# Patient Record
Sex: FEMALE | Race: WHITE | NOT HISPANIC OR LATINO | Employment: OTHER | URBAN - METROPOLITAN AREA
[De-identification: names, ages, dates, MRNs, and addresses within clinical notes are randomized per-mention and may not be internally consistent; named-entity substitution may affect disease eponyms.]

---

## 2017-01-18 DIAGNOSIS — G47.00 PERSISTENT INSOMNIA: ICD-10-CM

## 2017-01-18 DIAGNOSIS — G25.0 FAMILIAL TREMOR: Primary | ICD-10-CM

## 2017-01-19 NOTE — TELEPHONE ENCOUNTER
Pramipexole 0.125 mg     Last Written Prescription Date: 2/16/16  Last Fill Quantity: 180, # refills: 1  [place] [facility] or [facility] prescribing provider: 8/16/16        BP Readings from Last 3 Encounters:   08/16/16 110/66   08/01/16 106/60   06/24/16 140/70       Zolpidem 5 mg      Last Written Prescription Date:  11/29/16  Last Fill Quantity: 30,   # refills: 1  Last Office Visit with [facility] or [facility] prescribing provider: 8/16/16  Future Office visit:       Routing refill request to provider for review/approval because:  Drug not on the [facility], [facility] or [facility] refill protocol or controlled substance

## 2017-01-20 RX ORDER — PRAMIPEXOLE DIHYDROCHLORIDE 0.12 MG/1
TABLET ORAL
Qty: 180 TABLET | Refills: 1 | Status: SHIPPED | OUTPATIENT
Start: 2017-01-20 | End: 2017-07-17

## 2017-01-20 RX ORDER — ZOLPIDEM TARTRATE 5 MG/1
TABLET ORAL
Qty: 30 TABLET | Refills: 1 | Status: SHIPPED | OUTPATIENT
Start: 2017-01-20 | End: 2017-03-12

## 2017-01-27 ENCOUNTER — OFFICE VISIT (OUTPATIENT)
Dept: FAMILY MEDICINE | Facility: CLINIC | Age: 82
End: 2017-01-27
Payer: MEDICARE

## 2017-01-27 VITALS
SYSTOLIC BLOOD PRESSURE: 120 MMHG | BODY MASS INDEX: 21.83 KG/M2 | HEIGHT: 65 IN | DIASTOLIC BLOOD PRESSURE: 72 MMHG | RESPIRATION RATE: 18 BRPM | WEIGHT: 131 LBS | TEMPERATURE: 97.2 F | HEART RATE: 78 BPM

## 2017-01-27 DIAGNOSIS — R10.13 ABDOMINAL PAIN, EPIGASTRIC: ICD-10-CM

## 2017-01-27 DIAGNOSIS — R11.0 NAUSEA: Primary | ICD-10-CM

## 2017-01-27 DIAGNOSIS — R11.0 NAUSEA WITHOUT VOMITING: ICD-10-CM

## 2017-01-27 LAB
ALBUMIN SERPL-MCNC: 3.5 G/DL (ref 3.4–5)
ALP SERPL-CCNC: 100 U/L (ref 40–150)
ALT SERPL W P-5'-P-CCNC: 20 U/L (ref 0–50)
AMYLASE SERPL-CCNC: 23 U/L (ref 30–110)
ANION GAP SERPL CALCULATED.3IONS-SCNC: 7 MMOL/L (ref 3–14)
AST SERPL W P-5'-P-CCNC: 17 U/L (ref 0–45)
BILIRUB SERPL-MCNC: 0.3 MG/DL (ref 0.2–1.3)
BUN SERPL-MCNC: 12 MG/DL (ref 7–30)
CALCIUM SERPL-MCNC: 9.3 MG/DL (ref 8.5–10.1)
CHLORIDE SERPL-SCNC: 104 MMOL/L (ref 94–109)
CO2 SERPL-SCNC: 31 MMOL/L (ref 20–32)
CREAT SERPL-MCNC: 0.75 MG/DL (ref 0.52–1.04)
ERYTHROCYTE [DISTWIDTH] IN BLOOD BY AUTOMATED COUNT: 13.2 % (ref 10–15)
GFR SERPL CREATININE-BSD FRML MDRD: 73 ML/MIN/1.7M2
GLUCOSE SERPL-MCNC: 111 MG/DL (ref 70–99)
HCT VFR BLD AUTO: 45 % (ref 35–47)
HGB BLD-MCNC: 14.3 G/DL (ref 11.7–15.7)
LIPASE SERPL-CCNC: 102 U/L (ref 73–393)
MCH RBC QN AUTO: 31.6 PG (ref 26.5–33)
MCHC RBC AUTO-ENTMCNC: 31.8 G/DL (ref 31.5–36.5)
MCV RBC AUTO: 99 FL (ref 78–100)
PLATELET # BLD AUTO: 323 10E9/L (ref 150–450)
POTASSIUM SERPL-SCNC: 4.2 MMOL/L (ref 3.4–5.3)
PROT SERPL-MCNC: 7.5 G/DL (ref 6.8–8.8)
RBC # BLD AUTO: 4.53 10E12/L (ref 3.8–5.2)
SODIUM SERPL-SCNC: 142 MMOL/L (ref 133–144)
WBC # BLD AUTO: 12.3 10E9/L (ref 4–11)

## 2017-01-27 PROCEDURE — 99213 OFFICE O/P EST LOW 20 MIN: CPT | Performed by: FAMILY MEDICINE

## 2017-01-27 PROCEDURE — 36415 COLL VENOUS BLD VENIPUNCTURE: CPT | Performed by: FAMILY MEDICINE

## 2017-01-27 PROCEDURE — 83690 ASSAY OF LIPASE: CPT | Performed by: FAMILY MEDICINE

## 2017-01-27 PROCEDURE — 85027 COMPLETE CBC AUTOMATED: CPT | Performed by: FAMILY MEDICINE

## 2017-01-27 PROCEDURE — 80053 COMPREHEN METABOLIC PANEL: CPT | Performed by: FAMILY MEDICINE

## 2017-01-27 PROCEDURE — 82150 ASSAY OF AMYLASE: CPT | Performed by: FAMILY MEDICINE

## 2017-01-27 RX ORDER — ONDANSETRON 4 MG/1
4 TABLET, FILM COATED ORAL EVERY 8 HOURS PRN
Qty: 18 TABLET | Refills: 3 | Status: SHIPPED | OUTPATIENT
Start: 2017-01-27 | End: 2017-02-01

## 2017-01-27 NOTE — MR AVS SNAPSHOT
"              After Visit Summary   2017    Margy Babin    MRN: [id number]           Patient Information     Date Of Birth          10/2/1929        Visit Information        Provider Department      2017 9:15 AM Kike Nelson MD [facility]        Today's Diagnoses     Nausea    -  1     Nausea without vomiting         Abdominal pain, epigastric            Follow-ups after your visit        Who to contact     If you have questions or need follow up information about today's clinic visit or your schedule please contact [facility] directly at [phone].  Normal or non-critical lab and imaging results will be communicated to you by ChipCarehart, letter or phone within 4 business days after the clinic has received the results. If you do not hear from us within 7 days, please contact the clinic through [employer]Care[employer] or phone. If you have a critical or abnormal lab result, we will notify you by phone as soon as possible.  Submit refill requests through [employer]ify or call your pharmacy and they will forward the refill request to us. Please allow 3 business days for your refill to be completed.          Additional Information About Your Visit        MyChart Information     V[employer]ify lets you send messages to your doctor, view your test results, renew your prescriptions, schedule appointments and more. To sign up, go to [email or web address] . Click on \"Log in\" on the left side of the screen, which will take you to the Welcome page. Then click on \"Sign up Now\" on the right side of the page.     You will be asked to enter the access code listed below, as well as some personal information. Please follow the directions to create your username and password.     Your access code is: [email or web address]  Expires: 2017  9:26 AM     Your access code will  in 90 days. If you need help or a new code, please call your [facility] or [phone].      " "  Care EveryWhere ID     This is your Care EveryWhere ID. This could be used by other organizations to access your [place] medical records  [id number]        Your Vitals Were     Pulse Temperature Respirations Height BMI (Body Mass Index)       78 97.2  F (36.2  C) (Tympanic) 18 5' 5\" (1.651 m) 21.80 kg/m2        Blood Pressure from Last 3 Encounters:   01/27/17 120/72   08/16/16 110/66   08/01/16 106/60    Weight from Last 3 Encounters:   01/27/17 131 lb (59.421 kg)   08/16/16 139 lb (63.05 kg)   06/24/16 142 lb (64.411 kg)              We Performed the Following     Amylase     CBC with platelets     Comprehensive metabolic panel (BMP + Alb, Alk Phos, ALT, AST, Total. Bili, TP)     Lipase          Today's Medication Changes          These changes are accurate as of: 1/27/17  9:26 AM.  If you have any questions, ask your nurse or doctor.               Start taking these medicines.        Dose/Directions    ondansetron 4 MG tablet   Commonly known as:  ZOFRAN   Used for:  Nausea   Started by:  Kike Nelson MD        Dose:  4 mg   Take 1 tablet (4 mg) by mouth every 8 hours as needed for nausea   Quantity:  18 tablet   Refills:  3         Stop taking these medicines if you haven't already. Please contact your care team if you have questions.     prochlorperazine 10 MG tablet   Commonly known as:  COMPAZINE   Stopped by:  Kike Nelson MD                Where to get your medicines      These medications were sent to [facility] [address] [facility] [address]  [address] [facility] [address]     Phone:  [phone]    - ondansetron 4 MG tablet             Primary Care Provider Office Phone # Fax #    Yaya Hatfield [phone]       [facility] [address] [facility] [address]        Thank you!     Thank you for choosing [facility]  for your care. Our goal is always to provide you with excellent care. Hearing back " from our patients is one way we can continue to improve our services. Please take a few minutes to complete the written survey that you may receive in the mail after your visit with us. Thank you!             Your Updated Medication List - Protect others around you: Learn how to safely use, store and throw away your medicines at www.disposemymeds.org.          This list is accurate as of: 1/27/17  9:26 AM.  Always use your most recent med list.                   Brand Name Dispense Instructions for use    acetaminophen 500 MG tablet    TYLENOL    100 tablet    Take 2 tablets (1,000 mg) by mouth 3 times daily as needed for mild pain       ALPRAZolam 0.25 MG tablet    XANAX    60 tablet    TAKE 1-2 TABLETS BY MOUTH TWICE DAILY AS NEEDED.NOT TO EXCEED 2 ADDITIONAL FILLS BEFORE 12/25/2016       ascorbic acid 1000 MG Tabs    vitamin C     Take 1,000 mg by mouth daily.       aspirin 81 MG tablet      Take 1 tablet by mouth daily.       CALCIUM 600 PO      Take 1 tablet by mouth daily.       erythromycin with ethanol 2 % ophthalmic solution    THERAMYCIN     Apply topically daily       fish oil-omega-3 fatty acids 1000 MG capsule      Take 2 g by mouth daily.       GLUCOSAMINE CHOND COMPLEX/MSM Tabs      Take 1 tablet by mouth daily.       MULTIVITAL Tabs      Take 1 tablet by mouth daily.       omeprazole 40 MG capsule    priLOSEC    30 capsule    Take 1 capsule (40 mg) by mouth daily Take 30-60 minutes before a meal.       ondansetron 4 MG tablet    ZOFRAN    18 tablet    Take 1 tablet (4 mg) by mouth every 8 hours as needed for nausea       oxybutynin chloride 15 MG Tb24     90 tablet    TAKE ONE TABLET BY MOUTH ONE TIME DAILY       PARoxetine 20 MG tablet    PAXIL    90 tablet    TAKE ONE TABLET BY MOUTH AT BEDTIME       pramipexole 0.125 MG tablet    MIRAPEX    180 tablet    TAKE ONE TABLET BY MOUTH TWICE DAILY       propranolol 120 MG 24 hr capsule    INDERAL LA    180 capsule    TAKE TWO CAPSULES BY MOUTH DAILY        RESTASIS 0.05 % ophthalmic emulsion   Generic drug:  cycloSPORINE      Apply 1 drop to eye 2 times daily       THERMACARE ARTHRITIS NECK Misc      1 patch daily as needed       vitamin D 1000 UNITS capsule      Take 1 capsule by mouth daily.       zolpidem 5 MG tablet    AMBIEN    30 tablet    TAKE 1 TABLET BY MOUTH NIGHTLY AS NEEDED

## 2017-01-27 NOTE — PROGRESS NOTES
"  SUBJECTIVE:                                                    Margy Babin is a 87 year old female who presents to clinic today for the following health issues:      Gastrointestinal symptoms      Duration: 5 weeks    Description:           No pain just nausea    Intensity:  moderate    Accompanying signs and symptoms:  nausea    History  Previous similar problem: has had intermittent nausea in past  Previous evaluation:  none    Aggravating factors: none    Alleviating factors: nothing    Other Therapies tried: None           Problem list and histories reviewed & adjusted, as indicated.  Additional history: as documented    Labs reviewed in EPIC  Problem list, Medication list, Allergies, and Medical/Social/Surgical histories reviewed in [facility] and updated as appropriate.    ROS:  CONSTITUTIONAL:NEGATIVE for fever, chills, change in weight  RESP:NEGATIVE for significant cough or SOB  CV: NEGATIVE for chest pain, palpitations or peripheral edema  GI: POSITIVE for nausea  NEURO: POSITIVE for tremor no change    OBJECTIVE:                                                    /72 mmHg  Pulse 78  Temp(Src) 97.2  F (36.2  C) (Tympanic)  Resp 18  Ht 5' 5\" (1.651 m)  Wt 131 lb (59.421 kg)  BMI 21.80 kg/m2  Body mass index is 21.8 kg/(m^2).  GENERAL APPEARANCE: healthy, alert and no distress  RESP: lungs clear to auscultation - no rales, rhonchi or wheezes  CV: regular rates and rhythm, normal S1 S2, no S3 or S4 and no murmur, click or rub  ABDOMEN: soft, nontender, without hepatosplenomegaly or masses and bowel sounds normal  NEURO: Normal strength and tone, mentation intact, speech normal and tremor upper extremity and neck tremor    Diagnostic test results:  Lab: see below, results pending     ASSESSMENT/PLAN:                                                        ICD-10-CM    1. Nausea R11.0 ondansetron (ZOFRAN) 4 MG tablet   2. Nausea without vomiting R11.0 CBC with platelets     Comprehensive " metabolic panel (BMP + Alb, Alk Phos, ALT, AST, Total. Bili, TP)     Lipase     Amylase   3. Abdominal pain, epigastric R10.13        Follow up with Provider - 1 mo or as needed if not improving   Patient Instructions   Eat small amounts, drink fluids to keep hydrated        Kike Nelson MD  [facility]

## 2017-01-27 NOTE — NURSING NOTE
"Chief Complaint   Patient presents with     Gastrointestinal Problem       Initial /72 mmHg  Pulse 78  Temp(Src) 97.2  F (36.2  C) (Tympanic)  Resp 18  Ht 5' 5\" (1.651 m)  Wt 131 lb (59.421 kg)  BMI 21.80 kg/m2 Estimated body mass index is 21.8 kg/(m^2) as calculated from the following:    Height as of this encounter: 5' 5\" (1.651 m).    Weight as of this encounter: 131 lb (59.421 kg).  BP completed using cuff size: rory Davidson CMA      "

## 2017-01-27 NOTE — Clinical Note
[facility]  [address]  [address]  [place] [address] [phone]                                                                                                           Margy Babin  [address]   [facility] [address]    January 28, 2017      Dear Margy,    The results of your recent tests were reviewed and are enclosed.     Low amylase not significant.  No evidence for inflammation of pancrease  Lipase normal  Metabolic panel shows mild elevation of glucose but not significant since not fasting  CBC shows mild elevation of white blood count  Results for orders placed or performed in visit on 01/27/17   CBC with platelets   Result Value Ref Range    WBC 12.3 (H) 4.0 - 11.0 10e9/L    RBC Count 4.53 3.8 - 5.2 10e12/L    Hemoglobin 14.3 11.7 - 15.7 g/dL    Hematocrit 45.0 35.0 - 47.0 %    MCV 99 78 - 100 fl    MCH 31.6 26.5 - 33.0 pg    MCHC 31.8 31.5 - 36.5 g/dL    RDW 13.2 10.0 - 15.0 %    Platelet Count 323 150 - 450 10e9/L   Comprehensive metabolic panel (BMP + Alb, Alk Phos, ALT, AST, Total. Bili, TP)   Result Value Ref Range    Sodium 142 133 - 144 mmol/L    Potassium 4.2 3.4 - 5.3 mmol/L    Chloride 104 94 - 109 mmol/L    Carbon Dioxide 31 20 - 32 mmol/L    Anion Gap 7 3 - 14 mmol/L    Glucose 111 (H) 70 - 99 mg/dL    Urea Nitrogen 12 7 - 30 mg/dL    Creatinine 0.75 0.52 - 1.04 mg/dL    GFR Estimate 73 >60 mL/min/1.7m2    GFR Estimate If Black 89 >60 mL/min/1.7m2    Calcium 9.3 8.5 - 10.1 mg/dL    Bilirubin Total 0.3 0.2 - 1.3 mg/dL    Albumin 3.5 3.4 - 5.0 g/dL    Protein Total 7.5 6.8 - 8.8 g/dL    Alkaline Phosphatase 100 40 - 150 U/L    ALT 20 0 - 50 U/L    AST 17 0 - 45 U/L   Lipase   Result Value Ref Range    Lipase 102 73 - 393 U/L   Amylase   Result Value Ref Range    Amylase 23 (L) 30 - 110 U/L         Thank you for choosing [facility].  We appreciate the opportunity to serve you and look  forward to supporting your healthcare needs in the future.    If you have any questions or concerns, please call me or my staff at [phone].      Sincerely,    Kike Nelson MD

## 2017-02-01 ENCOUNTER — OFFICE VISIT (OUTPATIENT)
Dept: FAMILY MEDICINE | Facility: CLINIC | Age: 82
End: 2017-02-01
Payer: MEDICARE

## 2017-02-01 VITALS
OXYGEN SATURATION: 96 % | TEMPERATURE: 97.2 F | SYSTOLIC BLOOD PRESSURE: 134 MMHG | DIASTOLIC BLOOD PRESSURE: 78 MMHG | HEART RATE: 89 BPM | WEIGHT: 128.5 LBS | BODY MASS INDEX: 21.41 KG/M2 | HEIGHT: 65 IN

## 2017-02-01 DIAGNOSIS — R11.0 NAUSEA: Primary | ICD-10-CM

## 2017-02-01 DIAGNOSIS — R10.13 EPIGASTRIC PAIN: ICD-10-CM

## 2017-02-01 DIAGNOSIS — R11.0 NAUSEA WITHOUT VOMITING: ICD-10-CM

## 2017-02-01 PROCEDURE — 99214 OFFICE O/P EST MOD 30 MIN: CPT | Performed by: FAMILY MEDICINE

## 2017-02-01 RX ORDER — OMEPRAZOLE 40 MG/1
40 CAPSULE, DELAYED RELEASE ORAL DAILY
Qty: 30 CAPSULE | Refills: 1 | Status: SHIPPED | OUTPATIENT
Start: 2017-02-01 | End: 2017-02-02

## 2017-02-01 NOTE — PATIENT INSTRUCTIONS
Will send to [facility] for a consult. Restart omeprazole daily.  The patient was very unclear which medicines she was taking.  I was under the impression that she had stopped the omeprazole.  At the end of the visit we decided to restart that.  When she gets home she will call us with her medication pill bottle instructions so we can make sure her medication list is accurate.  Her Compazine needs to be refilled at some point.  It's unclear to me how many tablets she has left at present.  She will add fiber to her diet.  Because of the uncertainty of her medications I did not change anything else at present.  She will follow-up with me on an as-needed basis.

## 2017-02-01 NOTE — MR AVS SNAPSHOT
After Visit Summary   2/1/2017    Margy Babin    MRN: [id number]           Patient Information     Date Of Birth          10/2/1929        Visit Information        Provider Department      2/1/2017 3:00 PM Yaya Hatfield MD [facility]        Today's Diagnoses     Nausea    -  1     Epigastric pain         Nausea without vomiting           Care Instructions    Will send to [facility] for a consult. Restart omeprazole daily.        Follow-ups after your visit        Additional Services     GASTROENTEROLOGY ADULT REF CONSULT ONLY       Preferred Location: [facility] [phone]      Please be aware that coverage of these services is subject to the terms and limitations of your health insurance plan.  Call member services at your health plan with any benefit or coverage questions.  Any procedures must be performed at a [facility] facility OR coordinated by your clinic's referral office.    Please bring the following with you to your appointment:    (1) Any X-Rays, CTs or MRIs which have been performed.  Contact the facility where they were done to arrange for  prior to your scheduled appointment.    (2) List of current medications   (3) This referral request   (4) Any documents/labs given to you for this referral                  Who to contact     If you have questions or need follow up information about today's clinic visit or your schedule please contact [facility] directly at [phone].  Normal or non-critical lab and imaging results will be communicated to you by MyChart, letter or phone within 4 business days after the clinic has received the results. If you do not hear from us within 7 days, please contact the clinic through MyChart or phone. If you have a critical or abnormal lab result, we will notify you by phone as soon as possible.  Submit refill requests through [email or web address] or call your pharmacy and they will  "forward the refill request to us. Please allow 3 business days for your refill to be completed.          Additional Information About Your Visit        FlxOneharGreenLight Information     [employer] lets you send messages to your doctor, view your test results, renew your prescriptions, schedule appointments and more. To sign up, go to [email or web address] . Click on \"Log in\" on the left side of the screen, which will take you to the Welcome page. Then click on \"Sign up Now\" on the right side of the page.     You will be asked to enter the access code listed below, as well as some personal information. Please follow the directions to create your username and password.     Your access code is: [email or web address]  Expires: 2017  9:26 AM     Your access code will  in 90 days. If you need help or a new code, please call your [place] clinic or [phone].        Care EveryWhere ID     This is your Care EveryWhere ID. This could be used by other organizations to access your [place] medical records  [id number]        Your Vitals Were     Pulse Temperature Height BMI (Body Mass Index) Pulse Oximetry       89 97.2  F (36.2  C) (Tympanic) 5' 5\" (1.651 m) 21.38 kg/m2 96%        Blood Pressure from Last 3 Encounters:   17 134/78   17 120/72   16 110/66    Weight from Last 3 Encounters:   17 128 lb 8 oz (58.287 kg)   17 131 lb (59.421 kg)   16 139 lb (63.05 kg)              We Performed the Following     GASTROENTEROLOGY ADULT REF CONSULT ONLY          Where to get your medicines      These medications were sent to [facility] [address] [place] [facility]  [address] [facility] [address]     Phone:  [phone]    - omeprazole 40 MG capsule       Primary Care Provider Office Phone # Fax #    Yaya Hatfield [phone]       [facility] [address]  [facility] [address]        Thank you!     Thank you for choosing [facility] " [facility]  for your care. Our goal is always to provide you with excellent care. Hearing back from our patients is one way we can continue to improve our services. Please take a few minutes to complete the written survey that you may receive in the mail after your visit with us. Thank you!             Your Updated Medication List - Protect others around you: Learn how to safely use, store and throw away your medicines at www.disposemymeds.org.          This list is accurate as of: 2/1/17  3:58 PM.  Always use your most recent med list.                   Brand Name Dispense Instructions for use    acetaminophen 500 MG tablet    TYLENOL    100 tablet    Take 2 tablets (1,000 mg) by mouth 3 times daily as needed for mild pain       ALPRAZolam 0.25 MG tablet    XANAX    60 tablet    TAKE 1-2 TABLETS BY MOUTH TWICE DAILY AS NEEDED.NOT TO EXCEED 2 ADDITIONAL FILLS BEFORE 12/25/2016       ascorbic acid 1000 MG Tabs    vitamin C     Take 1,000 mg by mouth daily.       aspirin 81 MG tablet      Take 1 tablet by mouth daily.       CALCIUM 600 PO      Take 1 tablet by mouth daily.       erythromycin with ethanol 2 % ophthalmic solution    THERAMYCIN     Apply topically daily       fish oil-omega-3 fatty acids 1000 MG capsule      Take 2 g by mouth daily.       GLUCOSAMINE CHOND COMPLEX/MSM Tabs      Take 1 tablet by mouth daily.       MULTIVITAL Tabs      Take 1 tablet by mouth daily.       omeprazole 40 MG capsule    priLOSEC    30 capsule    Take 1 capsule (40 mg) by mouth daily Take 30-60 minutes before a meal.       oxybutynin chloride 15 MG Tb24     90 tablet    TAKE ONE TABLET BY MOUTH ONE TIME DAILY       PARoxetine 20 MG tablet    PAXIL    90 tablet    TAKE ONE TABLET BY MOUTH AT BEDTIME       pramipexole 0.125 MG tablet    MIRAPEX    180 tablet    TAKE ONE TABLET BY MOUTH TWICE DAILY       propranolol 120 MG 24 hr capsule    INDERAL LA    180 capsule    TAKE TWO CAPSULES BY MOUTH DAILY       RESTASIS  0.05 % ophthalmic emulsion   Generic drug:  cycloSPORINE      Apply 1 drop to eye 2 times daily       TOBRADEX ophthalmic ointment   Generic drug:  tobramycin-dexamethasone          vitamin D 1000 UNITS capsule      Take 1 capsule by mouth daily.       zolpidem 5 MG tablet    AMBIEN    30 tablet    TAKE 1 TABLET BY MOUTH NIGHTLY AS NEEDED

## 2017-02-01 NOTE — PROGRESS NOTES
"  SUBJECTIVE:                                                    Margy Babin is a 87 year old female who presents to clinic today for the following health issues:      Gastrointestinal symptoms      Duration: 6 weeks Patient saw Dr. Nelson on 1/27    Description:           No pain just nausea     Intensity:  moderate, severe    Accompanying signs and symptoms:  nausea, loose stools and constipation and appetite decrease and only eats sandwich      History  Previous {similar problem: YES  Previous evaluation:  none    Aggravating factors: poor diet     Alleviating factors: nothing    Other Therapies tried:  Zofran no help            Problem list and histories reviewed & adjusted, as indicated.  Additional history: as documented    Problem list, Medication list, Allergies, and Medical/Social/Surgical histories reviewed in EPIC and updated as appropriate.    ROS:  Constitutional, neuro, ENT, endocrine, pulmonary, cardiac, gastrointestinal, genitourinary, musculoskeletal, integument and psychiatric systems are negative, except as otherwise noted.    OBJECTIVE:                                                    /78 mmHg  Pulse 89  Temp(Src) 97.2  F (36.2  C) (Tympanic)  Ht 5' 5\" (1.651 m)  Wt 128 lb 8 oz (58.287 kg)  BMI 21.38 kg/m2  SpO2 96%  Body mass index is 21.38 kg/(m^2).  GENERAL APPEARANCE: healthy, alert and no distress  RESP: lungs clear to auscultation - no rales, rhonchi or wheezes  CV: regular rates and rhythm, normal S1 S2, no S3 or S4 and no murmur, click or rub  ABDOMEN: soft, nontender, without hepatosplenomegaly or masses and bowel sounds normal  NEURO: marked tremors, unchanged.       ASSESSMENT/PLAN:                                                        ICD-10-CM    1. Nausea R11.0 GASTROENTEROLOGY ADULT REF CONSULT ONLY     omeprazole (PRILOSEC) 40 MG capsule     prochlorperazine (COMPAZINE) 10 MG tablet   2. Epigastric pain R10.13 omeprazole (PRILOSEC) 40 MG capsule     " DISCONTINUED: omeprazole (PRILOSEC) 40 MG capsule   3. Nausea without vomiting R11.0 prochlorperazine (COMPAZINE) 10 MG tablet     DISCONTINUED: omeprazole (PRILOSEC) 40 MG capsule       Patient Instructions   Will send to [facility] for a consult. Restart omeprazole daily.  The patient was very unclear which medicines she was taking.  I was under the impression that she had stopped the omeprazole.  At the end of the visit we decided to restart that.  When she gets home she will call us with her medication pill bottle instructions so we can make sure her medication list is accurate.  Her Compazine needs to be refilled at some point.  It's unclear to me how many tablets she has left at present.  She will add fiber to her diet.  Because of the uncertainty of her medications I did not change anything else at present.  She will follow-up with me on an as-needed basis.        Yaya Hatfield MD  [facility]

## 2017-02-01 NOTE — NURSING NOTE
"No chief complaint on file.      Initial /78 mmHg  Pulse 89  Temp(Src) 97.2  F (36.2  C) (Tympanic)  Ht 5' 5\" (1.651 m)  Wt 128 lb 8 oz (58.287 kg)  BMI 21.38 kg/m2  SpO2 96% Estimated body mass index is 21.38 kg/(m^2) as calculated from the following:    Height as of this encounter: 5' 5\" (1.651 m).    Weight as of this encounter: 128 lb 8 oz (58.287 kg).  BP completed using cuff size: regular  "

## 2017-02-02 ENCOUNTER — TELEPHONE (OUTPATIENT)
Dept: FAMILY MEDICINE | Facility: CLINIC | Age: 82
End: 2017-02-02

## 2017-02-02 DIAGNOSIS — R11.0 NAUSEA: Primary | ICD-10-CM

## 2017-02-02 RX ORDER — PROCHLORPERAZINE MALEATE 10 MG
10 TABLET ORAL EVERY 6 HOURS PRN
Qty: 1 TABLET | Refills: 0 | COMMUNITY
Start: 2017-02-02 | End: 2017-06-06

## 2017-02-02 RX ORDER — PROCHLORPERAZINE MALEATE 10 MG
10 TABLET ORAL EVERY 6 HOURS PRN
Qty: 20 TABLET | Refills: 5
Start: 2017-02-02 | End: 2017-06-19

## 2017-02-02 RX ORDER — OMEPRAZOLE 40 MG/1
40 CAPSULE, DELAYED RELEASE ORAL DAILY
Qty: 30 CAPSULE | Refills: 5
Start: 2017-02-02 | End: 2018-01-09

## 2017-02-02 NOTE — TELEPHONE ENCOUNTER
Reason for Call:  Pt said Dr Hatfield wanted her to call in and go over her meds with the nurse    Detailed comments: na    Phone Number Patient can be reached at: Home number on file [phone] (home)    Best Time: any    Can we leave a detailed message on this number? YES    Call taken on 2/2/2017 at 9:34 AM by FABIOLA DUBON

## 2017-02-02 NOTE — TELEPHONE ENCOUNTER
Patient was called medication list was updated.   Pt is requesting updated medication list be sent to provider.

## 2017-03-12 DIAGNOSIS — G47.00 PERSISTENT INSOMNIA: ICD-10-CM

## 2017-03-13 RX ORDER — ZOLPIDEM TARTRATE 5 MG/1
TABLET ORAL
Qty: 30 TABLET | Refills: 1 | Status: SHIPPED | OUTPATIENT
Start: 2017-03-13 | End: 2017-05-16

## 2017-03-13 NOTE — TELEPHONE ENCOUNTER
zolpidem (AMBIEN) 5 MG tablet  Last Written Prescription Date:  01/20/2017  Last Fill Quantity: 30,   # refills: 1  Last Office Visit with [facility] or [facility] prescribing provider: 02/01/2017  Future Office visit:       Routing refill request to provider for review/approval because:  Drug not on the [facility], [facility] or [facility] refill protocol or controlled substance

## 2017-03-14 DIAGNOSIS — R10.13 EPIGASTRIC PAIN: ICD-10-CM

## 2017-03-14 DIAGNOSIS — R11.0 NAUSEA WITHOUT VOMITING: ICD-10-CM

## 2017-03-15 RX ORDER — OMEPRAZOLE 40 MG/1
CAPSULE, DELAYED RELEASE ORAL
Qty: 30 CAPSULE | Refills: 1 | OUTPATIENT
Start: 2017-03-15

## 2017-03-15 NOTE — TELEPHONE ENCOUNTER
omeprazole      Last Written Prescription Date: 02/02/17  Last Fill Quantity: 30,  # refills: 5   Last Office Visit with [facility] or [facility] prescribing provider: 02/01/17    Duplicate request- should have refills  Vickie Johnson RN  [facility]  [phone]

## 2017-04-10 DIAGNOSIS — F41.9 ANXIETY: ICD-10-CM

## 2017-04-10 RX ORDER — ALPRAZOLAM 0.25 MG
TABLET ORAL
Qty: 60 TABLET | Refills: 1 | Status: SHIPPED | OUTPATIENT
Start: 2017-04-10 | End: 2017-06-06

## 2017-04-10 NOTE — TELEPHONE ENCOUNTER
alprazolam      Last Written Prescription Date:  12-22-16  Last Fill Quantity: 60,   # refills: 1  Last Office Visit with [facility], [facility] or  Health prescribing provider: 2-1-17  Future Office visit:       Routing refill request to provider for review/approval because:  Drug not on the [facility], [facility] or  Health refill protocol or controlled substance

## 2017-05-16 DIAGNOSIS — G47.00 PERSISTENT INSOMNIA: ICD-10-CM

## 2017-05-17 RX ORDER — ZOLPIDEM TARTRATE 5 MG/1
TABLET ORAL
Qty: 30 TABLET | Refills: 5 | Status: ON HOLD | OUTPATIENT
Start: 2017-05-17 | End: 2017-06-28

## 2017-05-17 NOTE — TELEPHONE ENCOUNTER
Controlled Substance Refill Request for AMbien  Problem List Complete:  No     PROVIDER TO CONSIDER COMPLETION OF PROBLEM LIST AND OVERVIEW/CONTROLLED SUBSTANCE AGREEMENT    Last Written Prescription Date:  3-13-17  Last Fill Quantity: 30,   # refills: 1    [place] with [facility] [place] provider: 2-1-17    Future Office visit:     Controlled substance agreement on file: No.     Processing:  Fax Rx to [facility] pharmacy   checked in past 6 months?  No, route to RN     RX [place] ([place]) reviewed:  reviewed- no concerns    [place] profile:  [email or web address]

## 2017-05-30 DIAGNOSIS — F33.42 MAJOR DEPRESSIVE DISORDER, RECURRENT EPISODE, IN FULL REMISSION (H): ICD-10-CM

## 2017-05-31 RX ORDER — PAROXETINE 20 MG/1
TABLET, FILM COATED ORAL
Qty: 90 TABLET | Refills: 0 | Status: SHIPPED | OUTPATIENT
Start: 2017-05-31 | End: 2017-06-06

## 2017-05-31 NOTE — TELEPHONE ENCOUNTER
PAROXETINE HCL 20 MG TABLET     Last Written Prescription Date: 12/12/16  Last Fill Quantity: 90, # refills: 2  Last Office Visit with [facility] primary care provider:  02/01/17        Last PHQ-9 score on record=   PHQ-9 SCORE 12/12/2016   Total Score -   Total Score 1

## 2017-06-06 ENCOUNTER — OFFICE VISIT (OUTPATIENT)
Dept: FAMILY MEDICINE | Facility: CLINIC | Age: 82
End: 2017-06-06
Payer: MEDICARE

## 2017-06-06 VITALS
WEIGHT: 133 LBS | TEMPERATURE: 97 F | RESPIRATION RATE: 16 BRPM | SYSTOLIC BLOOD PRESSURE: 130 MMHG | BODY MASS INDEX: 22.13 KG/M2 | HEART RATE: 64 BPM | DIASTOLIC BLOOD PRESSURE: 70 MMHG

## 2017-06-06 DIAGNOSIS — H61.23 BILATERAL IMPACTED CERUMEN: Primary | ICD-10-CM

## 2017-06-06 DIAGNOSIS — G25.0 FAMILIAL TREMOR: ICD-10-CM

## 2017-06-06 DIAGNOSIS — F41.9 ANXIETY: ICD-10-CM

## 2017-06-06 PROCEDURE — 99213 OFFICE O/P EST LOW 20 MIN: CPT | Performed by: FAMILY MEDICINE

## 2017-06-06 NOTE — MR AVS SNAPSHOT
"              After Visit Summary   6/6/2017    Margy Babin    MRN: [id number]           Patient Information     Date Of Birth          10/2/1929        Visit Information        Provider Department      6/6/2017 2:30 PM Yaya Hatfield MD [facility]        Today's Diagnoses     Bilateral impacted cerumen    -  1    Familial tremor          Care Instructions    Patient's symptoms completely resolved after bilateral ear wash.  Her hearing was back to normal.          Follow-ups after your visit        Who to contact     If you have questions or need follow up information about today's clinic visit or your schedule please contact [facility] directly at [phone].  Normal or non-critical lab and imaging results will be communicated to you by MyChart, letter or phone within 4 business days after the clinic has received the results. If you do not hear from us within 7 days, please contact the clinic through MyChart or phone. If you have a critical or abnormal lab result, we will notify you by phone as soon as possible.  Submit refill requests through [employer] or call your pharmacy and they will forward the refill request to us. Please allow 3 business days for your refill to be completed.          Additional Information About Your Visit        MyChart Information     [employer] lets you send messages to your doctor, view your test results, renew your prescriptions, schedule appointments and more. To sign up, go to [email or web address] . Click on \"Log in\" on the left side of the screen, which will take you to the Welcome page. Then click on \"Sign up Now\" on the right side of the page.     You will be asked to enter the access code listed below, as well as some personal information. Please follow the directions to create your username and password.     Your access code is: [email or web address]  Expires: 9/4/2017  3:07 PM     Your access code will "  in 90 days. If you need help or a new code, please call your [facility] clinic or [phone].        Care EveryWhere ID     This is your Care EveryWhere ID. This could be used by other organizations to access your [facility] medical records  [id number]        Your Vitals Were     Pulse Temperature Respirations BMI (Body Mass Index)          64 97  F (36.1  C) (Tympanic) 16 22.13 kg/m2         Blood Pressure from Last 3 Encounters:   17 130/70   17 134/78   17 120/72    Weight from Last 3 Encounters:   17 133 lb (60.3 kg)   17 128 lb 8 oz (58.3 kg)   17 131 lb (59.4 kg)              Today, you had the following     No orders found for display         Today's Medication Changes          These changes are accurate as of: 17 11:59 PM.  If you have any questions, ask your nurse or doctor.               These medicines have changed or have updated prescriptions.        Dose/Directions    ALPRAZolam 0.25 MG tablet   Commonly known as:  XANAX   This may have changed:  See the new instructions.   Used for:  Anxiety   Changed by:  Kaylah Medeiros PA-C        TAKE 1 TO 2 TABLETS BY MOUTH TWICE DAILY AS NEEDED   Quantity:  60 tablet   Refills:  1            Where to get your medicines      Some of these will need a paper prescription and others can be bought over the counter.  Ask your nurse if you have questions.     Bring a paper prescription for each of these medications     ALPRAZolam 0.25 MG tablet                Primary Care Provider Office Phone # Fax #    Yaya Hatfield [phone]       [facility] [address] [facility] [address]        Thank you!     Thank you for choosing [facility]  for your care. Our goal is always to provide you with excellent care. Hearing back from our patients is one way we can continue to improve our services. Please take a few minutes to complete the written  survey that you may receive in the mail after your visit with us. Thank you!             Your Updated Medication List - Protect others around you: Learn how to safely use, store and throw away your medicines at www.disposemymeds.org.          This list is accurate as of: 6/6/17 11:59 PM.  Always use your most recent med list.                   Brand Name Dispense Instructions for use    acetaminophen 500 MG tablet    TYLENOL    100 tablet    Take 2 tablets (1,000 mg) by mouth 3 times daily as needed for mild pain       ALPRAZolam 0.25 MG tablet    XANAX    60 tablet    TAKE 1 TO 2 TABLETS BY MOUTH TWICE DAILY AS NEEDED       ascorbic acid 1000 MG Tabs    vitamin C     Take 1,000 mg by mouth daily.       aspirin 81 MG tablet      Take 1 tablet by mouth daily.       CALCIUM 600 PO      Take 1 tablet by mouth daily.       erythromycin with ethanol 2 % ophthalmic solution    THERAMYCIN     Apply topically daily       fish oil-omega-3 fatty acids 1000 MG capsule      Take 2 g by mouth daily.       GLUCOSAMINE CHOND COMPLEX/MSM Tabs      Take 1 tablet by mouth daily.       MULTIVITAL Tabs      Take 1 tablet by mouth daily.       omeprazole 40 MG capsule    priLOSEC    30 capsule    Take 1 capsule (40 mg) by mouth daily Take 30-60 minutes before a meal.       oxybutynin chloride 15 MG Tb24     90 tablet    TAKE ONE TABLET BY MOUTH ONE TIME DAILY       PARoxetine 20 MG tablet    PAXIL    90 tablet    TAKE ONE TABLET BY MOUTH AT BEDTIME       pramipexole 0.125 MG tablet    MIRAPEX    180 tablet    TAKE ONE TABLET BY MOUTH TWICE DAILY       prochlorperazine 10 MG tablet    COMPAZINE    20 tablet    Take 1 tablet (10 mg) by mouth every 6 hours as needed for nausea or vomiting       propranolol 120 MG 24 hr capsule    INDERAL LA    180 capsule    TAKE TWO CAPSULES BY MOUTH DAILY       RESTASIS 0.05 % ophthalmic emulsion   Generic drug:  cycloSPORINE      Apply 1 drop to eye 2 times daily       TOBRADEX ophthalmic ointment    Generic drug:  tobramycin-dexamethasone          vitamin D 1000 UNITS capsule      Take 1 capsule by mouth daily.       zolpidem 5 MG tablet    AMBIEN    30 tablet    TAKE ONE TABLET BY MOUTH NIGHTLY AS NEEDED

## 2017-06-06 NOTE — PROGRESS NOTES
SUBJECTIVE:                                                    Margy Babin is a 87 year old female who presents to clinic today for the following health issues:      Ear Problem      Duration: 1 week    Description (location/character/radiation): rt ear and somewhat on the left    Intensity:  mild    Accompanying signs and symptoms: feels plugged    History (similar episodes/previous evaluation): None    Precipitating or alleviating factors: None    Therapies tried and outcome: None           Problem list and histories reviewed & adjusted, as indicated.  Additional history: as documented    Patient Active Problem List   Diagnosis     Anxiety     Familial tremor     Dry eye syndrome     Blepharitis of both eyes     Depression, major, recurrent, in complete remission (H)     Persistent insomnia     Urge incontinence of urine     Nausea without vomiting     Abdominal pain, epigastric     ACP (advance care planning)     Neck pain     Left knee pain     Past Surgical History:   Procedure Laterality Date     C TOTAL HIP ARTHROPLASTY Right      HYSTERECTOMY TOTAL ABDOMINAL, BILATERAL SALPINGO-OOPHORECTOMY, COMBINED       NECK SURGERY  2009     ROTATOR CUFF REPAIR RT/LT Right        Social History   Substance Use Topics     Smoking status: Never Smoker     Smokeless tobacco: Never Used     Alcohol use No     Family History   Problem Relation Age of Onset     HEART DISEASE Father 80     MI     C.A.D. Father      Breast Cancer Daughter 43      age 53           Reviewed and updated as needed this visit by clinical staff  Tobacco  Allergies  Meds  Med Hx  Surg Hx  Fam Hx  Soc Hx      Reviewed and updated as needed this visit by Provider         ROS:  CONSTITUTIONAL:NEGATIVE for fever, chills, change in weight  ENT/MOUTH: POSITIVE for hearing loss  NEURO: POSITIVE for tremor     OBJECTIVE:                                                    /70  Pulse 64  Temp 97  F (36.1  C) (Tympanic)   Resp 16  Wt 133 lb (60.3 kg)  BMI 22.13 kg/m2  Body mass index is 22.13 kg/(m^2).  GENERAL APPEARANCE: healthy, alert and no distress  HENT: ear canals and TM's normal and nose and mouth without ulcers or lesions after bilateral ear wash  NEURO: Normal strength and tone, mentation intact, speech normal and tremor          ASSESSMENT/PLAN:                                                        ICD-10-CM    1. Bilateral impacted cerumen H61.23    2. Familial tremor G25.0        Patient Instructions   Patient's symptoms completely resolved after bilateral ear wash.  Her hearing was back to normal.      Yaya Hatfield MD  [facility]

## 2017-06-06 NOTE — NURSING NOTE
"Chief Complaint   Patient presents with     Ear Problem     RT ear       Initial /70  Pulse 64  Temp 97  F (36.1  C) (Tympanic)  Resp 16  Wt 133 lb (60.3 kg)  BMI 22.13 kg/m2 Estimated body mass index is 22.13 kg/(m^2) as calculated from the following:    Height as of 2/1/17: 5' 5\" (1.651 m).    Weight as of this encounter: 133 lb (60.3 kg).  Medication Reconciliation: complete     Ana Laura Davidson CMA      "

## 2017-06-07 NOTE — TELEPHONE ENCOUNTER
ALPRAZolam (XANAX) 0.25 MG tablet      Last Written Prescription Date:  4/10/17  Last Fill Quantity: 60,   # refills: 1  Last Office Visit with [facility], [facility] or [facility] prescribing provider: 6/6/17  Future Office visit:       Routing refill request to provider for review/approval because:  Drug not on the [facility], [facility] or [facility] refill protocol or controlled substance

## 2017-06-08 RX ORDER — ALPRAZOLAM 0.25 MG
TABLET ORAL
Qty: 60 TABLET | Refills: 1 | Status: ON HOLD | OUTPATIENT
Start: 2017-06-08 | End: 2017-06-28

## 2017-06-08 NOTE — TELEPHONE ENCOUNTER
Controlled Substance Refill Request for ALPRAZolam (XANAX) 0.25 MG tablet  Problem List Complete:  No     PROVIDER TO CONSIDER COMPLETION OF PROBLEM LIST AND OVERVIEW/CONTROLLED SUBSTANCE AGREEMENT        Controlled substance agreement on file: No.     Processing:  Fax Rx to [facility] pharmacy   checked in past 6 months?  Yes 06/08/2017   RX [place] program ([place]) reviewed:  reviewed- no concerns    [place]MP profile:  [email or web address]

## 2017-06-19 ENCOUNTER — OFFICE VISIT (OUTPATIENT)
Dept: FAMILY MEDICINE | Facility: CLINIC | Age: 82
End: 2017-06-19
Payer: MEDICARE

## 2017-06-19 VITALS
DIASTOLIC BLOOD PRESSURE: 70 MMHG | SYSTOLIC BLOOD PRESSURE: 124 MMHG | HEART RATE: 118 BPM | BODY MASS INDEX: 21.47 KG/M2 | RESPIRATION RATE: 16 BRPM | TEMPERATURE: 97.6 F | WEIGHT: 129 LBS

## 2017-06-19 DIAGNOSIS — R35.0 URINARY FREQUENCY: Primary | ICD-10-CM

## 2017-06-19 DIAGNOSIS — R11.0 NAUSEA: ICD-10-CM

## 2017-06-19 LAB
ALBUMIN UR-MCNC: ABNORMAL MG/DL
APPEARANCE UR: ABNORMAL
BACTERIA #/AREA URNS HPF: ABNORMAL /HPF
BASOPHILS # BLD AUTO: 0.1 10E9/L (ref 0–0.2)
BASOPHILS NFR BLD AUTO: 0.9 %
BILIRUB UR QL STRIP: ABNORMAL
COLOR UR AUTO: YELLOW
DIFFERENTIAL METHOD BLD: NORMAL
EOSINOPHIL # BLD AUTO: 0.2 10E9/L (ref 0–0.7)
EOSINOPHIL NFR BLD AUTO: 1.6 %
ERYTHROCYTE [DISTWIDTH] IN BLOOD BY AUTOMATED COUNT: 13.4 % (ref 10–15)
GLUCOSE UR STRIP-MCNC: NEGATIVE MG/DL
HCT VFR BLD AUTO: 44.5 % (ref 35–47)
HGB BLD-MCNC: 14.4 G/DL (ref 11.7–15.7)
HGB UR QL STRIP: ABNORMAL
KETONES UR STRIP-MCNC: 15 MG/DL
LEUKOCYTE ESTERASE UR QL STRIP: ABNORMAL
LYMPHOCYTES # BLD AUTO: 2.5 10E9/L (ref 0.8–5.3)
LYMPHOCYTES NFR BLD AUTO: 24.6 %
MCH RBC QN AUTO: 32 PG (ref 26.5–33)
MCHC RBC AUTO-ENTMCNC: 32.4 G/DL (ref 31.5–36.5)
MCV RBC AUTO: 99 FL (ref 78–100)
MONOCYTES # BLD AUTO: 0.8 10E9/L (ref 0–1.3)
MONOCYTES NFR BLD AUTO: 8.4 %
MUCOUS THREADS #/AREA URNS LPF: PRESENT /LPF
NEUTROPHILS # BLD AUTO: 6.4 10E9/L (ref 1.6–8.3)
NEUTROPHILS NFR BLD AUTO: 64.5 %
NITRATE UR QL: NEGATIVE
NON-SQ EPI CELLS #/AREA URNS LPF: ABNORMAL /LPF
PH UR STRIP: 5 PH (ref 5–7)
PLATELET # BLD AUTO: 309 10E9/L (ref 150–450)
RBC # BLD AUTO: 4.5 10E12/L (ref 3.8–5.2)
RBC #/AREA URNS AUTO: ABNORMAL /HPF (ref 0–2)
SP GR UR STRIP: 1.02 (ref 1–1.03)
URN SPEC COLLECT METH UR: ABNORMAL
UROBILINOGEN UR STRIP-ACNC: 0.2 EU/DL (ref 0.2–1)
WBC # BLD AUTO: 10 10E9/L (ref 4–11)
WBC #/AREA URNS AUTO: ABNORMAL /HPF (ref 0–2)

## 2017-06-19 PROCEDURE — 85025 COMPLETE CBC W/AUTO DIFF WBC: CPT | Performed by: FAMILY MEDICINE

## 2017-06-19 PROCEDURE — 87086 URINE CULTURE/COLONY COUNT: CPT | Performed by: FAMILY MEDICINE

## 2017-06-19 PROCEDURE — 99213 OFFICE O/P EST LOW 20 MIN: CPT | Performed by: FAMILY MEDICINE

## 2017-06-19 PROCEDURE — 81001 URINALYSIS AUTO W/SCOPE: CPT | Performed by: FAMILY MEDICINE

## 2017-06-19 PROCEDURE — 36415 COLL VENOUS BLD VENIPUNCTURE: CPT | Performed by: FAMILY MEDICINE

## 2017-06-19 RX ORDER — CIPROFLOXACIN 500 MG/1
500 TABLET, FILM COATED ORAL 2 TIMES DAILY
Qty: 14 TABLET | Refills: 0 | Status: ON HOLD | OUTPATIENT
Start: 2017-06-19 | End: 2017-06-28

## 2017-06-19 RX ORDER — ONDANSETRON 4 MG/1
4 TABLET, FILM COATED ORAL EVERY 6 HOURS PRN
Qty: 18 TABLET | Refills: 3 | Status: ON HOLD | OUTPATIENT
Start: 2017-06-19 | End: 2017-06-28

## 2017-06-19 NOTE — MR AVS SNAPSHOT
After Visit Summary   6/19/2017    Margy Babin    MRN: [id number]           Patient Information     Date Of Birth          10/2/1929        Visit Information        Provider Department      6/19/2017 2:00 PM Yaya Hatfield MD [facility]        Today's Diagnoses     Urinary frequency    -  1    Nausea          Care Instructions    Patient will discontinue Compazine.  I gave her a prescription for Zofran 4 mg to take up to 4 times daily.  I also placed her on ciprofloxacin 500 mg twice a day for the next 7 days.  Urine culture is pending.  We'll continue to push fluids.  Follow-up will be if not improving.  Her daughter was present in the room with her the entire visit.          Follow-ups after your visit        Who to contact     If you have questions or need follow up information about today's clinic visit or your schedule please contact [facility] directly at [phone].  Normal or non-critical lab and imaging results will be communicated to you by MyChart, letter or phone within 4 business days after the clinic has received the results. If you do not hear from us within 7 days, please contact the clinic through MyChart or phone. If you have a critical or abnormal lab result, we will notify you by phone as soon as possible.  Submit refill requests through [employer] or call your pharmacy and they will forward the refill request to us. Please allow 3 business days for your refill to be completed.          Additional Information About Your Visit        Care EveryWhere ID     This is your Care EveryWhere ID. This could be used by other organizations to access your [place] [facility] medical records  [id number]        Your Vitals Were     Pulse Temperature Respirations BMI (Body Mass Index)          118 97.6  F (36.4  C) (Tympanic) 16 21.47 kg/m2         Blood Pressure from Last 3 Encounters:   06/19/17 124/70   06/06/17 130/70    02/01/17 134/78    Weight from Last 3 Encounters:   06/19/17 129 lb (58.5 kg)   06/06/17 133 lb (60.3 kg)   02/01/17 128 lb 8 oz (58.3 kg)              We Performed the Following     CBC with platelets differential     DEPRESSION ACTION PLAN (DAP)     UA with Microscopic     Urine Culture Aerobic Bacterial          Today's Medication Changes          These changes are accurate as of: 6/19/17  5:25 PM.  If you have any questions, ask your nurse or doctor.               Start taking these medicines.        Dose/Directions    ciprofloxacin 500 MG tablet   Commonly known as:  CIPRO   Used for:  Urinary frequency   Started by:  Yaya Hatfield MD        Dose:  500 mg   Take 1 tablet (500 mg) by mouth 2 times daily for 7 days   Quantity:  14 tablet   Refills:  0       ondansetron 4 MG tablet   Commonly known as:  ZOFRAN   Used for:  Nausea   Started by:  Yaya Hatfield MD        Dose:  4 mg   Take 1 tablet (4 mg) by mouth every 6 hours as needed for nausea   Quantity:  18 tablet   Refills:  3         Stop taking these medicines if you haven't already. Please contact your care team if you have questions.     prochlorperazine 10 MG tablet   Commonly known as:  COMPAZINE   Stopped by:  Yaya Hatfield MD                Where to get your medicines      These medications were sent to [facility] [address] [place] [address] [facility] [address]     Phone:  [phone]     ciprofloxacin 500 MG tablet    ondansetron 4 MG tablet                Primary Care Provider Office Phone # Fax #    Yaya Hatfield [phone]       [facility] [address] [facility] [address]        Thank you!     Thank you for choosing [facility]  for your care. Our goal is always to provide you with excellent care. Hearing back from our patients is one way we can continue to improve our services. Please take a few minutes  to complete the written survey that you may receive in the mail after your visit with us. Thank you!             Your Updated Medication List - Protect others around you: Learn how to safely use, store and throw away your medicines at www.disposemymeds.org.          This list is accurate as of: 6/19/17  5:25 PM.  Always use your most recent med list.                   Brand Name Dispense Instructions for use    acetaminophen 500 MG tablet    TYLENOL    100 tablet    Take 2 tablets (1,000 mg) by mouth 3 times daily as needed for mild pain       ALPRAZolam 0.25 MG tablet    XANAX    60 tablet    TAKE 1 TO 2 TABLETS BY MOUTH TWICE DAILY AS NEEDED       ascorbic acid 1000 MG Tabs    vitamin C     Take 1,000 mg by mouth daily.       aspirin 81 MG tablet      Take 1 tablet by mouth daily.       CALCIUM 600 PO      Take 1 tablet by mouth daily.       ciprofloxacin 500 MG tablet    CIPRO    14 tablet    Take 1 tablet (500 mg) by mouth 2 times daily for 7 days       erythromycin with ethanol 2 % ophthalmic solution    THERAMYCIN     Apply topically daily       fish oil-omega-3 fatty acids 1000 MG capsule      Take 2 g by mouth daily.       GLUCOSAMINE CHOND COMPLEX/MSM Tabs      Take 1 tablet by mouth daily.       MULTIVITAL Tabs      Take 1 tablet by mouth daily.       omeprazole 40 MG capsule    priLOSEC    30 capsule    Take 1 capsule (40 mg) by mouth daily Take 30-60 minutes before a meal.       ondansetron 4 MG tablet    ZOFRAN    18 tablet    Take 1 tablet (4 mg) by mouth every 6 hours as needed for nausea       oxybutynin chloride 15 MG Tb24     90 tablet    TAKE ONE TABLET BY MOUTH ONE TIME DAILY       PARoxetine 20 MG tablet    PAXIL    90 tablet    TAKE ONE TABLET BY MOUTH AT BEDTIME       pramipexole 0.125 MG tablet    MIRAPEX    180 tablet    TAKE ONE TABLET BY MOUTH TWICE DAILY       propranolol 120 MG 24 hr capsule    INDERAL LA    180 capsule    TAKE TWO CAPSULES BY MOUTH DAILY       RESTASIS 0.05 % ophthalmic  emulsion   Generic drug:  cycloSPORINE      Apply 1 drop to eye 2 times daily       TOBRADEX ophthalmic ointment   Generic drug:  tobramycin-dexamethasone          vitamin D 1000 UNITS capsule      Take 1 capsule by mouth daily.       zolpidem 5 MG tablet    AMBIEN    30 tablet    TAKE ONE TABLET BY MOUTH NIGHTLY AS NEEDED

## 2017-06-19 NOTE — PATIENT INSTRUCTIONS
Patient will discontinue Compazine.  I gave her a prescription for Zofran 4 mg to take up to 4 times daily.  I also placed her on ciprofloxacin 500 mg twice a day for the next 7 days.  Urine culture is pending.  We'll continue to push fluids.  Follow-up will be if not improving.  Her daughter was present in the room with her the entire visit.

## 2017-06-19 NOTE — PROGRESS NOTES
SUBJECTIVE:                                                    Margy Babin is a 87 year old female who presents to clinic today for the following health issues:      Acute Illness   Acute illness concerns: nausea, fatigue  Onset: 2 weeks    Fever: no    Chills/Sweats: YES- sometimes    Headache (location?): no    Sinus Pressure:no    Conjunctivitis:  no    Ear Pain: no    Rhinorrhea: no    Congestion: no    Sore Throat: no     Cough: no    Wheeze: no    Decreased Appetite: YES    Nausea: YES    Vomiting: no    Diarrhea:  no    Dysuria/Freq.: no    Fatigue/Achiness: YES    Sick/Strep Exposure: no     Therapies Tried and outcome: compazine- helps stomach ache but not the nausea      Gastrointestinal symptoms      Duration: years    Description:           nausea    Intensity:  moderate    Accompanying signs and symptoms:  none    History  Previous {similar problem: YES  Previous evaluation:  none    Aggravating factors: none    Alleviating factors: proton pump inhibitor - prilosec    Other Therapies tried: None       Problem list and histories reviewed & adjusted, as indicated.  Additional history: as documented    Patient Active Problem List   Diagnosis     Anxiety     Familial tremor     Dry eye syndrome     Blepharitis of both eyes     Depression, major, recurrent, in complete remission (H)     Persistent insomnia     Urge incontinence of urine     Nausea without vomiting     Abdominal pain, epigastric     ACP (advance care planning)     Neck pain     Left knee pain     Past Surgical History:   Procedure Laterality Date     C TOTAL HIP ARTHROPLASTY Right 1997     HYSTERECTOMY TOTAL ABDOMINAL, BILATERAL SALPINGO-OOPHORECTOMY, COMBINED       NECK SURGERY  2009     ROTATOR CUFF REPAIR RT/LT Right        Social History   Substance Use Topics     Smoking status: Never Smoker     Smokeless tobacco: Never Used     Alcohol use No     Family History   Problem Relation Age of Onset     HEART DISEASE Father 80      HAO GERARDO Father      Breast Cancer Daughter 43      age 53           Reviewed and updated as needed this visit by clinical staff  Tobacco  Allergies  Meds  Med Hx  Surg Hx  Fam Hx  Soc Hx      Reviewed and updated as needed this visit by Provider         ROS:  Constitutional, HEENT, cardiovascular, pulmonary, gi and gu systems are negative, except as otherwise noted.  CONSTITUTIONAL:POSITIVE  for fever intermittently  GI: POSITIVE for nausea  : dysuria and frequency   NEURO: POSITIVE for tremor , made worse with compazine  OBJECTIVE:                                                    /70  Pulse 118  Temp 97.6  F (36.4  C) (Tympanic)  Resp 16  Wt 129 lb (58.5 kg)  BMI 21.47 kg/m2  Body mass index is 21.47 kg/(m^2).  GENERAL APPEARANCE: healthy, alert and no distress  ABDOMEN: soft, nontender, without hepatosplenomegaly or masses and bowel sounds normal  NEURO: Normal strength and tone, mentation intact, speech normal and tremor unchanged    Diagnostic test results:  Results for orders placed or performed in visit on 17 (from the past 24 hour(s))   UA with Microscopic   Result Value Ref Range    Color Urine Yellow     Appearance Urine Slightly Cloudy     Glucose Urine Negative NEG mg/dL    Bilirubin Urine (A) NEG     Small  This is an unconfirmed screening test result. A positive result may be false.      Ketones Urine 15 (A) NEG mg/dL    Specific Gravity Urine 1.020 1.003 - 1.035    pH Urine 5.0 5.0 - 7.0 pH    Protein Albumin Urine Trace (A) NEG mg/dL    Urobilinogen Urine 0.2 0.2 - 1.0 EU/dL    Nitrite Urine Negative NEG    Blood Urine Trace (A) NEG    Leukocyte Esterase Urine Trace (A) NEG    Source Midstream Urine     WBC Urine 2-5 (A) 0 - 2 /HPF    RBC Urine O - 2 0 - 2 /HPF    Squamous Epithelial /LPF Urine Few FEW /LPF    Bacteria Urine Few (A) NEG /HPF    Mucous Urine Present (A) NEG /LPF   CBC with platelets differential   Result Value Ref Range    WBC 10.0 4.0 - 11.0  10e9/L    RBC Count 4.50 3.8 - 5.2 10e12/L    Hemoglobin 14.4 11.7 - 15.7 g/dL    Hematocrit 44.5 35.0 - 47.0 %    MCV 99 78 - 100 fl    MCH 32.0 26.5 - 33.0 pg    MCHC 32.4 31.5 - 36.5 g/dL    RDW 13.4 10.0 - 15.0 %    Platelet Count 309 150 - 450 10e9/L    Diff Method Automated Method     % Neutrophils 64.5 %    % Lymphocytes 24.6 %    % Monocytes 8.4 %    % Eosinophils 1.6 %    % Basophils 0.9 %    Absolute Neutrophil 6.4 1.6 - 8.3 10e9/L    Absolute Lymphocytes 2.5 0.8 - 5.3 10e9/L    Absolute Monocytes 0.8 0.0 - 1.3 10e9/L    Absolute Eosinophils 0.2 0.0 - 0.7 10e9/L    Absolute Basophils 0.1 0.0 - 0.2 10e9/L        ASSESSMENT/PLAN:                                                        ICD-10-CM    1. Urinary frequency R35.0 DEPRESSION ACTION PLAN (DAP)     UA with Microscopic     CBC with platelets differential     ciprofloxacin (CIPRO) 500 MG tablet     Urine Culture Aerobic Bacterial   2. Nausea R11.0 ondansetron (ZOFRAN) 4 MG tablet       Patient Instructions   Patient will discontinue Compazine.  I gave her a prescription for Zofran 4 mg to take up to 4 times daily.  I also placed her on ciprofloxacin 500 mg twice a day for the next 7 days.  Urine culture is pending.  We'll continue to push fluids.  Follow-up will be if not improving.  Her daughter was present in the room with her the entire visit.      Yaya Hatfield MD  [facility]

## 2017-06-20 LAB
BACTERIA SPEC CULT: NORMAL
MICRO REPORT STATUS: NORMAL
SPECIMEN SOURCE: NORMAL

## 2017-06-20 ASSESSMENT — PATIENT HEALTH QUESTIONNAIRE - PHQ9: SUM OF ALL RESPONSES TO PHQ QUESTIONS 1-9: 3

## 2017-06-25 DIAGNOSIS — R35.0 URINARY FREQUENCY: ICD-10-CM

## 2017-06-26 NOTE — TELEPHONE ENCOUNTER
"Cipro    Patient was called; she has finished her Cipro given on 6-19-17 and still feels \"lousy\". Still with urinary frequency, nausea- urine is clear, no fever. She also mentioned that she has not had a BM x one week. There is no abd distension and is passing gas. She has an OTC laxative at home (name not known) and she will try that.        Patient does have an appointment Tues, 6-27-17 with Dr. Yaya Hatfield .  Patient was advised that Dr. Yaya Hatfield will want to discuss refilling the Cipro with her tomorrow.    Last Written Prescription Date:  6-19-17  Last Fill Quantity: 14,   # refills: 0  Last Office Visit with [facility], [facility] or [facility] prescribing provider: 6-19-17  Future Office visit:    Next 5 appointments (look out 90 days)     Jun 27, 2017  2:00 PM CDT   SHORT with Yaya Hatfield MD   [facility]    [address] [place] [id number]   [phone]                   Routing refill request to provider for review/approval because:  Drug not on the [facility], [facility] or [facility] refill protocol or controlled substance    "

## 2017-06-27 ENCOUNTER — APPOINTMENT (OUTPATIENT)
Dept: CARDIOLOGY | Facility: CLINIC | Age: 82
End: 2017-06-27
Attending: INTERNAL MEDICINE
Payer: MEDICARE

## 2017-06-27 ENCOUNTER — APPOINTMENT (OUTPATIENT)
Dept: CT IMAGING | Facility: CLINIC | Age: 82
End: 2017-06-27
Attending: EMERGENCY MEDICINE
Payer: MEDICARE

## 2017-06-27 ENCOUNTER — TELEPHONE (OUTPATIENT)
Dept: FAMILY MEDICINE | Facility: CLINIC | Age: 82
End: 2017-06-27

## 2017-06-27 ENCOUNTER — APPOINTMENT (OUTPATIENT)
Dept: GENERAL RADIOLOGY | Facility: CLINIC | Age: 82
End: 2017-06-27
Attending: EMERGENCY MEDICINE
Payer: MEDICARE

## 2017-06-27 ENCOUNTER — HOSPITAL ENCOUNTER (OUTPATIENT)
Facility: CLINIC | Age: 82
Setting detail: OBSERVATION
Discharge: HOME | End: 2017-06-28
Attending: EMERGENCY MEDICINE | Admitting: INTERNAL MEDICINE
Payer: MEDICARE

## 2017-06-27 DIAGNOSIS — G47.00 INSOMNIA, UNSPECIFIED TYPE: ICD-10-CM

## 2017-06-27 DIAGNOSIS — M62.81 GENERALIZED MUSCLE WEAKNESS: ICD-10-CM
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
## 2017-06-27 DIAGNOSIS — K59.00 CONSTIPATION, UNSPECIFIED CONSTIPATION TYPE: Primary | ICD-10-CM

## 2017-06-27 DIAGNOSIS — R11.0 NAUSEA: ICD-10-CM

## 2017-06-27 DIAGNOSIS — E87.6 HYPOKALEMIA: ICD-10-CM

## 2017-06-27 DIAGNOSIS — R53.1 WEAKNESS: ICD-10-CM

## 2017-06-27 PROBLEM — R55 SYNCOPE: Status: ACTIVE | Noted: 2017-06-27

## 2017-06-27 LAB
ALBUMIN SERPL-MCNC: 3.6 G/DL (ref 3.4–5)
ALP SERPL-CCNC: 85 U/L (ref 40–150)
ALT SERPL W P-5'-P-CCNC: 22 U/L (ref 0–50)
ANION GAP SERPL CALCULATED.3IONS-SCNC: 8 MMOL/L (ref 3–14)
AST SERPL W P-5'-P-CCNC: 24 U/L (ref 0–45)
BASOPHILS # BLD AUTO: 0.1 10E9/L (ref 0–0.2)
BASOPHILS NFR BLD AUTO: 0.3 %
BILIRUB SERPL-MCNC: 0.6 MG/DL (ref 0.2–1.3)
BUN SERPL-MCNC: 10 MG/DL (ref 7–30)
CALCIUM SERPL-MCNC: 9.1 MG/DL (ref 8.5–10.1)
CHLORIDE SERPL-SCNC: 99 MMOL/L (ref 94–109)
CO2 SERPL-SCNC: 30 MMOL/L (ref 20–32)
CREAT SERPL-MCNC: 0.73 MG/DL (ref 0.52–1.04)
DIFFERENTIAL METHOD BLD: ABNORMAL
EOSINOPHIL # BLD AUTO: 0 10E9/L (ref 0–0.7)
EOSINOPHIL NFR BLD AUTO: 0.2 %
ERYTHROCYTE [DISTWIDTH] IN BLOOD BY AUTOMATED COUNT: 13.5 % (ref 10–15)
GFR SERPL CREATININE-BSD FRML MDRD: 76 ML/MIN/1.7M2
GLUCOSE SERPL-MCNC: 117 MG/DL (ref 70–99)
HCT VFR BLD AUTO: 42.4 % (ref 35–47)
HGB BLD-MCNC: 14.4 G/DL (ref 11.7–15.7)
IMM GRANULOCYTES # BLD: 0.1 10E9/L (ref 0–0.4)
IMM GRANULOCYTES NFR BLD: 0.3 %
INTERPRETATION ECG - MUSE: NORMAL
LIPASE SERPL-CCNC: 100 U/L (ref 73–393)
LYMPHOCYTES # BLD AUTO: 1.7 10E9/L (ref 0.8–5.3)
LYMPHOCYTES NFR BLD AUTO: 10.8 %
MAGNESIUM SERPL-MCNC: 1.8 MG/DL (ref 1.6–2.3)
MCH RBC QN AUTO: 31.9 PG (ref 26.5–33)
MCHC RBC AUTO-ENTMCNC: 34 G/DL (ref 31.5–36.5)
MCV RBC AUTO: 94 FL (ref 78–100)
MONOCYTES # BLD AUTO: 1 10E9/L (ref 0–1.3)
MONOCYTES NFR BLD AUTO: 6.1 %
NEUTROPHILS # BLD AUTO: 13.2 10E9/L (ref 1.6–8.3)
NEUTROPHILS NFR BLD AUTO: 82.3 %
NRBC # BLD AUTO: 0 10*3/UL
NRBC BLD AUTO-RTO: 0 /100
PLATELET # BLD AUTO: 321 10E9/L (ref 150–450)
POTASSIUM SERPL-SCNC: 3.1 MMOL/L (ref 3.4–5.3)
POTASSIUM SERPL-SCNC: 3.2 MMOL/L (ref 3.4–5.3)
POTASSIUM SERPL-SCNC: 3.7 MMOL/L (ref 3.4–5.3)
PROT SERPL-MCNC: 7.2 G/DL (ref 6.8–8.8)
RBC # BLD AUTO: 4.51 10E12/L (ref 3.8–5.2)
SODIUM SERPL-SCNC: 137 MMOL/L (ref 133–144)
TROPONIN I SERPL-MCNC: NORMAL UG/L (ref 0–0.04)
TROPONIN I SERPL-MCNC: NORMAL UG/L (ref 0–0.04)
WBC # BLD AUTO: 16.1 10E9/L (ref 4–11)

## 2017-06-27 PROCEDURE — 99220 ZZC INITIAL OBSERVATION CARE,LEVL III: CPT | Performed by: INTERNAL MEDICINE

## 2017-06-27 PROCEDURE — 93306 TTE W/DOPPLER COMPLETE: CPT | Mod: 26 | Performed by: INTERNAL MEDICINE

## 2017-06-27 PROCEDURE — 25500064 ZZH RX 255 OP 636: Performed by: INTERNAL MEDICINE

## 2017-06-27 PROCEDURE — A9270 NON-COVERED ITEM OR SERVICE: HCPCS | Mod: GY | Performed by: INTERNAL MEDICINE

## 2017-06-27 PROCEDURE — 84132 ASSAY OF SERUM POTASSIUM: CPT | Performed by: INTERNAL MEDICINE

## 2017-06-27 PROCEDURE — 83690 ASSAY OF LIPASE: CPT | Performed by: EMERGENCY MEDICINE

## 2017-06-27 PROCEDURE — 93005 ELECTROCARDIOGRAM TRACING: CPT

## 2017-06-27 PROCEDURE — 96365 THER/PROPH/DIAG IV INF INIT: CPT

## 2017-06-27 PROCEDURE — G0378 HOSPITAL OBSERVATION PER HR: HCPCS

## 2017-06-27 PROCEDURE — 83735 ASSAY OF MAGNESIUM: CPT | Performed by: INTERNAL MEDICINE

## 2017-06-27 PROCEDURE — 40000264 ECHO COMPLETE WITH OPTISON

## 2017-06-27 PROCEDURE — 99285 EMERGENCY DEPT VISIT HI MDM: CPT | Mod: 25

## 2017-06-27 PROCEDURE — 36415 COLL VENOUS BLD VENIPUNCTURE: CPT | Performed by: INTERNAL MEDICINE

## 2017-06-27 PROCEDURE — 25000128 H RX IP 250 OP 636: Performed by: EMERGENCY MEDICINE

## 2017-06-27 PROCEDURE — 96375 TX/PRO/DX INJ NEW DRUG ADDON: CPT

## 2017-06-27 PROCEDURE — 80053 COMPREHEN METABOLIC PANEL: CPT | Performed by: EMERGENCY MEDICINE

## 2017-06-27 PROCEDURE — 85025 COMPLETE CBC W/AUTO DIFF WBC: CPT | Performed by: EMERGENCY MEDICINE

## 2017-06-27 PROCEDURE — 74177 CT ABD & PELVIS W/CONTRAST: CPT

## 2017-06-27 PROCEDURE — 84484 ASSAY OF TROPONIN QUANT: CPT | Performed by: INTERNAL MEDICINE

## 2017-06-27 PROCEDURE — 25000128 H RX IP 250 OP 636: Performed by: INTERNAL MEDICINE

## 2017-06-27 PROCEDURE — 71020 XR CHEST 2 VW: CPT

## 2017-06-27 PROCEDURE — 96366 THER/PROPH/DIAG IV INF ADDON: CPT

## 2017-06-27 PROCEDURE — 25000125 ZZHC RX 250: Performed by: EMERGENCY MEDICINE

## 2017-06-27 PROCEDURE — 25000132 ZZH RX MED GY IP 250 OP 250 PS 637: Mod: GY | Performed by: INTERNAL MEDICINE

## 2017-06-27 PROCEDURE — 70450 CT HEAD/BRAIN W/O DYE: CPT

## 2017-06-27 RX ORDER — SODIUM CHLORIDE 9 MG/ML
INJECTION, SOLUTION INTRAVENOUS CONTINUOUS
Status: DISCONTINUED | OUTPATIENT
Start: 2017-06-27 | End: 2017-06-28 | Stop reason: HOSPADM

## 2017-06-27 RX ORDER — PROCHLORPERAZINE MALEATE 5 MG
5 TABLET ORAL EVERY 6 HOURS PRN
Status: DISCONTINUED | OUTPATIENT
Start: 2017-06-27 | End: 2017-06-28 | Stop reason: HOSPADM

## 2017-06-27 RX ORDER — ASCORBIC ACID 500 MG
1000 TABLET ORAL DAILY
Status: DISCONTINUED | OUTPATIENT
Start: 2017-06-27 | End: 2017-06-28 | Stop reason: HOSPADM

## 2017-06-27 RX ORDER — PAROXETINE 20 MG/1
20 TABLET, FILM COATED ORAL DAILY
Status: DISCONTINUED | OUTPATIENT
Start: 2017-06-27 | End: 2017-06-28 | Stop reason: HOSPADM

## 2017-06-27 RX ORDER — NITROGLYCERIN 0.4 MG/1
0.4 TABLET SUBLINGUAL EVERY 5 MIN PRN
Status: DISCONTINUED | OUTPATIENT
Start: 2017-06-27 | End: 2017-06-28 | Stop reason: HOSPADM

## 2017-06-27 RX ORDER — DOCUSATE SODIUM 100 MG/1
100 CAPSULE, LIQUID FILLED ORAL 2 TIMES DAILY
Status: DISCONTINUED | OUTPATIENT
Start: 2017-06-27 | End: 2017-06-28 | Stop reason: HOSPADM

## 2017-06-27 RX ORDER — ASPIRIN 81 MG/1
81 TABLET, CHEWABLE ORAL DAILY
Status: DISCONTINUED | OUTPATIENT
Start: 2017-06-27 | End: 2017-06-28 | Stop reason: HOSPADM

## 2017-06-27 RX ORDER — POTASSIUM CHLORIDE 7.45 MG/ML
10 INJECTION INTRAVENOUS
Status: DISCONTINUED | OUTPATIENT
Start: 2017-06-27 | End: 2017-06-28 | Stop reason: HOSPADM

## 2017-06-27 RX ORDER — MAGNESIUM SULFATE HEPTAHYDRATE 40 MG/ML
4 INJECTION, SOLUTION INTRAVENOUS EVERY 4 HOURS PRN
Status: DISCONTINUED | OUTPATIENT
Start: 2017-06-27 | End: 2017-06-28 | Stop reason: HOSPADM

## 2017-06-27 RX ORDER — POTASSIUM CL/LIDO/0.9 % NACL 10MEQ/0.1L
10 INTRAVENOUS SOLUTION, PIGGYBACK (ML) INTRAVENOUS
Status: DISCONTINUED | OUTPATIENT
Start: 2017-06-27 | End: 2017-06-28 | Stop reason: HOSPADM

## 2017-06-27 RX ORDER — SENNOSIDES 8.6 MG
1 TABLET ORAL 2 TIMES DAILY
Status: DISCONTINUED | OUTPATIENT
Start: 2017-06-27 | End: 2017-06-28 | Stop reason: HOSPADM

## 2017-06-27 RX ORDER — POTASSIUM CL/LIDO/0.9 % NACL 10MEQ/0.1L
10 INTRAVENOUS SOLUTION, PIGGYBACK (ML) INTRAVENOUS
Status: DISCONTINUED | OUTPATIENT
Start: 2017-06-27 | End: 2017-06-27

## 2017-06-27 RX ORDER — ONDANSETRON 2 MG/ML
4 INJECTION INTRAMUSCULAR; INTRAVENOUS
Status: DISCONTINUED | OUTPATIENT
Start: 2017-06-27 | End: 2017-06-27

## 2017-06-27 RX ORDER — POTASSIUM CHLORIDE 1.5 G/1.58G
20-40 POWDER, FOR SOLUTION ORAL
Status: DISCONTINUED | OUTPATIENT
Start: 2017-06-27 | End: 2017-06-28 | Stop reason: HOSPADM

## 2017-06-27 RX ORDER — CIPROFLOXACIN 500 MG/1
TABLET, FILM COATED ORAL
Qty: 14 TABLET | Refills: 0 | OUTPATIENT
Start: 2017-06-27

## 2017-06-27 RX ORDER — POTASSIUM CHLORIDE 1500 MG/1
20-40 TABLET, EXTENDED RELEASE ORAL
Status: DISCONTINUED | OUTPATIENT
Start: 2017-06-27 | End: 2017-06-28 | Stop reason: HOSPADM

## 2017-06-27 RX ORDER — PROCHLORPERAZINE 25 MG
12.5 SUPPOSITORY, RECTAL RECTAL EVERY 12 HOURS PRN
Status: DISCONTINUED | OUTPATIENT
Start: 2017-06-27 | End: 2017-06-28 | Stop reason: HOSPADM

## 2017-06-27 RX ORDER — LIDOCAINE 40 MG/G
CREAM TOPICAL
Status: DISCONTINUED | OUTPATIENT
Start: 2017-06-27 | End: 2017-06-28 | Stop reason: HOSPADM

## 2017-06-27 RX ORDER — CYCLOSPORINE 0.5 MG/ML
1 EMULSION OPHTHALMIC 2 TIMES DAILY
Status: DISCONTINUED | OUTPATIENT
Start: 2017-06-27 | End: 2017-06-28 | Stop reason: HOSPADM

## 2017-06-27 RX ORDER — POTASSIUM CHLORIDE 29.8 MG/ML
20 INJECTION INTRAVENOUS
Status: DISCONTINUED | OUTPATIENT
Start: 2017-06-27 | End: 2017-06-28 | Stop reason: HOSPADM

## 2017-06-27 RX ORDER — IOPAMIDOL 755 MG/ML
65 INJECTION, SOLUTION INTRAVASCULAR ONCE
Status: COMPLETED | OUTPATIENT
Start: 2017-06-27 | End: 2017-06-27

## 2017-06-27 RX ORDER — ACETAMINOPHEN 500 MG
1000 TABLET ORAL 3 TIMES DAILY PRN
Status: DISCONTINUED | OUTPATIENT
Start: 2017-06-27 | End: 2017-06-28 | Stop reason: HOSPADM

## 2017-06-27 RX ADMIN — QUETIAPINE FUMARATE 12.5 MG: 25 TABLET, FILM COATED ORAL at 21:19

## 2017-06-27 RX ADMIN — POTASSIUM CHLORIDE 40 MEQ: 1500 TABLET, EXTENDED RELEASE ORAL at 16:24

## 2017-06-27 RX ADMIN — SODIUM CHLORIDE 1000 ML: 9 INJECTION, SOLUTION INTRAVENOUS at 11:01

## 2017-06-27 RX ADMIN — SENNOSIDES 1 TABLET: 8.6 TABLET, FILM COATED ORAL at 20:46

## 2017-06-27 RX ADMIN — SODIUM CHLORIDE: 9 INJECTION, SOLUTION INTRAVENOUS at 14:59

## 2017-06-27 RX ADMIN — IOPAMIDOL 65 ML: 755 INJECTION, SOLUTION INTRAVENOUS at 12:25

## 2017-06-27 RX ADMIN — ASPIRIN 81 MG 81 MG: 81 TABLET ORAL at 16:14

## 2017-06-27 RX ADMIN — DOCUSATE SODIUM 100 MG: 100 CAPSULE, LIQUID FILLED ORAL at 20:46

## 2017-06-27 RX ADMIN — POTASSIUM CHLORIDE 10 MEQ: 14.9 INJECTION, SOLUTION, CONCENTRATE PARENTERAL at 12:11

## 2017-06-27 RX ADMIN — POTASSIUM CHLORIDE 20 MEQ: 1500 TABLET, EXTENDED RELEASE ORAL at 18:39

## 2017-06-27 RX ADMIN — CYCLOSPORINE 1 DROP: 0.5 EMULSION OPHTHALMIC at 20:48

## 2017-06-27 RX ADMIN — ONDANSETRON 4 MG: 2 SOLUTION INTRAMUSCULAR; INTRAVENOUS at 11:01

## 2017-06-27 RX ADMIN — PROCHLORPERAZINE EDISYLATE 5 MG: 5 INJECTION INTRAMUSCULAR; INTRAVENOUS at 16:24

## 2017-06-27 RX ADMIN — HUMAN ALBUMIN MICROSPHERES AND PERFLUTREN 3 ML: 10; .22 INJECTION, SOLUTION INTRAVENOUS at 15:44

## 2017-06-27 RX ADMIN — PAROXETINE HYDROCHLORIDE 20 MG: 20 TABLET, FILM COATED ORAL at 16:14

## 2017-06-27 RX ADMIN — SODIUM CHLORIDE 60 ML: 9 INJECTION, SOLUTION INTRAVENOUS at 12:25

## 2017-06-27 NOTE — ED NOTES
[facility]  ED Nurse Handoff Report    ED Chief complaint: Nausea (for 3 weeks was on abx for uti and changed antiemetics but did not help. Also no appetite and weakness, constipated for 3 weeks. Pt had fall today) and Fall      ED Diagnosis:   Final diagnoses:   Nausea   Fall, initial encounter   Hypokalemia   Weakness       Code Status: Full Code    Allergies: No Known Allergies    Activity level - Baseline/Home:  Stand with Assist uses cane at home    Activity Level - Current:   Stand with Assist up to bedside commode with assist of 1 did well     Needed?: No    Isolation: No  Infection: Not Applicable    Bariatric?: No    Vital Signs:   Vitals:    06/27/17 1202 06/27/17 1300 06/27/17 1334 06/27/17 1335   BP: 114/77  132/78    Temp:       TempSrc:       SpO2: 98% 98%  95%   Weight:       Height:           Cardiac Rhythm: ,    SR    Pain level:      Is this patient confused?: No    Patient Report: Initial Complaint: pt had recent treatment for UTI and comes in with daughter today for 3 weeks of intermittent nausea and constipation.  And some increase weakness.  Pt fell today no LOC has bruise on right forehead and  unable to get her up. Pt has shaking of arms, pt at baseline neurologically per daughter  Focused Assessment: see above  Tests Performed: see epic  Abnormal Results: see epic  Treatments provided: see [facility]    Family Comments: daughter with pt, kind of demanding.  Sounds like daughter just recently got involved with her mothers care.    OBS brochure/video discussed/provided to patient: Yes    ED Medications:   Medications   ondansetron (ZOFRAN) injection 4 mg (4 mg Intravenous Given 6/27/17 1101)   potassium chloride 10 mEq in 100 mL intermittent infusion with 10 mg lidocaine (0 mEq Intravenous Stopped 6/27/17 1350)   0.9% sodium chloride BOLUS (1,000 mLs Intravenous New Bag 6/27/17 1101)   iopamidol (ISOVUE-370) solution 65 mL (65 mLs Intravenous Given 6/27/17 1225)    sodium chloride 0.9 % for CT scan flush dose 60 mL (60 mLs Intravenous Given 6/27/17 1225)       Drips infusing?:  No      ED NURSE PHONE NUMBER: [phone]

## 2017-06-27 NOTE — ED PROVIDER NOTES
"  History     Chief Complaint:  Nausea and Fall     History limited due to poor historian and supplemented by family at bedside.    ALEJANDRO Babin is a 87 year old female who presents to the emergency department today with her daughter for evaluation of nausea and a fall. The patient was seen by her primary care provider on June 19th at which time a urine sample was obtained revealing 2-5 white blood cells, 0-2 red blood cells, few squamous cells, and was negative for nitrites. She was diagnosed with a UTI and sent home with a prescription for ciprofloxacin. However, her daughter reports this antibiotic treatment ultimately did not improve the patient's condition. Today, the patient reports for evaluation of constant nausea and constipation for the past three weeks. She reports her last \"good\" bowel movement being three weeks ago. She took stool softeners and had a small nonbloody bowel movement within the last few days. She saw her primary care provider last week, at which time her nausea medication was changed from Compazine to Zofran, however her nausea has persisted. She currently takes her Zofran every six hours. Her nausea has caused a decrease in the patient's fluids and food intake. Her daughter reports a similar instance of persistent nausea this past winter, however this seemed to resolve on its own very abruptly when she woke up one day and felt suddenly back to normal. Additionally, earlier today the patient had a fall while getting up to use the restroom, subsequently hitting her head. The patient is not sure if she lost consciousness prompting the fall.  She uses a cane normally for walking. She denies any new pain or weakness to one side of her body after this fall.  Patient denies any abdominal pain, recent fevers, or urinary symptoms. She reports control over her urination.     Allergies:  No Known Drug Allergies      Medications:    ondansetron (ZOFRAN) 4 MG tablet  ALPRAZolam " "(XANAX) 0.25 MG tablet  zolpidem (AMBIEN) 5 MG tablet  omeprazole (PRILOSEC) 40 MG capsule  pramipexole (MIRAPEX) 0.125 MG tablet  PARoxetine (PAXIL) 20 MG tablet  propranolol (INDERAL LA) 120 MG 24 hr capsule  oxybutynin chloride 15 MG TB24  aspirin 81 MG tablet  ascorbic acid (VITAMIN C) 1000 MG TABS  Calcium Carbonate (CALCIUM 600 PO)  Cholecalciferol (VITAMIN D) 1000 UNITS capsule    Past Medical History:    Anxiety   Blepharitis of both eyes   Depression   Dry eye syndrome   Tremor, hereditary, benign     Past Surgical History:    C Total Hip arthroplasty   Hysterectomy total abdominal, bilateral salpingo-oophorectomy, combined   Neck surgery   Rotator cuff repair rt/lt     Family History:    Myocardial infarction  CAD     Social History:  The patient was accompanied to the ED by her daughter and granddaughter.  Smoking Status: Never smoker  Smokeless Tobacco: Never used   Alcohol Use: No    Marital Status:        Review of Systems   Unable to perform ROS: Other   ROS unable to be performed due to poor historian.     Physical Exam     Patient Vitals for the past 24 hrs:   BP Temp Temp src Heart Rate SpO2 Height Weight   06/27/17 1335 - - - 67 95 % - -   06/27/17 1334 132/78 - - 75 - - -   06/27/17 1300 - - - - 98 % - -   06/27/17 1202 114/77 - - - 98 % - -   06/27/17 1130 - - - - 99 % - -   06/27/17 1100 - - - - 99 % - -   06/27/17 1014 134/50 96.6  F (35.9  C) Temporal 72 100 % 1.651 m (5' 5\") 59 kg (130 lb)      Physical Exam  General: woman recumbent in bed, mother at bedside  HENT: mucous membranes moist, face nontender  CV: regular rate, regular rhythm, no LE edema  Resp: clear throughout, normal effort, no crackles or wheezing  GI: abdomen soft, nontender, no guarding  Rectal (female chaperone present throughout) with small-moderate amount of soft but formed brown stool but no impaction, no blood, no palpable masses  MSK: no bony tenderness, no CVAT, no midline spine tenderness, no chest wall " tenderness  Skin: appropriately warm and dry, well healed posterior neck scar, mild ecchymosis to R forehead/eyebrow  Neuro: alert, clear speech, oriented but poor historian   Psych: normal mood and affect    Emergency Department Course     ECG:  Indication: Fall   Completed at 1050.  Read at 1110.   Sinus rhythm with occasional premature ventricular complexes.   Possible Anterior infarct, age undetermined.   Abnormal ECG.   Rate 62 bpm. OR interval 158. QRS duration 106. QT/QTc 456/462. P-R-T axes 57 63 72.     Imaging:  Radiology findings were communicated with the patient, family and Admitting MD who voiced understanding of the findings.    CT Head w/o Contrast:  IMPRESSION: Chronic changes. No evidence for intracranial hemorrhage  or any acute process.  Report per radiology      CT Abdomen Pelvis w Contrast:  IMPRESSION: No acute abnormality.  Report per radiology      XR Chest 2 Views:  IMPRESSION: No acute cardiopulmonary abnormality.  Report per radiology      Laboratory:  Laboratory findings were communicated with the patient, family and Admitting MD who voiced understanding of the findings.    CBC: WBC 16.1 (H),  o/w WNL. (HGB 14.4, )    CMP: Potassium 3.1 (L), Glucose 117 (H), o/w WNL. (Creatinine 0.73)   Lipase: 100     Interventions:  1101 NS 1,000mL IV   1101 Zofran 4mg IV   1211 Potassium chloride 10mEq in 100mL intermittent infusion with 10mg lidocaine     Emergency Department Course:  Nursing notes and vitals reviewed.  I performed an exam of the patient as documented above.   1319 Patient rechecked and updated.    1345 Patient rechecked and updated for admission.  1355 I spoke with Dr. Mirza of the Hospitalist service regarding patient's presentation, findings, and plan of care.   IV was inserted and blood was drawn for laboratory testing, results above.   The patient was sent for a CT Head w/o Contrast, CT Abdomen Pelvis w Contrast, and XR Chest 2 views while in the emergency department,  results above.    EKG obtained in the ED, see results above.    I discussed the treatment plan with the patient. They expressed understanding of this plan and consented to admission. I discussed the patient with Dr. Mirza, who will admit the patient to a monitored bed for further evaluation and treatment.  I personally reviewed the laboratory and imaging results with the Patient and daughter and answered all related questions prior to admission.     Impression & Plan      Medical Decision Making:  This 87 year old woman presents for evaluation of subacute nausea, constipation, and some generalized weakness. Her exam and extensive testing as documented above reveal no definitive etiology. Consideration was given to intracranial hemorrhage, tumor, stroke, bowel obstruction, intraabdominal neoplasm, major electrolyte disturbance, fecal impaction, and many others. Her EKG is benign. The cause of her symptoms remains unclear though the patient and her daughter feel she is too weak to manage safely at home, as evidenced by her fall she sustained earlier today. She will therefore be kept in the hospitalization overnight for close monitoring and further treatment after discussing the situation with the hospitalist Dr. Mirza.     Diagnosis:    ICD-10-CM    1. Nausea R11.0    2. Fall, initial encounter W19.XXXA    3. Hypokalemia E87.6    4. Weakness R53.1       Disposition:   Admitted to Obs under the supervision of Dr. Mirza.     Scribe Disclosure:  I, Yehuda Vasquez, am serving as a scribe at 10:25 AM on 6/27/2017 to document services personally performed by Haris Hilario MD, based on my observations and the provider's statements to me.     Yehuda Vasquez  6/27/2017    EMERGENCY DEPARTMENT       Haris Hilario MD  06/27/17 8946

## 2017-06-27 NOTE — TELEPHONE ENCOUNTER
Patient calling to report that she is feeling very weak and that she fell this morning.Pt states that the paramedics came to help her up as her  couldn't,denies any injury. Patient states that she has been feeling weak and lightheaded,nauseated and unable to eat very much does admit that she is drinking.Denies any pain but states that she has not had a bm for over a week says she doesn't really remember.Advised to be seen patient states that she thinks she should go to the hospital.Agree with her.Will contact her daughter to bring her to the hospital.

## 2017-06-27 NOTE — PHARMACY-ADMISSION MEDICATION HISTORY
Admission medication history interview status for the 6/27/2017  admission is complete. See EPIC admission navigator for prior to admission medications     Medication history source reliability:Moderate    Actions taken by pharmacist (provider contacted, etc): spoke to pt and daughter     Additional medication history information not noted on PTA med list : pt didn't recognize omeprazole or oxybutynin on med list.    Medication reconciliation/reorder completed by provider prior to medication history? No    Time spent in this activity: 15 minutes    Prior to Admission medications    Medication Sig Last Dose Taking? Auth Provider   ondansetron (ZOFRAN) 4 MG tablet Take 1 tablet (4 mg) by mouth every 6 hours as needed for nausea prn med Yes Yaya Hatfield MD   ALPRAZolam (XANAX) 0.25 MG tablet TAKE 1 TO 2 TABLETS BY MOUTH TWICE DAILY AS NEEDED prn med Yes Kike Nelson MD   zolpidem (AMBIEN) 5 MG tablet TAKE ONE TABLET BY MOUTH NIGHTLY AS NEEDED prn med Yes Yaya Hatfield MD   omeprazole (PRILOSEC) 40 MG capsule Take 1 capsule (40 mg) by mouth daily Take 30-60 minutes before a meal. unk Yes Yaya Hatfield MD   TOBRADEX ophthalmic ointment 1 Application At Bedtime  6/26/2017 at Unknown time Yes Reported, Patient   pramipexole (MIRAPEX) 0.125 MG tablet TAKE ONE TABLET BY MOUTH TWICE DAILY 6/25/2017 Yes Kaylah Medeiros PA-C   PARoxetine (PAXIL) 20 MG tablet TAKE ONE TABLET BY MOUTH AT BEDTIME 6/25/2017 Yes Yaya Hatfield MD   propranolol (INDERAL LA) 120 MG 24 hr capsule TAKE TWO CAPSULES BY MOUTH DAILY 6/26/2017 at Unknown time Yes Yaya Hatfield MD   acetaminophen (TYLENOL) 500 MG tablet Take 2 tablets (1,000 mg) by mouth 3 times daily as needed for mild pain prn med Yes Yaya Hatfield MD   cycloSPORINE (RESTASIS) 0.05 % ophthalmic emulsion Apply 1 drop to eye 2 times daily 6/25/2017 Yes Reported, Patient   aspirin 81 MG tablet Take 1 tablet by mouth  daily. 6/26/2017 at Unknown time Yes Reported, Patient   ascorbic acid (VITAMIN C) 1000 MG TABS Take 1,000 mg by mouth daily. 6/25/2017 Yes Reported, Patient   Multiple Vitamins-Minerals (MULTIVITAL) TABS Take 1 tablet by mouth daily. 6/25/2017 Yes Reported, Patient   Calcium Carbonate (CALCIUM 600 PO) Take 1 tablet by mouth daily. 6/25/2017 Yes Reported, Patient   Misc Natural Products (GLUCOSAMINE CHOND COMPLEX/MSM) TABS Take 1 tablet by mouth daily. 6/25/2017 Yes Reported, Patient   Cholecalciferol (VITAMIN D) 1000 UNITS capsule Take 1 capsule by mouth daily. unk Yes Reported, Patient   fish oil-omega-3 fatty acids (FISH OIL) 1000 MG capsule Take 2 g by mouth daily. 6/25/2017 Yes Reported, Patient   oxybutynin chloride 15 MG TB24 TAKE ONE TABLET BY MOUTH ONE TIME DAILY Yaya Linton MD Beth Mulder, PharmD

## 2017-06-27 NOTE — PROGRESS NOTES
Patient arrived on unit at 1445. VSS with temp of 99.3. Mild forgetfulness. Denies dizziness, lightheadedness, chest pain, SOA, vomiting, abdominal pain. Endorses nausea without emesis. Headache earlier today, now denies pain.Tele placed, IVF started, echo in room. Ambulated form cart with assist of 1. Will update oncoming shift.

## 2017-06-27 NOTE — TELEPHONE ENCOUNTER
Patient's daughter Tina called back.   Reporting mother is feeling too weak to come see PCP today, OV cancelled.   Pt lives in [place] home and fell at 5 am last night due to weakness and dizziness.   Pt is not confused per daughter, but could be dehydrated.   Pt's call reviewed below with daughter and advised pt be taken to ER.   YULIYAI sent to PCP.

## 2017-06-27 NOTE — TELEPHONE ENCOUNTER
Reason for call:  Patient reporting a symptom  Symptom or request: nausea  Duration (how long have symptoms been present): 3 weeks  Have you been treated for this before? Yes  Additional comments: please call patient Daughter Tina at [phone]  Phone Number patient can be reached at:  [phone]  Best Time:  any  Can we leave a detailed message on this number:  YES  Call taken on 6/27/2017 at 8:13 AM by KIKE MALONE

## 2017-06-27 NOTE — IP AVS SNAPSHOT
"          [facility]: [phone]                                              INTERAGENCY TRANSFER FORM - PHYSICIAN ORDERS   2017                    Hospital Admission Date: 2017  KHANH BRAGG   : 10/2/1929  Sex: Female        Attending Provider: Uziel Mirza MD     Allergies:  No Known Allergies    Infection:  None   Service:  HOSPITALIST    Ht:  1.651 m (5' 5\")   Wt:  59 kg (130 lb)   Admission Wt:  59 kg (130 lb)    BMI:  21.63 kg/m 2   BSA:  1.64 m 2            Patient PCP Information     Provider PCP Type    Yaya Hatfield MD General      ED Clinical Impression     Diagnosis Description Comment Added By Time Added    Nausea [R11.0] Nausea [R11.0]  Haris Hilario MD 2017 12:59 PM    Fall, initial encounter [W19.XXXA] Fall, initial encounter [W19.XXXA]  Haris Hilario MD 2017 12:59 PM    Hypokalemia [E87.6] Hypokalemia [E87.6]  Haris Hilario MD 2017 12:59 PM    Weakness [R53.1] Weakness [R53.1]  Haris Hilario MD 2017 12:59 PM      Hospital Problems as of 2017              Priority Class Noted POA    Syncope Medium  2017 Yes      Non-Hospital Problems as of 2017              Priority Class Noted    Anxiety   3/6/2013    Familial tremor   3/6/2013    Dry eye syndrome   Unknown    Blepharitis of both eyes   Unknown    Depression, major, recurrent, in complete remission (H)   Unknown    Persistent insomnia   11/3/2014    Urge incontinence of urine   2015    Nausea without vomiting   2015    Abdominal pain, epigastric Medium  10/21/2015    ACP (advance care planning)   10/23/2015    Neck pain Medium  2016    Left knee pain Medium  2016      Code Status History     Date Active Date Inactive Code Status Order ID Comments User Context    2017  7:04 PM  Full Code [id number]  Lynn Mast PA-C Outpatient    2017  2:44 PM 2017  7:04 PM Full Code " [id number]  Uziel Mirza MD Inpatient         Medication Review      START taking        Dose / Directions Comments    docusate sodium 100 MG capsule   Commonly known as:  COLACE   Used for:  Constipation, unspecified constipation type        Dose:  100 mg   Take 1 capsule (100 mg) by mouth 2 times daily as needed for constipation   Quantity:  60 capsule   Refills:  0        order for DME   Used for:  Fall, initial encounter        Equipment being ordered: Walker Wheels () and Walker () Treatment Diagnosis: Difficulty ambulating   Quantity:  1 each   Refills:  0        polyethylene glycol Packet   Commonly known as:  MIRALAX/GLYCOLAX   Used for:  Constipation, unspecified constipation type        Dose:  17 g   Take 17 g by mouth daily as needed (constipation)   Quantity:  7 packet   Refills:  0        prochlorperazine 5 MG tablet   Commonly known as:  COMPAZINE   Used for:  Nausea        Dose:  5 mg   Take 1 tablet (5 mg) by mouth every 6 hours as needed for vomiting   Quantity:  30 tablet   Refills:  0        QUEtiapine 25 MG tablet   Commonly known as:  SEROQUEL   Used for:  Insomnia, unspecified type        Dose:  25 mg   Take 1 tablet (25 mg) by mouth nightly as needed   Quantity:  30 tablet   Refills:  0          CONTINUE these medications which have NOT CHANGED        Dose / Directions Comments    acetaminophen 500 MG tablet   Commonly known as:  TYLENOL   Used for:  Neck pain        Dose:  1000 mg   Take 2 tablets (1,000 mg) by mouth 3 times daily as needed for mild pain   Quantity:  100 tablet   Refills:  0        ascorbic acid 1000 MG Tabs   Commonly known as:  vitamin C        Dose:  1000 mg   Take 1,000 mg by mouth daily.   Refills:  0        aspirin 81 MG tablet        Dose:  1 tablet   Take 1 tablet by mouth daily.   Refills:  0        CALCIUM 600 PO        Dose:  1 tablet   Take 1 tablet by mouth daily.   Refills:  0        fish oil-omega-3 fatty acids 1000 MG capsule        Dose:  2 g    Take 2 g by mouth daily.   Refills:  0        GLUCOSAMINE CHOND COMPLEX/MSM Tabs        Dose:  1 tablet   Take 1 tablet by mouth daily.   Refills:  0        MULTIVITAL Tabs        Dose:  1 tablet   Take 1 tablet by mouth daily.   Refills:  0        omeprazole 40 MG capsule   Commonly known as:  priLOSEC   Used for:  Epigastric pain, Nausea        Dose:  40 mg   Take 1 capsule (40 mg) by mouth daily Take 30-60 minutes before a meal.   Quantity:  30 capsule   Refills:  5        ondansetron 4 MG tablet   Commonly known as:  ZOFRAN   Used for:  Nausea        Dose:  4 mg   Take 1 tablet (4 mg) by mouth every 6 hours as needed for nausea   Quantity:  10 tablet   Refills:  0        oxybutynin chloride 15 MG Tb24   Used for:  Urge incontinence of urine        TAKE ONE TABLET BY MOUTH ONE TIME DAILY   Quantity:  90 tablet   Refills:  3        PARoxetine 20 MG tablet   Commonly known as:  PAXIL   Used for:  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)        TAKE ONE TABLET BY MOUTH AT BEDTIME   Quantity:  90 tablet   Refills:  2        pramipexole 0.125 MG tablet   Commonly known as:  MIRAPEX   Used for:  Familial tremor        TAKE ONE TABLET BY MOUTH TWICE DAILY   Quantity:  180 tablet   Refills:  1        propranolol 120 MG 24 hr capsule   Commonly known as:  INDERAL LA   Used for:  Familial tremor        TAKE TWO CAPSULES BY MOUTH DAILY   Quantity:  180 capsule   Refills:  3        RESTASIS 0.05 % ophthalmic emulsion   Generic drug:  cycloSPORINE        Dose:  1 drop   Apply 1 drop to eye 2 times daily   Refills:  0        TOBRADEX ophthalmic ointment   Generic drug:  tobramycin-dexamethasone        Dose:  1 Application   1 Application At Bedtime   Refills:  0        vitamin D 1000 UNITS capsule        Dose:  1 capsule   Take 1 capsule by mouth daily.   Refills:  0          STOP taking     ALPRAZolam 0.25 MG tablet   Commonly known as:  XANAX           ciprofloxacin 500 MG tablet   Commonly known as:  CIPRO            zolpidem 5 MG tablet   Commonly known as:  AMBIEN                     Further instructions from your care team       [facility]   [phone]  **Call if not contacted within 24-48 hours of discharge**    Summary of Visit     Reason for your hospital stay       You were here for evaluation of nausea. We think this may be due to constipation.             After Care     Activity       Your activity upon discharge: activity as tolerated. You shoulduse your walker when ambulating.       Diet       Follow this diet upon discharge: Orders Placed This Encounter      Room Service    Regular adult diet, try and stick to bland foods. Limit acidic foods, coffee, alcohol.   Drink plenty of water to stay hydrated       Discharge Instructions       Stop Ambien and xanax and START Seroquel nightly for sleep, if you do not tolerate this medication well call your doctor for further instructions. It is very important that you do not take both Ambien and Seroquel.    You can use both colace (stool softener) and miralax to help you have regular bowel movements at home. Titrate this as needed. You can use one or both medications daily, every other day, up to twice daily as needed to have a soft bowel movement every other day to every day. Home nursing can help titrate these medications.     Continue using zofran and compazine at home as needed for nausea. Only use these medications as needed.             Referrals     Home Care PT Referral for Hospital Discharge       PT to eval and treat    Your provider has ordered home care - physical therapy. If you have not been contacted within 2 days of your discharge please call the department phone number listed on the top of this document.       Home care nursing referral       RN skilled nursing visit. RN to assess vital signs and weight and hydration, nutrition and bowel status, medication tolerance after changes.    Your provider has ordered home care nursing services. If you  have not been contacted within 2 days of your discharge please call the inpatient department phone number at [phone] .              MD face to face encounter       Documentation of Face to Face and Certification for Home Health Services    I certify that patient: Margy Babin is under my care and that I, or a nurse practitioner or physician's assistant working with me, had a face-to-face encounter that meets the physician face-to-face encounter requirements with this patient on: 6/28/2017.    This encounter with the patient was in whole, or in part, for the following medical condition, which is the primary reason for home health care: generalized weakness, persistent nausea, insomnia.     I certify that, based on my findings, the following services are medically necessary home health services: Nursing and Physical Therapy.    My clinical findings support the need for the above services because: Nurse is needed: To assess bowel function and sleep after changes in medications or other medical regimen. and Physical Therapy Services are needed to assess and treat the following functional impairments: generalized weakness, unsteady gait.     Further, I certify that my clinical findings support that this patient is homebound (i.e. absences from home require considerable and taxing effort and are for medical reasons or [employer] services or infrequently or of short duration when for other reasons) because: Requires assistance of another person or specialized equipment to access medical services because patient:  Patient no longer drives.    Based on the above findings. I certify that this patient is confined to the home and needs intermittent skilled nursing care, physical therapy and/or speech therapy.  The patient is under my care, and I have initiated the establishment of the plan of care.  This patient will be followed by a physician who will periodically review the plan of care.  Physician/Provider  to provide follow up care: Yaya Hatfield    Attending hospital physician (the Medicare certified [facility] provider): Daljit Graves MD  Physician Signature: See electronic signature associated with these discharge orders.  Date: 6/28/2017                  Follow-Up Appointment Instructions     Future Labs/Procedures    Follow-up and recommended labs and tests      Comments:    Follow up with primary care provider within 7 days to have CBC rechecked and follow up from hospitalization. If you do not need to be seen for symptoms, ask  To have home nurse collect lab. Home nurse can help titrate bowel medications.      Follow-Up Appointment Instructions     Follow-up and recommended labs and tests        Follow up with primary care provider within 7 days to have CBC rechecked and follow up from hospitalization. If you do not need to be seen for symptoms, ask  To have home nurse collect lab. Home nurse can help titrate bowel medications.

## 2017-06-27 NOTE — IP AVS SNAPSHOT
[facility]    [address] [place] [id number]    Phone:  [phone]                                       After Visit Summary   6/27/2017    Margy Babin    MRN: [id number]           After Visit Summary Signature Page     I have received my discharge instructions, and my questions have been answered. I have discussed any challenges I see with this plan with the nurse or doctor.    ..........................................................................................................................................  Patient/Patient Representative Signature      ..........................................................................................................................................  Patient Representative Print Name and Relationship to Patient    ..................................................               ................................................  Date                                            Time    ..........................................................................................................................................  Reviewed by Signature/Title    ...................................................              ..............................................  Date                                                            Time

## 2017-06-27 NOTE — IP AVS SNAPSHOT
MRN:[id number]                      After Visit Summary   6/27/2017    Margy Babin    MRN: [id number]           Thank you!     Thank you for choosing [facility] for your care. Our goal is always to provide you with excellent care. Hearing back from our patients is one way we can continue to improve our services. Please take a few minutes to complete the written survey that you may receive in the mail after you visit with us. Thank you!        Patient Information     Date Of Birth          10/2/1929        About your hospital stay     You were admitted on:  June 27, 2017 You last received care in the:  [facility] Observation Unit    You were discharged on:  June 28, 2017        Reason for your hospital stay       You were here for evaluation of nausea. We think this may be due to constipation.                  Who to Call     For medical emergencies, please call 911.  For non-urgent questions about your medical care, please call your primary care provider or clinic, [phone]          Attending Provider     Provider Specialty    Haris Hilario MD Emergency Medicine    [place], Uziel Blackman MD Internal Medicine       Primary Care Provider Office Phone # Fax #    Yaya Hatfield [phone]      After Care Instructions     Activity       Your activity upon discharge: activity as tolerated. You shoulduse your walker when ambulating.            Diet       Follow this diet upon discharge: Orders Placed This Encounter      Room Service    Regular adult diet, try and stick to bland foods. Limit acidic foods, coffee, alcohol.   Drink plenty of water to stay hydrated            Discharge Instructions       Stop Ambien and xanax and START Seroquel nightly for sleep, if you do not tolerate this medication well call your doctor for further instructions. It is very important that you do not take both Ambien and Seroquel.    You can use both colace (stool softener)  "and miralax to help you have regular bowel movements at home. Titrate this as needed. You can use one or both medications daily, every other day, up to twice daily as needed to have a soft bowel movement every other day to every day. Home nursing can help titrate these medications.     Continue using zofran and compazine at home as needed for nausea. Only use these medications as needed.                  Follow-up Appointments     Follow-up and recommended labs and tests        Follow up with primary care provider within 7 days to have CBC rechecked and follow up from hospitalization. If you do not need to be seen for symptoms, ask  To have home nurse collect lab. Home nurse can help titrate bowel medications.                  Additional Services     Home Care PT Referral for Hospital Discharge       PT to eval and treat    Your provider has ordered home care - physical therapy. If you have not been contacted within 2 days of your discharge please call the department phone number listed on the top of this document.            Home care nursing referral       RN skilled nursing visit. RN to assess vital signs and weight and hydration, nutrition and bowel status, medication tolerance after changes.    Your provider has ordered home care nursing services. If you have not been contacted within 2 days of your discharge please call the inpatient department phone number at [phone] .                  Further instructions from your care team       [facility]   [phone]  **Call if not contacted within 24-48 hours of discharge**    Pending Results     No orders found for last 3 day(s).            Admission Information     Date & Time Provider Department Dept. Phone    6/27/2017 Uziel Mirza MD [facility] Observation Unit [phone]      Your Vitals Were     Blood Pressure Pulse Temperature Respirations Height Weight    131/57 (BP Location: Right arm) 68 99.6  F (37.6  C) (Oral) 18 1.651 m (5' 5\") 59 " kg (130 lb)    Pulse Oximetry BMI (Body Mass Index)                94% 21.63 kg/m2          Care EveryWhere ID     This is your Care EveryWhere ID. This could be used by other organizations to access your [facility] medical records  [id number]        Equal Access to Services     KIAH WOOD : Hadii aad ku hadpatriciadavid Soraisa, waaxda luqadaha, qaybta kaalmada adeeleanor, waxjose lily manojaris coxcesia alegreesvin thornton. So [facility] [phone].    ATENCIÓN: Si habla español, tiene a hector disposición servicios gratuitos de asistencia lingüística. Llame al [phone].    We comply with applicable federal civil rights laws and [place] laws. We do not discriminate on the basis of race, color, national origin, age, disability sex, sexual orientation or gender identity.               Review of your medicines      START taking        Dose / Directions    docusate sodium 100 MG capsule   Commonly known as:  COLACE   Used for:  Constipation, unspecified constipation type        Dose:  100 mg   Take 1 capsule (100 mg) by mouth 2 times daily as needed for constipation   Quantity:  60 capsule   Refills:  0       order for DME   Used for:  Fall, initial encounter        Equipment being ordered: Walker Wheels () and Walker () Treatment Diagnosis: Difficulty ambulating   Quantity:  1 each   Refills:  0       polyethylene glycol Packet   Commonly known as:  MIRALAX/GLYCOLAX   Used for:  Constipation, unspecified constipation type        Dose:  17 g   Take 17 g by mouth daily as needed (constipation)   Quantity:  7 packet   Refills:  0       prochlorperazine 5 MG tablet   Commonly known as:  COMPAZINE   Used for:  Nausea        Dose:  5 mg   Take 1 tablet (5 mg) by mouth every 6 hours as needed for vomiting   Quantity:  30 tablet   Refills:  0       QUEtiapine 25 MG tablet   Commonly known as:  SEROQUEL   Used for:  Insomnia, unspecified type        Dose:  25 mg   Take 1 tablet (25 mg) by mouth nightly as needed   Quantity:  30 tablet    Refills:  0         CONTINUE these medicines which have NOT CHANGED        Dose / Directions    acetaminophen 500 MG tablet   Commonly known as:  TYLENOL   Used for:  Neck pain        Dose:  1000 mg   Take 2 tablets (1,000 mg) by mouth 3 times daily as needed for mild pain   Quantity:  100 tablet   Refills:  0       ascorbic acid 1000 MG Tabs   Commonly known as:  vitamin C        Dose:  1000 mg   Take 1,000 mg by mouth daily.   Refills:  0       aspirin 81 MG tablet        Dose:  1 tablet   Take 1 tablet by mouth daily.   Refills:  0       CALCIUM 600 PO        Dose:  1 tablet   Take 1 tablet by mouth daily.   Refills:  0       fish oil-omega-3 fatty acids 1000 MG capsule        Dose:  2 g   Take 2 g by mouth daily.   Refills:  0       GLUCOSAMINE CHOND COMPLEX/MSM Tabs        Dose:  1 tablet   Take 1 tablet by mouth daily.   Refills:  0       MULTIVITAL Tabs        Dose:  1 tablet   Take 1 tablet by mouth daily.   Refills:  0       omeprazole 40 MG capsule   Commonly known as:  priLOSEC   Used for:  Epigastric pain, Nausea        Dose:  40 mg   Take 1 capsule (40 mg) by mouth daily Take 30-60 minutes before a meal.   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)        TAKE ONE TABLET BY MOUTH AT BEDTIME   Quantity:  90 tablet   Refills:  2       pramipexole 0.125 MG tablet   Commonly known as:  MIRAPEX   Used for:  Familial tremor        TAKE ONE TABLET BY MOUTH TWICE DAILY   Quantity:  180 tablet   Refills:  1       propranolol 120 MG 24 hr  capsule   Commonly known as:  INDERAL LA   Used for:  Familial tremor        TAKE TWO CAPSULES BY MOUTH DAILY   Quantity:  180 capsule   Refills:  3       RESTASIS 0.05 % ophthalmic emulsion   Generic drug:  cycloSPORINE        Dose:  1 drop   Apply 1 drop to eye 2 times daily   Refills:  0       TOBRADEX ophthalmic ointment   Generic drug:  tobramycin-dexamethasone        Dose:  1 Application   1 Application At Bedtime   Refills:  0       vitamin D 1000 UNITS capsule        Dose:  1 capsule   Take 1 capsule by mouth daily.   Refills:  0         STOP taking     ALPRAZolam 0.25 MG tablet   Commonly known as:  XANAX           ciprofloxacin 500 MG tablet   Commonly known as:  CIPRO           zolpidem 5 MG tablet   Commonly known as:  AMBIEN                Where to get your medicines      These medications were sent to [facility] [id number] [place] [id number] [facility] [address]     Phone:  [phone]     prochlorperazine 5 MG tablet    QUEtiapine 25 MG tablet         Some of these will need a paper prescription and others can be bought over the counter. Ask your nurse if you have questions.     Bring a paper prescription for each of these medications     ondansetron 4 MG tablet    order for DME       You don't need a prescription for these medications     docusate sodium 100 MG capsule    polyethylene glycol Packet                Protect others around you: Learn how to safely use, store and throw away your medicines at www.disposemymeds.org.             Medication List: This is a list of all your medications and when to take them. Check marks below indicate your daily home schedule. Keep this list as a reference.      Medications           Morning Afternoon Evening Bedtime As Needed    acetaminophen 500 MG tablet   Commonly known as:  TYLENOL   Take 2 tablets (1,000 mg) by mouth 3 times daily as needed for mild pain   Last time this was given:  1,000 mg on 6/28/2017  5:49 PM                                 ascorbic acid 1000 MG Tabs   Commonly known as:  vitamin C   Take 1,000 mg by mouth daily.                                aspirin 81 MG tablet   Take 1 tablet by mouth daily.                                CALCIUM 600 PO   Take 1 tablet by mouth daily.                                docusate sodium 100 MG capsule   Commonly known as:  COLACE   Take 1 capsule (100 mg) by mouth 2 times daily as needed for constipation   Last time this was given:  100 mg on 6/27/2017  8:46 PM                                fish oil-omega-3 fatty acids 1000 MG capsule   Take 2 g by mouth daily.                                GLUCOSAMINE CHOND COMPLEX/MSM Tabs   Take 1 tablet by mouth daily.                                MULTIVITAL Tabs   Take 1 tablet by mouth daily.                                omeprazole 40 MG capsule   Commonly known as:  priLOSEC   Take 1 capsule (40 mg) by mouth daily Take 30-60 minutes before a meal.                                ondansetron 4 MG tablet   Commonly known as:  ZOFRAN   Take 1 tablet (4 mg) by mouth every 6 hours as needed for nausea                                order for DME   Equipment being ordered: Walker Wheels () and Walker () Treatment Diagnosis: Difficulty ambulating                                oxybutynin chloride 15 MG Tb24   TAKE ONE TABLET BY MOUTH ONE TIME DAILY                                PARoxetine 20 MG tablet   Commonly known as:  PAXIL   TAKE ONE TABLET BY MOUTH AT BEDTIME   Last time this was given:  20 mg on 6/28/2017  8:25 AM                                polyethylene glycol Packet   Commonly known as:  MIRALAX/GLYCOLAX   Take 17 g by mouth daily as needed (constipation)                                pramipexole 0.125 MG tablet   Commonly known as:  MIRAPEX   TAKE ONE TABLET BY MOUTH TWICE DAILY                                prochlorperazine 5 MG tablet   Commonly known as:  COMPAZINE   Take 1 tablet (5 mg) by mouth every 6 hours as  needed for vomiting   Last time this was given:  5 mg on 6/28/2017 12:25 PM                                propranolol 120 MG 24 hr capsule   Commonly known as:  INDERAL LA   TAKE TWO CAPSULES BY MOUTH DAILY                                QUEtiapine 25 MG tablet   Commonly known as:  SEROQUEL   Take 1 tablet (25 mg) by mouth nightly as needed   Last time this was given:  12.5 mg on 6/28/2017  2:03 AM                                RESTASIS 0.05 % ophthalmic emulsion   Apply 1 drop to eye 2 times daily   Last time this was given:  1 drop on 6/28/2017  8:25 AM   Generic drug:  cycloSPORINE                                TOBRADEX ophthalmic ointment   1 Application At Bedtime   Generic drug:  tobramycin-dexamethasone                                vitamin D 1000 UNITS capsule   Take 1 capsule by mouth daily.

## 2017-06-27 NOTE — ED NOTES
Pt became dizzy this am and fell, has bruise on right forehead.  Appetite and nausea for 3 weeks dtr concerned she is not getting enough to eat, this happened before and resolved on its own

## 2017-06-27 NOTE — ED NOTES
Pt became dizzy upon standing for orthostatics and had to lay back down, unable to obtain orthostatics

## 2017-06-28 ENCOUNTER — APPOINTMENT (OUTPATIENT)
Dept: PHYSICAL THERAPY | Facility: CLINIC | Age: 82
End: 2017-06-28
Attending: INTERNAL MEDICINE
Payer: MEDICARE

## 2017-06-28 VITALS
TEMPERATURE: 99.6 F | RESPIRATION RATE: 18 BRPM | DIASTOLIC BLOOD PRESSURE: 57 MMHG | BODY MASS INDEX: 21.66 KG/M2 | OXYGEN SATURATION: 94 % | SYSTOLIC BLOOD PRESSURE: 131 MMHG | WEIGHT: 130 LBS | HEIGHT: 65 IN | HEART RATE: 68 BPM

## 2017-06-28 LAB
ANION GAP SERPL CALCULATED.3IONS-SCNC: 4 MMOL/L (ref 3–14)
BASOPHILS # BLD AUTO: 0.1 10E9/L (ref 0–0.2)
BASOPHILS NFR BLD AUTO: 0.6 %
BUN SERPL-MCNC: 9 MG/DL (ref 7–30)
CALCIUM SERPL-MCNC: 7.9 MG/DL (ref 8.5–10.1)
CHLORIDE SERPL-SCNC: 109 MMOL/L (ref 94–109)
CO2 SERPL-SCNC: 29 MMOL/L (ref 20–32)
CREAT SERPL-MCNC: 0.74 MG/DL (ref 0.52–1.04)
DIFFERENTIAL METHOD BLD: ABNORMAL
EOSINOPHIL # BLD AUTO: 0.2 10E9/L (ref 0–0.7)
EOSINOPHIL NFR BLD AUTO: 1.7 %
ERYTHROCYTE [DISTWIDTH] IN BLOOD BY AUTOMATED COUNT: 14.1 % (ref 10–15)
GFR SERPL CREATININE-BSD FRML MDRD: 74 ML/MIN/1.7M2
GLUCOSE SERPL-MCNC: 100 MG/DL (ref 70–99)
HCT VFR BLD AUTO: 35.6 % (ref 35–47)
HGB BLD-MCNC: 11.8 G/DL (ref 11.7–15.7)
IMM GRANULOCYTES # BLD: 0 10E9/L (ref 0–0.4)
IMM GRANULOCYTES NFR BLD: 0.3 %
LYMPHOCYTES # BLD AUTO: 2.3 10E9/L (ref 0.8–5.3)
LYMPHOCYTES NFR BLD AUTO: 17.7 %
MCH RBC QN AUTO: 31.9 PG (ref 26.5–33)
MCHC RBC AUTO-ENTMCNC: 33.1 G/DL (ref 31.5–36.5)
MCV RBC AUTO: 96 FL (ref 78–100)
MONOCYTES # BLD AUTO: 1.2 10E9/L (ref 0–1.3)
MONOCYTES NFR BLD AUTO: 9.4 %
NEUTROPHILS # BLD AUTO: 8.9 10E9/L (ref 1.6–8.3)
NEUTROPHILS NFR BLD AUTO: 70.3 %
NRBC # BLD AUTO: 0 10*3/UL
NRBC BLD AUTO-RTO: 0 /100
PLATELET # BLD AUTO: 245 10E9/L (ref 150–450)
POTASSIUM SERPL-SCNC: 4 MMOL/L (ref 3.4–5.3)
RBC # BLD AUTO: 3.7 10E12/L (ref 3.8–5.2)
SODIUM SERPL-SCNC: 142 MMOL/L (ref 133–144)
WBC # BLD AUTO: 12.7 10E9/L (ref 4–11)

## 2017-06-28 PROCEDURE — 25000125 ZZHC RX 250: Performed by: PHYSICIAN ASSISTANT

## 2017-06-28 PROCEDURE — 85025 COMPLETE CBC W/AUTO DIFF WBC: CPT | Performed by: PHYSICIAN ASSISTANT

## 2017-06-28 PROCEDURE — 96375 TX/PRO/DX INJ NEW DRUG ADDON: CPT

## 2017-06-28 PROCEDURE — 25000128 H RX IP 250 OP 636: Performed by: INTERNAL MEDICINE

## 2017-06-28 PROCEDURE — 40000193 ZZH STATISTIC PT WARD VISIT

## 2017-06-28 PROCEDURE — A9270 NON-COVERED ITEM OR SERVICE: HCPCS | Mod: GY | Performed by: INTERNAL MEDICINE

## 2017-06-28 PROCEDURE — 80048 BASIC METABOLIC PNL TOTAL CA: CPT | Performed by: INTERNAL MEDICINE

## 2017-06-28 PROCEDURE — 25000132 ZZH RX MED GY IP 250 OP 250 PS 637: Mod: GY | Performed by: INTERNAL MEDICINE

## 2017-06-28 PROCEDURE — G0378 HOSPITAL OBSERVATION PER HR: HCPCS

## 2017-06-28 PROCEDURE — 99207 ZZC APP CREDIT; MD BILLING SHARED VISIT: CPT | Performed by: PHYSICIAN ASSISTANT

## 2017-06-28 PROCEDURE — 99217 ZZC OBSERVATION CARE DISCHARGE: CPT | Performed by: INTERNAL MEDICINE

## 2017-06-28 PROCEDURE — 97116 GAIT TRAINING THERAPY: CPT | Mod: GP

## 2017-06-28 PROCEDURE — 36415 COLL VENOUS BLD VENIPUNCTURE: CPT | Performed by: PHYSICIAN ASSISTANT

## 2017-06-28 PROCEDURE — 97161 PT EVAL LOW COMPLEX 20 MIN: CPT | Mod: GP

## 2017-06-28 PROCEDURE — 36415 COLL VENOUS BLD VENIPUNCTURE: CPT | Performed by: INTERNAL MEDICINE

## 2017-06-28 RX ORDER — ONDANSETRON 4 MG/1
4 TABLET, ORALLY DISINTEGRATING ORAL EVERY 6 HOURS PRN
Status: DISCONTINUED | OUTPATIENT
Start: 2017-06-28 | End: 2017-06-28 | Stop reason: HOSPADM

## 2017-06-28 RX ORDER — DOCUSATE SODIUM 100 MG/1
100 CAPSULE, LIQUID FILLED ORAL 2 TIMES DAILY PRN
Qty: 60 CAPSULE | Refills: 0 | COMMUNITY
Start: 2017-06-28 | End: 2022-08-12

## 2017-06-28 RX ORDER — POLYETHYLENE GLYCOL 3350 17 G/17G
17 POWDER, FOR SOLUTION ORAL DAILY PRN
Status: DISCONTINUED | OUTPATIENT
Start: 2017-06-28 | End: 2017-06-28 | Stop reason: HOSPADM

## 2017-06-28 RX ORDER — ONDANSETRON 4 MG/1
4 TABLET, FILM COATED ORAL EVERY 6 HOURS PRN
Qty: 10 TABLET | Refills: 0 | Status: SHIPPED | OUTPATIENT
Start: 2017-06-28 | End: 2018-01-09

## 2017-06-28 RX ORDER — PROCHLORPERAZINE MALEATE 5 MG
5 TABLET ORAL EVERY 6 HOURS PRN
Qty: 30 TABLET | Refills: 0 | Status: SHIPPED | OUTPATIENT
Start: 2017-06-28 | End: 2018-01-09

## 2017-06-28 RX ORDER — QUETIAPINE FUMARATE 25 MG/1
25 TABLET, FILM COATED ORAL
Qty: 30 TABLET | Refills: 0 | Status: SHIPPED | OUTPATIENT
Start: 2017-06-28 | End: 2017-07-25

## 2017-06-28 RX ORDER — PROPRANOLOL HYDROCHLORIDE 120 MG/1
120 CAPSULE, EXTENDED RELEASE ORAL DAILY
Status: CANCELLED | OUTPATIENT
Start: 2017-06-29

## 2017-06-28 RX ORDER — POLYETHYLENE GLYCOL 3350 17 G/17G
17 POWDER, FOR SOLUTION ORAL DAILY PRN
Qty: 7 PACKET | Refills: 0 | COMMUNITY
Start: 2017-06-28 | End: 2021-10-07

## 2017-06-28 RX ADMIN — PAROXETINE HYDROCHLORIDE 20 MG: 20 TABLET, FILM COATED ORAL at 08:25

## 2017-06-28 RX ADMIN — ACETAMINOPHEN 1000 MG: 500 TABLET, FILM COATED ORAL at 17:49

## 2017-06-28 RX ADMIN — CYCLOSPORINE 1 DROP: 0.5 EMULSION OPHTHALMIC at 08:25

## 2017-06-28 RX ADMIN — ONDANSETRON 4 MG: 4 TABLET, ORALLY DISINTEGRATING ORAL at 10:49

## 2017-06-28 RX ADMIN — QUETIAPINE FUMARATE 12.5 MG: 25 TABLET, FILM COATED ORAL at 02:03

## 2017-06-28 RX ADMIN — PANTOPRAZOLE SODIUM 40 MG: 40 INJECTION, POWDER, FOR SOLUTION INTRAVENOUS at 16:33

## 2017-06-28 RX ADMIN — ACETAMINOPHEN 1000 MG: 500 TABLET, FILM COATED ORAL at 02:10

## 2017-06-28 RX ADMIN — SODIUM CHLORIDE: 9 INJECTION, SOLUTION INTRAVENOUS at 02:10

## 2017-06-28 RX ADMIN — PROCHLORPERAZINE MALEATE 5 MG: 5 TABLET, FILM COATED ORAL at 12:25

## 2017-06-28 RX ADMIN — ASPIRIN 81 MG 81 MG: 81 TABLET ORAL at 08:25

## 2017-06-28 RX ADMIN — SENNOSIDES 1 TABLET: 8.6 TABLET, FILM COATED ORAL at 08:25

## 2017-06-28 ASSESSMENT — PAIN DESCRIPTION - DESCRIPTORS: DESCRIPTORS: ACHING;HEADACHE

## 2017-06-28 NOTE — PLAN OF CARE
Problem: Discharge Planning  Goal: Discharge Planning (Adult, OB, Behavioral, Peds)  Outcome: No Change  List all  goals to be met before discharge:   - Diagnostic tests and consults completed and resulted - not met, trops negative x2  - No further episodes of syncope and any new arrhythmia addressed with controlled heart rates - partially met, Tele SR w/ BBB  - Vital signs normal or at patient baseline and orthostatic vitals are normal and patient not lightheaded with standing - met   - Tolerating oral intake to maintain hydration - partially met  - Safe disposition plan has been identified - not met.

## 2017-06-28 NOTE — DISCHARGE INSTRUCTIONS
[facility]   [phone]  **Call if not contacted within 24-48 hours of discharge**

## 2017-06-28 NOTE — PLAN OF CARE
Problem: Discharge Planning  Goal: Discharge Planning (Adult, OB, Behavioral, Peds)  CM... Anticipate goal for discharge to home with home care on 6/28/17. LS

## 2017-06-28 NOTE — PROGRESS NOTES
"List all  goals to be met before discharge:     Diagnostic tests and consults completed and resulted   + partially met, will have CBC check later today    No further episodes of syncope and any new arrhythmia addressed with controlled heart rates   + met (thus far); tele SR, rate 60's-70's, no syncope    Vital signs normal or at patient baseline and orthostatic vitals are normal and patient not lightheaded with standing   + partially met; not lightheaded but states she feels \"goofy\"  + neuros checked, intact--pt c/o feeling weak, and bilateral equal weakness noted    Tolerating oral intake to maintain hydration   + partially met, now c/o nausea--will intervene    Safe disposition plan has been identified  + met, see PT note.          "

## 2017-06-28 NOTE — PROGRESS NOTES
"List all  goals to be met before discharge:     Diagnostic tests and consults completed and resulted   + met, CBC recheck drawn and improving    No further episodes of syncope and any new arrhythmia addressed with controlled heart rates   + met (thus far); tele SR, rate 60's-70's, no syncope    Vital signs normal or at patient baseline and orthostatic vitals are normal and patient not lightheaded with standing   + partially met; not lightheaded but states she feels \"goofy\"  + neuros checked, intact--pt c/o feeling weak, and bilateral equal weakness noted    Tolerating oral intake to maintain hydration   + partially met, now c/o nausea--zofran ineffective, BM ineffective, compazine effective, appetite low, will try dinner      Safe disposition plan has been identified  + met, see PT note.            "

## 2017-06-28 NOTE — PROGRESS NOTES
"[facility]    Hospitalist Progress Note      Assessment & Plan   Margy Babin is a 87 year old female with a past medical history significant for anxiety, depression, and hereditary benign tremor who was admitted on 6/27/2017 after presenting after a fall at home as well as persistent nausea.     Persistent nausea without vomiting. Patient and daughter report 3 weeks of persistent nausea at home without associated vomiting or abdominal pain. Upon further review I see notes dating back to January mentioning similar symptoms.  Daughter says patient feels so poorly at times she is unable to get out of bed. No provoking or palliative factors that patient can identify. Seen recently by PCP who switched her from compazine to zofran due to worsening tremor. CMP is unremarkable and lipase WNL. CT abdomen and pelvis are unrevealing for cause of nausea aside from a small hiatal hernia. No hematochezia or melena though patient reports she has been constipated and last \"good bowel movement\" was around 3 weeks prior. No obstruction on CT. Suspect some of her symptoms may be due to constipation. Patient feeling better after 2 bowel movements.   --continue prn zofran and compazine, compazine seems to work better. Could consider adding phenergan  --bowel regimen  --continue PPI  --continue IVF    -- consider GI consult as an outpatient if symptoms do not improve     Fall with possible syncope. Patient does not have good recollection of events but states she fell at home and believes she briefly lost consciousness. She did hit her head. CT head on admission is negative. Suspect fall due to dehydration in the setting of poor po intake. Troponin negative x2.   --tele shows SR  -- ECHO 6/28 shows EF 55% with mild to moderate AR. No WMA  -- holding PTA xanax  --seen by PT, ok to return home with home care     Insomnia. Patient has been taking ambien at home as well as xanax bid. In light of her age will " stop ambien and try Seroquel 25mg at bedtime. Patient had good results 6/27. Will prescribed at discharge.     Leukocytosis. WBC is elevated on admission at 16.1 with left shift. No source identified. Recent urine culture from 6/27 10-50k of mixed peewee. No respiratory symptoms. She has remained afebrile.   --recheck this afternoon     Familial tremor. Continue PTA propranolol at half dose to see if this helps with patient's fatigue . Monitor with compazine use.     Anxiety. Xanax discontinued on admission.     DVT Prophylaxis: Pneumatic Compression Devices  Code Status: Full Code    Disposition: Expected discharge 6/29 if patient able to tolerate po and nausea improved.     This patient was seen and examined with Dr. Graves who agrees with the above plan.    Lynn Mast PA-C    Interval History   Intermittent nausea, better at times and significant at others. Improved with compazine. Able to tolerate small po. Had a bowel movement this afternoon.  Fells fatigued.     -Data reviewed today: I reviewed all new labs and imaging results over the last 24 hours. I personally reviewed no images or EKG's today.    Physical Exam   Temp: 97.2  F (36.2  C) Temp src: Oral BP: 143/46 Pulse: 68 Heart Rate: 71 Resp: 18 SpO2: 98 % O2 Device: None (Room air)    Vitals:    06/27/17 1014   Weight: 59 kg (130 lb)     Vital Signs with Ranges  Temp:  [97.2  F (36.2  C)-99.9  F (37.7  C)] 97.2  F (36.2  C)  Pulse:  [68-75] 68  Heart Rate:  [65-75] 71  Resp:  [18] 18  BP: (101-175)/(46-99) 143/46  SpO2:  [94 %-98 %] 98 %  I/O last 3 completed shifts:  In: 120 [P.O.:120]  Out: -     Constitutional: Alert and oriented, sitting up in bed. Poor historian at times. Flat affect. Appears comfortable overall.   ENT: ecchymosis over right eyebrow, moist mucous membranes  Eyes:  pupils are equal and reactive to light, EOMI  Respiratory: Lungs clear to auscultation bilaterally, no increased work of breathing  Cardiovascular: Regular rate and  rhythm, no murmur  GI: Normal active bowel sounds, abdomen soft, non-tender to palpation. No rebound or guarding.   Skin/Integumen: warm, dry  MSK:  Patient moves all four extremities  Neuro:  Resting tremor in bilateral hands, mild. CN 2-12 grossly intact. No focal deficits.      Medications     NaCl 100 mL/hr at 06/28/17 0210       omeprazole  40 mg Oral BID     pantoprazole  40 mg Intravenous Once     ascorbic acid  1,000 mg Oral Daily     aspirin  81 mg Oral Daily     cycloSPORINE  1 drop Ophthalmic BID     PARoxetine  20 mg Oral Daily     sodium chloride (PF)  3 mL Intracatheter Q8H     sennosides  1 tablet Oral BID     docusate sodium  100 mg Oral BID       Data     Recent Labs  Lab 06/28/17  0650 06/27/17  2103 06/27/17  1520 06/27/17  1040   WBC  --   --   --  16.1*   HGB  --   --   --  14.4   MCV  --   --   --  94   PLT  --   --   --  321     --   --  137   POTASSIUM 4.0 3.7 3.2* 3.1*   CHLORIDE 109  --   --  99   CO2 29  --   --  30   BUN 9  --   --  10   CR 0.74  --   --  0.73   ANIONGAP 4  --   --  8   SOBIA 7.9*  --   --  9.1   *  --   --  117*   ALBUMIN  --   --   --  3.6   PROTTOTAL  --   --   --  7.2   BILITOTAL  --   --   --  0.6   ALKPHOS  --   --   --  85   ALT  --   --   --  22   AST  --   --   --  24   LIPASE  --   --   --  100   TROPI  --  <0.015The 99th percentile for upper reference range is 0.045 ug/L.  Troponin values in the range of 0.045 - 0.120 ug/L may be associated with risks of adverse clinical events. <0.015The 99th percentile for upper reference range is 0.045 ug/L.  Troponin values in the range of 0.045 - 0.120 ug/L may be associated with risks of adverse clinical events.  --        No results found for this or any previous visit (from the past 24 hour(s)).

## 2017-06-28 NOTE — PLAN OF CARE
Problem: Goal Outcome Summary  Goal: Goal Outcome Summary  Outcome: Improving  A&O x4. Temp of 99.9, otherwise VSS. Pt c/o headache, Tylenol given. Tele SR w/ BBB. Combo diet no caffeine. Troponins negative x2. SBA w/ walker. IVF infusing. Pt denies nausea, pain, SOB, dizziness. PT and SW scheduled for today. Continue to monitor.

## 2017-06-28 NOTE — PROGRESS NOTES
"List all  goals to be met before discharge:     Diagnostic tests and consults completed and resulted   + met    No further episodes of syncope and any new arrhythmia addressed with controlled heart rates   + met (thus far); tele SR, rate 60's-70's, no syncope    Vital signs normal or at patient baseline and orthostatic vitals are normal and patient not lightheaded with standing   + partially met; not lightheaded but states she feels \"goofy\"  + neuros checked, intact--pt c/o feeling weak, and bilateral equal weakness noted    Tolerating oral intake to maintain hydration   + met    Safe disposition plan has been identified  + not met.           "

## 2017-06-28 NOTE — PLAN OF CARE
"Problem: Discharge Planning  Goal: Discharge Planning (Adult, OB, Behavioral, Peds)  Outcome: Improving  RN: pt alert, oriented.  Neuros intact but pt is weak.  Somewhat flat or hard to read affect. This AM denied nausea but c/o feeling \"goofy.\"  Med review showed 12.5mg seroquel at HS and another 12.5mg seroquel in the 2am hour.  Last compazine was in 4pm hour.  This feeling improved by later in the morning.       Pt c/o \"not right, unwell\" late morning eventually describing it as being \"nauseous\" thus zofran given--ineffective.  Encouraged ambulation to promote BM, pt c/o of \"weak knees\" that increased with ambulation.  Successful BM post-walk, but unfortunately continued nausea.  PO compazine administered with relief, except then she c/o abd \"soreness.\"  Pt agrees this may be stool moving through colon.  Difficult to assess, pt is \"definitely not\" at her baseline per pt and daughter, but hard to put a finger on symptom due to somewhat vague complains of \"goofy,\" \"weak knees,\" \"not right, unwell,\" \"not better yet\" and so-so response to interventions.        "

## 2017-06-28 NOTE — PLAN OF CARE
Problem: Discharge Planning  Goal: Discharge Planning (Adult, OB, Behavioral, Peds)  Outcome: No Change  List all  goals to be met before discharge:   - Diagnostic tests and consults completed and resulted - not met   - No further episodes of syncope and any new arrhythmia addressed with controlled heart rates - met. Tele SR  - Vital signs normal or at patient baseline and orthostatic vitals are normal and patient not lightheaded with standing - met   - Tolerating oral intake to maintain hydration - not met. C/o nausea  - Safe disposition plan has been identified - not met.     Alert +O but can be forgetful. orthostatics negative. Nausea improved with compazine however gets worst when pt is up and walking in room/bathroom. Tele SR. IV infusing. Tolerates clears. K replaced. Echo completed. Dry skin with scratches noted on extremities. Last BM 6/26 but small. On stool softeners.BS present.  Daughter at bedside most of the day and supportive. Nursing will continue to monitor.

## 2017-06-28 NOTE — PLAN OF CARE
Problem: Discharge Planning  Goal: Discharge Planning (Adult, OB, Behavioral, Peds)  Outcome: No Change  List all  goals to be met before discharge:   - Diagnostic tests and consults completed and resulted - not met   - No further episodes of syncope and any new arrhythmia addressed with controlled heart rates - met. Tele SR  - Vital signs normal or at patient baseline and orthostatic vitals are normal and patient not lightheaded with standing - not met   - Tolerating oral intake to maintain hydration - not met. C/o nausea   - Safe disposition plan has been identified - not met.

## 2017-06-28 NOTE — PLAN OF CARE
"Problem: Goal Outcome Summary  Goal: Goal Outcome Summary  PT: Orders received, eval completed, treatment initiated. Pt admitted under observation status with syncope, fall, nausea and hypokalemia. Prior to admit pt was living with her spouse in an independent living apartment, uses a cane, independent with mobility and ADLs.  Pt demonstrates dec balance and difficulty ambulating and transferring and would benefit from skilled PT services in order to improve this.     Discharge Planner PT   Patient plan for discharge: home     Current status: Currently requires SBA for sit to/from stand, CGA for gait of 40 ft with SEC with dec in balance noted and pt stated she felt \"off balance.\" Pt amb another 40 ft with FWW and SBA with steady balance and pt stated she felt much steadier. Pt agreeable to take a walker home and use it. Pt was independent with bed mobility. Pt denied dizziness or lightheadedness with any activity today and states it was intermittent yesterday.     Barriers to return to prior living situation: None     Recommendations for discharge: Home with home PT and use of FWW     Rationale for recommendations: Pt would benefit form FWW for inc safety and balance with gait and home PT for improvement of balance and return to PLOF.       Entered by: Margy Castro 06/28/2017 10:21 AM         Pt has met PT goals, safe to return home and has no further skilled inpatient PT needs at this time.  Physical Therapy Discharge Summary     Reason for therapy discharge:    All goals and outcomes met, no further needs identified.     Progress towards therapy goal(s). See goals on Care Plan in [facility] electronic health record for goal details.  Goals met     Therapy recommendation(s):    Continued therapy is recommended.  Rationale/Recommendations:  home PT and use of walker to inc safety and stability.                      "

## 2017-06-28 NOTE — PROGRESS NOTES
Care Transition Initial Assessment - JESSICA  Reason For Consult: discharge planning  Met with: Patient and Family (Daughter)  Active Problems:    Syncope         DATA  Lives With: spouse  Living Arrangements: apartment  Description of Support System: Supportive, Involved  Who is your support system?: , Children  Support Assessment: Adequate family and caregiver support.   Identified issues/concerns regarding health management:     SW received request for consult to assist with discharge planning. Patient admitted Observation Status on 6/27/17 for Syncope. Tentative discharge date 6/28/17. SW met with patient, patient's daughter, and WILLA to discuss discharge planning and recommendation of home RN/PT services at discharge. Patient currently resides in an independent living apartment with her . Patient is independent at baseline, and typically uses a cane. SW explained home care services of RN/PT and coverage associated with home care. Patient and patient's daughter are in agreement with having home care services. Patient will also be sent home with a walker per therapy. Discussed options for home care services, and patient would like to use [facility] due to their services being in their building already. SW contacted [facility] and spoke with Vickie (P: [phone], F: [phone]). Per Vickie, they are able to start services for the patient at discharge. Vickie stated she will need the H&P, discharge orders, discharge summary, medication list, and facesheet faxed. SW will fax above information once completed. Patient and patient's daughter have no further questions at this time.     Resources List: Home Care     Quality Of Family Relationships: supportive, helpful, involved  Transportation Available: family or friend will provide      ASSESSMENT  Cognitive Status:  awake, alert and oriented  Concerns to be addressed: discharge planning.       PLAN  Financial costs for the patient includes:  N/A.  Patient given options and choices for discharge: discussed recommendation of Home Care RN/PT.  Patient/family is agreeable to the plan?  Yes  Patient Goals and Preferences: return home with her  at discharge.  Patient anticipates discharging to: home with home care through [facility] for RN/PT.    Rosanne Luna, REJI, LGSW

## 2017-06-28 NOTE — PROGRESS NOTES
"List all  goals to be met before discharge:     Diagnostic tests and consults completed and resulted   + partially met, will have CBC check later today    No further episodes of syncope and any new arrhythmia addressed with controlled heart rates   + met (thus far); tele SR, rate 60's-70's, no syncope    Vital signs normal or at patient baseline and orthostatic vitals are normal and patient not lightheaded with standing   + partially met; not lightheaded but states she feels \"goofy\"  + neuros checked, intact--pt c/o feeling weak, and bilateral equal weakness noted    Tolerating oral intake to maintain hydration   + partially met, now c/o nausea--zofran ineffective, BM ineffective, will try compazine    Safe disposition plan has been identified  + met, see PT note.            "

## 2017-06-28 NOTE — PROGRESS NOTES
06/28/17 0956   Quick Adds   Type of Visit Initial PT Evaluation   Living Environment   Lives With spouse   Living Arrangements apartment;independent living facility   Home Accessibility no concerns   Number of Stairs to Enter Home 0   Number of Stairs Within Home 0   Self-Care   Usual Activity Tolerance moderate   Current Activity Tolerance moderate   Regular Exercise no   Equipment Currently Used at Home cane, straight   Functional Level Prior   Ambulation 1-->assistive equipment   Transferring 1-->assistive equipment   Fall history within last six months yes   Number of times patient has fallen within last six months 1   Which of the above functional risks had a recent onset or change? ambulation;transferring   General Information   Onset of Illness/Injury or Date of Surgery - Date 06/27/17   Referring Physician Uziel Mirza MD   Patient/Family Goals Statement return home   Pertinent History of Current Problem (include personal factors and/or comorbidities that impact the POC) Admitted under observation status with syncope, fall, nausea, hypokalemia. PMH: recent UTI, anxiety, depression, tremor.   Precautions/Limitations fall precautions   Weight-Bearing Status - LLE full weight-bearing   Weight-Bearing Status - RLE full weight-bearing   General Observations Daughter present   Cognitive Status Examination   Orientation person;place   Level of Consciousness alert   Follows Commands and Answers Questions 100% of the time;able to follow single-step instructions   Personal Safety and Judgment intact   Pain Assessment   Patient Currently in Pain No   Posture    Posture Forward head position;Protracted shoulders   Range of Motion (ROM)   ROM Quick Adds No deficits were identified   Strength   Strength Comments B hip flex 4+/5, knee ext 4+/5, DF 4/5   Bed Mobility   Bed Mobility Comments Supine to/from sit independently   Transfer Skills   Transfer Comments Sit to/from stand with SBA   Gait   Gait Comments Pt amb  "10 ft with SEC and CGA, dec in balnace noted. Sidestepping at times, short step length.   Balance   Balance Comments Balance dec with cane   Sensory Examination   Sensory Perception Comments Denies numbness or tingling   General Therapy Interventions   Planned Therapy Interventions gait training   Clinical Impression   Criteria for Skilled Therapeutic Intervention yes, treatment indicated   PT Diagnosis Difficulty ambulating   Influenced by the following impairments Dec balance   Functional limitations due to impairments Difficulty ambulating   Clinical Presentation Stable/Uncomplicated   Clinical Presentation Rationale medically stable   Clinical Decision Making (Complexity) Low complexity   Therapy Frequency` daily   Predicted Duration of Therapy Intervention (days/wks) 1 day   Anticipated Discharge Disposition Home with Home Therapy   Risk & Benefits of therapy have been explained Yes   Patient, Family & other staff in agreement with plan of care Yes   [facility] [employer] \"6 Clicks\"   2016, Trustees of [facility], under license to [employer].  All rights reserved.   6 Clicks Short Forms Basic Mobility Inpatient Short Form   [facility] AM-PAC  \"6 Clicks\" V.2 Basic Mobility Inpatient Short Form   1. Turning from your back to your side while in a flat bed without using bedrails? 4 - None   2. Moving from lying on your back to sitting on the side of a flat bed without using bedrails? 4 - None   3. Moving to and from a bed to a chair (including a wheelchair)? 4 - None   4. Standing up from a chair using your arms (e.g., wheelchair, or bedside chair)? 4 - None   5. To walk in hospital room? 3 - A Little   6. Climbing 3-5 steps with a railing? 3 - A Little   Basic Mobility Raw Score (Score out of 24.Lower scores equate to lower levels of function) 22   Total Evaluation Time   Total Evaluation Time (Minutes) 15     "

## 2017-06-29 ENCOUNTER — TELEPHONE (OUTPATIENT)
Dept: FAMILY MEDICINE | Facility: CLINIC | Age: 82
End: 2017-06-29

## 2017-06-29 NOTE — DISCHARGE SUMMARY
"[facility]    Discharge Summary  Hospitalist    Date of Admission:  6/27/2017  Date of Discharge:  6/28/2017  8:00 PM  Discharging Provider: Lynn Mast PA-C    Discharge Diagnoses   Nausea  Constipation  Generalized weakness with fall  Insomnia  Leukocytosis  Familial tremor  Anxiety     History of Present Illness   Margy Babin is an 87 year old female with a past medical history significant for anxiety, depression, and hereditary benign tremor who was admitted on 6/27/2017 after presenting after a fall at home as well as persistent nausea. The patient had been struggling with intermittent nausea for three weeks prior to admission. Her symptoms were initially managed with compazine, however her tremor worsened so this medication was discontinued and zofran started about a week prior to admission. Since that time her nausea had been worsening and many days she was unable to get out of bed because she felt so poorly. She did not have associated vomiting or abdominal pain. She was seen by her PCP a few days prior to admission and diagnosed with UTI and discharged with Cipro which did not help her symptoms. Additionally, the patient reported that she has had trouble with constipation and had not had a \"good bowel movement\" in nearly three weeks. Due to her nausea she reported decreased oral intake. Per the patient's daughter she has had periods with similar symptoms to this in the past that resolved on their own. On the day of admission the patient had a fall while getting up to use the restroom. She did not have clear recollection of the events but believed she may have lost consciousness. She did hit her head. Refer to dictation by Dr. Román Mirza and ED note from Dr. Hilario for additional information.     On day of discharge patient is feeling better overall. Initially in the morning she felt \"goofy\" upon waking up though reported this improved through morning. Throughout the " "day she had intermittent episodes of nausea requiring compazine and zofran without emesis. She was started on bowel regimen with multiple bowel movements and felt significantly better following this. At time of discharge she was ambulating at baseline and seemed more like herself per daughter's report. She was tolerating regular diet prior to discharge.     Hospital Course   Margy Babin was admitted on 6/27/2017.  The following problems were addressed during her hospitalization:    Persistent nausea without vomiting. Patient and daughter report 3 weeks of persistent nausea at home without associated vomiting or abdominal pain. Upon further review I see notes dating back to January mentioning similar symptoms.  Daughter says patient feels so poorly at times she is unable to get out of bed. No provoking or palliative factors that patient can identify. Seen recently by PCP who switched her from compazine to zofran due to worsening tremor. CMP is unremarkable and lipase WNL. CT abdomen and pelvis are unrevealing for cause of nausea aside from a small hiatal hernia. No hematochezia or melena though patient reports she has been constipated and last \"good bowel movement\" was around 3 weeks prior. No obstruction on CT. Suspect some of her symptoms may be due to constipation. Patient feeling better after 2 bowel movements.   --discharged with prn zofran and compazine, compazine seems to work better, however will need to monitor tremor at home  --discharged on bowel regimen including stool softener and miralax prn, discussed with daughter to titrate these medications to one soft bowel movement every day or every other day. Hold for loose stools.   --continue PPI at discharge, patient unsure whether or not she has been taking this      Fall with possible syncope. Patient does not have good recollection of events but states she fell at home and believes she briefly lost consciousness. She did hit her head. CT head " on admission is negative. Suspect fall due to dehydration in the setting of poor po intake. Troponin negative x2. Tele shows SR through admission.ECHO 6/28 shows EF 55% with mild to moderate AR. No WMA  -- stopped PTA xanax in light of patient's age and fall risk  --seen by PT, ok to return home with home care      Insomnia. Patient has been taking ambien at home as well as xanax bid. In light of her age will stop ambien and try Seroquel 25mg at bedtime. Patient had good results 6/27. Prescibed at discharge.      Leukocytosis. WBC was elevated on admission at 16.1 with left shift, improved day of discharge to 12.7. No source identified. Recent urine culture from 6/27 10-50k of mixed peewee. No respiratory symptoms. She has remained afebrile.   --recheck at follow up with PCP      Familial tremor. Resume PTA propranolol at discharge     Anxiety. Xanax discontinued on admission.     This patient was seen and examined with Dr. Graves who agrees with the above plan.    Lynn Mast PA-C    Significant Results and Procedures   See below     Code Status   Full Code       Primary Care Physician   Yaya Hatfield    Physical Exam                      Vitals:    06/27/17 1014   Weight: 59 kg (130 lb)     Vital Signs with Ranges     I/O last 3 completed shifts:  In: 1300 [P.O.:700; I.V.:600]  Out: -     Constitutional:  Alert and oriented, sitting up in bed. Poor historian at times. Flat affect. Appears comfortable overall.   ENT: ecchymosis over right eyebrow, moist mucous membranes  Eyes:  pupils are equal and reactive to light, EOMI  Respiratory: Lungs clear to auscultation bilaterally, no increased work of breathing  Cardiovascular: Regular rate and rhythm, no murmur  GI: Normal active bowel sounds, abdomen soft, non-tender to palpation. No rebound or guarding.   Skin/Integumen: warm, dry  MSK:  Patient moves all four extremities  Neuro:  Resting tremor in bilateral hands, mild. CN 2-12 grossly intact. No focal  deficits.      Discharge Disposition   Discharged to home  Condition at discharge: Stable    Consultations This Hospital Stay   PHYSICAL THERAPY ADULT IP CONSULT  SOCIAL WORK IP CONSULT    Time Spent on this Encounter   I, Lynn Mast, personally saw the patient today and spent greater than 30 minutes discharging this patient.    Discharge Orders     Home care nursing referral     Home Care PT Referral for Hospital Discharge     Reason for your hospital stay   You were here for evaluation of nausea. We think this may be due to constipation.     Follow-up and recommended labs and tests    Follow up with primary care provider within 7 days to have CBC rechecked and follow up from hospitalization. If you do not need to be seen for symptoms, ask  To have home nurse collect lab. Home nurse can help titrate bowel medications.     Activity   Your activity upon discharge: activity as tolerated. You shoulduse your walker when ambulating.     Discharge Instructions   Stop Ambien and xanax and START Seroquel nightly for sleep, if you do not tolerate this medication well call your doctor for further instructions. It is very important that you do not take both Ambien and Seroquel.    You can use both colace (stool softener) and miralax to help you have regular bowel movements at home. Titrate this as needed. You can use one or both medications daily, every other day, up to twice daily as needed to have a soft bowel movement every other day to every day. Home nursing can help titrate these medications.     Continue using zofran and compazine at home as needed for nausea. Only use these medications as needed.     MD face to face encounter   Documentation of Face to Face and Certification for Home Health Services    I certify that patient: Margy Babin is under my care and that I, or a nurse practitioner or physician's assistant working with me, had a face-to-face encounter that meets the physician  face-to-face encounter requirements with this patient on: 6/28/2017.    This encounter with the patient was in whole, or in part, for the following medical condition, which is the primary reason for home health care: generalized weakness, persistent nausea, insomnia.     I certify that, based on my findings, the following services are medically necessary home health services: Nursing and Physical Therapy.    My clinical findings support the need for the above services because: Nurse is needed: To assess bowel function and sleep after changes in medications or other medical regimen. and Physical Therapy Services are needed to assess and treat the following functional impairments: generalized weakness, unsteady gait.     Further, I certify that my clinical findings support that this patient is homebound (i.e. absences from home require considerable and taxing effort and are for medical reasons or Christian services or infrequently or of short duration when for other reasons) because: Requires assistance of another person or specialized equipment to access medical services because patient:  Patient no longer drives.    Based on the above findings. I certify that this patient is confined to the home and needs intermittent skilled nursing care, physical therapy and/or speech therapy.  The patient is under my care, and I have initiated the establishment of the plan of care.  This patient will be followed by a physician who will periodically review the plan of care.  Physician/Provider to provide follow up care: Yaya Hatfield    [facility] physician (the Medicare certified [place] provider): Daljit Graves MD  Physician Signature: See electronic signature associated with these discharge orders.  Date: 6/28/2017     Full Code     Diet   Follow this diet upon discharge: Orders Placed This Encounter     Room Service   Regular adult diet, try and stick to bland foods. Limit acidic foods, coffee, alcohol.   Drink  plenty of water to stay hydrated       Discharge Medications   Discharge Medication List as of 6/28/2017  7:27 PM      START taking these medications    Details   order for DME Equipment being ordered: Walker Wheels () and Walker ()  Treatment Diagnosis: Difficulty ambulatingDisp-1 each, R-0, Local Print      prochlorperazine (COMPAZINE) 5 MG tablet Take 1 tablet (5 mg) by mouth every 6 hours as needed for vomiting, Disp-30 tablet, R-0, E-Prescribe      docusate sodium (COLACE) 100 MG capsule Take 1 capsule (100 mg) by mouth 2 times daily as needed for constipation, Disp-60 capsule, R-0, OTC      polyethylene glycol (MIRALAX/GLYCOLAX) Packet Take 17 g by mouth daily as needed (constipation), Disp-7 packet, R-0, OTC      QUEtiapine (SEROQUEL) 25 MG tablet Take 1 tablet (25 mg) by mouth nightly as needed, Disp-30 tablet, R-0, E-Prescribe         CONTINUE these medications which have CHANGED    Details   ondansetron (ZOFRAN) 4 MG tablet Take 1 tablet (4 mg) by mouth every 6 hours as needed for nausea, Disp-10 tablet, R-0, Local Print         CONTINUE these medications which have NOT CHANGED    Details   omeprazole (PRILOSEC) 40 MG capsule Take 1 capsule (40 mg) by mouth daily Take 30-60 minutes before a meal., Disp-30 capsule, R-5, No Print Out      TOBRADEX ophthalmic ointment 1 Application At Bedtime , CLAUDETTE, Historical      pramipexole (MIRAPEX) 0.125 MG tablet TAKE ONE TABLET BY MOUTH TWICE DAILY, Disp-180 tablet, R-1, E-Prescribe      PARoxetine (PAXIL) 20 MG tablet TAKE ONE TABLET BY MOUTH AT BEDTIME, Disp-90 tablet, R-2, E-Prescribe      propranolol (INDERAL LA) 120 MG 24 hr capsule TAKE TWO CAPSULES BY MOUTH DAILY, Disp-180 capsule, R-3, E-Prescribe      acetaminophen (TYLENOL) 500 MG tablet Take 2 tablets (1,000 mg) by mouth 3 times daily as needed for mild pain, Disp-100 tablet, R-0, OTC      oxybutynin chloride 15 MG TB24 TAKE ONE TABLET BY MOUTH ONE TIME DAILY, Disp-90 tablet, R-3, E-Prescribe       cycloSPORINE (RESTASIS) 0.05 % ophthalmic emulsion Apply 1 drop to eye 2 times daily, Historical      aspirin 81 MG tablet Take 1 tablet by mouth daily., Historical      ascorbic acid (VITAMIN C) 1000 MG TABS Take 1,000 mg by mouth daily., Historical      Multiple Vitamins-Minerals (MULTIVITAL) TABS Take 1 tablet by mouth daily., Historical      Calcium Carbonate (CALCIUM 600 PO) Take 1 tablet by mouth daily., Historical      Misc Natural Products (GLUCOSAMINE CHOND COMPLEX/MSM) TABS Take 1 tablet by mouth daily., Historical      Cholecalciferol (VITAMIN D) 1000 UNITS capsule Take 1 capsule by mouth daily., Historical      fish oil-omega-3 fatty acids (FISH OIL) 1000 MG capsule Take 2 g by mouth daily., Historical         STOP taking these medications       ciprofloxacin (CIPRO) 500 MG tablet Comments:   Reason for Stopping:         ALPRAZolam (XANAX) 0.25 MG tablet Comments:   Reason for Stopping:         zolpidem (AMBIEN) 5 MG tablet Comments:   Reason for Stopping:             Allergies   No Known Allergies  Data   Most Recent 3 CBC's:  Recent Labs   Lab Test  06/28/17   1355  06/27/17   1040  06/19/17   1412   WBC  12.7*  16.1*  10.0   HGB  11.8  14.4  14.4   MCV  96  94  99   PLT  245  321  309      Most Recent 3 BMP's:  Recent Labs   Lab Test  06/28/17   0650  06/27/17   2103  06/27/17   1520  06/27/17   1040  01/27/17   0928   NA  142   --    --   137  142   POTASSIUM  4.0  3.7  3.2*  3.1*  4.2   CHLORIDE  109   --    --   99  104   CO2  29   --    --   30  31   BUN  9   --    --   10  12   CR  0.74   --    --   0.73  0.75   ANIONGAP  4   --    --   8  7   SOBIA  7.9*   --    --   9.1  9.3   GLC  100*   --    --   117*  111*     Most Recent 2 LFT's:  Recent Labs   Lab Test  06/27/17   1040  01/27/17   0928   AST  24  17   ALT  22  20   ALKPHOS  85  100   BILITOTAL  0.6  0.3     Most Recent INR's and Anticoagulation Dosing History:  Anticoagulation Dose History     Recent Dosing and Labs Latest Ref Rng & Units  4/11/2007    INR 0.86 - 1.14 0.97        Most Recent 3 Troponin's:  Recent Labs   Lab Test  06/27/17   2103  06/27/17   1520   TROPI  <0.015  The 99th percentile for upper reference range is 0.045 ug/L.  Troponin values in   the range of 0.045 - 0.120 ug/L may be associated with risks of adverse   clinical events.    <0.015  The 99th percentile for upper reference range is 0.045 ug/L.  Troponin values in   the range of 0.045 - 0.120 ug/L may be associated with risks of adverse   clinical events.       Most Recent Cholesterol Panel:  Recent Labs   Lab Test  10/03/13   0954   CHOL  223*   LDL  126   HDL  73   TRIG  120     Most Recent 6 Bacteria Isolates From Any Culture (See EPIC Reports for Culture Details):  Recent Labs   Lab Test  06/19/17   1524  10/23/15   1028  02/11/15   1342   CULT  10,000 to 50,000 colonies/mL mixed urogenital peewee Susceptibility testing not   routinely done    10,000 to 50,000 colonies/mL mixed urogenital peewee  10,000 to 50,000 colonies/mL mixed urogenital peewee  Susceptibility testing not routinely done       Most Recent TSH, T4 and A1c Labs:  Recent Labs   Lab Test  08/24/11   1516   T4  1.06     Results for orders placed or performed during the hospital encounter of 06/27/17   CT Head w/o Contrast    Narrative    CT SCAN OF THE HEAD WITHOUT CONTRAST   6/27/2017 12:38 PM     HISTORY: Head trauma, evaluate for fracture/intracranial hemorrhage.    TECHNIQUE:  Axial images of the head and coronal reformations without  IV contrast material.  Radiation dose for this scan was reduced using  automated exposure control, adjustment of the mA and/or kV according  to patient size, or iterative reconstruction technique.    COMPARISON: 4/11/2007.    FINDINGS: There is some mild cerebral atrophy. Patchy white matter  changes are seen in both hemispheres without mass effect. There is no  evidence for intracranial hemorrhage, mass effect, acute infarct, or  skull fracture. Minimal mucosal thickening is  incidentally noted in  the left maxillary sinus.      Impression    IMPRESSION: Chronic changes. No evidence for intracranial hemorrhage  or any acute process.    LUIS MICHAUD MD   CT Abdomen Pelvis w Contrast    Narrative     CT ABDOMEN AND PELVIS WITH CONTRAST  6/27/2017 12:39 PM     HISTORY: Three weeks nausea, anorexia, constipation.    TECHNIQUE: Volumetric acquisition through abdomen and pelvis with  IV  contrast.  65 mL Isovue-370  Radiation dose for this scan was reduced  using automated exposure control, adjustment of the mA and/or kV  according to patient size, or iterative reconstruction technique.    COMPARISON: 10/24/2015.    FINDINGS: The liver, gallbladder, spleen, pancreas, adrenal glands and  kidneys demonstrate no worrisome findings. Small right renal cyst. No  hydronephrosis. Small esophageal hiatal hernia.    Mild atheromatous changes of the abdominal aorta without aneurysm.  Uterus is absent. No suspicious pelvic masses. No bowel obstruction,  ascites or inflammatory changes. Mild degenerative and hypertrophic  changes in the visualized spine. Right hip arthroplasty. There is an  old healed right lower posterior rib fracture deformity.      Impression    IMPRESSION: No acute abnormality.    JASPAL ESQUIVEL MD   XR Chest 2 Views    Narrative    XR CHEST 2 VW 6/27/2017 12:45 PM    HISTORY: Weakness, nausea.    COMPARISON: 6/17/2007    FINDINGS: No airspace consolidation, pleural effusion or pneumothorax.  Normal heart size.      Impression    IMPRESSION: No acute cardiopulmonary abnormality.    ISSA GARCÍA MD

## 2017-06-29 NOTE — PLAN OF CARE
Problem: Discharge Planning  Goal: Discharge Planning (Adult, OB, Behavioral, Peds)  Outcome: Adequate for Discharge Date Met:  06/28/17  Fall:  Pt up with walker, steady on feet. Discharge instructions reviewed with dgt and pt. Questions answered. Discharged  Via w/c  In dgt care.

## 2017-06-29 NOTE — TELEPHONE ENCOUNTER
"ED/Discharge Protocol    \"Hi, my name is Leona Funez, a registered nurse, and I am calling on behalf of Dr. Hatfield's  office at [facility].  I am calling to follow up and see how things are going for you after your recent visit.\"    \"I see that you were in the (ER/UC/IP) on 6/27/17.    How are you doing now that you are home?\" \"I am tired\"    Is patient experiencing symptoms that may require a hospital visit?  no    Discharge Instructions    \"Let's review your discharge instructions.  What is/are the follow-up recommendations?  Pt. Response: I don't know.    \"Were you instructed to make a follow-up appointment?\"  Pt. Response: No.       \"When you see the provider, I would recommend that you bring your discharge instructions with you.    Medications    \"How many new medications are you on since your hospitalization/ED visit?\"    0-1  \"How many of your current medicines changed (dose, timing, name, etc.) while you were in the hospital/ED visit?\"   0-1  \"Do you have questions about your medications?\"   No  \"Were you newly diagnosed with heart failure, COPD, diabetes or did you have a heart attack?\"   No  For patients on insulin: \"Did you start on insulin in the hospital or did you have your insulin dose changed?\"   No    Medication reconciliation completed? Yes    Was MTM referral placed (*Make sure to put transitions as reason for referral)?   No    Call Summary    \"Do you have any questions or concerns about your condition or care plan at the moment?\"    No  Triage nurse advice given: Advised to call with any concerns appointment scheduled to be seen will only see her primary care provider so appointment is scheduled 2 weeks out from her hospitalization    Patient was in ER 0 in the past year (assess appropriateness of ER visits.)      \"If you have questions or things don't continue to improve, we encourage you contact us through the main clinic number,  [phone].  Even if the clinic is not open, triage nurses " "are available 24/7 to help you.     We would like you to know that our clinic has extended hours (provide information).  We also have urgent care (provide details on closest location and hours/contact info)\"      \"Thank you for your time and take care!\"        "

## 2017-06-30 ENCOUNTER — TELEPHONE (OUTPATIENT)
Dept: FAMILY MEDICINE | Facility: CLINIC | Age: 82
End: 2017-06-30

## 2017-06-30 NOTE — H&P
[facility]  History and Physical  Hospitalist       Date of Admission:  6/27/2017  Date of Service (when I saw the patient): 6/27/17    Uziel Mirza MD  [facility] Hospitalist  Pager [phone]        Primary Care Physician   Yaya Hatfield    Chief Complaint   Fall, Nausea, Constipation    History is obtained from the patient    History of Present Illness   Margy Babin is a 87 year old female who presents with recent falls including one the morning of presentation.  She also reports about a month of waxing and waning nausea without emesis and constipation.  Her daughter reports she did have a BM the day of presentation, the patient reports decreased flatus.  The patient told me that she had a brief syncopal episode before the fall today.  She was ambulating down the hallway when she blacked out for a few seconds.  Fortunateley she did not strike her head during the fall.  She had a negative trauma workup in the ED, including negative head CT.  She was treated for a UTI the previous [place] with cipro, however, she reported no improvement in symptoms.  It was not all that convincing that she had a UTI as her UA was borderline and her UC grew normal peewee.  She reports chronic pain in her lower back and is on an oxycodone regimen for this which she says helps her significantly.  Her PCP is also changing her nausea med, she was on compazine but he is worried that its worsening her tremor.      Past Medical History    I have reviewed this patient's medical history and updated it with pertinent information if needed.   Past Medical History:   Diagnosis Date     Anxiety      Blepharitis of both eyes      Depression, major, recurrent, in complete remission (H)      Dry eye syndrome      Tremor, hereditary, benign        Past Surgical History   I have reviewed this patient's surgical history and updated it with pertinent information if needed.  Past Surgical History:    Procedure Laterality Date     C TOTAL HIP ARTHROPLASTY Right      HYSTERECTOMY TOTAL ABDOMINAL, BILATERAL SALPINGO-OOPHORECTOMY, COMBINED       NECK SURGERY       ROTATOR CUFF REPAIR RT/LT Right        Prior to Admission Medications   Prior to Admission Medications   Prescriptions Last Dose Informant Patient Reported? Taking?   ALPRAZolam (XANAX) 0.25 MG tablet prn med  No No   Sig: TAKE 1 TO 2 TABLETS BY MOUTH TWICE DAILY AS NEEDED   Calcium Carbonate (CALCIUM 600 PO) 2017  Yes Yes   Sig: Take 1 tablet by mouth daily.   Cholecalciferol (VITAMIN D) 1000 UNITS capsule unk  Yes Yes   Sig: Take 1 capsule by mouth daily.   Misc Natural Products (GLUCOSAMINE CHOND COMPLEX/MSM) TABS 2017  Yes Yes   Sig: Take 1 tablet by mouth daily.   Multiple Vitamins-Minerals (MULTIVITAL) TABS 2017  Yes Yes   Sig: Take 1 tablet by mouth daily.   PARoxetine (PAXIL) 20 MG tablet 2017  No Yes   Sig: TAKE ONE TABLET BY MOUTH AT BEDTIME   TOBRADEX ophthalmic ointment 2017 at Unknown time  Yes Yes   Si Application At Bedtime    acetaminophen (TYLENOL) 500 MG tablet prn med  No Yes   Sig: Take 2 tablets (1,000 mg) by mouth 3 times daily as needed for mild pain   ascorbic acid (VITAMIN C) 1000 MG TABS 2017  Yes Yes   Sig: Take 1,000 mg by mouth daily.   aspirin 81 MG tablet 2017 at Unknown time  Yes Yes   Sig: Take 1 tablet by mouth daily.   ciprofloxacin (CIPRO) 500 MG tablet   No No   Sig: Take 1 tablet (500 mg) by mouth 2 times daily for 7 days   cycloSPORINE (RESTASIS) 0.05 % ophthalmic emulsion 2017  Yes Yes   Sig: Apply 1 drop to eye 2 times daily   fish oil-omega-3 fatty acids (FISH OIL) 1000 MG capsule 2017  Yes Yes   Sig: Take 2 g by mouth daily.   omeprazole (PRILOSEC) 40 MG capsule unk  No Yes   Sig: Take 1 capsule (40 mg) by mouth daily Take 30-60 minutes before a meal.   oxybutynin chloride 15 MG TB24 unk  No No   Sig: TAKE ONE TABLET BY MOUTH ONE TIME DAILY   pramipexole  "(MIRAPEX) 0.125 MG tablet 2017  No Yes   Sig: TAKE ONE TABLET BY MOUTH TWICE DAILY   propranolol (INDERAL LA) 120 MG 24 hr capsule 2017 at Unknown time  No Yes   Sig: TAKE TWO CAPSULES BY MOUTH DAILY   zolpidem (AMBIEN) 5 MG tablet prn med  No No   Sig: TAKE ONE TABLET BY MOUTH NIGHTLY AS NEEDED      Facility-Administered Medications: None     Allergies   No Known Allergies    Social History   I have reviewed this patient's social history and updated it with pertinent information if needed. Margy Babin  reports that she has never smoked. She has never used smokeless tobacco. She reports that she does not drink alcohol or use illicit drugs.    Family History   I have reviewed this patient's family history and updated it with pertinent information if needed.   Family History   Problem Relation Age of Onset     HEART DISEASE Father 80     MI     C.A.D. Father      Breast Cancer Daughter 43      age 53       Review of Systems   The 10 point Review of Systems is negative other than noted in the HPI.    Physical Exam                       Blood pressure 131/57, pulse 68, temperature 99.6  F (37.6  C), temperature source Oral, resp. rate 18, height 1.651 m (5' 5\"), weight 59 kg (130 lb), SpO2 94 %, not currently breastfeeding.       130 lbs 0 oz    GEN:  NAD, A+Ox4, afebrile  HEENT: NCAT, PERRL, EOM intact bilaterally, sclera clear, conjunctiva normal, external ears without lesions, nose & mouth clear, mucous membranes moist, gums normal and good dentition  NECK/LYMPH: supple, nontender, no lymphadenopathy, full AROM bilaterally  PULM: lungs CTA bilaterally, no increased work of breathing, no wheeze, rales, rhonchi  CV: RRR, S1 & S2, no murmur  GI: soft, nontender, nondistended, no guarding or rigidity, +BS in all 4 quadrants  MSK: full AROM bilateral UE/LE, pedal & radial pulses 2+ bilaterally  NEURO: awake, alert, oriented to name, place, and time. CN II-XII grossly intact. Sensory grossly " intact. Muscle strength 5/5 bilaterally  SKIN: warm & dry without rash, wound, or pedal edema, no bruising or bleeding    Data   Data reviewed today:  I personally reviewed:  CT Head w/o Contrast: IMPRESSION: Chronic changes. No evidence for intracranial hemorrhage  or any acute process.    CT Abd/Pelvis: IMPRESSION: No acute abnormality.    CXR: IMPRESSION: No acute cardiopulmonary abnormality.    Recent Labs  Lab 06/28/17  1355 06/28/17  0650 06/27/17  2103 06/27/17  1520 06/27/17  1040   WBC 12.7*  --   --   --  16.1*   HGB 11.8  --   --   --  14.4   MCV 96  --   --   --  94     --   --   --  321   NA  --  142  --   --  137   POTASSIUM  --  4.0 3.7 3.2* 3.1*   CHLORIDE  --  109  --   --  99   CO2  --  29  --   --  30   BUN  --  9  --   --  10   CR  --  0.74  --   --  0.73   ANIONGAP  --  4  --   --  8   SOIBA  --  7.9*  --   --  9.1   GLC  --  100*  --   --  117*   ALBUMIN  --   --   --   --  3.6   PROTTOTAL  --   --   --   --  7.2   BILITOTAL  --   --   --   --  0.6   ALKPHOS  --   --   --   --  85   ALT  --   --   --   --  22   AST  --   --   --   --  24   LIPASE  --   --   --   --  100   TROPI  --   --  <0.015The 99th percentile for upper reference range is 0.045 ug/L.  Troponin values in the range of 0.045 - 0.120 ug/L may be associated with risks of adverse clinical events. <0.015The 99th percentile for upper reference range is 0.045 ug/L.  Troponin values in the range of 0.045 - 0.120 ug/L may be associated with risks of adverse clinical events.  --        No results found for this or any previous visit (from the past 24 hour(s)).     Assessment & Plan   Margy Babin is a 87 year old female who presents with fall after a syncopal episode.    1.) Syncope with Fall:  - patient reports not much hydration or other PO intake on a dialy basis since nausea and constipation, this is likely orthostatic hypotension.  She denied any CP or SOB, but could cardiac etiology as well, no abnormalities  found on cardiovascular and neuro exam  - will admit to observation  - get a TTE  - gently hydrate overnight with NS@100cc/hr  - PT eval in AM  - may be other issues related to meds, she takes oxycodone for chronic pain, consider revised pain plan, was referred to PCP for this  - may also be issue with her propranolol which is taken for her tremor as it may cause bradycardia and hypotension, consider lower dose  - she also takes ambien for insomina wich is contraindicated in elderly and female patients, strongly urged to stop using thismed  2.) Nausea and Constipation  - could be side effects from oxycodone which needs to be reconsidered on a chronic basis  - zofrna PTA likely adding to slower GI motility caused by oxycodone  - would consider going back on compazine  - will order scheduled bowel meds for tonight, senna and colace    3.) HTN  - resume PTA beta blocker  - PRN Labetalol and hydralazine    4.) Depression  - mood was a little flat although I feel she is stable  - order her home Paxil    DVT Prophylaxis: Anticipate short stay, encourage ambulation  Code Status: Full Code    Disposition: Expected discharge in 1-2 days once safe dispo plan and syncope workup complete.    Uziel Mirza MD

## 2017-06-30 NOTE — TELEPHONE ENCOUNTER
ROCHELLE Leung from [facility] called to inform provider patient refused HC services.

## 2017-07-07 NOTE — TELEPHONE ENCOUNTER
Iona from [facility] called to request order approval for SN and PT to eval and Tx. Patient has initially declined services but now would like HC again.  Verbal approval given

## 2017-07-10 ENCOUNTER — TELEPHONE (OUTPATIENT)
Dept: FAMILY MEDICINE | Facility: CLINIC | Age: 82
End: 2017-07-10

## 2017-07-10 NOTE — TELEPHONE ENCOUNTER
Patient has declined PT/RN care in the past,  is calling to restart initiation of care orders. Gave the verbal okay for the following:    Delayed start of care for patient. 7/11/17.    Lacey Schmitt RN  07/10/17  11:37 AM

## 2017-07-12 ENCOUNTER — TELEPHONE (OUTPATIENT)
Dept: FAMILY MEDICINE | Facility: CLINIC | Age: 82
End: 2017-07-12

## 2017-07-12 NOTE — TELEPHONE ENCOUNTER
Reason for Call:  Form, our goal is to have forms completed with 72 hours, however, some forms may require a visit or additional information.    Type of letter, form or note:  medical    Who is the form from?: Home care    Where did the form come from: form was faxed in    What clinic location was the form placed at?: [facility]    Where the form was placed: 's Box: Yaya Hatfield MD    What number is listed as a contact on the form?: [phone]       Additional comments: Interim healthcare  actions/orders ...availability/reschedule    Call taken on 7/12/2017 at 2:35 PM by Jigna Neumann

## 2017-07-12 NOTE — TELEPHONE ENCOUNTER
Reason for Call:  Form, our goal is to have forms completed with 72 hours, however, some forms may require a visit or additional information.    Type of letter, form or note:  medical    Who is the form from?: [facility] health care  plan of care/physician orders     Where did the form come from: form was faxed in    What clinic location was the form placed at?: [facility]    Where the form was placed: 's Box: Yaya Hatfield MD    What number is listed as a contact on the form?: [phone]       Additional comments: Interim health care  plan of care/physician orders     Call taken on 7/12/2017 at 11:23 AM by Jigna Neumann

## 2017-07-13 ENCOUNTER — TELEPHONE (OUTPATIENT)
Dept: FAMILY MEDICINE | Facility: CLINIC | Age: 82
End: 2017-07-13

## 2017-07-13 NOTE — TELEPHONE ENCOUNTER
FYI-  [facility]  nurse called requesting order approval for SN 1 time this week, 2 x 2 next wk and 1 x 1 the following week.   Verbal approval given.

## 2017-07-17 ENCOUNTER — TELEPHONE (OUTPATIENT)
Dept: FAMILY MEDICINE | Facility: CLINIC | Age: 82
End: 2017-07-17

## 2017-07-17 DIAGNOSIS — G25.0 FAMILIAL TREMOR: ICD-10-CM

## 2017-07-17 NOTE — TELEPHONE ENCOUNTER
Reason for Call:  Form, our goal is to have forms completed with 72 hours, however, some forms may require a visit or additional information.    Type of letter, form or note:  medical    Who is the form from?: Home care    Where did the form come from: form was faxed in    What clinic location was the form placed at?: [facility]    Where the form was placed: 's Box: Yaya Hatfield MD    What number is listed as a contact on the form?: [phone]       Additional comments: Interim  SN and PT orders     Call taken on 7/17/2017 at 2:53 PM by Jigna Neumann

## 2017-07-18 RX ORDER — PRAMIPEXOLE DIHYDROCHLORIDE 0.12 MG/1
TABLET ORAL
Qty: 180 TABLET | Refills: 1 | Status: SHIPPED | OUTPATIENT
Start: 2017-07-18 | End: 2018-01-12

## 2017-07-18 NOTE — TELEPHONE ENCOUNTER
pramipexole (MIRAPEX) 0.125 MG     Last Written Prescription Date: 1/20/17  Last Fill Quantity: 180, # refills: 1  Last Office Visit with [facility], [facility] or [facility] prescribing provider: 6/19/17        BP Readings from Last 3 Encounters:   06/28/17 131/57   06/19/17 124/70   06/06/17 130/70

## 2017-07-25 DIAGNOSIS — G47.00 INSOMNIA, UNSPECIFIED TYPE: ICD-10-CM

## 2017-07-26 ENCOUNTER — TELEPHONE (OUTPATIENT)
Dept: FAMILY MEDICINE | Facility: CLINIC | Age: 82
End: 2017-07-26

## 2017-07-26 DIAGNOSIS — Z53.9 DIAGNOSIS NOT YET DEFINED: Primary | ICD-10-CM

## 2017-07-26 PROCEDURE — G0180 MD CERTIFICATION HHA PATIENT: HCPCS | Performed by: FAMILY MEDICINE

## 2017-07-26 RX ORDER — QUETIAPINE FUMARATE 25 MG/1
TABLET, FILM COATED ORAL
Qty: 30 TABLET | Refills: 5 | Status: SHIPPED | OUTPATIENT
Start: 2017-07-26 | End: 2017-12-20

## 2017-07-26 NOTE — TELEPHONE ENCOUNTER
QUETIAPINE FUMARATE 25 MG TAB    Last Written Prescription Date: 06/28/2017  Last Fill Quantity: 30, # refills: 0  Last Office Visit with G, P or [facility] prescribing provider: 06/19/2017       BP Readings from Last 3 Encounters:   06/28/17 131/57   06/19/17 124/70   06/06/17 130/70     Pulse Readings from Last 2 Encounters:   06/27/17 68   06/19/17 118     Lab Results   Component Value Date     06/28/2017     Lab Results   Component Value Date    WBC 12.7 06/28/2017     Lab Results   Component Value Date    RBC 3.70 06/28/2017     Lab Results   Component Value Date    HGB 11.8 06/28/2017     Lab Results   Component Value Date    HCT 35.6 06/28/2017     No components found for: MCT  Lab Results   Component Value Date    MCV 96 06/28/2017     Lab Results   Component Value Date    MCH 31.9 06/28/2017     Lab Results   Component Value Date    MCHC 33.1 06/28/2017     Lab Results   Component Value Date    RDW 14.1 06/28/2017     Lab Results   Component Value Date     06/28/2017     Lab Results   Component Value Date    CHOL 223 10/03/2013     Lab Results   Component Value Date    HDL 73 10/03/2013     Lab Results   Component Value Date     10/03/2013     Lab Results   Component Value Date    TRIG 120 10/03/2013     Lab Results   Component Value Date    CHOLHDLRATIO 3.1 10/03/2013

## 2017-07-26 NOTE — TELEPHONE ENCOUNTER
Reason for Call:  Form, our goal is to have forms completed with 72 hours, however, some forms may require a visit or additional information.    Type of letter, form or note:  medical    Who is the form from?: Home care    Where did the form come from: form was faxed in    What clinic location was the form placed at?: [facility]    Where the form was placed: 's Box: Braeden David MD    What number is listed as a contact on the form?: [phone]       Additional comments: [facility] 7/11/17 to 9/8/17    Call taken on 7/26/2017 at 12:18 PM by Jigna Neumann

## 2017-07-27 ENCOUNTER — TELEPHONE (OUTPATIENT)
Dept: FAMILY MEDICINE | Facility: CLINIC | Age: 82
End: 2017-07-27

## 2017-07-27 NOTE — TELEPHONE ENCOUNTER
Reason for Call:  Form, our goal is to have forms completed with 72 hours, however, some forms may require a visit or additional information.    Type of letter, form or note:  medical    Who is the form from?: Home care    Where did the form come from: form was faxed in    What clinic location was the form placed at?: [facility]    Where the form was placed: 's Box: Yaya Hatfield MD    What number is listed as a contact on the form?: [phone]       Additional comments: [facility] physician orders for corn/callus trimming     Call taken on 7/27/2017 at 1:33 PM by Jigna Neumann

## 2017-07-31 ENCOUNTER — MEDICAL CORRESPONDENCE (OUTPATIENT)
Dept: HEALTH INFORMATION MANAGEMENT | Facility: CLINIC | Age: 82
End: 2017-07-31

## 2017-07-31 NOTE — H&P
Provider:  Uziel Mirza MD  Patient:  Margy Bella  MRN:  [id number]  :  10/02/1929  WORK TYPE:  History and Physical    PRIMARY CARE PROVIDER:  Dr. Yaya Hatfield    CODE STATUS:  Full code.    CHIEF COMPLAINT:   Fall and nausea.    HISTORY OF PRESENT ILLNESS:  The patient is a very pleasant 87-year-old female.  She is presenting to the emergency room with her daughter today.  She has a past medical history of anxiety, depression and hereditary benign tremors but, for the most part, she has been very healthy for many, many years.  She does have heart disease history in her family but no personal issues throughout her entire life.  She does take a couple of different meds for anxiety and she was recently seen by her primary care doctor on the  of this month due to nausea.  He checked a urinalysis and it was somewhat borderline, possible UTI although culture did not really grow anything.  They did a trial of ciprofloxacin and did not improve symptoms.  In addition, the patient is reporting constipation for about 3 weeks and she has only had a very small hard bowel movement recently but she is still passing gas.  She was on Compazine for the nausea but her primary care doctor switched her to Zofran because he thought maybe it was making her tremors worse.  Today, she was at home walking down the hallway and had a syncopal episode and fell.  She fortunately did not hit her head.  She was only unconscious for a very, very short time period.  She has no aches or pains after the fall.  Her daughter brought her in for further evaluation.  She notes to me that she is not drinking or eating very much because of her nausea recently and the patient confirms this when I asked her.  Currently the patient is not nauseated.  Her daughter is questioning whether she is on too many different medications.  We will be admitting her under observation status for syncope workup.      ALLERGIES:  No known drug  allergies.    HOME MEDICATION LIST:    1.  Zofran 4 mg q.6 p.r.n.   2.  Xanax 1 to 2 tabs daily p.r.n.  3.  Ambien 5 mg nightly p.r.n.   4.  Omeprazole 40 mg daily.  5.  TobraDex ophthalmic ointment at bedtime.  6.  Mirapex 1 tablet twice daily.  7.  Paxil 20 mg at bedtime.  8.  Propranolol 120 mg daily.  9.  Tylenol 1000 mg three times a day p.r.n.   10.  Cyclosporine ophthalmic ointment 1 drop both eyes 2 times daily.  11.  Aspirin 81 mg daily.  12.  Vitamin C 1000 mg daily.  13.  Multivitamin 1 tablet daily.  14.  Calcium 1 tablet daily.  15.  Oxybutynin 15 mg 1 tablet daily.    PAST MEDICAL HISTORY:   1.  Depression.  2.  Blepharitis bilaterally.  3.  Anxiety.  4.  Dry eyes.  5.  Hereditary benign tremor.    PAST SURGICAL HISTORY:    1.  Hysterectomy with bilateral salpingo-oophorectomy.  2.  Cervical spine surgery.  3.  Bilateral rotator cuff repair.  4.  Total hip arthroplasty.      FAMILY HISTORY:  I reviewed this with the patient and her daughter.  Both parents are .  Mother unknown cause, father  of an MI at the age of 80.  He had longstanding heart disease.  She has one daughter who  at the age of 53 from breast cancer.      SOCIAL HISTORY: She has never used tobacco.  No significant alcohol or illicit drug history.  She is retired.  She is .  She in an independent living portion of a half-way community.    REVIEW OF SYSTEMS:  10 system review conducted with the patient.  Other than those systems listed in the history of present illness are negative.    PHYSICAL EXAMINATION:   VITAL SIGNS: Blood pressure is 112/99, O2 sat is 97% on room air, respiratory rate 18, heart rate 69, temperature 96.6 F.    GENERAL:  This is a thin but adequately nourished appearing elderly female in no apparent distress.  Alert and oriented x4, afebrile.  HEENT:  Normocephalic, atraumatic.  No oropharyngeal erythema. No cervical lymphadenopathy.  No thyromegaly.    RESPIRATORY:  Lungs are clear to  auscultation bilaterally.  Normal work of breathing.  No wheezing, rales or rhonchi.   CARDIOVASCULAR:  Normal S1 and S2.  No S3 or S4.  Regular rate and rhythm.  No murmurs, rubs or gallops.  2+ pulses palpable in all 4 extremities.  ABDOMEN:  Hypoactive bowel sounds but the abdomen is soft and nondistended and nontender to palpation.    EXTREMITIES:  No clubbing, cyanosis or edema.    NEUROLOGIC:  She has a tremor in the right upper extremity.  It looks like a pill rolling tremor.  It is persistent but otherwise on neurological exam cranial nerves II through XII are grossly intact.  She has normal coordination of bilateral heel to shin test.    LABORATORY EVALUATION:   Complete metabolic profile:  All values are normal except for a low potassium of 3.1.  CBC white count 16.1 but all other values are normal.  Lipase 100.    IMAGING STUDIES:  CT head, without contrast, chronic changes, no evidence for intracranial hemorrhage or any other acute process.  CT abdomen and pelvis, with contrast, no acute abnormality.  X-ray chest, 2 view, no acute cardiopulmonary abnormality.  EKG shows sinus rhythm with occasional PVCs, possible anterior infarct.  These are changes from her most recent EKG we have on file from 04/11/2007.      ASSESSMENT:  This is an 87-year-old female with a past medical history of anxiety, depression, dry eyes and a benign tremor.  She is presenting today after a syncopal episode.  She is also reporting 3 weeks of off and on nausea and some constipation.  She will be admitted under observation for syncope workup.    PLAN:  1.  Syncope with fall.  The patient was unconscious for a very short time period, maybe just a second or two but she does acknowledge passing out before the fall.  On physical exam of the heart, I do not appreciate any significant murmurs or rhythm abnormalities but will get an echocardiogram to assess for any valve problems.  Also the computer interpreted on the EKG of possible  anterior infarct.  I do not see anything significant nor did the emergency room provider; however, since we want to rule out cardiac etiologies for syncope, we will trend troponins, look at the echo for any wall motion abnormalities and go from there.  Fortunately CT of the head looked normal.  I think the patient probably has a combination of hypovolemia causing orthostatic hypotension.  She is also on a pretty high dose of atenolol for her tremor.  I think it is probably more related to that so I will keep her on normal saline at 100 mL an hour at least until tomorrow morning and then we can get a PT evaluation for a safe disposition plan.  She is on a lot of concerning medications as well, Ambien for one which she says she does not take that very often.  She is on a benzodiazepine for her anxiety called Xanax which is not ideal in an 87-year-old.  While she is here, I would like to hold the Xanax but if she needs anything to help with sleep, we could do Seroquel at low dose.  We will start with 12.5 to 25 mg at bedtime p.r.n.  She has tried melatonin in the past but says it did nothing for her.    2.  Leukocytosis.  It does not look like she really had a UTI from her workup on the 19th and antibiotics did not really help her symptoms.  She does not have anything on CT or anything else on her workup or evaluation that suggests infection.  I think we will hold off on any antibiotics at this time and just observe closely.    3.  Hypokalemia.  It is very mildly low, might be secondary to reduced p.o. intake.  We will put her on electrolyte protocol.  She had some replacement in the ED already.  She is also on omeprazole which can cause hypomagnesemia which, in turn, can cause hypokalemia so I will check a magnesium level today.      4.  Depression.  She is very stable on paroxetine.  I will reorder this for her per her prior admission dose of 20 mg daily.    5.  Blepharitis and dry eye syndrome.  Continue all of her  ophthalmic solutions which include  Restasis and TobraDex.      6.  Deep vein thrombosis prophylaxis with PCDs.      7.  Code status:  FULL CODE, confirmed by patient.    DISPOSITION:  I am anticipating about 24 hours to rule out any cardiac causes for syncope and get her volume resuscitation and will also get a safe disposition plan with a PT evaluation.      CC:  Dr. Yaya Hatfield

## 2017-09-01 DIAGNOSIS — N39.41 URGE INCONTINENCE OF URINE: ICD-10-CM

## 2017-09-01 NOTE — TELEPHONE ENCOUNTER
oxybutynin chloride 15 MG TB24      Last Written Prescription Date: 5/24/16  Last Fill Quantity: 90,  # refills: 3   Last Office Visit with G, P or [facility] prescribing provider: 6/19/17

## 2017-09-05 RX ORDER — OXYBUTYNIN CHLORIDE 15 MG/1
TABLET, EXTENDED RELEASE ORAL
Qty: 90 TABLET | Refills: 2 | Status: SHIPPED | OUTPATIENT
Start: 2017-09-05 | End: 2018-06-02

## 2017-09-06 DIAGNOSIS — F33.42 MAJOR DEPRESSIVE DISORDER, RECURRENT EPISODE, IN FULL REMISSION (H): ICD-10-CM

## 2017-09-07 RX ORDER — PAROXETINE 20 MG/1
TABLET, FILM COATED ORAL
Qty: 90 TABLET | Refills: 0 | Status: SHIPPED | OUTPATIENT
Start: 2017-09-07 | End: 2017-12-05

## 2017-09-07 NOTE — TELEPHONE ENCOUNTER
Paroxetine 20 mg     Last Written Prescription Date: 12/12/16  Last Fill Quantity: 90, # refills: 2  Last Office Visit with G primary care provider:  6/19/17        Last PHQ-9 score on record=   PHQ-9 SCORE 6/19/2017   Total Score -   Total Score 3

## 2017-10-15 DIAGNOSIS — G25.0 FAMILIAL TREMOR: ICD-10-CM

## 2017-10-16 RX ORDER — PROPRANOLOL HYDROCHLORIDE 120 MG/1
CAPSULE, EXTENDED RELEASE ORAL
Qty: 180 CAPSULE | Refills: 2 | Status: SHIPPED | OUTPATIENT
Start: 2017-10-16 | End: 2018-11-14

## 2017-10-16 NOTE — TELEPHONE ENCOUNTER
propranolol (INDERAL LA) 120 MG 24 hr capsule      Last Written Prescription Date: 9/26/16  Last Fill Quantity: 180, # refills: 3  Last Office Visit with [facility], [facility] or [facility] prescribing provider: 6/19/17       BP Readings from Last 3 Encounters:   06/28/17 131/57   06/19/17 124/70   06/06/17 130/70

## 2017-11-28 DIAGNOSIS — F41.9 ANXIETY: Primary | ICD-10-CM

## 2017-11-28 RX ORDER — ALPRAZOLAM 0.25 MG
TABLET ORAL
Qty: 60 TABLET | Refills: 1 | Status: SHIPPED | OUTPATIENT
Start: 2017-11-28 | End: 2018-04-30

## 2017-12-05 DIAGNOSIS — F33.42 MAJOR DEPRESSIVE DISORDER, RECURRENT EPISODE, IN FULL REMISSION (H): ICD-10-CM

## 2017-12-06 RX ORDER — PAROXETINE 20 MG/1
TABLET, FILM COATED ORAL
Qty: 30 TABLET | Refills: 0 | Status: SHIPPED | OUTPATIENT
Start: 2017-12-06 | End: 2018-01-09

## 2018-01-09 ENCOUNTER — OFFICE VISIT (OUTPATIENT)
Dept: FAMILY MEDICINE | Facility: CLINIC | Age: 83
End: 2018-01-09
Payer: MEDICARE

## 2018-01-09 VITALS
HEIGHT: 65 IN | TEMPERATURE: 98 F | WEIGHT: 135 LBS | RESPIRATION RATE: 16 BRPM | HEART RATE: 71 BPM | BODY MASS INDEX: 22.49 KG/M2 | DIASTOLIC BLOOD PRESSURE: 78 MMHG | SYSTOLIC BLOOD PRESSURE: 126 MMHG

## 2018-01-09 DIAGNOSIS — R11.0 NAUSEA: ICD-10-CM

## 2018-01-09 DIAGNOSIS — G25.0 FAMILIAL TREMOR: ICD-10-CM

## 2018-01-09 DIAGNOSIS — F33.42 RECURRENT MAJOR DEPRESSION IN COMPLETE REMISSION (H): Primary | ICD-10-CM

## 2018-01-09 PROCEDURE — 99213 OFFICE O/P EST LOW 20 MIN: CPT | Performed by: FAMILY MEDICINE

## 2018-01-09 RX ORDER — PAROXETINE 20 MG/1
TABLET, FILM COATED ORAL
Qty: 90 TABLET | Refills: 3 | Status: SHIPPED | OUTPATIENT
Start: 2018-01-09 | End: 2018-12-26

## 2018-01-09 RX ORDER — PROCHLORPERAZINE MALEATE 5 MG
5 TABLET ORAL EVERY 6 HOURS PRN
Qty: 30 TABLET | Refills: 3 | Status: SHIPPED | OUTPATIENT
Start: 2018-01-09 | End: 2018-10-16

## 2018-01-09 ASSESSMENT — ANXIETY QUESTIONNAIRES
3. WORRYING TOO MUCH ABOUT DIFFERENT THINGS: NOT AT ALL
5. BEING SO RESTLESS THAT IT IS HARD TO SIT STILL: NOT AT ALL
IF YOU CHECKED OFF ANY PROBLEMS ON THIS QUESTIONNAIRE, HOW DIFFICULT HAVE THESE PROBLEMS MADE IT FOR YOU TO DO YOUR WORK, TAKE CARE OF THINGS AT HOME, OR GET ALONG WITH OTHER PEOPLE: NOT DIFFICULT AT ALL
7. FEELING AFRAID AS IF SOMETHING AWFUL MIGHT HAPPEN: NOT AT ALL
6. BECOMING EASILY ANNOYED OR IRRITABLE: NOT AT ALL
2. NOT BEING ABLE TO STOP OR CONTROL WORRYING: NOT AT ALL
GAD7 TOTAL SCORE: 0
1. FEELING NERVOUS, ANXIOUS, OR ON EDGE: NOT AT ALL

## 2018-01-09 ASSESSMENT — PATIENT HEALTH QUESTIONNAIRE - PHQ9
5. POOR APPETITE OR OVEREATING: NOT AT ALL
SUM OF ALL RESPONSES TO PHQ QUESTIONS 1-9: 0

## 2018-01-09 NOTE — PROGRESS NOTES
SUBJECTIVE:   Margy Babin is a 88 year old female who presents to clinic today for the following health issues:      Depression and Anxiety Follow-Up    Status since last visit: No change    Other associated symptoms:None    Complicating factors:     Significant life event: No     Current substance abuse: None    PHQ-9 Score and MyChart F/U Questions 2016   Total Score 1 3 0   Q9: Suicide Ideation Not at all Not at all Not at all     MEHNAZ-7 SCORE 2018   Total Score 0       PHQ-9  English  PHQ-9   Any Language  GAD7  Suicide Assessment Five-step Evaluation and Treatment (SAFE-T)      Amount of exercise or physical activity: None    Problems taking medications regularly: No    Medication side effects: none    Diet: regular (no restrictions)            Problem list and histories reviewed & adjusted, as indicated.  Additional history: as documented    Patient Active Problem List   Diagnosis     Anxiety     Familial tremor     Dry eye syndrome     Blepharitis of both eyes     Depression, major, recurrent, in complete remission (H)     Persistent insomnia     Urge incontinence of urine     Nausea without vomiting     Abdominal pain, epigastric     ACP (advance care planning)     Neck pain     Left knee pain     Syncope     Past Surgical History:   Procedure Laterality Date     C TOTAL HIP ARTHROPLASTY Right      HYSTERECTOMY TOTAL ABDOMINAL, BILATERAL SALPINGO-OOPHORECTOMY, COMBINED       NECK SURGERY  2009     ROTATOR CUFF REPAIR RT/LT Right        Social History   Substance Use Topics     Smoking status: Never Smoker     Smokeless tobacco: Never Used     Alcohol use No     Family History   Problem Relation Age of Onset     HEART DISEASE Father 80     MI     C.A.D. Father      Breast Cancer Daughter 43      age 53             Reviewed and updated as needed this visit by clinical staffTobacco  Meds  Med Hx  Surg Hx  Fam Hx  Soc Hx      Reviewed and updated as needed  "this visit by Provider  Meds         ROS:  Constitutional, HEENT, cardiovascular, pulmonary, gi and gu systems are negative, except as otherwise noted.      OBJECTIVE:                                                    /78  Pulse 71  Temp 98  F (36.7  C) (Tympanic)  Resp 16  Ht 5' 5\" (1.651 m)  Wt 135 lb (61.2 kg)  BMI 22.47 kg/m2  Body mass index is 22.47 kg/(m^2).  GENERAL APPEARANCE: healthy, alert and no distress  NEURO: Normal strength and tone, mentation intact, speech normal and tremor both hands         ASSESSMENT/PLAN:                                                        ICD-10-CM    1. 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) F33.42 PARoxetine (PAXIL) 20 MG tablet   2. Nausea R11.0 prochlorperazine (COMPAZINE) 5 MG tablet   3. Familial tremor G25.0        Patient Instructions   I refilled the patient's paroxetine and her Compazine.  The remainder of her medications were all up-to-date.  Follow-up will be in 6 months.  Will see sooner as needed.      Yaya Hatfield MD  [facility]  "

## 2018-01-09 NOTE — MR AVS SNAPSHOT
"              After Visit Summary   1/9/2018    Margy Babin    MRN: [id number]           Patient Information     Date Of Birth          10/2/1929        Visit Information        Provider Department      1/9/2018 10:30 AM Yaya Hatfield MD [facility]        Today's Diagnoses     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)    -  1    Nausea        Familial tremor          Care Instructions    I refilled the patient's paroxetine and her Compazine.  The remainder of her medications were all up-to-date.  Follow-up will be in 6 months.  Will see sooner as needed.          Follow-ups after your visit        Who to contact     If you have questions or need follow up information about today's clinic visit or your schedule please contact [facility] directly at [phone].  Normal or non-critical lab and imaging results will be communicated to you by Platialhart, letter or phone within 4 business days after the clinic has received the results. If you do not hear from us within 7 days, please contact the clinic through Platialhart or phone. If you have a critical or abnormal lab result, we will notify you by phone as soon as possible.  Submit refill requests through Jamclouds or call your pharmacy and they will forward the refill request to us. Please allow 3 business days for your refill to be completed.          Additional Information About Your Visit        MyChart Information     Jamclouds lets you send messages to your doctor, view your test results, renew your prescriptions, schedule appointments and more. To sign up, go to [email or web address] . Click on \"Log in\" on the left side of the screen, which will take you to the Welcome page. Then click on \"Sign up Now\" on the right side of the page.     You will be asked to enter the access code listed below, as well as some personal information. Please follow the directions to create your " "username and password.     Your access code is: [email or web address]  Expires: 2018 11:05 AM     Your access code will  in 90 days. If you need help or a new code, please call your [facility] or [phone].        Care EveryWhere ID     This is your Care EveryWhere ID. This could be used by other organizations to access your [place] medical records  [id number]        Your Vitals Were     Pulse Temperature Respirations Height BMI (Body Mass Index)       71 98  F (36.7  C) (Tympanic) 16 5' 5\" (1.651 m) 22.47 kg/m2        Blood Pressure from Last 3 Encounters:   18 126/78   17 131/57   17 124/70    Weight from Last 3 Encounters:   18 135 lb (61.2 kg)   17 130 lb (59 kg)   17 129 lb (58.5 kg)              Today, you had the following     No orders found for display         Today's Medication Changes          These changes are accurate as of: 18 11:05 AM.  If you have any questions, ask your nurse or doctor.               These medicines have changed or have updated prescriptions.        Dose/Directions    PARoxetine 20 MG tablet   Commonly known as:  PAXIL   This may have changed:  See the new instructions.   Used for:  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)   Changed by:  Yaya Hatfield MD        TAKE ONE TABLET BY MOUTH AT BEDTIME   Quantity:  90 tablet   Refills:  3         Stop taking these medicines if you haven't already. Please contact your care team if you have questions.     omeprazole 40 MG capsule   Commonly known as:  priLOSEC   Stopped by:  Yaya Hatfield MD           ondansetron 4 MG tablet   Commonly known as:  ZOFRAN   Stopped by:  Yaya Hatfield MD                Where to get your medicines      These medications were sent to [facility] [address] [place] [address]   [address] [facility] [address]     Phone:  [phone]     PARoxetine 20 MG tablet    prochlorperazine 5 MG tablet             "    Primary Care Provider Office Phone # Fax #    Yaya Hatfield [phone]       [address] [facility] [address] [address] [facility] [address]        Equal Access to Services     KIAH WOOD : Lashaun mullen ku lexieo Soomaali, waaxda luqadaha, qaybta kaalmada adeegyada, paul antony laKelvinraghu thornton. So [facility] [phone].    ATENCIÓN: Si habla español, tiene a hector disposición servicios gratuitos de asistencia lingüística. Llame al [phone].    We comply with applicable federal civil rights laws and [place] laws. We do not discriminate on the basis of race, color, national origin, age, disability, sex, sexual orientation, or gender identity.            Thank you!     Thank you for choosing [facility]  for your care. Our goal is always to provide you with excellent care. Hearing back from our patients is one way we can continue to improve our services. Please take a few minutes to complete the written survey that you may receive in the mail after your visit with us. Thank you!             Your Updated Medication List - Protect others around you: Learn how to safely use, store and throw away your medicines at www.disposemymeds.org.          This list is accurate as of: 1/9/18 11:05 AM.  Always use your most recent med list.                   Brand Name Dispense Instructions for use Diagnosis    acetaminophen 500 MG tablet    TYLENOL    100 tablet    Take 2 tablets (1,000 mg) by mouth 3 times daily as needed for mild pain    Neck pain       ALPRAZolam 0.25 MG tablet    XANAX    60 tablet    COSTA 1-2 TABLETS BY MOUTH TWICE DAILY AS NEEDED    Anxiety       ascorbic acid 1000 MG Tabs    vitamin C     Take 1,000 mg by mouth daily.        aspirin 81 MG tablet      Take 1 tablet by mouth daily.        CALCIUM 600 PO      Take 1 tablet by mouth daily.        docusate sodium 100 MG capsule    COLACE    60 capsule    Take 1 capsule (100 mg) by mouth 2 times daily as needed for  constipation    Constipation, unspecified constipation type       fish oil-omega-3 fatty acids 1000 MG capsule      Take 2 g by mouth daily.        GLUCOSAMINE CHOND COMPLEX/MSM Tabs      Take 1 tablet by mouth daily.        MULTIVITAL Tabs      Take 1 tablet by mouth daily.        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)       polyethylene glycol Packet    MIRALAX/GLYCOLAX    7 packet    Take 17 g by mouth daily as needed (constipation)    Constipation, unspecified constipation type       pramipexole 0.125 MG tablet    MIRAPEX    180 tablet    TAKE ONE TABLET BY MOUTH TWICE DAILY    Familial tremor       prochlorperazine 5 MG tablet    COMPAZINE    30 tablet    Take 1 tablet (5 mg) by mouth every 6 hours as needed for vomiting    Nausea       propranolol 120 MG 24 hr capsule    INDERAL LA    180 capsule    TAKE TWO CAPSULES BY MOUTH DAILY    Familial tremor       QUEtiapine 25 MG tablet    SEROquel    30 tablet    TAKE 1 TABLET (25 MG) BY MOUTH NIGHTLY AS NEEDED    Insomnia, unspecified type       RESTASIS 0.05 % ophthalmic emulsion   Generic drug:  cycloSPORINE      Apply 1 drop to eye 2 times daily        TOBRADEX ophthalmic ointment   Generic drug:  tobramycin-dexamethasone      1 Application At Bedtime        vitamin D 1000 UNITS capsule      Take 1 capsule by mouth daily.

## 2018-01-09 NOTE — PATIENT INSTRUCTIONS
I refilled the patient's paroxetine and her Compazine.  The remainder of her medications were all up-to-date.  Follow-up will be in 6 months.  Will see sooner as needed.

## 2018-01-09 NOTE — NURSING NOTE
"Chief Complaint   Patient presents with     Depression     Anxiety     Recheck Medication       Initial /78  Pulse 71  Temp 98  F (36.7  C) (Tympanic)  Resp 16  Ht 5' 5\" (1.651 m)  Wt 135 lb (61.2 kg)  BMI 22.47 kg/m2 Estimated body mass index is 22.47 kg/(m^2) as calculated from the following:    Height as of this encounter: 5' 5\" (1.651 m).    Weight as of this encounter: 135 lb (61.2 kg).  Medication Reconciliation: complete     Ana Laura Davidson CMA      "

## 2018-01-10 ASSESSMENT — ANXIETY QUESTIONNAIRES: GAD7 TOTAL SCORE: 0

## 2018-01-21 DIAGNOSIS — G25.0 FAMILIAL TREMOR: ICD-10-CM

## 2018-01-23 RX ORDER — PROPRANOLOL HYDROCHLORIDE 120 MG/1
CAPSULE, EXTENDED RELEASE ORAL
Qty: 180 CAPSULE | Refills: 2 | Status: SHIPPED | OUTPATIENT
Start: 2018-01-23 | End: 2019-02-04

## 2018-01-23 NOTE — TELEPHONE ENCOUNTER
"Requested Prescriptions   Pending Prescriptions Disp Refills     propranolol (INDERAL LA) 120 MG 24 hr capsule [Pharmacy Med Name: PROPRANOLOL  MG CAPSULE]  Last Written Prescription Date:  10/16/2017  Last Fill Quantity: 180 capsule,  # refills: 2   Last Office Visit  1/9/2018        with  [facility], [facility] or [facility] prescribing provider:     Future Office Visit:    180 capsule 2     Sig: TAKE TWO CAPSULES BY MOUTH DAILY    Beta-Blockers Protocol Passed    1/21/2018 11:35 AM       Passed - Blood pressure under 140/90    BP Readings from Last 3 Encounters:   01/09/18 126/78   06/28/17 131/57   06/19/17 124/70          Passed - Patient is age 6 or older       Passed - Recent or future visit with authorizing provider's specialty    Patient had office visit in the last year or has a visit in the next 30 days with authorizing provider.  See \"Patient Info\" tab in inbasket, or \"Choose Columns\" in Meds & Orders section of the refill encounter.             Prescription approved per [facility] Refill Protocol.    "

## 2018-01-24 ENCOUNTER — OFFICE VISIT (OUTPATIENT)
Dept: FAMILY MEDICINE | Facility: CLINIC | Age: 83
End: 2018-01-24
Payer: MEDICARE

## 2018-01-24 VITALS
RESPIRATION RATE: 16 BRPM | HEIGHT: 65 IN | BODY MASS INDEX: 23.16 KG/M2 | HEART RATE: 63 BPM | WEIGHT: 139 LBS | DIASTOLIC BLOOD PRESSURE: 72 MMHG | TEMPERATURE: 97.8 F | OXYGEN SATURATION: 99 % | SYSTOLIC BLOOD PRESSURE: 120 MMHG

## 2018-01-24 DIAGNOSIS — H60.393 INFECTIVE OTITIS EXTERNA, BILATERAL: ICD-10-CM

## 2018-01-24 DIAGNOSIS — H66.003 ACUTE SUPPURATIVE OTITIS MEDIA OF BOTH EARS WITHOUT SPONTANEOUS RUPTURE OF TYMPANIC MEMBRANES, RECURRENCE NOT SPECIFIED: Primary | ICD-10-CM

## 2018-01-24 DIAGNOSIS — H61.23 BILATERAL IMPACTED CERUMEN: ICD-10-CM

## 2018-01-24 PROCEDURE — 99214 OFFICE O/P EST MOD 30 MIN: CPT | Performed by: FAMILY MEDICINE

## 2018-01-24 RX ORDER — CIPROFLOXACIN HYDROCHLORIDE 3.5 MG/ML
1 SOLUTION/ DROPS TOPICAL 4 TIMES DAILY
Qty: 5 ML | Refills: 0 | Status: SHIPPED | OUTPATIENT
Start: 2018-01-24 | End: 2018-01-31

## 2018-01-24 RX ORDER — AMOXICILLIN AND CLAVULANATE POTASSIUM 500; 125 MG/1; MG/1
1 TABLET, FILM COATED ORAL 3 TIMES DAILY
Qty: 30 TABLET | Refills: 0 | Status: SHIPPED | OUTPATIENT
Start: 2018-01-24 | End: 2019-02-04

## 2018-01-24 RX ORDER — CIPROFLOXACIN 0.5 MG/.25ML
1 SOLUTION/ DROPS AURICULAR (OTIC) 2 TIMES DAILY
Qty: 5 ML | Refills: 0 | Status: SHIPPED | OUTPATIENT
Start: 2018-01-24 | End: 2018-01-24

## 2018-01-24 NOTE — PROGRESS NOTES
"  SUBJECTIVE:   Margy Babin is a 88 year old female who presents to clinic today for the following health issues:        Ear Problem      Duration: X7 dfays    Description (location/character/radiation): Bilateral ear, Left painfulear     Intensity:  moderate    Accompanying signs and symptoms: None    History (similar episodes/previous evaluation): Yes    Precipitating or alleviating factors: None    Therapies tried and outcome: None     No fevers.  No sore throat.  No allergies.    Problem list and histories reviewed & adjusted, as indicated.  Additional history: as documented    Labs reviewed in EPIC    Reviewed and updated as needed this visit by clinical staff  Tobacco  Allergies  Meds  Problems  Med Hx  Surg Hx  Fam Hx  Soc Hx        Reviewed and updated as needed this visit by Provider  Allergies  Meds  Problems         ROS:  C: NEGATIVE for fever, chills, change in weight  I: NEGATIVE for worrisome rashes, moles or lesions  E: NEGATIVE for vision changes or irritation  CV: NEGATIVE for chest pain, palpitations or peripheral edema  GI: NEGATIVE for nausea, abdominal pain, heartburn, or change in bowel habits  M: NEGATIVE for significant arthralgias or myalgia  H: NEGATIVE for bleeding problems      OBJECTIVE:     /72 (BP Location: Left arm, Patient Position: Sitting, Cuff Size: Adult Regular)  Pulse 63  Temp 97.8  F (36.6  C) (Tympanic)  Resp 16  Ht 5' 5\" (1.651 m)  Wt 139 lb (63 kg)  SpO2 99%  Breastfeeding? No  BMI 23.13 kg/m2  Body mass index is 23.13 kg/(m^2).   GENERAL: alert and no acute distress  EYES: Eyes grossly normal to inspection, PERRL and conjunctivae and sclerae normal  HENT: normal cephalic/atraumatic, both ears: mucopurulent effusion, occluded with wax and red and boggy canal, nose and mouth without ulcers or lesions, oropharynx clear and oral mucous membranes moist  NECK: no adenopathy, no asymmetry, masses, or scars and thyroid normal to " palpation  RESP: lungs clear to auscultation - no rales, rhonchi or wheezes  CV: regular rate and rhythm, normal S1 S2, no S3 or S4, no murmur, click or rub, no peripheral edema and peripheral pulses strong  ABDOMEN: soft, nontender, no hepatosplenomegaly, no masses and bowel sounds normal  SKIN: no suspicious lesions or rashes    Diagnostic Test Results:  none     ASSESSMENT/PLAN:     Problem List Items Addressed This Visit     None      Visit Diagnoses     Acute suppurative otitis media of both ears without spontaneous rupture of tympanic membranes, recurrence not specified    -  Primary    Relevant Medications    amoxicillin-clavulanate (AUGMENTIN) 500-125 MG per tablet    Infective otitis externa, bilateral        Relevant Medications    ciprofloxacin (CILOXAN) 0.3 % ophthalmic solution    Bilateral impacted cerumen             --Both ear canals were also found to be occluded with wax - my nurse was able to remove the ear wax successfully with irrigation (half warm water and half hydrogen peroxide.)  Hearing and plugged-feeling in both ears improved significantly.  --Will return to clinic in 1-2 weeks or sooner if needed.  See Patient Instructions for details and follow-up instructions      Margy Paredes, DO  [facility]

## 2018-01-24 NOTE — NURSING NOTE
"Chief Complaint   Patient presents with     Ear Problem     Plugged ears X1 week       Initial /72 (BP Location: Left arm, Patient Position: Sitting, Cuff Size: Adult Regular)  Pulse 63  Temp 97.8  F (36.6  C) (Tympanic)  Resp 16  Ht 5' 5\" (1.651 m)  Wt 139 lb (63 kg)  SpO2 99%  Breastfeeding? No  BMI 23.13 kg/m2 Estimated body mass index is 23.13 kg/(m^2) as calculated from the following:    Height as of this encounter: 5' 5\" (1.651 m).    Weight as of this encounter: 139 lb (63 kg).  Medication Reconciliation: complete       Rabia Alcantara LPN  "

## 2018-01-24 NOTE — MR AVS SNAPSHOT
After Visit Summary   1/24/2018    Margy Babin    MRN: [id number]           Patient Information     Date Of Birth          10/2/1929        Visit Information        Provider Department      1/24/2018 9:50 AM Margy Paredes DO [facility]        Today's Diagnoses     Acute suppurative otitis media of both ears without spontaneous rupture of tympanic membranes, recurrence not specified    -  1    Infective otitis externa, bilateral        Bilateral impacted cerumen          Care Instructions      External Ear Infection (Adult)    External otitis (also called  swimmer s ear ) is an infection in the ear canal. It is often caused by bacteria or fungus. It can occur a few days after water gets trapped in the ear canal (from swimming or bathing). It can also occur after cleaning too deeply in the ear canal with a cotton swab or other object. Sometimes, hair care products get into the ear canal and cause this problem.  Symptoms can include pain, fever, itching, redness, drainage, or swelling of the ear canal. Temporary hearing loss may also occur.  Home care    Do not try to clean the ear canal. This can push pus and bacteria deeper into the canal.    Use prescribed ear drops as directed. These help reduce swelling and fight the infection. If an ear wick was placed in the ear canal, apply drops right onto the end of the wick. The wick will draw the medication into the ear canal even if it is swollen closed.    A cotton ball may be loosely placed in the outer ear to absorb any drainage.    You may use acetaminophen or ibuprofen to control pain, unless another medication was prescribed. Note: If you have chronic liver or kidney disease or ever had a stomach ulcer or GI bleeding, talk to your health care provider before taking any of these medications.    Do not allow water to get into your ear when bathing. Also, avoid swimming until the infection has  cleared.  Prevention    Keep your ears dry. This helps lower the risk of infection. Dry your ears with a towel or hair dryer after getting wet. Also, use ear plugs when swimming.    Do not stick any objects in the ear to remove wax.    If you feel water trapped in your ear, use ear drops right away. You can get these drops over the counter at most drugstores. They work by removing water from the ear canal.  Follow-up care  Follow up with your health care provider in one week, or as advised.  When to seek medical advice  Call your health care provider right away if any of these occur:    Ear pain becomes worse or doesn t improve after 3 days of treatment    Redness or swelling of the outer ear occurs or gets worse    Headache    Painful or stiff neck    Drowsiness or confusion    Fever of 100.4 F (38 C) or higher, or as directed by your health care provider    Seizure  Date Last Reviewed: 3/22/2015    9177-5682 [employer]. [address] [place] [address]. All rights reserved. This information is not intended as a substitute for professional medical care. Always follow your healthcare professional's instructions.        Otitis Media (Middle-Ear Infection) in Adults  Otitis media is another name for a middle-ear infection. It means an infection behind your eardrum. This kind of ear infection can happen after any condition that keeps fluid from draining from the middle ear. These conditions include allergies, a cold, a sore throat, or a respiratory infection.  Middle-ear infections are common in children, but they can also happen in adults. An ear infection in an adult may mean a more serious problem than in a child. So you may need additional tests. If you have an ear infection, you should see your health care provider for treatment.  What are the types of middle-ear infections?  Infections can affect the middle ear in several ways. They are:    Acute otitis media. This middle-ear infection  occurs suddenly. It causes swelling and redness. Fluid and mucus become trapped inside the ear. You can have a fever and ear pain.    Otitis media with effusion. Fluid (effusion) and mucus build up in the middle ear after the infection goes away. You may feel like your middle ear is full. This can continue for months and may affect your hearing.    Chronic otitis media with effusion. Fluid (effusion) remains in the middle ear for a long time. Or it builds up again and again, even though there is no infection. This type of middle-ear infection may be hard to treat. It may also affect your hearing.  Who is more likely to get a middle-ear infection?  You are more likely to get an ear infection if you:    Smoke or are around someone who smokes    Have seasonal or year-round allergy symptoms    Have a cold or other upper respiratory infection  What causes a middle-ear infection?  The middle ear connects to the throat by a canal called the eustachian tube. This tube helps even out the pressure between the outer ear and the inner ear. A cold or allergy can irritate the tube or cause the area around it to swell. This can keep fluid from draining from the middle ear. The fluid builds up behind the eardrum. Bacteria and viruses can grow in this fluid. The bacteria and viruses cause the middle-ear infection.  What are the symptoms of a middle-ear infection?  Common symptoms of a middle-ear infection in adults are:    Pain in 1 or both ears    Drainage from the ear    Muffled hearing    Sore throat   You may also have a fever. Rarely, your balance can be affected.  These symptoms may be the same as for other conditions. It s important to talk with your health care provider if you think you have a middle-ear infection. If you have a high fever, severe pain behind your ear, or paralysis in your face, see your provider as soon as you can.  How is a middle-ear infection diagnosed?  Your health care provider will take a medical  history and do a physical exam. He or she will look at the outer ear and eardrum with an otoscope. The otoscope is a lighted tool that lets your provider see inside the ear. A pneumatic otoscope blows a puff of air into the ear to check how well your eardrum moves. If you eardrum doesn t move well, it may mean you have fluid behind it.  Your provider may also do a test called tympanometry. This test tells how well the middle ear is working. It can find any changes in pressure in the middle ear. Your provider may test your hearing with a tuning fork.  How is a middle-ear infection treated?  A middle-ear infection may be treated with:    Antibiotics, taken by mouth or as ear drops    Medication for pain    Decongestants, antihistamines, or nasal steroids  Your health care provider may also have you try autoinsufflation. This helps adjust the air pressure in your ear. For this, you pinch your nose and gently exhale. This forces air back through the eustachian tube.  The exact treatment for your ear infection will depend on the type of infection you have. In general, if your symptoms don t get better in 48 to 72 hours, contact your health care provider.  Middle-ear infections can cause long-term problems if not treated. They can lead to:    Infection in other parts of the head    Permanent hearing loss    Paralysis of a nerve in your face  If you have a middle-ear infection that doesn t get better, you may need to see an ear, nose, and throat specialist (otolaryngologist). You may need a CT scan or MRI to check for head and neck cancer.  Ear tubes  Sometimes fluid stays in the middle ear even after you take antibiotics and the infection goes away. In this case, your health care provider may suggest that a small tube be placed in your ear. The tube is put at the opening of the eardrum. The tube keeps fluid from building up and relieves pressure in the middle ear. It can also help you hear better. This surgery is called  "myringotomy. It is not often done in adults.  The tubes usually fall out on their own after 6 months to a year.    3326-8431 [employer]. [address] [place] [address], [place] [address]. All rights reserved. This information is not intended as a substitute for professional medical care. Always follow your healthcare professional's instructions.                Follow-ups after your visit        Follow-up notes from your care team     Return in about 2 weeks (around 2018) for re-check ears.      Who to contact     If you have questions or need follow up information about today's clinic visit or your schedule please contact [facility] directly at [phone].  Normal or non-critical lab and imaging results will be communicated to you by [employer], letter or phone within 4 business days after the clinic has received the results. If you do not hear from us within 7 days, please contact the clinic through [employer] or phone. If you have a critical or abnormal lab result, we will notify you by phone as soon as possible.  Submit refill requests through [employer] or call your pharmacy and they will forward the refill request to us. Please allow 3 business days for your refill to be completed.          Additional Information About Your Visit        [employer] Information     [employer] lets you send messages to your doctor, view your test results, renew your prescriptions, schedule appointments and more. To sign up, go to [email or web address] . Click on \"Log in\" on the left side of the screen, which will take you to the Welcome page. Then click on \"Sign up Now\" on the right side of the page.     You will be asked to enter the access code listed below, as well as some personal information. Please follow the directions to create your username and password.     Your access code is: [email or web address]  Expires: 2018 11:05 AM     Your access code will  in 90 days. If you need help or a new " "code, please call your [place] clinic or [phone].        Care EveryWhere ID     This is your Care EveryWhere ID. This could be used by other organizations to access your [place] medical records  [id number]        Your Vitals Were     Pulse Temperature Respirations Height Pulse Oximetry Breastfeeding?    63 97.8  F (36.6  C) (Tympanic) 16 5' 5\" (1.651 m) 99% No    BMI (Body Mass Index)                   23.13 kg/m2            Blood Pressure from Last 3 Encounters:   01/24/18 120/72   01/09/18 126/78   06/28/17 131/57    Weight from Last 3 Encounters:   01/24/18 139 lb (63 kg)   01/09/18 135 lb (61.2 kg)   06/27/17 130 lb (59 kg)              Today, you had the following     No orders found for display         Today's Medication Changes          These changes are accurate as of 1/24/18 10:10 AM.  If you have any questions, ask your nurse or doctor.               Start taking these medicines.        Dose/Directions    amoxicillin-clavulanate 500-125 MG per tablet   Commonly known as:  AUGMENTIN   Used for:  Acute suppurative otitis media of both ears without spontaneous rupture of tympanic membranes, recurrence not specified   Started by:  Margy Paredes DO        Dose:  1 tablet   Take 1 tablet by mouth 3 times daily   Quantity:  30 tablet   Refills:  0       ciprofloxacin 0.2 % otic solution   Commonly known as:  CETRAXAL   Used for:  Infective otitis externa, bilateral   Started by:  Margy Paredes DO        Dose:  1 Dropperette   Place 0.25 mLs (1 Dropperette) into both ears 2 times daily for 10 days   Quantity:  5 mL   Refills:  0            Where to get your medicines      These medications were sent to [place] [address] [place] [address] [place] [id number]     Phone:  [phone]     amoxicillin-clavulanate 500-125 MG per tablet    ciprofloxacin 0.2 % otic solution                Primary Care Provider Office Phone # Fax #    Yaya Hatfield, " [phone]       [address]  [facility] [address]        Equal Access to Services     KIAH WOOD : Hadii aad ku hadpatriciadavid Rosetta, waorianada rosyshaniha, pankajta kaaxelda silverio, paul medrano brookecesia antony laKelvinraghu thornton. So [facility] [phone].    ATENCIÓN: Si habla español, tiene a hector disposición servicios gratuitos de asistencia lingüística. Llame al [phone].    We comply with applicable federal civil rights laws and [place] laws. We do not discriminate on the basis of race, color, national origin, age, disability, sex, sexual orientation, or gender identity.            Thank you!     Thank you for choosing [facility]  for your care. Our goal is always to provide you with excellent care. Hearing back from our patients is one way we can continue to improve our services. Please take a few minutes to complete the written survey that you may receive in the mail after your visit with us. Thank you!             Your Updated Medication List - Protect others around you: Learn how to safely use, store and throw away your medicines at www.disposemymeds.org.          This list is accurate as of 1/24/18 10:10 AM.  Always use your most recent med list.                   Brand Name Dispense Instructions for use Diagnosis    acetaminophen 500 MG tablet    TYLENOL    100 tablet    Take 2 tablets (1,000 mg) by mouth 3 times daily as needed for mild pain    Neck pain       ALPRAZolam 0.25 MG tablet    XANAX    60 tablet    COSTA 1-2 TABLETS BY MOUTH TWICE DAILY AS NEEDED    Anxiety       amoxicillin-clavulanate 500-125 MG per tablet    AUGMENTIN    30 tablet    Take 1 tablet by mouth 3 times daily    Acute suppurative otitis media of both ears without spontaneous rupture of tympanic membranes, recurrence not specified       ascorbic acid 1000 MG Tabs    vitamin C     Take 1,000 mg by mouth daily.        aspirin 81 MG tablet      Take 1 tablet by mouth daily.        CALCIUM 600  PO      Take 1 tablet by mouth daily.        ciprofloxacin 0.2 % otic solution    CETRAXAL    5 mL    Place 0.25 mLs (1 Dropperette) into both ears 2 times daily for 10 days    Infective otitis externa, bilateral       docusate sodium 100 MG capsule    COLACE    60 capsule    Take 1 capsule (100 mg) by mouth 2 times daily as needed for constipation    Constipation, unspecified constipation type       fish oil-omega-3 fatty acids 1000 MG capsule      Take 2 g by mouth daily.        GLUCOSAMINE CHOND COMPLEX/MSM Tabs      Take 1 tablet by mouth daily.        MULTIVITAL Tabs      Take 1 tablet by mouth daily.        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)       polyethylene glycol Packet    MIRALAX/GLYCOLAX    7 packet    Take 17 g by mouth daily as needed (constipation)    Constipation, unspecified constipation type       pramipexole 0.125 MG tablet    MIRAPEX    180 tablet    TAKE ONE TABLET BY MOUTH TWICE DAILY    Familial tremor       prochlorperazine 5 MG tablet    COMPAZINE    30 tablet    Take 1 tablet (5 mg) by mouth every 6 hours as needed for vomiting    Nausea       * propranolol 120 MG 24 hr capsule    INDERAL LA    180 capsule    TAKE TWO CAPSULES BY MOUTH DAILY    Familial tremor       * propranolol 120 MG 24 hr capsule    INDERAL LA    180 capsule    TAKE TWO CAPSULES BY MOUTH DAILY    Familial tremor       QUEtiapine 25 MG tablet    SEROquel    30 tablet    TAKE 1 TABLET (25 MG) BY MOUTH NIGHTLY AS NEEDED    Insomnia, unspecified type       RESTASIS 0.05 % ophthalmic emulsion   Generic drug:  cycloSPORINE      Apply  1 drop to eye 2 times daily        TOBRADEX ophthalmic ointment   Generic drug:  tobramycin-dexamethasone      1 Application At Bedtime        vitamin D 1000 UNITS capsule      Take 1 capsule by mouth daily.        * Notice:  This list has 2 medication(s) that are the same as other medications prescribed for you. Read the directions carefully, and ask your doctor or other care provider to review them with you.

## 2018-01-24 NOTE — PATIENT INSTRUCTIONS
External Ear Infection (Adult)    External otitis (also called  swimmer s ear ) is an infection in the ear canal. It is often caused by bacteria or fungus. It can occur a few days after water gets trapped in the ear canal (from swimming or bathing). It can also occur after cleaning too deeply in the ear canal with a cotton swab or other object. Sometimes, hair care products get into the ear canal and cause this problem.  Symptoms can include pain, fever, itching, redness, drainage, or swelling of the ear canal. Temporary hearing loss may also occur.  Home care    Do not try to clean the ear canal. This can push pus and bacteria deeper into the canal.    Use prescribed ear drops as directed. These help reduce swelling and fight the infection. If an ear wick was placed in the ear canal, apply drops right onto the end of the wick. The wick will draw the medication into the ear canal even if it is swollen closed.    A cotton ball may be loosely placed in the outer ear to absorb any drainage.    You may use acetaminophen or ibuprofen to control pain, unless another medication was prescribed. Note: If you have chronic liver or kidney disease or ever had a stomach ulcer or GI bleeding, talk to your health care provider before taking any of these medications.    Do not allow water to get into your ear when bathing. Also, avoid swimming until the infection has cleared.  Prevention    Keep your ears dry. This helps lower the risk of infection. Dry your ears with a towel or hair dryer after getting wet. Also, use ear plugs when swimming.    Do not stick any objects in the ear to remove wax.    If you feel water trapped in your ear, use ear drops right away. You can get these drops over the counter at most drugstores. They work by removing water from the ear canal.  Follow-up care  Follow up with your health care provider in one week, or as advised.  When to seek medical advice  Call your health care provider right away if  any of these occur:    Ear pain becomes worse or doesn t improve after 3 days of treatment    Redness or swelling of the outer ear occurs or gets worse    Headache    Painful or stiff neck    Drowsiness or confusion    Fever of 100.4 F (38 C) or higher, or as directed by your health care provider    Seizure  Date Last Reviewed: 3/22/2015    7325-4762 [employer]. [address] [place] [address]. All rights reserved. This information is not intended as a substitute for professional medical care. Always follow your healthcare professional's instructions.        Otitis Media (Middle-Ear Infection) in Adults  Otitis media is another name for a middle-ear infection. It means an infection behind your eardrum. This kind of ear infection can happen after any condition that keeps fluid from draining from the middle ear. These conditions include allergies, a cold, a sore throat, or a respiratory infection.  Middle-ear infections are common in children, but they can also happen in adults. An ear infection in an adult may mean a more serious problem than in a child. So you may need additional tests. If you have an ear infection, you should see your health care provider for treatment.  What are the types of middle-ear infections?  Infections can affect the middle ear in several ways. They are:    Acute otitis media. This middle-ear infection occurs suddenly. It causes swelling and redness. Fluid and mucus become trapped inside the ear. You can have a fever and ear pain.    Otitis media with effusion. Fluid (effusion) and mucus build up in the middle ear after the infection goes away. You may feel like your middle ear is full. This can continue for months and may affect your hearing.    Chronic otitis media with effusion. Fluid (effusion) remains in the middle ear for a long time. Or it builds up again and again, even though there is no infection. This type of middle-ear infection may be hard to treat. It  may also affect your hearing.  Who is more likely to get a middle-ear infection?  You are more likely to get an ear infection if you:    Smoke or are around someone who smokes    Have seasonal or year-round allergy symptoms    Have a cold or other upper respiratory infection  What causes a middle-ear infection?  The middle ear connects to the throat by a canal called the eustachian tube. This tube helps even out the pressure between the outer ear and the inner ear. A cold or allergy can irritate the tube or cause the area around it to swell. This can keep fluid from draining from the middle ear. The fluid builds up behind the eardrum. Bacteria and viruses can grow in this fluid. The bacteria and viruses cause the middle-ear infection.  What are the symptoms of a middle-ear infection?  Common symptoms of a middle-ear infection in adults are:    Pain in 1 or both ears    Drainage from the ear    Muffled hearing    Sore throat   You may also have a fever. Rarely, your balance can be affected.  These symptoms may be the same as for other conditions. It s important to talk with your health care provider if you think you have a middle-ear infection. If you have a high fever, severe pain behind your ear, or paralysis in your face, see your provider as soon as you can.  How is a middle-ear infection diagnosed?  Your health care provider will take a medical history and do a physical exam. He or she will look at the outer ear and eardrum with an otoscope. The otoscope is a lighted tool that lets your provider see inside the ear. A pneumatic otoscope blows a puff of air into the ear to check how well your eardrum moves. If you eardrum doesn t move well, it may mean you have fluid behind it.  Your provider may also do a test called tympanometry. This test tells how well the middle ear is working. It can find any changes in pressure in the middle ear. Your provider may test your hearing with a tuning fork.  How is a middle-ear  infection treated?  A middle-ear infection may be treated with:    Antibiotics, taken by mouth or as ear drops    Medication for pain    Decongestants, antihistamines, or nasal steroids  Your health care provider may also have you try autoinsufflation. This helps adjust the air pressure in your ear. For this, you pinch your nose and gently exhale. This forces air back through the eustachian tube.  The exact treatment for your ear infection will depend on the type of infection you have. In general, if your symptoms don t get better in 48 to 72 hours, contact your health care provider.  Middle-ear infections can cause long-term problems if not treated. They can lead to:    Infection in other parts of the head    Permanent hearing loss    Paralysis of a nerve in your face  If you have a middle-ear infection that doesn t get better, you may need to see an ear, nose, and throat specialist (otolaryngologist). You may need a CT scan or MRI to check for head and neck cancer.  Ear tubes  Sometimes fluid stays in the middle ear even after you take antibiotics and the infection goes away. In this case, your health care provider may suggest that a small tube be placed in your ear. The tube is put at the opening of the eardrum. The tube keeps fluid from building up and relieves pressure in the middle ear. It can also help you hear better. This surgery is called myringotomy. It is not often done in adults.  The tubes usually fall out on their own after 6 months to a year.    1785-1846 [employer]. [address] [place] [address], [place] [address]. All rights reserved. This information is not intended as a substitute for professional medical care. Always follow your healthcare professional's instructions.

## 2018-02-01 ENCOUNTER — TELEPHONE (OUTPATIENT)
Dept: FAMILY MEDICINE | Facility: CLINIC | Age: 83
End: 2018-02-01

## 2018-02-01 NOTE — TELEPHONE ENCOUNTER
I called her back and she answered and said the dizziness went away. Not SOB, no chest pain, no weakness or slurred speech.  I let her know we are concerned and she should go to ED if dizzy ness comes back  Soco Jones RN- Triage [employer]

## 2018-02-01 NOTE — TELEPHONE ENCOUNTER
I spoke with patient. She was coming for a f/u for ear infection but when she was in the car she got dizzy and they came home. Did not throw up. She is not on BP medicine and they do not have BP monitor.  She has eaten and does not have history of glucose problems. NO chest pain or SOB.  She just doesn't feel well enough to come in but she is happy resting on the couch.     Per her : She hasn't been 100% for quite some time.  I let him know that this may be related to her ear problems and we discussed other possible reasons for dizziness. I asked him to make sure she is drinking adequate amounts of fluids.      We made appt for tomorrow.  He will call [facility] 24/7 for advice if needed.    FYI to Dr Lena Jones RN- Triage [employer]

## 2018-02-02 ENCOUNTER — OFFICE VISIT (OUTPATIENT)
Dept: FAMILY MEDICINE | Facility: CLINIC | Age: 83
End: 2018-02-02
Payer: MEDICARE

## 2018-02-02 VITALS
SYSTOLIC BLOOD PRESSURE: 100 MMHG | RESPIRATION RATE: 14 BRPM | DIASTOLIC BLOOD PRESSURE: 60 MMHG | BODY MASS INDEX: 22.71 KG/M2 | WEIGHT: 136.5 LBS | TEMPERATURE: 97.5 F | HEART RATE: 76 BPM

## 2018-02-02 DIAGNOSIS — H60.503 ACUTE OTITIS EXTERNA OF BOTH EARS, UNSPECIFIED TYPE: ICD-10-CM

## 2018-02-02 DIAGNOSIS — H66.003 ACUTE SUPPURATIVE OTITIS MEDIA OF BOTH EARS WITHOUT SPONTANEOUS RUPTURE OF TYMPANIC MEMBRANES, RECURRENCE NOT SPECIFIED: ICD-10-CM

## 2018-02-02 DIAGNOSIS — H61.22 HEARING LOSS OF LEFT EAR DUE TO CERUMEN IMPACTION: Primary | ICD-10-CM

## 2018-02-02 PROCEDURE — 99213 OFFICE O/P EST LOW 20 MIN: CPT | Mod: 25 | Performed by: FAMILY MEDICINE

## 2018-02-02 PROCEDURE — 69209 REMOVE IMPACTED EAR WAX UNI: CPT | Mod: LT | Performed by: FAMILY MEDICINE

## 2018-02-02 NOTE — MR AVS SNAPSHOT
"              After Visit Summary   2/2/2018    Margy Babin    MRN: [id number]           Patient Information     Date Of Birth          10/2/1929        Visit Information        Provider Department      2/2/2018 9:50 AM Margy Paredes,  [facility]        Today's Diagnoses     Hearing loss of left ear due to cerumen impaction    -  1    Acute otitis externa of both ears, unspecified type        Acute suppurative otitis media of both ears without spontaneous rupture of tympanic membranes, recurrence not specified           Follow-ups after your visit        Follow-up notes from your care team     Return if symptoms worsen or fail to improve.      Who to contact     If you have questions or need follow up information about today's clinic visit or your schedule please contact [facility] directly at [phone].  Normal or non-critical lab and imaging results will be communicated to you by MyChart, letter or phone within 4 business days after the clinic has received the results. If you do not hear from us within 7 days, please contact the clinic through MyChart or phone. If you have a critical or abnormal lab result, we will notify you by phone as soon as possible.  Submit refill requests through [employer] or call your pharmacy and they will forward the refill request to us. Please allow 3 business days for your refill to be completed.          Additional Information About Your Visit        MyChart Information     [employer] lets you send messages to your doctor, view your test results, renew your prescriptions, schedule appointments and more. To sign up, go to [email or web address] . Click on \"Log in\" on the left side of the screen, which will take you to the Welcome page. Then click on \"Sign up Now\" on the right side of the page.     You will be asked to enter the access code listed below, as well as some personal information. Please " follow the directions to create your username and password.     Your access code is: [email or web address]  Expires: 2018 11:05 AM     Your access code will  in 90 days. If you need help or a new code, please call your [place] clinic or [phone].        Care EveryWhere ID     This is your Care EveryWhere ID. This could be used by other organizations to access your [place] medical records  [id number]        Your Vitals Were     Pulse Temperature Respirations BMI (Body Mass Index)          76 97.5  F (36.4  C) (Tympanic) 14 22.71 kg/m2         Blood Pressure from Last 3 Encounters:   18 100/60   18 120/72   18 126/78    Weight from Last 3 Encounters:   18 136 lb 8 oz (61.9 kg)   18 139 lb (63 kg)   18 135 lb (61.2 kg)              Today, you had the following     No orders found for display       Primary Care Provider Office Phone # Fax #    Yaya Hatfield [phone]       [address] [facility] [id number]        Equal Access to Services     LUKE WOOD : Hadii paz alfaro hadasho Soomaali, waaxda luqadaha, qaybta kaalmada adeegyada, paul thornton. So [facility] [phone].    ATENCIÓN: Si habla español, tiene a hector disposición servicios gratuitos de asistencia lingüística. Llame al [phone].    We comply with applicable federal civil rights laws and [place] laws. We do not discriminate on the basis of race, color, national origin, age, disability, sex, sexual orientation, or gender identity.            Thank you!     Thank you for choosing [facility]  for your care. Our goal is always to provide you with excellent care. Hearing back from our patients is one way we can continue to improve our services. Please take a few minutes to complete the written survey that you may receive in the mail after your visit with us. Thank you!             Your Updated Medication List - Protect others  around you: Learn how to safely use, store and throw away your medicines at www.disposemymeds.org.          This list is accurate as of 2/2/18 10:24 AM.  Always use your most recent med list.                   Brand Name Dispense Instructions for use Diagnosis    acetaminophen 500 MG tablet    TYLENOL    100 tablet    Take 2 tablets (1,000 mg) by mouth 3 times daily as needed for mild pain    Neck pain       ALPRAZolam 0.25 MG tablet    XANAX    60 tablet    COSTA 1-2 TABLETS BY MOUTH TWICE DAILY AS NEEDED    Anxiety       amoxicillin-clavulanate 500-125 MG per tablet    AUGMENTIN    30 tablet    Take 1 tablet by mouth 3 times daily    Acute suppurative otitis media of both ears without spontaneous rupture of tympanic membranes, recurrence not specified       ascorbic acid 1000 MG Tabs    vitamin C     Take 1,000 mg by mouth daily.        aspirin 81 MG tablet      Take 1 tablet by mouth daily.        CALCIUM 600 PO      Take 1 tablet by mouth daily.        docusate sodium 100 MG capsule    COLACE    60 capsule    Take 1 capsule (100 mg) by mouth 2 times daily as needed for constipation    Constipation, unspecified constipation type       fish oil-omega-3 fatty acids 1000 MG capsule      Take 2 g by mouth daily.        GLUCOSAMINE CHOND COMPLEX/MSM Tabs      Take 1 tablet by mouth daily.        MULTIVITAL Tabs      Take 1 tablet by mouth daily.        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)       polyethylene glycol Packet    MIRALAX/GLYCOLAX    7 packet    Take 17 g by mouth daily as needed (constipation)    Constipation,  unspecified constipation type       pramipexole 0.125 MG tablet    MIRAPEX    180 tablet    TAKE ONE TABLET BY MOUTH TWICE DAILY    Familial tremor       prochlorperazine 5 MG tablet    COMPAZINE    30 tablet    Take 1 tablet (5 mg) by mouth every 6 hours as needed for vomiting    Nausea       * propranolol 120 MG 24 hr capsule    INDERAL LA    180 capsule    TAKE TWO CAPSULES BY MOUTH DAILY    Familial tremor       * propranolol 120 MG 24 hr capsule    INDERAL LA    180 capsule    TAKE TWO CAPSULES BY MOUTH DAILY    Familial tremor       QUEtiapine 25 MG tablet    SEROquel    30 tablet    TAKE 1 TABLET (25 MG) BY MOUTH NIGHTLY AS NEEDED    Insomnia, unspecified type       RESTASIS 0.05 % ophthalmic emulsion   Generic drug:  cycloSPORINE      Apply 1 drop to eye 2 times daily        TOBRADEX ophthalmic ointment   Generic drug:  tobramycin-dexamethasone      1 Application At Bedtime        vitamin D 1000 UNITS capsule      Take 1 capsule by mouth daily.        * Notice:  This list has 2 medication(s) that are the same as other medications prescribed for you. Read the directions carefully, and ask your doctor or other care provider to review them with you.

## 2018-02-02 NOTE — PROGRESS NOTES
SUBJECTIVE:   Margy Babin is a 88 year old female who presents to clinic today for the following health issues:      Ear Problems      Duration: 2 weeks    Description (location/character/radiation): Both ears feel plugged. Was treated last week for double ear infection.    Intensity:  moderate    Accompanying signs and symptoms: dizziness, and feels faint at times    History (similar episodes/previous evaluation): None    Precipitating or alleviating factors: None    Therapies tried and outcome: rest, taking antibiotics     Hard of hearing out right ear.  Feels like she has upset stomach--feels nausea, worse with Augmentin.  No gassy feeling. Only one day left of Augmentin.  Stools are regular--no concerns for constipation.  No dysuria.    No vomiting.  No fever.   Good appetite though overall and getting in fluids without issue. Ate breakfast today without issues.     Problem list and histories reviewed & adjusted, as indicated.  Additional history: as documented    Labs reviewed in EPIC    Reviewed and updated as needed this visit by clinical staff  Tobacco  Allergies  Meds  Problems  Med Hx  Surg Hx  Fam Hx  Soc Hx        Reviewed and updated as needed this visit by Provider  Allergies  Meds  Problems         ROS:  C: NEGATIVE for fever, chills, change in weight  I: NEGATIVE for worrisome rashes, moles or lesions  E: NEGATIVE for vision changes or irritation  CV: NEGATIVE for chest pain, palpitations or peripheral edema  GI: NEGATIVE for nausea, abdominal pain, heartburn, or change in bowel habits  M: NEGATIVE for significant arthralgias or myalgia  H: NEGATIVE for bleeding problems      OBJECTIVE:     /60  Pulse 76  Temp 97.5  F (36.4  C) (Tympanic)  Resp 14  Wt 136 lb 8 oz (61.9 kg)  BMI 22.71 kg/m2  Body mass index is 22.71 kg/(m^2).   GENERAL: alert and no acute distress  EYES: Eyes grossly normal to inspection, PERRL and conjunctivae and sclerae normal  HENT: mouth/nose  without ulcers or lesions.  Right ear with small amount of cotton pieces (from cotton swab), TM looks healthy, no erythema or discharge.  Right ear canal very dry and flaky--non-tender during ear tugging.  Left TM occluded with ear wax and cotton swab piece.  NECK: no adenopathy, no asymmetry, masses, or scars and thyroid normal to palpation  RESP: lungs clear to auscultation - no rales, rhonchi or wheezes  CV: regular rate and rhythm, normal S1 S2, no S3 or S4, no murmur, click or rub, no peripheral edema and peripheral pulses strong  SKIN: no suspicious lesions or rashes      Diagnostic Test Results:  none     ASSESSMENT/PLAN:     Problem List Items Addressed This Visit     None      Visit Diagnoses     Hearing loss of left ear due to cerumen impaction    -  Primary    Acute otitis externa of both ears, unspecified type        Acute suppurative otitis media of both ears without spontaneous rupture of tympanic membranes, recurrence not specified             Recommended to finish Augmentin (think she can still tolerate the medication) and ciloxan for OM and OE.    Both ear canals were found to be occluded with wax and cotton swab (worse in left ear)- my nurse was able to remove the ear wax successfully with irrigation (half warm water and half hydrogen peroxide.)  Hearing and plugged-feeling (in both ears) improved significantly.  Will return to clinic if symptoms persist or worsen.  See Patient Instructions for details and follow-up instructions  Information on cerumen impaction/removal, home care discussed with patient who expressed understanding.        Margy Paredes,   [facility]

## 2018-02-02 NOTE — NURSING NOTE
"Chief Complaint   Patient presents with     Ear Problem       Initial /60  Pulse 76  Temp 97.5  F (36.4  C) (Tympanic)  Resp 14  Wt 136 lb 8 oz (61.9 kg)  BMI 22.71 kg/m2 Estimated body mass index is 22.71 kg/(m^2) as calculated from the following:    Height as of 1/24/18: 5' 5\" (1.651 m).    Weight as of this encounter: 136 lb 8 oz (61.9 kg).  Medication Reconciliation: complete   Avelina Lebron MA student     "

## 2018-02-05 ENCOUNTER — OFFICE VISIT (OUTPATIENT)
Dept: FAMILY MEDICINE | Facility: CLINIC | Age: 83
End: 2018-02-05
Payer: MEDICARE

## 2018-02-05 VITALS
TEMPERATURE: 98.9 F | BODY MASS INDEX: 23.07 KG/M2 | WEIGHT: 138.5 LBS | OXYGEN SATURATION: 100 % | DIASTOLIC BLOOD PRESSURE: 80 MMHG | HEIGHT: 65 IN | SYSTOLIC BLOOD PRESSURE: 124 MMHG | RESPIRATION RATE: 14 BRPM | HEART RATE: 69 BPM

## 2018-02-05 DIAGNOSIS — H72.92 PERFORATION OF LEFT TYMPANIC MEMBRANE: ICD-10-CM

## 2018-02-05 DIAGNOSIS — H60.393 OTHER INFECTIVE CHRONIC OTITIS EXTERNA OF BOTH EARS: Primary | ICD-10-CM

## 2018-02-05 PROCEDURE — 99214 OFFICE O/P EST MOD 30 MIN: CPT | Performed by: FAMILY MEDICINE

## 2018-02-05 RX ORDER — OFLOXACIN 3 MG/ML
5 SOLUTION AURICULAR (OTIC) DAILY
Qty: 5 ML | Refills: 0 | Status: SHIPPED | OUTPATIENT
Start: 2018-02-05 | End: 2018-02-15

## 2018-02-05 NOTE — NURSING NOTE
"Chief Complaint   Patient presents with     Ear Problem     Bilateral plugged ears, no pain. Left greater than the right.       Initial /80 (BP Location: Left arm, Patient Position: Sitting, Cuff Size: Adult Regular)  Pulse 69  Temp 98.9  F (37.2  C)  Resp 14  Ht 5' 5\" (1.651 m)  Wt 138 lb 8 oz (62.8 kg)  SpO2 100%  Breastfeeding? No  BMI 23.05 kg/m2 Estimated body mass index is 23.05 kg/(m^2) as calculated from the following:    Height as of this encounter: 5' 5\" (1.651 m).    Weight as of this encounter: 138 lb 8 oz (62.8 kg).  Medication Reconciliation: complete     Rabia Alcantara LPN  "

## 2018-02-05 NOTE — PROGRESS NOTES
"  SUBJECTIVE:   Margy Babin is a 88 year old female who presents to clinic today for the following health issues:        Ear Problem      Duration: 3+ weeks    Description (location/character/radiation): Ears plugged with loss of hearing.    Intensity:  moderate    Accompanying signs and symptoms: None    History (similar episodes/previous evaluation): Ears were washed    Precipitating or alleviating factors: None    Therapies tried and outcome: None       Problem list and histories reviewed & adjusted, as indicated.  Additional history: as documented    Labs reviewed in EPIC    Reviewed and updated as needed this visit by clinical staff  Tobacco  Allergies  Meds  Problems  Med Hx  Surg Hx  Fam Hx  Soc Hx        Reviewed and updated as needed this visit by Provider  Allergies  Meds  Problems         ROS:  C: NEGATIVE for fever, chills, change in weight  I: NEGATIVE for worrisome rashes, moles or lesions  E: NEGATIVE for vision changes or irritation  CV: NEGATIVE for chest pain, palpitations or peripheral edema  GI: NEGATIVE for nausea, abdominal pain, heartburn, or change in bowel habits  M: NEGATIVE for significant arthralgias or myalgia  H: NEGATIVE for bleeding problems    OBJECTIVE:     /80 (BP Location: Left arm, Patient Position: Sitting, Cuff Size: Adult Regular)  Pulse 69  Temp 98.9  F (37.2  C)  Resp 14  Ht 5' 5\" (1.651 m)  Wt 138 lb 8 oz (62.8 kg)  SpO2 100%  Breastfeeding? No  BMI 23.05 kg/m2  Body mass index is 23.05 kg/(m^2).   GENERAL: alert and no acute distress  EYES: Eyes grossly normal to inspection, PERRL and conjunctivae and sclerae normal  HENT: mouth/nose without ulcers or lesions.  Right ear with small amount of cotton pieces (from cotton swab last week).  Right ear canal very dry and flaky--non-tender during ear tugging.  Left TM occluded due to swollen ear canal and possibly left over cotton swab pieces (from last week)--after ear irrigation, TM appeared " to be perforated.  Ear canals very small in general and dificult to exam.  NECK: no adenopathy, no asymmetry, masses, or scars and thyroid normal to palpation  RESP: lungs clear to auscultation - no rales, rhonchi or wheezes  CV: regular rate and rhythm, normal S1 S2, no S3 or S4, no murmur, click or rub, no peripheral edema and peripheral pulses strong  SKIN: no suspicious lesions or rashes    Diagnostic Test Results:  none     ASSESSMENT/PLAN:     Problem List Items Addressed This Visit     None      Visit Diagnoses     Other infective chronic otitis externa of both ears    -  Primary    Relevant Orders    OTOLARYNGOLOGY REFERRAL    Perforation of left tympanic membrane             --Due to re-current ear issues and failing Augmentin and Ciloxan and ear wax cleanings last week, pt agreeable to see ENT for further evaluation/treatment.  --Left ear canal was found to be occluded with wax and possibly parts of a cotton swab (used Q-tip last week)- my nurse and I were able to remove some of the debris with irrigation.  Right ear was also cleaned out--only had a few areas of wax.    Was not able to tolerate the irrigation that well and upon re-examination, Left ear appears to have a perforated TM, along with a very swollen/irritated ear canal.  Rx Ofoxacin gtts to be used until the TM heals.  Finished Augmentin last week--had GI issues toward end of treatment.  Will see ENT as well. Will not put anything in ear, including Q-tips.   Will return to clinic in 1-2 weeks or sooner if needed. See Patient Instructions for details and follow-up instructions      Margy Paredes, DO  [facility]

## 2018-02-05 NOTE — MR AVS SNAPSHOT
After Visit Summary   2/5/2018    Margy Babin    MRN: [id number]           Patient Information     Date Of Birth          10/2/1929        Visit Information        Provider Department      2/5/2018 10:10 AM Margy Paredes,  [facility]        Today's Diagnoses     Other infective chronic otitis externa of both ears    -  1    Perforation of left tympanic membrane           Follow-ups after your visit        Additional Services     OTOLARYNGOLOGY REFERRAL       Your provider has referred you to: [facility]: [facility] [phone]   [email or web address]    Please be aware that coverage of these services is subject to the terms and limitations of your health insurance plan.  Call member services at your health plan with any benefit or coverage questions.      Please bring the following with you to your appointment:    (1) Any X-Rays, CTs or MRIs which have been performed.  Contact the facility where they were done to arrange for  prior to your scheduled appointment.   (2) List of current medications  (3) This referral request   (4) Any documents/labs given to you for this referral                  Follow-up notes from your care team     Return if symptoms worsen or fail to improve.      Who to contact     If you have questions or need follow up information about today's clinic visit or your schedule please contact [facility] directly at [phone].  Normal or non-critical lab and imaging results will be communicated to you by MyChart, letter or phone within 4 business days after the clinic has received the results. If you do not hear from us within 7 days, please contact the clinic through MyChart or phone. If you have a critical or abnormal lab result, we will notify you by phone as soon as possible.  Submit refill requests through [employer] or call your pharmacy and they will  "forward the refill request to us. Please allow 3 business days for your refill to be completed.          Additional Information About Your Visit        [employer] Information     [employer] lets you send messages to your doctor, view your test results, renew your prescriptions, schedule appointments and more. To sign up, go to [email or web address] . Click on \"Log in\" on the left side of the screen, which will take you to the Welcome page. Then click on \"Sign up Now\" on the right side of the page.     You will be asked to enter the access code listed below, as well as some personal information. Please follow the directions to create your username and password.     Your access code is: [email or web address]  Expires: 2018 11:05 AM     Your access code will  in 90 days. If you need help or a new code, please call your [facility] clinic or [phone].        Care EveryWhere ID     This is your [facility] EveryWhere ID. This could be used by other organizations to access your [facility] medical records  [id number]        Your Vitals Were     Pulse Temperature Respirations Height Pulse Oximetry Breastfeeding?    69 98.9  F (37.2  C) 14 5' 5\" (1.651 m) 100% No    BMI (Body Mass Index)                   23.05 kg/m2            Blood Pressure from Last 3 Encounters:   18 124/80   18 100/60   18 120/72    Weight from Last 3 Encounters:   18 138 lb 8 oz (62.8 kg)   18 136 lb 8 oz (61.9 kg)   18 139 lb (63 kg)              We Performed the Following     OTOLARYNGOLOGY REFERRAL          Today's Medication Changes          These changes are accurate as of 18 11:12 AM.  If you have any questions, ask your nurse or doctor.               Start taking these medicines.        Dose/Directions    ofloxacin 0.3 % otic solution   Commonly known as:  FLOXIN   Used for:  Other infective chronic otitis externa of both ears, Perforation of left tympanic membrane   Started by:  Margy Paredes DO        " Dose:  5 drop   Place 5 drops Into the left ear daily for 10 days   Quantity:  5 mL   Refills:  0            Where to get your medicines      These medications were sent to [facility] [id number] [facility] - [place], [place] - [address]   [address] [facility] [address]     Phone:  [phone]     ofloxacin 0.3 % otic solution                Primary Care Provider Office Phone # Fax #    Yaya Hatfield [phone]       [address] [facility] [address]        Equal Access to Services     [facility]: Hadii aad ku hadasho Soomaali, waaxda luqadaha, qaybta kaalmada adeegyada, waxay lily hayraghu tovar . So [facility] [phone].    ATENCIÓN: Si habla español, tiene a hector disposición servicios gratuitos de asistencia lingüística. Llame al [phone].    We comply with applicable federal civil rights laws and [place] laws. We do not discriminate on the basis of race, color, national origin, age, disability, sex, sexual orientation, or gender identity.            Thank you!     Thank you for choosing [facility]  for your care. Our goal is always to provide you with excellent care. Hearing back from our patients is one way we can continue to improve our services. Please take a few minutes to complete the written survey that you may receive in the mail after your visit with us. Thank you!             Your Updated Medication List - Protect others around you: Learn how to safely use, store and throw away your medicines at www.disposemymeds.org.          This list is accurate as of 2/5/18 11:12 AM.  Always use your most recent med list.                   Brand Name Dispense Instructions for use Diagnosis    acetaminophen 500 MG tablet    TYLENOL    100 tablet    Take 2 tablets (1,000 mg) by mouth 3 times daily as needed for mild pain    Neck pain       ALPRAZolam 0.25 MG tablet    XANAX    60 tablet    COSTA 1-2 TABLETS BY MOUTH TWICE DAILY AS NEEDED     Anxiety       amoxicillin-clavulanate 500-125 MG per tablet    AUGMENTIN    30 tablet    Take 1 tablet by mouth 3 times daily    Acute suppurative otitis media of both ears without spontaneous rupture of tympanic membranes, recurrence not specified       ascorbic acid 1000 MG Tabs    vitamin C     Take 1,000 mg by mouth daily.        aspirin 81 MG tablet      Take 1 tablet by mouth daily.        CALCIUM 600 PO      Take 1 tablet by mouth daily.        docusate sodium 100 MG capsule    COLACE    60 capsule    Take 1 capsule (100 mg) by mouth 2 times daily as needed for constipation    Constipation, unspecified constipation type       fish oil-omega-3 fatty acids 1000 MG capsule      Take 2 g by mouth daily.        GLUCOSAMINE CHOND COMPLEX/MSM Tabs      Take 1 tablet by mouth daily.        MULTIVITAL Tabs      Take 1 tablet by mouth daily.        ofloxacin 0.3 % otic solution    FLOXIN    5 mL    Place 5 drops Into the left ear daily for 10 days    Other infective chronic otitis externa of both ears, Perforation of left tympanic membrane       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)       polyethylene glycol Packet    MIRALAX/GLYCOLAX    7 packet    Take 17 g by mouth daily as needed (constipation)    Constipation, unspecified constipation type       pramipexole 0.125 MG tablet    MIRAPEX    180 tablet    TAKE ONE TABLET BY MOUTH TWICE DAILY    Familial tremor       prochlorperazine 5 MG tablet    COMPAZINE    30 tablet    Take 1 tablet (5 mg) by mouth every 6 hours as needed for vomiting    Nausea       * propranolol 120 MG 24  hr capsule    INDERAL LA    180 capsule    TAKE TWO CAPSULES BY MOUTH DAILY    Familial tremor       * propranolol 120 MG 24 hr capsule    INDERAL LA    180 capsule    TAKE TWO CAPSULES BY MOUTH DAILY    Familial tremor       QUEtiapine 25 MG tablet    SEROquel    30 tablet    TAKE 1 TABLET (25 MG) BY MOUTH NIGHTLY AS NEEDED    Insomnia, unspecified type       RESTASIS 0.05 % ophthalmic emulsion   Generic drug:  cycloSPORINE      Apply 1 drop to eye 2 times daily        TOBRADEX ophthalmic ointment   Generic drug:  tobramycin-dexamethasone      1 Application At Bedtime        vitamin D 1000 UNITS capsule      Take 1 capsule by mouth daily.        * Notice:  This list has 2 medication(s) that are the same as other medications prescribed for you. Read the directions carefully, and ask your doctor or other care provider to review them with you.

## 2018-04-23 NOTE — NURSING NOTE
"Chief Complaint   Patient presents with     Nausea     x 2 weeks       Initial /70  Pulse 118  Temp 97.6  F (36.4  C) (Tympanic)  Resp 16  Wt 129 lb (58.5 kg)  BMI 21.47 kg/m2 Estimated body mass index is 21.47 kg/(m^2) as calculated from the following:    Height as of 2/1/17: 5' 5\" (1.651 m).    Weight as of this encounter: 129 lb (58.5 kg).  Medication Reconciliation: complete     Ana Laura Davidson CMA      " No significant past surgical history

## 2018-04-30 DIAGNOSIS — F41.9 ANXIETY: ICD-10-CM

## 2018-05-01 NOTE — TELEPHONE ENCOUNTER
Controlled Substance Refill Request for Xanax  Problem List Complete:  No     PROVIDER TO CONSIDER COMPLETION OF PROBLEM LIST AND OVERVIEW/CONTROLLED SUBSTANCE AGREEMENT    Last Written Prescription Date:  11-28-17   Last Fill Quantity: 60,   # refills: 1    Last Office Visit with [facility] primary care provider: 1-9-18 with Dr. Yaya Hatfield     Future Office visit:     Controlled substance agreement on file: No.     Processing:  Fax Rx to [facility] pharmacy   checked in past 6 months?  Yes 5-1-18 no concerns

## 2018-05-02 RX ORDER — ALPRAZOLAM 0.25 MG
TABLET ORAL
Qty: 60 TABLET | Refills: 1 | Status: SHIPPED | OUTPATIENT
Start: 2018-05-02 | End: 2018-07-10

## 2018-06-02 DIAGNOSIS — N39.41 URGE INCONTINENCE OF URINE: ICD-10-CM

## 2018-06-03 NOTE — TELEPHONE ENCOUNTER
"Requested Prescriptions   Pending Prescriptions Disp Refills     oxybutynin chloride 15 MG TB24 [Pharmacy Med Name: OXYBUTYNIN CL ER 15 MG TABLET]  Last Written Prescription Date:  09/05/2017  Last Fill Quantity: 90,  # refills: 2   Last office visit: 2/5/2018 with prescribing provider:  MARISOL   Future Office Visit:     90 tablet 2     Sig: TAKE ONE TABLET BY MOUTH ONE TIME DAILY    Muscarinic Antagonists (Urinary Incontinence Agents) Passed    6/2/2018  9:33 AM       Passed - Recent (12 mo) or future (30 days) visit within the authorizing provider's specialty    Patient had office visit in the last 12 months or has a visit in the next 30 days with authorizing provider or within the authorizing provider's specialty.  See \"Patient Info\" tab in inbasket, or \"Choose Columns\" in Meds & Orders section of the refill encounter.           Passed - Medication is Oxybutynin and patient is 5 years of age or older       Passed - Patient does not have a diagnosis of glaucoma on the problem list    If glaucoma diagnosis is new, refer refill to physician.         Passed - Patient is 18 years of age or older          "

## 2018-06-04 RX ORDER — OXYBUTYNIN CHLORIDE 15 MG/1
TABLET, EXTENDED RELEASE ORAL
Qty: 90 TABLET | Refills: 1 | Status: SHIPPED | OUTPATIENT
Start: 2018-06-04 | End: 2019-01-26

## 2018-07-10 DIAGNOSIS — G25.0 FAMILIAL TREMOR: ICD-10-CM

## 2018-07-10 DIAGNOSIS — F41.9 ANXIETY: ICD-10-CM

## 2018-07-11 NOTE — TELEPHONE ENCOUNTER
"Requested Prescriptions   Pending Prescriptions Disp Refills     pramipexole (MIRAPEX) 0.125 MG tablet  Last Written Prescription Date:  1/12/2018  Last Fill Quantity: 180,  # refills: 2   Last office visit: 2/5/2018 with prescribing provider:  Dr Paredes   Future Office Visit:     180 tablet 2     Sig: Take 1 tablet (0.125 mg) by mouth 2 times daily    Antiparkinson's Agents Protocol Failed    7/10/2018 11:46 AM       Failed - CBC on record in past 12 months    Recent Labs   Lab Test  06/28/17   1355   WBC  12.7*   RBC  3.70*   HGB  11.8   HCT  35.6   PLT  245       For [facility] ONLY: [id number] = WBC, [id number] = RBC         Failed - ALT on record in past 12 months        Recent Labs   Lab Test  06/27/17   1040   ALT  22            Failed - Serum Creatinine on file in past 12 months    Recent Labs   Lab Test  06/28/17   0650   CR  0.74            Passed - Blood pressure under 140/90 in past 12 months    BP Readings from Last 3 Encounters:   02/05/18 124/80   02/02/18 100/60   01/24/18 120/72                Passed - Patient is age 18 or older       Passed - No active pregnancy on record       Passed - No positive pregnancy test in the past 12 months       Passed - Recent (6 mo) or future (30 days) visit within the authorizing provider's specialty    Patient had office visit in the last 6 months or has a visit in the next 30 days with authorizing provider or within the authorizing provider's specialty.  See \"Patient Info\" tab in inbasket, or \"Choose Columns\" in Meds & Orders section of the refill encounter.            ALPRAZolam (XANAX) 0.25 MG tablet        Last Written Prescription Date:  5/2/2018  Last Fill Quantity: 60,   # refills: 1  Last Office Visit: 2/5/2018  Future Office visit:       Routing refill request to provider for review/approval because:  Drug not on the [facility] or [facility] refill protocol or controlled substance   60 tablet 1    There is no refill protocol information for this order          "

## 2018-07-12 RX ORDER — ALPRAZOLAM 0.25 MG
TABLET ORAL
Qty: 60 TABLET | Refills: 0 | Status: SHIPPED | OUTPATIENT
Start: 2018-07-12 | End: 2018-08-29

## 2018-07-12 RX ORDER — PRAMIPEXOLE DIHYDROCHLORIDE 0.12 MG/1
0.12 TABLET ORAL 2 TIMES DAILY
Qty: 60 TABLET | Refills: 0 | Status: SHIPPED | OUTPATIENT
Start: 2018-07-12 | End: 2018-09-11

## 2018-07-12 NOTE — TELEPHONE ENCOUNTER
Controlled Substance Refill Request for ALPRAZolam (XANAX) 0.25 MG tablet  Problem List Complete:  No     Processing:  Patient will  in clinic   checked in past 3 months?  Yes 5/1/18

## 2018-07-12 NOTE — TELEPHONE ENCOUNTER
Medication is being filled for 1 time refill only due to:  Patient needs to be seen because needs to be seen every six months .

## 2018-08-28 ENCOUNTER — TELEPHONE (OUTPATIENT)
Dept: FAMILY MEDICINE | Facility: CLINIC | Age: 83
End: 2018-08-28

## 2018-08-28 DIAGNOSIS — G47.00 INSOMNIA, UNSPECIFIED TYPE: ICD-10-CM

## 2018-08-28 DIAGNOSIS — F41.9 ANXIETY: ICD-10-CM

## 2018-08-28 RX ORDER — QUETIAPINE FUMARATE 25 MG/1
TABLET, FILM COATED ORAL
Qty: 30 TABLET | Refills: 0 | Status: SHIPPED | OUTPATIENT
Start: 2018-08-28 | End: 2018-09-19

## 2018-08-28 NOTE — TELEPHONE ENCOUNTER
[facility] called pt was given dissolvable alprazolam and pharmacy calling to clarify if that is what is appropriate please inform and call pharmacy

## 2018-08-28 NOTE — TELEPHONE ENCOUNTER
"QUETIAPINE 25MG TABLETS  Last Written Prescription Date:  06/04/18  Last Fill Quantity: 30,  # refills: 2   Last office visit: 2/5/2018 with prescribing provider:  02/05/18   Future Office Visit:    Requested Prescriptions   Pending Prescriptions Disp Refills     QUEtiapine (SEROQUEL) 25 MG tablet [Pharmacy Med Name: QUETIAPINE 25MG TABLETS] 30 tablet 0     Sig: TAKE ONE TABLET BY MOUTH EVERY EVENING AS NEEDED    Antipsychotic Medications Failed    8/28/2018  3:53 PM       Failed - Lipid panel on file within the past 12 months    Recent Labs   Lab Test  10/03/13   0954   CHOL  223*   TRIG  120   HDL  73   LDL  126   VLDL  24   CHOLHDLRATIO  3.1              Failed - CBC on file in past 12 months    Recent Labs   Lab Test  06/28/17   1355   WBC  12.7*   RBC  3.70*   HGB  11.8   HCT  35.6   PLT  245       For [facility] ONLY: [id number] = WBC, [id number] = RBC         Failed - A1c or Glucose on file in past 12 months    Recent Labs   Lab Test  06/28/17   0650   GLC  100*       Please review patients last 3 weights. If a weight gain of >10 lbs exists, you may refill the prescription once after instructing the patient to schedule an appointment within the next 30 days.    Wt Readings from Last 3 Encounters:   02/05/18 138 lb 8 oz (62.8 kg)   02/02/18 136 lb 8 oz (61.9 kg)   01/24/18 139 lb (63 kg)            Failed - Recent (6 mo) or future (30 days) visit within the authorizing provider's specialty    Patient had office visit in the last 6 months or has a visit in the next 30 days with authorizing provider or within the authorizing provider's specialty.  See \"Patient Info\" tab in inbasket, or \"Choose Columns\" in Meds & Orders section of the refill encounter.           Passed - Blood pressure under 140/90 in past 12 months    BP Readings from Last 3 Encounters:   02/05/18 124/80   02/02/18 100/60   01/24/18 120/72                Passed - Patient is 12 years of age or older       Passed - Heart Rate on file within past 12 months    " Pulse Readings from Last 3 Encounters:   02/05/18 69   02/02/18 76   01/24/18 63              Passed - Patient is not pregnant       Passed - No positve pregnancy test on file in past 12 months

## 2018-08-29 DIAGNOSIS — F41.9 ANXIETY: ICD-10-CM

## 2018-08-29 RX ORDER — ALPRAZOLAM 0.25 MG
TABLET ORAL
Qty: 60 TABLET | Refills: 0 | Status: CANCELLED | OUTPATIENT
Start: 2018-08-29

## 2018-08-29 RX ORDER — ALPRAZOLAM 0.25 MG
TABLET ORAL
Qty: 60 TABLET | Refills: 0 | Status: SHIPPED | OUTPATIENT
Start: 2018-08-29 | End: 2018-10-13

## 2018-08-29 NOTE — TELEPHONE ENCOUNTER
[facility] was called, they are requesting a refill for:    Controlled Substance Refill Request for ALPRAZolam (XANAX) 0.25 MG tablet  Problem List Complete:  No     PROVIDER TO CONSIDER COMPLETION OF PROBLEM LIST AND OVERVIEW/CONTROLLED SUBSTANCE AGREEMENT    Last Written Prescription Date:  7/12/18  Last Fill Quantity: 60,   # refills: 0    Last Office Visit with [facility] primary care provider: 2/5/18    Future Office visit:     Controlled substance agreement on file: No.     Processing:  Fax Rx to [facility] pharmacy   checked in past 3 months?  No, route to RN     [place] reviewed:  reviewed- no concerns    [place]  [email or web address]

## 2018-09-11 DIAGNOSIS — G25.0 FAMILIAL TREMOR: ICD-10-CM

## 2018-09-12 NOTE — TELEPHONE ENCOUNTER
"Requested Prescriptions   Pending Prescriptions Disp Refills     pramipexole (MIRAPEX) 0.125 MG tablet [Pharmacy Med Name: PRAMIPEXOLE 0.125MG TABLETS]  Last Written Prescription Date:  7/12/18  Last Fill Quantity: 60,  # refills: 0   Last office visit: 2/5/2018 with prescribing provider:  Lena   Future Office Visit:     180 tablet 0     Sig: TAKE 1 TABLET BY MOUTH TWICE DAILY    Antiparkinson's Agents Protocol Failed    9/11/2018  3:14 PM       Failed - CBC on record in past 12 months    Recent Labs   Lab Test  06/28/17   1355   WBC  12.7*   RBC  3.70*   HGB  11.8   HCT  35.6   PLT  245       For [facility] ONLY: [id number] = WBC, [id number] = RBC         Failed - ALT on record in past 12 months        Recent Labs   Lab Test  06/27/17   1040   ALT  22            Failed - Serum Creatinine on file in past 12 months    Recent Labs   Lab Test  06/28/17   0650   CR  0.74            Failed - Recent (6 mo) or future (30 days) visit within the authorizing provider's specialty    Patient had office visit in the last 6 months or has a visit in the next 30 days with authorizing provider or within the authorizing provider's specialty.  See \"Patient Info\" tab in inbasket, or \"Choose Columns\" in Meds & Orders section of the refill encounter.           Passed - Blood pressure under 140/90 in past 12 months    BP Readings from Last 3 Encounters:   02/05/18 124/80   02/02/18 100/60   01/24/18 120/72                Passed - Patient is age 18 or older       Passed - No active pregnancy on record       Passed - No positive pregnancy test in the past 12 months          "

## 2018-09-13 RX ORDER — PRAMIPEXOLE DIHYDROCHLORIDE 0.12 MG/1
TABLET ORAL
Qty: 180 TABLET | Refills: 0 | Status: SHIPPED | OUTPATIENT
Start: 2018-09-13 | End: 2018-12-06

## 2018-09-13 NOTE — TELEPHONE ENCOUNTER
Routing refill request to provider for review/approval because:  Labs not current:  Related to medication

## 2018-10-13 DIAGNOSIS — F41.9 ANXIETY: ICD-10-CM

## 2018-10-13 NOTE — TELEPHONE ENCOUNTER
ALPRAZolam (XANAX) 0.25 MG tablet  Last Written Prescription Date: 08/29/2018   Last Fill Quantity: 60,   # refills: 0  Last Office Visit: 02/05/2018  Future Office visit:       Routing refill request to provider for review/approval because:  Drug not on the [facility], [facility] or [facility] refill protocol or controlled substance

## 2018-10-14 DIAGNOSIS — G47.00 INSOMNIA, UNSPECIFIED TYPE: ICD-10-CM

## 2018-10-15 NOTE — TELEPHONE ENCOUNTER
"Requested Prescriptions   Pending Prescriptions Disp Refills     QUEtiapine (SEROQUEL) 25 MG tablet [Pharmacy Med Name: QUETIAPINE 25MG TABLETS]  Last Written Prescription Date:  9/20/2018  Last Fill Quantity: 30 tablet,  # refills: 0   Last Office Visit  2/5/2018        with  [facility] or [facility] prescribing provider:     Future Office Visit:      30 tablet 0     Sig: TAKE 1 TABLET BY MOUTH EVERY EVENING AS NEEDED    Antipsychotic Medications Failed    10/14/2018  4:32 PM       Failed - Lipid panel on file within the past 12 months    Recent Labs   Lab Test  10/03/13   0954   CHOL  223*   TRIG  120   HDL  73   LDL  126   VLDL  24   CHOLHDLRATIO  3.1            Failed - CBC on file in past 12 months    Recent Labs   Lab Test  06/28/17   1355   WBC  12.7*   RBC  3.70*   HGB  11.8   HCT  35.6   PLT  245       For [facility] ONLY: [id number] = WBC, [id number] = RBC         Failed - A1c or Glucose on file in past 12 months    Recent Labs   Lab Test  06/28/17   0650   GLC  100*       Please review patients last 3 weights. If a weight gain of >10 lbs exists, you may refill the prescription once after instructing the patient to schedule an appointment within the next 30 days.    Wt Readings from Last 3 Encounters:   02/05/18 138 lb 8 oz (62.8 kg)   02/02/18 136 lb 8 oz (61.9 kg)   01/24/18 139 lb (63 kg)            Failed - Recent (6 mo) or future (30 days) visit within the authorizing provider's specialty    Patient had office visit in the last 6 months or has a visit in the next 30 days with authorizing provider or within the authorizing provider's specialty.  See \"Patient Info\" tab in inbasket, or \"Choose Columns\" in Meds & Orders section of the refill encounter.           Passed - Blood pressure under 140/90 in past 12 months    BP Readings from Last 3 Encounters:   02/05/18 124/80   02/02/18 100/60   01/24/18 120/72            Passed - Patient is 12 years of age or older       Passed - Heart Rate on file within past 12 months    " Pulse Readings from Last 3 Encounters:   02/05/18 69   02/02/18 76   01/24/18 63            Passed - Patient is not pregnant       Passed - No positve pregnancy test on file in past 12 months

## 2018-10-15 NOTE — TELEPHONE ENCOUNTER
Controlled Substance Refill Request for ALPRAZolam (XANAX) 0.25 MG tablet  Problem List Complete:  No     PROVIDER TO CONSIDER COMPLETION OF PROBLEM LIST AND OVERVIEW/CONTROLLED SUBSTANCE AGREEMENT        Controlled substance agreement on file: No.     Processing:  Fax Rx to [facility] pharmacy   checked in past 3 months?  Yes  check 8/29/18- no concerns

## 2018-10-16 DIAGNOSIS — R11.0 NAUSEA: ICD-10-CM

## 2018-10-16 RX ORDER — PROCHLORPERAZINE MALEATE 5 MG
TABLET ORAL
Qty: 90 TABLET | Refills: 0 | Status: SHIPPED | OUTPATIENT
Start: 2018-10-16 | End: 2018-11-10

## 2018-10-16 NOTE — TELEPHONE ENCOUNTER
Prescription approved per [facility] Refill Protocol.  Soco Jones RN- Triage [employer]

## 2018-10-16 NOTE — TELEPHONE ENCOUNTER
"Requested Prescriptions   Pending Prescriptions Disp Refills     prochlorperazine (COMPAZINE) 5 MG tablet [Pharmacy Med Name: PROCHLORPERAZINE 5MG TABLETS]  Last Written Prescription Date:  1/9/2018  Last Fill Quantity: 30 tablet,  # refills: 3   Last Office Visit  2/5/2018        with  [facility] or [facility] prescribing provider:     Future Office Visit:        30 tablet 0     Sig: TAKE ONE TABLET(5MG) BY MOUTH EVERY 6 HOURS AS NEEDED FOR VOMTING     Antivertigo/Antiemetic Agents Passed    10/16/2018 10:05 AM       Passed - Recent (12 mo) or future (30 days) visit within the authorizing provider's specialty    Patient had office visit in the last 12 months or has a visit in the next 30 days with authorizing provider or within the authorizing provider's specialty.  See \"Patient Info\" tab in inbasket, or \"Choose Columns\" in Meds & Orders section of the refill encounter.           Passed - Patient is 18 years of age or older          "

## 2018-10-16 NOTE — TELEPHONE ENCOUNTER
Routing refill request to provider for review/approval because:  Labs out of date  Soco Jones RN- Triage [employer]

## 2018-10-17 RX ORDER — QUETIAPINE FUMARATE 25 MG/1
TABLET, FILM COATED ORAL
Qty: 30 TABLET | Refills: 0 | Status: SHIPPED | OUTPATIENT
Start: 2018-10-17 | End: 2018-11-10

## 2018-10-18 RX ORDER — ALPRAZOLAM 0.25 MG
TABLET ORAL
Qty: 60 TABLET | Refills: 0 | Status: SHIPPED | OUTPATIENT
Start: 2018-10-18 | End: 2018-11-13

## 2018-10-31 DIAGNOSIS — G25.0 FAMILIAL TREMOR: ICD-10-CM

## 2018-10-31 RX ORDER — PRAMIPEXOLE DIHYDROCHLORIDE 0.12 MG/1
TABLET ORAL
Qty: 180 TABLET | Refills: 2 | Status: SHIPPED | OUTPATIENT
Start: 2018-10-31 | End: 2019-02-04

## 2018-10-31 NOTE — TELEPHONE ENCOUNTER
"Requested Prescriptions   Pending Prescriptions Disp Refills     pramipexole (MIRAPEX) 0.125 MG tablet [Pharmacy Med Name: PRAMIPEXOLE 0.125 MG TABLET] 180 tablet 2     Sig: TAKE ONE TABLET BY MOUTH TWICE DAILY    Antiparkinson's Agents Protocol Failed    10/31/2018  2:27 PM       Failed - CBC on record in past 12 months    Recent Labs   Lab Test  06/28/17   1355   WBC  12.7*   RBC  3.70*   HGB  11.8   HCT  35.6   PLT  245                Failed - ALT on record in past 12 months        Recent Labs   Lab Test  06/27/17   1040   ALT  22            Failed - Serum Creatinine on file in past 12 months    Recent Labs   Lab Test  06/28/17   0650   CR  0.74            Failed - Recent (6 mo) or future (30 days) visit within the authorizing provider's specialty    Patient had office visit in the last 6 months or has a visit in the next 30 days with authorizing provider or within the authorizing provider's specialty.  See \"Patient Info\" tab in inbasket, or \"Choose Columns\" in Meds & Orders section of the refill encounter.           Passed - Blood pressure under 140/90 in past 12 months    BP Readings from Last 3 Encounters:   02/05/18 124/80   02/02/18 100/60   01/24/18 120/72                Passed - Patient is age 18 or older       Passed - No active pregnancy on record       Passed - No positive pregnancy test in the past 12 months        Routing refill request to provider for review/approval because:  Labs not current:  Cbc,creat,alt  Overdue for office visit        "

## 2018-11-10 DIAGNOSIS — R11.0 NAUSEA: ICD-10-CM

## 2018-11-10 DIAGNOSIS — G47.00 INSOMNIA, UNSPECIFIED TYPE: ICD-10-CM

## 2018-11-11 NOTE — TELEPHONE ENCOUNTER
"Requested Prescriptions   Pending Prescriptions Disp Refills     QUEtiapine (SEROQUEL) 25 MG tablet [Pharmacy Med Name: QUETIAPINE 25MG TABLETS]  Last Written Prescription Date:  10/17/2018  Last Fill Quantity: 30,  # refills: 0   Last office visit: 2/5/2018 with prescribing provider:  MARISOL  Future Office Visit:     30 tablet 0     Sig: TAKE 1 TABLET BY MOUTH EVERY EVENING AS NEEDED    Antipsychotic Medications Failed    11/10/2018  5:00 PM       Failed - Lipid panel on file within the past 12 months    Recent Labs   Lab Test  10/03/13   0954   CHOL  223*   TRIG  120   HDL  73   LDL  126   VLDL  24   CHOLHDLRATIO  3.1              Failed - CBC on file in past 12 months    Recent Labs   Lab Test  06/28/17   1355   WBC  12.7*   RBC  3.70*   HGB  11.8   HCT  35.6   PLT  245                Failed - A1c or Glucose on file in past 12 months    Recent Labs   Lab Test  06/28/17   0650   GLC  100*       Please review patients last 3 weights. If a weight gain of >10 lbs exists, you may refill the prescription once after instructing the patient to schedule an appointment within the next 30 days.    Wt Readings from Last 3 Encounters:   02/05/18 138 lb 8 oz (62.8 kg)   02/02/18 136 lb 8 oz (61.9 kg)   01/24/18 139 lb (63 kg)            Failed - Recent (6 mo) or future (30 days) visit within the authorizing provider's specialty    Patient had office visit in the last 6 months or has a visit in the next 30 days with authorizing provider or within the authorizing provider's specialty.  See \"Patient Info\" tab in inbasket, or \"Choose Columns\" in Meds & Orders section of the refill encounter.           Passed - Blood pressure under 140/90 in past 12 months    BP Readings from Last 3 Encounters:   02/05/18 124/80   02/02/18 100/60   01/24/18 120/72                Passed - Patient is 12 years of age or older       Passed - Heart Rate on file within past 12 months    Pulse Readings from Last 3 Encounters:   02/05/18 69   02/02/18 76 " "  01/24/18 63              Passed - Patient is not pregnant       Passed - No positve pregnancy test on file in past 12 months        prochlorperazine (COMPAZINE) 5 MG tablet [Pharmacy Med Name: PROCHLORPERAZINE 5MG TABLETS]  Last Written Prescription Date:  10/16/2018  Last Fill Quantity: 90,  # refills: 0   Last office visit: 2/5/2018 with prescribing provider:  MARISOL   Future Office Visit:     90 tablet 0     Sig: TAKE ONE TABLET BY MOUTH EVERY 6 HOURS AS NEEDED FOR VOMITING     Antivertigo/Antiemetic Agents Passed    11/10/2018  5:00 PM       Passed - Recent (12 mo) or future (30 days) visit within the authorizing provider's specialty    Patient had office visit in the last 12 months or has a visit in the next 30 days with authorizing provider or within the authorizing provider's specialty.  See \"Patient Info\" tab in inbasket, or \"Choose Columns\" in Meds & Orders section of the refill encounter.             Passed - Patient is 18 years of age or older          "

## 2018-11-12 NOTE — TELEPHONE ENCOUNTER
"Routing refill request to provider for review/approval because:  Patient needs to be seen because it has been more than 6 months since last office visit.    QUEtiapine (SEROQUEL) 25 MG tablet [Pharmacy Med Name: QUETIAPINE 25MG TABLETS]  Last Written Prescription Date:  10/17/2018  Last Fill Quantity: 30,  # refills: 0   Last office visit: 2/5/2018 with prescribing provider:  MARISOL  Future Office Visit:      30 tablet 0        Sig: TAKE 1 TABLET BY MOUTH EVERY EVENING AS NEEDED     Antipsychotic Medications Failed     11/10/2018  5:00 PM         Failed - Lipid panel on file within the past 12 months         Recent Labs   Lab Test  10/03/13   0954   CHOL  223*   TRIG  120   HDL  73   LDL  126   VLDL  24   CHOLHDLRATIO  3.1                Failed - CBC on file in past 12 months         Recent Labs   Lab Test  06/28/17   1355   WBC  12.7*   RBC  3.70*   HGB  11.8   HCT  35.6   PLT  245                   Failed - A1c or Glucose on file in past 12 months         Recent Labs   Lab Test  06/28/17   0650   GLC  100*         Please review patients last 3 weights. If a weight gain of >10 lbs exists, you may refill the prescription once after instructing the patient to schedule an appointment within the next 30 days.         Wt Readings from Last 3 Encounters:   02/05/18 138 lb 8 oz (62.8 kg)   02/02/18 136 lb 8 oz (61.9 kg)   01/24/18 139 lb (63 kg)             Failed - Recent (6 mo) or future (30 days) visit within the authorizing provider's specialty     Patient had office visit in the last 6 months or has a visit in the next 30 days with authorizing provider or within the authorizing provider's specialty.  See \"Patient Info\" tab in inbasket, or \"Choose Columns\" in Meds & Orders section of the refill encounter.           "

## 2018-11-13 DIAGNOSIS — F41.9 ANXIETY: ICD-10-CM

## 2018-11-14 DIAGNOSIS — R11.0 NAUSEA: ICD-10-CM

## 2018-11-14 DIAGNOSIS — G25.0 FAMILIAL TREMOR: ICD-10-CM

## 2018-11-14 RX ORDER — QUETIAPINE FUMARATE 25 MG/1
TABLET, FILM COATED ORAL
Qty: 30 TABLET | Refills: 0 | Status: SHIPPED | OUTPATIENT
Start: 2018-11-14 | End: 2018-12-06

## 2018-11-14 RX ORDER — ALPRAZOLAM 0.25 MG
TABLET ORAL
Qty: 60 TABLET | Refills: 3 | Status: SHIPPED | OUTPATIENT
Start: 2018-11-14 | End: 2019-04-01

## 2018-11-14 RX ORDER — PROCHLORPERAZINE MALEATE 5 MG
TABLET ORAL
Qty: 90 TABLET | Refills: 0 | Status: SHIPPED | OUTPATIENT
Start: 2018-11-14 | End: 2019-02-04

## 2018-11-14 RX ORDER — PROPRANOLOL HYDROCHLORIDE 120 MG/1
CAPSULE, EXTENDED RELEASE ORAL
Qty: 180 CAPSULE | Refills: 1 | Status: SHIPPED | OUTPATIENT
Start: 2018-11-14 | End: 2019-04-30

## 2018-11-14 NOTE — TELEPHONE ENCOUNTER
Requested Prescriptions   Pending Prescriptions Disp Refills     ALPRAZolam (XANAX) 0.25 MG tablet [Pharmacy Med Name: ALPRAZOLAM 0.25MG TABLETS]  Last Written Prescription Date:  10/18/2018  Last Fill Quantity: 60,   # refills: 0  Last Office Visit: 02/05/2018  Future Office visit:       Routing refill request to provider for review/approval because:  Drug not on the [facility], [facility] or [facility] refill protocol or controlled substance   60 tablet 0     Sig: TAKE 1-2 TABLETS BY MOUTH TWICE DAILY AS NEEDED    There is no refill protocol information for this order

## 2018-11-14 NOTE — TELEPHONE ENCOUNTER
"Requested Prescriptions   Pending Prescriptions Disp Refills     prochlorperazine (COMPAZINE) 5 MG tablet  Last Written Prescription Date:  10/16/2018  Last Fill Quantity: 90,  # refills: 0   Last office visit: 2/5/2018 with prescribing provider:  MARISOL   Future Office Visit:     90 tablet 0     Antivertigo/Antiemetic Agents Passed    11/14/2018  9:18 AM       Passed - Recent (12 mo) or future (30 days) visit within the authorizing provider's specialty    Patient had office visit in the last 12 months or has a visit in the next 30 days with authorizing provider or within the authorizing provider's specialty.  See \"Patient Info\" tab in inbasket, or \"Choose Columns\" in Meds & Orders section of the refill encounter.             Passed - Patient is 18 years of age or older          "

## 2018-11-14 NOTE — TELEPHONE ENCOUNTER
"Requested Prescriptions   Pending Prescriptions Disp Refills     propranolol (INDERAL LA) 120 MG 24 hr capsule  Last Written Prescription Date:  01/23/2018  Last Fill Quantity: 180,  # refills: 2   Last office visit: 2/5/2018 with prescribing provider:  MARISOL   Future Office Visit:     180 capsule 2    Beta-Blockers Protocol Passed    11/14/2018  9:17 AM       Passed - Blood pressure under 140/90 in past 12 months    BP Readings from Last 3 Encounters:   02/05/18 124/80   02/02/18 100/60   01/24/18 120/72                Passed - Patient is age 6 or older       Passed - Recent (12 mo) or future (30 days) visit within the authorizing provider's specialty    Patient had office visit in the last 12 months or has a visit in the next 30 days with authorizing provider or within the authorizing provider's specialty.  See \"Patient Info\" tab in inbasket, or \"Choose Columns\" in Meds & Orders section of the refill encounter.                "

## 2018-11-15 RX ORDER — PROCHLORPERAZINE MALEATE 5 MG
TABLET ORAL
Qty: 90 TABLET | Refills: 0 | Status: SHIPPED | OUTPATIENT
Start: 2018-11-15 | End: 2019-04-23

## 2018-12-06 DIAGNOSIS — G47.00 INSOMNIA, UNSPECIFIED TYPE: ICD-10-CM

## 2018-12-06 DIAGNOSIS — G25.0 FAMILIAL TREMOR: ICD-10-CM

## 2018-12-07 NOTE — TELEPHONE ENCOUNTER
"QUETIAPINE 25MG TABLETS  Last Written Prescription Date:  11/14/18  Last Fill Quantity: 30,  # refills: 0   Last office visit: 2/5/2018 with prescribing provider:  02/05/18   Future Office Visit:    Requested Prescriptions   Pending Prescriptions Disp Refills     QUEtiapine (SEROQUEL) 25 MG tablet [Pharmacy Med Name: QUETIAPINE 25MG TABLETS] 30 tablet 0     Sig: TAKE 1 TABLET BY MOUTH EVERY EVENING AS NEEDED    Antipsychotic Medications Failed    12/6/2018  5:27 PM       Failed - Lipid panel on file within the past 12 months    Recent Labs   Lab Test  10/03/13   0954   CHOL  223*   TRIG  120   HDL  73   LDL  126   VLDL  24   CHOLHDLRATIO  3.1              Failed - CBC on file in past 12 months    Recent Labs   Lab Test  06/28/17   1355   WBC  12.7*   RBC  3.70*   HGB  11.8   HCT  35.6   PLT  245                Failed - A1c or Glucose on file in past 12 months    Recent Labs   Lab Test  06/28/17   0650   GLC  100*       Please review patients last 3 weights. If a weight gain of >10 lbs exists, you may refill the prescription once after instructing the patient to schedule an appointment within the next 30 days.    Wt Readings from Last 3 Encounters:   02/05/18 138 lb 8 oz (62.8 kg)   02/02/18 136 lb 8 oz (61.9 kg)   01/24/18 139 lb (63 kg)            Failed - Recent (6 mo) or future (30 days) visit within the authorizing provider's specialty    Patient had office visit in the last 6 months or has a visit in the next 30 days with authorizing provider or within the authorizing provider's specialty.  See \"Patient Info\" tab in inbasket, or \"Choose Columns\" in Meds & Orders section of the refill encounter.           Passed - Blood pressure under 140/90 in past 12 months    BP Readings from Last 3 Encounters:   02/05/18 124/80   02/02/18 100/60   01/24/18 120/72                Passed - Patient is 12 years of age or older       Passed - Heart Rate on file within past 12 months    Pulse Readings from Last 3 Encounters: "   02/05/18 69   02/02/18 76   01/24/18 63              Passed - Patient is not pregnant       Passed - No positve pregnancy test on file in past 12 months

## 2018-12-10 RX ORDER — QUETIAPINE FUMARATE 25 MG/1
TABLET, FILM COATED ORAL
Qty: 30 TABLET | Refills: 0 | Status: SHIPPED | OUTPATIENT
Start: 2018-12-10 | End: 2019-01-06

## 2018-12-10 RX ORDER — PRAMIPEXOLE DIHYDROCHLORIDE 0.12 MG/1
TABLET ORAL
Qty: 180 TABLET | Refills: 0 | Status: SHIPPED | OUTPATIENT
Start: 2018-12-10 | End: 2019-02-09

## 2018-12-10 NOTE — TELEPHONE ENCOUNTER
Routing refill request to provider for review/approval because:  Labs out of range:  Lipid, CBC and A1C  FYI- patient has scheduled a future appointment 12/17/2018.

## 2018-12-26 DIAGNOSIS — F33.42 RECURRENT MAJOR DEPRESSION IN COMPLETE REMISSION (H): ICD-10-CM

## 2018-12-26 NOTE — TELEPHONE ENCOUNTER
"PAROXETINE 20MG TABLETS  Last Written Prescription Date:  01/09/18  Last Fill Quantity: 90,  # refills: 3   Last office visit: 2/5/2018 with prescribing provider:  02/05/18   Future Office Visit:    Requested Prescriptions   Pending Prescriptions Disp Refills     PARoxetine (PAXIL) 20 MG tablet [Pharmacy Med Name: PAROXETINE 20MG TABLETS] 90 tablet 0     Sig: TAKE ONE TABLET BY MOUTH EVERY NIGHT AT BEDTIME    SSRIs Protocol Failed - 12/26/2018 10:23 AM       Failed - PHQ-9 score less than 5 in past 6 months    Please review last PHQ-9 score.          Failed - Recent (6 mo) or future (30 days) visit within the authorizing provider's specialty    Patient had office visit in the last 6 months or has a visit in the next 30 days with authorizing provider or within the authorizing provider's specialty.  See \"Patient Info\" tab in inbasket, or \"Choose Columns\" in Meds & Orders section of the refill encounter.           Passed - Patient is age 18 or older       Passed - No active pregnancy on record       Passed - No positive pregnancy test in last 12 months          "

## 2018-12-31 RX ORDER — PAROXETINE 20 MG/1
TABLET, FILM COATED ORAL
Qty: 90 TABLET | Refills: 0 | Status: SHIPPED | OUTPATIENT
Start: 2018-12-31 | End: 2019-03-26

## 2018-12-31 NOTE — TELEPHONE ENCOUNTER
PHQ-9 SCORE 12/12/2016 6/19/2017 1/9/2018   PHQ-9 Total Score - - -   PHQ-9 Total Score 1 3 0     Routing refill request to provider for review/approval because:  PHQ 9 > 4     Patient Contact    Attempt # 1    Was call answered?  No.  Left message on voicemail with information to call me back.

## 2019-01-30 DIAGNOSIS — G47.00 INSOMNIA, UNSPECIFIED TYPE: ICD-10-CM

## 2019-01-30 NOTE — TELEPHONE ENCOUNTER
"Requested Prescriptions   Pending Prescriptions Disp Refills     QUEtiapine (SEROQUEL) 25 MG tablet [Pharmacy Med Name: QUETIAPINE 25MG TABLETS]  Last Written Prescription Date:  1/9/2019  Last Fill Quantity: 30 tablet,  # refills: 0   Last office visit: 2/5/2018 with prescribing provider:  Lena   Future Office Visit:     30 tablet 0     Sig: TAKE 1 TABLET BY MOUTH EVERY EVENING AS NEEDED    Antipsychotic Medications Failed - 1/30/2019  3:54 PM       Failed - Lipid panel on file within the past 12 months    Recent Labs   Lab Test 10/03/13  0954   CHOL 223*   TRIG 120   HDL 73      VLDL 24   CHOLHDLRATIO 3.1              Failed - CBC on file in past 12 months    Recent Labs   Lab Test 06/28/17  1355   WBC 12.7*   RBC 3.70*   HGB 11.8   HCT 35.6                   Failed - A1c or Glucose on file in past 12 months    Recent Labs   Lab Test 06/28/17  0650   *       Please review patients last 3 weights. If a weight gain of >10 lbs exists, you may refill the prescription once after instructing the patient to schedule an appointment within the next 30 days.    Wt Readings from Last 3 Encounters:   02/05/18 62.8 kg (138 lb 8 oz)   02/02/18 61.9 kg (136 lb 8 oz)   01/24/18 63 kg (139 lb)            Failed - Recent (6 mo) or future (30 days) visit within the authorizing provider's specialty    Patient had office visit in the last 6 months or has a visit in the next 30 days with authorizing provider or within the authorizing provider's specialty.  See \"Patient Info\" tab in inbasket, or \"Choose Columns\" in Meds & Orders section of the refill encounter.           Passed - Blood pressure under 140/90 in past 12 months    BP Readings from Last 3 Encounters:   02/05/18 124/80   02/02/18 100/60   01/24/18 120/72                Passed - Patient is 12 years of age or older       Passed - Heart Rate on file within past 12 months    Pulse Readings from Last 3 Encounters:   02/05/18 69   02/02/18 76   01/24/18 63 "              Passed - Medication is active on med list       Passed - Patient is not pregnant       Passed - No positve pregnancy test on file in past 12 months

## 2019-01-31 RX ORDER — QUETIAPINE FUMARATE 25 MG/1
TABLET, FILM COATED ORAL
Qty: 30 TABLET | Refills: 0 | Status: SHIPPED | OUTPATIENT
Start: 2019-01-31 | End: 2019-02-04

## 2019-01-31 NOTE — TELEPHONE ENCOUNTER
Routing refill request to provider for review/approval because:  Patient needs to be seen because:  Over 6 months since last visit. 1 pool refill has already been given.   Called patient and set up appointment for 2/4/2019 at 0915.

## 2019-02-04 ENCOUNTER — OFFICE VISIT (OUTPATIENT)
Dept: FAMILY MEDICINE | Facility: CLINIC | Age: 84
End: 2019-02-04
Payer: MEDICARE

## 2019-02-04 VITALS
HEIGHT: 65 IN | OXYGEN SATURATION: 96 % | HEART RATE: 68 BPM | RESPIRATION RATE: 18 BRPM | BODY MASS INDEX: 24.66 KG/M2 | DIASTOLIC BLOOD PRESSURE: 80 MMHG | WEIGHT: 148 LBS | TEMPERATURE: 97 F | SYSTOLIC BLOOD PRESSURE: 126 MMHG

## 2019-02-04 DIAGNOSIS — F33.42 DEPRESSION, MAJOR, RECURRENT, IN COMPLETE REMISSION (H): ICD-10-CM

## 2019-02-04 DIAGNOSIS — N39.41 URGE INCONTINENCE OF URINE: ICD-10-CM

## 2019-02-04 DIAGNOSIS — F41.9 ANXIETY: Primary | ICD-10-CM

## 2019-02-04 DIAGNOSIS — G47.00 INSOMNIA, UNSPECIFIED TYPE: ICD-10-CM

## 2019-02-04 DIAGNOSIS — G25.0 FAMILIAL TREMOR: ICD-10-CM

## 2019-02-04 PROCEDURE — 99214 OFFICE O/P EST MOD 30 MIN: CPT | Performed by: FAMILY MEDICINE

## 2019-02-04 RX ORDER — QUETIAPINE FUMARATE 25 MG/1
25 TABLET, FILM COATED ORAL AT BEDTIME
Qty: 90 TABLET | Refills: 1 | Status: SHIPPED | OUTPATIENT
Start: 2019-02-04 | End: 2019-08-19

## 2019-02-04 ASSESSMENT — ANXIETY QUESTIONNAIRES
5. BEING SO RESTLESS THAT IT IS HARD TO SIT STILL: NOT AT ALL
1. FEELING NERVOUS, ANXIOUS, OR ON EDGE: NOT AT ALL
7. FEELING AFRAID AS IF SOMETHING AWFUL MIGHT HAPPEN: NOT AT ALL
3. WORRYING TOO MUCH ABOUT DIFFERENT THINGS: NOT AT ALL
IF YOU CHECKED OFF ANY PROBLEMS ON THIS QUESTIONNAIRE, HOW DIFFICULT HAVE THESE PROBLEMS MADE IT FOR YOU TO DO YOUR WORK, TAKE CARE OF THINGS AT HOME, OR GET ALONG WITH OTHER PEOPLE: NOT DIFFICULT AT ALL
GAD7 TOTAL SCORE: 0
2. NOT BEING ABLE TO STOP OR CONTROL WORRYING: NOT AT ALL
6. BECOMING EASILY ANNOYED OR IRRITABLE: NOT AT ALL

## 2019-02-04 ASSESSMENT — PATIENT HEALTH QUESTIONNAIRE - PHQ9
SUM OF ALL RESPONSES TO PHQ QUESTIONS 1-9: 0
5. POOR APPETITE OR OVEREATING: NOT AT ALL

## 2019-02-04 ASSESSMENT — MIFFLIN-ST. JEOR: SCORE: 1097.2

## 2019-02-04 NOTE — PATIENT INSTRUCTIONS
Patient will be seen back in April for a physical and will need labs at that time.  Seroquel was refilled today.  Patient is having a dry mouth from her oxybutynin but it is working very well for urine control.  She is willing to put up with a dry mouth because of the effectiveness in controlling her incontinence.  She has also gained some weight and admits to eating a lot of sweets.  I did suggest trying sugarless candies.  Also, her treatment with Seroquel may be adding to the issue.

## 2019-02-04 NOTE — PROGRESS NOTES
SUBJECTIVE:   Margy Babin is a 89 year old female who presents to clinic today for the following health issues:      Anxiety Follow-Up    Status since last visit: Improved     Other associated symptoms:None    Complicating factors:   Significant life event: No   Current substance abuse: None  Depression symptoms: No  MEHNAZ-7 SCORE 1/9/2018 2/4/2019   Total Score 0 0       MEHNAZ-7    Amount of exercise or physical activity: None    Problems taking medications regularly: No    Medication side effects: none    Diet: regular (no restrictions)        URINARY TRACT SYMPTOMS      Duration: Years    Description  incontinence    Intensity:  moderate    Accompanying signs and symptoms:  Fever/chills: no   Flank pain no   Nausea and vomiting: no   Vaginal symptoms: none  Abdominal/Pelvic Pain: no     History  History of frequent UTI's: no   History of kidney stones: no   Sexually Active: no   Possibility of pregnancy: No    Precipitating or alleviating factors: Oxybutynin works very well for control.  She stopped taking it 1 day for not wanting to have her dry mouth and had a incontinent episode.    Therapies tried and outcome: Oxybutynin   outcome: Works well      Problem list and histories reviewed & adjusted, as indicated.  Additional history: as documented    Patient Active Problem List   Diagnosis     Anxiety     Familial tremor     Dry eye syndrome     Blepharitis of both eyes     Depression, major, recurrent, in complete remission (H)     Persistent insomnia     Urge incontinence of urine     Nausea without vomiting     Abdominal pain, epigastric     ACP (advance care planning)     Neck pain     Left knee pain     Syncope     Past Surgical History:   Procedure Laterality Date     C TOTAL HIP ARTHROPLASTY Right 1997     HYSTERECTOMY TOTAL ABDOMINAL, BILATERAL SALPINGO-OOPHORECTOMY, COMBINED       NECK SURGERY  2009     ROTATOR CUFF REPAIR RT/LT Right        Social History     Tobacco Use     Smoking status:  "Never Smoker     Smokeless tobacco: Never Used   Substance Use Topics     Alcohol use: No     Alcohol/week: 0.0 oz     Family History   Problem Relation Age of Onset     Heart Disease Father 80        MI     C.A.D. Father      Breast Cancer Daughter 43         age 53           Reviewed and updated as needed this visit by clinical staff  Tobacco  Allergies  Meds  Med Hx  Surg Hx  Fam Hx  Soc Hx      Reviewed and updated as needed this visit by Provider         ROS:  Constitutional, HEENT, cardiovascular, pulmonary, gi and gu systems are negative, except as otherwise noted.    OBJECTIVE:                                                    /80 (Cuff Size: Adult Regular)   Pulse 68   Temp 97  F (36.1  C) (Tympanic)   Resp 18   Ht 1.651 m (5' 5\")   Wt 67.1 kg (148 lb)   SpO2 96%   BMI 24.63 kg/m    Body mass index is 24.63 kg/m .  GENERAL APPEARANCE: healthy, alert, no distress and over weight  NEURO: Normal strength and tone, mentation intact, speech normal and tremor present and generalized but generally improved from previous visits.         ASSESSMENT/PLAN:                                                        ICD-10-CM    1. Anxiety F41.9    2. Insomnia, unspecified type G47.00 QUEtiapine (SEROQUEL) 25 MG tablet   3. Urge incontinence of urine N39.41    4. Depression, major, recurrent, in complete remission (H) F33.42    5. Familial tremor G25.0        Patient Instructions   Patient will be seen back in April for a physical and will need labs at that time.  Seroquel was refilled today.  Patient is having a dry mouth from her oxybutynin but it is working very well for urine control.  She is willing to put up with a dry mouth because of the effectiveness in controlling her incontinence.  She has also gained some weight and admits to eating a lot of sweets.  I did suggest trying sugarless candies.  Also, her treatment with Seroquel may be adding to the issue.      Yaya Hatfield, " MD  [facility]

## 2019-02-05 ASSESSMENT — ANXIETY QUESTIONNAIRES: GAD7 TOTAL SCORE: 0

## 2019-02-26 DIAGNOSIS — N39.41 URGE INCONTINENCE OF URINE: ICD-10-CM

## 2019-02-26 NOTE — TELEPHONE ENCOUNTER
"OXYBUTYNIN ER 15MG TABLETS  Last Written Prescription Date:  01/28/19  Last Fill Quantity: 30,  # refills: 0   Last office visit: 2/4/2019 with prescribing provider:  02/04/19   Future Office Visit:   Next 5 appointments (look out 90 days)    Apr 23, 2019  9:30 AM CDT  PHYSICAL with Yaya Hatfield MD  [facility] [address] [place] [phone] [phone]         Requested Prescriptions   Pending Prescriptions Disp Refills     oxybutynin ER (DITROPAN XL) 15 MG 24 hr tablet [Pharmacy Med Name: OXYBUTYNIN ER 15MG TABLETS] 30 tablet 0     Sig: TAKE 1 TABLET BY MOUTH DAILY    Muscarinic Antagonists (Urinary Incontinence Agents) Passed - 2/26/2019 10:23 AM       Passed - Recent (12 mo) or future (30 days) visit within the authorizing provider's specialty    Patient had office visit in the last 12 months or has a visit in the next 30 days with authorizing provider or within the authorizing provider's specialty.  See \"Patient Info\" tab in inbasket, or \"Choose Columns\" in Meds & Orders section of the refill encounter.             Passed - Medication is Oxybutynin and patient is 5 years of age or older       Passed - Patient does not have a diagnosis of glaucoma on the problem list    If glaucoma diagnosis is new, refer refill to physician.         Passed - Medication is active on med list       Passed - Patient is 18 years of age or older          "

## 2019-02-27 RX ORDER — OXYBUTYNIN CHLORIDE 15 MG/1
TABLET, EXTENDED RELEASE ORAL
Qty: 90 TABLET | Refills: 3 | Status: SHIPPED | OUTPATIENT
Start: 2019-02-27 | End: 2019-04-23

## 2019-03-03 DIAGNOSIS — G47.00 INSOMNIA, UNSPECIFIED TYPE: ICD-10-CM

## 2019-03-04 NOTE — TELEPHONE ENCOUNTER
Requested Prescriptions   Pending Prescriptions Disp Refills     QUEtiapine (SEROQUEL) 25 MG tablet [Pharmacy Med Name: QUETIAPINE 25MG TABLETS]  Last Written Prescription Date:  2/4/2019  Last Fill Quantity: 90 tablet,  # refills: 1   Last office visit: 2/4/2019 with prescribing provider:  Alysia   Future Office Visit:   Next 5 appointments (look out 90 days)    Apr 23, 2019  9:30 AM CDT  PHYSICAL with Y[facility]field MD  [facility] [address] [place] [phone]  [phone]          30 tablet 0     Sig: TAKE 1 TABLET BY MOUTH EVERY EVENING AS NEEDED    Antipsychotic Medications Failed - 3/3/2019  3:07 AM       Failed - Lipid panel on file within the past 12 months    Recent Labs   Lab Test 10/03/13  0954   CHOL 223*   TRIG 120   HDL 73      VLDL 24   CHOLHDLRATIO 3.1              Failed - CBC on file in past 12 months    Recent Labs   Lab Test 06/28/17  1355   WBC 12.7*   RBC 3.70*   HGB 11.8   HCT 35.6                   Failed - A1c or Glucose on file in past 12 months    Recent Labs   Lab Test 06/28/17  0650   *       Please review patients last 3 weights. If a weight gain of >10 lbs exists, you may refill the prescription once after instructing the patient to schedule an appointment within the next 30 days.    Wt Readings from Last 3 Encounters:   02/04/19 67.1 kg (148 lb)   02/05/18 62.8 kg (138 lb 8 oz)   02/02/18 61.9 kg (136 lb 8 oz)            Passed - Blood pressure under 140/90 in past 12 months    BP Readings from Last 3 Encounters:   02/04/19 126/80   02/05/18 124/80   02/02/18 100/60                Passed - Patient is 12 years of age or older       Passed - Heart Rate on file within past 12 months    Pulse Readings from Last 3 Encounters:   02/04/19 68   02/05/18 69   02/02/18 76              Passed - Medication is active on med list       Passed - Patient is not  "pregnant       Passed - No positve pregnancy test on file in past 12 months       Passed - Recent (6 mo) or future (30 days) visit within the authorizing provider's specialty    Patient had office visit in the last 6 months or has a visit in the next 30 days with authorizing provider or within the authorizing provider's specialty.  See \"Patient Info\" tab in inbasket, or \"Choose Columns\" in Meds & Orders section of the refill encounter.               "

## 2019-03-05 RX ORDER — QUETIAPINE FUMARATE 25 MG/1
TABLET, FILM COATED ORAL
Qty: 30 TABLET | Refills: 0 | Status: SHIPPED | OUTPATIENT
Start: 2019-03-05 | End: 2019-04-23

## 2019-03-05 NOTE — TELEPHONE ENCOUNTER
Pended Lipid Panel, CBC, Glucose. Unable to okay orders because the associated diagnosis is not covered for what is on record with medication.     Please complete order if approved. 30 pills with no refills was pended for medication until patient completes her labs.

## 2019-04-01 DIAGNOSIS — F41.9 ANXIETY: ICD-10-CM

## 2019-04-01 NOTE — TELEPHONE ENCOUNTER
Controlled Substance Refill Request for ALPRAZolam (XANAX) 0.25 MG tablet  Problem List Complete:  Yes    No CSA on file      check 8/29/18- no concerns    Last Written Prescription Date:  11/14/2018  Last Fill Quantity: 60 tablet,  # refills: 3   Last office visit: 2/4/2019 with prescribing provider:  Alysia   Future Office Visit:   Next 5 appointments (look out 90 days)    Apr 23, 2019  9:30 AM CDT  PHYSICAL with Yaya Hatfield MD  [facility] [address] [place] [id number]  [phone]             Controlled substance agreement:   Encounter-Level CSA:    There are no encounter-level csa.     Patient-Level CSA:    There are no patient-level csa.         Last Urine Drug Screen: No results found for: CDAUT, No results found for: COMDAT, No results found for: THC13, PCP13, COC13, MAMP13, OPI13, AMP13, BZO13, TCA13, MTD13, BAR13, OXY13, PPX13, BUP13     Processing:  [facility] [address] [place], [place] - [address] AT [address] [place] [address] pharmacy    [email or web address]   checked in past 3 months?  No, route to RN

## 2019-04-02 RX ORDER — ALPRAZOLAM 0.25 MG
TABLET ORAL
Qty: 60 TABLET | Refills: 0 | Status: SHIPPED | OUTPATIENT
Start: 2019-04-02 | End: 2019-04-16

## 2019-04-02 NOTE — TELEPHONE ENCOUNTER
RX [place] ([place]) reviewed:  reviewed- no concerns    [place]:  [email or web address]    Routing refill request to provider for review/approval because:  Drug not on the [facility] refill protocol

## 2019-04-16 DIAGNOSIS — F41.9 ANXIETY: ICD-10-CM

## 2019-04-16 NOTE — TELEPHONE ENCOUNTER
This appears much too early.                  How many tablets does she take per day?           She got an Rx for 60 tablets on 4/2/19.

## 2019-04-16 NOTE — TELEPHONE ENCOUNTER
Controlled Substance Refill Request for ALPRAZolam (XANAX) 0.25 MG tablet  Problem List Complete:  Yes    No CSA on file      check 4/2/2019- no concerns    Last Written Prescription Date:  4/2/2019  Last Fill Quantity: 60 tablet,  # refills: 0   Last office visit: 2/4/2019 with prescribing provider:  Alysia   Future Office Visit:   Next 5 appointments (look out 90 days)    Apr 23, 2019  9:30 AM CDT  PHYSICAL with Yaya Hatfield MD  [facility] ([facility]) [address] [place] [phone]  [phone]             Controlled substance agreement:   Encounter-Level CSA:    There are no encounter-level csa.     Patient-Level CSA:    There are no patient-level csa.         Last Urine Drug Screen: No results found for: CDAUT, No results found for: COMDAT, No results found for: THC13, PCP13, COC13, MAMP13, OPI13, AMP13, BZO13, TCA13, MTD13, BAR13, OXY13, PPX13, BUP13     Processing:  Fax Rx to [facility] [address] [place], [place] - [address] AT [address] [place] [address] pharmacy    [email or web address]   checked in past 3 months?  Yes 4/2/2019

## 2019-04-16 NOTE — TELEPHONE ENCOUNTER
RX [place] program ([place]) reviewed:  not reviewed/not due - last done on 4/2/19    [place]:  [email or web address]  Routing refill request to provider for review/approval because:  Drug not on the [facility] refill protocol

## 2019-04-17 RX ORDER — ALPRAZOLAM 0.25 MG
TABLET ORAL
Qty: 60 TABLET | Refills: 0 | Status: SHIPPED | OUTPATIENT
Start: 2019-04-17 | End: 2019-04-23

## 2019-04-17 NOTE — TELEPHONE ENCOUNTER
The  does not determine her dosage, nor the number of tablets she is allowed per month.                                  I cannot find this information anywhere in the medical record.                      I will refill it this time, but please ask her prescribing physician to specify the length of time that 60 tablets should last for this patient,especially at age 89.

## 2019-04-17 NOTE — TELEPHONE ENCOUNTER
For future refills, can provider please clarify how long this medication should last the patient and update problem list?

## 2019-04-23 ENCOUNTER — OFFICE VISIT (OUTPATIENT)
Dept: FAMILY MEDICINE | Facility: CLINIC | Age: 84
End: 2019-04-23
Payer: MEDICARE

## 2019-04-23 VITALS
HEART RATE: 60 BPM | OXYGEN SATURATION: 96 % | BODY MASS INDEX: 25.63 KG/M2 | WEIGHT: 154 LBS | DIASTOLIC BLOOD PRESSURE: 70 MMHG | RESPIRATION RATE: 18 BRPM | SYSTOLIC BLOOD PRESSURE: 120 MMHG

## 2019-04-23 DIAGNOSIS — R11.0 NAUSEA WITHOUT VOMITING: ICD-10-CM

## 2019-04-23 DIAGNOSIS — G47.00 PERSISTENT INSOMNIA: ICD-10-CM

## 2019-04-23 DIAGNOSIS — H04.123 DRY EYE SYNDROME OF BOTH EYES: ICD-10-CM

## 2019-04-23 DIAGNOSIS — H01.00A BLEPHARITIS OF UPPER AND LOWER EYELIDS OF BOTH EYES, UNSPECIFIED TYPE: ICD-10-CM

## 2019-04-23 DIAGNOSIS — H01.00B BLEPHARITIS OF UPPER AND LOWER EYELIDS OF BOTH EYES, UNSPECIFIED TYPE: ICD-10-CM

## 2019-04-23 DIAGNOSIS — N39.41 URGE INCONTINENCE OF URINE: ICD-10-CM

## 2019-04-23 DIAGNOSIS — F41.9 ANXIETY: ICD-10-CM

## 2019-04-23 DIAGNOSIS — Z00.00 ROUTINE MEDICAL EXAM: Primary | ICD-10-CM

## 2019-04-23 DIAGNOSIS — F33.42 DEPRESSION, MAJOR, RECURRENT, IN COMPLETE REMISSION (H): ICD-10-CM

## 2019-04-23 LAB
ALBUMIN SERPL-MCNC: 3.8 G/DL (ref 3.4–5)
ALBUMIN UR-MCNC: 30 MG/DL
ALP SERPL-CCNC: 94 U/L (ref 40–150)
ALT SERPL W P-5'-P-CCNC: 18 U/L (ref 0–50)
ANION GAP SERPL CALCULATED.3IONS-SCNC: 9 MMOL/L (ref 3–14)
APPEARANCE UR: CLEAR
AST SERPL W P-5'-P-CCNC: 19 U/L (ref 0–45)
BASOPHILS # BLD AUTO: 0.1 10E9/L (ref 0–0.2)
BASOPHILS NFR BLD AUTO: 0.6 %
BILIRUB SERPL-MCNC: 0.5 MG/DL (ref 0.2–1.3)
BILIRUB UR QL STRIP: ABNORMAL
BUN SERPL-MCNC: 20 MG/DL (ref 7–30)
CALCIUM SERPL-MCNC: 9.5 MG/DL (ref 8.5–10.1)
CHLORIDE SERPL-SCNC: 107 MMOL/L (ref 94–109)
CHOLEST SERPL-MCNC: 243 MG/DL
CO2 SERPL-SCNC: 25 MMOL/L (ref 20–32)
COLOR UR AUTO: YELLOW
CREAT SERPL-MCNC: 1.13 MG/DL (ref 0.52–1.04)
DIFFERENTIAL METHOD BLD: ABNORMAL
EOSINOPHIL # BLD AUTO: 0.4 10E9/L (ref 0–0.7)
EOSINOPHIL NFR BLD AUTO: 3.8 %
ERYTHROCYTE [DISTWIDTH] IN BLOOD BY AUTOMATED COUNT: 13.5 % (ref 10–15)
GFR SERPL CREATININE-BSD FRML MDRD: 43 ML/MIN/{1.73_M2}
GLUCOSE SERPL-MCNC: 106 MG/DL (ref 70–99)
GLUCOSE UR STRIP-MCNC: 100 MG/DL
HCT VFR BLD AUTO: 45.7 % (ref 35–47)
HDLC SERPL-MCNC: 56 MG/DL
HGB BLD-MCNC: 15.3 G/DL (ref 11.7–15.7)
HGB UR QL STRIP: NEGATIVE
KETONES UR STRIP-MCNC: ABNORMAL MG/DL
LDLC SERPL CALC-MCNC: 148 MG/DL
LEUKOCYTE ESTERASE UR QL STRIP: ABNORMAL
LYMPHOCYTES # BLD AUTO: 2.3 10E9/L (ref 0.8–5.3)
LYMPHOCYTES NFR BLD AUTO: 19.9 %
MCH RBC QN AUTO: 32.4 PG (ref 26.5–33)
MCHC RBC AUTO-ENTMCNC: 33.5 G/DL (ref 31.5–36.5)
MCV RBC AUTO: 97 FL (ref 78–100)
MONOCYTES # BLD AUTO: 0.8 10E9/L (ref 0–1.3)
MONOCYTES NFR BLD AUTO: 6.7 %
NEUTROPHILS # BLD AUTO: 7.9 10E9/L (ref 1.6–8.3)
NEUTROPHILS NFR BLD AUTO: 69 %
NITRATE UR QL: NEGATIVE
NONHDLC SERPL-MCNC: 187 MG/DL
PH UR STRIP: 5.5 PH (ref 5–7)
PLATELET # BLD AUTO: 306 10E9/L (ref 150–450)
POTASSIUM SERPL-SCNC: 4.1 MMOL/L (ref 3.4–5.3)
PROT SERPL-MCNC: 7.8 G/DL (ref 6.8–8.8)
RBC # BLD AUTO: 4.72 10E12/L (ref 3.8–5.2)
RBC #/AREA URNS AUTO: ABNORMAL /HPF
SODIUM SERPL-SCNC: 141 MMOL/L (ref 133–144)
SOURCE: ABNORMAL
SP GR UR STRIP: 1.02 (ref 1–1.03)
TRIGL SERPL-MCNC: 197 MG/DL
UROBILINOGEN UR STRIP-ACNC: 0.2 EU/DL (ref 0.2–1)
WBC # BLD AUTO: 11.5 10E9/L (ref 4–11)
WBC #/AREA URNS AUTO: ABNORMAL /HPF

## 2019-04-23 PROCEDURE — 85025 COMPLETE CBC W/AUTO DIFF WBC: CPT | Performed by: FAMILY MEDICINE

## 2019-04-23 PROCEDURE — 36415 COLL VENOUS BLD VENIPUNCTURE: CPT | Performed by: FAMILY MEDICINE

## 2019-04-23 PROCEDURE — 90670 PCV13 VACCINE IM: CPT | Performed by: FAMILY MEDICINE

## 2019-04-23 PROCEDURE — 87086 URINE CULTURE/COLONY COUNT: CPT | Performed by: FAMILY MEDICINE

## 2019-04-23 PROCEDURE — 80053 COMPREHEN METABOLIC PANEL: CPT | Performed by: FAMILY MEDICINE

## 2019-04-23 PROCEDURE — 80061 LIPID PANEL: CPT | Performed by: FAMILY MEDICINE

## 2019-04-23 PROCEDURE — 81001 URINALYSIS AUTO W/SCOPE: CPT | Performed by: FAMILY MEDICINE

## 2019-04-23 PROCEDURE — 99397 PER PM REEVAL EST PAT 65+ YR: CPT | Performed by: FAMILY MEDICINE

## 2019-04-23 PROCEDURE — G0009 ADMIN PNEUMOCOCCAL VACCINE: HCPCS | Performed by: FAMILY MEDICINE

## 2019-04-23 RX ORDER — ALPRAZOLAM 0.25 MG
0.25 TABLET ORAL
Qty: 60 TABLET | Refills: 0
Start: 2019-04-23 | End: 2019-07-15

## 2019-04-23 ASSESSMENT — ACTIVITIES OF DAILY LIVING (ADL): CURRENT_FUNCTION: NO ASSISTANCE NEEDED

## 2019-04-23 NOTE — PATIENT INSTRUCTIONS
Will see back in 6 months. Prevnar today.  Patient Education   Personalized Prevention Plan  You are due for the preventive services outlined below.  Your care team is available to assist you in scheduling these services.  If you have already completed any of these items, please share that information with your care team to update in your medical record.  Health Maintenance Due   Topic Date Due     Zoster (Shingles) Vaccine (1 of 2) 10/02/1979     Pneumococcal Vaccine (2 of 2 - PCV13) 01/01/2007     Annual Wellness Visit  10/08/2015     Flu Vaccine (1) 09/01/2018       Exercise for a Healthier Heart     Exercise with a friend. When activity is fun, you're more likely to stick with it.   You may wonder how you can improve the health of your heart. If you re thinking about exercise, you re on the right track. You don t need to become an athlete, but you do need a certain amount of brisk exercise to help strengthen your heart. If you have been diagnosed with a heart condition, your doctor may recommend exercise to help stabilize your condition. To help make exercise a habit, choose safe, fun activities.  Be sure to check with your healthcare provider before starting an exercise program.   Why exercise?  Exercising regularly offers many healthy rewards. It can help you do all of the following:    Improve your blood cholesterol level to help prevent further heart trouble    Lower your blood pressure to help prevent a stroke or heart attack    Control diabetes, or reduce your risk of getting this disease    Improve your heart and lung function    Reach and maintain a healthy weight    Make your muscles stronger and more limber so you can stay active    Prevent falls and fractures by slowing the loss of bone mass (osteoporosis)    Manage stress better    Reduce your blood pressure    Improve your sense of self and your body image  Exercise tips  Ease into your routine. Set small goals. Then build on them.  Exercise on most  days. Aim for a total of 150 or more minutes of moderate to  vigorous intensity activity each week. Consider 40 minutes, 3 to 4 times a week. For best results, activity should last for 40 minutes on average. It is OK to work up to the 40 minute period over time. Examples of moderate-intensity activity is walking 1 mile in 15 minutes or 30 to 45 minutes of yard work.  Step up your daily activity level. Along with your exercise program, try being more active throughout the day. Walk instead of drive. Do more household tasks or yard work.  Choose one or more activities you enjoy. Walking is one of the easiest things you can do. You can also try swimming, riding a bike, dancing, or taking an exercise class.  Stop exercising and call your doctor if you:    Have chest pain or feel dizzy or lightheaded    Feel burning, tightness, pressure, or heaviness in your chest, neck, shoulders, back, or arms    Have unusual shortness of breath    Have increased joint or muscle pain    Have palpitations or an irregular heartbeat   Date Last Reviewed: 5/1/2016 2000-2018 [employer]. [address] [place] [address]. All rights reserved. This information is not intended as a substitute for professional medical care. Always follow your healthcare professional's instructions.          Understanding USDA MyPlate  The USDA (U.S. Department of Agriculture) has guidelines to help you make healthy food choices. These are called MyPlate. MyPlate shows the food groups that make up healthy meals using the image of a place setting. Before you eat, think about the healthiest choices for what to put onto your plate or into your cup or bowl. To learn more about building a healthy plate, visit www.choosemyplate.gov.    The food groups    Fruits. Any fruit or 100% fruit juice counts as part of the Fruit Group. Fruits may be fresh, canned, frozen, or dried, and may be whole, cut-up, or pureed. Make half your plate fruits and  vegetables.    Vegetables. Any vegetable or 100% vegetable juice counts as a member of the Vegetable Group. Vegetables may be fresh, frozen, canned, or dried. They can be served raw or cooked and may be whole, cut-up, or mashed. Make half your plate fruits and vegetables.    Grains. All foods made from grains are part of the Grains Group. These include wheat, rice, oats, cornmeal, and barley such as bread, pasta, oatmeal, cereal, tortillas, and grits. Grains should be no more than a quarter of your plate. At least half of your grains should be whole grains.    Protein. This group includes meat, poultry, seafood, beans and peas, eggs, processed soy products (like tofu), nuts (including nut butters), and seeds. Make protein choices no more than a quarter of your plate. Meat and poultry choices should be lean or low fat.    Dairy. All fluid milk products and foods made from milk that contain calcium, like yogurt and cheese, are part of the Dairy Group. (Foods that have little calcium, such as cream, butter, and cream cheese, are not part of the group.) Most dairy choices should be low-fat or fat-free.    Oils. These are fats that are liquid at room temperature. They include canola, corn, olive, soybean, and sunflower oil. Foods that are mainly oil include mayonnaise, certain salad dressings, and soft margarines. You should have only 5 to 7 teaspoons of oils a day. You probably already get this much from the food you eat.  Use [employer] to help build your meals  The [employer] can help you plan and track your meals and activity. You can look up individual foods to see or compare their nutritional value. You can get guidelines for what and how much you should eat. You can compare your food choices. And you can assess personal physical activities and see ways you can improve. Go to www.choosemyplate.gov/supertracker/. For more eating tips and nutritional information, go to www.choosemyplate.gov/ten-tips.  Date Last  Reviewed: 8/1/2017 2000-2018 [employer]. [address] [place] [address], [place] [address]. All rights reserved. This information is not intended as a substitute for professional medical care. Always follow your healthcare professional's instructions.          Urinary Incontinence, Female (Adult)  Urinary incontinence means loss of control of the bladder. This problem affects many women, especially as they get older. If you have incontinence, you may be embarrassed to ask for help. But know that this problem can be treated.  Types of Incontinence  There are different types of incontinence. Two of the main types are described here. You can have more than one type.    Stress incontinence. With this type, urine leaks when pressure (stress) is put on the bladder. This may happen when you cough, sneeze, or laugh. Stress incontinence most often occurs because the pelvic floor muscles that support the bladder and urethra are weak. This can happen after pregnancy and vaginal childbirth or a hysterectomy. It can also be due to excess body weight or hormone changes.    Urge incontinence (also called overactive bladder). With this type, a sudden urge to urinate is felt often. This may happen even though there may not be much urine in the bladder. The need to urinate often during the night is common. Urge incontinence most often occurs because of bladder spasms. This may be due to bladder irritation or infection. Damage to bladder nerves or pelvic muscles, constipation, and certain medicines can also lead to urge incontinence.  Treatment of urinary incontinence depends on the cause. Further evaluation is needed to find the type you have. This will likely include an exam and certain tests. Based on the results, you and your healthcare provider can then plan treatment. Until a diagnosis is made, the home care tips below can help relieve symptoms.  Home care    Do pelvic floor muscle exercises, if they are  prescribed. The pelvic floor muscles help support the bladder and urethra. Many women find that their symptoms improve when doing special exercises that strengthen these muscles. To do the exercises contract the muscles you would use to stop your stream of urine, but do this when you re not urinating. Hold for 10 seconds, then relax. Repeat 10 to 20 times in a row, at least 3 times a day. Your provider may give you other instructions for how to do the exercises and how often.    Keep a bladder diary. This helps track how often and how much you urinate over a set period of time. Bring this diary with you to your next visit with the provider. The information can help your provider learn more about your bladder problem.    Lose weight, if advised to by your provider. Excess weight puts pressure on the bladder. Your provider can help you create a weight-loss plan that s right for you. This may include exercising more and making certain diet changes.    Don't consume foods and drinks that may irritate the bladder. These can include alcohol and caffeinated drinks.    Quit smoking. Smoking and other tobacco use can lead to chronic cough that strains the pelvic floor muscles. Smoking may also damage the bladder and urethra. Talk with your provider about treatments or methods you can use to quit smoking.    If drinking large amounts of fluid causes you to have symptoms, you may be advised to limit your fluid intake. You may also be advised to drink most of your fluids during the day and to limit fluids at night.    If you re worried about urine leakage or accidents, you may wear absorbent pads to catch urine. Change the pads often. This helps reduce discomfort. It may also reduce the risk of skin or bladder infections.  Follow-up care  Follow up with your healthcare provider, or as directed. It may take some to find the right treatment for your problem. Your treatment plan may include special therapies or medicines. Certain  procedures or surgery may also be options. Be sure to discuss any questions you have with your provider.  When to seek medical advice  Call the healthcare provider right away if any of these occur:    Fever of 100.4 F (38 C) or higher, or as directed by your provider    Bladder pain or fullness    Abdominal swelling    Nausea or vomiting    Back pain    Weakness, dizziness or fainting  Date Last Reviewed: 10/1/2017    1678-3282 [employer]. [address] [place] [address], [place] [address]. All rights reserved. This information is not intended as a substitute for professional medical care. Always follow your healthcare professional's instructions.             Urinary Incontinence, Female (Adult)  Urinary incontinence means loss of control of the bladder. This problem affects many women, especially as they get older. If you have incontinence, you may be embarrassed to ask for help. But know that this problem can be treated.  Types of Incontinence  There are different types of incontinence. Two of the main types are described here. You can have more than one type.    Stress incontinence. With this type, urine leaks when pressure (stress) is put on the bladder. This may happen when you cough, sneeze, or laugh. Stress incontinence most often occurs because the pelvic floor muscles that support the bladder and urethra are weak. This can happen after pregnancy and vaginal childbirth or a hysterectomy. It can also be due to excess body weight or hormone changes.    Urge incontinence (also called overactive bladder). With this type, a sudden urge to urinate is felt often. This may happen even though there may not be much urine in the bladder. The need to urinate often during the night is common. Urge incontinence most often occurs because of bladder spasms. This may be due to bladder irritation or infection. Damage to bladder nerves or pelvic muscles, constipation, and certain medicines can also lead to urge  incontinence.  Treatment of urinary incontinence depends on the cause. Further evaluation is needed to find the type you have. This will likely include an exam and certain tests. Based on the results, you and your healthcare provider can then plan treatment. Until a diagnosis is made, the home care tips below can help relieve symptoms.  Home care    Do pelvic floor muscle exercises, if they are prescribed. The pelvic floor muscles help support the bladder and urethra. Many women find that their symptoms improve when doing special exercises that strengthen these muscles. To do the exercises contract the muscles you would use to stop your stream of urine, but do this when you re not urinating. Hold for 10 seconds, then relax. Repeat 10 to 20 times in a row, at least 3 times a day. Your provider may give you other instructions for how to do the exercises and how often.    Keep a bladder diary. This helps track how often and how much you urinate over a set period of time. Bring this diary with you to your next visit with the provider. The information can help your provider learn more about your bladder problem.    Lose weight, if advised to by your provider. Excess weight puts pressure on the bladder. Your provider can help you create a weight-loss plan that s right for you. This may include exercising more and making certain diet changes.    Don't consume foods and drinks that may irritate the bladder. These can include alcohol and caffeinated drinks.    Quit smoking. Smoking and other tobacco use can lead to chronic cough that strains the pelvic floor muscles. Smoking may also damage the bladder and urethra. Talk with your provider about treatments or methods you can use to quit smoking.    If drinking large amounts of fluid causes you to have symptoms, you may be advised to limit your fluid intake. You may also be advised to drink most of your fluids during the day and to limit fluids at night.    If you re worried  about urine leakage or accidents, you may wear absorbent pads to catch urine. Change the pads often. This helps reduce discomfort. It may also reduce the risk of skin or bladder infections.  Follow-up care  Follow up with your healthcare provider, or as directed. It may take some to find the right treatment for your problem. Your treatment plan may include special therapies or medicines. Certain procedures or surgery may also be options. Be sure to discuss any questions you have with your provider.  When to seek medical advice  Call the healthcare provider right away if any of these occur:    Fever of 100.4 F (38 C) or higher, or as directed by your provider    Bladder pain or fullness    Abdominal swelling    Nausea or vomiting    Back pain    Weakness, dizziness or fainting  Date Last Reviewed: 10/1/2017    0846-4266 [employer]. [address] [place] [address], [place] [address]. All rights reserved. This information is not intended as a substitute for professional medical care. Always follow your healthcare professional's instructions.          Treating Incontinence in Women: Nonsurgical Methods    The best treatment for you will depend on the type of incontinence you have. Your symptoms, age, and any underlying problems that are found also affect your treatment. While some types of incontinence may eventually require surgery, nonsurgical treatments may be effective in many cases. Nonsurgical treatments include lifestyle changes, muscle-strengthening exercises, and medicines.  Nonsurgical Treatments  Treatment for stress urinary incontinence includes:    Bladder training    Lifestyle changes such as weight loss and increased activity if incontinence is due to being overweight    Medicines, if bladder training has not helped    Pelvic floor muscle exercises  Lifestyle changes    Losing weight. Excess weight puts extra pressure on the pelvic floor muscles. Exercising and eating right can help you lose weight.  This helps other treatments work better.    Making certain diet changes. Some foods may make you need to urinate more, so it may be good to avoid them. These include caffeinated drinks and alcohol. Ask your healthcare provider whether these or other diet changes might be helpful.    Quitting smoking. Smoking can lead to a chronic cough that strains pelvic floor muscles. Smoking may also damage the bladder and urethra.  Pelvic floor muscle exercises  There are exercises you can do to help strengthen your pelvic floor muscles. The pelvic floor muscles act as a sling to help hold the bladder and urethra in place. These muscles also help keep the urethra closed. Weak pelvic floor muscles may allow urine to leak. To strengthen the pelvic floor muscles, do the exercises daily. In a few months, the muscles will be stronger and tighter. This can help prevent urine leakage.  Date Last Reviewed: 1/1/2017 2000-2018 [employer]. [address] [place] [address]. All rights reserved. This information is not intended as a substitute for professional medical care. Always follow your healthcare professional's instructions.

## 2019-04-23 NOTE — NURSING NOTE
Screening Questionnaire for Adult Immunization    Are you sick today?   No   Do you have allergies to medications, food, a vaccine component or latex?   No   Have you ever had a serious reaction after receiving a vaccination?   No   Do you have a long-term health problem with heart disease, lung disease, asthma, kidney disease, metabolic disease (e.g. diabetes), anemia, or other blood disorder?   No   Do you have cancer, leukemia, HIV/AIDS, or any other immune system problem?   No   In the past 3 months, have you taken medications that affect  your immune system, such as prednisone, other steroids, or anticancer drugs; drugs for the treatment of rheumatoid arthritis, Crohn s disease, or psoriasis; or have you had radiation treatments?   No   Have you had a seizure, or a brain or other nervous system problem?   No   During the past year, have you received a transfusion of blood or blood     products, or been given immune (gamma) globulin or antiviral drug?   No   For women: Are you pregnant or is there a chance you could become        pregnant during the next month?   No   Have you received any vaccinations in the past 4 weeks?   No     Immunization questionnaire answers were all negative.        Per orders of Dr. Hatfield, injection of Prevnar 13 given by Gricelda Sousa. Patient instructed to remain in clinic for 15 minutes afterwards, and to report any adverse reaction to me immediately.       Screening performed by Gricelda Sousa on 4/23/2019 at 9:52 AM.     Abdomen soft, nontender, nondistended, bowel sounds present in all 4 quadrants.

## 2019-04-23 NOTE — PROGRESS NOTES
"SUBJECTIVE:   Margy Babin is a 89 year old female who presents for Preventive Visit.    Are you in the first 12 months of your Medicare coverage?  No    Healthy Habits:     In general, how would you rate your overall health?  Excellent    Frequency of exercise:  None    Do you usually eat at least 4 servings of fruit and vegetables a day, include whole grains    & fiber and avoid regularly eating high fat or \"junk\" foods?  No    Taking medications regularly:  Yes    Medication side effects:  None    Ability to successfully perform activities of daily living:  No assistance needed    Home Safety:  No safety concerns identified    Hearing Impairment:  No hearing concerns    In the past 6 months, have you been bothered by leaking of urine? Yes    In general, how would you rate your overall mental or emotional health?  Excellent      PHQ-2 Total Score: 0    Additional concerns today:  No    Do you feel safe in your environment? Yes    Do you have a Health Care Directive? No: Advance care planning was reviewed with patient; patient declined at this time.      Fall risk  Fallen 2 or more times in the past year?: (P) No  Any fall with injury in the past year?: (P) No    Cognitive Screening   1) Repeat 3 items (Leader, Season, Table)    2) Clock draw: ABNORMAL numbers are a little off and hands are set wrong  3) 3 item recall: Recalls NO objects   Results: 0 items recalled: PROBABLE COGNITIVE IMPAIRMENT, **INFORM PROVIDER**    Mini-CogTM Copyright KYLEE Johnson. Licensed by the author for use in [facility]; reprinted with permission (peter@.[facility]). All rights reserved.      Do you have sleep apnea, excessive snoring or daytime drowsiness?: no    Reviewed and updated as needed this visit by clinical staff  Tobacco  Allergies  Meds  Med Hx  Surg Hx  Fam Hx  Soc Hx        Reviewed and updated as needed this visit by Provider        Social History     Tobacco Use     Smoking status: Never Smoker     " Smokeless tobacco: Never Used   Substance Use Topics     Alcohol use: No     Alcohol/week: 0.0 oz         Alcohol Use 4/23/2019   Prescreen: >3 drinks/day or >7 drinks/week? No   Prescreen: >3 drinks/day or >7 drinks/week? -               Current providers sharing in care for this patient include: PCP  Patient Care Team:  Yaya Hatfield MD as PCP - General (Family Practice)  Yaya Hatfield MD as Assigned PCP    The following health maintenance items are reviewed in Epic and correct as of today:  Health Maintenance   Topic Date Due     ZOSTER IMMUNIZATION (1 of 2) 10/02/1979     PNEUMOCOCCAL IMMUNIZATION 65+ LOW/MEDIUM RISK (2 of 2 - PCV13) 01/01/2007     MEDICARE ANNUAL WELLNESS VISIT  10/08/2015     INFLUENZA VACCINE (1) 09/01/2018     PHQ-9 Q6 MONTHS  08/04/2019     FALL RISK ASSESSMENT  02/04/2020     ADVANCE DIRECTIVE PLANNING Q5 YRS  10/23/2020     DTAP/TDAP/TD IMMUNIZATION (2 - Td) 08/24/2021     DEPRESSION ACTION PLAN  Completed     IPV IMMUNIZATION  Aged Out     MENINGITIS IMMUNIZATION  Aged Out     Patient Active Problem List   Diagnosis     Anxiety     Familial tremor     Dry eye syndrome     Blepharitis of both eyes     Depression, major, recurrent, in complete remission (H)     Persistent insomnia     Urge incontinence of urine     Nausea without vomiting     Abdominal pain, epigastric     ACP (advance care planning)     Neck pain     Left knee pain     Syncope     Past Surgical History:   Procedure Laterality Date     C TOTAL HIP ARTHROPLASTY Right 1997     HYSTERECTOMY TOTAL ABDOMINAL, BILATERAL SALPINGO-OOPHORECTOMY, COMBINED       NECK SURGERY  2009     ROTATOR CUFF REPAIR RT/LT Right        Social History     Tobacco Use     Smoking status: Never Smoker     Smokeless tobacco: Never Used   Substance Use Topics     Alcohol use: No     Alcohol/week: 0.0 oz     Family History   Problem Relation Age of Onset     Heart Disease Father 80        MI     C.A.D. Father      Breast Cancer  "Daughter 43         age 53         Pneumonia Vaccine:Adults age 65+ who received Pneumovax (PPSV23) at 65 years or older: Should be given PCV13 > 1 year after their most recent PPSV23    Review of Systems  CONSTITUTIONAL: NEGATIVE for fever, chills, change in weight  INTEGUMENTARY/SKIN: NEGATIVE for worrisome rashes, moles or lesions  EYES: NEGATIVE for vision changes or irritation  ENT/MOUTH: NEGATIVE for ear, mouth and throat problems except dry mouth  RESP: NEGATIVE for significant cough or SOB  BREAST: NEGATIVE for masses, tenderness or discharge  CV: NEGATIVE for chest pain, palpitations or peripheral edema  GI: NEGATIVE for nausea, abdominal pain, heartburn, or change in bowel habits  : NEGATIVE for frequency, dysuria, or hematuria  MUSCULOSKELETAL: NEGATIVE for significant arthralgias or myalgia  NEURO: NEGATIVE for weakness, dizziness or paresthesias  ENDOCRINE: NEGATIVE for temperature intolerance, skin/hair changes  HEME: NEGATIVE for bleeding problems  PSYCHIATRIC: NEGATIVE for changes in mood or affect    OBJECTIVE:   /70   Pulse 60   Resp 18   Wt 69.9 kg (154 lb)   SpO2 96%   BMI 25.63 kg/m   Estimated body mass index is 25.63 kg/m  as calculated from the following:    Height as of 19: 1.651 m (5' 5\").    Weight as of this encounter: 69.9 kg (154 lb).  Physical Exam  GENERAL APPEARANCE: healthy, alert and no distress  EYES: Eyes grossly normal to inspection, PERRL and conjunctivae and sclerae normal  HENT: ear canals and TM's normal, nose and mouth without ulcers or lesions, oropharynx clear and oral mucous membranes moist  NECK: no adenopathy, no asymmetry, masses, or scars and thyroid normal to palpation  RESP: lungs clear to auscultation - no rales, rhonchi or wheezes  CV: regular rate and rhythm, normal S1 S2, no S3 or S4, no murmur, click or rub, no peripheral edema and peripheral pulses strong  ABDOMEN: soft, nontender, no hepatosplenomegaly, no masses and bowel sounds " "normal  MS: no musculoskeletal defects are noted and gait is age appropriate without ataxia  SKIN: no suspicious lesions or rashes  NEURO: Normal strength and tone, sensory exam grossly normal, mentation intact and speech normal with marked familial tremor  PSYCH: mentation appears normal and affect normal/bright        ASSESSMENT / PLAN:       ICD-10-CM    1. Routine medical exam Z00.00 ADMIN 1st VACCINE     UA with Microscopic     CBC with platelets differential     Comprehensive metabolic panel     Lipid Profile   2. Anxiety F41.9 ALPRAZolam (XANAX) 0.25 MG tablet   3. Nausea without vomiting R11.0    4. Urge incontinence of urine N39.41 UA with Microscopic   5. Persistent insomnia G47.00    6. Dry eye syndrome of both eyes H04.123    7. Blepharitis of upper and lower eyelids of both eyes, unspecified type H01.00A     H01.00B    8. Depression, major, recurrent, in complete remission (H) F33.42        End of Life Planning:  Patient currently has an advanced directive: No.  I have verified the patient's ablity to prepare an advanced directive/make health care decisions.  Literature was provided to assist patient in preparing an advanced directive.    COUNSELING:  Reviewed preventive health counseling, as reflected in patient instructions       Regular exercise       Healthy diet/nutrition       Immunizations    Vaccinated for: Pneumococcal          BP Readings from Last 1 Encounters:   04/23/19 120/70     Estimated body mass index is 25.63 kg/m  as calculated from the following:    Height as of 2/4/19: 1.651 m (5' 5\").    Weight as of this encounter: 69.9 kg (154 lb).           reports that she has never smoked. She has never used smokeless tobacco.      Appropriate preventive services were discussed with this patient, including applicable screening as appropriate for cardiovascular disease, diabetes, osteopenia/osteoporosis, and glaucoma.  As appropriate for age/gender, discussed screening for colorectal cancer, " prostate cancer, breast cancer, and cervical cancer. Checklist reviewing preventive services available has been given to the patient.    Reviewed patients plan of care and provided an AVS. The Basic Care Plan (routine screening as documented in Health Maintenance) for Margy meets the Care Plan requirement. This Care Plan has been established and reviewed with the Patient.    Counseling Resources:  ATP IV Guidelines  Pooled Cohorts Equation Calculator  Breast Cancer Risk Calculator  FRAX Risk Assessment  ICSI Preventive Guidelines  Dietary Guidelines for Americans, 2010  [employer]'s MyPlate  ASA Prophylaxis  Lung CA Screening    Yaya Hatfield MD  [facility]    Identified Health Risks:    She is at risk for lack of exercise and has been provided with information to increase physical activity for the benefit of her well-being.  The patient was counseled and encouraged to consider modifying their diet and eating habits. She was provided with information on recommended healthy diet options.  Information on urinary incontinence and treatment options given to patient.  She is really bothered by the dry mouth that her medication for her bladder causes.  She does not think it works very well anyway so we opted to discontinue the oxybutynin.  With respect to her alprazolam she usually takes 1 of those at night and was cautioned not to overuse that.  I did change the directions on her prescription.  She is not really having any increased problems with either nausea or vomiting so we decided to discontinue her Compazine.  Hopefully the mixture of discontinuing these medications will improve her dry mouth situation.  We will check labs as noted and follow-up pending review of those.  Prevnar was given today.

## 2019-04-25 LAB
BACTERIA SPEC CULT: NORMAL
SPECIMEN SOURCE: NORMAL

## 2019-06-19 DIAGNOSIS — F33.42 RECURRENT MAJOR DEPRESSION IN COMPLETE REMISSION (H): ICD-10-CM

## 2019-06-20 NOTE — TELEPHONE ENCOUNTER
"Requested Prescriptions   Pending Prescriptions Disp Refills     PARoxetine (PAXIL) 20 MG tablet [Pharmacy Med Name: PAROXETINE 20MG TABLETS] 90 tablet 0     Sig: TAKE 1 TABLET BY MOUTH EVERY NIGHT AT BEDTIME   Last Written Prescription Date:  3/26/2019  Last Fill Quantity: 90,  # refills: 0   Last office visit: 4/23/2019 with prescribing provider:  Dr Hatfield   Future Office Visit:        SSRIs Protocol Passed - 6/19/2019  5:14 PM        Passed - PHQ-9 score less than 5 in past 6 months     Please review last PHQ-9 score.           Passed - Medication is active on med list        Passed - Patient is age 18 or older        Passed - No active pregnancy on record        Passed - No positive pregnancy test in last 12 months        Passed - Recent (6 mo) or future (30 days) visit within the authorizing provider's specialty     Patient had office visit in the last 6 months or has a visit in the next 30 days with authorizing provider or within the authorizing provider's specialty.  See \"Patient Info\" tab in inbasket, or \"Choose Columns\" in Meds & Orders section of the refill encounter.              "

## 2019-06-24 RX ORDER — PAROXETINE 20 MG/1
TABLET, FILM COATED ORAL
Qty: 90 TABLET | Refills: 1 | Status: SHIPPED | OUTPATIENT
Start: 2019-06-24 | End: 2019-12-11

## 2019-06-24 NOTE — TELEPHONE ENCOUNTER
Prescription approved per [facility] Refill Protocol for 6 months of refills since last appointment, which was 4/23/2019.     PHQ-9 SCORE 6/19/2017 1/9/2018 2/4/2019   PHQ-9 Total Score - - -   PHQ-9 Total Score 3 0 0

## 2019-07-15 DIAGNOSIS — F41.9 ANXIETY: ICD-10-CM

## 2019-07-16 NOTE — TELEPHONE ENCOUNTER
ALPRAZOLAM 0.25MG TABLETS      Last Written Prescription Date:  04/23/2019  Last Fill Quantity: 60,   # refills: 0  Last Office Visit: 04/23/2019  Future Office visit:       Routing refill request to provider for review/approval because:  Drug not on the [facility], [facility] or [facility] refill protocol or controlled substance  Requested Prescriptions   Pending Prescriptions Disp Refills     ALPRAZolam (XANAX) 0.25 MG tablet [Pharmacy Med Name: ALPRAZOLAM 0.25MG TABLETS] 60 tablet 0     Sig: TAKE 1 TO 2 TABLETS BY MOUTH TWICE DAILY AS NEEDED       There is no refill protocol information for this order

## 2019-07-18 RX ORDER — ALPRAZOLAM 0.25 MG
TABLET ORAL
Qty: 60 TABLET | Refills: 0 | Status: SHIPPED | OUTPATIENT
Start: 2019-07-18 | End: 2019-08-25

## 2019-10-23 DIAGNOSIS — G47.00 INSOMNIA, UNSPECIFIED TYPE: ICD-10-CM

## 2019-10-23 RX ORDER — QUETIAPINE FUMARATE 25 MG/1
TABLET, FILM COATED ORAL
Qty: 30 TABLET | Refills: 0 | Status: SHIPPED | OUTPATIENT
Start: 2019-10-23 | End: 2019-12-04

## 2019-10-23 NOTE — TELEPHONE ENCOUNTER
Routing refill request to provider for review/approval because:  Анна given x1 and patient did not follow up, please advise  Due for office visit

## 2019-10-23 NOTE — TELEPHONE ENCOUNTER
"Requested Prescriptions   Pending Prescriptions Disp Refills     QUEtiapine (SEROQUEL) 25 MG tablet [Pharmacy Med Name: QUETIAPINE 25MG TABLETS]  Last Written Prescription Date:  9/17/2019  Last Fill Quantity: 30 tablet,  # refills: 0   Last office visit: 4/23/2019 with prescribing provider:  Alysia   Future Office Visit:     30 tablet 0     Sig: TAKE 1 TABLET BY MOUTH EVERY EVENING AS NEEDED       Antipsychotic Medications Failed - 10/23/2019  2:29 PM        Failed - Recent (6 mo) or future (30 days) visit within the authorizing provider's specialty     Patient had office visit in the last 6 months or has a visit in the next 30 days with authorizing provider or within the authorizing provider's specialty.  See \"Patient Info\" tab in inbasket, or \"Choose Columns\" in Meds & Orders section of the refill encounter.            Passed - Blood pressure under 140/90 in past 12 months     BP Readings from Last 3 Encounters:   04/23/19 120/70   02/04/19 126/80   02/05/18 124/80                 Passed - Patient is 12 years of age or older        Passed - Lipid panel on file within the past 12 months     Recent Labs   Lab Test 04/23/19  0951 10/03/13  0954   CHOL 243* 223*   TRIG 197* 120   HDL 56 73   * 126   NHDL 187*  --    VLDL  --  24   CHOLHDLRATIO  --  3.1               Passed - CBC on file in past 12 months     Recent Labs   Lab Test 04/23/19  0951   WBC 11.5*   RBC 4.72   HGB 15.3   HCT 45.7                    Passed - Heart Rate on file within past 12 months     Pulse Readings from Last 3 Encounters:   04/23/19 60   02/04/19 68   02/05/18 69               Passed - A1c or Glucose on file in past 12 months     Recent Labs   Lab Test 04/23/19  0951   *       Please review patients last 3 weights. If a weight gain of >10 lbs exists, you may refill the prescription once after instructing the patient to schedule an appointment within the next 30 days.    Wt Readings from Last 3 Encounters:   04/23/19 " 69.9 kg (154 lb)   02/04/19 67.1 kg (148 lb)   02/05/18 62.8 kg (138 lb 8 oz)             Passed - Medication is active on med list        Passed - Patient is not pregnant        Passed - No positve pregnancy test on file in past 12 months

## 2019-12-04 ENCOUNTER — OFFICE VISIT (OUTPATIENT)
Dept: FAMILY MEDICINE | Facility: CLINIC | Age: 84
End: 2019-12-04
Payer: MEDICARE

## 2019-12-04 VITALS
SYSTOLIC BLOOD PRESSURE: 152 MMHG | DIASTOLIC BLOOD PRESSURE: 80 MMHG | OXYGEN SATURATION: 96 % | BODY MASS INDEX: 24.66 KG/M2 | HEART RATE: 67 BPM | WEIGHT: 148 LBS | TEMPERATURE: 98.1 F | HEIGHT: 65 IN | RESPIRATION RATE: 14 BRPM

## 2019-12-04 DIAGNOSIS — H04.123 DRY EYE SYNDROME OF BOTH EYES: ICD-10-CM

## 2019-12-04 DIAGNOSIS — F41.9 ANXIETY: ICD-10-CM

## 2019-12-04 DIAGNOSIS — N18.30 CHRONIC KIDNEY DISEASE, STAGE 3 (MODERATE): Primary | ICD-10-CM

## 2019-12-04 DIAGNOSIS — G25.0 FAMILIAL TREMOR: ICD-10-CM

## 2019-12-04 DIAGNOSIS — G47.00 INSOMNIA, UNSPECIFIED TYPE: ICD-10-CM

## 2019-12-04 PROCEDURE — 84520 ASSAY OF UREA NITROGEN: CPT | Performed by: FAMILY MEDICINE

## 2019-12-04 PROCEDURE — 36415 COLL VENOUS BLD VENIPUNCTURE: CPT | Performed by: FAMILY MEDICINE

## 2019-12-04 PROCEDURE — 82565 ASSAY OF CREATININE: CPT | Performed by: FAMILY MEDICINE

## 2019-12-04 PROCEDURE — 80051 ELECTROLYTE PANEL: CPT | Performed by: FAMILY MEDICINE

## 2019-12-04 PROCEDURE — 99214 OFFICE O/P EST MOD 30 MIN: CPT | Performed by: FAMILY MEDICINE

## 2019-12-04 RX ORDER — QUETIAPINE FUMARATE 25 MG/1
TABLET, FILM COATED ORAL
Qty: 30 TABLET | Refills: 5 | Status: SHIPPED | OUTPATIENT
Start: 2019-12-04 | End: 2020-06-05

## 2019-12-04 ASSESSMENT — MIFFLIN-ST. JEOR: SCORE: 1092.2

## 2019-12-04 NOTE — PROGRESS NOTES
Subjective     Margy Babin is a 89 year old female who presents to clinic today for the following health issues:    HPI   Depression and Anxiety Follow-Up    How are you doing with your depression since your last visit? No change    How are you doing with your anxiety since your last visit?  No change    Are you having other symptoms that might be associated with depression or anxiety? No    Have you had a significant life event? Grief or Loss     Do you have any concerns with your use of alcohol or other drugs? No    Social History     Tobacco Use     Smoking status: Never Smoker     Smokeless tobacco: Never Used   Substance Use Topics     Alcohol use: No     Alcohol/week: 0.0 standard drinks     Drug use: No     PHQ 1/9/2018 2/4/2019 12/4/2019   PHQ-9 Total Score 0 0 -   Q9: Thoughts of better off dead/self-harm past 2 weeks Not at all Not at all Not at all     MEHNAZ-7 SCORE 1/9/2018 2/4/2019   Total Score 0 0     Last PHQ-9 12/4/2019   1.  Little interest or pleasure in doing things 0   2.  Feeling down, depressed, or hopeless 0   3.  Trouble falling or staying asleep, or sleeping too much 0   4.  Feeling tired or having little energy 0   5.  Poor appetite or overeating 0   6.  Feeling bad about yourself 0   7.  Trouble concentrating 0   8.  Moving slowly or restless -   Q9: Thoughts of better off dead/self-harm past 2 weeks 0   PHQ-9 Total Score -   Difficulty at work, home, or with people Not difficult at all     MEHNAZ-7  2/4/2019   1. Feeling nervous, anxious, or on edge 0   2. Not being able to stop or control worrying 0   3. Worrying too much about different things 0   4. Trouble relaxing 0   5. Being so restless that it is hard to sit still 0   6. Becoming easily annoyed or irritable 0   7. Feeling afraid, as if something awful might happen 0   MEHNAZ-7 Total Score 0   If you checked any problems, how difficult have they made it for you to do your work, take care of things at home, or get along with  other people? Not difficult at all         Suicide Assessment Five-step Evaluation and Treatment (SAFE-T)      How many servings of fruits and vegetables do you eat daily?  2-3    On average, how many sweetened beverages do you drink each day (Examples: soda, juice, sweet tea, etc.  Do NOT count diet or artificially sweetened beverages)?   0    How many days per week do you miss taking your medication? 0    Insomnia      Duration: years    Description  Frequency of insomnia:  occasionally  Time to fall asleep: 15 minutes  Middle of night awakening:  none  Early morning awakening:  none    Accompanying signs and symptoms:  none    History  Similar episodes in past:  YES  Previous evaluation/sleep study:  no     Precipitating or alleviating factors:  New stressful situation: no   Caffeine intake after lunchtime: no   OTC decongestants: no   Any new medications: no     Therapies tried and outcome: seroquel has worked well      Patient Active Problem List   Diagnosis     Anxiety     Familial tremor     Dry eye syndrome     Blepharitis of both eyes     Depression, major, recurrent, in complete remission (H)     Insomnia, unspecified type     Urge incontinence of urine     Nausea without vomiting     Abdominal pain, epigastric     ACP (advance care planning)     Neck pain     Left knee pain     Syncope     Chronic kidney disease, stage 3 (moderate) (H)     Past Surgical History:   Procedure Laterality Date     C TOTAL HIP ARTHROPLASTY Right 1997     HYSTERECTOMY TOTAL ABDOMINAL, BILATERAL SALPINGO-OOPHORECTOMY, COMBINED       NECK SURGERY  2009    cervical fusion     ROTATOR CUFF REPAIR RT/LT Right        Social History     Tobacco Use     Smoking status: Never Smoker     Smokeless tobacco: Never Used   Substance Use Topics     Alcohol use: No     Alcohol/week: 0.0 standard drinks     Family History   Problem Relation Age of Onset     Heart Disease Father 80        MI     C.A.D. Father      Breast Cancer Daughter 43        "  age 53     Emphysema Sister      Heart Disease Sister              Reviewed and updated as needed this visit by Provider         Review of Systems   ROS COMP: Constitutional, HEENT, cardiovascular, pulmonary, gi and gu systems are negative, except as otherwise noted.      Objective    BP (!) 152/80   Pulse 67   Temp 98.1  F (36.7  C) (Tympanic)   Resp 14   Ht 1.651 m (5' 5\")   Wt 67.1 kg (148 lb)   SpO2 96%   BMI 24.63 kg/m    Body mass index is 24.63 kg/m .  Physical Exam   GENERAL APPEARANCE: healthy, alert and no distress  NEURO: Normal strength and tone, mentation intact, speech normal and tremor especially the right hand            Assessment & Plan       ICD-10-CM    1. Chronic kidney disease, stage 3 (moderate) (H) N18.3 Electrolyte panel (Na, K, Cl, CO2, Anion gap)     Creatinine     Urea nitrogen   2. Insomnia, unspecified type G47.00 QUEtiapine (SEROQUEL) 25 MG tablet   3. Anxiety F41.9    4. Familial tremor G25.0    5. Dry eye syndrome of both eyes H04.123           Patient Instructions   The patient and her  are currently starting to look into assisted living spots.  Her  has been having problems with falling.  They have no obstacles in their current living site at [address] [place] [address] for ambulation.  However, they are here with their daughter and she is going to see if she can help make their bedroom a little bit more accessible for her dad to get up at night when he needs to use the restroom.  I did refill the patient's Seroquel.  That has worked well for her for sleep.  It is not making her overly sedated or groggy.  We will check an electrolyte panel and kidney function today.  Follow-up will be in 3 months sooner as needed.  I did discuss with the patient and her daughter that some of the assisted living spots will have onsite medical care if they so choose.  It is getting more difficult to get them both out to go anywhere.      Return in about 3 months (around 3/4/2020) for BP " Recheck, anxiety, tremor, CKD.    Yaya Hatfield MD  [facility]

## 2019-12-04 NOTE — LETTER
December 6, 2019      Margy Tonya Funesyasmani  [address]   [place] [address]        Dear ,    We are writing to inform you of your test results.    Your test results fall within the expected range(s) or remain unchanged from previous results.  Please continue with current treatment plan.    Resulted Orders   Electrolyte panel (Na, K, Cl, CO2, Anion gap)   Result Value Ref Range    Sodium 139 133 - 144 mmol/L    Potassium 4.9 3.4 - 5.3 mmol/L    Chloride 107 94 - 109 mmol/L    Carbon Dioxide 25 20 - 32 mmol/L    Anion Gap 7 3 - 14 mmol/L   Creatinine   Result Value Ref Range    Creatinine 1.07 (H) 0.52 - 1.04 mg/dL    GFR Estimate 46 (L) >60 mL/min/[1.73_m2]      Comment:      Non  GFR Calc  Starting 12/18/2018, serum creatinine based estimated GFR (eGFR) will be   calculated using the Chronic Kidney Disease Epidemiology Collaboration   (CKD-EPI) equation.      GFR Estimate If Black 53 (L) >60 mL/min/[1.73_m2]      Comment:       GFR Calc  Starting 12/18/2018, serum creatinine based estimated GFR (eGFR) will be   calculated using the Chronic Kidney Disease Epidemiology Collaboration   (CKD-EPI) equation.     Urea nitrogen   Result Value Ref Range    Urea Nitrogen 29 7 - 30 mg/dL       If you have any questions or concerns, please call the clinic at the number listed above.       Sincerely,        Yaya Hatfield MD

## 2019-12-05 LAB
ANION GAP SERPL CALCULATED.3IONS-SCNC: 7 MMOL/L (ref 3–14)
BUN SERPL-MCNC: 29 MG/DL (ref 7–30)
CHLORIDE SERPL-SCNC: 107 MMOL/L (ref 94–109)
CO2 SERPL-SCNC: 25 MMOL/L (ref 20–32)
CREAT SERPL-MCNC: 1.07 MG/DL (ref 0.52–1.04)
GFR SERPL CREATININE-BSD FRML MDRD: 46 ML/MIN/{1.73_M2}
POTASSIUM SERPL-SCNC: 4.9 MMOL/L (ref 3.4–5.3)
SODIUM SERPL-SCNC: 139 MMOL/L (ref 133–144)

## 2019-12-05 NOTE — PATIENT INSTRUCTIONS
The patient and her  are currently starting to look into assisted living spots.  Her  has been having problems with falling.  They have no obstacles in their current living site at [address] [place] [address] for ambulation.  However, they are here with their daughter and she is going to see if she can help make their bedroom a little bit more accessible for her dad to get up at night when he needs to use the restroom.  I did refill the patient's Seroquel.  That has worked well for her for sleep.  It is not making her overly sedated or groggy.  We will check an electrolyte panel and kidney function today.  Follow-up will be in 3 months sooner as needed.  I did discuss with the patient and her daughter that some of the assisted living spots will have onsite medical care if they so choose.  It is getting more difficult to get them both out to go anywhere.

## 2020-01-22 DIAGNOSIS — F41.9 ANXIETY: ICD-10-CM

## 2020-01-22 NOTE — TELEPHONE ENCOUNTER
Controlled Substance Refill Request for ALPRAZolam (XANAX) 0.25 MG tablet  Problem List Complete:  Yes      No CSA on file      check 12/20/2019 - No concerns      Last Written Prescription Date:  12/20/2019  Last Fill Quantity: 60 tablet,  # refills: 0   Last office visit: 12/4/2019 with prescribing provider:  Alysia   Future Office Visit:   Next 5 appointments (look out 90 days)    Mar 04, 2020  4:15 PM CST  Office Visit with Y[facility]field MD  [facility] [address] [place] [id number]  [phone]             Controlled substance agreement:   Encounter-Level CSA:    There are no encounter-level csa.     Patient-Level CSA:    There are no patient-level csa.         Last Urine Drug Screen: No results found for: CDAUT, No results found for: COMDAT, No results found for: THC13, PCP13, COC13, MAMP13, OPI13, AMP13, BZO13, TCA13, MTD13, BAR13, OXY13, PPX13, BUP13         [email or web address]   checked in past 3 months?  Yes 12/20/2019

## 2020-01-23 RX ORDER — ALPRAZOLAM 0.25 MG
TABLET ORAL
Qty: 60 TABLET | Refills: 0 | Status: SHIPPED | OUTPATIENT
Start: 2020-01-23 | End: 2020-02-29

## 2020-02-11 DIAGNOSIS — N39.41 URGE INCONTINENCE OF URINE: ICD-10-CM

## 2020-02-11 NOTE — TELEPHONE ENCOUNTER
"Requested Prescriptions   Pending Prescriptions Disp Refills     oxybutynin ER (DITROPAN XL) 15 MG 24 hr tablet [Pharmacy Med Name: OXYBUTYNIN ER 15MG TABLETS]  DISCONTINUED  Last Written Prescription Date:  2/27/2019 END: 4/23/2019  Last Fill Quantity: 90 tablet,  # refills: 3   Last office visit: 12/4/2019 with prescribing provider:  Alysia   Future Office Visit:   Next 5 appointments (look out 90 days)    Mar 04, 2020  4:15 PM CST  [facility] [address] [place] [id number] [phone]          90 tablet 3     Sig: TAKE 1 TABLET BY MOUTH DAILY       Muscarinic Antagonists (Urinary Incontinence Agents) Failed - 2/11/2020 10:21 AM        Failed - Medication is active on med list        Passed - Recent (12 mo) or future (30 days) visit within the authorizing provider's specialty     Patient has had an office visit with the authorizing provider or a provider within the authorizing providers department within the previous 12 mos or has a future within next 30 days. See \"Patient Info\" tab in inbasket, or \"Choose Columns\" in Meds & Orders section of the refill encounter.              Passed - Medication is Oxybutynin and patient is 5 years of age or older        Passed - Patient does not have a diagnosis of glaucoma on the problem list     If glaucoma diagnosis is new, refer refill to physician.          Passed - Patient is 18 years of age or older           "

## 2020-02-12 RX ORDER — OXYBUTYNIN CHLORIDE 15 MG/1
TABLET, EXTENDED RELEASE ORAL
Qty: 90 TABLET | Refills: 3 | OUTPATIENT
Start: 2020-02-12

## 2020-03-04 ENCOUNTER — OFFICE VISIT (OUTPATIENT)
Dept: FAMILY MEDICINE | Facility: CLINIC | Age: 85
End: 2020-03-04
Payer: MEDICARE

## 2020-03-04 VITALS
SYSTOLIC BLOOD PRESSURE: 170 MMHG | WEIGHT: 150 LBS | TEMPERATURE: 97.7 F | OXYGEN SATURATION: 97 % | DIASTOLIC BLOOD PRESSURE: 78 MMHG | HEART RATE: 67 BPM | HEIGHT: 65 IN | RESPIRATION RATE: 14 BRPM | BODY MASS INDEX: 24.99 KG/M2

## 2020-03-04 DIAGNOSIS — I10 ESSENTIAL HYPERTENSION: ICD-10-CM

## 2020-03-04 DIAGNOSIS — H61.23 CERUMINOSIS, BILATERAL: ICD-10-CM

## 2020-03-04 DIAGNOSIS — F41.9 ANXIETY: Primary | ICD-10-CM

## 2020-03-04 DIAGNOSIS — F33.42 DEPRESSION, MAJOR, RECURRENT, IN COMPLETE REMISSION (H): ICD-10-CM

## 2020-03-04 DIAGNOSIS — G25.0 FAMILIAL TREMOR: ICD-10-CM

## 2020-03-04 DIAGNOSIS — N18.30 CHRONIC KIDNEY DISEASE, STAGE 3 (MODERATE): ICD-10-CM

## 2020-03-04 PROCEDURE — 99214 OFFICE O/P EST MOD 30 MIN: CPT | Performed by: FAMILY MEDICINE

## 2020-03-04 ASSESSMENT — MIFFLIN-ST. JEOR: SCORE: 1101.28

## 2020-03-04 NOTE — PROGRESS NOTES
Subjective     Margy Babin is a 89 year old female, accompanied by her daughter, who presents to clinic today for the following health issues:    HPI   Tremors      Duration: Recheck    Description (location/character/radiation): right hand tremor    Intensity:  moderate    Accompanying signs and symptoms: None    History (similar episodes/previous evaluation): recheck today    Precipitating or alleviating factors: None    Therapies tried and outcome: None     Hypertension Follow-up      Do you check your blood pressure regularly outside of the clinic? No     Are you following a low salt diet? Yes    Are your blood pressures ever more than 140 on the top number (systolic) OR more   than 90 on the bottom number (diastolic), for example 140/90? No    Anxiety Follow-Up    How are you doing with your anxiety since your last visit? No change    Are you having other symptoms that might be associated with anxiety? No    Have you had a significant life event? No     Are you feeling depressed? No    Do you have any concerns with your use of alcohol or other drugs? No    Social History     Tobacco Use     Smoking status: Never Smoker     Smokeless tobacco: Never Used   Substance Use Topics     Alcohol use: No     Alcohol/week: 0.0 standard drinks     Drug use: No     MEHNAZ-7 SCORE 1/9/2018 2/4/2019   Total Score 0 0     PHQ 1/9/2018 2/4/2019 12/4/2019   PHQ-9 Total Score 0 0 -   Q9: Thoughts of better off dead/self-harm past 2 weeks Not at all Not at all Not at all     Last PHQ-9 12/4/2019   1.  Little interest or pleasure in doing things 0   2.  Feeling down, depressed, or hopeless 0   3.  Trouble falling or staying asleep, or sleeping too much 0   4.  Feeling tired or having little energy 0   5.  Poor appetite or overeating 0   6.  Feeling bad about yourself 0   7.  Trouble concentrating 0   8.  Moving slowly or restless -   Q9: Thoughts of better off dead/self-harm past 2 weeks 0   PHQ-9 Total Score -    Difficulty at work, home, or with people Not difficult at all     MEHNAZ-7  2019   1. Feeling nervous, anxious, or on edge 0   2. Not being able to stop or control worrying 0   3. Worrying too much about different things 0   4. Trouble relaxing 0   5. Being so restless that it is hard to sit still 0   6. Becoming easily annoyed or irritable 0   7. Feeling afraid, as if something awful might happen 0   MEHNAZ-7 Total Score 0   If you checked any problems, how difficult have they made it for you to do your work, take care of things at home, or get along with other people? Not difficult at all         Patient Active Problem List   Diagnosis     Anxiety     Familial tremor     Dry eye syndrome     Blepharitis of both eyes     Depression, major, recurrent, in complete remission (H)     Insomnia, unspecified type     Urge incontinence of urine     Nausea without vomiting     Abdominal pain, epigastric     ACP (advance care planning)     Neck pain     Left knee pain     Syncope     Chronic kidney disease, stage 3 (moderate) (H)     Essential hypertension     Past Surgical History:   Procedure Laterality Date     C TOTAL HIP ARTHROPLASTY Right      HYSTERECTOMY TOTAL ABDOMINAL, BILATERAL SALPINGO-OOPHORECTOMY, COMBINED       NECK SURGERY  2009    cervical fusion     ROTATOR CUFF REPAIR RT/LT Right        Social History     Tobacco Use     Smoking status: Never Smoker     Smokeless tobacco: Never Used   Substance Use Topics     Alcohol use: No     Alcohol/week: 0.0 standard drinks     Family History   Problem Relation Age of Onset     Heart Disease Father 80        MI     C.A.D. Father      Breast Cancer Daughter 43         age 53     Emphysema Sister      Heart Disease Sister            Fall      Duration: fell 2 weeks ago with injury to back    Description (location/character/radiation): Cut and pain on back    Intensity:  mild    Accompanying signs and symptoms: None    History (similar episodes/previous  "evaluation): None    Precipitating or alleviating factors: None    Therapies tried and outcome: None     Reviewed and updated as needed this visit by Provider         Review of Systems   ROS COMP: Constitutional, HEENT, cardiovascular, pulmonary, gi and gu systems are negative, except as otherwise noted.      Objective    BP (!) 170/78   Pulse 67   Temp 97.7  F (36.5  C) (Tympanic)   Resp 14   Ht 1.651 m (5' 5\")   Wt 68 kg (150 lb)   SpO2 97%   Breastfeeding No   BMI 24.96 kg/m    Body mass index is 24.96 kg/m .  Physical Exam   GENERAL APPEARANCE: healthy, alert and no distress  SKIN: There is some bruising present on the lumbar portion of her back where she fell and hit a plastic wastebasket that actually broke when she hit it.  NEURO: mentation intact, speech normal and tremor of both hands  HENT: Mild cerumen bilaterally.          Assessment & Plan       ICD-10-CM    1. Anxiety F41.9    2. Depression, major, recurrent, in complete remission (H) F33.42    3. Chronic kidney disease, stage 3 (moderate) (H) N18.3    4. Familial tremor G25.0    5. Essential hypertension I10    6. Ceruminosis, bilateral H61.23           Patient Instructions   The patient has only been taking 1 tablet of her propranolol.  She was supposed to have been taking 2.  I will have her increase her propranolol to 1 twice daily.  She was also complaining of some cerumen in her ears and in fact she does have some mild ceruminosis on exam.  She will try Debrox 5 drops twice daily for the next 5 days.  If her hearing becomes totally plugged I suggested having her see 1 of the ear nose and throat specialists to get the cleaning done.  I gave the daughter the contact information.  We made a follow-up appointment towards the end of April.  She will follow with me sooner as needed.  That is the next time that her daughter will be back in town.  She will also bring her medications with her next time so we can make sure that her medication list " is accurate.  I did print up her AVS today so that the daughter could check her medications at home to see if they are in fact accurate.  She was also complaining of a sore spot on her tongue.  She had been to the dentist and the dentist seem to think that it looks like she had bitten herself.  Today I cannot see anything abnormal in the area where she says her tongue hurts.  I did recommend salt water gargles 1/4 teaspoon of salt in 4 ounces of lukewarm water and swish and spit that to see if that will help with the sore area.      Return in about 2 months (around 5/4/2020) for hypertension, labs, tremors, dyslipidemia.    Yaya Hatfield MD  [facility]

## 2020-03-05 NOTE — PATIENT INSTRUCTIONS
The patient has only been taking 1 tablet of her propranolol.  She was supposed to have been taking 2.  I will have her increase her propranolol to 1 twice daily.  She was also complaining of some cerumen in her ears and in fact she does have some mild ceruminosis on exam.  She will try Debrox 5 drops twice daily for the next 5 days.  If her hearing becomes totally plugged I suggested having her see 1 of the ear nose and throat specialists to get the cleaning done.  I gave the daughter the contact information.  We made a follow-up appointment towards the end of April.  She will follow with me sooner as needed.  That is the next time that her daughter will be back in town.  She will also bring her medications with her next time so we can make sure that her medication list is accurate.  I did print up her AVS today so that the daughter could check her medications at home to see if they are in fact accurate.  She was also complaining of a sore spot on her tongue.  She had been to the dentist and the dentist seem to think that it looks like she had bitten herself.  Today I cannot see anything abnormal in the area where she says her tongue hurts.  I did recommend salt water gargles 1/4 teaspoon of salt in 4 ounces of lukewarm water and swish and spit that to see if that will help with the sore area.

## 2020-03-10 DIAGNOSIS — N39.41 URGE INCONTINENCE OF URINE: ICD-10-CM

## 2020-03-10 NOTE — TELEPHONE ENCOUNTER
"Requested Prescriptions   Pending Prescriptions Disp Refills     oxybutynin ER (DITROPAN XL) 15 MG 24 hr tablet [Pharmacy Med Name: OXYBUTYNIN ER 15MG TABLETS]  DISCONTINUED  Last Written Prescription Date:  2/27/2019 END: 4/23/2019  Last Fill Quantity: 90 tablet,  # refills: 3   Last office visit: 3/4/2020 with prescribing provider:  Alysia   Future Office Visit:   Next 5 appointments (look out 90 days)    Apr 28, 2020  4:15 PM CDT  Office Visit with Yaya Hatfield MD  [facility] [address] [place] [id number] [phone]          90 tablet 3     Sig: TAKE 1 TABLET BY MOUTH DAILY       Muscarinic Antagonists (Urinary Incontinence Agents) Failed - 3/10/2020  2:31 PM        Failed - Medication is active on med list        Passed - Recent (12 mo) or future (30 days) visit within the authorizing provider's specialty     Patient has had an office visit with the authorizing provider or a provider within the authorizing providers department within the previous 12 mos or has a future within next 30 days. See \"Patient Info\" tab in inbasket, or \"Choose Columns\" in Meds & Orders section of the refill encounter.              Passed - Medication is Oxybutynin and patient is 5 years of age or older        Passed - Patient does not have a diagnosis of glaucoma on the problem list     If glaucoma diagnosis is new, refer refill to physician.          Passed - Patient is 18 years of age or older              "

## 2020-03-14 RX ORDER — OXYBUTYNIN CHLORIDE 15 MG/1
TABLET, EXTENDED RELEASE ORAL
Qty: 90 TABLET | Refills: 3 | Status: SHIPPED | OUTPATIENT
Start: 2020-03-14 | End: 2021-03-03

## 2020-04-17 ENCOUNTER — TELEPHONE (OUTPATIENT)
Dept: FAMILY MEDICINE | Facility: CLINIC | Age: 85
End: 2020-04-17

## 2020-04-17 NOTE — TELEPHONE ENCOUNTER
Reason for Call:  Other call back    Detailed comments: Writer called Margy to reschedule an in office appointment in the Work Que on 4/28/2020.      Margy said the appointment was to check her high blood pressure.  She does not have a home BP unit.    Question is: should Margy be scheduled in office or for a telephone visit?  Informed Margy Dr. Hatfield's office will return a call to her to schedle.    Phone Number Patient can be reached at: Home number on file [phone] (home)    Best Time: any    Can we leave a detailed message on this number? YES    Call taken on 4/17/2020 at 1:18 PM by Mariela Bonilla

## 2020-04-20 NOTE — TELEPHONE ENCOUNTER
Patient Contact    Called patient and scheduled OV with PCP on 5/4. She prefers to stay with PCP at this time. Routing to provider as FYI.

## 2020-05-04 ENCOUNTER — VIRTUAL VISIT (OUTPATIENT)
Dept: FAMILY MEDICINE | Facility: CLINIC | Age: 85
End: 2020-05-04
Payer: MEDICARE

## 2020-05-04 VITALS — SYSTOLIC BLOOD PRESSURE: 138 MMHG | DIASTOLIC BLOOD PRESSURE: 77 MMHG

## 2020-05-04 DIAGNOSIS — H91.93 DECREASED HEARING OF BOTH EARS: ICD-10-CM

## 2020-05-04 DIAGNOSIS — I10 ESSENTIAL HYPERTENSION: Primary | ICD-10-CM

## 2020-05-04 PROCEDURE — 99442 ZZC PHYSICIAN TELEPHONE EVALUATION 11-20 MIN: CPT | Performed by: FAMILY MEDICINE

## 2020-05-04 NOTE — PROGRESS NOTES
"Margy Babin is a 89 year old female who is being evaluated via a billable telephone visit.      The patient has been notified of following:     \"This telephone visit will be conducted via a call between you and your physician/provider. We have found that certain health care needs can be provided without the need for a physical exam.  This service lets us provide the care you need with a short phone conversation.  If a prescription is necessary we can send it directly to your pharmacy.  If lab work is needed we can place an order for that and you can then stop by our lab to have the test done at a later time.    Telephone visits are billed at different rates depending on your insurance coverage. During this emergency period, for some insurers they may be billed the same as an in-person visit.  Please reach out to your insurance provider with any questions.    If during the course of the call the physician/provider feels a telephone visit is not appropriate, you will not be charged for this service.\"    Patient has given verbal consent for Telephone visit?  Yes    What phone number would you like to be contacted at? [phone]    How would you like to obtain your AVS? Mail a copy    Subjective     Margy Babin is a 89 year old female who presents to clinic today for the following health issues:    HPI  Hypertension Follow-up      Do you check your blood pressure regularly outside of the clinic? Yes     Are you following a low salt diet? Yes    Are your blood pressures ever more than 140 on the top number (systolic) OR more   than 90 on the bottom number (diastolic), for example 140/90? No      How many servings of fruits and vegetables do you eat daily?  0-1    On average, how many sweetened beverages do you drink each day (Examples: soda, juice, sweet tea, etc.  Do NOT count diet or artificially sweetened beverages)?   0    How many days per week do you exercise enough to make your heart " beat faster? 3 or less    How many minutes a day do you exercise enough to make your heart beat faster? 9 or less    How many days per week do you miss taking your medication? 0    Blood pressure at her residence today was 138/77.      Decreased hearing 2 weeks      Duration: 2 weeks    Description (location/character/radiation): Bilateral    Intensity:  moderate    Accompanying signs and symptoms: Did have a trial of Debrox    History (similar episodes/previous evaluation): None    Precipitating or alleviating factors: The patient thinks it is caused by cerumen.    Therapies tried and outcome: They called her ENT office and they did not want her to come in.       Patient Active Problem List   Diagnosis     Anxiety     Familial tremor     Dry eye syndrome     Blepharitis of both eyes     Depression, major, recurrent, in complete remission (H)     Insomnia, unspecified type     Urge incontinence of urine     Nausea without vomiting     Abdominal pain, epigastric     ACP (advance care planning)     Neck pain     Left knee pain     Syncope     Chronic kidney disease, stage 3 (moderate) (H)     Essential hypertension     Past Surgical History:   Procedure Laterality Date     C TOTAL HIP ARTHROPLASTY Right      HYSTERECTOMY TOTAL ABDOMINAL, BILATERAL SALPINGO-OOPHORECTOMY, COMBINED       NECK SURGERY  2009    cervical fusion     ROTATOR CUFF REPAIR RT/LT Right        Social History     Tobacco Use     Smoking status: Never Smoker     Smokeless tobacco: Never Used   Substance Use Topics     Alcohol use: No     Alcohol/week: 0.0 standard drinks     Family History   Problem Relation Age of Onset     Heart Disease Father 80        MI     C.A.D. Father      Breast Cancer Daughter 43         age 53     Emphysema Sister      Heart Disease Sister            Reviewed and updated as needed this visit by Provider         Review of Systems   ROS COMP: Constitutional, HEENT, cardiovascular, pulmonary, gi and gu systems are  negative, except as otherwise noted.       Objective   Reported vitals:  There were no vitals taken for this visit.   alert  PSYCH: Alert and oriented times 3; coherent speech, normal   rate and volume, able to articulate logical thoughts, able   to abstract reason, no tangential thoughts, no hallucinations   or delusions  Her affect is normal and pleasant  RESP: No cough, no audible wheezing, able to talk in full sentences  Remainder of exam unable to be completed due to telephone visits            Assessment/Plan:  1. Essential hypertension  No change in medications.    2. Decreased hearing of both ears  The patient may try another 5-day course of Debrox twice daily.  If her hearing gets markedly worse she will make a face-to-face visit to get her ears examined.  She has not been having any other signs or symptoms of any respiratory or COVID-19 symptoms.      No follow-ups on file.      Phone call duration:  13 minutes    Yaya Hatfield MD

## 2020-05-10 DIAGNOSIS — G25.0 FAMILIAL TREMOR: ICD-10-CM

## 2020-05-11 RX ORDER — PROPRANOLOL HYDROCHLORIDE 120 MG/1
CAPSULE, EXTENDED RELEASE ORAL
Qty: 180 CAPSULE | Refills: 0 | Status: SHIPPED | OUTPATIENT
Start: 2020-05-11 | End: 2020-10-16

## 2020-06-05 DIAGNOSIS — G47.00 INSOMNIA, UNSPECIFIED TYPE: ICD-10-CM

## 2020-06-05 RX ORDER — QUETIAPINE FUMARATE 25 MG/1
TABLET, FILM COATED ORAL
Qty: 30 TABLET | Refills: 5 | Status: SHIPPED | OUTPATIENT
Start: 2020-06-05 | End: 2020-09-02

## 2020-06-05 NOTE — TELEPHONE ENCOUNTER
Routing refill request to provider for review/approval because:  Failed protocol     Lipid panel on file within the past 12 months Protocol Details    CBC on file in past 12 months     A1c or Glucose on file in past 12 months

## 2020-06-18 DIAGNOSIS — F33.42 RECURRENT MAJOR DEPRESSION IN COMPLETE REMISSION (H): ICD-10-CM

## 2020-06-18 NOTE — TELEPHONE ENCOUNTER
Patient care team-     Please update PHQ-9    Please route back to triage with the above information.    If unable to get a hold of patient, please route to the provider.     Nichole MENDOZAN, RN, PHN

## 2020-06-19 RX ORDER — PAROXETINE 20 MG/1
TABLET, FILM COATED ORAL
Qty: 90 TABLET | Refills: 1 | Status: SHIPPED | OUTPATIENT
Start: 2020-06-19 | End: 2020-12-14

## 2020-06-19 ASSESSMENT — PATIENT HEALTH QUESTIONNAIRE - PHQ9: SUM OF ALL RESPONSES TO PHQ QUESTIONS 1-9: 0

## 2020-08-06 ENCOUNTER — TELEPHONE (OUTPATIENT)
Dept: FAMILY MEDICINE | Facility: CLINIC | Age: 85
End: 2020-08-06

## 2020-08-06 NOTE — LETTER
August 6, 2020    Margy Babin  [address]   [place] [address]    Dear Amari Ji cares about your health and your health plan.  I have reviewed your medical conditions, medication list and lab results, and am making recommendations based on this review to better manage your health.    You are in particular need of attention regarding:  -High Blood Pressure  -Wellness (Physical) Visit     I am recommending that you:     -schedule a WELLNESS (Physical) APPOINTMENT with me.   I will check fasting labs the same day - nothing to eat except water and meds for 8-10 hours prior. (If you go elsewhere for Wellness visits then please disregard this reminder.)      Please call us at the [place] location:  [phone] or use [employer] to address the above recommendations.     Thank you for trusting [facility].  We appreciate the opportunity to serve you and look forward to supporting your healthcare in the future.    If you have (or plan to have) any of these tests done at a facility other than a [facility] or a [facility], please have the results sent to the [facility] location noted above.      Best Regards,    Yaya Hatfield MD

## 2020-08-06 NOTE — TELEPHONE ENCOUNTER
Panel Management Review      Patient has the following on her problem list:     Depression / Dysthymia review    Measure:  Needs PHQ-9 score of 4 or less during index window.  Administer PHQ-9 and if score is 5 or more, send encounter to provider for next steps.    5 - 7 month window range:     PHQ-9 SCORE 1/9/2018 2/4/2019 6/19/2020   PHQ-9 Total Score - - -   PHQ-9 Total Score 0 0 0       If PHQ-9 recheck is 5 or more, route to provider for next steps.    Patient is due for:  None    Hypertension   Last three blood pressure readings:  BP Readings from Last 3 Encounters:   05/04/20 138/77   03/04/20 (!) 170/78   12/04/19 (!) 152/80     Blood pressure: FAILED    HTN Guidelines:  Less than 140/90      Composite cancer screening  Chart review shows that this patient is due/due soon for the following None  Summary:    Patient is due/failing the following:   BP CHECK and PHYSICAL    Action needed:   Patient needs office visit for physical.    Type of outreach:    Sent letter.    Questions for provider review:    None

## 2020-09-02 ENCOUNTER — TELEPHONE (OUTPATIENT)
Dept: FAMILY MEDICINE | Facility: CLINIC | Age: 85
End: 2020-09-02

## 2020-09-02 DIAGNOSIS — G47.00 INSOMNIA, UNSPECIFIED TYPE: ICD-10-CM

## 2020-09-02 RX ORDER — QUETIAPINE FUMARATE 25 MG/1
25-50 TABLET, FILM COATED ORAL
Qty: 35 TABLET | Refills: 5 | Status: SHIPPED | OUTPATIENT
Start: 2020-09-02 | End: 2020-11-30

## 2020-09-21 ENCOUNTER — TELEPHONE (OUTPATIENT)
Dept: FAMILY MEDICINE | Facility: CLINIC | Age: 85
End: 2020-09-21

## 2020-09-21 NOTE — TELEPHONE ENCOUNTER
Reason for Call:  Form, our goal is to have forms completed with 72 hours, however, some forms may require a visit or additional information.    Type of letter, form or note:  medical    Who is the form from?: Pediatry (if other please explain)    Where did the form come from: form was faxed in    What clinic location was the form placed at?: [facility]    Where the form was placed: [employer] Box/Folder    What number is listed as a contact on the form?: [phone] (P) [phone]       Additional comments: [employer]: Order request for [employer]    Call taken on 9/21/2020 at 11:01 AM by Miranda Painting

## 2020-10-05 PROBLEM — N18.30 STAGE 3 CHRONIC KIDNEY DISEASE (H): Status: ACTIVE | Noted: 2019-12-05

## 2020-10-16 DIAGNOSIS — G25.0 FAMILIAL TREMOR: ICD-10-CM

## 2020-10-16 RX ORDER — PROPRANOLOL HYDROCHLORIDE 120 MG/1
CAPSULE, EXTENDED RELEASE ORAL
Qty: 180 CAPSULE | Refills: 1 | Status: SHIPPED | OUTPATIENT
Start: 2020-10-16 | End: 2021-04-05

## 2020-11-04 ENCOUNTER — OFFICE VISIT (OUTPATIENT)
Dept: FAMILY MEDICINE | Facility: CLINIC | Age: 85
End: 2020-11-04
Payer: MEDICARE

## 2020-11-04 VITALS
SYSTOLIC BLOOD PRESSURE: 142 MMHG | OXYGEN SATURATION: 97 % | HEIGHT: 65 IN | HEART RATE: 81 BPM | RESPIRATION RATE: 16 BRPM | BODY MASS INDEX: 24.83 KG/M2 | TEMPERATURE: 97.6 F | DIASTOLIC BLOOD PRESSURE: 76 MMHG | WEIGHT: 149 LBS

## 2020-11-04 DIAGNOSIS — N18.31 STAGE 3A CHRONIC KIDNEY DISEASE (H): ICD-10-CM

## 2020-11-04 DIAGNOSIS — G25.0 FAMILIAL TREMOR: ICD-10-CM

## 2020-11-04 DIAGNOSIS — N39.41 URGE INCONTINENCE OF URINE: ICD-10-CM

## 2020-11-04 DIAGNOSIS — I10 ESSENTIAL HYPERTENSION: ICD-10-CM

## 2020-11-04 DIAGNOSIS — H04.123 DRY EYE SYNDROME OF BOTH EYES: ICD-10-CM

## 2020-11-04 DIAGNOSIS — F41.9 ANXIETY: ICD-10-CM

## 2020-11-04 DIAGNOSIS — E78.2 MIXED HYPERLIPIDEMIA: ICD-10-CM

## 2020-11-04 DIAGNOSIS — G44.209 TENSION HEADACHE: ICD-10-CM

## 2020-11-04 DIAGNOSIS — G47.00 INSOMNIA, UNSPECIFIED TYPE: ICD-10-CM

## 2020-11-04 DIAGNOSIS — Z00.00 ROUTINE MEDICAL EXAM: Primary | ICD-10-CM

## 2020-11-04 DIAGNOSIS — R11.0 NAUSEA WITHOUT VOMITING: ICD-10-CM

## 2020-11-04 DIAGNOSIS — H61.23 CERUMINOSIS, BILATERAL: ICD-10-CM

## 2020-11-04 LAB
ALBUMIN UR-MCNC: 30 MG/DL
APPEARANCE UR: CLEAR
BASOPHILS # BLD AUTO: 0.1 10E9/L (ref 0–0.2)
BASOPHILS NFR BLD AUTO: 0.6 %
BILIRUB UR QL STRIP: ABNORMAL
COLOR UR AUTO: YELLOW
DIFFERENTIAL METHOD BLD: ABNORMAL
EOSINOPHIL # BLD AUTO: 0.5 10E9/L (ref 0–0.7)
EOSINOPHIL NFR BLD AUTO: 3.7 %
ERYTHROCYTE [DISTWIDTH] IN BLOOD BY AUTOMATED COUNT: 13.4 % (ref 10–15)
GLUCOSE UR STRIP-MCNC: NEGATIVE MG/DL
HCT VFR BLD AUTO: 44.6 % (ref 35–47)
HGB BLD-MCNC: 14.1 G/DL (ref 11.7–15.7)
HGB UR QL STRIP: NEGATIVE
KETONES UR STRIP-MCNC: ABNORMAL MG/DL
LEUKOCYTE ESTERASE UR QL STRIP: NEGATIVE
LYMPHOCYTES # BLD AUTO: 2.8 10E9/L (ref 0.8–5.3)
LYMPHOCYTES NFR BLD AUTO: 20.1 %
MCH RBC QN AUTO: 30.7 PG (ref 26.5–33)
MCHC RBC AUTO-ENTMCNC: 31.6 G/DL (ref 31.5–36.5)
MCV RBC AUTO: 97 FL (ref 78–100)
MONOCYTES # BLD AUTO: 1.1 10E9/L (ref 0–1.3)
MONOCYTES NFR BLD AUTO: 7.9 %
NEUTROPHILS # BLD AUTO: 9.5 10E9/L (ref 1.6–8.3)
NEUTROPHILS NFR BLD AUTO: 67.7 %
NITRATE UR QL: NEGATIVE
PH UR STRIP: 5 PH (ref 5–7)
PLATELET # BLD AUTO: 366 10E9/L (ref 150–450)
RBC # BLD AUTO: 4.59 10E12/L (ref 3.8–5.2)
RBC #/AREA URNS AUTO: ABNORMAL /HPF
SOURCE: ABNORMAL
SP GR UR STRIP: >1.03 (ref 1–1.03)
UROBILINOGEN UR STRIP-ACNC: 0.2 EU/DL (ref 0.2–1)
WBC # BLD AUTO: 14 10E9/L (ref 4–11)
WBC #/AREA URNS AUTO: ABNORMAL /HPF

## 2020-11-04 PROCEDURE — 81001 URINALYSIS AUTO W/SCOPE: CPT | Performed by: FAMILY MEDICINE

## 2020-11-04 PROCEDURE — 36415 COLL VENOUS BLD VENIPUNCTURE: CPT | Performed by: FAMILY MEDICINE

## 2020-11-04 PROCEDURE — 85025 COMPLETE CBC W/AUTO DIFF WBC: CPT | Performed by: FAMILY MEDICINE

## 2020-11-04 PROCEDURE — 80061 LIPID PANEL: CPT | Performed by: FAMILY MEDICINE

## 2020-11-04 PROCEDURE — 80053 COMPREHEN METABOLIC PANEL: CPT | Performed by: FAMILY MEDICINE

## 2020-11-04 PROCEDURE — G0439 PPPS, SUBSEQ VISIT: HCPCS | Performed by: FAMILY MEDICINE

## 2020-11-04 RX ORDER — PROCHLORPERAZINE MALEATE 5 MG
5 TABLET ORAL EVERY 6 HOURS PRN
Qty: 20 TABLET | Refills: 0 | Status: SHIPPED | OUTPATIENT
Start: 2020-11-04 | End: 2020-12-09

## 2020-11-04 ASSESSMENT — ACTIVITIES OF DAILY LIVING (ADL)
CURRENT_FUNCTION: SHOPPING REQUIRES ASSISTANCE
CURRENT_FUNCTION: LAUNDRY REQUIRES ASSISTANCE
CURRENT_FUNCTION: TRANSPORTATION REQUIRES ASSISTANCE
CURRENT_FUNCTION: HOUSEWORK REQUIRES ASSISTANCE

## 2020-11-04 ASSESSMENT — MIFFLIN-ST. JEOR: SCORE: 1091.74

## 2020-11-04 NOTE — LETTER
November 6, 2020      Margy Tonya Funesyasmani  [address]   [place] [address]        Dear ,    We are writing to inform you of your test results.    This is all very stable and can be repeated in 6 months.    Resulted Orders   UA with Microscopic   Result Value Ref Range    Color Urine Yellow     Appearance Urine Clear     Glucose Urine Negative NEG^Negative mg/dL    Bilirubin Urine Small (A) NEG^Negative      Comment:      This is an unconfirmed screening test result. A positive result may be false.    Ketones Urine Trace (A) NEG^Negative mg/dL    Specific Gravity Urine >1.030 1.003 - 1.035    pH Urine 5.0 5.0 - 7.0 pH    Protein Albumin Urine 30 (A) NEG^Negative mg/dL    Urobilinogen Urine 0.2 0.2 - 1.0 EU/dL    Nitrite Urine Negative NEG^Negative    Blood Urine Negative NEG^Negative    Leukocyte Esterase Urine Negative NEG^Negative    Source Midstream Urine     WBC Urine 0 - 5 OTO5^0 - 5 /HPF    RBC Urine O - 2 OTO2^O - 2 /HPF   CBC with platelets differential   Result Value Ref Range    WBC 14.0 (H) 4.0 - 11.0 10e9/L    RBC Count 4.59 3.8 - 5.2 10e12/L    Hemoglobin 14.1 11.7 - 15.7 g/dL    Hematocrit 44.6 35.0 - 47.0 %    MCV 97 78 - 100 fl    MCH 30.7 26.5 - 33.0 pg    MCHC 31.6 31.5 - 36.5 g/dL    RDW 13.4 10.0 - 15.0 %    Platelet Count 366 150 - 450 10e9/L    Diff Method Automated Method     % Neutrophils 67.7 %    % Lymphocytes 20.1 %    % Monocytes 7.9 %    % Eosinophils 3.7 %    % Basophils 0.6 %    Absolute Neutrophil 9.5 (H) 1.6 - 8.3 10e9/L    Absolute Lymphocytes 2.8 0.8 - 5.3 10e9/L    Absolute Monocytes 1.1 0.0 - 1.3 10e9/L    Absolute Eosinophils 0.5 0.0 - 0.7 10e9/L    Absolute Basophils 0.1 0.0 - 0.2 10e9/L   Comprehensive metabolic panel   Result Value Ref Range    Sodium 142 133 - 144 mmol/L    Potassium 4.2 3.4 - 5.3 mmol/L    Chloride 110 (H) 94 - 109 mmol/L    Carbon Dioxide 25 20 - 32 mmol/L    Anion Gap 7 3 - 14 mmol/L    Glucose 105 (H) 70 - 99 mg/dL     Urea Nitrogen 32 (H) 7 - 30 mg/dL    Creatinine 1.38 (H) 0.52 - 1.04 mg/dL    GFR Estimate 33 (L) >60 mL/min/[1.73_m2]      Comment:      Non  GFR Calc  Starting 12/18/2018, serum creatinine based estimated GFR (eGFR) will be   calculated using the Chronic Kidney Disease Epidemiology Collaboration   (CKD-EPI) equation.      GFR Estimate If Black 39 (L) >60 mL/min/[1.73_m2]      Comment:       GFR Calc  Starting 12/18/2018, serum creatinine based estimated GFR (eGFR) will be   calculated using the Chronic Kidney Disease Epidemiology Collaboration   (CKD-EPI) equation.      Calcium 10.1 8.5 - 10.1 mg/dL    Bilirubin Total 0.5 0.2 - 1.3 mg/dL    Albumin 3.8 3.4 - 5.0 g/dL    Protein Total 7.9 6.8 - 8.8 g/dL    Alkaline Phosphatase 105 40 - 150 U/L    ALT 20 0 - 50 U/L    AST 22 0 - 45 U/L   Lipid Profile   Result Value Ref Range    Cholesterol 240 (H) <200 mg/dL      Comment:      Desirable:       <200 mg/dl    Triglycerides 168 (H) <150 mg/dL      Comment:      Borderline high:  150-199 mg/dl  High:             200-499 mg/dl  Very high:       >499 mg/dl      HDL Cholesterol 51 >49 mg/dL    LDL Cholesterol Calculated 155 (H) <100 mg/dL      Comment:      Above desirable:  100-129 mg/dl  Borderline High:  130-159 mg/dL  High:             160-189 mg/dL  Very high:       >189 mg/dl      Non HDL Cholesterol 189 (H) <130 mg/dL      Comment:      Above Desirable:  130-159 mg/dl  Borderline high:  160-189 mg/dl  High:             190-219 mg/dl  Very high:       >219 mg/dl         If you have any questions or concerns, please call the clinic at the number listed above.       Sincerely,        Yaya Hatfield MD

## 2020-11-04 NOTE — PROGRESS NOTES
"SUBJECTIVE:   Margy Babin is a 89 year old female who presents for Preventive Visit.      Patient has been advised of split billing requirements and indicates understanding: Yes   Are you in the first 12 months of your Medicare coverage?  No    Healthy Habits:     In general, how would you rate your overall health?  Good    Frequency of exercise:  None    Do you usually eat at least 4 servings of fruit and vegetables a day, include whole grains    & fiber and avoid regularly eating high fat or \"junk\" foods?  Yes    Taking medications regularly:  Yes    Medication side effects:  No muscle aches and No significant flushing    Ability to successfully perform activities of daily living:  Transportation requires assistance, shopping requires assistance, housework requires assistance and laundry requires assistance    Home Safety:  No safety concerns identified    Hearing Impairment:  Feel that people are mumbling or not speaking clearly, need to ask people to speak up or repeat themselves and difficulty understanding speech on the telephone    In the past 6 months, have you been bothered by leaking of urine?  No    In general, how would you rate your overall mental or emotional health?  Good      PHQ-2 Total Score: 0    Additional concerns today:  Yes    Do you feel safe in your environment? Yes    Have you ever done Advance Care Planning? (For example, a Health Directive, POLST, or a discussion with a medical provider or your loved ones about your wishes): Yes, advance care planning is on file.      Fall risk  Fallen 2 or more times in the past year?: No  Any fall with injury in the past year?: No  click delete button to remove this line now  Cognitive Screening   1) Repeat 3 items (Leader, Season, Table)    2) Clock draw: ABNORMAL   3) 3 item recall: Recalls 1 object   Results: ABNORMAL clock, 1-2 items recalled: PROBABLE COGNITIVE IMPAIRMENT, **INFORM PROVIDER**    Mini-CogTM Copyright S Elizabeth. Licensed " by the author for use in [facility]; reprinted with permission (peter@[facility]). All rights reserved.      Do you have sleep apnea, excessive snoring or daytime drowsiness?: no    Reviewed and updated as needed this visit by clinical staff  Tobacco  Allergies  Meds   Med Hx  Surg Hx  Fam Hx  Soc Hx        Reviewed and updated as needed this visit by Provider                Social History     Tobacco Use     Smoking status: Never Smoker     Smokeless tobacco: Never Used   Substance Use Topics     Alcohol use: No     Alcohol/week: 0.0 standard drinks     If you drink alcohol do you typically have >3 drinks per day or >7 drinks per week? No    Alcohol Use 11/4/2020   Prescreen: >3 drinks/day or >7 drinks/week? Not Applicable   Prescreen: >3 drinks/day or >7 drinks/week? -           Back itching      Duration: X2 week    Description (location/character/radiation): unable to describe, not seen    Intensity:  moderate    Accompanying signs and symptoms: none    History (similar episodes/previous evaluation): None    Precipitating or alleviating factors: None    Therapies tried and outcome: None       Current providers sharing in care for this patient include:   Patient Care Team:  Yaya Hatfield MD as PCP - General (Family Practice)  Yaya Hatfield MD as Assigned PCP    The following health maintenance items are reviewed in Epic and correct as of today:  Health Maintenance   Topic Date Due     ZOSTER IMMUNIZATION (1 of 2) 10/[birth year]/1979     FALL RISK ASSESSMENT  04/23/2020     PHQ-9  12/18/2020     DTAP/TDAP/TD IMMUNIZATION (2 - Td) 08/24/2021     MEDICARE ANNUAL WELLNESS VISIT  11/04/2021     ADVANCE CARE PLANNING  11/04/2025     DEPRESSION ACTION PLAN  Completed     INFLUENZA VACCINE  Completed     Pneumococcal Vaccine: 65+ Years  Completed     Pneumococcal Vaccine: Pediatrics (0 to 5 Years) and At-Risk Patients (6 to 64 Years)  Aged Out     IPV IMMUNIZATION  Aged Out     MENINGITIS  "IMMUNIZATION  Aged Out     HEPATITIS B IMMUNIZATION  Aged Out     Patient Active Problem List   Diagnosis     Anxiety     Familial tremor     Dry eye syndrome     Blepharitis of both eyes     Depression, major, recurrent, in complete remission (H)     Insomnia, unspecified type     Urge incontinence of urine     Nausea without vomiting     Abdominal pain, epigastric     ACP (advance care planning)     Neck pain     Left knee pain     Syncope     Chronic kidney disease, stage 3 (moderate)     Essential hypertension     Ceruminosis, bilateral     Mixed hyperlipidemia     Tension headache     Past Surgical History:   Procedure Laterality Date     C TOTAL HIP ARTHROPLASTY Right      HYSTERECTOMY TOTAL ABDOMINAL, BILATERAL SALPINGO-OOPHORECTOMY, COMBINED       NECK SURGERY  2009    cervical fusion     ROTATOR CUFF REPAIR RT/LT Right        Social History     Tobacco Use     Smoking status: Never Smoker     Smokeless tobacco: Never Used   Substance Use Topics     Alcohol use: No     Alcohol/week: 0.0 standard drinks     Family History   Problem Relation Age of Onset     Heart Disease Father 80        MI     C.A.D. Father      Breast Cancer Daughter 43         age 53     Emphysema Sister      Heart Disease Sister              Review of Systems  Constitutional, HEENT, cardiovascular, pulmonary, gi and gu systems are negative, except as otherwise noted.  Patient has had a very itchy upper back right in the midline for the past 2 weeks.    OBJECTIVE:   BP (!) 142/76   Pulse 81   Temp 97.6  F (36.4  C) (Tympanic)   Resp 16   Ht 1.651 m (5' 5\")   Wt 67.6 kg (149 lb)   SpO2 97%   Breastfeeding No   BMI 24.79 kg/m   Estimated body mass index is 24.79 kg/m  as calculated from the following:    Height as of this encounter: 1.651 m (5' 5\").    Weight as of this encounter: 67.6 kg (149 lb).  Physical Exam  GENERAL APPEARANCE: healthy, alert and no distress  EYES: Eyes grossly normal to inspection, PERRL and " "conjunctivae and sclerae normal  HENT: ear canals and TM's normal, nose and mouth without ulcers or lesions, oropharynx clear and oral mucous membranes moist  NECK: no adenopathy, no asymmetry, masses, or scars and thyroid normal to palpation  RESP: lungs clear to auscultation - no rales, rhonchi or wheezes  CV: regular rate and rhythm, normal S1 S2, no S3 or S4, no murmur, click or rub, no peripheral edema and peripheral pulses strong  ABDOMEN: soft, nontender, no hepatosplenomegaly, no masses and bowel sounds normal  MS: no musculoskeletal defects are noted and gait is age appropriate without ataxia  SKIN: no suspicious lesions or rashes  NEURO: Normal strength and tone, sensory exam grossly normal, mentation intact and speech normal  PSYCH: mentation appears normal and affect normal/bright        ASSESSMENT / PLAN:       ICD-10-CM    1. Routine medical exam  Z00.00    2. Nausea without vomiting  R11.0    3. Essential hypertension  I10 UA with Microscopic     CBC with platelets differential     Comprehensive metabolic panel   4. Ceruminosis, bilateral  H61.23    5. Urge incontinence of urine  N39.41    6. Mixed hyperlipidemia  E78.2 Lipid Profile   7. Stage 3a chronic kidney disease  N18.31    8. Anxiety  F41.9    9. Familial tremor  G25.0    10. Dry eye syndrome of both eyes  H04.123    11. Insomnia, unspecified type  G47.00    12. Tension headache  G44.209        Patient has been advised of split billing requirements and indicates understanding: Yes  COUNSELING:  Reviewed preventive health counseling, as reflected in patient instructions       Regular exercise       Healthy diet/nutrition    Estimated body mass index is 24.79 kg/m  as calculated from the following:    Height as of this encounter: 1.651 m (5' 5\").    Weight as of this encounter: 67.6 kg (149 lb).        She reports that she has never smoked. She has never used smokeless tobacco.      Appropriate preventive services were discussed with this " patient, including applicable screening as appropriate for cardiovascular disease, diabetes, osteopenia/osteoporosis, and glaucoma.  As appropriate for age/gender, discussed screening for colorectal cancer, prostate cancer, breast cancer, and cervical cancer. Checklist reviewing preventive services available has been given to the patient.    Reviewed patients plan of care and provided an AVS. The Basic Care Plan (routine screening as documented in Health Maintenance) for Margy meets the Care Plan requirement. This Care Plan has been established and reviewed with the Patient and daughter.    Counseling Resources:  ATP IV Guidelines  Pooled Cohorts Equation Calculator  Breast Cancer Risk Calculator  Breast Cancer: Medication to Reduce Risk  FRAX Risk Assessment  ICSI Preventive Guidelines  Dietary Guidelines for Americans, 2010  USDA's MyPlate  ASA Prophylaxis  Lung CA Screening    Yaya Hatfield MD  [facility]    Identified Health Risks:

## 2020-11-04 NOTE — PATIENT INSTRUCTIONS
The patient has been using intermittent Compazine for nausea which works very well.  I refilled that and the 5 mg dose.  For her headaches she is taking 2 extra strength Tylenol which works very well.  Her headaches frequently come on in the early to mid afternoon.  She will continue using acetaminophen.  I reviewed all of her medications with her daughter.  She is going to go back into the patient's residence when she gets back there and compare what is on the list to what the patient's bottles are.  She has not been in the patient's apartment since the beginning of the Covid pandemic.  She will use Debrox drops twice daily for the next week for the ceruminosis.  We will see if that affects her hearing or not.  Her daughter will keep an eye on that for us.  We were going to have her see an ENT specialist to get her ears cleared out but then the coronavirus appeared and they have not made another appointment.  The patient will keep a food diary to see if there are any foods that she is eating that are causing her to get nausea.  She gets Meals on Wheels for some of her meals but the ones that she does for herself there is a lot of spaghetti and meatballs.

## 2020-11-05 LAB
ALBUMIN SERPL-MCNC: 3.8 G/DL (ref 3.4–5)
ALP SERPL-CCNC: 105 U/L (ref 40–150)
ALT SERPL W P-5'-P-CCNC: 20 U/L (ref 0–50)
ANION GAP SERPL CALCULATED.3IONS-SCNC: 7 MMOL/L (ref 3–14)
AST SERPL W P-5'-P-CCNC: 22 U/L (ref 0–45)
BILIRUB SERPL-MCNC: 0.5 MG/DL (ref 0.2–1.3)
BUN SERPL-MCNC: 32 MG/DL (ref 7–30)
CALCIUM SERPL-MCNC: 10.1 MG/DL (ref 8.5–10.1)
CHLORIDE SERPL-SCNC: 110 MMOL/L (ref 94–109)
CHOLEST SERPL-MCNC: 240 MG/DL
CO2 SERPL-SCNC: 25 MMOL/L (ref 20–32)
CREAT SERPL-MCNC: 1.38 MG/DL (ref 0.52–1.04)
GFR SERPL CREATININE-BSD FRML MDRD: 33 ML/MIN/{1.73_M2}
GLUCOSE SERPL-MCNC: 105 MG/DL (ref 70–99)
HDLC SERPL-MCNC: 51 MG/DL
LDLC SERPL CALC-MCNC: 155 MG/DL
NONHDLC SERPL-MCNC: 189 MG/DL
POTASSIUM SERPL-SCNC: 4.2 MMOL/L (ref 3.4–5.3)
PROT SERPL-MCNC: 7.9 G/DL (ref 6.8–8.8)
SODIUM SERPL-SCNC: 142 MMOL/L (ref 133–144)
TRIGL SERPL-MCNC: 168 MG/DL

## 2020-11-06 RX ORDER — PROCHLORPERAZINE MALEATE 5 MG
TABLET ORAL
Qty: 20 TABLET | Refills: 0 | OUTPATIENT
Start: 2020-11-06

## 2020-12-09 DIAGNOSIS — R11.0 NAUSEA WITHOUT VOMITING: Primary | ICD-10-CM

## 2020-12-09 RX ORDER — PROCHLORPERAZINE MALEATE 5 MG
5 TABLET ORAL EVERY 6 HOURS PRN
Qty: 20 TABLET | Refills: 3 | Status: SHIPPED | OUTPATIENT
Start: 2020-12-09 | End: 2020-12-30

## 2020-12-11 DIAGNOSIS — F33.42 RECURRENT MAJOR DEPRESSION IN COMPLETE REMISSION (H): ICD-10-CM

## 2020-12-14 RX ORDER — PAROXETINE 20 MG/1
20 TABLET, FILM COATED ORAL AT BEDTIME
Qty: 90 TABLET | Refills: 3 | Status: SHIPPED | OUTPATIENT
Start: 2020-12-14 | End: 2021-11-15

## 2020-12-16 DIAGNOSIS — F41.9 ANXIETY: ICD-10-CM

## 2020-12-16 RX ORDER — ALPRAZOLAM 0.25 MG
TABLET ORAL
Qty: 60 TABLET | Refills: 1 | Status: ON HOLD | OUTPATIENT
Start: 2020-12-16 | End: 2022-01-11

## 2020-12-17 DIAGNOSIS — G47.00 INSOMNIA, UNSPECIFIED TYPE: ICD-10-CM

## 2020-12-18 RX ORDER — QUETIAPINE FUMARATE 25 MG/1
25 TABLET, FILM COATED ORAL AT BEDTIME
Qty: 30 TABLET | Refills: 5 | Status: SHIPPED | OUTPATIENT
Start: 2020-12-18 | End: 2021-05-03

## 2020-12-18 NOTE — TELEPHONE ENCOUNTER
Seroquel    Routing refill request to provider for review/approval because:  BP not at goal    BP Readings from Last 3 Encounters:   11/04/20 (!) 142/76   05/04/20 138/77   03/04/20 (!) 170/78

## 2020-12-25 ENCOUNTER — NURSE TRIAGE (OUTPATIENT)
Dept: NURSING | Facility: CLINIC | Age: 85
End: 2020-12-25

## 2020-12-25 ENCOUNTER — APPOINTMENT (OUTPATIENT)
Dept: CT IMAGING | Facility: CLINIC | Age: 85
End: 2020-12-25
Attending: EMERGENCY MEDICINE
Payer: MEDICARE

## 2020-12-25 ENCOUNTER — APPOINTMENT (OUTPATIENT)
Dept: GENERAL RADIOLOGY | Facility: CLINIC | Age: 85
End: 2020-12-25
Attending: EMERGENCY MEDICINE
Payer: MEDICARE

## 2020-12-25 ENCOUNTER — HOSPITAL ENCOUNTER (OUTPATIENT)
Facility: CLINIC | Age: 85
Setting detail: OBSERVATION
Discharge: HOME | End: 2020-12-26
Attending: EMERGENCY MEDICINE | Admitting: STUDENT IN AN ORGANIZED HEALTH CARE EDUCATION/TRAINING PROGRAM
Payer: MEDICARE

## 2020-12-25 DIAGNOSIS — E86.0 DEHYDRATION: ICD-10-CM

## 2020-12-25 DIAGNOSIS — G25.0 FAMILIAL TREMOR: ICD-10-CM

## 2020-12-25 DIAGNOSIS — R11.0 NAUSEA: ICD-10-CM

## 2020-12-25 DIAGNOSIS — B37.2 CANDIDIASIS OF SKIN: Primary | ICD-10-CM

## 2020-12-25 DIAGNOSIS — M62.81 GENERALIZED MUSCLE WEAKNESS: ICD-10-CM

## 2020-12-25 LAB
ALBUMIN SERPL-MCNC: 3.5 G/DL (ref 3.4–5)
ALBUMIN UR-MCNC: NEGATIVE MG/DL
ALP SERPL-CCNC: 78 U/L (ref 40–150)
ALT SERPL W P-5'-P-CCNC: 26 U/L (ref 0–50)
ANION GAP SERPL CALCULATED.3IONS-SCNC: 6 MMOL/L (ref 3–14)
APPEARANCE UR: CLEAR
AST SERPL W P-5'-P-CCNC: 28 U/L (ref 0–45)
BASOPHILS # BLD AUTO: 0.2 10E9/L (ref 0–0.2)
BASOPHILS NFR BLD AUTO: 1.2 %
BILIRUB SERPL-MCNC: 0.3 MG/DL (ref 0.2–1.3)
BILIRUB UR QL STRIP: NEGATIVE
BUN SERPL-MCNC: 9 MG/DL (ref 7–30)
CALCIUM SERPL-MCNC: 8.8 MG/DL (ref 8.5–10.1)
CHLORIDE SERPL-SCNC: 102 MMOL/L (ref 94–109)
CK SERPL-CCNC: 85 U/L (ref 30–225)
CO2 SERPL-SCNC: 30 MMOL/L (ref 20–32)
COLOR UR AUTO: YELLOW
CREAT SERPL-MCNC: 0.76 MG/DL (ref 0.52–1.04)
DIFFERENTIAL METHOD BLD: ABNORMAL
EOSINOPHIL # BLD AUTO: 0.2 10E9/L (ref 0–0.7)
EOSINOPHIL NFR BLD AUTO: 1.4 %
ERYTHROCYTE [DISTWIDTH] IN BLOOD BY AUTOMATED COUNT: 14 % (ref 10–15)
GFR SERPL CREATININE-BSD FRML MDRD: 68 ML/MIN/{1.73_M2}
GLUCOSE SERPL-MCNC: 92 MG/DL (ref 70–99)
GLUCOSE UR STRIP-MCNC: NEGATIVE MG/DL
HCT VFR BLD AUTO: 44.2 % (ref 35–47)
HGB BLD-MCNC: 14.5 G/DL (ref 11.7–15.7)
HGB UR QL STRIP: NEGATIVE
IMM GRANULOCYTES # BLD: 0.1 10E9/L (ref 0–0.4)
IMM GRANULOCYTES NFR BLD: 0.4 %
INTERPRETATION ECG - MUSE: NORMAL
KETONES UR STRIP-MCNC: NEGATIVE MG/DL
LABORATORY COMMENT REPORT: NORMAL
LACTATE BLD-SCNC: 1.4 MMOL/L (ref 0.7–2)
LACTATE BLD-SCNC: 3 MMOL/L (ref 0.7–2)
LEUKOCYTE ESTERASE UR QL STRIP: NEGATIVE
LIPASE SERPL-CCNC: 147 U/L (ref 73–393)
LYMPHOCYTES # BLD AUTO: 2.8 10E9/L (ref 0.8–5.3)
LYMPHOCYTES NFR BLD AUTO: 21.3 %
MCH RBC QN AUTO: 30.8 PG (ref 26.5–33)
MCHC RBC AUTO-ENTMCNC: 32.8 G/DL (ref 31.5–36.5)
MCV RBC AUTO: 94 FL (ref 78–100)
MONOCYTES # BLD AUTO: 1 10E9/L (ref 0–1.3)
MONOCYTES NFR BLD AUTO: 7.8 %
NEUTROPHILS # BLD AUTO: 8.8 10E9/L (ref 1.6–8.3)
NEUTROPHILS NFR BLD AUTO: 67.9 %
NITRATE UR QL: NEGATIVE
NRBC # BLD AUTO: 0 10*3/UL
NRBC BLD AUTO-RTO: 0 /100
PH UR STRIP: 5.5 PH (ref 5–7)
PLATELET # BLD AUTO: 388 10E9/L (ref 150–450)
POTASSIUM SERPL-SCNC: 2.9 MMOL/L (ref 3.4–5.3)
POTASSIUM SERPL-SCNC: 3.1 MMOL/L (ref 3.4–5.3)
PROT SERPL-MCNC: 7.3 G/DL (ref 6.8–8.8)
RBC # BLD AUTO: 4.71 10E12/L (ref 3.8–5.2)
RBC #/AREA URNS AUTO: <1 /HPF (ref 0–2)
SARS-COV-2 RNA SPEC QL NAA+PROBE: NEGATIVE
SODIUM SERPL-SCNC: 138 MMOL/L (ref 133–144)
SOURCE: NORMAL
SP GR UR STRIP: 1.01 (ref 1–1.03)
SPECIMEN SOURCE: NORMAL
TROPONIN I SERPL-MCNC: <0.015 UG/L (ref 0–0.04)
UROBILINOGEN UR STRIP-MCNC: NORMAL MG/DL (ref 0–2)
WBC # BLD AUTO: 12.9 10E9/L (ref 4–11)
WBC #/AREA URNS AUTO: <1 /HPF (ref 0–5)

## 2020-12-25 PROCEDURE — 80053 COMPREHEN METABOLIC PANEL: CPT | Performed by: EMERGENCY MEDICINE

## 2020-12-25 PROCEDURE — 36415 COLL VENOUS BLD VENIPUNCTURE: CPT | Performed by: STUDENT IN AN ORGANIZED HEALTH CARE EDUCATION/TRAINING PROGRAM

## 2020-12-25 PROCEDURE — C9803 HOPD COVID-19 SPEC COLLECT: HCPCS

## 2020-12-25 PROCEDURE — 82550 ASSAY OF CK (CPK): CPT | Performed by: EMERGENCY MEDICINE

## 2020-12-25 PROCEDURE — 70450 CT HEAD/BRAIN W/O DYE: CPT

## 2020-12-25 PROCEDURE — 93005 ELECTROCARDIOGRAM TRACING: CPT

## 2020-12-25 PROCEDURE — 83605 ASSAY OF LACTIC ACID: CPT | Performed by: EMERGENCY MEDICINE

## 2020-12-25 PROCEDURE — 96374 THER/PROPH/DIAG INJ IV PUSH: CPT

## 2020-12-25 PROCEDURE — 83690 ASSAY OF LIPASE: CPT | Performed by: EMERGENCY MEDICINE

## 2020-12-25 PROCEDURE — G0378 HOSPITAL OBSERVATION PER HR: HCPCS

## 2020-12-25 PROCEDURE — 81001 URINALYSIS AUTO W/SCOPE: CPT | Performed by: EMERGENCY MEDICINE

## 2020-12-25 PROCEDURE — 250N000013 HC RX MED GY IP 250 OP 250 PS 637: Mod: GY | Performed by: STUDENT IN AN ORGANIZED HEALTH CARE EDUCATION/TRAINING PROGRAM

## 2020-12-25 PROCEDURE — 85025 COMPLETE CBC W/AUTO DIFF WBC: CPT | Performed by: EMERGENCY MEDICINE

## 2020-12-25 PROCEDURE — 258N000003 HC RX IP 258 OP 636: Performed by: EMERGENCY MEDICINE

## 2020-12-25 PROCEDURE — 84484 ASSAY OF TROPONIN QUANT: CPT | Performed by: EMERGENCY MEDICINE

## 2020-12-25 PROCEDURE — 99219 PR INITIAL OBSERVATION CARE,LEVEL II: CPT | Performed by: STUDENT IN AN ORGANIZED HEALTH CARE EDUCATION/TRAINING PROGRAM

## 2020-12-25 PROCEDURE — 84132 ASSAY OF SERUM POTASSIUM: CPT | Mod: 91 | Performed by: STUDENT IN AN ORGANIZED HEALTH CARE EDUCATION/TRAINING PROGRAM

## 2020-12-25 PROCEDURE — 96361 HYDRATE IV INFUSION ADD-ON: CPT

## 2020-12-25 PROCEDURE — 250N000011 HC RX IP 250 OP 636: Performed by: EMERGENCY MEDICINE

## 2020-12-25 PROCEDURE — 87635 SARS-COV-2 COVID-19 AMP PRB: CPT | Performed by: EMERGENCY MEDICINE

## 2020-12-25 PROCEDURE — 99285 EMERGENCY DEPT VISIT HI MDM: CPT | Mod: 25

## 2020-12-25 PROCEDURE — 87040 BLOOD CULTURE FOR BACTERIA: CPT | Performed by: EMERGENCY MEDICINE

## 2020-12-25 PROCEDURE — 71046 X-RAY EXAM CHEST 2 VIEWS: CPT

## 2020-12-25 RX ORDER — QUETIAPINE FUMARATE 25 MG/1
25 TABLET, FILM COATED ORAL AT BEDTIME
Status: DISCONTINUED | OUTPATIENT
Start: 2020-12-25 | End: 2020-12-26 | Stop reason: HOSPADM

## 2020-12-25 RX ORDER — LIDOCAINE 40 MG/G
CREAM TOPICAL
Status: DISCONTINUED | OUTPATIENT
Start: 2020-12-25 | End: 2020-12-26 | Stop reason: HOSPADM

## 2020-12-25 RX ORDER — PAROXETINE 20 MG/1
20 TABLET, FILM COATED ORAL AT BEDTIME
Status: DISCONTINUED | OUTPATIENT
Start: 2020-12-25 | End: 2020-12-26 | Stop reason: HOSPADM

## 2020-12-25 RX ORDER — POTASSIUM CHLORIDE 7.45 MG/ML
10 INJECTION INTRAVENOUS ONCE
Status: COMPLETED | OUTPATIENT
Start: 2020-12-25 | End: 2020-12-25

## 2020-12-25 RX ORDER — ONDANSETRON 2 MG/ML
4 INJECTION INTRAMUSCULAR; INTRAVENOUS EVERY 30 MIN PRN
Status: DISCONTINUED | OUTPATIENT
Start: 2020-12-25 | End: 2020-12-25

## 2020-12-25 RX ORDER — PROCHLORPERAZINE MALEATE 5 MG
5 TABLET ORAL EVERY 6 HOURS PRN
Status: DISCONTINUED | OUTPATIENT
Start: 2020-12-25 | End: 2020-12-26 | Stop reason: HOSPADM

## 2020-12-25 RX ORDER — ALPRAZOLAM 0.25 MG
0.25 TABLET ORAL 4 TIMES DAILY PRN
Status: DISCONTINUED | OUTPATIENT
Start: 2020-12-25 | End: 2020-12-26 | Stop reason: HOSPADM

## 2020-12-25 RX ORDER — ERYTHROMYCIN 5 MG/G
0.5 OINTMENT OPHTHALMIC 2 TIMES DAILY
COMMUNITY
End: 2021-11-23

## 2020-12-25 RX ORDER — POTASSIUM CHLORIDE 1500 MG/1
40 TABLET, EXTENDED RELEASE ORAL ONCE
Status: COMPLETED | OUTPATIENT
Start: 2020-12-25 | End: 2020-12-25

## 2020-12-25 RX ORDER — POTASSIUM CHLORIDE 1500 MG/1
20 TABLET, EXTENDED RELEASE ORAL ONCE
Status: DISCONTINUED | OUTPATIENT
Start: 2020-12-25 | End: 2020-12-25 | Stop reason: DRUGHIGH

## 2020-12-25 RX ORDER — PROCHLORPERAZINE 25 MG
12.5 SUPPOSITORY, RECTAL RECTAL EVERY 12 HOURS PRN
Status: DISCONTINUED | OUTPATIENT
Start: 2020-12-25 | End: 2020-12-25

## 2020-12-25 RX ORDER — PROCHLORPERAZINE 25 MG
12.5 SUPPOSITORY, RECTAL RECTAL EVERY 12 HOURS PRN
Status: DISCONTINUED | OUTPATIENT
Start: 2020-12-25 | End: 2020-12-26 | Stop reason: HOSPADM

## 2020-12-25 RX ORDER — POTASSIUM CHLORIDE 1500 MG/1
40 TABLET, EXTENDED RELEASE ORAL ONCE
Status: DISCONTINUED | OUTPATIENT
Start: 2020-12-25 | End: 2020-12-25 | Stop reason: DRUGHIGH

## 2020-12-25 RX ORDER — PRAMIPEXOLE DIHYDROCHLORIDE 0.12 MG/1
0.12 TABLET ORAL 2 TIMES DAILY
Status: DISCONTINUED | OUTPATIENT
Start: 2020-12-25 | End: 2020-12-26 | Stop reason: HOSPADM

## 2020-12-25 RX ORDER — PROCHLORPERAZINE MALEATE 5 MG
5 TABLET ORAL EVERY 6 HOURS PRN
Status: DISCONTINUED | OUTPATIENT
Start: 2020-12-25 | End: 2020-12-25

## 2020-12-25 RX ORDER — ONDANSETRON 2 MG/ML
4 INJECTION INTRAMUSCULAR; INTRAVENOUS EVERY 6 HOURS PRN
Status: DISCONTINUED | OUTPATIENT
Start: 2020-12-25 | End: 2020-12-25

## 2020-12-25 RX ORDER — ONDANSETRON 4 MG/1
4 TABLET, ORALLY DISINTEGRATING ORAL EVERY 6 HOURS PRN
Status: DISCONTINUED | OUTPATIENT
Start: 2020-12-25 | End: 2020-12-25

## 2020-12-25 RX ORDER — ACETAMINOPHEN 325 MG/1
650 TABLET ORAL EVERY 4 HOURS PRN
Status: DISCONTINUED | OUTPATIENT
Start: 2020-12-25 | End: 2020-12-26 | Stop reason: HOSPADM

## 2020-12-25 RX ORDER — ACETAMINOPHEN 650 MG/1
650 SUPPOSITORY RECTAL EVERY 4 HOURS PRN
Status: DISCONTINUED | OUTPATIENT
Start: 2020-12-25 | End: 2020-12-26 | Stop reason: HOSPADM

## 2020-12-25 RX ORDER — PROPRANOLOL HYDROCHLORIDE 120 MG/1
240 CAPSULE, EXTENDED RELEASE ORAL DAILY
Status: DISCONTINUED | OUTPATIENT
Start: 2020-12-25 | End: 2020-12-26 | Stop reason: HOSPADM

## 2020-12-25 RX ADMIN — QUETIAPINE 25 MG: 25 TABLET ORAL at 21:16

## 2020-12-25 RX ADMIN — ONDANSETRON 4 MG: 2 INJECTION INTRAMUSCULAR; INTRAVENOUS at 14:52

## 2020-12-25 RX ADMIN — PAROXETINE HYDROCHLORIDE 20 MG: 20 TABLET, FILM COATED ORAL at 21:16

## 2020-12-25 RX ADMIN — POTASSIUM CHLORIDE 10 MEQ: 7.46 INJECTION, SOLUTION INTRAVENOUS at 16:18

## 2020-12-25 RX ADMIN — PROPRANOLOL HYDROCHLORIDE 240 MG: 120 CAPSULE, EXTENDED RELEASE ORAL at 20:07

## 2020-12-25 RX ADMIN — SODIUM CHLORIDE 500 ML: 9 INJECTION, SOLUTION INTRAVENOUS at 14:41

## 2020-12-25 RX ADMIN — OXYBUTYNIN 15 MG: 5 TABLET, FILM COATED, EXTENDED RELEASE ORAL at 20:02

## 2020-12-25 RX ADMIN — PRAMIPEXOLE DIHYDROCHLORIDE 0.12 MG: 0.12 TABLET ORAL at 20:07

## 2020-12-25 RX ADMIN — POTASSIUM CHLORIDE 40 MEQ: 1500 TABLET, EXTENDED RELEASE ORAL at 20:02

## 2020-12-25 ASSESSMENT — MIFFLIN-ST. JEOR
SCORE: 1078.59
SCORE: 1096.28

## 2020-12-25 NOTE — PROGRESS NOTES
RECEIVING UNIT ED HANDOFF REVIEW    ED Nurse Handoff Report was reviewed by: Fay Chavez RN on December 25, 2020 at 5:20 PM

## 2020-12-25 NOTE — ED NOTES
"[facility]  ED Nurse Handoff Report    ED Chief complaint: Generalized Weakness      ED Diagnosis:   Final diagnoses:   Nausea   Generalized muscle weakness   Dehydration       Code Status: to be assessed by admitting provider     Allergies: No Known Allergies    Patient Story: Patient arrives to the ED today from home were she resides alone. Patients daughter went to visit her mother and noted her mother was nauseated but not vomiting and had generalized weakness with a headache for multiple days. Patients is forgetful but per daughter she is at her baseline mentation. Patient also has a tremor at baseline.   Focused Assessment:    Resp: WDL  Cardiac: WDL   Neuro: patient is very forgetful but however is reportedly at her baseline mentation, patient additionally complains of a headache for the last few days   GI: nausea with no vomiting for the last few days   :WDL   Skin: patient has significant skin breakdown in her frances area which she states is from her depends.   Musculoskeletal: generalized weakness   Treatments and/or interventions provided: Fluids, potassium, zofran   Patient's response to treatments and/or interventions: improved labs    To be done/followed up on inpatient unit:  control patients signs and symptoms, continue to monitor    Does this patient have any cognitive concerns?: Short term memory loss and Forgetful    Activity level - Baseline/Home:  Stand with Assist  Activity Level - Current:   Total Care    Patient's Preferred language: English   Needed?: No    Isolation: None  Infection: Not Applicable  Patient tested for COVID 19 prior to admission: YES  Bariatric?: No    Vital Signs:   Vitals:    12/25/20 1430 12/25/20 1500 12/25/20 1507 12/25/20 1600   BP: (!) 149/81 (!) 136/109  (!) 146/72   Pulse: 96 85  104   Resp: 18      Temp: 99.7  F (37.6  C)      TempSrc: Oral      SpO2: 99% 100%  99%   Weight:   68 kg (150 lb)    Height:   1.651 m (5' 5\")        Cardiac " Rhythm:     Was the PSS-3 completed:   Yes  What interventions are required if any?               Family Comments: Daughter would like updates on care    OBS brochure/video discussed/provided to patient/family: Yes              Name of person given brochure if not patient: n.a               Relationship to patient: n.a     For the majority of the shift this patient's behavior was Green.   Behavioral interventions performed were none .    ED NURSE PHONE NUMBER: *[id number]

## 2020-12-25 NOTE — ED PROVIDER NOTES
"  History   Chief Complaint:  Generalized Weakness     The history is provided by the patient.      Margy Babin is a 89 year old female with history of hyperlipidemia, CKD, tremors, who presents with generalized weakness, fatigue, nausea.  Patient's symptoms started about a week ago.  She lives independently.  She has been able to ambulate with her walker to the bathroom but otherwise has largely been staying in her bed.  She denies vomiting or fever or cough or diarrhea or constipation.     Per discussion with daughter Tina, patient lives independently. On the 16th of December patient started complaining of weakness and nausea. Her building staff called 911 on the 19th to check on patient, but pt refused transport. Apparently after eating a turkey sandwich pt started to feel well again that evening up until the past few days when her symptoms returned. Today patient had a banana and applesauce but that didn't help her symptoms.       Review of Systems   All other systems reviewed and are negative.    Allergies:  No known drug allergies     Medications:  Xanax  Colace  Oxybutynin  Mirapex  Miralax  Inderal  Seroquel     Past Medical History:    Anxiety  Depression   Tremor  Hyperlipidemia   Tension headache  CKD stage 3     Past Surgical History:    Total hip arthroplasty, right   Total abdominal hysterectomy, salpingoophrectomy   Rotator cuff repair  Neck surgery    Family History:    Heart disease, father   CAD, father   Breast cancer, daughter   Emphysema, sister  Heart disease, sister    Social History:  Lives independently  PCP: Yaya Hatfield      Physical Exam     Patient Vitals for the past 24 hrs:   BP Temp Temp src Pulse Resp SpO2 Height Weight   12/25/20 1600 (!) 146/72 -- -- 104 -- 99 % -- --   12/25/20 1507 -- -- -- -- -- -- 1.651 m (5' 5\") 68 kg (150 lb)   12/25/20 1500 (!) 136/109 -- -- 85 -- 100 % -- --   12/25/20 1430 (!) 149/81 99.7  F (37.6  C) Oral 96 18 99 % -- -- "     Physical Exam  VS: Reviewed per above  HENT: Mucous membranes dry, no nuchal rigidity  EYES: sclera anicteric  CV: Rate as noted, regular rhythm.   RESP: Effort normal. Breath sounds are normal bilaterally.  GI: no tenderness/rebound/guarding, not distended.  NEURO: GCS 15, cranial nerves II through XII are intact, 5 out of 5 strength in all 4 extremities, sensation is intact to light touch in all 4 extremities  MSK: No deformity of the extremities  SKIN: Warm and dry, lower abdominal erythematous rash at site of depends elastic    Emergency Department Course     ECG:  ECG taken at 1455, ECG read at 1531  Atrial flutter with variable AV block vs supraventricular regular wide complex rhythm  Left axis deviation   Left bundle branch block  Abnormal ECG  Changes noted compared to EKG dated 6/27/2017  Rate 85 bpm. MD interval * ms. QRS duration 146 ms. QT/QTc 448/533 ms. P-R-T axes 38 -30 131.     Imaging:  CT Head w/o Contrast   Final Result   IMPRESSION:   1. No acute intracranial findings by CT.   2. Severe small vessel ischemic disease.       ANJALI CAMPOS MD      XR Chest 2 Views   Final Result   IMPRESSION: PA and lateral views of the chest were obtained.   Cardiomediastinal silhouette is within normal limits. No suspicious   focal pulmonary opacities. No significant pleural effusion or   pneumothorax. Diffuse osteopenia of the visualized thoracic spine.   Degenerative changes of the right shoulder joint.      WILLIAN TRUJILLO MD          Laboratory:  Labs Ordered and Resulted from Time of ED Arrival Up to the Time of Departure from the ED   CBC WITH PLATELETS DIFFERENTIAL - Abnormal; Notable for the following components:       Result Value    WBC 12.9 (*)     Absolute Neutrophil 8.8 (*)     All other components within normal limits   COMPREHENSIVE METABOLIC PANEL - Abnormal; Notable for the following components:    Potassium 2.9 (*)     All other components within normal limits   LACTIC ACID WHOLE BLOOD -  Abnormal; Notable for the following components:    Lactic Acid 3.0 (*)     All other components within normal limits   ROUTINE UA WITH MICROSCOPIC   TROPONIN I   LACTIC ACID WHOLE BLOOD   CK TOTAL   SARS-COV-2 (COVID-19) VIRUS RT-PCR   LIPASE   CARDIAC CONTINUOUS MONITORING   BLOOD CULTURE   BLOOD CULTURE      Procedures    Emergency Department Course:  Reviewed:  I reviewed the patient's nursing notes, vitals, past medical records, Care Everywhere.     Assessments:  I performed an assessment and examination of the patient as noted above.     Interventions:  Medications   ondansetron (ZOFRAN) injection 4 mg (4 mg Intravenous Given 12/25/20 1452)   0.9% sodium chloride BOLUS (0 mLs Intravenous Stopped 12/25/20 1530)   potassium chloride 10 mEq in 100 mL sterile water intermittent infusion (premix) (10 mEq Intravenous New Bag 12/25/20 1618)       Disposition:  The patient was admitted to the hospital under the care of Dr. Mueller.       Impression & Plan   Medical Decision Making:  Patient presents to the ER for evaluation of generalized weakness, nausea.  On arrival vital signs are reassuring.  On exam there are no focal neurological deficits to suggest stroke or signs of skin or soft tissue infection.  Abdomen is soft and nontender without peritoneal signs.  I have low suspicion for occult sinister intra-abdominal process, including infectious and obstructive processes.  No evidence of UTI or pneumonia.  No evidence of intracranial hemorrhage or other acute intracranial process on CT imaging of the brain without contrast.  Labs initially show lactic acidosis of 3 and after 1 L of IV fluids, this normalized.  I suspect this is from dehydration rather than sepsis.  Other labs do not suggest evidence of myocardial ischemia or rhabdomyolysis.  EKG was interpreted by the computer as atrial flutter although I could not definitively see flutter waves.  Other possible supraventricular wide-complex rhythm is possible as  well.  In discussion with patient's daughter, there was concern about patient's wellbeing given that she lives independently, and so she was admitted to the hospital for further evaluation.    Diagnosis:    ICD-10-CM    1. Nausea  R11.0 Blood culture     Blood culture     Lactic acid     CK total     Asymptomatic SARS-CoV-2 COVID-19 Virus (Coronavirus) by PCR     Lipase     Lipase     CANCELED: Lipase   2. Generalized muscle weakness  M62.81    3. Dehydration  E86.0        Discharge Medications:  New Prescriptions    No medications on file       Scribe Disclosure:  I, Margy Avina, am serving as a scribe at 2:52 PM on 12/25/2020 to document services personally performed by Neo Freitas MD based on my observations and the provider's statements to me.     Scribe Disclosure:  I, Orla Severson, am serving as a scribe at 3:56 PM on 12/25/2020 to document services personally performed by Neo Freitas MD based on my observations and the provider's statements to me.                  Neo Freitas MD  12/25/20 [id number]

## 2020-12-25 NOTE — ED NOTES
Report received. Patient visualized. Patient returned from CT scan. Pure wick placed on patient due to her need to void. Patients blood taken for cultures and lactic acid redrawn per MD orders post fluid admin.

## 2020-12-25 NOTE — TELEPHONE ENCOUNTER
"Daughter calling in stating that patient has not been feeling well for the last 10 days. She is nauseated and tired. No vomiting. NO abdominal pain, no diarrhea.  Daughter calling in today asking what to do next? She ate yesterday, had cereal and a turkey sandwich. She was up and prepared this herself. BM yesterday and normal. NO abdominal pain. No changes with urination, yellow. NO fever, no chills. Advised patient to be seen within 24 hours. Urgent Care recommended. Hours and locations discussed.   Anita Johnson RN on 12/25/2020 at 8:22 AM      Additional Information    Negative: Severe difficulty breathing (e.g., struggling for each breath, speaks in single words)    Negative: Shock suspected (e.g., cold/pale/clammy skin, too weak to stand, low BP, rapid pulse)    Negative: Difficult to awaken or acting confused (e.g., disoriented, slurred speech)    Negative: [1] Fainted > 15 minutes ago AND [2] still feels too weak or dizzy to stand    Negative: [1] SEVERE weakness (i.e., unable to walk or barely able to walk, requires support) AND     [2] new onset or worsening    Negative: Sounds like a life-threatening emergency to the triager    Negative: Weakness of the face, arm or leg on one side of the body    Negative: [1] Has diabetes (diabetes mellitus) AND [2] weakness from low blood sugar (i.e., < 60 mg/dl or 3.5 mmol/l)    Negative: Heat exhaustion suspected (i.e., dehydration from heat exposure)    Negative: Vomiting is main symptom    Negative: Diarrhea is main symptom    Negative: Difficulty breathing    Negative: Heart beating < 50 beats per minute OR > 140 beats per minute    Negative: Extra heart beats OR irregular heart beating   (i.e., \"palpitations\")    Negative: Follows bleeding (e.g., from vomiting, rectum, vagina; Exception: small brief weakness from sight of a small amount blood)    Negative: Black or tarry bowel movements    Negative: [1] Drinking very little AND [2] dehydration suspected (e.g., no " urine > 12 hours, very dry mouth, very lightheaded)    Negative: Patient sounds very sick or weak to the triager    Negative: [1] MODERATE weakness (i.e., interferes with work, school, normal activities) AND [2] cause unknown  (Exceptions: weakness with acute minor illness, or weakness from poor fluid intake)    Negative: [1] MODERATE weakness AND [2] from poor fluid intake AND [3] no improvement after 2 hours of rest and fluids    Negative: [1] Fever > 103 F (39.4 C) AND [2] not able to get the fever down using Fever Care Advice    Negative: [1] Fever > 101 F (38.3 C) AND [2] age > 60    Negative: [1] Fever > 100.0 F (37.8 C) AND [2] bedridden (e.g., nursing home patient, CVA, chronic illness, recovering from surgery)    Negative: [1] Fever > 100.0 F (37.8 C) AND [2] diabetes mellitus or weak immune system (e.g., HIV positive, cancer chemo, splenectomy, organ transplant, chronic steroids)    Negative: Pale skin (pallor)    [1] MODERATE weakness (i.e., interferes with work, school, normal activities) AND [2] persists > 3 days    Protocols used: WEAKNESS (GENERALIZED) AND FATIGUE-A-AH

## 2020-12-25 NOTE — ED TRIAGE NOTES
Lives in [place].  Daughter visited today and states patient has been nauseous, no vomiting, HA, gen weakness for a couple days.  Baseline mentation and tremors.

## 2020-12-26 ENCOUNTER — APPOINTMENT (OUTPATIENT)
Dept: PHYSICAL THERAPY | Facility: CLINIC | Age: 85
End: 2020-12-26
Attending: STUDENT IN AN ORGANIZED HEALTH CARE EDUCATION/TRAINING PROGRAM
Payer: MEDICARE

## 2020-12-26 VITALS
OXYGEN SATURATION: 95 % | HEIGHT: 65 IN | SYSTOLIC BLOOD PRESSURE: 158 MMHG | DIASTOLIC BLOOD PRESSURE: 53 MMHG | BODY MASS INDEX: 24.51 KG/M2 | TEMPERATURE: 96.5 F | WEIGHT: 147.1 LBS | HEART RATE: 74 BPM | RESPIRATION RATE: 18 BRPM

## 2020-12-26 LAB — POTASSIUM SERPL-SCNC: 4.1 MMOL/L (ref 3.4–5.3)

## 2020-12-26 PROCEDURE — 250N000009 HC RX 250: Performed by: PHYSICIAN ASSISTANT

## 2020-12-26 PROCEDURE — 250N000013 HC RX MED GY IP 250 OP 250 PS 637: Mod: GY | Performed by: INTERNAL MEDICINE

## 2020-12-26 PROCEDURE — 99217 PR OBSERVATION CARE DISCHARGE: CPT | Performed by: PHYSICIAN ASSISTANT

## 2020-12-26 PROCEDURE — G0378 HOSPITAL OBSERVATION PER HR: HCPCS

## 2020-12-26 PROCEDURE — 250N000013 HC RX MED GY IP 250 OP 250 PS 637: Performed by: PHYSICIAN ASSISTANT

## 2020-12-26 PROCEDURE — 250N000013 HC RX MED GY IP 250 OP 250 PS 637: Mod: GY | Performed by: STUDENT IN AN ORGANIZED HEALTH CARE EDUCATION/TRAINING PROGRAM

## 2020-12-26 PROCEDURE — 36415 COLL VENOUS BLD VENIPUNCTURE: CPT | Performed by: STUDENT IN AN ORGANIZED HEALTH CARE EDUCATION/TRAINING PROGRAM

## 2020-12-26 PROCEDURE — 84132 ASSAY OF SERUM POTASSIUM: CPT | Performed by: STUDENT IN AN ORGANIZED HEALTH CARE EDUCATION/TRAINING PROGRAM

## 2020-12-26 PROCEDURE — 97161 PT EVAL LOW COMPLEX 20 MIN: CPT | Mod: GP

## 2020-12-26 PROCEDURE — 97530 THERAPEUTIC ACTIVITIES: CPT | Mod: GP

## 2020-12-26 PROCEDURE — 99207 PR MOONLIGHTING INDICATOR: CPT | Performed by: PHYSICIAN ASSISTANT

## 2020-12-26 RX ORDER — SCOLOPAMINE TRANSDERMAL SYSTEM 1 MG/1
1 PATCH, EXTENDED RELEASE TRANSDERMAL
Status: DISCONTINUED | OUTPATIENT
Start: 2020-12-26 | End: 2020-12-26 | Stop reason: HOSPADM

## 2020-12-26 RX ADMIN — Medication 1 MG: at 00:33

## 2020-12-26 RX ADMIN — OXYBUTYNIN 15 MG: 5 TABLET, FILM COATED, EXTENDED RELEASE ORAL at 08:52

## 2020-12-26 RX ADMIN — SCOPALAMINE 1 PATCH: 1 PATCH, EXTENDED RELEASE TRANSDERMAL at 08:53

## 2020-12-26 RX ADMIN — PROPRANOLOL HYDROCHLORIDE 240 MG: 120 CAPSULE, EXTENDED RELEASE ORAL at 08:52

## 2020-12-26 RX ADMIN — PROCHLORPERAZINE MALEATE 5 MG: 5 TABLET ORAL at 00:33

## 2020-12-26 RX ADMIN — ACETAMINOPHEN 650 MG: 325 TABLET, FILM COATED ORAL at 09:10

## 2020-12-26 RX ADMIN — PRAMIPEXOLE DIHYDROCHLORIDE 0.12 MG: 0.12 TABLET ORAL at 08:52

## 2020-12-26 RX ADMIN — ALPRAZOLAM 0.25 MG: 0.25 TABLET ORAL at 04:33

## 2020-12-26 RX ADMIN — MICONAZOLE NITRATE: 20 POWDER TOPICAL at 11:12

## 2020-12-26 ASSESSMENT — MIFFLIN-ST. JEOR: SCORE: 1083.12

## 2020-12-26 NOTE — PLAN OF CARE
"PRIMARY DIAGNOSIS: \"GENERIC\" NURSING  OUTPATIENT/OBSERVATION GOALS TO BE MET BEFORE DISCHARGE:  1. ADLs back to baseline: Yes    2. Activity and level of assistance: Up with standby assistance.    3. Pain status: Pain free.    4. Return to near baseline physical activity: Yes     Discharge Planner Nurse   Safe discharge environment identified: Yes  Barriers to discharge: No       Entered by: Nga Schafer 12/26/2020 1:15 PM     Please review provider order for any additional goals.   Nurse to notify provider when observation goals have been met and patient is ready for discharge.  "

## 2020-12-26 NOTE — DISCHARGE SUMMARY
Patient discharged to home on 12/26/2020 at 2:03 PM via wheelchair with daughter picking up . All belongings with patient. Patient education given and all questions answered. Medication regimen discussed with patient. Upcoming appointments also discussed. Instructed to remove scopalamine patch behind R ear tomorrow and that miconazole powder used in hospital was sent home with patient. Patient verbalized understanding.

## 2020-12-26 NOTE — PLAN OF CARE
"PRIMARY DIAGNOSIS: \"GENERIC\" NURSING  OUTPATIENT/OBSERVATION GOALS TO BE MET BEFORE DISCHARGE:  1. ADLs back to baseline: Yes    2. Activity and level of assistance: Up with standby assistance.    3. Pain status: Pain free.    4. Return to near baseline physical activity: Yes     Discharge Planner Nurse   Safe discharge environment identified: No  Barriers to discharge: Yes       Entered by: Nga Schafer 12/26/2020 11:28 AM     Please review provider order for any additional goals.   Nurse to notify provider when observation goals have been met and patient is ready for discharge.  "

## 2020-12-26 NOTE — PLAN OF CARE
Covid Negative.  Pt A&OX4. VSS on RA except slightly elevated 163/69, 153/67. Tremors at baseline. Forgetful. Denies pain. No Nausea. Tolerating Reg diet and oral meds. Purewick in place. Has abdominal abrasion/rash. K+ 3.1, given 40mEq once this shift and redraw K+ at midnight. PT/OT/Wound consult in place. Continue to monitor.

## 2020-12-26 NOTE — H&P
[facility]    History and Physical - Hospitalist Service       Date of Admission:  12/25/2020    Assessment & Plan   Margy Babin is a 89 year old female admitted on 12/25/2020. She presents with nausea and inability to tolerate p.o.    Nausea, acute on chronic  Generalized weakness  Patient has long history of intermittent nausea. Minimal prior workup, seems as though family would like to limit aggressive interventions as able. Only PTA med for nausea appears to be alprazolam and compazine.  -register to observation  -plan to discharge once able to tolerate PO  -discussed with daughter that may be unable to find etiology of nausea  -ativan and compazine PRN for nausea    Lactic acidosis, resolved  Likely related to hypovolemia from poor PO.    Prolonged QTC  LBBB    -Avoid QRS prolonging medications    Overactive bladder  - continue PTA oxybutynin    Tremor  - continue beta blocker    Abdominal rash, arm sore, POA  - consult WOCN    Hypokalemia  - on protocol       Diet: Regular Diet Adult    DVT Prophylaxis: Pneumatic Compression Devices  Pichardo Catheter: not present  Code Status: No CPR- Do NOT Intubate           Disposition Plan   Expected discharge: Tomorrow, recommended to prior living arrangement once adequate pain management/ tolerating PO medications and nausea tolerable.  Entered: Dionte Mueller MD 12/25/2020, 6:03 PM     The patient's care was discussed with the Patient, Patient's Family and ED MD.    Dionte Mueller MD  [facility]  Contact information available via [facility] Paging/Directory      ______________________________________________________________________    Chief Complaint   Nausea    History is obtained from the patient, daughter    History of Present Illness   Margy Babin is a 89 year old female who has a long history of intermittent nausea and presents with approximately 10 days of nausea preventing  significant oral intake.  Reportedly on 16 December she began feeling nauseous and was unable to take significant p.o. for the next several days.  On the 19th, security at her facility due to welfare check and thought that she looked acutely unwell and EMS was called.  At that time she was assessed and offered transport to the emergency department which she declined.  That evening she ate a turkey sandwich and said she felt back at her baseline but the next morning she again felt nauseous.  Since the 20th she has not been able to tolerate significant input.  Daughter saw her on the 25th and felt that her nausea was just unmanageable and so decided to call EMS.    On exam patient endorses ongoing nausea but has a negative review of systems otherwise.    In the emergency department she was under the care of Dr. Freitas and the case was discussed with him.  Labs were notable for hypokalemia 2.9, and elevated lactate of 3.0 that resolved with 1 L of fluids.  She does have a mild leukocytosis of 12.9 but this appears to be somewhat chronic.  Chest x-ray noted no acute pathology.  CT head with no acute pathology but severe small vessel ischemic disease.    Review of Systems    The 10 point Review of Systems is negative other than noted in the HPI or here.     Past Medical History    I have reviewed this patient's medical history and updated it with pertinent information if needed.   Past Medical History:   Diagnosis Date     Anxiety      Blepharitis of both eyes      Depression, major, recurrent, in complete remission (H)      Dry eye syndrome      Tremor, hereditary, benign        Past Surgical History   I have reviewed this patient's surgical history and updated it with pertinent information if needed.  Past Surgical History:   Procedure Laterality Date     C TOTAL HIP ARTHROPLASTY Right 1997     HYSTERECTOMY TOTAL ABDOMINAL, BILATERAL SALPINGO-OOPHORECTOMY, COMBINED       NECK SURGERY  2009    cervical fusion      ROTATOR CUFF REPAIR RT/LT Right        Social History   I have reviewed this patient's social history and updated it with pertinent information if needed.  Social History     Tobacco Use     Smoking status: Never Smoker     Smokeless tobacco: Never Used   Substance Use Topics     Alcohol use: No     Alcohol/week: 0.0 standard drinks     Drug use: No       Family History   I have reviewed this patient's family history and updated it with pertinent information if needed.  Family History   Problem Relation Age of Onset     Heart Disease Father 80        MI     C.A.D. Father      Breast Cancer Daughter 43         age 53     Emphysema Sister      Heart Disease Sister        Prior to Admission Medications   Prior to Admission Medications   Prescriptions Last Dose Informant Patient Reported? Taking?   ALPRAZolam (XANAX) 0.25 MG tablet   No No   Sig: TAKE 1 TO 2 TABLETS BY MOUTH TWICE DAILY AS NEEDED   Calcium Carbonate (CALCIUM 600 PO)   Yes No   Sig: Take 1 tablet by mouth daily.   Cholecalciferol (VITAMIN D) 1000 UNITS capsule   Yes No   Sig: Take 1 capsule by mouth daily.   Misc Natural Products (GLUCOSAMINE CHOND COMPLEX/MSM) TABS   Yes No   Sig: Take 1 tablet by mouth daily.   Multiple Vitamins-Minerals (MULTIVITAL) TABS   Yes No   Sig: Take 1 tablet by mouth daily.   PARoxetine (PAXIL) 20 MG tablet   No No   Sig: Take 1 tablet (20 mg) by mouth At Bedtime   QUEtiapine (SEROQUEL) 25 MG tablet   No No   Sig: Take 1 tablet (25 mg) by mouth At Bedtime   acetaminophen (TYLENOL) 500 MG tablet   No No   Sig: Take 2 tablets (1,000 mg) by mouth 3 times daily as needed for mild pain   ascorbic acid (VITAMIN C) 1000 MG TABS   Yes No   Sig: Take 1,000 mg by mouth daily.   cycloSPORINE (RESTASIS) 0.05 % ophthalmic emulsion   Yes No   Sig: Apply 1 drop to eye 2 times daily   docusate sodium (COLACE) 100 MG capsule   No No   Sig: Take 1 capsule (100 mg) by mouth 2 times daily as needed for constipation   fish oil-omega-3 fatty  acids (FISH OIL) 1000 MG capsule   Yes No   Sig: Take 2 g by mouth daily.   order for DME   No No   Sig: Equipment being ordered: Walker Wheels () and Walker ()  Treatment Diagnosis: Difficulty ambulating   oxybutynin ER (DITROPAN XL) 15 MG 24 hr tablet   No No   Sig: TAKE 1 TABLET BY MOUTH DAILY   polyethylene glycol (MIRALAX/GLYCOLAX) Packet   No No   Sig: Take 17 g by mouth daily as needed (constipation)   pramipexole (MIRAPEX) 0.125 MG tablet   No No   Sig: TAKE 1 TABLET BY MOUTH TWICE DAILY   prochlorperazine (COMPAZINE) 5 MG tablet   No No   Sig: Take 1 tablet (5 mg) by mouth every 6 hours as needed for nausea   propranolol ER (INDERAL LA) 120 MG 24 hr capsule   No No   Sig: TAKE 2 CAPSULES BY MOUTH DAILY      Facility-Administered Medications: None     Allergies   No Known Allergies    Physical Exam   Vital Signs: Temp: 96.4  F (35.8  C) Temp src: Axillary BP: (!) 163/69 Pulse: 103   Resp: 18 SpO2: 99 % O2 Device: None (Room air)    Weight: 146 lbs 1.6 oz    Constitutional: Awake, alert, cooperative, no apparent cardiopulmonary distress.  Eyes: Conjunctiva and pupils examined and normal.  HEENT: Moist mucous membranes, normal dentition.  Respiratory: Clear to auscultation bilaterally, no crackles or wheezing.  Cardiovascular: Regular rate and rhythm, normal S1 and S2, and no murmur noted.  GI: Soft, non-distended, non-tender, normal bowel sounds.  Lymph/Hematologic: No anterior cervical or supraclavicular adenopathy.  Skin: No rashes, no cyanosis, no edema noted on exposed skin.  Musculoskeletal: No joint swelling, erythema or tenderness. No gross bony abnormalities  Neurologic: Cranial nerves 2-12 grossly intact, normal strength and sensation.  Significant essential tremor noted.  Psychiatric: Alert, oriented to person, place and time, no obvious anxiety or depression.  Some repetition of previously asked questions.      Data   Data reviewed today: I reviewed all medications, new labs and imaging  results over the last 24 hours. I personally reviewed the EKG tracing showing Sinus rhythm, the chest x-ray image(s) showing No acute infiltrate, diffuse osteopenia and the head CT image(s) showing No acute pathology, severe small vessel ischemic disease.    Recent Labs   Lab 12/25/20  1435   WBC 12.9*   HGB 14.5   MCV 94         POTASSIUM 2.9*   CHLORIDE 102   CO2 30   BUN 9   CR 0.76   ANIONGAP 6   SOBIA 8.8   GLC 92   ALBUMIN 3.5   PROTTOTAL 7.3   BILITOTAL 0.3   ALKPHOS 78   ALT 26   AST 28   LIPASE 147   TROPI <0.015     Recent Results (from the past 24 hour(s))   XR Chest 2 Views    Narrative    XR CHEST TWO VIEWS   12/25/2020 3:29 PM     HISTORY: Weakness    COMPARISON: Chest x-ray on 6/27/2017      Impression    IMPRESSION: PA and lateral views of the chest were obtained.  Cardiomediastinal silhouette is within normal limits. No suspicious  focal pulmonary opacities. No significant pleural effusion or  pneumothorax. Diffuse osteopenia of the visualized thoracic spine.  Degenerative changes of the right shoulder joint.    WILLIAN TRUJILLO MD   CT Head w/o Contrast    Narrative    CT SCAN OF THE HEAD WITHOUT CONTRAST   12/25/2020 3:34 PM     HISTORY: Headaches, nausea.    TECHNIQUE:  Axial images of the head and coronal reformations without  IV contrast material. Radiation dose for this scan was reduced using  automated exposure control, adjustment of the mA and/or kV according  to patient size, or iterative reconstruction technique.    COMPARISON: Brain MRI 4/12/2007 and head CT 6/27/2017    FINDINGS:   The ventricles and sulci are normal for age. Patchy and confluent  low-attenuation of the white matter is nonspecific, though likely due  to severe small vessel ischemic disease. No evidence of recent  infarction or mass. Cerebellum and brainstem are within normal limits.  The globes and orbits are unremarkable. The paranasal sinuses and  mastoid air cells are clear.      Impression     IMPRESSION:  1. No acute intracranial findings by CT.  2. Severe small vessel ischemic disease.     ANJALI CAMPOS MD

## 2020-12-26 NOTE — DISCHARGE INSTRUCTIONS
HOMECARE NOTE:   Your doctor has ordered home care to help you after your hospital stay.  The staff will contact you to schedule your first visit.  This service will be provided by [facility].  If you have any question, or have not received a call within 48 hours of discharge, please call them at [phone] or [phone].  *please see homecare quality ratings for all homecares in your area at www.medicare.gov

## 2020-12-26 NOTE — PHARMACY-ADMISSION MEDICATION HISTORY
Pharmacy Medication History  Admission medication history interview status for the 12/25/2020  admission is complete. See EPIC admission navigator for prior to admission medications       Medication history sources: Patient's family/friend (Daughter Tina [phone] )    Adherence Assessment: Moderate    Significant changes made to the medication list:    Only medication daughter not sure about was pramipexole. I left it on the list. Not sure when last doses were    Additional medication history information:     Medication reconciliation completed by provider prior to medication history? No    Time spent in this activity: 25min       Prior to Admission medications    Medication Sig Last Dose Taking? Auth Provider   acetaminophen (TYLENOL) 500 MG tablet Take 2 tablets (1,000 mg) by mouth 3 times daily as needed for mild pain Unknown at Unknown time  Yaya Hatfield MD   ALPRAZolam (XANAX) 0.25 MG tablet TAKE 1 TO 2 TABLETS BY MOUTH TWICE DAILY AS NEEDED Unknown at Unknown time  Yaya Hatfield MD   Calcium Carbonate (CALCIUM 600 PO) Take 1 tablet by mouth daily. Unknown at Unknown time  Reported, Patient   Cholecalciferol (VITAMIN D) 1000 UNITS capsule Take 1 capsule by mouth daily. Unknown at Unknown time  Reported, Patient   cycloSPORINE (RESTASIS) 0.05 % ophthalmic emulsion Apply 1 drop to eye 2 times daily Unknown at Unknown time  Reported, Patient   docusate sodium (COLACE) 100 MG capsule Take 1 capsule (100 mg) by mouth 2 times daily as needed for constipation Unknown at Unknown time  Lynn Mast PA-C   erythromycin (ROMYCIN) 5 MG/GM ophthalmic ointment 0.5 inches 2 times daily Unknown at Unknown time  Unknown, Entered By History   Misc Natural Products (GLUCOSAMINE CHOND COMPLEX/MSM) TABS Take 1 tablet by mouth daily. Unknown at Unknown time  Reported, Patient   Multiple Vitamins-Minerals (MULTIVITAL) TABS Take 1 tablet by mouth daily. Unknown at Unknown time  Reported, Patient    order for DME Equipment being ordered: Walker Wheels () and Walker ()  Treatment Diagnosis: Difficulty ambulating Unknown at Unknown time  Lynn Mast PA-C   oxybutynin ER (DITROPAN XL) 15 MG 24 hr tablet TAKE 1 TABLET BY MOUTH DAILY Unknown at Unknown time  Yaya Hatfield MD   PARoxetine (PAXIL) 20 MG tablet Take 1 tablet (20 mg) by mouth At Bedtime Unknown at Unknown time  Yaya Hatfield MD   polyethylene glycol (MIRALAX/GLYCOLAX) Packet Take 17 g by mouth daily as needed (constipation) Unknown at Unknown time  Lynn Mast PA-C   pramipexole (MIRAPEX) 0.125 MG tablet TAKE 1 TABLET BY MOUTH TWICE DAILY Unknown at Unknown time  Yaya Hatfield MD   prochlorperazine (COMPAZINE) 5 MG tablet Take 1 tablet (5 mg) by mouth every 6 hours as needed for nausea Unknown at Unknown time  Yaya Hatfield MD   propranolol ER (INDERAL LA) 120 MG 24 hr capsule TAKE 2 CAPSULES BY MOUTH DAILY Unknown at Unknown time  Yaya Hatfield MD   QUEtiapine (SEROQUEL) 25 MG tablet Take 1 tablet (25 mg) by mouth At Bedtime Unknown at Unknown time  Yaya Hatfield MD       The information provided in this note is only as accurate as the sources available at the time of the update(s).   Leonora Finley PharmD

## 2020-12-26 NOTE — PROVIDER NOTIFICATION
MD Notification    Notified Person: MD    Notified Person Name: Dr. Mueller    Notification Date/Time: 12/25/2020 1821    Notification Interaction: Webpage    Purpose of Notification: FYI: Pt potassium is 2.9. Given 10meq in ED. Thanks    Orders Received:    Comments:

## 2020-12-26 NOTE — PLAN OF CARE
Physical Therapy Discharge Summary    Reason for therapy discharge:    IP PT goals met, defer further needs to home PT.    Progress towards therapy goal(s). See goals on Care Plan in [facility] electronic health record for goal details.  Goals met    Therapy recommendation(s):    Continued therapy is recommended.  Rationale/Recommendations:  Home PT recommended to build activity tolerance and strength toward baseline.

## 2020-12-26 NOTE — PROGRESS NOTES
Observation goals PRIOR TO DISCHARGE     Comments: List all goals to be met before discharge home   - Nausea/vomiting (diarrhea if present) improved. - met  - Tolerating oral intake to maintain hydration - met  - Vital signs normal or at patient baseline and orthostatic vitals are normal and patient not lightheaded with standing - not met  - Diagnostic tests and consults completed and resulted if ordered - not met  - Adequate pain control on oral analgesia - not met  - Tolerating oral antibiotics if ordered - not met  - Safe disposition plan has been identified - not met  - Nurse to notify provider when observation goals have been met and patient is ready for discharge.

## 2020-12-26 NOTE — DISCHARGE SUMMARY
[facility]  Discharge Summary  Hospitalist    Date of Admission:  12/25/2020  Date of Discharge:  12/26/2020  Discharging Provider: Salome Kimble PA-C    Discharge Diagnoses   Acute on chronic nausea    Hospital Course   Margy Babin is a 89 year old year old female who has a PMH significant for intermittent nausea, overactive bladder, prolonged QTC, and tremor. Pt was admitted to [facility] on 12/25/2020 after presenting with nausea, abdominal rash, hypokalemia, and lactic acidosis.  The following problems were addressed during her hospitalization:    Nausea, acute on chronic  Generalized weakness  Patient has long history of intermittent nausea. Minimal prior workup, seems as though family would like to limit aggressive interventions as able. Only PTA meds for nausea appears to be alprazolam and compazine.  -Continue PTA ativan and compazine PRN for nausea  -Scopolamine patch in place, remove in 2 days  -Follow-up outpatient with PCP  -PT consulted and recommend home PT. Will also order home RN to assess home and to check on abdominal rash     Lactic acidosis, resolved  Likely related to hypovolemia from poor PO.     Prolonged QTC  LBBB    -Avoid QRS prolonging medications     Overactive bladder  -Continue PTA oxybutynin     Tremor  -Continue beta blocker     Abdominal rash, arm sore, POA  - Miconazole for abdominal fold yeast infection     Hypokalemia  - on protocol      The patient's care was discussed with the Attending Physician, Dr. Jacinto, Bedside Nurse, Care Coordinator/, and Patient.    Salome Kimble PA-C    Significant Results and Procedures   Nausea and hypokalemia resolved    Pending Results   These results will be followed up by PCP  Unresulted Labs Ordered in the Past 30 Days of this Admission     Date and Time Order Name Status Description    12/25/2020 1522 Blood culture Preliminary     12/25/2020 1522 Blood  culture Preliminary         Code Status   DNR / DNI       Primary Care Physician   Yaya Hatfield    Physical Exam   Temp: 96.5  F (35.8  C) Temp src: Oral BP: (!) 158/53(sitting,  initially dizzy but resolved sitting) Pulse: 74   Resp: 18 SpO2: 95 % O2 Device: None (Room air)    Vitals:    12/25/20 1507 12/25/20 1749 12/26/20 0616   Weight: 68 kg (150 lb) 66.3 kg (146 lb 1.6 oz) 66.7 kg (147 lb 1.6 oz)     Vital Signs with Ranges  Temp:  [95.8  F (35.4  C)-99.7  F (37.6  C)] 96.5  F (35.8  C)  Pulse:  [] 74  Resp:  [16-18] 18  BP: (136-172)/() 158/53  SpO2:  [94 %-100 %] 95 %  No intake/output data recorded.    Constitutional: resting comfortably in bed, no acute distress  Eyes: No scleral icterus or injection  ENT: Normocephalic, atraumatic  Respiratory: No secondary muscle use or labored breathing, no supplemental oxygen required. Lungs clear to auscultation bilaterally without wheezes or rhonchi   Cardiovascular: RRR without murmur  GI: Abdomen soft, non-tender to palpation, non-distended.  Bowel sounds active  Lymph/Hematologic: no LE edema, no calf tenderness  Genitourinary: no head catheter in place  Skin/Integumen: warm, dry, in tact without rash or hives  MSK: able to move extremities on command without weakness, walks without weakness or ataxia  Neurologic: alert to voice, oriented x3,   Neuropsychiatric: pleasant, interactive, answers questions appropriately    Discharge Disposition   Discharged to assisted living  Condition at discharge: Stable    Consultations This Hospital Stay   PHYSICAL THERAPY ADULT IP CONSULT  OCCUPATIONAL THERAPY ADULT IP CONSULT  WOUND OSTOMY CONTINENCE NURSE  IP CONSULT    Time Spent on this Encounter   ISalome PA-C, personally saw the patient today and spent greater than 30 minutes discharging this patient.    Discharge Orders      Home Care PT Referral for Hospital Discharge      Home care nursing referral      Reason for your hospital  stay    You were in the hospital to treat the nausea, low potassium, and dehydration     Follow-up and recommended labs and tests     Follow up with primary care provider, Yaya Hatfield, within 7 days for hospital follow- up.  The following labs/tests are recommended: basic metabolic panel.     Activity    Your activity upon discharge: activity as tolerated     MD face to face encounter    Documentation of Face to Face and Certification for Home Health Services    I certify that patient: Margy Babin is under my care and that I, or a nurse practitioner or physician's assistant working with me, had a face-to-face encounter that meets the physician face-to-face encounter requirements with this patient on: December 26, 2020.    This encounter with the patient was in whole, or in part, for the following medical condition, which is the primary reason for home health care: deconditioning.    I certify that, based on my findings, the following services are medically necessary home health services: Physical Therapy.    My clinical findings support the need for the above services because: Nurse is needed: To assess abdomen after changes in medications or other medical regimen.. and Physical Therapy Services are needed to assess and treat the following functional impairments: deconditioning.    Further, I certify that my clinical findings support that this patient is homebound (i.e. absences from home require considerable and taxing effort and are for medical reasons or Religion services or infrequently or of short duration when for other reasons) because: Leaving home is medically contraindicated for the following reason(s): Dyspnea on exertion that makes it so they cannot leave their home for needed services without clinical deterioration...    Based on the above findings. I certify that this patient is confined to the home and needs intermittent skilled nursing care, physical therapy and/or speech  therapy.  The patient is under my care, and I have initiated the establishment of the plan of care.  This patient will be followed by a physician who will periodically review the plan of care.  Physician/Provider to provide follow up care: Yaya Hatfield    Attending [facility] physician (the Medicare certified [place] provider): Dionte Mueller MD  Physician Signature: See electronic signature associated with these discharge orders.  Date: 12/26/2020     Discharge Instructions    Keep the abdominal fold clean and dry. Do NOT apply vaseline to this area. Instead keep it dry with the prescribed powder     Discharge Instructions    Remove the scopolamine patch behind your ear on 12/28     Diet    Follow this diet upon discharge: Orders Placed This Encounter      Regular Diet Adult     Discharge Medications   Current Discharge Medication List      START taking these medications    Details   miconazole (MICATIN) 2 % external powder Apply topically 2 times daily  Qty: 30 g, Refills: 1    Associated Diagnoses: Candidiasis of skin         CONTINUE these medications which have NOT CHANGED    Details   acetaminophen (TYLENOL) 500 MG tablet Take 2 tablets (1,000 mg) by mouth 3 times daily as needed for mild pain  Qty: 100 tablet, Refills: 0    Associated Diagnoses: Neck pain      ALPRAZolam (XANAX) 0.25 MG tablet TAKE 1 TO 2 TABLETS BY MOUTH TWICE DAILY AS NEEDED  Qty: 60 tablet, Refills: 1    Associated Diagnoses: Anxiety      Calcium Carbonate (CALCIUM 600 PO) Take 1 tablet by mouth daily.      Cholecalciferol (VITAMIN D) 1000 UNITS capsule Take 1 capsule by mouth daily.      cycloSPORINE (RESTASIS) 0.05 % ophthalmic emulsion Apply 1 drop to eye 2 times daily      docusate sodium (COLACE) 100 MG capsule Take 1 capsule (100 mg) by mouth 2 times daily as needed for constipation  Qty: 60 capsule, Refills: 0    Associated Diagnoses: Constipation, unspecified constipation type      erythromycin (ROMYCIN) 5 MG/GM  ophthalmic ointment 0.5 inches 2 times daily      Misc Natural Products (GLUCOSAMINE CHOND COMPLEX/MSM) TABS Take 1 tablet by mouth daily.      Multiple Vitamins-Minerals (MULTIVITAL) TABS Take 1 tablet by mouth daily.      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)      polyethylene glycol (MIRALAX/GLYCOLAX) Packet Take 17 g by mouth daily as needed (constipation)  Qty: 7 packet, Refills: 0    Associated Diagnoses: Constipation, unspecified constipation type      pramipexole (MIRAPEX) 0.125 MG tablet TAKE 1 TABLET BY MOUTH TWICE DAILY  Qty: 180 tablet, Refills: 3    Associated Diagnoses: Familial tremor      prochlorperazine (COMPAZINE) 5 MG tablet Take 1 tablet (5 mg) by mouth every 6 hours as needed for nausea  Qty: 20 tablet, Refills: 3    Associated Diagnoses: Nausea without vomiting      propranolol ER (INDERAL LA) 120 MG 24 hr capsule TAKE 2 CAPSULES BY MOUTH DAILY  Qty: 180 capsule, Refills: 1    Associated Diagnoses: Familial tremor      QUEtiapine (SEROQUEL) 25 MG tablet Take 1 tablet (25 mg) by mouth At Bedtime  Qty: 30 tablet, Refills: 5    Associated Diagnoses: Insomnia, unspecified type           Allergies   No Known Allergies  Data   Most Recent 3 CBC's:  Recent Labs   Lab Test 12/25/20  1435 11/04/20  1043 04/23/19  0951   WBC 12.9* 14.0* 11.5*   HGB 14.5 14.1 15.3   MCV 94 97 97    366 306      Most Recent 3 BMP's:  Recent Labs   Lab Test 12/26/20  0025 12/25/20  1851 12/25/20  1435 11/04/20  1043  12/04/19  1707 04/23/19  0951   NA  --   --  138 142 139 141   POTASSIUM 4.1 3.1* 2.9* 4.2 4.9 4.1   CHLORIDE  --   --  102 110* 107 107   CO2  --   --  30 25 25 25   BUN  --   --  9 32* 29 20   CR  --   --  0.76 1.38* 1.07* 1.13*   ANIONGAP  --   --  6 7 7 9   SOBIA  --   --  8.8 10.1  --  9.5   GLC  --   --  92 105*  --  106*     Most Recent 2 LFT's:  Recent Labs   Lab Test 12/25/20  1435 11/04/20  1043   AST 28 22   ALT 26 20   ALKPHOS 78 105   BILITOTAL 0.3 0.5     Most Recent INR's and Anticoagulation Dosing History:  Anticoagulation Dose History     Recent Dosing and Labs Latest Ref Rng & Units 4/11/2007    INR 0.86 - 1.14 0.97        Most Recent 3 Troponin's:  Recent Labs   Lab Test 12/25/20  1435 06/27/17  2103 06/27/17  1520   TROPI <0.015 <0.015  The 99th percentile for upper reference range is 0.045 ug/L.  Troponin values in   the range of 0.045 - 0.120 ug/L may be associated with risks of adverse   clinical events.   <0.015  The 99th percentile for upper reference range is 0.045 ug/L.  Troponin values in   the range of 0.045 - 0.120 ug/L may be associated with risks of adverse   clinical events.       Most Recent Cholesterol Panel:  Recent Labs   Lab Test 11/04/20  1043   CHOL 240*   *   HDL 51   TRIG 168*     Most Recent 6 Bacteria Isolates From Any Culture (See EPIC Reports for Culture Details):  Recent Labs   Lab Test 12/25/20  1544 12/25/20  1435 04/23/19  1255 06/19/17  1524 10/23/15  1028 02/11/15  1342   CULT No growth after 11 hours No growth after 11 hours 10,000 to 50,000 colonies/mL  mixed urogenital peewee  Susceptibility testing not routinely done   10,000 to 50,000 colonies/mL mixed urogenital peewee Susceptibility testing not   routinely done   10,000 to 50,000 colonies/mL mixed urogenital peewee 10,000 to 50,000 colonies/mL mixed urogenital peewee  Susceptibility testing not routinely done       Most Recent TSH, T4 and A1c Labs:No lab results found.  Results for orders placed or  performed during the hospital encounter of 12/25/20   CT Head w/o Contrast    Narrative    CT SCAN OF THE HEAD WITHOUT CONTRAST   12/25/2020 3:34 PM     HISTORY: Headaches, nausea.    TECHNIQUE:  Axial images of the head and coronal reformations without  IV contrast material. Radiation dose for this scan was reduced using  automated exposure control, adjustment of the mA and/or kV according  to patient size, or iterative reconstruction technique.    COMPARISON: Brain MRI 4/12/2007 and head CT 6/27/2017    FINDINGS:   The ventricles and sulci are normal for age. Patchy and confluent  low-attenuation of the white matter is nonspecific, though likely due  to severe small vessel ischemic disease. No evidence of recent  infarction or mass. Cerebellum and brainstem are within normal limits.  The globes and orbits are unremarkable. The paranasal sinuses and  mastoid air cells are clear.      Impression    IMPRESSION:  1. No acute intracranial findings by CT.  2. Severe small vessel ischemic disease.     ANJALI CAMPOS MD   XR Chest 2 Views    Narrative    XR CHEST TWO VIEWS   12/25/2020 3:29 PM     HISTORY: Weakness    COMPARISON: Chest x-ray on 6/27/2017      Impression    IMPRESSION: PA and lateral views of the chest were obtained.  Cardiomediastinal silhouette is within normal limits. No suspicious  focal pulmonary opacities. No significant pleural effusion or  pneumothorax. Diffuse osteopenia of the visualized thoracic spine.  Degenerative changes of the right shoulder joint.    WILLIAN TRUJILLO MD

## 2020-12-26 NOTE — PLAN OF CARE
Rested well overnight. Nauseated at times, Compazine and Ativan given. Regular diet but not taking in much PO. A&Ox4, forgetful. K+ recheck WNL 4.1. Denies pain. Ambulating well stand by with walker. Voiding. WOC ordered for abdominal wound. PT/OT ordered. Discharge TBD, lives at home alone.

## 2020-12-26 NOTE — PLAN OF CARE
OT: Orders received. Chart reviewed and discussed with care team.  OT not indicated due to: evaluated/treated by physical therapy, pt completing ADLs with min A, PT recommending home OT eval for further ADL and equipment needs. PT recommended shower chair for home use.  Defer discharge recommendations to PT.  Will complete orders.    Jennifer Cruz, OT

## 2020-12-26 NOTE — CONSULTS
Care Management Initial Consult    General Information  Assessment completed with: Patient,    Type of CM/SW Visit: Initial Assessment    Primary Care Provider verified and updated as needed:     Readmission within the last 30 days:        Reason for Consult: discharge planning  Advance Care Planning: Advance Care Planning Reviewed: no concerns identified          Communication Assessment  Patient's communication style: spoken language (English or Bilingual)    Hearing Difficulty or Deaf: no   Wear Glasses or Blind: yes    Cognitive  Cognitive/Neuro/Behavioral: WDL  Level of Consciousness: alert  Arousal Level: opens eyes spontaneously  Orientation: oriented x 4  Mood/Behavior: calm, cooperative  Best Language: 0 - No aphasia  Speech: clear    Living Environment:   People in home: alone     Current living Arrangements: apartment, independent living facility      Able to return to prior arrangements:         Family/Social Support:  Care provided by:    Provides care for:       Children          Description of Support System: Supportive         Current Resources:   Skilled Home Care Services:    Community Resources:    Equipment currently used at home: walker, rolling  Supplies currently used at home:      Employment/Financial:  Employment Status:          Financial Concerns:             Lifestyle & Psychosocial Needs:        Socioeconomic History     Marital status:      Spouse name: Not on file     Number of children: 2     Years of education: Not on file     Highest education level: Not on file   Occupational History     Employer: RETIRED     Tobacco Use     Smoking status: Never Smoker     Smokeless tobacco: Never Used   Substance and Sexual Activity     Alcohol use: No     Alcohol/week: 0.0 standard drinks     Drug use: No     Sexual activity: Never     Partners: Male       Functional Status:  Prior to admission patient needed assistance:              Mental Health Status:          Chemical Dependency  Status:                Values/Beliefs:  Spiritual, Cultural Beliefs, Orthodox Practices, Values that affect care:                 Additional Information:  Patient has orders for discharge, including orders for HHC.  Met briefly with patient.  She was waiting for ride home.  She states she lives in independent senior building.  Her daughter checks in on her or the security checks in on her if she has not opened the door.  We discussed home care and she was in agreement.  She would like [facility].  She will schedule PCP appt.  She verified that she is aware that the [facility] has closed, but stated her provider Dr. Hatfield is at a new clinic.  She denied any other discharge needs.    Care Management Discharge Note    Discharge Date: 12/26/20       Discharge Disposition: Home, Home Care    Discharge Services:  Home care    Discharge DME:  n/a    Discharge Transportation: family or friend will provide    Patient/family educated on Medicare website which has current facility and service quality ratings: yes    Education Provided on the Discharge Plan:    Persons Notified of Discharge n/a Plans:Patient/Family in Agreement with the Plan:  yes    Handoff Referral Completed: No    Additional Information:  Referral emailed to [facility].      Samina Polo RN

## 2020-12-26 NOTE — PROGRESS NOTES
"   12/26/20 1116   Quick Adds   Type of Visit Initial PT Evaluation   Living Environment   People in home alone   Current Living Arrangements apartment;independent living facility   Transportation Anticipated family or friend will provide   Living Environment Comments Pt reports she lives alone in independent senior apartment. Pt reports her daughter assists with laundry. Pt used to have cleaning lady 1x/month but building hasn't allowed it since pandemic. Pt has meals on wheels for 1 meal per day M-F and prepares simple meals the rest of the time. Pt's daughter Jamin helps with grocery shopping. Pt reports she does her own med management (\"I only take 1 a day with breakfast\"). Pt reports jamin calls every day; Jamin is out of town often but stops by at least every 1-2 weeks per pt/daughter. Pt's daughter reports that pt's apartment building will check in on pt if she doesn't open her door in the morning   Self-Care   Usual Activity Tolerance good   Current Activity Tolerance fair   Equipment Currently Used at Home walker, rolling   Activity/Exercise/Self-Care Comment Pt has walk in shower & stands to shower at baseline, pt has toilet without grab bars.   Disability/Function   Wear Glasses or Blind yes   Vision Management glasses   Walking or Climbing Stairs Difficulty yes   Walking or Climbing Stairs ambulation difficulty, requires equipment   Dressing/Bathing Difficulty no   Toileting issues no   Change in Functional Status Since Onset of Current Illness/Injury yes   General Information   Onset of Illness/Injury or Date of Surgery 12/25/20   Referring Physician Dionte Mueller MD   Patient/Family Therapy Goals Statement (PT) to go home   Pertinent History of Current Problem (include personal factors and/or comorbidities that impact the POC) Pt admitted to observation 12/25 with nausea and inability to tolerate P.O. Pt with R UE tremor at baseline per pt.   Existing Precautions/Restrictions fall   General " "Observations Pt is pleasant & agreeable to PT   Cognition   Orientation Status (Cognition) oriented x 4  (states 12/27/2020)   Pain Assessment   Patient Currently in Pain No  (denies pain at rest, also denies nausea)   Integumentary/Edema   Integumentary/Edema no deficits were identifed   Posture    Posture Forward head position;Protracted shoulders   Range of Motion (ROM)   ROM Comment ROM appears WFL with bed mobility and transfers; pt is able to reach to don/doff sock over foot in sitting   Strength   Strength Comments Pt appears to have some weakness with bed mobility but able to complete without assist; pt able to complete functional mobilty without assist but endorses feeling \"weak\"   Bed Mobility   Comment (Bed Mobility) Supine>sit from flat bed; pt initially reaching for/ requesting assist of PT but able to come to sitting EOB without assist with extra time/cue   Transfers   Transfers sit-stand transfer   Sit-Stand Transfer   Sit/Stand Transfer Comments Sit<>stand from EOB and toilet with FWW, demos mod ind level   Gait/Stairs (Locomotion)   Comment (Gait/Stairs) Ambulated to/from bathroom with FWW and S-SBA with good stability, pt reports some dizziness on return from bathroom, SBA provided for safety but pt steady with walker throughout   Balance   Balance Comments Needs B UE support on walker for safe dynamic mobility   Sensory Examination   Sensory Perception Comments denies numbness/tingling   Clinical Impression   Criteria for Skilled Therapeutic Intervention yes, treatment indicated   PT Diagnosis (PT) decreased functional independence   Influenced by the following impairments decreased activity tolerance, decreased strength   Functional limitations due to impairments difficulty with bed mobility, decreased activity tolerance for ambulation & functional tasks, decreased independence with bathing   Clinical Presentation Stable/Uncomplicated   Clinical Presentation Rationale clinical judgement "   Clinical Decision Making (Complexity) low complexity   Therapy Frequency (PT) One time eval and treatment only   Predicted Duration of Therapy Intervention (days/wks) 1 day   Planned Therapy Interventions (PT) bed mobility training;gait training;patient/family education;transfer training   Anticipated Equipment Needs at Discharge (PT) other (see comments)  (recommend shower chair )   Risk & Benefits of therapy have been explained evaluation/treatment results reviewed;care plan/treatment goals reviewed;risks/benefits reviewed;current/potential barriers reviewed;participants voiced agreement with care plan;participants included;patient;daughter  (daughter updated via phone)   Clinical Impression Comments Patient appears slightly below baseline mobility; pt is steady ambulating with walker for household distances but reports she feels weak & fatigues more quickly since being ill past few weeks. Pt will benefit from home PT and OT to progress activity tolerance, assess home set up and progress independence with cares toward baseline.   PT Discharge Planning    PT Discharge Recommendation (DC Rec) home with home care physical therapy   PT Rationale for DC Rec Patient appears slightly below baseline mobility with reduced activity tolerance and strength. Recommend Home PT/OT to progress toward baseline & recommend pt use shower chair for safety, wait to shower until after practiced with home therapy.   PT Brief overview of current status  IND bed mobility, mod ind transfer with walker, ambulated in room with walker with good stability but some dizziness. Rec SBA of nursing for mobility due to pt dizziness   Total Evaluation Time   Total Evaluation Time (Minutes) 15

## 2020-12-26 NOTE — PLAN OF CARE
A&Ox4, VSS on RA with ex of htn. Forgetful at times. Denies pain, denies nausea. C/o abdominal discomfort after eating, tylenol given. Up SBA with walker. Reg diet, voiding adequately. IV SL. Pannus cleaned and miconazole powder applied. PT seen, rec home with home PT. Plan to discharge today.

## 2020-12-28 DIAGNOSIS — R11.0 NAUSEA WITHOUT VOMITING: ICD-10-CM

## 2020-12-29 ENCOUNTER — MEDICAL CORRESPONDENCE (OUTPATIENT)
Dept: HEALTH INFORMATION MANAGEMENT | Facility: CLINIC | Age: 85
End: 2020-12-29

## 2020-12-29 ENCOUNTER — VIRTUAL VISIT (OUTPATIENT)
Dept: INTERNAL MEDICINE | Facility: CLINIC | Age: 85
End: 2020-12-29
Payer: MEDICARE

## 2020-12-29 DIAGNOSIS — R11.0 NAUSEA: Primary | ICD-10-CM

## 2020-12-29 DIAGNOSIS — F41.9 ANXIETY: ICD-10-CM

## 2020-12-29 DIAGNOSIS — G25.0 FAMILIAL TREMOR: ICD-10-CM

## 2020-12-29 PROCEDURE — 99443 PR PHYSICIAN TELEPHONE EVALUATION 21-30 MIN: CPT | Mod: 95 | Performed by: FAMILY MEDICINE

## 2020-12-29 NOTE — PATIENT INSTRUCTIONS
I spent 25 minutes with the patient's daughter going over all of her medications and indications for use.  She has now gotten a pillbox for her mother to use.  I suggested getting a separate pillbox for the alprazolam to use as needed.  She has gotten her appetite back and is doing relatively well.  She is no longer taking her Mirapex.  Her tremors are not any worse having been off that so she will stay off.  We will follow-up with another virtual visit in 1 month.  We discussed getting signed up for my chart.

## 2020-12-29 NOTE — PROGRESS NOTES
"Margy Babin is a 89 year old female who is being evaluated via a billable telephone visit.      The patient has been notified of following:     \"This telephone visit will be conducted via a call between you and your physician/provider. We have found that certain health care needs can be provided without the need for a physical exam.  This service lets us provide the care you need with a short phone conversation.  If a prescription is necessary we can send it directly to your pharmacy.  If lab work is needed we can place an order for that and you can then stop by our lab to have the test done at a later time.    Telephone visits are billed at different rates depending on your insurance coverage. During this emergency period, for some insurers they may be billed the same as an in-person visit.  Please reach out to your insurance provider with any questions.    If during the course of the call the physician/provider feels a telephone visit is not appropriate, you will not be charged for this service.\"    Patient has given verbal consent for Telephone visit?  Yes    What phone number would you like to be contacted at? [phone]    How would you like to obtain your AVS? Mail a copy    Subjective     Margy Babin is a 89 year old female who presents via phone visit today for the following health issues:    [facility]       Hospital Follow-up Visit:    Hospital/Nursing Home/IP Rehab Facility: [facility]  Date of Admission: 12/25/2020  Date of Discharge: 12/26/2020  Reason(s) for Admission: candidiasis of skin, nausea, generalized muscle weakness, dehydration, familial tremor       Was your hospitalization related to COVID-19? No   Problems taking medications regularly:  None  Medication changes since discharge: miconazole 2% external powder   Problems adhering to non-medication therapy:  None    Summary of hospitalization:  [facility] discharge summary reviewed  Diagnostic " Tests/Treatments reviewed.  Follow up needed: Patient has home care and PT stopping in to see her.  I went over the vital signs with the home care nurse today.  Other Healthcare Providers Involved in Patient s Care:         Homecare  Update since discharge: improved. Post Discharge Medication Reconciliation: discharge medications reconciled, continue medications without change.  Plan of care communicated with family               Anxiety Follow-Up    How are you doing with your anxiety since your last visit? No change    Are you having other symptoms that might be associated with anxiety? Yes:  Nausea    Have you had a significant life event? No     Are you feeling depressed? Yes:  Patient's daughter thinks that she is probably a little bit depressed.  It is now been 1 year since she and her  have been .  They have been  for over 60 years.    Do you have any concerns with your use of alcohol or other drugs? No    Social History     Tobacco Use     Smoking status: Never Smoker     Smokeless tobacco: Never Used   Substance Use Topics     Alcohol use: No     Alcohol/week: 0.0 standard drinks     Drug use: No     MEHNAZ-7 SCORE 1/9/2018 2/4/2019   Total Score 0 0     PHQ 2/4/2019 12/4/2019 6/19/2020   PHQ-9 Total Score 0 - 0   Q9: Thoughts of better off dead/self-harm past 2 weeks Not at all Not at all Not at all           How many servings of fruits and vegetables do you eat daily?  2-3    On average, how many sweetened beverages do you drink each day (Examples: soda, juice, sweet tea, etc.  Do NOT count diet or artificially sweetened beverages)?   0    How many days per week do you exercise enough to make your heart beat faster? 3 or less    How many minutes a day do you exercise enough to make your heart beat faster? 9 or less    How many days per week do you miss taking your medication? 0      Review of Systems   Constitutional, HEENT, cardiovascular, pulmonary, gi and gu systems are negative,  "except as otherwise noted.       Objective   Vitals - Patient Reported  Weight (Patient Reported): 66.7 kg (147 lb)  Height (Patient Reported): 165.1 cm (5' 5\")  BMI (Based on Pt Reported Ht/Wt): 24.46      Vitals:  No vitals were obtained today due to virtual visit.    healthy, alert and no distress  PSYCH: Alert and oriented times 3; coherent speech, normal   rate and volume, able to articulate logical thoughts, able   to abstract reason, no tangential thoughts, no hallucinations   or delusions  Her affect is normal  RESP: No cough, no audible wheezing, able to talk in full sentences  Remainder of exam unable to be completed due to telephone visits            Assessment/Plan:    Assessment & Plan     There are no diagnoses linked to this encounter.        Patient Instructions   I spent 25 minutes with the patient's daughter going over all of her medications and indications for use.  She has now gotten a pillbox for her mother to use.  I suggested getting a separate pillbox for the alprazolam to use as needed.  She has gotten her appetite back and is doing relatively well.  She is no longer taking her Mirapex.  Her tremors are not any worse having been off that so she will stay off.  We will follow-up with another virtual visit in 1 month.  We discussed getting signed up for my chart.      Return in about 4 weeks (around 1/26/2021) for anxiety, virtual visit.    Yaya Hatfield MD  [facility]    Phone call duration:  25 minutes               "

## 2020-12-30 RX ORDER — PROCHLORPERAZINE MALEATE 5 MG
5 TABLET ORAL EVERY 6 HOURS PRN
Qty: 20 TABLET | Refills: 3 | Status: SHIPPED | OUTPATIENT
Start: 2020-12-30 | End: 2021-01-26

## 2020-12-31 LAB
BACTERIA SPEC CULT: NO GROWTH
BACTERIA SPEC CULT: NO GROWTH
SPECIMEN SOURCE: NORMAL
SPECIMEN SOURCE: NORMAL

## 2021-01-17 DIAGNOSIS — Z53.9 DIAGNOSIS NOT YET DEFINED: Primary | ICD-10-CM

## 2021-01-17 PROCEDURE — G0180 MD CERTIFICATION HHA PATIENT: HCPCS | Performed by: FAMILY MEDICINE

## 2021-01-24 DIAGNOSIS — R11.0 NAUSEA WITHOUT VOMITING: ICD-10-CM

## 2021-01-26 RX ORDER — PROCHLORPERAZINE MALEATE 5 MG
5 TABLET ORAL EVERY 6 HOURS PRN
Qty: 20 TABLET | Refills: 3 | Status: SHIPPED | OUTPATIENT
Start: 2021-01-26 | End: 2021-11-23

## 2021-01-30 ENCOUNTER — IMMUNIZATION (OUTPATIENT)
Dept: NURSING | Facility: CLINIC | Age: 86
End: 2021-01-30
Payer: MEDICARE

## 2021-01-30 PROCEDURE — 0001A PR COVID VAC PFIZER DIL RECON 30 MCG/0.3 ML IM: CPT

## 2021-01-30 PROCEDURE — 91300 PR COVID VAC PFIZER DIL RECON 30 MCG/0.3 ML IM: CPT

## 2021-02-20 ENCOUNTER — IMMUNIZATION (OUTPATIENT)
Dept: NURSING | Facility: CLINIC | Age: 86
End: 2021-02-20
Attending: INTERNAL MEDICINE
Payer: MEDICARE

## 2021-02-20 PROCEDURE — 0002A PR COVID VAC PFIZER DIL RECON 30 MCG/0.3 ML IM: CPT

## 2021-02-20 PROCEDURE — 91300 PR COVID VAC PFIZER DIL RECON 30 MCG/0.3 ML IM: CPT

## 2021-02-24 ENCOUNTER — NURSE TRIAGE (OUTPATIENT)
Dept: INTERNAL MEDICINE | Facility: CLINIC | Age: 86
End: 2021-02-24

## 2021-02-24 NOTE — TELEPHONE ENCOUNTER
"Call from daughter. The patient had second vaccine on Saturday morning. No reaction Saturday, Sunday, Monday. States on Tuesday she started to not feel well, and today daughter states she said, \"I just don't feel good and I feel so sick.\" She can't describe it any more.     Called patient. She denies headache, muscle aches, fever, numbness or tingling in arms. States she feels tired and like she needs to stay in bed. She states she is eating, but also notes she is \"skimping on meals\". She has not tried taking tylenol. Advised she could try this. Her daughter is coming to help her and will bring her something to eat.     Called daughter and updated on conversation. She verbalizes understanding. They will call back if symptoms don't improve by tomorrow to see if further evaluation is needed.     Routing to PCP as FYI.     Reason for Disposition    COVID-19 vaccine, systemic reactions (e.g., fatigue, fever, muscle aches), questions about    Additional Information    Negative: [1] Difficulty breathing or swallowing AND [2] starts within 2 hours after injection    Negative: Sounds like a life-threatening emergency to the triager    Negative: [1] COVID-19 exposure AND [2] no symptoms    Negative: [1] Typical COVID-19 symptoms AND [2] symptoms that are NOT expected from vaccine (e.g., cough, difficulty breathing, loss of taste or smell, runny nose, sore throat)    Negative: [1] Typical COVID-19 symptoms AND [2] started > 3 days after getting vaccine    Negative: Fever > 104 F (40 C)    Negative: Sounds like a severe, unusual reaction to the triager    Negative: [1] Redness or red streak around the injection site AND [2] started > 48 hours after getting vaccine AND [3] fever    Negative: [1] Fever > 101 F (38.3 C) AND [2] age > 60 AND [3] started > 48 hours after getting vaccine    Negative: [1] Fever > 100.0 F (37.8 C) AND [2] bedridden (e.g., nursing home patient, CVA, chronic illness, recovering from surgery) AND [3] " "started > 48 hours after getting vaccine    Negative: [1] Fever > 100.0 F (37.8 C) AND [2] diabetes mellitus or weak immune system (e.g., HIV positive, cancer chemo, splenectomy, organ transplant, chronic steroids) AND [3] started > 48 hours after getting vaccine    Negative: [1] Redness or red streak around the injection site AND [2] started > 48 hours after getting vaccine AND [3] no fever  (Exception: red area < 1 inch or 2.5 cm wide)    Negative: [1] Pain, tenderness, or swelling at the injection site AND [2] over 3 days (72 hours) since vaccine AND [3] getting worse    Negative: Fever > 100.0 F (37.8 C) present > 3 days (72 hours)    Negative: [1] Fever > 100.0 F (37.8 C) AND [2] healthcare worker    Negative: [1] Pain, tenderness, or swelling at the injection site AND [2] lasts > 7 days    Negative: [1] Requesting COVID-19 vaccine AND [2] healthcare worker (e.g., EMS first responders, doctors, nurses)    Negative: [1] Requesting COVID-19 vaccine AND [2] resident of a long-term care facility (e.g., nursing home)    Negative: [1] Requesting COVID-19 vaccine AND [2] vaccine available in the community for this patient group    Negative: COVID-19 vaccine, injection site reaction (e.g., pain, redness, swelling), question about    Answer Assessment - Initial Assessment Questions  1. MAIN CONCERN OR SYMPTOM:  \"What is your main concern right now?\" \"What question do you have?\" \"What's the main symptom you're worried about?\" (e.g., fever, pain, redness, swelling)    Feeling awful, \"I don't feel good and I feel so sick.\"   Feels like she wants to stay in bed        2. VACCINE: \"What vaccination did you receive?\" \"Is this your first or second shot?\" (e.g., none; Moderna, Pfizer, other)    Yes - she cannot remember the vaccine she had           3. SYMPTOM ONSET: \"When did the symptoms begin?\" (e.g., not relevant; hours, days)     Started to feel bad yesterday. Woke up feeling bad.         4. SYMPTOM SEVERITY: \"How bad is " "it?\"     Bad that she doesn't want to get out of bed       5. FEVER: \"Is there a fever?\" If so, ask: \"What is it, how was it measured, and when did it start?\"     NO         6. PAST REACTIONS: \"Have you reacted to immunizations before?\" If so, ask: \"What happened?\"    NO         7. OTHER SYMPTOMS: \"Do you have any other symptoms?\"    No.        No pain. No muscle aches. No numbness or tingling in her arms.    Protocols used: CORONAVIRUS (COVID-19) VACCINE QUESTIONS AND [id number] 1.3      "

## 2021-03-03 DIAGNOSIS — N39.41 URGE INCONTINENCE OF URINE: ICD-10-CM

## 2021-03-03 RX ORDER — OXYBUTYNIN CHLORIDE 15 MG/1
15 TABLET, EXTENDED RELEASE ORAL DAILY
Qty: 90 TABLET | Refills: 2 | Status: SHIPPED | OUTPATIENT
Start: 2021-03-03 | End: 2021-11-23

## 2021-04-02 DIAGNOSIS — G25.0 FAMILIAL TREMOR: ICD-10-CM

## 2021-04-05 RX ORDER — PROPRANOLOL HYDROCHLORIDE 120 MG/1
CAPSULE, EXTENDED RELEASE ORAL
Qty: 180 CAPSULE | Refills: 1 | Status: SHIPPED | OUTPATIENT
Start: 2021-04-05 | End: 2021-09-23

## 2021-05-22 DIAGNOSIS — G47.00 INSOMNIA, UNSPECIFIED TYPE: ICD-10-CM

## 2021-05-28 RX ORDER — QUETIAPINE FUMARATE 25 MG/1
TABLET, FILM COATED ORAL
Qty: 30 TABLET | Refills: 5 | OUTPATIENT
Start: 2021-05-28

## 2021-05-28 NOTE — TELEPHONE ENCOUNTER
QUEtiapine (SEROQUEL) 25 MG tablet    Request refused: Should already have refills on file (RX ordered on 5/3/2021 for 30 tabs and 5 refills.)

## 2021-06-30 ENCOUNTER — OFFICE VISIT (OUTPATIENT)
Dept: INTERNAL MEDICINE | Facility: CLINIC | Age: 86
End: 2021-06-30
Payer: MEDICARE

## 2021-06-30 VITALS
HEIGHT: 65 IN | BODY MASS INDEX: 24.47 KG/M2 | TEMPERATURE: 97.8 F | WEIGHT: 146.9 LBS | HEART RATE: 60 BPM | SYSTOLIC BLOOD PRESSURE: 142 MMHG | DIASTOLIC BLOOD PRESSURE: 62 MMHG | OXYGEN SATURATION: 97 %

## 2021-06-30 DIAGNOSIS — M70.62 TROCHANTERIC BURSITIS OF LEFT HIP: Primary | ICD-10-CM

## 2021-06-30 PROCEDURE — 99213 OFFICE O/P EST LOW 20 MIN: CPT | Performed by: FAMILY MEDICINE

## 2021-06-30 ASSESSMENT — MIFFLIN-ST. JEOR: SCORE: 1082.21

## 2021-06-30 NOTE — PROGRESS NOTES
Assessment & Plan     Trochanteric bursitis of left hip    - Orthopedic  Referral; Future             There are no Patient Instructions on file for this visit.    No follow-ups on file.    Yaya Hatfield MD  [facility]    Antonio Ji is a 89 year old who presents for the following health issues     HPI     Musculoskeletal problem/pain  Onset/Duration: 1 month  Description  Location: hip - left  Joint Swelling: no  Redness: no  Pain: YES-she cannot sleep on her left side due to the pain when she lies that way.  Warmth: no  Intensity:  mild, moderate  Progression of Symptoms:  improving  Accompanying signs and symptoms:   Fevers: no  Numbness/tingling/weakness: no  History  Trauma to the area: no  Recent illness:  no  Previous similar problem: no  Previous evaluation:  no  Precipitating or alleviating factors:  Aggravating factors include: sleeping the left side  Therapies tried and outcome: nothing        Review of Systems   Constitutional, HEENT, cardiovascular, pulmonary, gi and gu systems are negative, except as otherwise noted.      Objective    There were no vitals taken for this visit.  There is no height or weight on file to calculate BMI.  Physical Exam   GENERAL APPEARANCE: healthy, alert and no distress  MS: extremities normal- no gross deformities noted  ORTHO: Examination of the left hip shows decreased range of motion with internal and external rotation but no pain with any of that movement.  She has full flexion and extension.  Palpation over the left greater trochanteric bursa is exquisitely tender.

## 2021-09-22 DIAGNOSIS — G25.0 FAMILIAL TREMOR: ICD-10-CM

## 2021-09-23 RX ORDER — PROPRANOLOL HYDROCHLORIDE 120 MG/1
CAPSULE, EXTENDED RELEASE ORAL
Qty: 180 CAPSULE | Refills: 0 | Status: SHIPPED | OUTPATIENT
Start: 2021-09-23 | End: 2021-10-07

## 2021-09-23 NOTE — TELEPHONE ENCOUNTER
Routing refill request to provider for review/approval because:  Labs out of range:    BP Readings from Last 3 Encounters:   06/30/21 (!) 142/62   12/26/20 (!) 158/53   11/04/20 (!) 142/76   Caridad Jaeger RN

## 2021-10-07 ENCOUNTER — OFFICE VISIT (OUTPATIENT)
Dept: FAMILY MEDICINE | Facility: CLINIC | Age: 86
End: 2021-10-07
Payer: MEDICARE

## 2021-10-07 VITALS
DIASTOLIC BLOOD PRESSURE: 80 MMHG | HEART RATE: 50 BPM | TEMPERATURE: 98 F | BODY MASS INDEX: 25.69 KG/M2 | WEIGHT: 154.4 LBS | RESPIRATION RATE: 20 BRPM | SYSTOLIC BLOOD PRESSURE: 138 MMHG | OXYGEN SATURATION: 99 %

## 2021-10-07 DIAGNOSIS — R06.09 DYSPNEA ON EXERTION: ICD-10-CM

## 2021-10-07 DIAGNOSIS — N39.41 URGE INCONTINENCE OF URINE: ICD-10-CM

## 2021-10-07 DIAGNOSIS — F33.42 DEPRESSION, MAJOR, RECURRENT, IN COMPLETE REMISSION (H): Primary | ICD-10-CM

## 2021-10-07 DIAGNOSIS — G25.0 FAMILIAL TREMOR: ICD-10-CM

## 2021-10-07 DIAGNOSIS — R11.0 NAUSEA WITHOUT VOMITING: ICD-10-CM

## 2021-10-07 PROBLEM — E86.0 DEHYDRATION: Status: RESOLVED | Noted: 2020-12-25 | Resolved: 2021-10-07

## 2021-10-07 PROCEDURE — 99214 OFFICE O/P EST MOD 30 MIN: CPT | Mod: 25 | Performed by: INTERNAL MEDICINE

## 2021-10-07 PROCEDURE — 69209 REMOVE IMPACTED EAR WAX UNI: CPT | Performed by: INTERNAL MEDICINE

## 2021-10-07 RX ORDER — ONDANSETRON 4 MG/1
4 TABLET, FILM COATED ORAL EVERY 8 HOURS PRN
Qty: 90 TABLET | Refills: 3 | Status: SHIPPED | OUTPATIENT
Start: 2021-10-07 | End: 2022-06-24

## 2021-10-07 RX ORDER — PROPRANOLOL HYDROCHLORIDE 120 MG/1
CAPSULE, EXTENDED RELEASE ORAL
Qty: 180 CAPSULE | Refills: 1 | Status: SHIPPED | OUTPATIENT
Start: 2021-10-07 | End: 2021-12-09 | Stop reason: ALTCHOICE

## 2021-10-07 ASSESSMENT — ENCOUNTER SYMPTOMS
SHORTNESS OF BREATH: 1
CHEST TIGHTNESS: 0
CONSTIPATION: 1
COUGH: 0
NAUSEA: 1
VOMITING: 0

## 2021-10-07 ASSESSMENT — PATIENT HEALTH QUESTIONNAIRE - PHQ9: SUM OF ALL RESPONSES TO PHQ QUESTIONS 1-9: 4

## 2021-10-07 ASSESSMENT — PAIN SCALES - GENERAL: PAINLEVEL: NO PAIN (0)

## 2021-10-07 NOTE — PATIENT INSTRUCTIONS
Please call to schedule the echocardiogram [phone]  They are located in [address] on the [facility]    Follow up in 1 month

## 2021-10-07 NOTE — PROGRESS NOTES
Assessment & Plan     Depression, major, recurrent, in complete remission (H)  Stable.  Denies suicidal ideation.  Continue paroxetine 20 mg daily    Familial tremor  Stable.  Continue propranolol to 40 mg daily as prescribed by her previous physician.  This dose has helped her.  She is also taking Compazine for nausea occasionally maybe 1 time a week.  We will switch to another medication and explained to the family that Compazine can cause or increase tremors as well.  - propranolol ER (INDERAL LA) 120 MG 24 hr capsule; Propranolol 120 mg two tablets daily    Nausea without vomiting  Stop taking Compazine.  Start Zofran 4 nausea take as needed.  - ondansetron (ZOFRAN) 4 MG tablet; Take 1 tablet (4 mg) by mouth every 8 hours as needed for nausea    Dyspnea on exertion  Dyspnea on walking outside the house to get mail.  No dyspnea been ambulating in the house.  On exam lungs are clear on auscultation and there is no leg swelling.  We will get a echo to see LV function.  - Echocardiogram Complete; Future    Urge incontinence of urine  Stable.  Continue oxybutynin 15 mg tablet      Return in about 4 weeks (around 11/4/2021).    ALLYSON ESPINAL MD  [facility]    Antonio Ji is a 89 year old who presents for the following health issues  accompanied by her Daughter:    ALEJANDRO Ji, 89-year-old lady who presented to the clinic with her daughter to establish care.  Her previous primary care physician retired.  Margy lives by herself in an independent living.  Her  passed away this summer who was living in a nursing home.  She is able to rest, eat meals and go to the bathroom by herself.  Her daughter helps her with medications, groceries and laundry and driving her to 's appointments.  She has been feeling shortness of breath for past few weeks.  She gets dyspneic when walking to get her mail outside the house.  She does not feel short of breath walking in the  house.    Review of Systems   Respiratory: Positive for shortness of breath. Negative for cough and chest tightness.    Cardiovascular: Negative for chest pain.   Gastrointestinal: Positive for constipation and nausea. Negative for vomiting.   Genitourinary: Positive for urgency.        Objective     There were no vitals taken for this visit.  There is no height or weight on file to calculate BMI.  Physical Exam  Vitals reviewed.   Constitutional:       Appearance: Normal appearance.   HENT:      Right Ear: There is impacted cerumen.      Left Ear: There is impacted cerumen.   Cardiovascular:      Rate and Rhythm: Normal rate and regular rhythm.      Heart sounds: Normal heart sounds. No murmur heard.   No gallop.    Pulmonary:      Effort: Pulmonary effort is normal. No respiratory distress.      Breath sounds: Normal breath sounds. No wheezing or rales.   Neurological:      Mental Status: She is alert.      Motor: Tremor present.

## 2021-10-07 NOTE — PROGRESS NOTES
Patient identified using two patient identifiers.  Ear exam showing wax occlusion completed by RN.  Solution: warm water was placed in the bilateral ear(s) via irrigation tool: elephant ear.

## 2021-11-05 ENCOUNTER — HOSPITAL ENCOUNTER (OUTPATIENT)
Dept: CARDIOLOGY | Facility: CLINIC | Age: 86
Discharge: HOME | End: 2021-11-05
Attending: INTERNAL MEDICINE | Admitting: INTERNAL MEDICINE
Payer: MEDICARE

## 2021-11-05 DIAGNOSIS — R06.09 DYSPNEA ON EXERTION: ICD-10-CM

## 2021-11-05 LAB — LVEF ECHO: NORMAL

## 2021-11-05 PROCEDURE — 93306 TTE W/DOPPLER COMPLETE: CPT

## 2021-11-05 PROCEDURE — 93306 TTE W/DOPPLER COMPLETE: CPT | Mod: 26 | Performed by: INTERNAL MEDICINE

## 2021-11-08 ENCOUNTER — TELEPHONE (OUTPATIENT)
Dept: FAMILY MEDICINE | Facility: CLINIC | Age: 86
End: 2021-11-08
Payer: MEDICARE

## 2021-11-08 DIAGNOSIS — I50.20 HEART FAILURE WITH REDUCED EJECTION FRACTION, NYHA CLASS II (H): Primary | ICD-10-CM

## 2021-11-09 NOTE — TELEPHONE ENCOUNTER
Patient Contact    Attempt # 1    Was call answered?  No.  Left message on voicemail with information to call back.    Zuleyka SIDHU, Triage RN  [facility]

## 2021-11-11 NOTE — TELEPHONE ENCOUNTER
Called patient and notified her, gave her cardiology scheduling number     Zuleyka SIDHU, Triage RN  [facility]

## 2021-11-16 ENCOUNTER — OFFICE VISIT (OUTPATIENT)
Dept: CARDIOLOGY | Facility: CLINIC | Age: 86
End: 2021-11-16
Payer: MEDICARE

## 2021-11-16 ENCOUNTER — HOSPITAL ENCOUNTER (OUTPATIENT)
Dept: GENERAL RADIOLOGY | Facility: CLINIC | Age: 86
End: 2021-11-16
Attending: INTERNAL MEDICINE
Payer: MEDICARE

## 2021-11-16 ENCOUNTER — LAB (OUTPATIENT)
Dept: LAB | Facility: CLINIC | Age: 86
End: 2021-11-16
Attending: INTERNAL MEDICINE
Payer: MEDICARE

## 2021-11-16 VITALS
HEIGHT: 65 IN | BODY MASS INDEX: 25.67 KG/M2 | DIASTOLIC BLOOD PRESSURE: 82 MMHG | WEIGHT: 154.1 LBS | HEART RATE: 67 BPM | SYSTOLIC BLOOD PRESSURE: 173 MMHG

## 2021-11-16 DIAGNOSIS — I50.20 HEART FAILURE WITH REDUCED EJECTION FRACTION, NYHA CLASS II (H): ICD-10-CM

## 2021-11-16 DIAGNOSIS — R06.09 DYSPNEA ON EXERTION: Primary | ICD-10-CM

## 2021-11-16 LAB
ALBUMIN SERPL-MCNC: 3.5 G/DL (ref 3.4–5)
ALP SERPL-CCNC: 102 U/L (ref 40–150)
ALT SERPL W P-5'-P-CCNC: 19 U/L (ref 0–50)
ANION GAP SERPL CALCULATED.3IONS-SCNC: 6 MMOL/L (ref 3–14)
AST SERPL W P-5'-P-CCNC: 15 U/L (ref 0–45)
BILIRUB SERPL-MCNC: 0.4 MG/DL (ref 0.2–1.3)
BUN SERPL-MCNC: 19 MG/DL (ref 7–30)
CALCIUM SERPL-MCNC: 8.9 MG/DL (ref 8.5–10.1)
CHLORIDE BLD-SCNC: 107 MMOL/L (ref 94–109)
CO2 SERPL-SCNC: 25 MMOL/L (ref 20–32)
CREAT SERPL-MCNC: 1.11 MG/DL (ref 0.52–1.04)
ERYTHROCYTE [DISTWIDTH] IN BLOOD BY AUTOMATED COUNT: 13.8 % (ref 10–15)
GFR SERPL CREATININE-BSD FRML MDRD: 43 ML/MIN/1.73M2
GLUCOSE BLD-MCNC: 116 MG/DL (ref 70–99)
HCT VFR BLD AUTO: 45.8 % (ref 35–47)
HGB BLD-MCNC: 14.5 G/DL (ref 11.7–15.7)
MCH RBC QN AUTO: 29.1 PG (ref 26.5–33)
MCHC RBC AUTO-ENTMCNC: 31.7 G/DL (ref 31.5–36.5)
MCV RBC AUTO: 92 FL (ref 78–100)
NT-PROBNP SERPL-MCNC: 840 PG/ML (ref 0–450)
PLATELET # BLD AUTO: 454 10E3/UL (ref 150–450)
POTASSIUM BLD-SCNC: 4.3 MMOL/L (ref 3.4–5.3)
PROT SERPL-MCNC: 7.9 G/DL (ref 6.8–8.8)
RBC # BLD AUTO: 4.98 10E6/UL (ref 3.8–5.2)
SODIUM SERPL-SCNC: 138 MMOL/L (ref 133–144)
WBC # BLD AUTO: 12.2 10E3/UL (ref 4–11)

## 2021-11-16 PROCEDURE — 85027 COMPLETE CBC AUTOMATED: CPT

## 2021-11-16 PROCEDURE — 83880 ASSAY OF NATRIURETIC PEPTIDE: CPT

## 2021-11-16 PROCEDURE — 80053 COMPREHEN METABOLIC PANEL: CPT

## 2021-11-16 PROCEDURE — 99204 OFFICE O/P NEW MOD 45 MIN: CPT | Performed by: INTERNAL MEDICINE

## 2021-11-16 PROCEDURE — 36415 COLL VENOUS BLD VENIPUNCTURE: CPT

## 2021-11-16 PROCEDURE — 71046 X-RAY EXAM CHEST 2 VIEWS: CPT

## 2021-11-16 RX ORDER — LISINOPRIL 10 MG/1
10 TABLET ORAL DAILY
Qty: 90 TABLET | Refills: 3 | Status: ON HOLD | OUTPATIENT
Start: 2021-11-16 | End: 2022-02-11

## 2021-11-16 ASSESSMENT — MIFFLIN-ST. JEOR: SCORE: 1109.87

## 2021-11-16 NOTE — LETTER
11/16/2021    Braeden Sood MD  [address]  [facility] [address]    RE: Margy Babin       Dear Colleague,    I had the pleasure of seeing Margy Babin in the [facility].    HPI and Plan:   See dictation    Today's clinic visit entailed:  The following tests were independently interpreted by me as noted in my documentation: Echocardiogram  30 minutes spent on the date of the encounter doing chart review, history and exam, documentation and further activities per the note  Provider  Link to MDM Help Grid     The level of medical decision making during this visit was of moderate complexity.      Orders Placed This Encounter   Procedures     XR Chest 2 Views     N terminal pro BNP outpatient     CBC with platelets     Comprehensive metabolic panel       Orders Placed This Encounter   Medications     lisinopril (ZESTRIL) 10 MG tablet     Sig: Take 1 tablet (10 mg) by mouth daily     Dispense:  90 tablet     Refill:  3       There are no discontinued medications.      Encounter Diagnoses   Name Primary?     Heart failure with reduced ejection fraction, NYHA class II (H)      Dyspnea on exertion Yes       CURRENT MEDICATIONS:  Current Outpatient Medications   Medication Sig Dispense Refill     ALPRAZolam (XANAX) 0.25 MG tablet TAKE 1 TO 2 TABLETS BY MOUTH TWICE DAILY AS NEEDED 60 tablet 1     Calcium Carbonate (CALCIUM 600 PO) Take 1 tablet by mouth daily.       Cholecalciferol (VITAMIN D) 1000 UNITS capsule Take 1 capsule by mouth daily.       cycloSPORINE (RESTASIS) 0.05 % ophthalmic emulsion Apply 1 drop to eye 2 times daily       docusate sodium (COLACE) 100 MG capsule Take 1 capsule (100 mg) by mouth 2 times daily as needed for constipation 60 capsule 0     lisinopril (ZESTRIL) 10 MG tablet Take 1 tablet (10 mg) by mouth daily 90 tablet 3     Multiple Vitamins-Minerals (MULTIVITAL) TABS Take 1 tablet by mouth daily.        ondansetron (ZOFRAN) 4 MG tablet Take 1 tablet (4 mg) by mouth every 8 hours as needed for nausea 90 tablet 3     oxybutynin ER (DITROPAN XL) 15 MG 24 hr tablet Take 1 tablet (15 mg) by mouth daily 90 tablet 2     PARoxetine (PAXIL) 20 MG tablet TAKE 1 TABLET(20 MG) BY MOUTH AT BEDTIME 90 tablet 0     prochlorperazine (COMPAZINE) 5 MG tablet Take 1 tablet (5 mg) by mouth every 6 hours as needed for nausea 20 tablet 3     propranolol ER (INDERAL LA) 120 MG 24 hr capsule Propranolol 120 mg two tablets daily 180 capsule 1     QUEtiapine (SEROQUEL) 25 MG tablet TAKE 1 TABLET(25 MG) BY MOUTH AT BEDTIME 30 tablet 5     acetaminophen (TYLENOL) 500 MG tablet Take 2 tablets (1,000 mg) by mouth 3 times daily as needed for mild pain (Patient not taking: Reported on 11/16/2021) 100 tablet 0     erythromycin (ROMYCIN) 5 MG/GM ophthalmic ointment 0.5 inches 2 times daily (Patient not taking: Reported on 11/16/2021)       miconazole (MICATIN) 2 % external powder Apply topically 2 times daily (Patient not taking: Reported on 11/16/2021) 30 g 1     order for DME Equipment being ordered: Walker Wheels () and Walker ()  Treatment Diagnosis: Difficulty ambulating 1 each 0       ALLERGIES   No Known Allergies    PAST MEDICAL HISTORY:  Past Medical History:   Diagnosis Date     Anxiety      Blepharitis of both eyes      Depression, major, recurrent, in complete remission (H)      Dry eye syndrome      Dyspnea on exertion 10/7/2021     Essential hypertension 3/5/2020     Familial tremor 3/6/2013     Insomnia, unspecified type 11/3/2014    No CSA on file Last  check - 02/28/2020 with no concerns.     Mixed hyperlipidemia 11/4/2020     Nausea without vomiting 6/16/2015     Problem list name updated by automated process. Provider to review     Tremor, hereditary, benign        PAST SURGICAL HISTORY:  Past Surgical History:   Procedure Laterality Date     C TOTAL HIP ARTHROPLASTY Right 1997     HYSTERECTOMY TOTAL ABDOMINAL,  "BILATERAL SALPINGO-OOPHORECTOMY, COMBINED       NECK SURGERY  2009    cervical fusion     ROTATOR CUFF REPAIR RT/LT Right        FAMILY HISTORY:  Family History   Problem Relation Age of Onset     Heart Disease Father 80        MI     C.A.D. Father      Breast Cancer Daughter 43         age 53     Emphysema Sister      Heart Disease Sister        SOCIAL HISTORY:  Social History     Socioeconomic History     Marital status:      Spouse name: None     Number of children: 2     Years of education: None     Highest education level: None   Occupational History     Employer: RETIRED   Tobacco Use     Smoking status: Never Smoker     Smokeless tobacco: Never Used   Substance and Sexual Activity     Alcohol use: No     Alcohol/week: 0.0 standard drinks     Drug use: No     Sexual activity: Never     Partners: Male   Other Topics Concern     Parent/sibling w/ CABG, MI or angioplasty before 65F 55M? No   Social History Narrative     None     Social Determinants of Health     Financial Resource Strain: Not on file   Food Insecurity: Not on file   Transportation Needs: Not on file   Physical Activity: Not on file   Stress: Not on file   Social Connections: Not on file   Intimate Partner Violence: Not on file   Housing Stability: Not on file       Review of Systems:  Skin:  Positive for bruising     Eyes:  Positive for glasses;cataracts    ENT:  Negative      Respiratory:  Positive for shortness of breath;dyspnea on exertion when she bends over   Cardiovascular:  Negative      Gastroenterology: Positive for nausea    Genitourinary:  not assessed      Musculoskeletal:  Positive for arthritis    Neurologic:  Positive for tremors    Psychiatric:  Positive for anxiety    Heme/Lymph/Imm:  Negative      Endocrine:    diabetes;thyroid disorder      Physical Exam:  Vitals: BP (!) 173/82   Pulse 67   Ht 1.651 m (5' 5\")   Wt 69.9 kg (154 lb 1.6 oz)   BMI 25.64 kg/m      Constitutional:  cooperative;in no acute distress    "     Skin:  warm and dry to the touch          Head:  normocephalic        Eyes:  conjunctivae and lids unremarkable        Lymph:      ENT:  no pallor or cyanosis        Neck:  JVP normal        Respiratory:  clear to auscultation         Cardiac: regular rhythm;no murmurs, gallops or rubs detected                pulses full and equal                                        GI:  abdomen soft;non-tender        Extremities and Muscular Skeletal:  no edema              Neurological:    tremor      Psych:  Alert and Oriented x 3        CC  Brittani Schafer MD  [address] [id number]                  Thank you for allowing me to participate in the care of your patient.      Sincerely,     Cedrick Mcmahon MD, MD     [facility]  cc:   Brittani Schafer MD  [address] [id number]

## 2021-11-16 NOTE — LETTER
11/16/2021       RE: Margy Babin  [address]  [facility] [address]     Dear Colleague,    Thank you for referring your patient, Margy Babin, to the [facility] [place] at [facility]. Please see a copy of my visit note below.    HPI and Plan:   See dictation    Today's clinic visit entailed:  The following tests were independently interpreted by me as noted in my documentation: Echocardiogram  30 minutes spent on the date of the encounter doing chart review, history and exam, documentation and further activities per the note  Provider  Link to MDM Help Grid     The level of medical decision making during this visit was of moderate complexity.      Orders Placed This Encounter   Procedures     XR Chest 2 Views     N terminal pro BNP outpatient     CBC with platelets     Comprehensive metabolic panel       Orders Placed This Encounter   Medications     lisinopril (ZESTRIL) 10 MG tablet     Sig: Take 1 tablet (10 mg) by mouth daily     Dispense:  90 tablet     Refill:  3       There are no discontinued medications.      Encounter Diagnoses   Name Primary?     Heart failure with reduced ejection fraction, NYHA class II (H)      Dyspnea on exertion Yes       CURRENT MEDICATIONS:  Current Outpatient Medications   Medication Sig Dispense Refill     ALPRAZolam (XANAX) 0.25 MG tablet TAKE 1 TO 2 TABLETS BY MOUTH TWICE DAILY AS NEEDED 60 tablet 1     Calcium Carbonate (CALCIUM 600 PO) Take 1 tablet by mouth daily.       Cholecalciferol (VITAMIN D) 1000 UNITS capsule Take 1 capsule by mouth daily.       cycloSPORINE (RESTASIS) 0.05 % ophthalmic emulsion Apply 1 drop to eye 2 times daily       docusate sodium (COLACE) 100 MG capsule Take 1 capsule (100 mg) by mouth 2 times daily as needed for constipation 60 capsule 0     lisinopril (ZESTRIL) 10 MG tablet Take 1 tablet (10 mg) by mouth daily 90 tablet 3     Multiple  Vitamins-Minerals (MULTIVITAL) TABS Take 1 tablet by mouth daily.       ondansetron (ZOFRAN) 4 MG tablet Take 1 tablet (4 mg) by mouth every 8 hours as needed for nausea 90 tablet 3     oxybutynin ER (DITROPAN XL) 15 MG 24 hr tablet Take 1 tablet (15 mg) by mouth daily 90 tablet 2     PARoxetine (PAXIL) 20 MG tablet TAKE 1 TABLET(20 MG) BY MOUTH AT BEDTIME 90 tablet 0     prochlorperazine (COMPAZINE) 5 MG tablet Take 1 tablet (5 mg) by mouth every 6 hours as needed for nausea 20 tablet 3     propranolol ER (INDERAL LA) 120 MG 24 hr capsule Propranolol 120 mg two tablets daily 180 capsule 1     QUEtiapine (SEROQUEL) 25 MG tablet TAKE 1 TABLET(25 MG) BY MOUTH AT BEDTIME 30 tablet 5     acetaminophen (TYLENOL) 500 MG tablet Take 2 tablets (1,000 mg) by mouth 3 times daily as needed for mild pain (Patient not taking: Reported on 11/16/2021) 100 tablet 0     erythromycin (ROMYCIN) 5 MG/GM ophthalmic ointment 0.5 inches 2 times daily (Patient not taking: Reported on 11/16/2021)       miconazole (MICATIN) 2 % external powder Apply topically 2 times daily (Patient not taking: Reported on 11/16/2021) 30 g 1     order for DME Equipment being ordered: Walker Wheels () and Walker ()  Treatment Diagnosis: Difficulty ambulating 1 each 0       ALLERGIES   No Known Allergies    PAST MEDICAL HISTORY:  Past Medical History:   Diagnosis Date     Anxiety      Blepharitis of both eyes      Depression, major, recurrent, in complete remission (H)      Dry eye syndrome      Dyspnea on exertion 10/7/2021     Essential hypertension 3/5/2020     Familial tremor 3/6/2013     Insomnia, unspecified type 11/3/2014    No CSA on file Last  check - 02/28/2020 with no concerns.     Mixed hyperlipidemia 11/4/2020     Nausea without vomiting 6/16/2015     Problem list name updated by automated process. Provider to review     Tremor, hereditary, benign        PAST SURGICAL HISTORY:  Past Surgical History:   Procedure Laterality Date     C  "TOTAL HIP ARTHROPLASTY Right 1997     HYSTERECTOMY TOTAL ABDOMINAL, BILATERAL SALPINGO-OOPHORECTOMY, COMBINED       NECK SURGERY  2009    cervical fusion     ROTATOR CUFF REPAIR RT/LT Right        FAMILY HISTORY:  Family History   Problem Relation Age of Onset     Heart Disease Father 80        MI     C.A.D. Father      Breast Cancer Daughter 43         age 53     Emphysema Sister      Heart Disease Sister        SOCIAL HISTORY:  Social History     Socioeconomic History     Marital status:      Spouse name: None     Number of children: 2     Years of education: None     Highest education level: None   Occupational History     Employer: RETIRED   Tobacco Use     Smoking status: Never Smoker     Smokeless tobacco: Never Used   Substance and Sexual Activity     Alcohol use: No     Alcohol/week: 0.0 standard drinks     Drug use: No     Sexual activity: Never     Partners: Male   Other Topics Concern     Parent/sibling w/ CABG, MI or angioplasty before 65F 55M? No   Social History Narrative     None     Social Determinants of Health     Financial Resource Strain: Not on file   Food Insecurity: Not on file   Transportation Needs: Not on file   Physical Activity: Not on file   Stress: Not on file   Social Connections: Not on file   Intimate Partner Violence: Not on file   Housing Stability: Not on file       Review of Systems:  Skin:  Positive for bruising     Eyes:  Positive for glasses;cataracts    ENT:  Negative      Respiratory:  Positive for shortness of breath;dyspnea on exertion when she bends over   Cardiovascular:  Negative      Gastroenterology: Positive for nausea    Genitourinary:  not assessed      Musculoskeletal:  Positive for arthritis    Neurologic:  Positive for tremors    Psychiatric:  Positive for anxiety    Heme/Lymph/Imm:  Negative      Endocrine:    diabetes;thyroid disorder      Physical Exam:  Vitals: BP (!) 173/82   Pulse 67   Ht 1.651 m (5' 5\")   Wt 69.9 kg (154 lb 1.6 oz)   BMI " 25.64 kg/m      Constitutional:  cooperative;in no acute distress        Skin:  warm and dry to the touch          Head:  normocephalic        Eyes:  conjunctivae and lids unremarkable        Lymph:      ENT:  no pallor or cyanosis        Neck:  JVP normal        Respiratory:  clear to auscultation         Cardiac: regular rhythm;no murmurs, gallops or rubs detected                pulses full and equal                                        GI:  abdomen soft;non-tender        Extremities and Muscular Skeletal:  no edema              Neurological:    tremor      Psych:  Alert and Oriented x 3        CC  Brittani Schafer MD  [address] [place] [address]                Service Date: 11/16/2021    REASON FOR CONSULTATION:  Dyspnea on exertion.    REFERRING PHYSICIAN:  Dr. Schafer.    HISTORY OF PRESENT ILLNESS:  I had the pleasure of seeing Margy Babin at the [facility] in [place] this afternoon.  She is a delightful 89-year-old female with a history of hypertension and hyperlipidemia who was referred for further evaluation regarding recent onset dyspnea on exertion.    She reports dyspnea on exertion over the last 2-3 months.  She gets short of breath with minimal activity such as walking or climbing flights of stairs.  She also gets short of breath if she bends down to tie her shoelaces.  She does not have any symptoms at rest.  No orthopnea or PND.  Also, no significant lower extremity edema.    To further evaluate her symptoms, the patient was referred for a transthoracic echocardiogram.  This was performed 3 days ago.  It revealed mild LV systolic dysfunction with an EF of 45%-50% and mild to moderate aortic regurgitation.  There was septal motion likely consistent with the patient's known left bundle branch block.  Given the echo findings, the patient was referred here for further evaluation.    She does not endorse chest pain.  No palpitations, presyncope or syncope.   Her blood pressure is elevated in the office today and previous office visits have also shown elevated blood pressures.    ASSESSMENT AND PLAN:  A very pleasant 89-year-old female with new onset dyspnea on exertion.  The cause of the patient's dyspnea on exertion and the new cardiomyopathy is unclear at this point.  She does not endorse chest pain.  No personal history of coronary artery disease, but she does have family history of coronary artery disease.  Her previous EKG from a year ago showed left bundle branch block and atrial flutter.  Whether the cause of the patient's cardiomyopathy is due to poor rate control is unclear at this point.  However, her most recent office visits, her heart rate has been in the 50s to 60s, therefore I doubt this is due to tachycardia-mediated.  Of note, she is on propranolol.    To further investigate the cause of her shortness of breath, we will obtain some basic labs including NT-proBNP.  We will also obtain chest x-ray to see if there is any evidence of pulmonary edema.  Given her risk factors we will also obtain a stress test to rule out significant ischemia, as her symptoms could be secondary to significant obstructive coronary artery disease.    Finally, we will start her on lisinopril 10 mg daily to treat her blood pressure.    I appreciate the opportunity to participate in her care.      Cedrick Mcmahon MD      D: 2021   T: 2021   MT: estefani    Name:     KHANH BRAGG  MRN:      [id number]        Account:      [id number]   :      10/02/1929           Service Date: 2021     Document: [id number]

## 2021-11-16 NOTE — PROGRESS NOTES
HPI and Plan:   See dictation    Today's clinic visit entailed:  The following tests were independently interpreted by me as noted in my documentation: Echocardiogram  30 minutes spent on the date of the encounter doing chart review, history and exam, documentation and further activities per the note  Provider  Link to MDM Help Grid     The level of medical decision making during this visit was of moderate complexity.      Orders Placed This Encounter   Procedures     XR Chest 2 Views     N terminal pro BNP outpatient     CBC with platelets     Comprehensive metabolic panel       Orders Placed This Encounter   Medications     lisinopril (ZESTRIL) 10 MG tablet     Sig: Take 1 tablet (10 mg) by mouth daily     Dispense:  90 tablet     Refill:  3       There are no discontinued medications.      Encounter Diagnoses   Name Primary?     Heart failure with reduced ejection fraction, NYHA class II (H)      Dyspnea on exertion Yes       CURRENT MEDICATIONS:  Current Outpatient Medications   Medication Sig Dispense Refill     ALPRAZolam (XANAX) 0.25 MG tablet TAKE 1 TO 2 TABLETS BY MOUTH TWICE DAILY AS NEEDED 60 tablet 1     Calcium Carbonate (CALCIUM 600 PO) Take 1 tablet by mouth daily.       Cholecalciferol (VITAMIN D) 1000 UNITS capsule Take 1 capsule by mouth daily.       cycloSPORINE (RESTASIS) 0.05 % ophthalmic emulsion Apply 1 drop to eye 2 times daily       docusate sodium (COLACE) 100 MG capsule Take 1 capsule (100 mg) by mouth 2 times daily as needed for constipation 60 capsule 0     lisinopril (ZESTRIL) 10 MG tablet Take 1 tablet (10 mg) by mouth daily 90 tablet 3     Multiple Vitamins-Minerals (MULTIVITAL) TABS Take 1 tablet by mouth daily.       ondansetron (ZOFRAN) 4 MG tablet Take 1 tablet (4 mg) by mouth every 8 hours as needed for nausea 90 tablet 3     oxybutynin ER (DITROPAN XL) 15 MG 24 hr tablet Take 1 tablet (15 mg) by mouth daily 90 tablet 2     PARoxetine (PAXIL) 20 MG tablet TAKE 1 TABLET(20 MG) BY  MOUTH AT BEDTIME 90 tablet 0     prochlorperazine (COMPAZINE) 5 MG tablet Take 1 tablet (5 mg) by mouth every 6 hours as needed for nausea 20 tablet 3     propranolol ER (INDERAL LA) 120 MG 24 hr capsule Propranolol 120 mg two tablets daily 180 capsule 1     QUEtiapine (SEROQUEL) 25 MG tablet TAKE 1 TABLET(25 MG) BY MOUTH AT BEDTIME 30 tablet 5     acetaminophen (TYLENOL) 500 MG tablet Take 2 tablets (1,000 mg) by mouth 3 times daily as needed for mild pain (Patient not taking: Reported on 11/16/2021) 100 tablet 0     erythromycin (ROMYCIN) 5 MG/GM ophthalmic ointment 0.5 inches 2 times daily (Patient not taking: Reported on 11/16/2021)       miconazole (MICATIN) 2 % external powder Apply topically 2 times daily (Patient not taking: Reported on 11/16/2021) 30 g 1     order for DME Equipment being ordered: Walker Wheels () and Walker ()  Treatment Diagnosis: Difficulty ambulating 1 each 0       ALLERGIES   No Known Allergies    PAST MEDICAL HISTORY:  Past Medical History:   Diagnosis Date     Anxiety      Blepharitis of both eyes      Depression, major, recurrent, in complete remission (H)      Dry eye syndrome      Dyspnea on exertion 10/7/2021     Essential hypertension 3/5/2020     Familial tremor 3/6/2013     Insomnia, unspecified type 11/3/2014    No CSA on file Last  check - 02/28/2020 with no concerns.     Mixed hyperlipidemia 11/4/2020     Nausea without vomiting 6/16/2015     Problem list name updated by automated process. Provider to review     Tremor, hereditary, benign        PAST SURGICAL HISTORY:  Past Surgical History:   Procedure Laterality Date     C TOTAL HIP ARTHROPLASTY Right 1997     HYSTERECTOMY TOTAL ABDOMINAL, BILATERAL SALPINGO-OOPHORECTOMY, COMBINED       NECK SURGERY  2009    cervical fusion     ROTATOR CUFF REPAIR RT/LT Right        FAMILY HISTORY:  Family History   Problem Relation Age of Onset     Heart Disease Father 80        MI     C.A.D. Father      Breast Cancer  "Daughter 43         age 53     Emphysema Sister      Heart Disease Sister        SOCIAL HISTORY:  Social History     Socioeconomic History     Marital status:      Spouse name: None     Number of children: 2     Years of education: None     Highest education level: None   Occupational History     Employer: RETIRED   Tobacco Use     Smoking status: Never Smoker     Smokeless tobacco: Never Used   Substance and Sexual Activity     Alcohol use: No     Alcohol/week: 0.0 standard drinks     Drug use: No     Sexual activity: Never     Partners: Male   Other Topics Concern     Parent/sibling w/ CABG, MI or angioplasty before 65F 55M? No   Social History Narrative     None     Social Determinants of Health     Financial Resource Strain: Not on file   Food Insecurity: Not on file   Transportation Needs: Not on file   Physical Activity: Not on file   Stress: Not on file   Social Connections: Not on file   Intimate Partner Violence: Not on file   Housing Stability: Not on file       Review of Systems:  Skin:  Positive for bruising     Eyes:  Positive for glasses;cataracts    ENT:  Negative      Respiratory:  Positive for shortness of breath;dyspnea on exertion when she bends over   Cardiovascular:  Negative      Gastroenterology: Positive for nausea    Genitourinary:  not assessed      Musculoskeletal:  Positive for arthritis    Neurologic:  Positive for tremors    Psychiatric:  Positive for anxiety    Heme/Lymph/Imm:  Negative      Endocrine:    diabetes;thyroid disorder      Physical Exam:  Vitals: BP (!) 173/82   Pulse 67   Ht 1.651 m (5' 5\")   Wt 69.9 kg (154 lb 1.6 oz)   BMI 25.64 kg/m      Constitutional:  cooperative;in no acute distress        Skin:  warm and dry to the touch          Head:  normocephalic        Eyes:  conjunctivae and lids unremarkable        Lymph:      ENT:  no pallor or cyanosis        Neck:  JVP normal        Respiratory:  clear to auscultation         Cardiac: regular rhythm;no " murmurs, gallops or rubs detected                pulses full and equal                                        GI:  abdomen soft;non-tender        Extremities and Muscular Skeletal:  no edema              Neurological:    tremor      Psych:  Alert and Oriented x 3        CC  Brittani Schafer MD  [address] [id number]

## 2021-11-17 NOTE — PROGRESS NOTES
Service Date: 11/16/2021    REASON FOR CONSULTATION:  Dyspnea on exertion.    REFERRING PHYSICIAN:  Dr. Schafer.    HISTORY OF PRESENT ILLNESS:  I had the pleasure of seeing Margy Babin at the [facility] in [place] this afternoon.  She is a delightful 89-year-old female with a history of hypertension and hyperlipidemia who was referred for further evaluation regarding recent onset dyspnea on exertion.    She reports dyspnea on exertion over the last 2-3 months.  She gets short of breath with minimal activity such as walking or climbing flights of stairs.  She also gets short of breath if she bends down to tie her shoelaces.  She does not have any symptoms at rest.  No orthopnea or PND.  Also, no significant lower extremity edema.    To further evaluate her symptoms, the patient was referred for a transthoracic echocardiogram.  This was performed 3 days ago.  It revealed mild LV systolic dysfunction with an EF of 45%-50% and mild to moderate aortic regurgitation.  There was septal motion likely consistent with the patient's known left bundle branch block.  Given the echo findings, the patient was referred here for further evaluation.    She does not endorse chest pain.  No palpitations, presyncope or syncope.  Her blood pressure is elevated in the office today and previous office visits have also shown elevated blood pressures.    ASSESSMENT AND PLAN:  A very pleasant 89-year-old female with new onset dyspnea on exertion.  The cause of the patient's dyspnea on exertion and the new cardiomyopathy is unclear at this point.  She does not endorse chest pain.  No personal history of coronary artery disease, but she does have family history of coronary artery disease.  Her previous EKG from a year ago showed left bundle branch block and atrial flutter.  Whether the cause of the patient's cardiomyopathy is due to poor rate control is unclear at this point.  However, her most recent office  visits, her heart rate has been in the 50s to 60s, therefore I doubt this is due to tachycardia-mediated.  Of note, she is on propranolol.    To further investigate the cause of her shortness of breath, we will obtain some basic labs including NT-proBNP.  We will also obtain chest x-ray to see if there is any evidence of pulmonary edema.  Given her risk factors we will also obtain a stress test to rule out significant ischemia, as her symptoms could be secondary to significant obstructive coronary artery disease.    Finally, we will start her on lisinopril 10 mg daily to treat her blood pressure.    I appreciate the opportunity to participate in her care.    Cedrick Mcmahon MD        D: 2021   T: 2021   MT: estefani    Name:     KHANH BRAGG  MRN:      [id number]        Account:      [id number]   :      10/02/1929           Service Date: 2021       Document: [id number]

## 2021-11-18 DIAGNOSIS — G47.00 INSOMNIA, UNSPECIFIED TYPE: ICD-10-CM

## 2021-11-18 NOTE — TELEPHONE ENCOUNTER
Routing refill request to provider for review/approval because:  Routing to new PCP establishing 11/23  BP Readings from Last 3 Encounters:   11/16/21 (!) 173/82   10/07/21 138/80   06/30/21 (!) 142/62     Recent Labs   Lab Test 11/04/20  1043 04/23/19  0951 10/03/13  0954   CHOL 240* 243* 223*   HDL 51 56 73   * 148* 126   TRIG 168* 197* 120   CHOLHDLRATIO  --   --  3.1        Kale Kingston, RN  [facility] Triage Nurse

## 2021-11-19 RX ORDER — QUETIAPINE FUMARATE 25 MG/1
TABLET, FILM COATED ORAL
Qty: 30 TABLET | Refills: 5 | Status: SHIPPED | OUTPATIENT
Start: 2021-11-19 | End: 2021-11-23

## 2021-11-22 DIAGNOSIS — N39.41 URGE INCONTINENCE OF URINE: ICD-10-CM

## 2021-11-23 ENCOUNTER — OFFICE VISIT (OUTPATIENT)
Dept: FAMILY MEDICINE | Facility: CLINIC | Age: 86
End: 2021-11-23
Payer: MEDICARE

## 2021-11-23 VITALS
DIASTOLIC BLOOD PRESSURE: 76 MMHG | TEMPERATURE: 97 F | BODY MASS INDEX: 24.83 KG/M2 | SYSTOLIC BLOOD PRESSURE: 134 MMHG | HEART RATE: 97 BPM | RESPIRATION RATE: 17 BRPM | OXYGEN SATURATION: 99 % | WEIGHT: 149 LBS | HEIGHT: 65 IN

## 2021-11-23 DIAGNOSIS — G47.00 INSOMNIA, UNSPECIFIED TYPE: ICD-10-CM

## 2021-11-23 DIAGNOSIS — I51.7 LVH (LEFT VENTRICULAR HYPERTROPHY): Primary | ICD-10-CM

## 2021-11-23 LAB — VIT B12 SERPL-MCNC: 630 PG/ML (ref 193–986)

## 2021-11-23 PROCEDURE — 82607 VITAMIN B-12: CPT | Performed by: INTERNAL MEDICINE

## 2021-11-23 PROCEDURE — 80048 BASIC METABOLIC PNL TOTAL CA: CPT | Performed by: INTERNAL MEDICINE

## 2021-11-23 PROCEDURE — 99214 OFFICE O/P EST MOD 30 MIN: CPT | Performed by: INTERNAL MEDICINE

## 2021-11-23 PROCEDURE — 36415 COLL VENOUS BLD VENIPUNCTURE: CPT | Performed by: INTERNAL MEDICINE

## 2021-11-23 PROCEDURE — 84443 ASSAY THYROID STIM HORMONE: CPT | Performed by: INTERNAL MEDICINE

## 2021-11-23 RX ORDER — OXYBUTYNIN CHLORIDE 15 MG/1
TABLET, EXTENDED RELEASE ORAL
Qty: 90 TABLET | Refills: 2 | Status: SHIPPED | OUTPATIENT
Start: 2021-11-23 | End: 2022-08-12

## 2021-11-23 RX ORDER — QUETIAPINE FUMARATE 25 MG/1
TABLET, FILM COATED ORAL
Qty: 30 TABLET | Refills: 5 | Status: SHIPPED | OUTPATIENT
Start: 2021-11-23 | End: 2022-05-17

## 2021-11-23 ASSESSMENT — MIFFLIN-ST. JEOR: SCORE: 1086.74

## 2021-11-23 NOTE — LETTER
December 6, 2021      Margy Tonya Funesyasmani  [address]   [place] [address]        Dear ,    We are writing to inform you of your test results.    Your electrolytes are improved as is yourkidney function. your thyroid function was completely normal.      Resulted Orders   Basic metabolic panel  (Ca, Cl, CO2, Creat, Gluc, K, Na, BUN)   Result Value Ref Range    Sodium 140 133 - 144 mmol/L    Potassium 4.5 3.4 - 5.3 mmol/L    Chloride 107 94 - 109 mmol/L    Carbon Dioxide (CO2) 26 20 - 32 mmol/L    Anion Gap 7 3 - 14 mmol/L    Urea Nitrogen 20 7 - 30 mg/dL    Creatinine 0.95 0.52 - 1.04 mg/dL    Calcium 9.7 8.5 - 10.1 mg/dL    Glucose 96 70 - 99 mg/dL    GFR Estimate 52 (L) >60 mL/min/1.73m2      Comment:      As of July 11, 2021, eGFR is calculated by the CKD-EPI creatinine equation, without race adjustment. eGFR can be influenced by muscle mass, exercise, and diet. The reported eGFR is an estimation only and is only applicable if the renal function is stable.   TSH with free T4 reflex   Result Value Ref Range    TSH 1.27 0.40 - 4.00 mU/L   Vitamin B12   Result Value Ref Range    Vitamin B12 630 193 - 986 pg/mL       If you have any questions or concerns, please call the clinic at the number listed above.       Sincerely,      Braeden Sood MD

## 2021-11-23 NOTE — TELEPHONE ENCOUNTER
Routing refill request to provider for review/approval because:  Routing to new PCP, scheduled today 11/23    Kale Kingston, RN  [facility] Triage Nurse

## 2021-11-23 NOTE — PATIENT INSTRUCTIONS
Margy;  It was very nice to meet you today    I would like to check a couple of lab tests today.    I will schedule you for a cardiac monitor to make certain that you have no episodes of rapid heartbeat.    I would like to see you back in 1 month to recheck your blood pressure and electrolytes.    Best regards  Braeden

## 2021-11-23 NOTE — PROGRESS NOTES
"  Assessment & Plan     LVH (left ventricular hypertrophy)  Is being followed by cardiology.  I would like to obtain a leadless EKG monitor to rule out tachycardia.    Initial blood pressure elevated rechecked at 134/76.    - Basic metabolic panel  (Ca, Cl, CO2, Creat, Gluc, K, Na, BUN); Future  - TSH with free T4 reflex; Future  - Vitamin B12; Future  - Leadless EKG Monitor 3 to 7 Days; Future  - Basic metabolic panel  (Ca, Cl, CO2, Creat, Gluc, K, Na, BUN)  - TSH with free T4 reflex  - Vitamin B12    Insomnia, unspecified type  Seroquel will be prescribed.  We will discontinue alprazolam at this juncture in this 89-year-old female.  - QUEtiapine (SEROQUEL) 25 MG tablet; TAKE 1 TABLET(25 MG) BY MOUTH AT BEDTIME             See Patient Instructions    Return in about 4 weeks (around 12/21/2021) for Follow up.    Braeden Sood MD  [facility] MARGA Ji is a 89 year old who presents for the following health issues  accompanied by her [facility]. [facility]           Review of Systems   Constitutional, HEENT, cardiovascular, pulmonary, gi and gu systems are negative, except as otherwise noted.      Objective    /76 (BP Location: Right arm, Patient Position: Chair, Cuff Size: Adult Regular)   Pulse 97   Temp 97  F (36.1  C) (Temporal)   Resp 17   Ht 1.651 m (5' 5\")   Wt 67.6 kg (149 lb)   SpO2 99%   BMI 24.79 kg/m    Body mass index is 24.79 kg/m .  Physical Exam   GENERAL: healthy, alert and no distress  EYES: Eyes grossly normal to inspection, PERRL and conjunctivae and sclerae normal  RESP: lungs clear to auscultation - no rales, rhonchi or wheezes  CV: regular rate and rhythm, normal S1 S2, no S3 or S4, no murmur, click or rub, no peripheral edema and peripheral pulses strong  MS: no gross musculoskeletal defects noted, no edema  NEURO: Normal strength and tone, mentation intact and speech normal  PSYCH: mentation appears normal, affect normal/bright    Lab on " 11/16/2021   Component Date Value Ref Range Status     Sodium 11/16/2021 138  133 - 144 mmol/L Final     Potassium 11/16/2021 4.3  3.4 - 5.3 mmol/L Final     Chloride 11/16/2021 107  94 - 109 mmol/L Final     Carbon Dioxide (CO2) 11/16/2021 25  20 - 32 mmol/L Final     Anion Gap 11/16/2021 6  3 - 14 mmol/L Final     Urea Nitrogen 11/16/2021 19  7 - 30 mg/dL Final     Creatinine 11/16/2021 1.11* 0.52 - 1.04 mg/dL Final     Calcium 11/16/2021 8.9  8.5 - 10.1 mg/dL Final     Glucose 11/16/2021 116* 70 - 99 mg/dL Final     Alkaline Phosphatase 11/16/2021 102  40 - 150 U/L Final     AST 11/16/2021 15  0 - 45 U/L Final     ALT 11/16/2021 19  0 - 50 U/L Final     Protein Total 11/16/2021 7.9  6.8 - 8.8 g/dL Final     Albumin 11/16/2021 3.5  3.4 - 5.0 g/dL Final     Bilirubin Total 11/16/2021 0.4  0.2 - 1.3 mg/dL Final     GFR Estimate 11/16/2021 43* >60 mL/min/1.73m2 Final    As of July 11, 2021, eGFR is calculated by the CKD-EPI creatinine equation, without race adjustment. eGFR can be influenced by muscle mass, exercise, and diet. The reported eGFR is an estimation only and is only applicable if the renal function is stable.     WBC Count 11/16/2021 12.2* 4.0 - 11.0 10e3/uL Final     RBC Count 11/16/2021 4.98  3.80 - 5.20 10e6/uL Final     Hemoglobin 11/16/2021 14.5  11.7 - 15.7 g/dL Final     Hematocrit 11/16/2021 45.8  35.0 - 47.0 % Final     MCV 11/16/2021 92  78 - 100 fL Final     MCH 11/16/2021 29.1  26.5 - 33.0 pg Final     MCHC 11/16/2021 31.7  31.5 - 36.5 g/dL Final     RDW 11/16/2021 13.8  10.0 - 15.0 % Final     Platelet Count 11/16/2021 454* 150 - 450 10e3/uL Final     N Terminal Pro BNP Outpatient 11/16/2021 840* 0 - 450 pg/mL Final    Reference range shown and results flagged as abnormal are for the outpatient, non acute settings. Establishing a baseline value for each individual patient is useful for follow-up.    Suggested inpatient cut points for confirming diagnosis of CHF in an acute setting are:  >450  pg/mL (age 18 to less than 50)  >900 pg/mL (age 50 to less than 75)  >1800 pg/mL (75 yrs and older)    An inpatient or emergency department NT-proPBNP <300 pg/mL effectively rules out acute CHF, with 99% negative predictive value.

## 2021-11-24 LAB
ANION GAP SERPL CALCULATED.3IONS-SCNC: 7 MMOL/L (ref 3–14)
BUN SERPL-MCNC: 20 MG/DL (ref 7–30)
CALCIUM SERPL-MCNC: 9.7 MG/DL (ref 8.5–10.1)
CHLORIDE BLD-SCNC: 107 MMOL/L (ref 94–109)
CO2 SERPL-SCNC: 26 MMOL/L (ref 20–32)
CREAT SERPL-MCNC: 0.95 MG/DL (ref 0.52–1.04)
GFR SERPL CREATININE-BSD FRML MDRD: 52 ML/MIN/1.73M2
GLUCOSE BLD-MCNC: 96 MG/DL (ref 70–99)
POTASSIUM BLD-SCNC: 4.5 MMOL/L (ref 3.4–5.3)
SODIUM SERPL-SCNC: 140 MMOL/L (ref 133–144)
TSH SERPL DL<=0.005 MIU/L-ACNC: 1.27 MU/L (ref 0.4–4)

## 2021-11-29 ENCOUNTER — HOSPITAL ENCOUNTER (OUTPATIENT)
Dept: CARDIOLOGY | Facility: CLINIC | Age: 86
End: 2021-11-29
Attending: INTERNAL MEDICINE
Payer: MEDICARE

## 2021-11-29 VITALS
SYSTOLIC BLOOD PRESSURE: 124 MMHG | HEIGHT: 65 IN | HEART RATE: 65 BPM | BODY MASS INDEX: 25.56 KG/M2 | OXYGEN SATURATION: 95 % | DIASTOLIC BLOOD PRESSURE: 70 MMHG | WEIGHT: 153.4 LBS

## 2021-11-29 DIAGNOSIS — R06.09 DYSPNEA ON EXERTION: ICD-10-CM

## 2021-11-29 LAB
CV STRESS MAX HR HE: 97
RATE PRESSURE PRODUCT: NORMAL
STRESS ECHO BASELINE DIASTOLIC HE: 70
STRESS ECHO BASELINE HR: 65 BPM
STRESS ECHO BASELINE SYSTOLIC BP: 124
STRESS ECHO CALCULATED PERCENT HR: 76 %
STRESS ECHO LAST STRESS DIASTOLIC BP: 66
STRESS ECHO LAST STRESS SYSTOLIC BP: 120
STRESS ECHO TARGET HR: 128

## 2021-11-29 PROCEDURE — 250N000011 HC RX IP 250 OP 636: Performed by: INTERNAL MEDICINE

## 2021-11-29 PROCEDURE — 78452 HT MUSCLE IMAGE SPECT MULT: CPT | Mod: 26 | Performed by: INTERNAL MEDICINE

## 2021-11-29 PROCEDURE — 93018 CV STRESS TEST I&R ONLY: CPT | Mod: MG | Performed by: INTERNAL MEDICINE

## 2021-11-29 PROCEDURE — A9502 TC99M TETROFOSMIN: HCPCS | Performed by: INTERNAL MEDICINE

## 2021-11-29 PROCEDURE — G1004 CDSM NDSC: HCPCS

## 2021-11-29 PROCEDURE — 343N000001 HC RX 343: Performed by: INTERNAL MEDICINE

## 2021-11-29 PROCEDURE — G1004 CDSM NDSC: HCPCS | Performed by: INTERNAL MEDICINE

## 2021-11-29 PROCEDURE — 93016 CV STRESS TEST SUPVJ ONLY: CPT | Performed by: INTERNAL MEDICINE

## 2021-11-29 RX ORDER — AMINOPHYLLINE 25 MG/ML
50-100 INJECTION, SOLUTION INTRAVENOUS
Status: DISCONTINUED | OUTPATIENT
Start: 2021-11-29 | End: 2021-11-30 | Stop reason: HOSPADM

## 2021-11-29 RX ORDER — ACYCLOVIR 200 MG/1
0-1 CAPSULE ORAL
Status: DISCONTINUED | OUTPATIENT
Start: 2021-11-29 | End: 2021-11-30 | Stop reason: HOSPADM

## 2021-11-29 RX ORDER — REGADENOSON 0.08 MG/ML
0.4 INJECTION, SOLUTION INTRAVENOUS ONCE
Status: COMPLETED | OUTPATIENT
Start: 2021-11-29 | End: 2021-11-29

## 2021-11-29 RX ORDER — ALBUTEROL SULFATE 90 UG/1
2 AEROSOL, METERED RESPIRATORY (INHALATION) EVERY 5 MIN PRN
Status: DISCONTINUED | OUTPATIENT
Start: 2021-11-29 | End: 2021-11-30 | Stop reason: HOSPADM

## 2021-11-29 RX ORDER — CAFFEINE CITRATE 20 MG/ML
60 SOLUTION INTRAVENOUS
Status: DISCONTINUED | OUTPATIENT
Start: 2021-11-29 | End: 2021-11-30 | Stop reason: HOSPADM

## 2021-11-29 RX ADMIN — REGADENOSON 0.4 MG: 0.08 INJECTION, SOLUTION INTRAVENOUS at 14:27

## 2021-11-29 RX ADMIN — TETROFOSMIN 9.23 MCI.: 1.38 INJECTION, POWDER, LYOPHILIZED, FOR SOLUTION INTRAVENOUS at 14:32

## 2021-11-29 RX ADMIN — TETROFOSMIN 3.47 MCI.: 1.38 INJECTION, POWDER, LYOPHILIZED, FOR SOLUTION INTRAVENOUS at 13:30

## 2021-11-29 ASSESSMENT — MIFFLIN-ST. JEOR: SCORE: 1106.7

## 2021-12-07 ENCOUNTER — HOSPITAL ENCOUNTER (OUTPATIENT)
Dept: CARDIOLOGY | Facility: CLINIC | Age: 86
Discharge: HOME | End: 2021-12-07
Attending: INTERNAL MEDICINE | Admitting: INTERNAL MEDICINE
Payer: MEDICARE

## 2021-12-07 DIAGNOSIS — I51.7 LVH (LEFT VENTRICULAR HYPERTROPHY): ICD-10-CM

## 2021-12-07 PROCEDURE — 93242 EXT ECG>48HR<7D RECORDING: CPT

## 2021-12-07 PROCEDURE — 93244 EXT ECG>48HR<7D REV&INTERPJ: CPT | Performed by: INTERNAL MEDICINE

## 2021-12-09 ENCOUNTER — TELEPHONE (OUTPATIENT)
Dept: CARDIOLOGY | Facility: CLINIC | Age: 86
End: 2021-12-09
Payer: MEDICARE

## 2021-12-09 DIAGNOSIS — R06.09 DYSPNEA ON EXERTION: ICD-10-CM

## 2021-12-09 DIAGNOSIS — I50.20 HEART FAILURE WITH REDUCED EJECTION FRACTION, NYHA CLASS II (H): Primary | ICD-10-CM

## 2021-12-09 RX ORDER — METOPROLOL SUCCINATE 25 MG/1
25 TABLET, EXTENDED RELEASE ORAL DAILY
Qty: 90 TABLET | Refills: 3 | Status: ON HOLD | OUTPATIENT
Start: 2021-12-09 | End: 2022-01-11

## 2021-12-09 NOTE — TELEPHONE ENCOUNTER
Labs, CXR and stress test reviewed by Dr. Mcmahon. No findings to explain ongoing shortness of breath. Would like patient to stop propanolol and start Toprol XL. Would like NICK follow-up to reassess symptoms.    Telephoned Margy, relayed recommendations regarding medications. She verbalized understanding. Confirmed pharmacy. Requested I call her daughter to schedule follow-up appointment.    Spoke with Tina, verbalized understanding regarding medication change. Patient is schedule to see Dr. Sood 12/23, I will follow-up with them by phone that week to see if NICK visit needed, as Tina drives patient. She has my direct contact information if needed.    Zuleyka James, RN  [facility]

## 2021-12-23 ENCOUNTER — TELEPHONE (OUTPATIENT)
Dept: CARDIOLOGY | Facility: CLINIC | Age: 86
End: 2021-12-23

## 2021-12-23 ENCOUNTER — OFFICE VISIT (OUTPATIENT)
Dept: FAMILY MEDICINE | Facility: CLINIC | Age: 86
End: 2021-12-23
Payer: MEDICARE

## 2021-12-23 VITALS
WEIGHT: 150 LBS | DIASTOLIC BLOOD PRESSURE: 91 MMHG | SYSTOLIC BLOOD PRESSURE: 197 MMHG | RESPIRATION RATE: 30 BRPM | BODY MASS INDEX: 24.99 KG/M2 | TEMPERATURE: 96.2 F | HEART RATE: 96 BPM | HEIGHT: 65 IN | OXYGEN SATURATION: 97 %

## 2021-12-23 DIAGNOSIS — G47.00 INSOMNIA, UNSPECIFIED TYPE: ICD-10-CM

## 2021-12-23 DIAGNOSIS — I10 ESSENTIAL HYPERTENSION: ICD-10-CM

## 2021-12-23 DIAGNOSIS — K59.00 CONSTIPATION, UNSPECIFIED CONSTIPATION TYPE: Primary | ICD-10-CM

## 2021-12-23 PROCEDURE — 99214 OFFICE O/P EST MOD 30 MIN: CPT | Performed by: INTERNAL MEDICINE

## 2021-12-23 RX ORDER — AMLODIPINE BESYLATE 2.5 MG/1
2.5 TABLET ORAL DAILY
Qty: 60 TABLET | Refills: 1 | Status: SHIPPED | OUTPATIENT
Start: 2021-12-23 | End: 2022-04-19

## 2021-12-23 RX ORDER — RAMELTEON 8 MG/1
8 TABLET ORAL AT BEDTIME
Qty: 30 TABLET | Refills: 1 | Status: ON HOLD | OUTPATIENT
Start: 2021-12-23 | End: 2022-01-11

## 2021-12-23 RX ORDER — BISACODYL 5 MG/1
5 TABLET, DELAYED RELEASE ORAL DAILY PRN
Qty: 60 TABLET | Refills: 1 | Status: SHIPPED | OUTPATIENT
Start: 2021-12-23 | End: 2022-08-12

## 2021-12-23 ASSESSMENT — MIFFLIN-ST. JEOR: SCORE: 1091.28

## 2021-12-23 ASSESSMENT — PAIN SCALES - GENERAL: PAINLEVEL: NO PAIN (0)

## 2021-12-23 NOTE — TELEPHONE ENCOUNTER
Telephoned daughter Tina to follow-up on change to propanolol. Pt to see PCP Dr. Sood today.    LM with direct contact information, requested call back to discuss.    Zuleyka James RN  [facility]

## 2021-12-23 NOTE — PROGRESS NOTES
Assessment & Plan     Constipation, unspecified constipation type  Likely related to medications inclusive of Ditropan.  As well as a diet low in fiber.  She needs to increase her fluid and fiber intake and utilize Dulcolax as needed    - bisacodyl (DULCOLAX) 5 MG EC tablet; Take 1 tablet (5 mg) by mouth daily as needed for constipation    Essential hypertension  We need to initiate 2.5 mg of amlodipine.  Her systolic pressures are elevated.  I am cognizant of her age we will aim for systolic pressure of 150 range  - amLODIPine (NORVASC) 2.5 MG tablet; Take 1 tablet (2.5 mg) by mouth daily    Insomnia, unspecified type  We will try to avoid benzodiazepines in this patient with generalized muscular weakness.  - ramelteon (ROZEREM) 8 MG tablet; Take 1 tablet (8 mg) by mouth At Bedtime        Return in about 6 weeks (around 2/3/2022) for Follow up.    Braeden Sood MD  [facility] MARGA Ji is a 89 year old who presents for the following health issues  accompanied by her daughter. Tina ALLEN 89-year-old presents clinic today with a few issues.  This is a patient who is having issues of anxiety.  She has insomnia occasionally as well.  BuSpar Seroquel is not helpful for her anxiety as it has paroxetine.  Her current dose of paroxetine is 20 g daily.  Occasionally she is having difficulty in the evenings with sleep.  The past she was treated with benzodiazepines.  I have discussed with her my concerns of this given her history of generalized muscular weakness.  I am very concerned about her possibility of fall utilizing benzodiazepines.  She has utilized alprazolam in the past    Her blood pressure is elevated again today.  We discussed adding a low-dose of a calcium channel blocker.    Having occasional issues with constipation.  Suspect her constipation relates to her utilization of Ditropan as well as supplementation with vitamin D and calcium.  The patient needs to utilize  "over-the-counter fiber, increase her fluid intake, utilize Dulcolax as needed.  I am cognizant that the calcium channel blocker may also exacerbate this.  If this is the case the use of magnesium oxide may be very helpful.        Review of Systems   Constitutional, HEENT, cardiovascular, pulmonary, gi and gu systems are negative, except as otherwise noted.      Objective    BP (!) 197/91 (BP Location: Right arm, Patient Position: Chair, Cuff Size: Adult Regular)   Pulse 96   Temp (!) 96.2  F (35.7  C) (Temporal)   Resp 30   Ht 1.651 m (5' 5\")   Wt 68 kg (150 lb)   SpO2 97%   BMI 24.96 kg/m    Body mass index is 24.96 kg/m .  Physical Exam   Alert patient no apparent distress  Heart regular rate and rhythm  Lungs are clear  Abdomen is soft  Affect is normal mood good.  Mild agitation noted    Hospital Outpatient Visit on 11/29/2021   Component Date Value Ref Range Status     Target HR 11/29/2021 128   Final     Baseline Systolic BP 11/29/2021 124   Final     Baseline Diastolic BP 11/29/2021 70   Final     Last Stress Systolic BP 11/29/2021 120   Final     Last Stress Diastolic BP 11/29/2021 66   Final     Baseline HR 11/29/2021 65  bpm Final     Max HR  11/29/2021 97   Final     Max Perdicted HR  11/29/2021 76  % Final     Rate Pressure Product 11/29/2021 11,640.0   Final               "

## 2021-12-28 ENCOUNTER — TELEPHONE (OUTPATIENT)
Dept: CARDIOLOGY | Facility: CLINIC | Age: 86
End: 2021-12-28
Payer: MEDICARE

## 2021-12-28 NOTE — TELEPHONE ENCOUNTER
Daughter called to state patient was just started on amloidipine for BP by Dr. Sood.  At this time she doesn't feel much different.  I explained to her that it may take 2 weeks before she notices a difference.  She will call next month with update.

## 2022-01-06 ENCOUNTER — APPOINTMENT (OUTPATIENT)
Dept: CT IMAGING | Facility: CLINIC | Age: 87
DRG: 871 | End: 2022-01-06
Attending: EMERGENCY MEDICINE
Payer: MEDICARE

## 2022-01-06 ENCOUNTER — HOSPITAL ENCOUNTER (INPATIENT)
Facility: CLINIC | Age: 87
LOS: 5 days | Discharge: NURSING, REHAB OR LONG-TERM CARE | DRG: 871 | End: 2022-01-11
Attending: EMERGENCY MEDICINE | Admitting: INTERNAL MEDICINE
Payer: MEDICARE

## 2022-01-06 ENCOUNTER — APPOINTMENT (OUTPATIENT)
Dept: GENERAL RADIOLOGY | Facility: CLINIC | Age: 87
DRG: 871 | End: 2022-01-06
Attending: EMERGENCY MEDICINE
Payer: MEDICARE

## 2022-01-06 DIAGNOSIS — R06.09 DYSPNEA ON EXERTION: Primary | ICD-10-CM

## 2022-01-06 DIAGNOSIS — T79.6XXA TRAUMATIC RHABDOMYOLYSIS, INITIAL ENCOUNTER (H): ICD-10-CM

## 2022-01-06 DIAGNOSIS — A41.9 SEPSIS, DUE TO UNSPECIFIED ORGANISM, UNSPECIFIED WHETHER ACUTE ORGAN DYSFUNCTION PRESENT (H): ICD-10-CM

## 2022-01-06 DIAGNOSIS — N39.0 URINARY TRACT INFECTION WITHOUT HEMATURIA, SITE UNSPECIFIED: ICD-10-CM

## 2022-01-06 DIAGNOSIS — F41.9 ANXIETY: ICD-10-CM

## 2022-01-06 DIAGNOSIS — I47.29 NSVT (NONSUSTAINED VENTRICULAR TACHYCARDIA) (H): ICD-10-CM

## 2022-01-06 DIAGNOSIS — N28.9 RENAL INSUFFICIENCY: ICD-10-CM

## 2022-01-06 LAB
ABO/RH(D): NORMAL
ALBUMIN SERPL-MCNC: 3.5 G/DL (ref 3.4–5)
ALBUMIN UR-MCNC: 70 MG/DL
ALP SERPL-CCNC: 108 U/L (ref 40–150)
ALT SERPL W P-5'-P-CCNC: 85 U/L (ref 0–50)
ANION GAP SERPL CALCULATED.3IONS-SCNC: 9 MMOL/L (ref 3–14)
ANTIBODY SCREEN: NEGATIVE
APPEARANCE UR: ABNORMAL
AST SERPL W P-5'-P-CCNC: 133 U/L (ref 0–45)
ATRIAL RATE - MUSE: 120 BPM
BACTERIA #/AREA URNS HPF: ABNORMAL /HPF
BASOPHILS # BLD MANUAL: 0 10E3/UL (ref 0–0.2)
BASOPHILS NFR BLD MANUAL: 0 %
BILIRUB SERPL-MCNC: 0.9 MG/DL (ref 0.2–1.3)
BILIRUB UR QL STRIP: NEGATIVE
BUN SERPL-MCNC: 67 MG/DL (ref 7–30)
CALCIUM SERPL-MCNC: 9.4 MG/DL (ref 8.5–10.1)
CHLORIDE BLD-SCNC: 111 MMOL/L (ref 94–109)
CK SERPL-CCNC: 2103 U/L (ref 30–225)
CO2 SERPL-SCNC: 23 MMOL/L (ref 20–32)
COLOR UR AUTO: ABNORMAL
CREAT BLD-MCNC: 2.7 MG/DL (ref 0.5–1)
CREAT SERPL-MCNC: 2.51 MG/DL (ref 0.52–1.04)
DIASTOLIC BLOOD PRESSURE - MUSE: NORMAL MMHG
EOSINOPHIL # BLD MANUAL: 0.3 10E3/UL (ref 0–0.7)
EOSINOPHIL NFR BLD MANUAL: 1 %
ERYTHROCYTE [DISTWIDTH] IN BLOOD BY AUTOMATED COUNT: 14.5 % (ref 10–15)
GFR SERPL CREATININE-BSD FRML MDRD: 16 ML/MIN/1.73M2
GFR SERPL CREATININE-BSD FRML MDRD: 17 ML/MIN/1.73M2
GLUCOSE BLD-MCNC: 133 MG/DL (ref 70–99)
GLUCOSE BLDC GLUCOMTR-MCNC: 112 MG/DL (ref 70–99)
GLUCOSE UR STRIP-MCNC: NEGATIVE MG/DL
HCO3 BLDV-SCNC: 22 MMOL/L (ref 21–28)
HCT VFR BLD AUTO: 49.1 % (ref 35–47)
HGB BLD-MCNC: 15.4 G/DL (ref 11.7–15.7)
HGB UR QL STRIP: ABNORMAL
HOLD SPECIMEN: NORMAL
INR PPP: 1.27 (ref 0.85–1.15)
INTERPRETATION ECG - MUSE: NORMAL
KETONES UR STRIP-MCNC: NEGATIVE MG/DL
LACTATE BLD-SCNC: 3.1 MMOL/L
LACTATE SERPL-SCNC: 1.4 MMOL/L (ref 0.7–2)
LEUKOCYTE ESTERASE UR QL STRIP: ABNORMAL
LIPASE SERPL-CCNC: 63 U/L (ref 73–393)
LIPASE SERPL-CCNC: 82 U/L (ref 73–393)
LYMPHOCYTES # BLD MANUAL: 2.3 10E3/UL (ref 0.8–5.3)
LYMPHOCYTES NFR BLD MANUAL: 7 %
MAGNESIUM SERPL-MCNC: 2.6 MG/DL (ref 1.6–2.3)
MCH RBC QN AUTO: 29.6 PG (ref 26.5–33)
MCHC RBC AUTO-ENTMCNC: 31.4 G/DL (ref 31.5–36.5)
MCV RBC AUTO: 94 FL (ref 78–100)
MONOCYTES # BLD MANUAL: 2 10E3/UL (ref 0–1.3)
MONOCYTES NFR BLD MANUAL: 6 %
MUCOUS THREADS #/AREA URNS LPF: PRESENT /LPF
NEUTROPHILS # BLD MANUAL: 28.5 10E3/UL (ref 1.6–8.3)
NEUTROPHILS NFR BLD MANUAL: 86 %
NITRATE UR QL: NEGATIVE
P AXIS - MUSE: NORMAL DEGREES
PCO2 BLDV: 40 MM HG (ref 40–50)
PH BLDV: 7.35 [PH] (ref 7.32–7.43)
PH UR STRIP: 5 [PH] (ref 5–7)
PLAT MORPH BLD: ABNORMAL
PLATELET # BLD AUTO: 373 10E3/UL (ref 150–450)
PO2 BLDV: 15 MM HG (ref 25–47)
POTASSIUM BLD-SCNC: 3.8 MMOL/L (ref 3.4–5.3)
PR INTERVAL - MUSE: NORMAL MS
PROCALCITONIN SERPL-MCNC: 0.9 NG/ML
PROT SERPL-MCNC: 8.1 G/DL (ref 6.8–8.8)
QRS DURATION - MUSE: 130 MS
QT - MUSE: 364 MS
QTC - MUSE: 546 MS
R AXIS - MUSE: -49 DEGREES
RBC # BLD AUTO: 5.21 10E6/UL (ref 3.8–5.2)
RBC MORPH BLD: ABNORMAL
RBC URINE: 22 /HPF
SAO2 % BLDV: 17 % (ref 94–100)
SARS-COV-2 RNA RESP QL NAA+PROBE: NEGATIVE
SODIUM SERPL-SCNC: 143 MMOL/L (ref 133–144)
SP GR UR STRIP: 1.02 (ref 1–1.03)
SPECIMEN EXPIRATION DATE: NORMAL
SQUAMOUS EPITHELIAL: 2 /HPF
SYSTOLIC BLOOD PRESSURE - MUSE: NORMAL MMHG
T AXIS - MUSE: 127 DEGREES
TROPONIN I SERPL HS-MCNC: 83 NG/L
UROBILINOGEN UR STRIP-MCNC: NORMAL MG/DL
VENTRICULAR RATE- MUSE: 135 BPM
WBC # BLD AUTO: 33.1 10E3/UL (ref 4–11)
WBC CLUMPS #/AREA URNS HPF: PRESENT /HPF
WBC URINE: 67 /HPF

## 2022-01-06 PROCEDURE — 71045 X-RAY EXAM CHEST 1 VIEW: CPT

## 2022-01-06 PROCEDURE — 82803 BLOOD GASES ANY COMBINATION: CPT

## 2022-01-06 PROCEDURE — 250N000013 HC RX MED GY IP 250 OP 250 PS 637: Performed by: INTERNAL MEDICINE

## 2022-01-06 PROCEDURE — 36415 COLL VENOUS BLD VENIPUNCTURE: CPT | Performed by: EMERGENCY MEDICINE

## 2022-01-06 PROCEDURE — 87635 SARS-COV-2 COVID-19 AMP PRB: CPT | Performed by: EMERGENCY MEDICINE

## 2022-01-06 PROCEDURE — 250N000011 HC RX IP 250 OP 636: Performed by: EMERGENCY MEDICINE

## 2022-01-06 PROCEDURE — 86901 BLOOD TYPING SEROLOGIC RH(D): CPT | Performed by: EMERGENCY MEDICINE

## 2022-01-06 PROCEDURE — G1004 CDSM NDSC: HCPCS

## 2022-01-06 PROCEDURE — 83605 ASSAY OF LACTIC ACID: CPT | Performed by: EMERGENCY MEDICINE

## 2022-01-06 PROCEDURE — 84484 ASSAY OF TROPONIN QUANT: CPT | Performed by: EMERGENCY MEDICINE

## 2022-01-06 PROCEDURE — 96365 THER/PROPH/DIAG IV INF INIT: CPT

## 2022-01-06 PROCEDURE — 96367 TX/PROPH/DG ADDL SEQ IV INF: CPT

## 2022-01-06 PROCEDURE — 250N000009 HC RX 250: Performed by: EMERGENCY MEDICINE

## 2022-01-06 PROCEDURE — 83735 ASSAY OF MAGNESIUM: CPT | Performed by: INTERNAL MEDICINE

## 2022-01-06 PROCEDURE — 99223 1ST HOSP IP/OBS HIGH 75: CPT | Mod: AI | Performed by: INTERNAL MEDICINE

## 2022-01-06 PROCEDURE — 99232 SBSQ HOSP IP/OBS MODERATE 35: CPT | Performed by: NURSE PRACTITIONER

## 2022-01-06 PROCEDURE — C9803 HOPD COVID-19 SPEC COLLECT: HCPCS

## 2022-01-06 PROCEDURE — 99285 EMERGENCY DEPT VISIT HI MDM: CPT | Mod: 25

## 2022-01-06 PROCEDURE — C9113 INJ PANTOPRAZOLE SODIUM, VIA: HCPCS | Performed by: EMERGENCY MEDICINE

## 2022-01-06 PROCEDURE — 250N000011 HC RX IP 250 OP 636

## 2022-01-06 PROCEDURE — 82803 BLOOD GASES ANY COMBINATION: CPT | Performed by: NURSE PRACTITIONER

## 2022-01-06 PROCEDURE — 84145 PROCALCITONIN (PCT): CPT | Performed by: INTERNAL MEDICINE

## 2022-01-06 PROCEDURE — 258N000003 HC RX IP 258 OP 636: Performed by: EMERGENCY MEDICINE

## 2022-01-06 PROCEDURE — 258N000003 HC RX IP 258 OP 636: Performed by: INTERNAL MEDICINE

## 2022-01-06 PROCEDURE — 99356 PR PROLONGED SERV,INPATIENT,1ST HR: CPT | Performed by: NURSE PRACTITIONER

## 2022-01-06 PROCEDURE — 96375 TX/PRO/DX INJ NEW DRUG ADDON: CPT

## 2022-01-06 PROCEDURE — 87086 URINE CULTURE/COLONY COUNT: CPT | Performed by: EMERGENCY MEDICINE

## 2022-01-06 PROCEDURE — 210N000002 HC R&B HEART CARE

## 2022-01-06 PROCEDURE — 83690 ASSAY OF LIPASE: CPT | Performed by: EMERGENCY MEDICINE

## 2022-01-06 PROCEDURE — 81001 URINALYSIS AUTO W/SCOPE: CPT | Performed by: EMERGENCY MEDICINE

## 2022-01-06 PROCEDURE — 82550 ASSAY OF CK (CPK): CPT | Performed by: EMERGENCY MEDICINE

## 2022-01-06 PROCEDURE — 82310 ASSAY OF CALCIUM: CPT | Performed by: EMERGENCY MEDICINE

## 2022-01-06 PROCEDURE — 82565 ASSAY OF CREATININE: CPT

## 2022-01-06 PROCEDURE — 96361 HYDRATE IV INFUSION ADD-ON: CPT

## 2022-01-06 PROCEDURE — 85610 PROTHROMBIN TIME: CPT | Performed by: EMERGENCY MEDICINE

## 2022-01-06 PROCEDURE — 96366 THER/PROPH/DIAG IV INF ADDON: CPT

## 2022-01-06 PROCEDURE — 83605 ASSAY OF LACTIC ACID: CPT

## 2022-01-06 PROCEDURE — 87040 BLOOD CULTURE FOR BACTERIA: CPT | Performed by: EMERGENCY MEDICINE

## 2022-01-06 PROCEDURE — 93005 ELECTROCARDIOGRAM TRACING: CPT

## 2022-01-06 PROCEDURE — 85027 COMPLETE CBC AUTOMATED: CPT | Performed by: EMERGENCY MEDICINE

## 2022-01-06 PROCEDURE — 250N000009 HC RX 250: Performed by: INTERNAL MEDICINE

## 2022-01-06 RX ORDER — PIPERACILLIN SODIUM, TAZOBACTAM SODIUM 2; .25 G/10ML; G/10ML
2.25 INJECTION, POWDER, LYOPHILIZED, FOR SOLUTION INTRAVENOUS ONCE
Status: COMPLETED | OUTPATIENT
Start: 2022-01-06 | End: 2022-01-06

## 2022-01-06 RX ORDER — PIPERACILLIN SODIUM, TAZOBACTAM SODIUM 2; .25 G/10ML; G/10ML
2.25 INJECTION, POWDER, LYOPHILIZED, FOR SOLUTION INTRAVENOUS EVERY 6 HOURS
Status: DISCONTINUED | OUTPATIENT
Start: 2022-01-07 | End: 2022-01-09

## 2022-01-06 RX ORDER — LIDOCAINE 40 MG/G
CREAM TOPICAL
Status: DISCONTINUED | OUTPATIENT
Start: 2022-01-06 | End: 2022-01-11 | Stop reason: HOSPADM

## 2022-01-06 RX ORDER — IOPAMIDOL 755 MG/ML
69 INJECTION, SOLUTION INTRAVASCULAR ONCE
Status: DISCONTINUED | OUTPATIENT
Start: 2022-01-06 | End: 2022-01-06

## 2022-01-06 RX ORDER — BISACODYL 10 MG
10 SUPPOSITORY, RECTAL RECTAL DAILY PRN
Status: DISCONTINUED | OUTPATIENT
Start: 2022-01-06 | End: 2022-01-11 | Stop reason: HOSPADM

## 2022-01-06 RX ORDER — CARBOXYMETHYLCELLULOSE SODIUM 5 MG/ML
1 SOLUTION/ DROPS OPHTHALMIC DAILY
Status: DISCONTINUED | OUTPATIENT
Start: 2022-01-07 | End: 2022-01-11 | Stop reason: HOSPADM

## 2022-01-06 RX ORDER — QUETIAPINE FUMARATE 25 MG/1
25 TABLET, FILM COATED ORAL AT BEDTIME
Status: DISCONTINUED | OUTPATIENT
Start: 2022-01-06 | End: 2022-01-11 | Stop reason: HOSPADM

## 2022-01-06 RX ORDER — SODIUM CHLORIDE 9 MG/ML
INJECTION, SOLUTION INTRAVENOUS CONTINUOUS
Status: DISCONTINUED | OUTPATIENT
Start: 2022-01-06 | End: 2022-01-07

## 2022-01-06 RX ORDER — PAROXETINE 20 MG/1
20 TABLET, FILM COATED ORAL AT BEDTIME
Status: DISCONTINUED | OUTPATIENT
Start: 2022-01-06 | End: 2022-01-11 | Stop reason: HOSPADM

## 2022-01-06 RX ORDER — SENNOSIDES 8.6 MG
1-2 TABLET ORAL 2 TIMES DAILY PRN
Status: DISCONTINUED | OUTPATIENT
Start: 2022-01-06 | End: 2022-01-11 | Stop reason: HOSPADM

## 2022-01-06 RX ORDER — ONDANSETRON 2 MG/ML
4 INJECTION INTRAMUSCULAR; INTRAVENOUS EVERY 6 HOURS PRN
Status: DISCONTINUED | OUTPATIENT
Start: 2022-01-06 | End: 2022-01-11 | Stop reason: HOSPADM

## 2022-01-06 RX ORDER — ACETAMINOPHEN 650 MG/1
650 SUPPOSITORY RECTAL EVERY 6 HOURS PRN
Status: DISCONTINUED | OUTPATIENT
Start: 2022-01-06 | End: 2022-01-11 | Stop reason: HOSPADM

## 2022-01-06 RX ORDER — ONDANSETRON 4 MG/1
4 TABLET, ORALLY DISINTEGRATING ORAL EVERY 6 HOURS PRN
Status: DISCONTINUED | OUTPATIENT
Start: 2022-01-06 | End: 2022-01-11 | Stop reason: HOSPADM

## 2022-01-06 RX ORDER — ACETAMINOPHEN 325 MG/1
650 TABLET ORAL EVERY 6 HOURS PRN
Status: DISCONTINUED | OUTPATIENT
Start: 2022-01-06 | End: 2022-01-11 | Stop reason: HOSPADM

## 2022-01-06 RX ORDER — METOPROLOL SUCCINATE 25 MG/1
25 TABLET, EXTENDED RELEASE ORAL DAILY
Status: DISCONTINUED | OUTPATIENT
Start: 2022-01-07 | End: 2022-01-08

## 2022-01-06 RX ORDER — PROCHLORPERAZINE MALEATE 5 MG
5 TABLET ORAL EVERY 6 HOURS PRN
Status: DISCONTINUED | OUTPATIENT
Start: 2022-01-06 | End: 2022-01-11 | Stop reason: HOSPADM

## 2022-01-06 RX ORDER — NALOXONE HYDROCHLORIDE 0.4 MG/ML
INJECTION, SOLUTION INTRAMUSCULAR; INTRAVENOUS; SUBCUTANEOUS
Status: COMPLETED
Start: 2022-01-06 | End: 2022-01-06

## 2022-01-06 RX ORDER — PROCHLORPERAZINE 25 MG
12.5 SUPPOSITORY, RECTAL RECTAL EVERY 12 HOURS PRN
Status: DISCONTINUED | OUTPATIENT
Start: 2022-01-06 | End: 2022-01-11 | Stop reason: HOSPADM

## 2022-01-06 RX ORDER — POLYETHYLENE GLYCOL 3350 17 G/17G
17 POWDER, FOR SOLUTION ORAL DAILY PRN
Status: DISCONTINUED | OUTPATIENT
Start: 2022-01-06 | End: 2022-01-11 | Stop reason: HOSPADM

## 2022-01-06 RX ORDER — HYDROMORPHONE HYDROCHLORIDE 1 MG/ML
0.2 INJECTION, SOLUTION INTRAMUSCULAR; INTRAVENOUS; SUBCUTANEOUS ONCE
Status: COMPLETED | OUTPATIENT
Start: 2022-01-06 | End: 2022-01-06

## 2022-01-06 RX ADMIN — PAROXETINE HYDROCHLORIDE 20 MG: 20 TABLET, FILM COATED ORAL at 21:47

## 2022-01-06 RX ADMIN — HYDROMORPHONE HYDROCHLORIDE 0.2 MG: 1 INJECTION, SOLUTION INTRAMUSCULAR; INTRAVENOUS; SUBCUTANEOUS at 14:57

## 2022-01-06 RX ADMIN — PIPERACILLIN SODIUM AND TAZOBACTAM SODIUM 2.25 G: 2; .25 INJECTION, POWDER, LYOPHILIZED, FOR SOLUTION INTRAVENOUS at 21:45

## 2022-01-06 RX ADMIN — SODIUM CHLORIDE: 9 INJECTION, SOLUTION INTRAVENOUS at 21:47

## 2022-01-06 RX ADMIN — DILTIAZEM HYDROCHLORIDE 5 MG/HR: 5 INJECTION INTRAVENOUS at 21:46

## 2022-01-06 RX ADMIN — SODIUM CHLORIDE 1000 ML: 9 INJECTION, SOLUTION INTRAVENOUS at 14:15

## 2022-01-06 RX ADMIN — PIPERACILLIN SODIUM AND TAZOBACTAM SODIUM 2.25 G: 2; .25 INJECTION, POWDER, LYOPHILIZED, FOR SOLUTION INTRAVENOUS at 15:11

## 2022-01-06 RX ADMIN — SODIUM CHLORIDE 1000 ML: 9 INJECTION, SOLUTION INTRAVENOUS at 20:34

## 2022-01-06 RX ADMIN — ACETAMINOPHEN 650 MG: 325 TABLET, FILM COATED ORAL at 22:10

## 2022-01-06 RX ADMIN — PANTOPRAZOLE SODIUM 40 MG: 40 INJECTION, POWDER, FOR SOLUTION INTRAVENOUS at 14:17

## 2022-01-06 RX ADMIN — QUETIAPINE FUMARATE 25 MG: 25 TABLET ORAL at 22:01

## 2022-01-06 RX ADMIN — NALOXONE HYDROCHLORIDE 0.4 MG: 0.4 INJECTION, SOLUTION INTRAMUSCULAR; INTRAVENOUS; SUBCUTANEOUS at 23:49

## 2022-01-06 ASSESSMENT — ACTIVITIES OF DAILY LIVING (ADL)
ADLS_ACUITY_SCORE: 11

## 2022-01-06 NOTE — ED NOTES
Bed: ST02  Expected date:   Expected time:   Means of arrival:   Comments:  Sheeba 2 to room 9/pt is leaving soon

## 2022-01-06 NOTE — ED NOTES
[facility]  ED Nurse Handoff Report    ED Chief complaint: Fall      ED Diagnosis:   Final diagnoses:   None       Code Status: DNR / DNI    Allergies: No Known Allergies    Patient Story: Found down, estimate of 18 hrs. Covered in urine and with blood in the moth.  Focused Assessment:  Alert, disoriented x4. In 2L of O2. 20 G in ACs bilaterally. Moves all extremities. Dry blood in the mouth. A-fib with RVR upon arrival. Cathed for UA.     Labs Ordered and Resulted from Time of ED Arrival to Time of ED Departure   ISTAT GASES LACTATE VENOUS POCT - Abnormal       Result Value    Lactic Acid POCT 3.1 (*)     Bicarbonate Venous POCT 22      O2 Sat, Venous POCT 17 (*)     pCO2V Venous POCT 40      pH Venous POCT 7.35      pO2 Venous POCT 15 (*)    INR - Abnormal    INR 1.27 (*)    COMPREHENSIVE METABOLIC PANEL - Abnormal    Sodium 143      Potassium 3.8      Chloride 111 (*)     Carbon Dioxide (CO2) 23      Anion Gap 9      Urea Nitrogen 67 (*)     Creatinine 2.51 (*)     Calcium 9.4      Glucose 133 (*)     Alkaline Phosphatase 108       (*)     ALT 85 (*)     Protein Total 8.1      Albumin 3.5      Bilirubin Total 0.9      GFR Estimate 17 (*)    TROPONIN I - Abnormal    Troponin I High Sensitivity 83 (*)    ROUTINE UA WITH MICROSCOPIC REFLEX TO CULTURE - Abnormal    Color Urine Orange (*)     Appearance Urine Cloudy (*)     Glucose Urine Negative      Bilirubin Urine Negative      Ketones Urine Negative      Specific Gravity Urine 1.020      Blood Urine Large (*)     pH Urine 5.0      Protein Albumin Urine 70  (*)     Urobilinogen Urine Normal      Nitrite Urine Negative      Leukocyte Esterase Urine Large (*)     Bacteria Urine Many (*)     WBC Clumps Urine Present (*)     Mucus Urine Present (*)     RBC Urine 22 (*)     WBC Urine 67 (*)     Squamous Epithelials Urine 2 (*)    CBC WITH PLATELETS AND DIFFERENTIAL - Abnormal    WBC Count 33.1 (*)     RBC Count 5.21 (*)     Hemoglobin 15.4       Hematocrit 49.1 (*)     MCV 94      MCH 29.6      MCHC 31.4 (*)     RDW 14.5      Platelet Count 373     ISTAT CREATININE POCT - Abnormal    Creatinine POCT 2.7 (*)     GFR, ESTIMATED POCT 16 (*)    DIFFERENTIAL - Abnormal    % Neutrophils 86      % Lymphocytes 7      % Monocytes 6      % Eosinophils 1      % Basophils 0      Absolute Neutrophils 28.5 (*)     Absolute Lymphocytes 2.3      Absolute Monocytes 2.0 (*)     Absolute Eosinophils 0.3      Absolute Basophils 0.0      RBC Morphology Confirmed RBC Indices      Platelet Assessment        Value: Automated Count Confirmed. Platelet morphology is normal.   LIPASE - Normal    Lipase 82     COVID-19 VIRUS (CORONAVIRUS) BY PCR   CK TOTAL   TYPE AND SCREEN, ADULT   BLOOD CULTURE   BLOOD CULTURE   URINE CULTURE   ABO/RH TYPE AND SCREEN     XR Chest Port 1 View   Preliminary Result   IMPRESSION: No acute disease.       CT Head w/o Contrast    (Results Pending)   CT Abdomen Pelvis w/o Contrast    (Results Pending)         Treatments and/or interventions provided: NS, Dilaudid, Sozyn  Patient's response to treatments and/or interventions: Tolerated well    To be done/followed up on inpatient unit:  Continue with plan of care per admitting MD.      Does this patient have any cognitive concerns?: Forgetful and Disoriented to time    Activity level - Baseline/Home:  Independent  Activity Level - Current:   Total Care    Patient's Preferred language: English   Needed?: No    Isolation: None  Infection: Not Applicable  Patient tested for COVID 19 prior to admission: YES  Bariatric?: No    Vital Signs:   Vitals:    01/06/22 1405 01/06/22 1410 01/06/22 1415 01/06/22 1420   BP: (!) 154/81 (!) 134/101 (!) 127/93 (!) 150/76   Pulse: (!) 210 (!) 131 (!) 135 (!) 137   Resp: 23 28 22 18   Temp:       TempSrc:       SpO2:   92% 95%   Weight:           Cardiac Rhythm:     Was the PSS-3 completed:   Yes  What interventions are required if any?               Family Comments:  Daughter called the ED and is on her way  OBS brochure/video discussed/provided to patient/family: N/A               For the majority of the shift this patient's behavior was Green.     ED NURSE PHONE NUMBER: [id number]

## 2022-01-06 NOTE — H&P
[facility]    History and Physical - Hospitalist Service       Date of Admission:  1/6/2022    Assessment & Plan      Margy Babin is a 89 year old female history of chronic nausea, hypertension, paroxysmal atrial flutter, HLD, insomnia, anxiety, and recent cardiac work-up for progressive dyspnea as outlined below who is admitted on 1/6/2022 after she was found in her apartment on the floor of the bathroom feces and confused.    At presentation her heart rate was 128 (EKG: afib with RVR with LBBB) blood pressure 154/81 respirations 28, oxygenation 92% on room air.     Labs were remarkable for an elevated BUN 67, Cr 2.51 (recent baseline Cr 0.95), ALT 85  with a CK of 2103.  Lipase normal.  Lactic acid was initially 3.1 normalized to 1.4 after fluids.  Troponin mildly elevated at 83.  Glucose 133.  VBG revealed a normal pH of 7.35 CO2 40.  Her white cell count was markedly elevated at 33.1 (neutrophilic) hemoglobin 15.4 platelets 373, INR 1.27.  Her UA was contaminated with 2 squamous cells 67 WBCs 22 RBCs and concentrated at 1.020 with elevated protein.      CT of her head showed no acute findings.  CT of the abdomen pelvis was unremarkable as well.  Chest x-ray reviewed by myself is clear as well.  Covid PCR negative.  Blood cultures and urine cultures were obtained.    In the ED she received 2 L normal saline, 40 mg IV Protonix x1, 0.2 mg of IV Dilaudid, and 2.25 g of Zosyn.     Acute metabolic encephalopathy, resolving multifactorial secondary to below.  * Head CT negative.  Mentation is clearing and patient is returning to baseline.  She lives independently and has no history of dementia.    Frequent re-orientation  Maintain normal day/night, sleep wake cycles  Minimize sedating/altering medications as able  Treat separate conditions as detailed above/below    Sepsis (WBC 33, , Afeb) with probable UTI No localizing symptoms and chest x-ray and CT of the abdomen  pelvis negative.  Lactic acid elevation, resolved    Continue antibiotics with IV Zosyn to treat UTI  Follow blood cultures and urine culture  Hemodynamically stable, continue NS at 100 ml/h.     Atrial fib/flutter with RVR   Dyspnea on exertion x months with recent evaluation by cardiology with echo, lexiscan, zio patch as outlined below   H/o SVT, VTach Zio patch on 12/7/2021: 2 episodes of VT up to 15 beats, and 16 episodes of SVT up to 17 beats.  H/o Aflutter, paroxysmal (seen on EKG 2020) during hospitalization for N/V, but do not see this diagnosis noted in the in EMR.   Cardiomyopathy, HF borderline EF unknown etiology Echo 11/5/2021: mild LVH, proximal septal thickening not consistent with left ventricular outflow obstruction.  EF mildly low at 46 to 51% with moderately dilated left atrium and mild to moderate aortic regurgitation RV was of normal size and function  Lexiscan on 11/29/2021 revealed an abnormality consistent with left bundle branch block no evidence of ischemia or infarction EF was 59% at rest, 50% with stress.   LBBB seen on 2020 EKG.      Telemetry   Start heparin drip for A. fib/a flutter in anticipation of possible cardioversion although I do not think she is a great candidate for this as it appears she has recurrent proximal paroxysmal episodes  Rate control with IV diltiazem.    Continue PTA metoprolol. Otherwise, hold on initiation of further rate controlling oral medications given concern for sepsis  Echo  Follow serial troponins  Check magnesium and TSH    Suspect acute dehydration  Mild rhabdomyolysis with from being down CK elevation in the 2000 (low risk for kidney injury) and   Severe CRISSY on chronic stage 3a CKD  Cr, 2.51 at admission, baseline Cr 0.95 (on 11/23/21)     Continue IV fluids at 100 cc/h following strict I's and O's and daily creatinines.  Follow CK    Liver enzyme elevation Suspect from CK elevation    Follow serial levels and abdominal exam.     HTN  Hold PTA  lisinopril and amlodipine for AK I with possible sepsis  Continue metoprolol     Anxiety/depression, insomnia  Hold ramelteon, which was just recently started  Hold PRN xanax  Continue PTA quetiapine at HS  Continue PTA paroxetine     Chronic nausea  Chronic consitipation -none currently.  Nausea and vomiting protocol was ordered.  Bowel regimen ordered     Polypharmacy -  Recommended she discuss decreasing her medications with her PCP as this likely contributes to her nausea     Urgency  Hold PTA oxybutynin        Diet:  regular diet.   DVT Prophylaxis: heparin gtt.   Pichardo Catheter: Not present  Central Lines: None  Code Status:  DNR/DNI    Dispo: Admit to inpatient with dispo TBD     Clinically Significant Risk Factors Present on Admission             # Coagulation Defect: INR = 1.27 (Ref range: 0.85 - 1.15) and/or PTT = N/A on admission, will monitor for bleeding      The patient's care was discussed with the daughter, granddaughter, ED physician and RN.    Iona Pena MD  [facility]  Securely message with the Vocera Web Console (learn more here)  Text page via [employer] Paging/Directory    ______________________________________________________________________    Chief Complaint    Found down, altered.     History is obtained from the ED physician note and daughter.     History of Present Illness    Margy Babin is a 89 year old female with history of chronic nausea, hypertension, paroxysmal atrial flutter, HLD, insomnia, anxiety, and recent cardiac work-up for progressive dyspnea as outlined below who is admitted on 1/6/2022 after she was found in her apartment on the floor of the bathroom, confused.  They went to check on her for normal well check this AM and she was found on the floor of her bathroom covered with feces, unable to get up, and confused.  Patient cannot tell me history of recent events.  She thinks she was in her bed and unable to get out due to guardrails  on her bed. She does not recall being in the bathroom.  Per  daughter, she has been in her usual state of health.  There has been no recent signs of acute illness. She does have chronic nausea and progressive dyspnea on exertion, which was evaluated by the cardiologist.  Otherwise she has had no recent signs of infection no fevers chills, vomiting, diarrhea, complaints of urinary symptoms, rashes.  No signs of fluid overload, edema. She lives independently and is quite sharp.  There is no history of memory problems or dementia. Daughter also notes some med changes in the last month from cardiologist and PCP for insomnia. She is unsure of the names of the medications.     At presentation her heart rate was 128 (EKG: afib with RVR with LBBB) blood pressure 154/81 respirations 28, oxygenation 92% on room air.     Labs were remarkable for an elevated BUN 67, Cr 2.51 (recent baseline Cr 0.95), ALT 85  with a CK of 2103.  Lipase normal.  Lactic acid was initially 3.1 normalized to 1.4 after fluids.  Troponin mildly elevated at 83.  Glucose 133.  VBG revealed a normal pH of 7.35 CO2 40.  Her white cell count was markedly elevated at 33.1 (neutrophilic) hemoglobin 15.4 platelets 373, INR 1.27.  Her UA was contaminated with 2 squamous cells 67 WBCs 22 RBCs and concentrated at 1.020 with elevated protein.      CT of her head showed no acute findings.  CT of the abdomen pelvis was unremarkable as well.  Chest x-ray reviewed by myself is clear as well.  Covid PCR negative.  Blood cultures and urine cultures were obtained.    In the ED she received 2 L normal saline, 40 mg IV Protonix x1, 0.2 mg of IV Dilaudid, and 2.25 g of Zosyn.   Review of Systems    The 10 point Review of Systems is negative other than noted in the HPI.     Past Medical History    I have reviewed this patient's medical history and updated it with pertinent information if needed.   Past Medical History:   Diagnosis Date     Anxiety      Blepharitis of  both eyes      Depression, major, recurrent, in complete remission (H)      Dry eye syndrome      Dyspnea on exertion 10/7/2021     Essential hypertension 3/5/2020     Familial tremor 3/6/2013     Insomnia, unspecified type 11/3/2014    No CSA on file Last  check - 2020 with no concerns.     Mixed hyperlipidemia 2020     Nausea without vomiting 2015     Problem list name updated by automated process. Provider to review     Tremor, hereditary, benign    Dyspnea   SVT, VTach Zio patch on 2021: 2 episodes of VT up to 15 beats, and 16 episodes of SVT up to 17 beats.  Aflutter, paroxysmal (seen on EKG ) during hospitalization for N/V.   Cardiomyopathy, HF borderline EF unknown etiology Echo 2021: mild LVH, proximal septal thickening not consistent with left ventricular outflow obstruction.  EF mildly low at 46 to 51% with moderately dilated left atrium and mild to moderate aortic regurgitation RV was of normal size and function  Lexiscan on 2021 revealed an abnormality consistent with left bundle branch block no evidence of ischemia or infarction EF was 59% at rest, 50% with stress.   LBBB seen on  EKG.      Past Surgical History   I have reviewed this patient's surgical history and updated it with pertinent information if needed.  Past Surgical History:   Procedure Laterality Date     HYSTERECTOMY TOTAL ABDOMINAL, BILATERAL SALPINGO-OOPHORECTOMY, COMBINED       NECK SURGERY  2009    cervical fusion     ROTATOR CUFF REPAIR RT/LT Right      ZZC TOTAL HIP ARTHROPLASTY Right        Social History   I have reviewed this patient's social history and updated it with pertinent information if needed.  [address] [place] [address] apartment. Totally independent. Uses walker.     in July, lived separately due to dementia.   Does not alcohol.   Social History     Tobacco Use     Smoking status: Never Smoker     Smokeless tobacco: Never Used   Substance Use Topics     Alcohol use: No      Alcohol/week: 0.0 standard drinks     Drug use: No       Family History    I have reviewed this patient's family history and updated it with pertinent information if needed.  Family History   Problem Relation Age of Onset     Heart Disease Father 80        MI     C.A.D. Father      Breast Cancer Daughter 43         age 53     Emphysema Sister      Heart Disease Sister        Prior to Admission Medications    Prior to Admission Medications   Prescriptions Last Dose Informant Patient Reported? Taking?   ALPRAZolam (XANAX) 0.25 MG tablet  Daughter No No   Sig: TAKE 1 TO 2 TABLETS BY MOUTH TWICE DAILY AS NEEDED   Calcium Carbonate (CALCIUM 600 PO)  Daughter Yes No   Sig: Take 1 tablet by mouth daily.   Cholecalciferol (VITAMIN D) 1000 UNITS capsule  Daughter Yes No   Sig: Take 1 capsule by mouth daily.   Multiple Vitamins-Minerals (MULTIVITAL) TABS  Daughter Yes No   Sig: Take 1 tablet by mouth daily.   PARoxetine (PAXIL) 20 MG tablet   No No   Sig: TAKE 1 TABLET(20 MG) BY MOUTH AT BEDTIME   QUEtiapine (SEROQUEL) 25 MG tablet   No No   Sig: TAKE 1 TABLET(25 MG) BY MOUTH AT BEDTIME   acetaminophen (TYLENOL) 500 MG tablet  Daughter No No   Sig: Take 2 tablets (1,000 mg) by mouth 3 times daily as needed for mild pain   Patient not taking: Reported on 2021   amLODIPine (NORVASC) 2.5 MG tablet   No No   Sig: Take 1 tablet (2.5 mg) by mouth daily   bisacodyl (DULCOLAX) 5 MG EC tablet   No No   Sig: Take 1 tablet (5 mg) by mouth daily as needed for constipation   cycloSPORINE (RESTASIS) 0.05 % ophthalmic emulsion  Daughter Yes No   Sig: Apply 1 drop to eye 2 times daily   docusate sodium (COLACE) 100 MG capsule  Daughter No No   Sig: Take 1 capsule (100 mg) by mouth 2 times daily as needed for constipation   lisinopril (ZESTRIL) 10 MG tablet   No No   Sig: Take 1 tablet (10 mg) by mouth daily   metoprolol succinate ER (TOPROL XL) 25 MG 24 hr tablet   No No   Sig: Take 1 tablet (25 mg) by mouth daily   ondansetron  (ZOFRAN) 4 MG tablet   No No   Sig: Take 1 tablet (4 mg) by mouth every 8 hours as needed for nausea   order for DME  Daughter No No   Sig: Equipment being ordered: Walker Wheels () and Walker ()  Treatment Diagnosis: Difficulty ambulating   oxybutynin ER (DITROPAN XL) 15 MG 24 hr tablet   No No   Sig: TAKE 1 TABLET(15 MG) BY MOUTH DAILY   ramelteon (ROZEREM) 8 MG tablet   No No   Sig: Take 1 tablet (8 mg) by mouth At Bedtime      Facility-Administered Medications: None     Allergies   No Known Allergies    Physical Exam   Vital Signs: Temp: 97.4  F (36.3  C) Temp src: Rectal BP: (!) 156/90 Pulse: (!) 130   Resp: 19 SpO2: 96 %      Weight: 136 lbs 10.96 oz    Constitutional:   awake, alert, cooperative, no apparent distress, and appears stated age     Eyes:   Lids and lashes normal, extra ocular muscles intact, sclera clear, conjunctiva normal     ENT:   Normocephalic, without obvious abnormality, atramatic, oral pharynx very dry.      Neck:   Supple, symmetrical, trachea midline, no adenopathy, thyroid symmetric, not enlarged and no tenderness, skin normal     Lungs:   No increased work of breathing, good air exchange, clear to auscultation bilaterally, no crackles or wheezing     Cardiovascular:   Irregular and tachy currently regular and appears to be in aflutter on monitor, normal S1 and S2, no S3 or S4, and no murmur noted. Extremities are warm. No edema.      Abdomen:   Normal bowel sounds, soft, non-distended, non-tender, no masses palpated, no hepatosplenomegally     Musculoskeletal:   There is no redness, warmth, or swelling of the joints. Frail build     Neurologic:   Awake, alert, oriented to being in hospital, but can not recall recent eventss  Speech intact. Follows commands  Face symmetric   Horizontal gaze normal.   Moving all 4 extremities without gross focal deficit.      Neuropsychiatric:   General: normal, calm and normal eye contact     Skin:   Scattered bruising and abrasions. No  bleeding, redness, warmth, or swelling and no rashes         Data   Data reviewed today: I reviewed all medications, new labs and imaging results over the last 24 hours. I personally reviewed the EKG tracing showing as above  and the chest x-ray image(s) showing as above.    Recent Labs   Lab 01/06/22  1431 01/06/22  1406 01/06/22  0506   WBC  --  33.1*  --    HGB  --  15.4  --    MCV  --  94  --    PLT  --  373  --    INR  --  1.27*  --    NA  --  143  --    POTASSIUM  --  3.8  --    CHLORIDE  --  111*  --    CO2  --  23  --    BUN  --  67*  --    CR 2.7* 2.51*  --    ANIONGAP  --  9  --    SOBIA  --  9.4  --    GLC  --  133*  --    ALBUMIN  --  3.5  --    PROTTOTAL  --  8.1  --    BILITOTAL  --  0.9  --    ALKPHOS  --  108  --    ALT  --  85*  --    AST  --  133*  --    LIPASE  --  82 63*     Recent Results (from the past 24 hour(s))   XR Chest Port 1 View    Narrative    CHEST ONE VIEW  1/6/2022 2:14 PM     HISTORY: Weak.    COMPARISON: November 16, 2021.      Impression    IMPRESSION: No acute disease.     ELI SCHUMACHER MD         SYSTEM ID:  [id number]   CT Abdomen Pelvis w/o Contrast    Narrative    CT ABDOMEN AND PELVIS WITHOUT CONTRAST 1/6/2022 3:11 PM    CLINICAL HISTORY: Abdominal pain, acute, nonlocalized.     TECHNIQUE: CT scan of the abdomen and pelvis was performed without IV  contrast. Multiplanar reformats were obtained. Dose reduction  techniques were used.  CONTRAST: None.    COMPARISON: 6/27/2017    FINDINGS:   LOWER CHEST: No infiltrates or effusions. Minimal scattered probable  atelectasis. Small hiatal hernia.    HEPATOBILIARY: No significant mass or bile duct dilatation. No  calcified gallstones.     PANCREAS: No significant mass, duct dilatation, or inflammatory  change.    SPLEEN: Normal size.    ADRENAL GLANDS: No significant nodules.    KIDNEYS/BLADDER: No significant mass, stones, or hydronephrosis.    BOWEL: No obstruction or inflammatory change.    VASCULATURE: There are moderate  atherosclerotic changes of the  visualized aorta and its branches. There is no evidence of aortic  aneurysm.    PELVIC ORGANS: No pelvic masses.    OTHER: No free air or free fluid.    MUSCULOSKELETAL: No suspicious bony lesions.      Impression    IMPRESSION:   1.  No acute process demonstrated in the abdomen and pelvis.    ELI SCHUMACHER MD         SYSTEM ID:  [id number]   CT Head w/o Contrast    Narrative    CT OF THE HEAD WITHOUT CONTRAST 1/6/2022 3:11 PM     COMPARISON: Head CT 12/25/2020    HISTORY: Trauma.  Head injury.    TECHNIQUE: 5 mm thick axial CT images of the head were acquired  without IV contrast material.    FINDINGS:  There is mild diffuse cerebral volume loss. There are  extensive confluent areas of decreased density in the cerebral white  matter bilaterally that are consistent with sequela of chronic small  vessel ischemic disease.     The ventricles and basal cisterns are within normal limits in  configuration given the degree of cerebral volume loss.  There is no  midline shift. There are no extra-axial fluid collections.     No intracranial hemorrhage, mass or recent infarct.    The visualized paranasal sinuses are well-aerated. There is no  mastoiditis. There are no fractures of the visualized bones.       Impression    IMPRESSION:  Diffuse cerebral volume loss and cerebral white matter  changes consistent with chronic small vessel ischemic disease. No  evidence for acute intracranial pathology.       Radiation dose for this scan was reduced using automated exposure  control, adjustment of the mA and/or kV according to patient size, or  iterative reconstruction technique.    GISELLE CHAVIRA MD         SYSTEM ID:  [id number]

## 2022-01-06 NOTE — ED TRIAGE NOTES
[facility]  ED Arrival Note      Means of Arrival: EMS  Comes from: Independent living    Story: Patient found in the bathroom by the building security. Patient was unable to get up, was covered in feces, and confused. EMS found the patient to be tachycardic.         EMS/PD Interventions: EKG  EMS Medications: N/A    Meets Stroke Criteria? No  Meets Trauma Criteria? No  Shock Index: >1      Directed to: Stabilization room  Belongings: In room locker

## 2022-01-07 ENCOUNTER — APPOINTMENT (OUTPATIENT)
Dept: CARDIOLOGY | Facility: CLINIC | Age: 87
DRG: 871 | End: 2022-01-07
Attending: INTERNAL MEDICINE
Payer: MEDICARE

## 2022-01-07 ENCOUNTER — APPOINTMENT (OUTPATIENT)
Dept: PHYSICAL THERAPY | Facility: CLINIC | Age: 87
DRG: 871 | End: 2022-01-07
Attending: INTERNAL MEDICINE
Payer: MEDICARE

## 2022-01-07 LAB
ALBUMIN SERPL-MCNC: 2.3 G/DL (ref 3.4–5)
ALP SERPL-CCNC: 74 U/L (ref 40–150)
ALT SERPL W P-5'-P-CCNC: 55 U/L (ref 0–50)
ANION GAP SERPL CALCULATED.3IONS-SCNC: 7 MMOL/L (ref 3–14)
AST SERPL W P-5'-P-CCNC: 62 U/L (ref 0–45)
BASE EXCESS BLDV CALC-SCNC: -7.5 MMOL/L (ref -7.7–1.9)
BILIRUB DIRECT SERPL-MCNC: 0.1 MG/DL (ref 0–0.2)
BILIRUB SERPL-MCNC: 0.6 MG/DL (ref 0.2–1.3)
BUN SERPL-MCNC: 59 MG/DL (ref 7–30)
CALCIUM SERPL-MCNC: 7.4 MG/DL (ref 8.5–10.1)
CHLORIDE BLD-SCNC: 124 MMOL/L (ref 94–109)
CK SERPL-CCNC: 604 U/L (ref 30–225)
CO2 SERPL-SCNC: 16 MMOL/L (ref 20–32)
CREAT SERPL-MCNC: 2.08 MG/DL (ref 0.52–1.04)
ERYTHROCYTE [DISTWIDTH] IN BLOOD BY AUTOMATED COUNT: 14.6 % (ref 10–15)
ERYTHROCYTE [DISTWIDTH] IN BLOOD BY AUTOMATED COUNT: 14.6 % (ref 10–15)
GFR SERPL CREATININE-BSD FRML MDRD: 22 ML/MIN/1.73M2
GLUCOSE BLD-MCNC: 114 MG/DL (ref 70–99)
HCO3 BLDV-SCNC: 19 MMOL/L (ref 21–28)
HCT VFR BLD AUTO: 37.5 % (ref 35–47)
HCT VFR BLD AUTO: 40.2 % (ref 35–47)
HGB BLD-MCNC: 12 G/DL (ref 11.7–15.7)
HGB BLD-MCNC: 12.3 G/DL (ref 11.7–15.7)
LACTATE SERPL-SCNC: 1.1 MMOL/L (ref 0.7–2)
LVEF ECHO: NORMAL
MCH RBC QN AUTO: 30.1 PG (ref 26.5–33)
MCH RBC QN AUTO: 30.8 PG (ref 26.5–33)
MCHC RBC AUTO-ENTMCNC: 30.6 G/DL (ref 31.5–36.5)
MCHC RBC AUTO-ENTMCNC: 32 G/DL (ref 31.5–36.5)
MCV RBC AUTO: 96 FL (ref 78–100)
MCV RBC AUTO: 98 FL (ref 78–100)
O2/TOTAL GAS SETTING VFR VENT: 28 %
PCO2 BLDV: 39 MM HG (ref 40–50)
PH BLDV: 7.29 [PH] (ref 7.32–7.43)
PLATELET # BLD AUTO: 261 10E3/UL (ref 150–450)
PLATELET # BLD AUTO: 373 10E3/UL (ref 150–450)
PO2 BLDV: 43 MM HG (ref 25–47)
POTASSIUM BLD-SCNC: 3.6 MMOL/L (ref 3.4–5.3)
PROT SERPL-MCNC: 5.7 G/DL (ref 6.8–8.8)
RBC # BLD AUTO: 3.9 10E6/UL (ref 3.8–5.2)
RBC # BLD AUTO: 4.09 10E6/UL (ref 3.8–5.2)
SODIUM SERPL-SCNC: 147 MMOL/L (ref 133–144)
UFH PPP CHRO-ACNC: 0.39 IU/ML
UFH PPP CHRO-ACNC: <0.1 IU/ML
WBC # BLD AUTO: 21.5 10E3/UL (ref 4–11)
WBC # BLD AUTO: 22 10E3/UL (ref 4–11)

## 2022-01-07 PROCEDURE — 93321 DOPPLER ECHO F-UP/LMTD STD: CPT | Mod: 26 | Performed by: INTERNAL MEDICINE

## 2022-01-07 PROCEDURE — 36415 COLL VENOUS BLD VENIPUNCTURE: CPT | Performed by: INTERNAL MEDICINE

## 2022-01-07 PROCEDURE — 250N000011 HC RX IP 250 OP 636

## 2022-01-07 PROCEDURE — 85027 COMPLETE CBC AUTOMATED: CPT | Performed by: INTERNAL MEDICINE

## 2022-01-07 PROCEDURE — 82550 ASSAY OF CK (CPK): CPT | Performed by: INTERNAL MEDICINE

## 2022-01-07 PROCEDURE — 250N000013 HC RX MED GY IP 250 OP 250 PS 637: Performed by: INTERNAL MEDICINE

## 2022-01-07 PROCEDURE — 93325 DOPPLER ECHO COLOR FLOW MAPG: CPT

## 2022-01-07 PROCEDURE — 93325 DOPPLER ECHO COLOR FLOW MAPG: CPT | Mod: 26 | Performed by: INTERNAL MEDICINE

## 2022-01-07 PROCEDURE — 97530 THERAPEUTIC ACTIVITIES: CPT | Mod: GP | Performed by: PHYSICAL THERAPIST

## 2022-01-07 PROCEDURE — 93308 TTE F-UP OR LMTD: CPT | Mod: 26 | Performed by: INTERNAL MEDICINE

## 2022-01-07 PROCEDURE — 99222 1ST HOSP IP/OBS MODERATE 55: CPT | Mod: 25 | Performed by: INTERNAL MEDICINE

## 2022-01-07 PROCEDURE — 99233 SBSQ HOSP IP/OBS HIGH 50: CPT | Performed by: INTERNAL MEDICINE

## 2022-01-07 PROCEDURE — 80053 COMPREHEN METABOLIC PANEL: CPT | Performed by: INTERNAL MEDICINE

## 2022-01-07 PROCEDURE — 85520 HEPARIN ASSAY: CPT | Performed by: INTERNAL MEDICINE

## 2022-01-07 PROCEDURE — 210N000002 HC R&B HEART CARE

## 2022-01-07 PROCEDURE — 250N000011 HC RX IP 250 OP 636: Performed by: HOSPITALIST

## 2022-01-07 PROCEDURE — 83605 ASSAY OF LACTIC ACID: CPT | Performed by: INTERNAL MEDICINE

## 2022-01-07 PROCEDURE — 93308 TTE F-UP OR LMTD: CPT

## 2022-01-07 PROCEDURE — 258N000003 HC RX IP 258 OP 636: Performed by: HOSPITALIST

## 2022-01-07 PROCEDURE — 36415 COLL VENOUS BLD VENIPUNCTURE: CPT | Performed by: HOSPITALIST

## 2022-01-07 PROCEDURE — 82248 BILIRUBIN DIRECT: CPT | Performed by: INTERNAL MEDICINE

## 2022-01-07 PROCEDURE — 250N000011 HC RX IP 250 OP 636: Performed by: INTERNAL MEDICINE

## 2022-01-07 PROCEDURE — 85027 COMPLETE CBC AUTOMATED: CPT | Performed by: HOSPITALIST

## 2022-01-07 PROCEDURE — 97161 PT EVAL LOW COMPLEX 20 MIN: CPT | Mod: GP | Performed by: PHYSICAL THERAPIST

## 2022-01-07 PROCEDURE — 258N000003 HC RX IP 258 OP 636: Performed by: INTERNAL MEDICINE

## 2022-01-07 PROCEDURE — 93005 ELECTROCARDIOGRAM TRACING: CPT

## 2022-01-07 RX ORDER — DEXTROSE MONOHYDRATE 50 MG/ML
INJECTION, SOLUTION INTRAVENOUS CONTINUOUS
Status: DISCONTINUED | OUTPATIENT
Start: 2022-01-07 | End: 2022-01-09

## 2022-01-07 RX ORDER — NALOXONE HYDROCHLORIDE 0.4 MG/ML
INJECTION, SOLUTION INTRAMUSCULAR; INTRAVENOUS; SUBCUTANEOUS
Status: COMPLETED
Start: 2022-01-07 | End: 2022-01-07

## 2022-01-07 RX ORDER — HEPARIN SODIUM 10000 [USP'U]/100ML
0-5000 INJECTION, SOLUTION INTRAVENOUS CONTINUOUS
Status: DISCONTINUED | OUTPATIENT
Start: 2022-01-07 | End: 2022-01-08

## 2022-01-07 RX ADMIN — NALOXONE HYDROCHLORIDE 0.4 MG: 0.4 INJECTION, SOLUTION INTRAMUSCULAR; INTRAVENOUS; SUBCUTANEOUS at 00:20

## 2022-01-07 RX ADMIN — PIPERACILLIN SODIUM AND TAZOBACTAM SODIUM 2.25 G: 2; .25 INJECTION, POWDER, LYOPHILIZED, FOR SOLUTION INTRAVENOUS at 16:27

## 2022-01-07 RX ADMIN — HEPARIN SODIUM 750 UNITS/HR: 1000 INJECTION INTRAVENOUS; SUBCUTANEOUS at 03:10

## 2022-01-07 RX ADMIN — ACETAMINOPHEN 650 MG: 325 TABLET, FILM COATED ORAL at 15:01

## 2022-01-07 RX ADMIN — Medication 1 DROP: at 08:56

## 2022-01-07 RX ADMIN — QUETIAPINE FUMARATE 25 MG: 25 TABLET ORAL at 20:30

## 2022-01-07 RX ADMIN — METOPROLOL SUCCINATE 25 MG: 25 TABLET, EXTENDED RELEASE ORAL at 08:56

## 2022-01-07 RX ADMIN — HEPARIN SODIUM 1050 UNITS/HR: 1000 INJECTION INTRAVENOUS; SUBCUTANEOUS at 20:42

## 2022-01-07 RX ADMIN — PIPERACILLIN SODIUM AND TAZOBACTAM SODIUM 2.25 G: 2; .25 INJECTION, POWDER, LYOPHILIZED, FOR SOLUTION INTRAVENOUS at 20:42

## 2022-01-07 RX ADMIN — DEXTROSE MONOHYDRATE: 50 INJECTION, SOLUTION INTRAVENOUS at 15:02

## 2022-01-07 RX ADMIN — PIPERACILLIN SODIUM AND TAZOBACTAM SODIUM 2.25 G: 2; .25 INJECTION, POWDER, LYOPHILIZED, FOR SOLUTION INTRAVENOUS at 03:09

## 2022-01-07 RX ADMIN — PIPERACILLIN SODIUM AND TAZOBACTAM SODIUM 2.25 G: 2; .25 INJECTION, POWDER, LYOPHILIZED, FOR SOLUTION INTRAVENOUS at 08:57

## 2022-01-07 RX ADMIN — PAROXETINE HYDROCHLORIDE 20 MG: 20 TABLET, FILM COATED ORAL at 20:30

## 2022-01-07 ASSESSMENT — ACTIVITIES OF DAILY LIVING (ADL)
ADLS_ACUITY_SCORE: 15
ADLS_ACUITY_SCORE: 11
ADLS_ACUITY_SCORE: 15
ADLS_ACUITY_SCORE: 11
ADLS_ACUITY_SCORE: 15

## 2022-01-07 NOTE — PROGRESS NOTES
[facility]    Hospitalist Progress Note    Assessment & Plan   Margy Babin is a 89 year old female history of chronic nausea, hypertension, paroxysmal atrial flutter, HLD, insomnia, anxiety, and recent cardiac work-up for progressive dyspnea as outlined below who is admitted on 1/6/2022 after she was found in her apartment on the floor of the bathroom feces and confused.  Acute metabolic encephalopathy  Sepsis secondary to UTI  Lactic acidosis secondary to sepsis  ---Head CT negative, pending  blood cultures, urine cultures growing E. coli.  --Continue IV fluids and IV antibiotics Zosyn  --White count is trending down  Hypernatremia  --Possibly secondary to IV fluids, will change fluids  --Repeat labs in a.m.  Acute dehydration  Mild rhabdomyolysis  Severe CRISSY  on CKD stage III  --- Baseline creatinine appears to be 0.95, present with a creatinine of 2.51, continue hydration, creatinine trending down.  A. fib/flutter with RVR  History of SVT V. tach  Paroxysmal a flutter  Cardiomyopathy  -Zio patch on 12/7/2021: 2 episodes of VT up to 15 beats, and 16 episodes of SVT up to 17 beats.-  --HF borderline EF unknown etiology Echo 11/5/2021: mild LVH, proximal septal thickening not consistent with left ventricular outflow obstruction.  EF mildly low at 46 to 51% with moderately dilated left atrium and mild to moderate aortic regurgitation RV was of normal size and function  Lexiscan on 11/29/2021 revealed an abnormality consistent with left bundle branch block no evidence of ischemia or infarction EF was 59% at rest, 50% with stress.   LBBB seen on 2020 EKG.    --Continue on telemetry, continue heparin drip, currently on IV diltiazem for rate control, continue PTA metoprolol, will adjust the dose and wean diltiazem down, repeat echocardiogram pending, monitor electrolytes and replace.  --cardiology consult pending   Elevated LFTs secondary to rhabdomyolysis  --Trending  down  HTN  Hold PTA lisinopril and amlodipine for AK I with possible sepsis  Continue metoprolol      Anxiety/depression, insomnia  Hold ramelteon, which was just recently started  Hold PRN xanax  Continue PTA quetiapine at HS  Continue PTA paroxetine      Chronic nausea  Chronic consitipation -none currently.  Nausea and vomiting protocol was ordered.  Bowel regimen ordered      Polypharmacy -  Recommended she discuss decreasing her medications with her PCP as this likely contributes to her nausea     Urgency  Hold PTA oxybutynin  DVT Prophylaxis: Currently on heparin drip  Code Status: No CPR- Do NOT Intubate   Updated daughter on the phone  Disposition: Expected discharge in 1 to 2 days    Stacy James MD  Text Page   (7am to 6pm)    Interval History   Overnight events noted, patient was very sleepy when I visited with her, she was sitting in the chair and dozing off.  Denied any new concerns.  Appeared to be confused    -Data reviewed today: I reviewed all new labs and imaging results over the last 24 hours.  Physical Exam   Temp: 98.1  F (36.7  C) Temp src: Axillary BP: 123/55 Pulse: 82   Resp: 19 SpO2: 92 % O2 Device: Nasal cannula Oxygen Delivery: 3 LPM  Vitals:    01/06/22 1345 01/07/22 0600   Weight: 62 kg (136 lb 11 oz) 71.4 kg (157 lb 6.4 oz)     Vital Signs with Ranges  Temp:  [98.1  F (36.7  C)-98.7  F (37.1  C)] 98.1  F (36.7  C)  Pulse:  [] 82  Resp:  [11-28] 19  BP: ()/() 123/55  SpO2:  [86 %-100 %] 92 %  I/O last 3 completed shifts:  In: 0   Out: 150 [Urine:150]    Constitutional: Patient very sleepy/drowsy  Respiratory: Clear to auscultation bilaterally, no crackles or wheezing  Cardiovascular: Regular rate and rhythm, normal S1 and S2, and no murmur noted  GI: Normal bowel sounds, soft, non-distended, non-tender  Skin/Integumen: No rashes, no cyanosis, lower extremity edema present  Neuro : moving all 4 extremities, no focal deficit noted     Medications     dilTIAZem  HCl-Sodium Chloride Stopped (01/07/22 0920)     heparin 750 Units/hr (01/07/22 0952)     - MEDICATION INSTRUCTIONS -       sodium chloride 100 mL/hr at 01/07/22 0021       carboxymethylcellulose PF  1 drop Both Eyes Daily     metoprolol succinate ER  25 mg Oral Daily     PARoxetine  20 mg Oral At Bedtime     piperacillin-tazobactam  2.25 g Intravenous Q6H     QUEtiapine  25 mg Oral At Bedtime     sodium chloride (PF)  3 mL Intracatheter Q8H       Data   Recent Labs   Lab 01/07/22  0552 01/07/22  0242 01/06/22  2346 01/06/22  1431 01/06/22  1406 01/06/22  0506   WBC 21.5* 22.0*  --   --  33.1*  --    HGB 12.3 12.0  --   --  15.4  --    MCV 98 96  --   --  94  --     373  --   --  373  --    INR  --   --   --   --  1.27*  --    *  --   --   --  143  --    POTASSIUM 3.6  --   --   --  3.8  --    CHLORIDE 124*  --   --   --  111*  --    CO2 16*  --   --   --  23  --    BUN 59*  --   --   --  67*  --    CR 2.08*  --   --  2.7* 2.51*  --    ANIONGAP 7  --   --   --  9  --    SOBIA 7.4*  --   --   --  9.4  --    *  --  112*  --  133*  --    ALBUMIN 2.3*  --   --   --  3.5  --    PROTTOTAL 5.7*  --   --   --  8.1  --    BILITOTAL 0.6  --   --   --  0.9  --    ALKPHOS 74  --   --   --  108  --    ALT 55*  --   --   --  85*  --    AST 62*  --   --   --  133*  --    LIPASE  --   --   --   --  82 63*     Recent Labs   Lab 01/07/22  0552 01/06/22  2346 01/06/22  1406   * 112* 133*       Imaging:   Recent Results (from the past 24 hour(s))   XR Chest Port 1 View    Narrative    CHEST ONE VIEW  1/6/2022 2:14 PM     HISTORY: Weak.    COMPARISON: November 16, 2021.      Impression    IMPRESSION: No acute disease.     ELI SCHUMACHER MD         SYSTEM ID:  [id number]   CT Abdomen Pelvis w/o Contrast    Narrative    CT ABDOMEN AND PELVIS WITHOUT CONTRAST 1/6/2022 3:11 PM    CLINICAL HISTORY: Abdominal pain, acute, nonlocalized.     TECHNIQUE: CT scan of the abdomen and pelvis was performed without IV  contrast.  Multiplanar reformats were obtained. Dose reduction  techniques were used.  CONTRAST: None.    COMPARISON: 6/27/2017    FINDINGS:   LOWER CHEST: No infiltrates or effusions. Minimal scattered probable  atelectasis. Small hiatal hernia.    HEPATOBILIARY: No significant mass or bile duct dilatation. No  calcified gallstones.     PANCREAS: No significant mass, duct dilatation, or inflammatory  change.    SPLEEN: Normal size.    ADRENAL GLANDS: No significant nodules.    KIDNEYS/BLADDER: No significant mass, stones, or hydronephrosis.    BOWEL: No obstruction or inflammatory change.    VASCULATURE: There are moderate atherosclerotic changes of the  visualized aorta and its branches. There is no evidence of aortic  aneurysm.    PELVIC ORGANS: No pelvic masses.    OTHER: No free air or free fluid.    MUSCULOSKELETAL: No suspicious bony lesions.      Impression    IMPRESSION:   1.  No acute process demonstrated in the abdomen and pelvis.    ELI SCHUMACHER MD         SYSTEM ID:  [id number]   CT Head w/o Contrast    Narrative    CT OF THE HEAD WITHOUT CONTRAST 1/6/2022 3:11 PM     COMPARISON: Head CT 12/25/2020    HISTORY: Trauma.  Head injury.    TECHNIQUE: 5 mm thick axial CT images of the head were acquired  without IV contrast material.    FINDINGS:  There is mild diffuse cerebral volume loss. There are  extensive confluent areas of decreased density in the cerebral white  matter bilaterally that are consistent with sequela of chronic small  vessel ischemic disease.     The ventricles and basal cisterns are within normal limits in  configuration given the degree of cerebral volume loss.  There is no  midline shift. There are no extra-axial fluid collections.     No intracranial hemorrhage, mass or recent infarct.    The visualized paranasal sinuses are well-aerated. There is no  mastoiditis. There are no fractures of the visualized bones.       Impression    IMPRESSION:  Diffuse cerebral volume loss and cerebral white  matter  changes consistent with chronic small vessel ischemic disease. No  evidence for acute intracranial pathology.       Radiation dose for this scan was reduced using automated exposure  control, adjustment of the mA and/or kV according to patient size, or  iterative reconstruction technique.    GISELLE CHAVIRA MD         SYSTEM ID:  [id number]

## 2022-01-07 NOTE — CODE/RAPID RESPONSE
[facility]    House NICK RRT Note  1/7/2022   Time Called: 2335    RRT called for: Neuro changes    Assessment & Plan     Acute encephalopathy suspect multifactorial 2/2 sepsis/UTI, medication effect (dilaudid, seroquel).  Thick, garbled speech suspect 2/2 above, very dry oral mucosa, medication effect (seroquel).    Non-anion gap metabolic acidosis.  - Upon arrival, pt lying in bed, eyes closed, in no overt distress.  Nursing notes pt recently arrived from ED.  At time of arrival, nursing notes pt was obtunded with minimal response to sternal rub and no grasp in R hand prompting RRT.  At time of arrival, pt sluggishly opens bilateral eyes to voice, pupils 2 mm, able to protrude tongue and show teeth, no obvious facial asymmetry noted, pt able to lift BUE off the bed up to bilateral elbows (pt states too tired to do so), pt able to grasp bilateral hands equally, pt able to bend at bilateral knees and wiggle bilateral toes.  Pt states an address when asked where she is.        INTERVENTIONS:  - BG - 112  - Stat VBG  - Will trial narcan 0.4 mg IV now, will repeat x1  - Continues on zosyn for suspected UTI  - Pt presented with AMS, unknown last known well, CT head without on admission without acute abnormality, pt able to follow commands x4 extremities equally, able to protrude tongue, show teeth without asymmetry noted; will defer CODE STROKE activation.  Will proceed with additional work up as noted above  - Will adjust VS to Q4H and neuro checks to Q4H    At the end of the RRT pt remains hemodynamically stable, lethargic, continues on diltiazem and after discussion with cross covering hospitalist, will initiate pt on heparin infusion as noted in admitting hospitalist's note    Discussed with and defer further cares to nursing and Dr. Chaparro, cross covering hospitalist     Interval History     Margy Babin is a 89 year old female who was admitted on 1/6/2022 for AMS,  fall.    Medical history significant for: Chronic nausea, HTN, PAF, HLP, insomnia, anxiety, SVT, VT, cardiomyopathy, LBBB, CKD IIIa, depression, chronic constipation    Code Status: No CPR- Do NOT Intubate    Allergies   No Known Allergies    Physical Exam   Vital Signs with Ranges:  Temp:  [97.4  F (36.3  C)-98.7  F (37.1  C)] 98.7  F (37.1  C)  Pulse:  [] 118  Resp:  [11-28] 22  BP: ()/() 102/47  SpO2:  [89 %-100 %] 97 %  No intake/output data recorded.    Constitutional: Pt lying in bed, eyes closed, in no overt distress  Neck: No upper airway wheezes or stridor noted  Pulmonary: In no overt respiratory distress  Cardiovascular: Mildly tachycardic, regular rate and rhythm  GI: Soft  Skin/Integumen: Warm, dry  Neuro: PERRL 2mm, able to protrude tongue and show teeth, no obvious facial asymmetry noted, pt able to lift BUE off the bed up to bilateral elbows (pt states too tired to do so), pt able to grasp bilateral hands equally, pt able to bend at bilateral knees and wiggle bilateral toes.  Pt states an address when asked where she is.   Psych:  Calm  Extremities: Weakly moves all extremities     Data       IMAGING: (X-ray/CT/MRI)   Recent Results (from the past 24 hour(s))   XR Chest Port 1 View    Narrative    CHEST ONE VIEW  1/6/2022 2:14 PM     HISTORY: Weak.    COMPARISON: November 16, 2021.      Impression    IMPRESSION: No acute disease.     ELI SCHUMACHER MD         SYSTEM ID:  [id number]   CT Abdomen Pelvis w/o Contrast    Narrative    CT ABDOMEN AND PELVIS WITHOUT CONTRAST 1/6/2022 3:11 PM    CLINICAL HISTORY: Abdominal pain, acute, nonlocalized.     TECHNIQUE: CT scan of the abdomen and pelvis was performed without IV  contrast. Multiplanar reformats were obtained. Dose reduction  techniques were used.  CONTRAST: None.    COMPARISON: 6/27/2017    FINDINGS:   LOWER CHEST: No infiltrates or effusions. Minimal scattered probable  atelectasis. Small hiatal hernia.    HEPATOBILIARY: No  significant mass or bile duct dilatation. No  calcified gallstones.     PANCREAS: No significant mass, duct dilatation, or inflammatory  change.    SPLEEN: Normal size.    ADRENAL GLANDS: No significant nodules.    KIDNEYS/BLADDER: No significant mass, stones, or hydronephrosis.    BOWEL: No obstruction or inflammatory change.    VASCULATURE: There are moderate atherosclerotic changes of the  visualized aorta and its branches. There is no evidence of aortic  aneurysm.    PELVIC ORGANS: No pelvic masses.    OTHER: No free air or free fluid.    MUSCULOSKELETAL: No suspicious bony lesions.      Impression    IMPRESSION:   1.  No acute process demonstrated in the abdomen and pelvis.    ELI SCHUMACHER MD         SYSTEM ID:  [id number]   CT Head w/o Contrast    Narrative    CT OF THE HEAD WITHOUT CONTRAST 1/6/2022 3:11 PM     COMPARISON: Head CT 12/25/2020    HISTORY: Trauma.  Head injury.    TECHNIQUE: 5 mm thick axial CT images of the head were acquired  without IV contrast material.    FINDINGS:  There is mild diffuse cerebral volume loss. There are  extensive confluent areas of decreased density in the cerebral white  matter bilaterally that are consistent with sequela of chronic small  vessel ischemic disease.     The ventricles and basal cisterns are within normal limits in  configuration given the degree of cerebral volume loss.  There is no  midline shift. There are no extra-axial fluid collections.     No intracranial hemorrhage, mass or recent infarct.    The visualized paranasal sinuses are well-aerated. There is no  mastoiditis. There are no fractures of the visualized bones.       Impression    IMPRESSION:  Diffuse cerebral volume loss and cerebral white matter  changes consistent with chronic small vessel ischemic disease. No  evidence for acute intracranial pathology.       Radiation dose for this scan was reduced using automated exposure  control, adjustment of the mA and/or kV according to patient size,  or  iterative reconstruction technique.    GISELLE CHAVIRA MD         SYSTEM ID:  [id number]       Recent Labs   Lab 01/06/22  2359 01/06/22  1354   PHV 7.29*  --    PO2V 43  --    PCO2V 39*  --    HCO3V 19* 22       Time Spent on this Encounter   I spent 20 minutes on the unit/floor managing the care of Margy Tonya Lauritakishore. Over 50% of my time was spent counseling the patient and/or coordinating care regarding services listed in this note.    KAM Boss [facility]

## 2022-01-07 NOTE — PHARMACY-ADMISSION MEDICATION HISTORY
Pharmacy Medication History  Admission medication history interview status for the 1/6/2022  admission is complete. See EPIC admission navigator for prior to admission medications     Location of Interview: Patient room  Medication history sources: Patient's family/friend (Daughter)    In the past week, patient estimated taking medication this percent of the time: greater than 90%    Medication reconciliation completed by provider prior to medication history? No    Time spent in this activity: 40 minutes    Prior to Admission medications    Medication Sig Last Dose Taking? Auth Provider   acetaminophen (TYLENOL) 500 MG tablet Take 2 tablets (1,000 mg) by mouth 3 times daily as needed for mild pain  Yes Yaya Hatfield MD   ALPRAZolam (XANAX) 0.25 MG tablet TAKE 1 TABLET BY MOUTH TWICE DAILY AS NEEDED 1/5/2022 at AM Yes Yaya Hatfield MD   amLODIPine (NORVASC) 2.5 MG tablet Take 1 tablet (2.5 mg) by mouth daily 1/5/2022 at AM Yes Braeden Sood MD   bisacodyl (DULCOLAX) 5 MG EC tablet Take 1 tablet (5 mg) by mouth daily as needed for constipation Past Month at Unknown time Yes Braeden Sood MD   Calcium Carbonate (CALCIUM 600 PO) Take 1 tablet by mouth daily. 1/5/2022 at AM Yes Reported, Patient   Cholecalciferol (VITAMIN D) 1000 UNITS capsule Take 1 capsule by mouth daily. 1/5/2022 at AM Yes Reported, Patient   cycloSPORINE (RESTASIS) 0.05 % ophthalmic emulsion Place 1 drop into both eyes daily  1/5/2022 at AM Yes Reported, Patient   docusate sodium (COLACE) 100 MG capsule Take 1 capsule (100 mg) by mouth 2 times daily as needed for constipation More than a month at Unknown time Yes Lynn Davies PA-C   lisinopril (ZESTRIL) 10 MG tablet Take 1 tablet (10 mg) by mouth daily 1/5/2022 at AM Yes Cedrick Mcmahon MD   metoprolol succinate ER (TOPROL XL) 25 MG 24 hr tablet Take 1 tablet (25 mg) by mouth daily 1/5/2022 at AM Yes Cedrick Mcmahon MD   Multiple Vitamins-Minerals (MULTIVITAL)  TABS Take 1 tablet by mouth daily. 1/5/2022 at AM Yes Reported, Patient   ondansetron (ZOFRAN) 4 MG tablet Take 1 tablet (4 mg) by mouth every 8 hours as needed for nausea Past Month at Unknown time Yes Brittani Schafer MD   oxybutynin ER (DITROPAN XL) 15 MG 24 hr tablet TAKE 1 TABLET(15 MG) BY MOUTH DAILY 1/5/2022 at hs Yes Braeden Sood MD   PARoxetine (PAXIL) 20 MG tablet TAKE 1 TABLET(20 MG) BY MOUTH AT BEDTIME 1/5/2022 at hs Yes Brittani Schafer MD   QUEtiapine (SEROQUEL) 25 MG tablet TAKE 1 TABLET(25 MG) BY MOUTH AT BEDTIME 1/5/2022 at hs Yes Braeden Sood MD   ramelteon (ROZEREM) 8 MG tablet Take 1 tablet (8 mg) by mouth At Bedtime 1/5/2022 at hs Yes Braeden Sood MD   order for DME Equipment being ordered: Walker Wheels () and Walker ()  Treatment Diagnosis: Difficulty ambulating   Lynn Davies PA-C       The information provided in this note is only as accurate as the sources available at the time of update(s)

## 2022-01-07 NOTE — PROGRESS NOTES
01/07/22 0827   Quick Adds   Type of Visit Initial PT Evaluation   Living Environment   People in home alone   Current Living Arrangements condominium   Home Accessibility no concerns  (Elevator Access)   Self-Care   Usual Activity Tolerance good   Current Activity Tolerance fair   Equipment Currently Used at Home walker, rolling   Disability/Function   Fall history within last six months yes   Number of times patient has fallen within last six months 2   General Information   Onset of Illness/Injury or Date of Surgery 01/06/22   Referring Physician Iona Pena MD   Pertinent History of Current Problem (include personal factors and/or comorbidities that impact the POC) 89 y/o admitted after being found down at home, noted to have acute metabolic encephalopathy, sepsis with probable UTI. PMH including HTN, hyperlipidemia, anxiety, nausea, paroxysmal atrial flutter.    General Observations Pt in supine sleeping upon arrival of therapist.    Cognition   Orientation Status (Cognition) person;place   Affect/Mental Status (Cognition) confused   Follows Commands (Cognition) 50-74% accuracy   Pain Assessment   Patient Currently in Pain No   Integumentary/Edema   Integumentary/Edema Comments Noted redness on upper back, pt reports discomfort on back with movement.    Posture    Posture Comments Noted forward head and shoulder posture sitting EOB And standing at FWW.    Range of Motion (ROM)   ROM Comment B LEs WFL.    Strength   Strength Comments Not formally assessed, pt demonstrates B LE weakness with mobility.    Bed Mobility   Comment (Bed Mobility) Supine-sit with modA.   Transfers   Transfer Safety Comments Sit <> stand with FWW and Sean.    Gait/Stairs (Locomotion)   Comment (Gait/Stairs) Pt amb approx 5' to bedside recliner with FWW and Sean.    Balance   Balance Comments Noted good seated balance EOB and fair standing balance at FWW.    Clinical Impression   Criteria for Skilled Therapeutic Intervention yes,  treatment indicated   PT Diagnosis (PT) Difficulty with functional mobility.    Influenced by the following impairments Pain, Generalized weakness, Decreased activity tolerance   Functional limitations due to impairments Limited functional mobility requiring AD and assist.    Clinical Presentation Stable/Uncomplicated   Clinical Presentation Rationale Based on PMH, current presentation, and social support.    Clinical Decision Making (Complexity) low complexity   Therapy Frequency (PT) Daily   Predicted Duration of Therapy Intervention (days/wks) 4 days   Planned Therapy Interventions (PT) bed mobility training;gait training;strengthening;transfer training   Risk & Benefits of therapy have been explained patient   PT Discharge Planning    PT Discharge Recommendation (DC Rec) Transitional Care Facility;home with assist;home with home care physical therapy   PT Rationale for DC Rec Pt is currently below baseline, limited by lethargy, weakness and pain. Pt would benefit from continued skilled PT intervention at TCU to progress independence and safety with functional mobility. If pt were to discharge home recommend assist of 1 with all mobility and Home PT.    Total Evaluation Time   Total Evaluation Time (Minutes) 10

## 2022-01-07 NOTE — ED NOTES
Brought pt her dinner. She sat up and ate about 2 bites of food then said she no longer wants to eat any more. Now laying comfortably in bed.

## 2022-01-07 NOTE — PROGRESS NOTES
Alert to self and intermittently place but often times disoriented. VSS, on RA and some complaints of lip and eye pain. She was given Tylenol this afternoon. Heparin Xa drawn at 1500 and anticipating change to heparin. Plan for cards consult and echo to be completed today.

## 2022-01-07 NOTE — CONSULTS
CARDIOLOGY CONSULT    REASON FOR CONSULT:  Paroxysmal atrial fibrillation    PRIMARY CARE PHYSICIAN:  Braeden Sood    HISTORY OF PRESENT ILLNESS:  Ms. Babin is a 89 y/o woman with PMH significant for hypertension, hyperlipidemia who was admitted with altered mental status and acute dehydration.  She was found in the bathroom by security of her independent living facility covered in feces and confused.  She was reportedly down for about 15 hours.  She is currently being treated for a urinary tract infection.  She was initially in atrial fibrillation with RVR on admission she has subsequently converted to sinus rhythm.  She has been confused since admission, more alert today but still somewhat confused.  She denies any exertional chest pain, chest discomfort or shortness of breath.  She denies light-headedness or dizziness.  She was seen by Dr. Mcmahon in 11/2021 with complaints of shortness of breath on exertion.  She underwent an echocardiogram which demonstrated mildly reduced LV function, mild to moderate aortic regurgitation.  She wore a 7 day ZIO patch monitor which demonstrated only brief SVT, 2 episodes of VT up at 15 beats.          PAST MEDICAL HISTORY:  Past Medical History:   Diagnosis Date     Anxiety      Blepharitis of both eyes      Depression, major, recurrent, in complete remission (H)      Dry eye syndrome      Dyspnea on exertion 10/7/2021     Essential hypertension 3/5/2020     Familial tremor 3/6/2013     Insomnia, unspecified type 11/3/2014    No CSA on file Last  check - 02/28/2020 with no concerns.     Mixed hyperlipidemia 11/4/2020     Nausea without vomiting 6/16/2015     Problem list name updated by automated process. Provider to review     Tremor, hereditary, benign        MEDICATIONS:  Current Facility-Administered Medications   Medication     acetaminophen (TYLENOL) tablet 650 mg    Or     acetaminophen (TYLENOL) Suppository 650 mg     bisacodyl (DULCOLAX) Suppository 10 mg      carboxymethylcellulose PF (REFRESH PLUS) 0.5 % ophthalmic solution 1 drop     dextrose 5% infusion     diltiazem (CARDIZEM) 125 mg in sodium chloride 0.9 % 125 mL infusion     heparin infusion 25,000 units in D5W 250 mL ANTICOAGULANT     lidocaine (LMX4) cream     lidocaine 1 % 0.1-1 mL     magnesium hydroxide (MILK OF MAGNESIA) suspension 30 mL     melatonin tablet 1 mg     metoprolol succinate ER (TOPROL-XL) 24 hr tablet 25 mg     ondansetron (ZOFRAN-ODT) ODT tab 4 mg    Or     ondansetron (ZOFRAN) injection 4 mg     PARoxetine (PAXIL) tablet 20 mg     Patient is already receiving anticoagulation with heparin, enoxaparin (LOVENOX), warfarin (COUMADIN)  or other anticoagulant medication     piperacillin-tazobactam (ZOSYN) 2.25 g vial to attach to  ml bag     polyethylene glycol (MIRALAX) Packet 17 g     prochlorperazine (COMPAZINE) injection 5 mg    Or     prochlorperazine (COMPAZINE) tablet 5 mg    Or     prochlorperazine (COMPAZINE) suppository 12.5 mg     QUEtiapine (SEROquel) tablet 25 mg     sennosides (SENOKOT) tablet 1-2 tablet     sodium chloride (PF) 0.9% PF flush 3 mL     sodium chloride (PF) 0.9% PF flush 3 mL       ALLERGIES:  No Known Allergies    SOCIAL HISTORY:  I have reviewed this patient's social history and updated it with pertinent information if needed. Margy Babin  reports that she has never smoked. She has never used smokeless tobacco. She reports that she does not drink alcohol and does not use drugs.    FAMILY HISTORY:  I have reviewed this patient's family history and updated it with pertinent information if needed.   Family History   Problem Relation Age of Onset     Heart Disease Father 80        MI     C.A.D. Father      Breast Cancer Daughter 43         age 53     Emphysema Sister      Heart Disease Sister        REVIEW OF SYSTEMS:  A complete ROS was obtained and the pertinent positives are outlined in the history of present illness above.  The remainder of  systems is negative.      PHYSICAL EXAM:  Temp: 98.1  F (36.7  C) Temp src: Axillary BP: 123/55 Pulse: 82   Resp: 19 SpO2: 92 % O2 Device: Nasal cannula Oxygen Delivery: 3 LPM  Vital Signs with Ranges  Temp:  [98.1  F (36.7  C)-98.7  F (37.1  C)] 98.1  F (36.7  C)  Pulse:  [] 82  Resp:  [11-24] 19  BP: ()/() 123/55  SpO2:  [86 %-100 %] 92 %  157 lbs 6.4 oz    Constitutional: alert, mildly confused  Eyes: PERRL, sclera nonicteric  ENT: trachea midline  Respiratory: CTAB  Cardiovascular: RRR no m/r/g,no JVD  GI: nondistended, nontender, bowel sounds present  Lymph/Hematologic: no lymphadenopathy  Skin: dry, no rash  Musculoskeletal: good muscle tone, no edema bilaterally  Neurologic: no focal deficits  Neuropsychiatric: appropriate affact    DATA:  Labs:   Cr 2.51-2.08  Lactate 3.1 (now normalized)  WBC 33.1  Hgb 15.4  Troponin 83    EKG: Dated 1/6/21 reviewed personally.  Afib with RVR, LBBB converted to sinus rhythm on subsequent ECG      ASSESSMENT:  1.  Acute metabolic encephalopathy  2.  Sepsis secondary to UTI  3.  Acute dehydration  4.  CRISSY on CKD stage III  5.  Paroxysmal atrial fibrillation with RVR:  Secondary to all of the above; converted to sinus rhythm  6.  ? Hx of atrial flutter by ECG in 2020:  Personal review of ECG demonstrates sinus rhythm with underlying artifact, no evidence for atrial flutter  7.  Mild LV dysfunction by echocardiogram in 11/2021:  Secondary to underlying LBBB.  Nuclear stress test without evidence for ischemia or infarction.      RECOMMENDATIONS:  1.  Echocardiogram pending  2.  Stop heparin gtt.   Transient atrial fibrillation precipitated by acute underlying illness.  Would not anticoagulate in this setting along with advanced age and altered mental status, fall risk.  If recurrent afib in the outpatient setting, could consider anticoagulation at that time.  3.  Plan for 14 day ZIO patch monitor at discharge with cardiology follow up. Cardiology will sign off.  Please call with questions.      Bianca Subramanian MD [facility]  January 7, 2022

## 2022-01-07 NOTE — PROGRESS NOTES
Pt arrived to unit from ED. Settled by another RN and per RN pt was arousable to speech but lethargic. Pt obtunded, arousable to sternal rub, garbled speech, unable to grasp with R hand. RRT called.

## 2022-01-07 NOTE — PROGRESS NOTES
RECEIVING UNIT ED HANDOFF REVIEW    ED Nurse Handoff Report was reviewed by: Shana Munoz RN on January 6, 2022 at 10:36 PM

## 2022-01-07 NOTE — PLAN OF CARE
Pt alert to self only. Speech hoarse, garbled. More alert this AM. Following commands. /55 (BP Location: Right arm)   Pulse 105   Temp 98.1  F (36.7  C) (Axillary)   Resp 16   Wt 71.4 kg (157 lb 6.4 oz)   SpO2 93%   BMI 26.19 kg/m   Tele ST. On 3 L NC. Denies pain. Heparin, Dilt and NS infusing per orders. Q2H repo. Plan for cards consult, PT,OT.

## 2022-01-08 ENCOUNTER — APPOINTMENT (OUTPATIENT)
Dept: PHYSICAL THERAPY | Facility: CLINIC | Age: 87
DRG: 871 | End: 2022-01-08
Payer: MEDICARE

## 2022-01-08 LAB
ALBUMIN SERPL-MCNC: 2.2 G/DL (ref 3.4–5)
ALP SERPL-CCNC: 69 U/L (ref 40–150)
ALT SERPL W P-5'-P-CCNC: 58 U/L (ref 0–50)
ANION GAP SERPL CALCULATED.3IONS-SCNC: 5 MMOL/L (ref 3–14)
AST SERPL W P-5'-P-CCNC: 48 U/L (ref 0–45)
BACTERIA UR CULT: ABNORMAL
BACTERIA UR CULT: ABNORMAL
BILIRUB SERPL-MCNC: 0.4 MG/DL (ref 0.2–1.3)
BUN SERPL-MCNC: 44 MG/DL (ref 7–30)
CALCIUM SERPL-MCNC: 7.9 MG/DL (ref 8.5–10.1)
CHLORIDE BLD-SCNC: 118 MMOL/L (ref 94–109)
CK SERPL-CCNC: 261 U/L (ref 30–225)
CO2 SERPL-SCNC: 22 MMOL/L (ref 20–32)
CREAT SERPL-MCNC: 1.5 MG/DL (ref 0.52–1.04)
ERYTHROCYTE [DISTWIDTH] IN BLOOD BY AUTOMATED COUNT: 14.8 % (ref 10–15)
GFR SERPL CREATININE-BSD FRML MDRD: 32 ML/MIN/1.73M2
GLUCOSE BLD-MCNC: 96 MG/DL (ref 70–99)
GLUCOSE BLDC GLUCOMTR-MCNC: 86 MG/DL (ref 70–99)
GLUCOSE BLDC GLUCOMTR-MCNC: 95 MG/DL (ref 70–99)
HCT VFR BLD AUTO: 37 % (ref 35–47)
HGB BLD-MCNC: 11.8 G/DL (ref 11.7–15.7)
MAGNESIUM SERPL-MCNC: 2.2 MG/DL (ref 1.6–2.3)
MCH RBC QN AUTO: 29.9 PG (ref 26.5–33)
MCHC RBC AUTO-ENTMCNC: 31.9 G/DL (ref 31.5–36.5)
MCV RBC AUTO: 94 FL (ref 78–100)
PLATELET # BLD AUTO: 254 10E3/UL (ref 150–450)
POTASSIUM BLD-SCNC: 3.5 MMOL/L (ref 3.4–5.3)
PROT SERPL-MCNC: 5.8 G/DL (ref 6.8–8.8)
RBC # BLD AUTO: 3.94 10E6/UL (ref 3.8–5.2)
SODIUM SERPL-SCNC: 145 MMOL/L (ref 133–144)
UFH PPP CHRO-ACNC: 0.26 IU/ML
WBC # BLD AUTO: 16 10E3/UL (ref 4–11)

## 2022-01-08 PROCEDURE — 99233 SBSQ HOSP IP/OBS HIGH 50: CPT | Performed by: INTERNAL MEDICINE

## 2022-01-08 PROCEDURE — 258N000003 HC RX IP 258 OP 636: Performed by: INTERNAL MEDICINE

## 2022-01-08 PROCEDURE — 36415 COLL VENOUS BLD VENIPUNCTURE: CPT | Performed by: INTERNAL MEDICINE

## 2022-01-08 PROCEDURE — 97116 GAIT TRAINING THERAPY: CPT | Mod: GP | Performed by: PHYSICAL THERAPIST

## 2022-01-08 PROCEDURE — 82550 ASSAY OF CK (CPK): CPT | Performed by: INTERNAL MEDICINE

## 2022-01-08 PROCEDURE — 85027 COMPLETE CBC AUTOMATED: CPT | Performed by: INTERNAL MEDICINE

## 2022-01-08 PROCEDURE — 210N000002 HC R&B HEART CARE

## 2022-01-08 PROCEDURE — 83735 ASSAY OF MAGNESIUM: CPT | Performed by: INTERNAL MEDICINE

## 2022-01-08 PROCEDURE — 250N000013 HC RX MED GY IP 250 OP 250 PS 637: Performed by: INTERNAL MEDICINE

## 2022-01-08 PROCEDURE — 97530 THERAPEUTIC ACTIVITIES: CPT | Mod: GP | Performed by: PHYSICAL THERAPIST

## 2022-01-08 PROCEDURE — 250N000011 HC RX IP 250 OP 636: Performed by: INTERNAL MEDICINE

## 2022-01-08 PROCEDURE — 85520 HEPARIN ASSAY: CPT | Performed by: INTERNAL MEDICINE

## 2022-01-08 PROCEDURE — 80053 COMPREHEN METABOLIC PANEL: CPT | Performed by: INTERNAL MEDICINE

## 2022-01-08 RX ORDER — METOPROLOL SUCCINATE 50 MG/1
50 TABLET, EXTENDED RELEASE ORAL DAILY
Status: DISCONTINUED | OUTPATIENT
Start: 2022-01-09 | End: 2022-01-11 | Stop reason: HOSPADM

## 2022-01-08 RX ORDER — METOPROLOL SUCCINATE 25 MG/1
25 TABLET, EXTENDED RELEASE ORAL ONCE
Status: COMPLETED | OUTPATIENT
Start: 2022-01-08 | End: 2022-01-08

## 2022-01-08 RX ADMIN — Medication 1 DROP: at 08:46

## 2022-01-08 RX ADMIN — DEXTROSE MONOHYDRATE: 50 INJECTION, SOLUTION INTRAVENOUS at 17:53

## 2022-01-08 RX ADMIN — PAROXETINE HYDROCHLORIDE 20 MG: 20 TABLET, FILM COATED ORAL at 20:27

## 2022-01-08 RX ADMIN — PIPERACILLIN SODIUM AND TAZOBACTAM SODIUM 2.25 G: 2; .25 INJECTION, POWDER, LYOPHILIZED, FOR SOLUTION INTRAVENOUS at 17:52

## 2022-01-08 RX ADMIN — QUETIAPINE FUMARATE 25 MG: 25 TABLET ORAL at 20:27

## 2022-01-08 RX ADMIN — SENNOSIDES 2 TABLET: 8.6 TABLET, FILM COATED ORAL at 08:46

## 2022-01-08 RX ADMIN — DEXTROSE MONOHYDRATE: 50 INJECTION, SOLUTION INTRAVENOUS at 22:44

## 2022-01-08 RX ADMIN — METOPROLOL SUCCINATE 25 MG: 25 TABLET, EXTENDED RELEASE ORAL at 17:52

## 2022-01-08 RX ADMIN — ACETAMINOPHEN 650 MG: 325 TABLET, FILM COATED ORAL at 00:01

## 2022-01-08 RX ADMIN — METOPROLOL SUCCINATE 25 MG: 25 TABLET, EXTENDED RELEASE ORAL at 08:46

## 2022-01-08 RX ADMIN — ACETAMINOPHEN 650 MG: 325 TABLET, FILM COATED ORAL at 20:27

## 2022-01-08 RX ADMIN — PIPERACILLIN SODIUM AND TAZOBACTAM SODIUM 2.25 G: 2; .25 INJECTION, POWDER, LYOPHILIZED, FOR SOLUTION INTRAVENOUS at 05:08

## 2022-01-08 RX ADMIN — DEXTROSE MONOHYDRATE: 50 INJECTION, SOLUTION INTRAVENOUS at 05:11

## 2022-01-08 RX ADMIN — PIPERACILLIN SODIUM AND TAZOBACTAM SODIUM 2.25 G: 2; .25 INJECTION, POWDER, LYOPHILIZED, FOR SOLUTION INTRAVENOUS at 08:46

## 2022-01-08 RX ADMIN — PIPERACILLIN SODIUM AND TAZOBACTAM SODIUM 2.25 G: 2; .25 INJECTION, POWDER, LYOPHILIZED, FOR SOLUTION INTRAVENOUS at 22:43

## 2022-01-08 ASSESSMENT — ACTIVITIES OF DAILY LIVING (ADL)
ADLS_ACUITY_SCORE: 15
ADLS_ACUITY_SCORE: 15
ADLS_ACUITY_SCORE: 19
ADLS_ACUITY_SCORE: 19
ADLS_ACUITY_SCORE: 15
ADLS_ACUITY_SCORE: 15
ADLS_ACUITY_SCORE: 19
ADLS_ACUITY_SCORE: 15
ADLS_ACUITY_SCORE: 19
ADLS_ACUITY_SCORE: 15
ADLS_ACUITY_SCORE: 19
ADLS_ACUITY_SCORE: 15

## 2022-01-08 NOTE — PROGRESS NOTES
[facility]    Hospitalist Progress Note    Assessment & Plan   Margy Babin is a 89 year old female history of chronic nausea, hypertension, paroxysmal atrial flutter, HLD, insomnia, anxiety, and recent cardiac work-up for progressive dyspnea as outlined below who is admitted on 1/6/2022 after she was found in her apartment on the floor of the bathroom feces and confused.  Acute metabolic encephalopathy  Sepsis secondary to UTI  Lactic acidosis secondary to sepsis  ---Head CT negative, pending  blood cultures, urine cultures growing E. coli.  --Continue IV fluids and IV antibiotics Zosyn  --White count is trending down  Hypernatremia  --Possibly secondary to IV fluids, improving, continue D5W for now.  Repeat labs in a.m.  Acute dehydration  Mild rhabdomyolysis  Severe CRISSY  on CKD stage III  --- Baseline creatinine appears to be 0.95, present with a creatinine of 2.51, continue hydration, creatinine trending down.  A. fib/flutter with RVR  History of SVT V. tach  Paroxysmal a flutter  Cardiomyopathy  -Zio patch on 12/7/2021: 2 episodes of VT up to 15 beats, and 16 episodes of SVT up to 17 beats.-  --HF borderline EF unknown etiology Echo 11/5/2021: mild LVH, proximal septal thickening not consistent with left ventricular outflow obstruction.  EF mildly low at 46 to 51% with moderately dilated left atrium and mild to moderate aortic regurgitation RV was of normal size and function  Lexiscan on 11/29/2021 revealed an abnormality consistent with left bundle branch block no evidence of ischemia or infarction EF was 59% at rest, 50% with stress.   LBBB seen on 2020 EKG.    --Continue on telemetry, appreciate cardiology input, they recommended to discontinue heparin since patient will not be discharged home on anticoagulation, his her A. fib possibly related to acute sepsis.  Needs to wean down diltiazem drip and continue with the metoprolol.  -Cardiology recommending Zio patch at  the time of discharge x14 days  Elevated LFTs secondary to rhabdomyolysis  --Trending down  HTN  Hold PTA lisinopril and amlodipine for AK I with possible sepsis  Continue metoprolol, will increase metoprolol dose for now.  This was increased from 25 to 50 mg on 1/8 full     Anxiety/depression, insomnia  Hold ramelteon, which was just recently started  Hold PRN xanax  Continue PTA quetiapine at HS  Continue PTA paroxetine      Chronic nausea  Chronic consitipation -none currently.  Nausea and vomiting protocol was ordered.  Bowel regimen ordered      Polypharmacy -  Recommended she discuss decreasing her medications with her PCP as this likely contributes to her nausea     Urgency  Hold PTA oxybutynin  DVT Prophylaxis: Currently on heparin drip  Code Status: No CPR- Do NOT Intubate   Updated daughter on the phone  Disposition: Expected discharge in 1 to 2 days    Stacy James MD  Text Page   (7am to 6pm)    Interval History   And is doing better today, she is still quite weak and lethargic, alert, oriented to self, denies any headache or nausea    -Data reviewed today: I reviewed all new labs and imaging results over the last 24 hours.  Physical Exam   Temp: 97.5  F (36.4  C) Temp src: Oral BP: (!) 143/75 Pulse: 80   Resp: 16 SpO2: 96 % O2 Device: None (Room air)    Vitals:    01/06/22 1345 01/07/22 0600 01/08/22 0505   Weight: 62 kg (136 lb 11 oz) 71.4 kg (157 lb 6.4 oz) 73.9 kg (162 lb 14.4 oz)     Vital Signs with Ranges  Temp:  [97.5  F (36.4  C)-97.9  F (36.6  C)] 97.5  F (36.4  C)  Pulse:  [80-91] 80  Resp:  [16-18] 16  BP: (121-169)/(51-75) 143/75  SpO2:  [95 %-96 %] 96 %  I/O last 3 completed shifts:  In: 500 [P.O.:500]  Out: -     Constitutional: Patient awake but appears deconditioned  Respiratory: Clear to auscultation bilaterally, no crackles or wheezing  Cardiovascular: Regular rate and rhythm, normal S1 and S2, and no murmur noted  GI: Normal bowel sounds, soft, non-distended,  non-tender  Skin/Integumen: No rashes, no cyanosis, lower extremity edema present  Neuro : moving all 4 extremities, no focal deficit noted     Medications     D5W 75 mL/hr at 01/08/22 0511     dilTIAZem HCl-Sodium Chloride Stopped (01/07/22 0920)     heparin 1,050 Units/hr (01/07/22 2128)     - MEDICATION INSTRUCTIONS -         carboxymethylcellulose PF  1 drop Both Eyes Daily     metoprolol succinate ER  25 mg Oral Daily     PARoxetine  20 mg Oral At Bedtime     piperacillin-tazobactam  2.25 g Intravenous Q6H     QUEtiapine  25 mg Oral At Bedtime     sodium chloride (PF)  3 mL Intracatheter Q8H       Data   Recent Labs   Lab 01/08/22  0608 01/07/22  0552 01/07/22  0242 01/06/22  2346 01/06/22  1431 01/06/22  1406 01/06/22  1406 01/06/22  0506   WBC 16.0* 21.5* 22.0*  --   --    < > 33.1*  --    HGB 11.8 12.3 12.0  --   --    < > 15.4  --    MCV 94 98 96  --   --    < > 94  --     261 373  --   --    < > 373  --    INR  --   --   --   --   --   --  1.27*  --    * 147*  --   --   --   --  143  --    POTASSIUM 3.5 3.6  --   --   --   --  3.8  --    CHLORIDE 118* 124*  --   --   --   --  111*  --    CO2 22 16*  --   --   --   --  23  --    BUN 44* 59*  --   --   --   --  67*  --    CR 1.50* 2.08*  --   --  2.7*  --  2.51*  --    ANIONGAP 5 7  --   --   --   --  9  --    SOBIA 7.9* 7.4*  --   --   --   --  9.4  --    GLC 96 114*  --  112*  --   --  133*  --    ALBUMIN 2.2* 2.3*  --   --   --    < > 3.5  --    PROTTOTAL 5.8* 5.7*  --   --   --    < > 8.1  --    BILITOTAL 0.4 0.6  --   --   --    < > 0.9  --    ALKPHOS 69 74  --   --   --    < > 108  --    ALT 58* 55*  --   --   --    < > 85*  --    AST 48* 62*  --   --   --    < > 133*  --    LIPASE  --   --   --   --   --   --  82 63*    < > = values in this interval not displayed.     Recent Labs   Lab 01/08/22  0608 01/07/22  0552 01/06/22  2346 01/06/22  1406   GLC 96 114* 112* 133*       Imaging:   Recent Results (from the past 24 hour(s))   Echo Limited    Result Value    LVEF  45-50%    [facility]    [id number]  [id number]  [id number]  [id number]^MAC^KAYODE^ISABEL     [facility]  Echocardiography Laboratory  [address] [place] [address]     Name: KHANH BRAGG  MRN: [id number]  : 10/02/1929  Study Date: 2022 03:24 PM  Age: 89 yrs  Gender: Female  Patient Location: [facility]  Reason For Study: Atrial Fibrillation  Ordering Physician: KAYODE WATTS  Referring Physician: Braeden Sood  Performed By: Jean Mcclendon     BSA: 1.8 m2  Height: 67 in  Weight: 157 lb  HR: 87  BP: 126/77 mmHg  ______________________________________________________________________________  Procedure  Limited Portable Echo Adult.  ______________________________________________________________________________  Interpretation Summary     The left ventricle is normal in size. Left ventricular systolic function is  mildly reduced. The visual ejection fraction is 45-50%.  There is mild (1+) mitral, tricuspid and aortic regurgitation.  Compared to prior study, there is no significant change.  ______________________________________________________________________________  Left Ventricle  The left ventricle is normal in size. Left ventricular systolic function is  mildly reduced. The visual ejection fraction is 45-50%. There is mild global  hypokinesia of the left ventricle.     Right Ventricle  Right ventricular function cannot be assessed due to poor image quality.     Mitral Valve  There is mild (1+) mitral regurgitation.     Tricuspid Valve  There is mild (1+) tricuspid regurgitation. The right ventricular systolic  pressure is approximated at 34.4 mmHg plus the right atrial pressure.     Aortic Valve  There is mild (1+) aortic regurgitation.  ______________________________________________________________________________  MMode/2D Measurements & Calculations     IVSd: 1.3 cm  LVIDd: 4.4 cm  LVIDs: 3.7 cm  LVPWd: 1.4 cm  FS: 16.6 %  LV mass(C)d: 229.7 grams  LV  mass(C)dI: 125.9 grams/m2  LA dimension: 3.8 cm  asc Aorta Diam: 2.8 cm  RWT: 0.61     Doppler Measurements & Calculations  AI P1/2t: 492.1 msec  TR max miranda: 293.2 cm/sec  TR max P.4 mmHg     ______________________________________________________________________________  Report approved by: Lorie Grayson 2022 05:00 PM

## 2022-01-08 NOTE — CONSULTS
Care Management Initial Consult    General Information  Assessment completed with: Children,  (Daughter Tina)  Type of CM/SW Visit: Initial Assessment    Primary Care Provider verified and updated as needed: Yes   Readmission within the last 30 days:        Reason for Consult: discharge planning  Advance Care Planning: Advance Care Planning Reviewed:  (no ACP documents)          Communication Assessment  Patient's communication style: spoken language (English or Bilingual)    Hearing Difficulty or Deaf: no   Wear Glasses or Blind: yes    Cognitive  Cognitive/Neuro/Behavioral: .WDL except  Level of Consciousness: confused  Arousal Level: opens eyes spontaneously  Orientation: disoriented to,place,time,situation  Mood/Behavior: calm,cooperative  Best Language: 0 - No aphasia  Speech: hoarse    Living Environment:   People in home: alone     Current living Arrangements: independent living facility ([facility] ([address] [place] [address])      Able to return to prior arrangements: no       Family/Social Support:  Care provided by: self  Provides care for: no one  Marital Status:   Children (grandchildren)          Description of Support System: Supportive,Involved    Support Assessment: Adequate family and caregiver support,Adequate social supports    Current Resources:   Patient receiving home care services: No     Community Resources: None  Equipment currently used at home: walker, rolling  Supplies currently used at home: None    Employment/Financial:  Employment Status: retired        Financial Concerns: No concerns identified           Lifestyle & Psychosocial Needs:  Social Determinants of Health     Tobacco Use: Low Risk      Smoking Tobacco Use: Never Smoker     Smokeless Tobacco Use: Never Used   Alcohol Use: Not on file   Financial Resource Strain: Not on file   Food Insecurity: Not on file   Transportation Needs: Not on file   Physical Activity: Not on file   Stress: Not on file   Social Connections: Not on file    Intimate Partner Violence: Not on file   Depression: Not at risk     PHQ-2 Score: 0   Housing Stability: Not on file       Functional Status:  Prior to admission patient needed assistance:              Mental Health Status:          Chemical Dependency Status:                Values/Beliefs:  Spiritual, Cultural Beliefs, Mu-ism Practices, Values that affect care: no               Additional Information:  Per care management/social work consult for discharge planning.  Patient was admitted on 1-6-22 after she was found down on her apartment floor in feces and confused.  The tentative date of discharge is 1-10-22.  Reviewed chart and call placed to patient's daughter Tina to discuss discharge plans.  Per patient's daughter's report, patient lives alone in a senior co[facility]op.  Patient uses a rolling walker at baseline.  Reviewed the therapy discharge recommendations of TCU placement on discharge and patient's daughter is in agreement.    Patient's daughter is asking for a referral to be sent to [facility].  Patient's daughter states that they go to [facility] and patient has been on the waiting list for long term care there if and when she needs that.  [facility], [facility], [facility] and [facility].  Referrals sent, via discharge on the double, to check bed availability.  Patient's daughter is asking for transport to be arranged.  Explained that this will be private pay and patient's daughter is in agreement.    Will continue to follow.      REJI Avila, [facility]    [phone]  [facility]

## 2022-01-08 NOTE — PLAN OF CARE
Neuro- A&O to self  Most Recent Vitals- Temp: 97.6  F (36.4  C) Temp src: Oral BP: 121/65 Pulse: 89   Resp: 18 SpO2: 95 % O2 Device: None (Room air)   Tele/Cardiac- SR  Resp- LD diminished on RA  Activity- A1 GB, W  Pain- generalized, tylenol given x1  Drips- hep, D5  Drains/Tubes- PIV  Skin- WNL, scattered bruising, rash  GI/- voiding adequately  Aggression Color- Green  COVID status- Negative  Plan- TCU at discharge  [facility]-     Rosanne Boyce RN

## 2022-01-08 NOTE — PLAN OF CARE
VSS. Pt complained of mild pain to back relieved w/ re positioning, given tyelnol x1. Pt up w/ Ax1 GBW. Sating well on RA.

## 2022-01-09 LAB
ANION GAP SERPL CALCULATED.3IONS-SCNC: 6 MMOL/L (ref 3–14)
BUN SERPL-MCNC: 32 MG/DL (ref 7–30)
CALCIUM SERPL-MCNC: 8.1 MG/DL (ref 8.5–10.1)
CHLORIDE BLD-SCNC: 113 MMOL/L (ref 94–109)
CK SERPL-CCNC: 101 U/L (ref 30–225)
CO2 SERPL-SCNC: 22 MMOL/L (ref 20–32)
CREAT SERPL-MCNC: 1.18 MG/DL (ref 0.52–1.04)
ERYTHROCYTE [DISTWIDTH] IN BLOOD BY AUTOMATED COUNT: 14.5 % (ref 10–15)
GFR SERPL CREATININE-BSD FRML MDRD: 43 ML/MIN/1.73M2
GLUCOSE BLD-MCNC: 103 MG/DL (ref 70–99)
GLUCOSE BLDC GLUCOMTR-MCNC: 95 MG/DL (ref 70–99)
HCT VFR BLD AUTO: 36.5 % (ref 35–47)
HGB BLD-MCNC: 11.6 G/DL (ref 11.7–15.7)
MAGNESIUM SERPL-MCNC: 2 MG/DL (ref 1.6–2.3)
MCH RBC QN AUTO: 30.3 PG (ref 26.5–33)
MCHC RBC AUTO-ENTMCNC: 31.8 G/DL (ref 31.5–36.5)
MCV RBC AUTO: 95 FL (ref 78–100)
PLATELET # BLD AUTO: 272 10E3/UL (ref 150–450)
POTASSIUM BLD-SCNC: 3.3 MMOL/L (ref 3.4–5.3)
POTASSIUM BLD-SCNC: 3.6 MMOL/L (ref 3.4–5.3)
RBC # BLD AUTO: 3.83 10E6/UL (ref 3.8–5.2)
SODIUM SERPL-SCNC: 141 MMOL/L (ref 133–144)
WBC # BLD AUTO: 13.5 10E3/UL (ref 4–11)

## 2022-01-09 PROCEDURE — 36415 COLL VENOUS BLD VENIPUNCTURE: CPT | Performed by: INTERNAL MEDICINE

## 2022-01-09 PROCEDURE — 250N000011 HC RX IP 250 OP 636: Performed by: INTERNAL MEDICINE

## 2022-01-09 PROCEDURE — 80048 BASIC METABOLIC PNL TOTAL CA: CPT | Performed by: INTERNAL MEDICINE

## 2022-01-09 PROCEDURE — 84132 ASSAY OF SERUM POTASSIUM: CPT | Performed by: INTERNAL MEDICINE

## 2022-01-09 PROCEDURE — 99232 SBSQ HOSP IP/OBS MODERATE 35: CPT | Performed by: INTERNAL MEDICINE

## 2022-01-09 PROCEDURE — 250N000013 HC RX MED GY IP 250 OP 250 PS 637: Performed by: INTERNAL MEDICINE

## 2022-01-09 PROCEDURE — 82550 ASSAY OF CK (CPK): CPT | Performed by: INTERNAL MEDICINE

## 2022-01-09 PROCEDURE — 83735 ASSAY OF MAGNESIUM: CPT | Performed by: INTERNAL MEDICINE

## 2022-01-09 PROCEDURE — 258N000003 HC RX IP 258 OP 636: Performed by: INTERNAL MEDICINE

## 2022-01-09 PROCEDURE — 210N000002 HC R&B HEART CARE

## 2022-01-09 PROCEDURE — 85027 COMPLETE CBC AUTOMATED: CPT | Performed by: INTERNAL MEDICINE

## 2022-01-09 RX ORDER — CEFTRIAXONE 1 G/1
1 INJECTION, POWDER, FOR SOLUTION INTRAMUSCULAR; INTRAVENOUS EVERY 24 HOURS
Status: DISCONTINUED | OUTPATIENT
Start: 2022-01-09 | End: 2022-01-10

## 2022-01-09 RX ORDER — POTASSIUM CHLORIDE 1.5 G/1.58G
20 POWDER, FOR SOLUTION ORAL ONCE
Status: COMPLETED | OUTPATIENT
Start: 2022-01-09 | End: 2022-01-09

## 2022-01-09 RX ADMIN — POTASSIUM CHLORIDE 20 MEQ: 1.5 POWDER, FOR SOLUTION ORAL at 06:43

## 2022-01-09 RX ADMIN — QUETIAPINE FUMARATE 25 MG: 25 TABLET ORAL at 21:38

## 2022-01-09 RX ADMIN — ACETAMINOPHEN 650 MG: 325 TABLET, FILM COATED ORAL at 10:08

## 2022-01-09 RX ADMIN — DEXTROSE MONOHYDRATE: 50 INJECTION, SOLUTION INTRAVENOUS at 06:44

## 2022-01-09 RX ADMIN — METOPROLOL SUCCINATE 50 MG: 50 TABLET, EXTENDED RELEASE ORAL at 09:22

## 2022-01-09 RX ADMIN — MAGNESIUM HYDROXIDE 30 ML: 400 SUSPENSION ORAL at 17:50

## 2022-01-09 RX ADMIN — PIPERACILLIN SODIUM AND TAZOBACTAM SODIUM 2.25 G: 2; .25 INJECTION, POWDER, LYOPHILIZED, FOR SOLUTION INTRAVENOUS at 09:22

## 2022-01-09 RX ADMIN — PIPERACILLIN SODIUM AND TAZOBACTAM SODIUM 2.25 G: 2; .25 INJECTION, POWDER, LYOPHILIZED, FOR SOLUTION INTRAVENOUS at 03:29

## 2022-01-09 RX ADMIN — CEFTRIAXONE SODIUM 1 G: 1 INJECTION, POWDER, FOR SOLUTION INTRAMUSCULAR; INTRAVENOUS at 12:50

## 2022-01-09 RX ADMIN — PAROXETINE HYDROCHLORIDE 20 MG: 20 TABLET, FILM COATED ORAL at 21:38

## 2022-01-09 RX ADMIN — ACETAMINOPHEN 650 MG: 325 TABLET, FILM COATED ORAL at 05:33

## 2022-01-09 RX ADMIN — Medication 1 DROP: at 09:22

## 2022-01-09 RX ADMIN — SENNOSIDES 2 TABLET: 8.6 TABLET, FILM COATED ORAL at 12:51

## 2022-01-09 ASSESSMENT — ACTIVITIES OF DAILY LIVING (ADL)
ADLS_ACUITY_SCORE: 17
ADLS_ACUITY_SCORE: 15
ADLS_ACUITY_SCORE: 17
ADLS_ACUITY_SCORE: 17
ADLS_ACUITY_SCORE: 15
ADLS_ACUITY_SCORE: 17
ADLS_ACUITY_SCORE: 15

## 2022-01-09 NOTE — PLAN OF CARE
Oriented to self only. VSS on RA ex HTN (140s-150's systolic). Tylenol for back pain with improvement. Tele: SR with BBB. Up AX1 with GB and walker. Able to turn/reposition self in bed independently but occasionally needs reminders to do so. Voiding via external catheter, good urine output. Potassium of 3.3 replaced this AM, recheck will be at 1045. Therapies recommending TCU at discharge, SW following to assist with discharge planning.

## 2022-01-09 NOTE — PLAN OF CARE
Neuro- A&O x3, confused and forgetful  Most Recent Vitals- Temp: 97.8  F (36.6  C) Temp src: Oral BP: (!) 165/81 Pulse: 75   Resp: 20 SpO2: 97 % O2 Device: None (Room air)   Tele/Cardiac- SR BBB  Resp- LS diminished with crackles at bases on RA  Activity- A1 GB w  Pain- complained of gen pain x 1, tylenol given with relief  Drips- ABX, D5  Drains/Tubes- PIV  Skin- scattered bruising, scabs and rash  GI/- voiding adequately with purewick  Aggression Color- Green  COVID status- Negative  Plan- discharge pending clinical improvement  Misc-     Rosanne Boyce RN

## 2022-01-09 NOTE — PROGRESS NOTES
[facility]    Hospitalist Progress Note    Assessment & Plan   Margy Tonya Babin is a 89 year old female history of chronic nausea, hypertension, paroxysmal atrial flutter, HLD, insomnia, anxiety, and recent cardiac work-up for progressive dyspnea as outlined below who is admitted on 1/6/2022 after she was found in her apartment on the floor of the bathroom feces and confused.  Acute metabolic encephalopathy  Sepsis secondary to UTI  Lactic acidosis secondary to sepsis  ---Head CT negative, blood cultures so far negative  --Creatinine is close to baseline, will stop IV fluids, transition Zosyn to Rocephin with once daily dosage.  --White count is trending down, urine culture is growing E. coli x2  Hypernatremia  --Resolved  Hypokalemia  --Replace per protocol  Acute dehydration  Mild rhabdomyolysis  Severe CRISSY  on CKD stage III  --- Baseline creatinine appears to be 0.95, present with a creatinine of 2.51, continue hydration, creatinine is close to baseline.  A. fib/flutter with RVR  History of SVT V. tach  Paroxysmal a flutter  Cardiomyopathy  -Zio patch on 12/7/2021: 2 episodes of VT up to 15 beats, and 16 episodes of SVT up to 17 beats.-  --HF borderline EF unknown etiology Echo 11/5/2021: mild LVH, proximal septal thickening not consistent with left ventricular outflow obstruction.  EF mildly low at 46 to 51% with moderately dilated left atrium and mild to moderate aortic regurgitation RV was of normal size and function  Lexiscan on 11/29/2021 revealed an abnormality consistent with left bundle branch block no evidence of ischemia or infarction EF was 59% at rest, 50% with stress.   LBBB seen on 2020 EKG.    --Continue on telemetry, appreciate cardiology input, they recommended to discontinue heparin since patient will not be discharged home on anticoagulation, his her A. fib possibly related to acute sepsis.  Needs to wean down diltiazem drip and continue with the  metoprolol.  -Cardiology recommending Zio patch at the time of discharge x14 days  Elevated LFTs secondary to rhabdomyolysis  --Trending down  HTN  Hold PTA lisinopril and amlodipine for AK I with possible sepsis  Continue metoprolol, will increase metoprolol dose for now.  This was increased from 25 to 50 mg on 1/8 full     Anxiety/depression, insomnia  Hold ramelteon, which was just recently started  Hold PRN xanax  Continue PTA quetiapine at HS  Continue PTA paroxetine      Chronic nausea  Chronic consitipation -none currently.  Nausea and vomiting protocol was ordered.  Bowel regimen ordered      Polypharmacy -  Recommended she discuss decreasing her medications with her PCP as this likely contributes to her nausea     Urgency  Hold PTA oxybutynin  DVT Prophylaxis: Currently on heparin drip  Code Status: No CPR- Do NOT Intubate   Updated daughter on the phone  Disposition: Expected discharge 1/10/2022 to TCU    Stacy James MD  Text Page   (7am to 6pm)    Interval History   Patient appears to be close to baseline in terms of her mental status, denies any new concerns, discussed about possible discharge to TCU, I tried to contact patient's daughter, left message.    -Data reviewed today: I reviewed all new labs and imaging results over the last 24 hours.  Physical Exam   Temp: 97.5  F (36.4  C) Temp src: Oral BP: (!) 146/74 Pulse: 75   Resp: 16 SpO2: 98 % O2 Device: None (Room air)    Vitals:    01/07/22 0600 01/08/22 0505 01/09/22 0530   Weight: 71.4 kg (157 lb 6.4 oz) 73.9 kg (162 lb 14.4 oz) 73.6 kg (162 lb 3.2 oz)     Vital Signs with Ranges  Temp:  [97.5  F (36.4  C)] 97.5  F (36.4  C)  Pulse:  [75-93] 75  Resp:  [16-18] 16  BP: (140-156)/(53-75) 146/74  SpO2:  [96 %-98 %] 98 %  I/O last 3 completed shifts:  In: 1515 [P.O.:360; I.V.:1155]  Out: 1150 [Urine:1150]    Constitutional: Patient awake but appears deconditioned  Respiratory: Clear to auscultation bilaterally, no crackles or  wheezing  Cardiovascular: Regular rate and rhythm, normal S1 and S2, and no murmur noted  GI: Normal bowel sounds, soft, non-distended, non-tender  Skin/Integumen: No rashes, no cyanosis, lower extremity edema present  Neuro : moving all 4 extremities, no focal deficit noted     Medications     D5W 75 mL/hr at 01/09/22 0644     dilTIAZem HCl-Sodium Chloride Stopped (01/07/22 0920)     - MEDICATION INSTRUCTIONS -         carboxymethylcellulose PF  1 drop Both Eyes Daily     metoprolol succinate ER  50 mg Oral Daily     PARoxetine  20 mg Oral At Bedtime     piperacillin-tazobactam  2.25 g Intravenous Q6H     QUEtiapine  25 mg Oral At Bedtime     sodium chloride (PF)  3 mL Intracatheter Q8H       Data   Recent Labs   Lab 01/09/22  1041 01/09/22  0545 01/09/22  0320 01/08/22  2127 01/08/22  1729 01/08/22  0608 01/07/22  0552 01/06/22  1431 01/06/22  1406 01/06/22  0506   0000   WBC  --  13.5*  --   --   --  16.0* 21.5*   < > 33.1*  --   --    HGB  --  11.6*  --   --   --  11.8 12.3   < > 15.4  --   --    MCV  --  95  --   --   --  94 98   < > 94  --   --    PLT  --  272  --   --   --  254 261   < > 373  --   --    INR  --   --   --   --   --   --   --   --  1.27*  --   --    NA  --  141  --   --   --  145* 147*  --  143  --    < >   POTASSIUM 3.6 3.3*  --   --   --  3.5 3.6  --  3.8  --    < >   CHLORIDE  --  113*  --   --   --  118* 124*  --  111*  --    < >   CO2  --  22  --   --   --  22 16*  --  23  --    < >   BUN  --  32*  --   --   --  44* 59*  --  67*  --    < >   CR  --  1.18*  --   --   --  1.50* 2.08*   < > 2.51*  --    < >   ANIONGAP  --  6  --   --   --  5 7  --  9  --    < >   SOBIA  --  8.1*  --   --   --  7.9* 7.4*  --  9.4  --    < >   GLC  --  103* 95 95   < > 96 114*   < > 133*  --    < >   ALBUMIN  --   --   --   --   --  2.2* 2.3*  --  3.5  --    < >   PROTTOTAL  --   --   --   --   --  5.8* 5.7*  --  8.1  --    < >   BILITOTAL  --   --   --   --   --  0.4 0.6  --  0.9  --    < >   ALKPHOS  --   --    --   --   --  69 74  --  108  --    < >   ALT  --   --   --   --   --  58* 55*  --  85*  --    < >   AST  --   --   --   --   --  48* 62*  --  133*  --    < >   LIPASE  --   --   --   --   --   --   --   --  82 63*  --     < > = values in this interval not displayed.     Recent Labs   Lab 01/09/22  0545 01/09/22  0320 01/08/22  2127 01/08/22  1729 01/08/22  0608   * 95 95 86 96       Imaging:   No results found for this or any previous visit (from the past 24 hour(s)).

## 2022-01-09 NOTE — PLAN OF CARE
Neuro- oriented to self only  Most Recent Vitals- Temp: 97.5  F (36.4  C) Temp src: Oral BP: (!) 143/72 Pulse: 93   Resp: 16 SpO2: 96 % O2 Device: None (Room air)   Tele/Cardiac- SR BBB  Resp- LS diminished  Activity- A GB W  Pain- no complaints  Drips- hep gtt, D5  Drains/Tubes- 2 PIV  Skin- rash to back, scattered bruising and scabs  GI/- purewick  Aggression Color- Green  COVID status- Negative  Plan- discharge pending clinical improvement  Misc-     Rosanne Boyce, RN

## 2022-01-09 NOTE — PLAN OF CARE
OT: orders rec'd and chart reviewed. Pt admitted on 1/6/2022 after she was found in her apartment on the floor of the bathroom feces and confused. Per chart, pt discharging to TCU in next 1-2 days, spoke with PT and will continue to work with pt until discharge. Will defer OT to TCU, OT order completed.

## 2022-01-09 NOTE — PROVIDER NOTIFICATION
01/08/22 7:53 PM Paged Dr. Carter: Per cardiology note on 1/7 they would like heparin drip stopped, but the order was never discontinued, it is still infusing. Would you like it stopped/could you discontinue the order?

## 2022-01-10 ENCOUNTER — APPOINTMENT (OUTPATIENT)
Dept: PHYSICAL THERAPY | Facility: CLINIC | Age: 87
DRG: 871 | End: 2022-01-10
Payer: MEDICARE

## 2022-01-10 LAB
ANION GAP SERPL CALCULATED.3IONS-SCNC: 6 MMOL/L (ref 3–14)
BUN SERPL-MCNC: 20 MG/DL (ref 7–30)
CALCIUM SERPL-MCNC: 8.7 MG/DL (ref 8.5–10.1)
CHLORIDE BLD-SCNC: 109 MMOL/L (ref 94–109)
CO2 SERPL-SCNC: 27 MMOL/L (ref 20–32)
CREAT SERPL-MCNC: 0.9 MG/DL (ref 0.52–1.04)
ERYTHROCYTE [DISTWIDTH] IN BLOOD BY AUTOMATED COUNT: 14.4 % (ref 10–15)
GFR SERPL CREATININE-BSD FRML MDRD: 60 ML/MIN/1.73M2
GLUCOSE BLD-MCNC: 94 MG/DL (ref 70–99)
HCT VFR BLD AUTO: 39.2 % (ref 35–47)
HGB BLD-MCNC: 12.4 G/DL (ref 11.7–15.7)
LACTATE SERPL-SCNC: 1.1 MMOL/L (ref 0.7–2)
MAGNESIUM SERPL-MCNC: 2 MG/DL (ref 1.6–2.3)
MCH RBC QN AUTO: 29.8 PG (ref 26.5–33)
MCHC RBC AUTO-ENTMCNC: 31.6 G/DL (ref 31.5–36.5)
MCV RBC AUTO: 94 FL (ref 78–100)
PLATELET # BLD AUTO: 312 10E3/UL (ref 150–450)
POTASSIUM BLD-SCNC: 3.6 MMOL/L (ref 3.4–5.3)
RBC # BLD AUTO: 4.16 10E6/UL (ref 3.8–5.2)
SODIUM SERPL-SCNC: 142 MMOL/L (ref 133–144)
WBC # BLD AUTO: 12.2 10E3/UL (ref 4–11)

## 2022-01-10 PROCEDURE — 36415 COLL VENOUS BLD VENIPUNCTURE: CPT | Performed by: INTERNAL MEDICINE

## 2022-01-10 PROCEDURE — 85027 COMPLETE CBC AUTOMATED: CPT | Performed by: INTERNAL MEDICINE

## 2022-01-10 PROCEDURE — 83735 ASSAY OF MAGNESIUM: CPT | Performed by: INTERNAL MEDICINE

## 2022-01-10 PROCEDURE — 97116 GAIT TRAINING THERAPY: CPT | Mod: GP

## 2022-01-10 PROCEDURE — 999N000128 HC STATISTIC PERIPHERAL IV START W/O US GUIDANCE

## 2022-01-10 PROCEDURE — 210N000002 HC R&B HEART CARE

## 2022-01-10 PROCEDURE — 250N000011 HC RX IP 250 OP 636: Performed by: INTERNAL MEDICINE

## 2022-01-10 PROCEDURE — 84132 ASSAY OF SERUM POTASSIUM: CPT | Performed by: INTERNAL MEDICINE

## 2022-01-10 PROCEDURE — 99232 SBSQ HOSP IP/OBS MODERATE 35: CPT | Performed by: INTERNAL MEDICINE

## 2022-01-10 PROCEDURE — 250N000013 HC RX MED GY IP 250 OP 250 PS 637: Performed by: INTERNAL MEDICINE

## 2022-01-10 PROCEDURE — 83605 ASSAY OF LACTIC ACID: CPT | Performed by: INTERNAL MEDICINE

## 2022-01-10 PROCEDURE — 97530 THERAPEUTIC ACTIVITIES: CPT | Mod: GP

## 2022-01-10 RX ORDER — LABETALOL HYDROCHLORIDE 5 MG/ML
10 INJECTION, SOLUTION INTRAVENOUS
Status: DISCONTINUED | OUTPATIENT
Start: 2022-01-10 | End: 2022-01-11 | Stop reason: HOSPADM

## 2022-01-10 RX ORDER — LEVOFLOXACIN 250 MG/1
500 TABLET, FILM COATED ORAL DAILY
Status: DISCONTINUED | OUTPATIENT
Start: 2022-01-10 | End: 2022-01-10

## 2022-01-10 RX ORDER — CEFDINIR 300 MG/1
300 CAPSULE ORAL 2 TIMES DAILY
Status: DISCONTINUED | OUTPATIENT
Start: 2022-01-10 | End: 2022-01-11 | Stop reason: HOSPADM

## 2022-01-10 RX ADMIN — ONDANSETRON 4 MG: 2 INJECTION INTRAMUSCULAR; INTRAVENOUS at 17:34

## 2022-01-10 RX ADMIN — PAROXETINE HYDROCHLORIDE 20 MG: 20 TABLET, FILM COATED ORAL at 21:10

## 2022-01-10 RX ADMIN — ACETAMINOPHEN 650 MG: 325 TABLET, FILM COATED ORAL at 06:30

## 2022-01-10 RX ADMIN — METOPROLOL SUCCINATE 50 MG: 50 TABLET, EXTENDED RELEASE ORAL at 08:49

## 2022-01-10 RX ADMIN — QUETIAPINE FUMARATE 25 MG: 25 TABLET ORAL at 21:10

## 2022-01-10 RX ADMIN — Medication 1 DROP: at 08:48

## 2022-01-10 RX ADMIN — LABETALOL HYDROCHLORIDE 10 MG: 5 INJECTION, SOLUTION INTRAVENOUS at 14:01

## 2022-01-10 RX ADMIN — CEFDINIR 300 MG: 300 CAPSULE ORAL at 13:58

## 2022-01-10 RX ADMIN — CEFDINIR 300 MG: 300 CAPSULE ORAL at 21:10

## 2022-01-10 ASSESSMENT — ACTIVITIES OF DAILY LIVING (ADL)
ADLS_ACUITY_SCORE: 15
ADLS_ACUITY_SCORE: 15
ADLS_ACUITY_SCORE: 17
DRESSING/BATHING_DIFFICULTY: YES
ADLS_ACUITY_SCORE: 15
ADLS_ACUITY_SCORE: 15
ADLS_ACUITY_SCORE: 17
FALL_HISTORY_WITHIN_LAST_SIX_MONTHS: YES
ADLS_ACUITY_SCORE: 15
DOING_ERRANDS_INDEPENDENTLY_DIFFICULTY: YES
ADLS_ACUITY_SCORE: 17
DIFFICULTY_EATING/SWALLOWING: NO
TOILETING_ISSUES: NO
ADLS_ACUITY_SCORE: 15
HEARING_DIFFICULTY_OR_DEAF: NO
ADLS_ACUITY_SCORE: 17
ADLS_ACUITY_SCORE: 15
WALKING_OR_CLIMBING_STAIRS: AMBULATION DIFFICULTY, REQUIRES EQUIPMENT;STAIR CLIMBING DIFFICULTY, DEPENDENT;TRANSFERRING DIFFICULTY, REQUIRES EQUIPMENT
ADLS_ACUITY_SCORE: 15
ADLS_ACUITY_SCORE: 17
NUMBER_OF_TIMES_PATIENT_HAS_FALLEN_WITHIN_LAST_SIX_MONTHS: 1
DRESSING/BATHING: BATHING DIFFICULTY, ASSISTANCE 1 PERSON
ADLS_ACUITY_SCORE: 15
ADLS_ACUITY_SCORE: 15
WALKING_OR_CLIMBING_STAIRS_DIFFICULTY: YES
EQUIPMENT_CURRENTLY_USED_AT_HOME: CANE, STRAIGHT;WALKER, HEMI
CONCENTRATING,_REMEMBERING_OR_MAKING_DECISIONS_DIFFICULTY: YES
ADLS_ACUITY_SCORE: 15
ADLS_ACUITY_SCORE: 17
WEAR_GLASSES_OR_BLIND: NO
WHICH_OF_THE_ABOVE_FUNCTIONAL_RISKS_HAD_A_RECENT_ONSET_OR_CHANGE?: AMBULATION;COGNITION
ADLS_ACUITY_SCORE: 15
DIFFICULTY_COMMUNICATING: NO
ADLS_ACUITY_SCORE: 15
ADLS_ACUITY_SCORE: 15

## 2022-01-10 NOTE — PROGRESS NOTES
Care Management Follow Up    Length of Stay (days): 4    Expected Discharge Date: 01/10/2022     Concerns to be Addressed: discharge planning     Patient plan of care discussed at interdisciplinary rounds: Yes    Anticipated Discharge Disposition: Transitional Care     Anticipated Discharge Services: Other (see comment) (therapy)  Anticipated Discharge DME: None    Patient/family educated on Medicare website which has current facility and service quality ratings:  No  Education Provided on the Discharge Plan:  yes  Patient/Family in Agreement with the Plan: yes    Referrals Placed by CM/SW: Post Acute Facilities  Private pay costs discussed: private room/amenity fees and transportation costs    Additional Information:  Received a call back from [facility].  They have a bed available for tomorrow.  It is a double room.  They can place patient on the private room waiting list.  The private room amenity fee is $58.13 per day,  They also want patient's family to be aware that they have COVID in the building.  Call placed to update patient's daughter.  She is in agreement and is asking for patient to be placed on the waiting list for a private room.  Call placed to [facility] to arrange for wheelchair transport at 12:30 tomorrow.  Call placed to update patient's daughter as to this information and she is in agreement.  Call placed and message left for [facility] to update them.    Will continue to follow.      REJI Avila, [facility]    [phone]  [facility]

## 2022-01-10 NOTE — PROGRESS NOTES
[facility]    Hospitalist Progress Note    Assessment & Plan   Margy Tonya Babin is a 89 year old female history of chronic nausea, hypertension, paroxysmal atrial flutter, HLD, insomnia, anxiety, and recent cardiac work-up for progressive dyspnea as outlined below who is admitted on 1/6/2022 after she was found in her apartment on the floor of the bathroom feces and confused.  Acute metabolic encephalopathy  Sepsis secondary to UTI  Lactic acidosis secondary to sepsis  ---Head CT negative, blood cultures so far negative  --Creatinine is close to baseline, she was received Zosyn, later transitioned to Rocephin, she lost her IV access, wanted to transition to p.o., will continue with the cefdinir 300 mg twice daily to complete 10-day course.  --White count is trending down, urine culture is growing E. coli x2, E. coli sensitive to second and third-generation cephalosporins.  Hypernatremia  --Resolved  Hypokalemia  --Replace per protocol  Acute dehydration  Mild rhabdomyolysis  Severe CRISSY  on CKD stage III  --- Baseline creatinine appears to be 0.95, present with a creatinine of 2.51, continue hydration, creatinine is close to baseline.  A. fib/flutter with RVR  History of SVT V. tach  Paroxysmal a flutter  Cardiomyopathy  -Zio patch on 12/7/2021: 2 episodes of VT up to 15 beats, and 16 episodes of SVT up to 17 beats.-  --HF borderline EF unknown etiology Echo 11/5/2021: mild LVH, proximal septal thickening not consistent with left ventricular outflow obstruction.  EF mildly low at 46 to 51% with moderately dilated left atrium and mild to moderate aortic regurgitation RV was of normal size and function  Lexiscan on 11/29/2021 revealed an abnormality consistent with left bundle branch block no evidence of ischemia or infarction EF was 59% at rest, 50% with stress.   LBBB seen on 2020 EKG.    --Continue on telemetry, appreciate cardiology input, they recommended to discontinue heparin  since patient will not be discharged home on anticoagulation, his her A. fib possibly related to acute sepsis.  Needs to wean down diltiazem drip and continue with the metoprolol.  -Cardiology recommending Zio patch at the time of discharge x14 days  Elevated LFTs secondary to rhabdomyolysis  --Trending down  HTN  Hold PTA lisinopril and amlodipine for AK I with possible sepsis  Continue metoprolol, will increase metoprolol dose for now.  This was increased from 25 to 50 mg on 1/8 full     Anxiety/depression, insomnia  Hold ramelteon, which was just recently started  Hold PRN xanax  Continue PTA quetiapine at HS  Continue PTA paroxetine      Chronic nausea  Chronic consitipation -none currently.  Nausea and vomiting protocol was ordered.  Bowel regimen ordered      Polypharmacy -  Recommended she discuss decreasing her medications with her PCP as this likely contributes to her nausea     Urgency  Hold PTA oxybutynin  DVT Prophylaxis: Currently on heparin drip  Code Status: No CPR- Do NOT Intubate   Updated daughter on the phone  Disposition: Expected discharge 1/10/2022 to TCU  Total time spend 35 min >50% spend on coordination of care including discharge planning, discussion with family etc.    Stacy James MD  Text Page   (7am to 6pm)    Interval History   Discussed with family, requested social work consulted, PT/OT is recommending TCU, will plan discharge to TCU when bed available.  Currently patient appears to be close to baseline.    -Data reviewed today: I reviewed all new labs and imaging results over the last 24 hours.  Physical Exam   Temp: 98  F (36.7  C) Temp src: Oral BP: (!) 174/84 Pulse: 87   Resp: 18 SpO2: 94 % O2 Device: None (Room air)    Vitals:    01/08/22 0505 01/09/22 0530 01/10/22 0615   Weight: 73.9 kg (162 lb 14.4 oz) 73.6 kg (162 lb 3.2 oz) 70.5 kg (155 lb 6.4 oz)     Vital Signs with Ranges  Temp:  [97.3  F (36.3  C)-99.4  F (37.4  C)] 98  F (36.7  C)  Pulse:  [75-99] 87  Resp:  [16-24]  18  BP: (156-174)/(64-88) 174/84  SpO2:  [94 %-98 %] 94 %  I/O last 3 completed shifts:  In: 30 [P.O.:30]  Out: 2300 [Urine:2300]    Constitutional: Patient awake but appears deconditioned  Respiratory: Clear to auscultation bilaterally, no crackles or wheezing  Cardiovascular: Regular rate and rhythm, normal S1 and S2, and no murmur noted  GI: Normal bowel sounds, soft, non-distended, non-tender  Skin/Integumen: No rashes, no cyanosis, lower extremity edema present  Neuro : moving all 4 extremities, no focal deficit noted     Medications     - MEDICATION INSTRUCTIONS -         carboxymethylcellulose PF  1 drop Both Eyes Daily     levofloxacin  500 mg Oral Daily     metoprolol succinate ER  50 mg Oral Daily     PARoxetine  20 mg Oral At Bedtime     QUEtiapine  25 mg Oral At Bedtime     sodium chloride (PF)  3 mL Intracatheter Q8H       Data   Recent Labs   Lab 01/10/22  0651 01/09/22  1041 01/09/22  0545 01/09/22  0320 01/08/22  1729 01/08/22  0608 01/07/22  0552 01/06/22  1431 01/06/22  1406 01/06/22  0506   0000   WBC 12.2*  --  13.5*  --   --  16.0* 21.5*   < > 33.1*  --   --    HGB 12.4  --  11.6*  --   --  11.8 12.3   < > 15.4  --   --    MCV 94  --  95  --   --  94 98   < > 94  --   --      --  272  --   --  254 261   < > 373  --   --    INR  --   --   --   --   --   --   --   --  1.27*  --   --      --  141  --   --  145* 147*  --  143  --    < >   POTASSIUM 3.6 3.6 3.3*  --   --  3.5 3.6  --  3.8  --    < >   CHLORIDE 109  --  113*  --   --  118* 124*  --  111*  --    < >   CO2 27  --  22  --   --  22 16*  --  23  --    < >   BUN 20  --  32*  --   --  44* 59*  --  67*  --    < >   CR 0.90  --  1.18*  --   --  1.50* 2.08*   < > 2.51*  --    < >   ANIONGAP 6  --  6  --   --  5 7  --  9  --    < >   SOBIA 8.7  --  8.1*  --   --  7.9* 7.4*  --  9.4  --    < >   GLC 94  --  103* 95   < > 96 114*   < > 133*  --    < >   ALBUMIN  --   --   --   --   --  2.2* 2.3*  --  3.5  --    < >   PROTTOTAL  --   --    --   --   --  5.8* 5.7*  --  8.1  --    < >   BILITOTAL  --   --   --   --   --  0.4 0.6  --  0.9  --    < >   ALKPHOS  --   --   --   --   --  69 74  --  108  --    < >   ALT  --   --   --   --   --  58* 55*  --  85*  --    < >   AST  --   --   --   --   --  48* 62*  --  133*  --    < >   LIPASE  --   --   --   --   --   --   --   --  82 63*  --     < > = values in this interval not displayed.     Recent Labs   Lab 01/10/22  0651 01/09/22  0545 01/09/22  0320 01/08/22  2127 01/08/22  1729   GLC 94 103* 95 95 86       Imaging:   No results found for this or any previous visit (from the past 24 hour(s)).

## 2022-01-10 NOTE — PLAN OF CARE
No  ivs in place when doing am assessment.  Inf. Appetite poor..  to start po   antibiotic med. Pt very weak. C/O dizziness and lightheaded when up . BP elevated.Skin dry and flaky

## 2022-01-10 NOTE — PLAN OF CARE
DO to place/situation, forgetful. Tele: SR w/BBB. Denies pain. Neuro's unchanged. Assist x 1-2 w/GB & walker. Triggered sepsis protocol, LA 1.1. Purewick in place. Discharge pending clinical improvement. Will continue w/plan of care.

## 2022-01-11 VITALS
OXYGEN SATURATION: 96 % | BODY MASS INDEX: 25.29 KG/M2 | RESPIRATION RATE: 18 BRPM | SYSTOLIC BLOOD PRESSURE: 163 MMHG | TEMPERATURE: 98.3 F | DIASTOLIC BLOOD PRESSURE: 75 MMHG | WEIGHT: 152 LBS | HEART RATE: 83 BPM

## 2022-01-11 LAB
ANION GAP SERPL CALCULATED.3IONS-SCNC: 5 MMOL/L (ref 3–14)
ATRIAL RATE - MUSE: 104 BPM
BACTERIA BLD CULT: NO GROWTH
BACTERIA BLD CULT: NO GROWTH
BUN SERPL-MCNC: 18 MG/DL (ref 7–30)
CALCIUM SERPL-MCNC: 8.7 MG/DL (ref 8.5–10.1)
CHLORIDE BLD-SCNC: 110 MMOL/L (ref 94–109)
CO2 SERPL-SCNC: 28 MMOL/L (ref 20–32)
CREAT SERPL-MCNC: 0.93 MG/DL (ref 0.52–1.04)
DIASTOLIC BLOOD PRESSURE - MUSE: NORMAL MMHG
GFR SERPL CREATININE-BSD FRML MDRD: 57 ML/MIN/1.73M2
GLUCOSE BLD-MCNC: 97 MG/DL (ref 70–99)
INTERPRETATION ECG - MUSE: NORMAL
MAGNESIUM SERPL-MCNC: 1.9 MG/DL (ref 1.6–2.3)
P AXIS - MUSE: 62 DEGREES
POTASSIUM BLD-SCNC: 3.5 MMOL/L (ref 3.4–5.3)
PR INTERVAL - MUSE: 152 MS
QRS DURATION - MUSE: 136 MS
QT - MUSE: 394 MS
QTC - MUSE: 518 MS
R AXIS - MUSE: -27 DEGREES
SODIUM SERPL-SCNC: 143 MMOL/L (ref 133–144)
SYSTOLIC BLOOD PRESSURE - MUSE: NORMAL MMHG
T AXIS - MUSE: 149 DEGREES
VENTRICULAR RATE- MUSE: 104 BPM

## 2022-01-11 PROCEDURE — 83735 ASSAY OF MAGNESIUM: CPT | Performed by: HOSPITALIST

## 2022-01-11 PROCEDURE — 250N000013 HC RX MED GY IP 250 OP 250 PS 637: Performed by: INTERNAL MEDICINE

## 2022-01-11 PROCEDURE — 99239 HOSP IP/OBS DSCHRG MGMT >30: CPT | Performed by: INTERNAL MEDICINE

## 2022-01-11 PROCEDURE — 99221 1ST HOSP IP/OBS SF/LOW 40: CPT | Performed by: INTERNAL MEDICINE

## 2022-01-11 PROCEDURE — 82310 ASSAY OF CALCIUM: CPT | Performed by: HOSPITALIST

## 2022-01-11 PROCEDURE — 36415 COLL VENOUS BLD VENIPUNCTURE: CPT | Performed by: HOSPITALIST

## 2022-01-11 RX ORDER — CEFDINIR 300 MG/1
300 CAPSULE ORAL 2 TIMES DAILY
DISCHARGE
Start: 2022-01-11 | End: 2022-01-18

## 2022-01-11 RX ORDER — METOPROLOL SUCCINATE 50 MG/1
50 TABLET, EXTENDED RELEASE ORAL DAILY
DISCHARGE
Start: 2022-01-12 | End: 2022-03-21

## 2022-01-11 RX ORDER — POTASSIUM CHLORIDE 1500 MG/1
20 TABLET, EXTENDED RELEASE ORAL DAILY
DISCHARGE
Start: 2022-01-11 | End: 2022-04-25

## 2022-01-11 RX ORDER — POLYETHYLENE GLYCOL 3350 17 G/17G
17 POWDER, FOR SOLUTION ORAL DAILY
Qty: 510 G | DISCHARGE
Start: 2022-01-11 | End: 2022-08-12

## 2022-01-11 RX ORDER — POTASSIUM CHLORIDE 1.5 G/1.58G
40 POWDER, FOR SOLUTION ORAL ONCE
Status: COMPLETED | OUTPATIENT
Start: 2022-01-11 | End: 2022-01-11

## 2022-01-11 RX ADMIN — POTASSIUM CHLORIDE 40 MEQ: 1.5 POWDER, FOR SOLUTION ORAL at 10:21

## 2022-01-11 RX ADMIN — Medication 1 DROP: at 10:20

## 2022-01-11 RX ADMIN — CEFDINIR 300 MG: 300 CAPSULE ORAL at 10:20

## 2022-01-11 RX ADMIN — METOPROLOL SUCCINATE 50 MG: 50 TABLET, EXTENDED RELEASE ORAL at 10:20

## 2022-01-11 ASSESSMENT — ACTIVITIES OF DAILY LIVING (ADL)
ADLS_ACUITY_SCORE: 15

## 2022-01-11 NOTE — PROGRESS NOTES
Care Management Discharge Note    Discharge Date: 01/11/2022  Expected Time of Departure: 12:30    Discharge Disposition: Transitional Care    Discharge Services: Other (see comment) (therapy)    Discharge DME: None    Discharge Transportation: agency, [facility] wheelchair transport at 12:30    Private pay costs discussed: transportation costs    PAS Confirmation Code: [id number]  Patient/family educated on Medicare website which has current facility and service quality ratings: no    Education Provided on the Discharge Plan:  yes  Persons Notified of Discharge Plans: patient's daughter  Patient/Family in Agreement with the Plan: yes    Handoff Referral Completed: Yes    Additional Information:  Received discharge orders for patient.  Bed available at [facility] for today.  Patient's daughter is asking for transport to be arranged.  Explained that this will be private pay and patient's daughter is in agreement.  Call placed to [facility] to arrange for wheelchair transport at 12:30 today.  Patient's daughter informed of the plan and in agreement to the plan.  Call placed to update [employer] [facility] and faxed the orders and the PAS.        PAS-RR    D: Per DHS regulation, JESSICA completed and submitted PAS-RR to [place] [employer] Direct Connect via the Senior [employer] Line.  PAS-RR confirmation # is : [id number].    I: SW spoke with patient's daughter and they are aware a PAS-RR has been submitted.  SW reviewed with patient's daughter that they may be contacted for a follow up appointment within 10 days of hospital discharge if their SNF stay is < 30 days.  Contact information for [facility] was also provided.    A: Patient's daughter verbalized understanding.    P: Further questions may be directed to [facility] at #[phone], option #4 for PAS-RR staff.        REJI Avila, [facility]    [phone]  [facility] [place]

## 2022-01-11 NOTE — PROVIDER NOTIFICATION
Patient noted to have 29 beats VT on the tele monitor. Writer went into patient room to check on patient who was sleeping at the time. Primary RN notified and Dr. Chaparro paged

## 2022-01-11 NOTE — PLAN OF CARE
Neuro- A&OX3-4, forgetful, horse/garbled speech unchanged,   Most Recent Vitals- Temp: 97.9  F (36.6  C) Temp src: Oral BP: 136/62 Pulse: 89   Resp: 20 SpO2: 94 % O2 Device: None (Room air)   Tele/Cardiac- SR   Resp- RA   Activity- Up with 1 and walker   Pain- Denies   Drips- none   Drains/Tubes- P-IV on right forearm SL  Skin- multiple abrasions, bruising. Dressing on back CDI.  GI/- WDL/pure wick   Aggression Color- Green  COVID status- Negative  Plan- Discharge to [facility] TCU tomorrow at 1230pm.  [facility]     Eliud Alexander RN

## 2022-01-11 NOTE — DISCHARGE SUMMARY
[facility]    Discharge Summary  Hospitalist    Date of Admission:  1/6/2022  Date of Discharge:  1/11/2022  Discharging Provider: Stacy James MD    Discharge Diagnoses   Abscess  UTI  CRISSY    History of Present Illness   Please review admission history and physical.    Hospital Course   Margy Babin was admitted on 1/6/2022.  The following problems were addressed during her hospitalization:    Active Problems:    Renal insufficiency    Traumatic rhabdomyolysis, initial encounter (H)    Urinary tract infection without hematuria, site unspecified    Sepsis, due to unspecified organism, unspecified whether acute organ dysfunction present (H)  Margy Babin is a 89 year old female history of chronic nausea, hypertension, paroxysmal atrial flutter, HLD, insomnia, anxiety, and recent cardiac work-up for progressive dyspnea as outlined below who is admitted on 1/6/2022 after she was found in her apartment on the floor of the bathroom feces and confused.  Acute metabolic encephalopathy  Sepsis secondary to UTI  Lactic acidosis secondary to sepsis  Head CT negative, blood cultures so far negative, creatinine is currently close to baseline after hydration, she was received Zosyn, later transitioned to Rocephin, she lost her IV access, wanted to transition to p.o., will continue with the cefdinir 300 mg twice daily to complete 10-day course.  White count is trending down, urine culture is growing E. coli x2, E. coli sensitive to second and third-generation cephalosporins.  Hypernatremia and hypokalemia  --Resolved    Acute dehydration  Mild rhabdomyolysis  Severe CRISSY  on CKD stage III   Baseline creatinine appears to be 0.95, present with a creatinine of 2.51, continue hydration, creatinine is close to baseline.  A. fib/flutter with RVR  History of SVT V. tach  Paroxysmal a flutter  Cardiomyopathy  -Zio patch on 12/7/2021: 2 episodes of VT up to 15 beats, and 16 episodes  of SVT up to 17 beats.-  --HF borderline EF unknown etiology Echo 11/5/2021: mild LVH, proximal septal thickening not consistent with left ventricular outflow obstruction.  EF mildly low at 46 to 51% with moderately dilated left atrium and mild to moderate aortic regurgitation RV was of normal size and function  Lexiscan on 11/29/2021 revealed an abnormality consistent with left bundle branch block no evidence of ischemia or infarction EF was 59% at rest, 50% with stress.   LBBB seen on 2020 EKG.    --Continue on telemetry, appreciate cardiology input, they recommended to discontinue heparin since patient will not be discharged home on anticoagulation,  her A. fib possibly related to acute sepsis.  Diltiazem drip was weaned off and metoprolol continued.  Cardiology recommended Zio patch at the time of discharge for 14 days, patient had an episode of nonsustained VT for 28 beats the day prior to discharge, patient was evaluated by electrophysiology team and they recommended no further antiarrhythmic drug therapy, EP study or ICD.  Plan is to continue to keep potassium close to 4, started on potassium chloride 20 mEq daily.  Need to repeat labs in 4 to 5 days and adjust potassium dosage.    Elevated LFTs secondary to rhabdomyolysis  --Trending down  HTN   PTA lisinopril and amlodipine were on hold for CRISSY with possible sepsis.  Continue metoprolol, will increase metoprolol dose for now.  This was increased from 25 to 50 mg .  Amlodipine and lisinopril were restarted on discharge.     Anxiety/depression, insomnia  PTA was on ramelteon and Xanax, both discontinued on discharge.  Continue PTA quetiapine at HS  Continue PTA paroxetine      Chronic nausea  Chronic consitipation -none currently.  Nausea and vomiting protocol was ordered.  Bowel regimen ordered      Polypharmacy -  Recommended she discuss decreasing her medications with her PCP as this likely contributes to her nausea     Urgency  Hold PTA  oxybutynin        Stacy James MD    Significant Results and Procedures       Pending Results     Unresulted Labs Ordered in the Past 30 Days of this Admission     Date and Time Order Name Status Description    1/6/2022  2:00 PM Blood Culture Line, venous Preliminary     1/6/2022  2:00 PM Blood Culture Line, venous Preliminary           Code Status   DNR/DNI       Primary Care Physician   Braeden Sood    Physical Exam   Temp: 98.3  F (36.8  C) Temp src: Oral BP: (!) 163/75 Pulse: 83   Resp: 18 SpO2: 96 % O2 Device: None (Room air)    Vitals:    01/09/22 0530 01/10/22 0615 01/11/22 0544   Weight: 73.6 kg (162 lb 3.2 oz) 70.5 kg (155 lb 6.4 oz) 68.9 kg (152 lb)     Vital Signs with Ranges  Temp:  [97.9  F (36.6  C)-98.4  F (36.9  C)] 98.3  F (36.8  C)  Pulse:  [] 83  Resp:  [18-20] 18  BP: (136-167)/(61-76) 163/75  SpO2:  [92 %-96 %] 96 %  I/O last 3 completed shifts:  In: 420 [P.O.:420]  Out: 600 [Urine:600]    The patient was examined on the day of discharge.    Discharge Disposition   Discharged to nursing home  Condition at discharge: Stable    Consultations This Hospital Stay   CARDIOLOGY IP CONSULT  CARE MANAGEMENT / SOCIAL WORK IP CONSULT  PHYSICAL THERAPY ADULT IP CONSULT  OCCUPATIONAL THERAPY ADULT IP CONSULT  PHARMACY IP CONSULT  PHARMACY IP CONSULT  SOCIAL WORK IP CONSULT  CARDIOLOGY IP CONSULT  PHYSICAL THERAPY ADULT IP CONSULT  OCCUPATIONAL THERAPY ADULT IP CONSULT    Time Spent on this Encounter   I, Stacy James MD, personally saw the patient today and spent greater than 30 minutes discharging this patient.    Discharge Orders      Care Coordination Referral      General info for SNF    Length of Stay Estimate: Short Term Care: Estimated # of Days <30  Condition at Discharge: Improving  Level of care:skilled   Rehabilitation Potential: Excellent  Admission H&P remains valid and up-to-date: Yes  Recent Chemotherapy: N/A  Use Nursing Home Standing Orders: Yes     Mantoux instructions     Give two-step Mantoux (PPD) Per Facility Policy Yes     Follow Up and recommended labs and tests    Follow up with care home physician.  The following labs/tests are recommended: cbc and basic metabolic panel in 4-5 days .     Reason for your hospital stay    Fall, urinary tract infection , sergo     Intake and output    Every shift     Daily weights    Call Provider for weight gain of more than 2 pounds per day or 5 pounds per week.     Activity - Up with nursing assistance     No CPR- Do NOT Intubate     Physical Therapy Adult Consult    Evaluate and treat as clinically indicated.    Reason:  deconditioning     Occupational Therapy Adult Consult    Evaluate and treat as clinically indicated.    Reason:  deconditioning     Fall precautions     Diet    Follow this diet upon discharge: Orders Placed This Encounter      Room Service      Combination Diet Regular Diet Adult     Discharge Medications   Discharge Medication List as of 1/11/2022 12:26 PM      START taking these medications    Details   cefdinir (OMNICEF) 300 MG capsule Take 1 capsule (300 mg) by mouth 2 times daily for 7 days, Transitional      polyethylene glycol (MIRALAX) 17 GM/Dose powder Take 17 g by mouth daily, Disp-510 g, Transitional      potassium chloride ER (KLOR-CON M) 20 MEQ CR tablet Take 1 tablet (20 mEq) by mouth daily, Transitional         CONTINUE these medications which have CHANGED    Details   metoprolol succinate ER (TOPROL-XL) 50 MG 24 hr tablet Take 1 tablet (50 mg) by mouth daily, Transitional         CONTINUE these medications which have NOT CHANGED    Details   acetaminophen (TYLENOL) 500 MG tablet Take 2 tablets (1,000 mg) by mouth 3 times daily as needed for mild pain, Disp-100 tablet, R-0, OTC      amLODIPine (NORVASC) 2.5 MG tablet Take 1 tablet (2.5 mg) by mouth daily, Disp-60 tablet, R-1, E-Prescribe      bisacodyl (DULCOLAX) 5 MG EC tablet Take 1 tablet (5 mg) by mouth daily as needed for constipation, Disp-60 tablet,  R-1, E-Prescribe      Calcium Carbonate (CALCIUM 600 PO) Take 1 tablet by mouth daily., Historical      Cholecalciferol (VITAMIN D) 1000 UNITS capsule Take 1 capsule by mouth daily., Historical      cycloSPORINE (RESTASIS) 0.05 % ophthalmic emulsion Place 1 drop into both eyes daily , Historical      docusate sodium (COLACE) 100 MG capsule Take 1 capsule (100 mg) by mouth 2 times daily as needed for constipation, Disp-60 capsule, R-0, OTC      lisinopril (ZESTRIL) 10 MG tablet Take 1 tablet (10 mg) by mouth daily, Disp-90 tablet, R-3, E-Prescribe      Multiple Vitamins-Minerals (MULTIVITAL) TABS Take 1 tablet by mouth daily., Historical      ondansetron (ZOFRAN) 4 MG tablet Take 1 tablet (4 mg) by mouth every 8 hours as needed for nausea, Disp-90 tablet, R-3, E-Prescribe      oxybutynin ER (DITROPAN XL) 15 MG 24 hr tablet TAKE 1 TABLET(15 MG) BY MOUTH DAILY, Disp-90 tablet, R-2, E-Prescribe      PARoxetine (PAXIL) 20 MG tablet TAKE 1 TABLET(20 MG) BY MOUTH AT BEDTIME, Disp-90 tablet, R-0, E-Prescribe      QUEtiapine (SEROQUEL) 25 MG tablet TAKE 1 TABLET(25 MG) BY MOUTH AT BEDTIME, Disp-30 tablet, R-5, E-Prescribe      order for DME Equipment being ordered: Walker Wheels () and Walker ()  Treatment Diagnosis: Difficulty ambulatingDisp-1 each, R-0, Local Print         STOP taking these medications       ALPRAZolam (XANAX) 0.25 MG tablet Comments:   Reason for Stopping:         ramelteon (ROZEREM) 8 MG tablet Comments:   Reason for Stopping:             Allergies   No Known Allergies  Data   Most Recent 3 CBC's:Recent Labs   Lab Test 01/10/22  0651 01/09/22  0545 01/08/22  0608   WBC 12.2* 13.5* 16.0*   HGB 12.4 11.6* 11.8   MCV 94 95 94    272 254      Most Recent 3 BMP's:  Recent Labs   Lab Test 01/11/22  0603 01/10/22  0651 01/09/22  1041 01/09/22  0545    142  --  141   POTASSIUM 3.5 3.6 3.6 3.3*   CHLORIDE 110* 109  --  113*   CO2 28 27  --  22   BUN 18 20  --  32*   CR 0.93 0.90  --  1.18*    ANIONGAP 5 6  --  6   SOBIA 8.7 8.7  --  8.1*   GLC 97 94  --  103*     Most Recent 2 LFT's:  Recent Labs   Lab Test 01/08/22  0608 01/07/22  0552   AST 48* 62*   ALT 58* 55*   ALKPHOS 69 74   BILITOTAL 0.4 0.6     Most Recent INR's and Anticoagulation Dosing History:  Anticoagulation Dose History     Recent Dosing and Labs Latest Ref Rng & Units 4/11/2007 1/6/2022    INR 0.85 - 1.15 0.97 1.27(H)        Most Recent 3 Troponin's:  Recent Labs   Lab Test 12/25/20  1435 06/27/17  2103 06/27/17  1520   TROPI <0.015 <0.015  The 99th percentile for upper reference range is 0.045 ug/L.  Troponin values in   the range of 0.045 - 0.120 ug/L may be associated with risks of adverse   clinical events.   <0.015  The 99th percentile for upper reference range is 0.045 ug/L.  Troponin values in   the range of 0.045 - 0.120 ug/L may be associated with risks of adverse   clinical events.       Most Recent Cholesterol Panel:  Recent Labs   Lab Test 11/04/20  1043   CHOL 240*   *   HDL 51   TRIG 168*     Most Recent 6 Bacteria Isolates From Any Culture (See EPIC Reports for Culture Details):  Recent Labs   Lab Test 12/25/20  1544 12/25/20  1435 04/23/19  1255 06/19/17  1524 10/23/15  1028 02/11/15  1342   CULT No growth No growth 10,000 to 50,000 colonies/mL  mixed urogenital peewee  Susceptibility testing not routinely done   10,000 to 50,000 colonies/mL mixed urogenital peewee Susceptibility testing not   routinely done   10,000 to 50,000 colonies/mL mixed urogenital peewee 10,000 to 50,000 colonies/mL mixed urogenital peewee  Susceptibility testing not routinely done       Most Recent TSH, T4 and A1c Labs:  Recent Labs   Lab Test 11/23/21  1202   TSH 1.27     Results for orders placed or performed during the hospital encounter of 01/06/22   XR Chest Port 1 View    Narrative    CHEST ONE VIEW  1/6/2022 2:14 PM     HISTORY: Weak.    COMPARISON: November 16, 2021.      Impression    IMPRESSION: No acute disease.     ELI SCHUMACHER MD          SYSTEM ID:  [id number]   CT Head w/o Contrast    Narrative    CT OF THE HEAD WITHOUT CONTRAST 1/6/2022 3:11 PM     COMPARISON: Head CT 12/25/2020    HISTORY: Trauma.  Head injury.    TECHNIQUE: 5 mm thick axial CT images of the head were acquired  without IV contrast material.    FINDINGS:  There is mild diffuse cerebral volume loss. There are  extensive confluent areas of decreased density in the cerebral white  matter bilaterally that are consistent with sequela of chronic small  vessel ischemic disease.     The ventricles and basal cisterns are within normal limits in  configuration given the degree of cerebral volume loss.  There is no  midline shift. There are no extra-axial fluid collections.     No intracranial hemorrhage, mass or recent infarct.    The visualized paranasal sinuses are well-aerated. There is no  mastoiditis. There are no fractures of the visualized bones.       Impression    IMPRESSION:  Diffuse cerebral volume loss and cerebral white matter  changes consistent with chronic small vessel ischemic disease. No  evidence for acute intracranial pathology.       Radiation dose for this scan was reduced using automated exposure  control, adjustment of the mA and/or kV according to patient size, or  iterative reconstruction technique.    GISELLE CHAVIRA MD         SYSTEM ID:  [id number]   CT Abdomen Pelvis w/o Contrast    Narrative    CT ABDOMEN AND PELVIS WITHOUT CONTRAST 1/6/2022 3:11 PM    CLINICAL HISTORY: Abdominal pain, acute, nonlocalized.     TECHNIQUE: CT scan of the abdomen and pelvis was performed without IV  contrast. Multiplanar reformats were obtained. Dose reduction  techniques were used.  CONTRAST: None.    COMPARISON: 6/27/2017    FINDINGS:   LOWER CHEST: No infiltrates or effusions. Minimal scattered probable  atelectasis. Small hiatal hernia.    HEPATOBILIARY: No significant mass or bile duct dilatation. No  calcified gallstones.     PANCREAS: No significant mass, duct  dilatation, or inflammatory  change.    SPLEEN: Normal size.    ADRENAL GLANDS: No significant nodules.    KIDNEYS/BLADDER: No significant mass, stones, or hydronephrosis.    BOWEL: No obstruction or inflammatory change.    VASCULATURE: There are moderate atherosclerotic changes of the  visualized aorta and its branches. There is no evidence of aortic  aneurysm.    PELVIC ORGANS: No pelvic masses.    OTHER: No free air or free fluid.    MUSCULOSKELETAL: No suspicious bony lesions.      Impression    IMPRESSION:   1.  No acute process demonstrated in the abdomen and pelvis.    ELI SCHUMACHER MD         SYSTEM ID:  [id number]   Echo Limited     Value    LVEF  45-50%    Narrative    [id number]  [id number]  [id number]  [id number]^MAC^KAYODE^ISABEL     [facility]  Echocardiography Laboratory  [address] [place] [address]     Name: KHANH BRAGG  MRN: [id number]  : 10/02/1929  Study Date: 2022 03:24 PM  Age: 89 yrs  Gender: Female  Patient Location: [facility]  Reason For Study: Atrial Fibrillation  Ordering Physician: KAYODE WATTS  Referring Physician: Braeden Sood  Performed By: Jean Mcclendon     BSA: 1.8 m2  Height: 67 in  Weight: 157 lb  HR: 87  BP: 126/77 mmHg  ______________________________________________________________________________  Procedure  Limited Portable Echo Adult.  ______________________________________________________________________________  Interpretation Summary     The left ventricle is normal in size. Left ventricular systolic function is  mildly reduced. The visual ejection fraction is 45-50%.  There is mild (1+) mitral, tricuspid and aortic regurgitation.  Compared to prior study, there is no significant change.  ______________________________________________________________________________  Left Ventricle  The left ventricle is normal in size. Left ventricular systolic function is  mildly reduced. The visual ejection fraction is 45-50%. There is mild  global  hypokinesia of the left ventricle.     Right Ventricle  Right ventricular function cannot be assessed due to poor image quality.     Mitral Valve  There is mild (1+) mitral regurgitation.     Tricuspid Valve  There is mild (1+) tricuspid regurgitation. The right ventricular systolic  pressure is approximated at 34.4 mmHg plus the right atrial pressure.     Aortic Valve  There is mild (1+) aortic regurgitation.  ______________________________________________________________________________  MMode/2D Measurements & Calculations     IVSd: 1.3 cm  LVIDd: 4.4 cm  LVIDs: 3.7 cm  LVPWd: 1.4 cm  FS: 16.6 %  LV mass(C)d: 229.7 grams  LV mass(C)dI: 125.9 grams/m2  LA dimension: 3.8 cm  asc Aorta Diam: 2.8 cm  RWT: 0.61     Doppler Measurements & Calculations  AI P1/2t: 492.1 msec  TR max miranda: 293.2 cm/sec  TR max P.4 mmHg     ______________________________________________________________________________  Report approved by: Lorie Grayson 2022 05:00 PM

## 2022-01-11 NOTE — PLAN OF CARE
Shift Note [id number]:   Patient admitted to unit for UTI, Fall. Patient is alert and oriented x4-has had difficulty stating why pt is in hospital but was able to state in hospital due to fall without cues. Assist x1 W&GB. VS-mild tachycardia present. denies pain. Aggression Stop Light green. Tele: ST with BBB, one episode of V-tach 29 beats. MD notified, labs ordered for morning, patient asymptomatic. Purewick in place. Neuros intact.     Major Shift Events: Episode of V-Tach

## 2022-01-11 NOTE — PROGRESS NOTES
Pt denied pain. vitals stable. IV removed w/o s/s of infection. Reviewed/gave d/c instructions including med's, and F/u's. Pt verbalized understanding. Leaving unit with all belongings in wheelchair. No New RX . transport provided by wheelchair transport.

## 2022-01-11 NOTE — PROGRESS NOTES
I was paged overnight for a 28 beat run of VT; she was asymptomatic and sleeping at the time per nursing staff. I ordered AM electrolytes as we continue to monitor her very closely overnight.

## 2022-01-11 NOTE — PLAN OF CARE
Physical Therapy Discharge Summary    Reason for therapy discharge:    Discharged to transitional care facility.    Progress towards therapy goal(s). See goals on Care Plan in [facility] electronic health record for goal details.  Goals partially met.  Barriers to achieving goals:   discharge from facility.    Therapy recommendation(s):    Continued therapy is recommended.  Rationale/Recommendations:  To further increase independence with mobility.

## 2022-01-11 NOTE — CONSULTS
CARDIAC ELECTROPHYSIOLOGY CONSULTATION  January 11, 2022    REQUESTING PROVIDER:  Dr. Chaparro    REASON FOR CONSULTATION: Nonsustained ventricular tachycardia during sleep.      HISTORY OF PRESENT ILLNESS:    Thank you for asking the EP service to evaluate this pleasant 89-year-old female.  She was admitted on 1/6/2022 with confusion and weakness.  During her hospital stay she was seen in cardiology consultation by my partner Dr. Shabana Subramanian for a self-terminating episode of atrial fibrillation.  The patient was previously seen by my partner Dr. Cedrick Mcmahon in the clinic last fall for dyspnea on exertion.  She had detailed cardiac evaluation including echocardiography and nuclear stress testing.  EF was 45-50% with mild to moderate AR.  She has chronic LBBB.  She had nuclear stress test was basically unremarkable (some abnormal findings can be explained by LBBB).    Early this morning around 2:14 AM the patient had 28 beat run of wide QRS tachycardia, consistent with NSVT.  She was asleep at the time.  [place] runs of NSVT were previously seen during this hospitalization as well as during an outpatient cardiac monitor last fall.    The patient lives independently in a senior apartment.  She tells me she has never fainted.  Physical activity is quite limited.  She is a non-smoker and nondrinker.      DIAGNOSTIC STUDIES:  Labs: Sodium 143, potassium 3.5, creatinine 0.93, hematocrit 39.2%.  12-lead ECG: Sinus rhythm with LBBB.  Echocardiogram: LVEF 45-50%, 1+ MR, TR and AR.  Lexiscan nuclear stress test: See HPI      IMPRESSION:  1. Asymptomatic NSVT.  2. Mild nonischemic cardiomyopathy.  3. LBBB.  4. Self-terminating episode of atrial fibrillation early on during the present admission, possibly triggered by acute medical decompensation at the time.    RECOMMENDATIONS:    1. She is already on a beta-blocker (metoprolol XL 50 mg daily).  Nothing further needs to be done for her asymptomatic NSVT.  She has had complete  cardiac evaluation.  There is no indication for antiarrhythmic drug therapy, EP study or an ICD.  2. Keeping her potassium close to 4.0 is reasonable.  Consider low-dose daily K-Dur supplementation.    Sign off.  Please call with further questions.    I appreciate the opportunity to be part of this patient's care.  Please feel free to call me with any questions (pager #[phone]).    Patricia Shin MD, [facility]        PHYSICAL EXAM:  Vitals: BP (!) 163/76 (BP Location: Right arm)   Pulse 91   Temp 98.4  F (36.9  C) (Oral)   Resp 18   Wt 68.9 kg (152 lb)   SpO2 92%   BMI 25.29 kg/m      Intake/Output Summary (Last 24 hours) at 1/11/2022 0933  Last data filed at 1/11/2022 0922  Gross per 24 hour   Intake 420 ml   Output 1050 ml   Net -630 ml     Vitals:    01/07/22 0600 01/08/22 0505 01/09/22 0530 01/10/22 0615   Weight: 71.4 kg (157 lb 6.4 oz) 73.9 kg (162 lb 14.4 oz) 73.6 kg (162 lb 3.2 oz) 70.5 kg (155 lb 6.4 oz)    01/11/22 0544   Weight: 68.9 kg (152 lb)     Constitutional:  Alert and oriented to place.  Pt appears comfortable, has no CP or respiratory distress.  Head: Normocephalic and atraumatic.   Skin:  Normal color and texture.  No rashes, lesions or eruptions.  Eyes:  no jaundice, EOMI.  ENT:  Supple, normal JVP.  No lymphadenopathy or apparent thyroid enlargement.  Chest/Lungs:  Clear bilaterally, no rales or wheezing.  Cardiac:  Regular rhythm, normal S1 and S2.  No murmur, rub or gallop.    Abdomen:  Normal bowel sounds. Abdomen is soft.  Extremities:  Radial pulses 1+ bilaterally.   No sig lower extremity edema is present.   Neurological:  CN 2-12 grossly intact.  Strength in UE is normal & symmetric.    Back:  No CVA tenderness.     REVIEW OF SYSTEMS:  Review of system completed and negative with the exception of what was described in the HPI section.     CURRENT MEDICATIONS:    carboxymethylcellulose PF  1 drop Both Eyes Daily     cefdinir  300 mg Oral BID     metoprolol succinate ER  50 mg  Oral Daily     PARoxetine  20 mg Oral At Bedtime     potassium chloride  40 mEq Oral Once     QUEtiapine  25 mg Oral At Bedtime     sodium chloride (PF)  3 mL Intracatheter Q8H       ALLERGIES   No Known Allergies    PAST MEDICAL HISTORY:  Past Medical History:   Diagnosis Date     Anxiety      Blepharitis of both eyes      Depression, major, recurrent, in complete remission (H)      Dry eye syndrome      Dyspnea on exertion 10/7/2021     Essential hypertension 3/5/2020     Familial tremor 3/6/2013     Insomnia, unspecified type 11/3/2014    No CSA on file Last  check - 2020 with no concerns.     Mixed hyperlipidemia 2020     Nausea without vomiting 2015     Problem list name updated by automated process. Provider to review     Tremor, hereditary, benign        PAST SURGICAL HISTORY:  Past Surgical History:   Procedure Laterality Date     HYSTERECTOMY TOTAL ABDOMINAL, BILATERAL SALPINGO-OOPHORECTOMY, COMBINED       NECK SURGERY  2009    cervical fusion     ROTATOR CUFF REPAIR RT/LT Right      ZZC TOTAL HIP ARTHROPLASTY Right        FAMILY HISTORY:  Family History   Problem Relation Age of Onset     Heart Disease Father 80        MI     C.A.D. Father      Breast Cancer Daughter 43         age 53     Emphysema Sister      Heart Disease Sister        SOCIAL HISTORY:  Social History     Socioeconomic History     Marital status:      Spouse name: Not on file     Number of children: 2     Years of education: Not on file     Highest education level: Not on file   Occupational History     Employer: RETIRED   Tobacco Use     Smoking status: Never Smoker     Smokeless tobacco: Never Used   Substance and Sexual Activity     Alcohol use: No     Alcohol/week: 0.0 standard drinks     Drug use: No     Sexual activity: Never     Partners: Male   Other Topics Concern     Parent/sibling w/ CABG, MI or angioplasty before 65F 55M? No   Social History Narrative     Not on file     Social Determinants of  Health     Financial Resource Strain: Not on file   Food Insecurity: Not on file   Transportation Needs: Not on file   Physical Activity: Not on file   Stress: Not on file   Social Connections: Not on file   Intimate Partner Violence: Not on file   Housing Stability: Not on file         Recent Lab Results:  Recent Labs   Lab 01/11/22  0603 01/10/22  0651 01/09/22  1041 01/09/22  0545 01/08/22  1729 01/08/22  0608 01/07/22  0552 01/06/22  1431 01/06/22  1406 01/06/22  0506   0000   WBC  --  12.2*  --  13.5*  --  16.0* 21.5*   < > 33.1*  --   --    HGB  --  12.4  --  11.6*  --  11.8 12.3   < > 15.4  --   --    MCV  --  94  --  95  --  94 98   < > 94  --   --    PLT  --  312  --  272  --  254 261   < > 373  --   --    INR  --   --   --   --   --   --   --   --  1.27*  --   --     142  --  141  --  145* 147*  --  143  --    < >   POTASSIUM 3.5 3.6 3.6 3.3*  --  3.5 3.6  --  3.8  --    < >   CHLORIDE 110* 109  --  113*  --  118* 124*  --  111*  --    < >   CO2 28 27  --  22  --  22 16*  --  23  --    < >   BUN 18 20  --  32*  --  44* 59*  --  67*  --    < >   CR 0.93 0.90  --  1.18*  --  1.50* 2.08*   < > 2.51*  --    < >   ANIONGAP 5 6  --  6  --  5 7  --  9  --    < >   SOBIA 8.7 8.7  --  8.1*  --  7.9* 7.4*  --  9.4  --    < >   GLC 97 94  --  103*   < > 96 114*   < > 133*  --    < >   ALBUMIN  --   --   --   --   --  2.2* 2.3*  --  3.5  --    < >   PROTTOTAL  --   --   --   --   --  5.8* 5.7*  --  8.1  --    < >   BILITOTAL  --   --   --   --   --  0.4 0.6  --  0.9  --    < >   ALKPHOS  --   --   --   --   --  69 74  --  108  --    < >   ALT  --   --   --   --   --  58* 55*  --  85*  --    < >   AST  --   --   --   --   --  48* 62*  --  133*  --    < >   LIPASE  --   --   --   --   --   --   --   --  82 63*  --     < > = values in this interval not displayed.

## 2022-01-12 ENCOUNTER — PATIENT OUTREACH (OUTPATIENT)
Dept: CARE COORDINATION | Facility: CLINIC | Age: 87
End: 2022-01-12
Payer: MEDICARE

## 2022-01-12 NOTE — LETTER
[facility]   To:   [facility]          Please give to facility    From:   Jud Miner RN  Care Coordinator   [facility]   P: [phone]  [email or web address]   Patient Name:  Margy Babin YOB: 1929   Admit date: 1/11/2022      *Information Needed:  Please contact me when the patient will discharge (or if they will move to long term care)- include the discharge date, disposition, and main diagnosis   - If the patient is discharged with home care services, please provide the name of the agency    Also- Please inform me if a care conference is being held.   Phone, Fax or Email with information        Jud Miner RN, BSN, PHN  Primary Care / Care Coordinator   [facility]  E-mail [email or web address]   [phone]                Thank you

## 2022-01-12 NOTE — PROGRESS NOTES
Clinic Care Coordination Contact  Care Coordination Transition Communication    Referral Source: IP Report    Clinical Data: Patient was hospitalized at [facility] from 1/6/2022 to 1/11/2022 with diagnosis of Abscess, UTI, CRISSY.     Transition to Facility:              [facility]              SW phone number/fax: [phone]    Plan: RN/SW Care Coordinator will await notification from facility staff informing RN/SW Care Coordinator of patient's discharge plans/needs. RN/SW Care Coordinator will review chart and outreach to facility staff every 4 weeks and as needed.     Jud Miner RN, BSN, PHN  Primary Care / Care Coordinator   [facility]  E-mail [email or web address]   [phone]

## 2022-01-14 ENCOUNTER — LAB REQUISITION (OUTPATIENT)
Dept: LAB | Facility: CLINIC | Age: 87
End: 2022-01-14
Payer: MEDICARE

## 2022-01-14 DIAGNOSIS — R53.1 WEAKNESS: ICD-10-CM

## 2022-01-14 DIAGNOSIS — I10 ESSENTIAL (PRIMARY) HYPERTENSION: ICD-10-CM

## 2022-01-17 LAB
ANION GAP SERPL CALCULATED.3IONS-SCNC: 14 MMOL/L (ref 5–18)
BUN SERPL-MCNC: 17 MG/DL (ref 8–28)
CALCIUM SERPL-MCNC: 9.7 MG/DL (ref 8.5–10.5)
CHLORIDE BLD-SCNC: 103 MMOL/L (ref 98–107)
CO2 SERPL-SCNC: 23 MMOL/L (ref 22–31)
CREAT SERPL-MCNC: 0.95 MG/DL (ref 0.6–1.1)
ERYTHROCYTE [DISTWIDTH] IN BLOOD BY AUTOMATED COUNT: 14.7 % (ref 10–15)
GFR SERPL CREATININE-BSD FRML MDRD: 56 ML/MIN/1.73M2
GLUCOSE BLD-MCNC: 91 MG/DL (ref 70–125)
HCT VFR BLD AUTO: 40.9 % (ref 35–47)
HGB BLD-MCNC: 12.8 G/DL (ref 11.7–15.7)
MCH RBC QN AUTO: 30.3 PG (ref 26.5–33)
MCHC RBC AUTO-ENTMCNC: 31.3 G/DL (ref 31.5–36.5)
MCV RBC AUTO: 97 FL (ref 78–100)
PLATELET # BLD AUTO: 411 10E3/UL (ref 150–450)
POTASSIUM BLD-SCNC: 4.5 MMOL/L (ref 3.5–5)
RBC # BLD AUTO: 4.22 10E6/UL (ref 3.8–5.2)
SODIUM SERPL-SCNC: 140 MMOL/L (ref 136–145)
WBC # BLD AUTO: 14.4 10E3/UL (ref 4–11)

## 2022-01-17 PROCEDURE — 80048 BASIC METABOLIC PNL TOTAL CA: CPT

## 2022-01-17 PROCEDURE — 85027 COMPLETE CBC AUTOMATED: CPT

## 2022-01-17 PROCEDURE — 36415 COLL VENOUS BLD VENIPUNCTURE: CPT

## 2022-01-17 PROCEDURE — P9604 ONE-WAY ALLOW PRORATED TRIP: HCPCS

## 2022-01-19 ENCOUNTER — LAB REQUISITION (OUTPATIENT)
Dept: LAB | Facility: CLINIC | Age: 87
End: 2022-01-19
Payer: MEDICARE

## 2022-01-19 DIAGNOSIS — D72.829 ELEVATED WHITE BLOOD CELL COUNT, UNSPECIFIED: ICD-10-CM

## 2022-01-20 LAB
BASOPHILS # BLD AUTO: 0.2 10E3/UL (ref 0–0.2)
BASOPHILS NFR BLD AUTO: 1 %
EOSINOPHIL # BLD AUTO: 0.6 10E3/UL (ref 0–0.7)
EOSINOPHIL NFR BLD AUTO: 6 %
ERYTHROCYTE [DISTWIDTH] IN BLOOD BY AUTOMATED COUNT: 14.6 % (ref 10–15)
HCT VFR BLD AUTO: 39.3 % (ref 35–47)
HGB BLD-MCNC: 12.6 G/DL (ref 11.7–15.7)
IMM GRANULOCYTES # BLD: 0.1 10E3/UL
IMM GRANULOCYTES NFR BLD: 1 %
LYMPHOCYTES # BLD AUTO: 1.8 10E3/UL (ref 0.8–5.3)
LYMPHOCYTES NFR BLD AUTO: 17 %
MCH RBC QN AUTO: 30.6 PG (ref 26.5–33)
MCHC RBC AUTO-ENTMCNC: 32.1 G/DL (ref 31.5–36.5)
MCV RBC AUTO: 95 FL (ref 78–100)
MONOCYTES # BLD AUTO: 0.8 10E3/UL (ref 0–1.3)
MONOCYTES NFR BLD AUTO: 7 %
NEUTROPHILS # BLD AUTO: 7.3 10E3/UL (ref 1.6–8.3)
NEUTROPHILS NFR BLD AUTO: 68 %
NRBC # BLD AUTO: 0 10E3/UL
NRBC BLD AUTO-RTO: 0 /100
PLATELET # BLD AUTO: 430 10E3/UL (ref 150–450)
RBC # BLD AUTO: 4.12 10E6/UL (ref 3.8–5.2)
WBC # BLD AUTO: 10.8 10E3/UL (ref 4–11)

## 2022-01-20 PROCEDURE — P9604 ONE-WAY ALLOW PRORATED TRIP: HCPCS

## 2022-01-20 PROCEDURE — 85025 COMPLETE CBC W/AUTO DIFF WBC: CPT

## 2022-01-20 PROCEDURE — 36415 COLL VENOUS BLD VENIPUNCTURE: CPT

## 2022-01-21 ENCOUNTER — TELEPHONE (OUTPATIENT)
Dept: FAMILY MEDICINE | Facility: CLINIC | Age: 87
End: 2022-01-21
Payer: MEDICARE

## 2022-01-21 NOTE — TELEPHONE ENCOUNTER
Tash from [place] called because patient is discharging from TCU on 1/22    They are not able to open her until 1/25    Requesting verbal order for Delay SOC for kindread for 1/25     Kale Kingston RN

## 2022-01-24 ENCOUNTER — PATIENT OUTREACH (OUTPATIENT)
Dept: NURSING | Facility: CLINIC | Age: 87
End: 2022-01-24
Payer: MEDICARE

## 2022-01-24 NOTE — LETTER
[facility] CARE COORDINATION  [address]  [place] [facility] [place] [address]    January 24, 2022    Margy Babin  [address]   [place] [facility] [address]      Dear Margy,    I am a clinic care coordinator who works with Braeden Sood MD at [facility]. I wanted to thank you for spending the time to talk with me.  Below is a description of clinic care coordination and how I can further assist you.      The clinic care coordination team is made up of a registered nurse,  and community health worker who understand the health care system. The goal of clinic care coordination is to help you manage your health and improve access to the health care system in the most efficient manner. The team can assist you in meeting your health care goals by providing education, coordinating services, strengthening the communication among your providers and supporting you with any resource needs.    Please feel free to contact me with any questions or concerns. We are focused on providing you with the highest-quality healthcare experience possible and that all starts with you.     Sincerely,        Jud Miner RN, BSN, PHN  Primary Care / Care Coordinator   [facility]  E-mail [email or web address]   [phone]

## 2022-01-24 NOTE — PROGRESS NOTES
Clinic Care Coordination Contact    Follow Up Progress Note      Assessment:   RNCC received a voice mail message from JESSICA at [facility] apprising RNCC of patient discharge to home on 1/22/2022 with Physical Therapy/OT/HHA services provided by [facility] services.    RNCC reached out to patient and patient is not a candidate for care coordination.    Care Gaps:    Health Maintenance Due   Topic Date Due     ZOSTER IMMUNIZATION (1 of 2) Never done     COVID-19 Vaccine (3 - Booster for Pfizer series) 07/20/2021     DTAP/TDAP/TD IMMUNIZATION (2 - Td or Tdap) 08/24/2021     MEDICARE ANNUAL WELLNESS VISIT  11/04/2021       Care Gaps Last addressed on 1/24/2022    Goals addressed this encounter:   Goals Addressed    None       Intervention/Education provided during outreach:   RNCC called and spoke with patient; introduced self, discussed role of Care Coordination and explained reason for call    Plan:   -Patient will contact the care team with questions, concerns, support needs   -Patient will use the clinic as a resource and understands (s)he can contact the [facility] with 24/7 after hours services available  -Care Coordinator will remain available as needed  -No further care coordination at this time     Jud Miner RN, BSN, PHN  Primary Care / Care Coordinator   [facility]  [email or web address] [email or web address]   [phone]

## 2022-01-24 NOTE — TELEPHONE ENCOUNTER
Tash called back     Gave verbal okay on this     Zuleyka SIDHU Triage RN  [facility]

## 2022-01-24 NOTE — TELEPHONE ENCOUNTER
Tash calling again regarding home care orders. She states there is no other home care that would be able to see pt any sooner. Asking for approval to start tomorrow 1/25. It looks like pt does have an appointment with Dr. Sood coming up, next week the 3rd of Feb. If she does not get approval she will not be able to start tomorrow. Will you approve of orders?    Tash number: [phone], confidential     Cheryl HAYS RN  [facility]

## 2022-01-24 NOTE — TELEPHONE ENCOUNTER
Left a message with home care intake, they could not take verbal ok on orders so they will have Tash call back     Zuleyka SIDHU, Triage RN  [facility]

## 2022-01-24 NOTE — TELEPHONE ENCOUNTER
She should have started before. It's obviously OK for her to start.  Braeden Sood MD on 1/24/2022 at 1:15 PM

## 2022-01-25 ENCOUNTER — TELEPHONE (OUTPATIENT)
Dept: FAMILY MEDICINE | Facility: CLINIC | Age: 87
End: 2022-01-25
Payer: MEDICARE

## 2022-01-25 ENCOUNTER — MEDICAL CORRESPONDENCE (OUTPATIENT)
Dept: HEALTH INFORMATION MANAGEMENT | Facility: CLINIC | Age: 87
End: 2022-01-25
Payer: MEDICARE

## 2022-01-25 NOTE — TELEPHONE ENCOUNTER
Rani, PT calling in from [facility] requesting verbal orders    PT    2 x a week x 3 weeks then 1 x a week x 5 weeks for weakness and balance.     OT eval the week of 1/31/22    Verbal orders given    Caridad Jaeger RN

## 2022-01-31 ENCOUNTER — TELEPHONE (OUTPATIENT)
Dept: FAMILY MEDICINE | Facility: CLINIC | Age: 87
End: 2022-01-31
Payer: MEDICARE

## 2022-01-31 NOTE — TELEPHONE ENCOUNTER
Verbal approval given for the homecare request below. Homecare/Hospice agency to fax orders for provider signature.    OT 1 time a week for 5 weeks to work on DME recommednation, ADL trainging, therapy to exercise, cognitive testing as needed.    Court Garzon RN  [facility]

## 2022-02-01 DIAGNOSIS — Z53.9 DIAGNOSIS NOT YET DEFINED: Primary | ICD-10-CM

## 2022-02-01 PROCEDURE — G0180 MD CERTIFICATION HHA PATIENT: HCPCS | Performed by: INTERNAL MEDICINE

## 2022-02-02 ENCOUNTER — TELEPHONE (OUTPATIENT)
Dept: FAMILY MEDICINE | Facility: CLINIC | Age: 87
End: 2022-02-02
Payer: MEDICARE

## 2022-02-02 NOTE — TELEPHONE ENCOUNTER
Rani PT calling in from [facility] requesting verbal orders    Skilled nursing visit x 1 for eval      Verbal order given    Caridad Jaeger RN

## 2022-02-02 NOTE — PROGRESS NOTES
Assessment & Plan     Sepsis, due to unspecified organism, unspecified whether acute organ dysfunction present (H)    - CBC with platelets and differential; Future  - UA with Microscopic reflex to Culture - lab collect; Future  - CBC with platelets and differential    Traumatic rhabdomyolysis, subsequent encounter    - Basic metabolic panel  (Ca, Cl, CO2, Creat, Gluc, K, Na, BUN); Future  - CK total; Future  - Basic metabolic panel  (Ca, Cl, CO2, Creat, Gluc, K, Na, BUN)  - CK total    Nausea  Possibly related to calcium supplement.               See Patient Instructions    Return if symptoms worsen or fail to improve, for pcp.    Braeden Sood MD  [facility] MARGA Ji is a 89 year old who presents for the following health issues     HPI Patient found down. Transported to [place] with the following diagnosis.    Much better, yet continued fatigue. Mild dysuria.      Hospital Follow-up Visit:    Hospital/Nursing Home/IP Rehab Facility:   Date of Admission: 1/6/22  Date of Discharge: 1/11/22  Reason(s) for Admission:    Abscess  UTI  CRISSY      Was your hospitalization related to COVID-19? No   Problems taking medications regularly:  None  Medication changes since discharge: None  Problems adhering to non-medication therapy:      Summary of hospitalization:  [facility] hospital discharge summary reviewed  Diagnostic Tests/Treatments reviewed.  Follow up needed: none  Other Healthcare Providers Involved in Patient s Care:         None  Update since discharge: improved.       Post Discharge Medication Reconciliation: .  Plan of care communicated with patient                Review of Systems   Constitutional, HEENT, cardiovascular, pulmonary, gi and gu systems are negative, except as otherwise noted.      Objective    /70 (BP Location: Left arm, Patient Position: Chair, Cuff Size: Adult Large)   Pulse 98   Temp 97  F (36.1  C) (Temporal)   Resp 18   Ht 1.651 m (5'  "5\")   Wt 66.7 kg (147 lb)   SpO2 98%   BMI 24.46 kg/m    Body mass index is 24.46 kg/m .  Physical Exam   Alert patient.    Lungs clear  Heart regular rate and rhythm.  R extremities no edema  Affect and mood appear appropriate    Lab Requisition on 01/20/2022   Component Date Value Ref Range Status     WBC Count 01/20/2022 10.8  4.0 - 11.0 10e3/uL Final     RBC Count 01/20/2022 4.12  3.80 - 5.20 10e6/uL Final     Hemoglobin 01/20/2022 12.6  11.7 - 15.7 g/dL Final     Hematocrit 01/20/2022 39.3  35.0 - 47.0 % Final     MCV 01/20/2022 95  78 - 100 fL Final     MCH 01/20/2022 30.6  26.5 - 33.0 pg Final     MCHC 01/20/2022 32.1  31.5 - 36.5 g/dL Final     RDW 01/20/2022 14.6  10.0 - 15.0 % Final     Platelet Count 01/20/2022 430  150 - 450 10e3/uL Final     % Neutrophils 01/20/2022 68  % Final     % Lymphocytes 01/20/2022 17  % Final     % Monocytes 01/20/2022 7  % Final     % Eosinophils 01/20/2022 6  % Final     % Basophils 01/20/2022 1  % Final     % Immature Granulocytes 01/20/2022 1  % Final     NRBCs per 100 WBC 01/20/2022 0  <1 /100 Final     Absolute Neutrophils 01/20/2022 7.3  1.6 - 8.3 10e3/uL Final     Absolute Lymphocytes 01/20/2022 1.8  0.8 - 5.3 10e3/uL Final     Absolute Monocytes 01/20/2022 0.8  0.0 - 1.3 10e3/uL Final     Absolute Eosinophils 01/20/2022 0.6  0.0 - 0.7 10e3/uL Final     Absolute Basophils 01/20/2022 0.2  0.0 - 0.2 10e3/uL Final     Absolute Immature Granulocytes 01/20/2022 0.1  <=0.4 10e3/uL Final     Absolute NRBCs 01/20/2022 0.0  10e3/uL Final               "

## 2022-02-03 ENCOUNTER — OFFICE VISIT (OUTPATIENT)
Dept: FAMILY MEDICINE | Facility: CLINIC | Age: 87
End: 2022-02-03
Payer: MEDICARE

## 2022-02-03 ENCOUNTER — MEDICAL CORRESPONDENCE (OUTPATIENT)
Dept: HEALTH INFORMATION MANAGEMENT | Facility: CLINIC | Age: 87
End: 2022-02-03

## 2022-02-03 VITALS
DIASTOLIC BLOOD PRESSURE: 70 MMHG | SYSTOLIC BLOOD PRESSURE: 129 MMHG | BODY MASS INDEX: 24.49 KG/M2 | OXYGEN SATURATION: 98 % | RESPIRATION RATE: 18 BRPM | WEIGHT: 147 LBS | TEMPERATURE: 97 F | HEART RATE: 98 BPM | HEIGHT: 65 IN

## 2022-02-03 DIAGNOSIS — A41.9 SEPSIS, DUE TO UNSPECIFIED ORGANISM, UNSPECIFIED WHETHER ACUTE ORGAN DYSFUNCTION PRESENT (H): Primary | ICD-10-CM

## 2022-02-03 DIAGNOSIS — R11.0 NAUSEA: ICD-10-CM

## 2022-02-03 DIAGNOSIS — T79.6XXD TRAUMATIC RHABDOMYOLYSIS, SUBSEQUENT ENCOUNTER: ICD-10-CM

## 2022-02-03 DIAGNOSIS — A41.9 SEPSIS (H): Primary | ICD-10-CM

## 2022-02-03 LAB
BASOPHILS # BLD AUTO: 0.1 10E3/UL (ref 0–0.2)
BASOPHILS NFR BLD AUTO: 1 %
EOSINOPHIL # BLD AUTO: 1 10E3/UL (ref 0–0.7)
EOSINOPHIL NFR BLD AUTO: 7 %
ERYTHROCYTE [DISTWIDTH] IN BLOOD BY AUTOMATED COUNT: 14.9 % (ref 10–15)
HCT VFR BLD AUTO: 43.4 % (ref 35–47)
HGB BLD-MCNC: 13.7 G/DL (ref 11.7–15.7)
LYMPHOCYTES # BLD AUTO: 2.1 10E3/UL (ref 0.8–5.3)
LYMPHOCYTES NFR BLD AUTO: 16 %
MCH RBC QN AUTO: 30.8 PG (ref 26.5–33)
MCHC RBC AUTO-ENTMCNC: 31.6 G/DL (ref 31.5–36.5)
MCV RBC AUTO: 98 FL (ref 78–100)
MONOCYTES # BLD AUTO: 1.1 10E3/UL (ref 0–1.3)
MONOCYTES NFR BLD AUTO: 8 %
NEUTROPHILS # BLD AUTO: 9.1 10E3/UL (ref 1.6–8.3)
NEUTROPHILS NFR BLD AUTO: 68 %
PLATELET # BLD AUTO: 472 10E3/UL (ref 150–450)
RBC # BLD AUTO: 4.45 10E6/UL (ref 3.8–5.2)
WBC # BLD AUTO: 13.3 10E3/UL (ref 4–11)

## 2022-02-03 PROCEDURE — 80048 BASIC METABOLIC PNL TOTAL CA: CPT | Performed by: INTERNAL MEDICINE

## 2022-02-03 PROCEDURE — 82550 ASSAY OF CK (CPK): CPT | Performed by: INTERNAL MEDICINE

## 2022-02-03 PROCEDURE — 99214 OFFICE O/P EST MOD 30 MIN: CPT | Performed by: INTERNAL MEDICINE

## 2022-02-03 PROCEDURE — 85025 COMPLETE CBC W/AUTO DIFF WBC: CPT | Performed by: INTERNAL MEDICINE

## 2022-02-03 PROCEDURE — 36415 COLL VENOUS BLD VENIPUNCTURE: CPT | Performed by: INTERNAL MEDICINE

## 2022-02-03 ASSESSMENT — MIFFLIN-ST. JEOR: SCORE: 1077.67

## 2022-02-03 ASSESSMENT — PAIN SCALES - GENERAL: PAINLEVEL: NO PAIN (0)

## 2022-02-03 NOTE — PATIENT INSTRUCTIONS
Flor;  It looks like you are doing well.  You had a very close call.    I would like you to discontinue your calcium as this may be worsening your nausea.  As long as your diet is high in calcium, this will be no issue.    Because of your recent infection and muscle damage, I need to check your blood count and your kidney function carefully today.  We will also get a urine sample to make certain there is no residual urinary tract infection.    Best regards  Braeden

## 2022-02-04 LAB
ANION GAP SERPL CALCULATED.3IONS-SCNC: 3 MMOL/L (ref 3–14)
BUN SERPL-MCNC: 21 MG/DL (ref 7–30)
CALCIUM SERPL-MCNC: 9.4 MG/DL (ref 8.5–10.1)
CHLORIDE BLD-SCNC: 108 MMOL/L (ref 94–109)
CK SERPL-CCNC: 36 U/L (ref 30–225)
CO2 SERPL-SCNC: 27 MMOL/L (ref 20–32)
CREAT SERPL-MCNC: 1.41 MG/DL (ref 0.52–1.04)
GFR SERPL CREATININE-BSD FRML MDRD: 35 ML/MIN/1.73M2
GLUCOSE BLD-MCNC: 103 MG/DL (ref 70–99)
POTASSIUM BLD-SCNC: 5.1 MMOL/L (ref 3.4–5.3)
SODIUM SERPL-SCNC: 138 MMOL/L (ref 133–144)

## 2022-02-05 ENCOUNTER — LAB (OUTPATIENT)
Dept: LAB | Facility: CLINIC | Age: 87
End: 2022-02-05
Payer: MEDICARE

## 2022-02-05 DIAGNOSIS — N39.0 URINARY TRACT INFECTION WITHOUT HEMATURIA, SITE UNSPECIFIED: Primary | ICD-10-CM

## 2022-02-05 DIAGNOSIS — A41.9 SEPSIS (H): ICD-10-CM

## 2022-02-05 LAB
ALBUMIN UR-MCNC: NEGATIVE MG/DL
APPEARANCE UR: CLEAR
BACTERIA #/AREA URNS HPF: ABNORMAL /HPF
BILIRUB UR QL STRIP: NEGATIVE
COLOR UR AUTO: YELLOW
GLUCOSE UR STRIP-MCNC: NEGATIVE MG/DL
HGB UR QL STRIP: NEGATIVE
KETONES UR STRIP-MCNC: NEGATIVE MG/DL
LEUKOCYTE ESTERASE UR QL STRIP: ABNORMAL
NITRATE UR QL: NEGATIVE
PH UR STRIP: 5.5 [PH] (ref 5–7)
RBC #/AREA URNS AUTO: ABNORMAL /HPF
SP GR UR STRIP: 1.02 (ref 1–1.03)
SQUAMOUS #/AREA URNS AUTO: ABNORMAL /LPF
UROBILINOGEN UR STRIP-ACNC: 0.2 E.U./DL
WBC #/AREA URNS AUTO: ABNORMAL /HPF
WBC CLUMPS #/AREA URNS HPF: PRESENT /HPF

## 2022-02-05 PROCEDURE — 87186 SC STD MICRODIL/AGAR DIL: CPT

## 2022-02-05 PROCEDURE — 81001 URINALYSIS AUTO W/SCOPE: CPT

## 2022-02-05 PROCEDURE — 87086 URINE CULTURE/COLONY COUNT: CPT

## 2022-02-05 RX ORDER — CIPROFLOXACIN 250 MG/1
250 TABLET, FILM COATED ORAL 2 TIMES DAILY
Qty: 14 TABLET | Refills: 0 | Status: ON HOLD | OUTPATIENT
Start: 2022-02-05 | End: 2022-02-11

## 2022-02-06 LAB — BACTERIA UR CULT: ABNORMAL

## 2022-02-07 ENCOUNTER — MEDICAL CORRESPONDENCE (OUTPATIENT)
Dept: HEALTH INFORMATION MANAGEMENT | Facility: CLINIC | Age: 87
End: 2022-02-07
Payer: MEDICARE

## 2022-02-08 ENCOUNTER — HOSPITAL ENCOUNTER (INPATIENT)
Facility: CLINIC | Age: 87
LOS: 3 days | Discharge: HOME HEALTH | DRG: 392 | End: 2022-02-11
Attending: EMERGENCY MEDICINE | Admitting: STUDENT IN AN ORGANIZED HEALTH CARE EDUCATION/TRAINING PROGRAM
Payer: MEDICARE

## 2022-02-08 ENCOUNTER — MEDICAL CORRESPONDENCE (OUTPATIENT)
Dept: HEALTH INFORMATION MANAGEMENT | Facility: CLINIC | Age: 87
End: 2022-02-08

## 2022-02-08 ENCOUNTER — APPOINTMENT (OUTPATIENT)
Dept: CT IMAGING | Facility: CLINIC | Age: 87
DRG: 392 | End: 2022-02-08
Attending: EMERGENCY MEDICINE
Payer: MEDICARE

## 2022-02-08 DIAGNOSIS — R10.84 ABDOMINAL PAIN, GENERALIZED: ICD-10-CM

## 2022-02-08 DIAGNOSIS — K52.9 COLITIS: ICD-10-CM

## 2022-02-08 PROBLEM — I48.20 CHRONIC ATRIAL FIBRILLATION, UNSPECIFIED (H): Status: ACTIVE | Noted: 2022-02-08

## 2022-02-08 PROBLEM — I48.92 ATRIAL FLUTTER (H): Status: ACTIVE | Noted: 2022-01-11

## 2022-02-08 PROBLEM — I44.7 LEFT BUNDLE BRANCH BLOCK: Status: ACTIVE | Noted: 2022-01-11

## 2022-02-08 PROBLEM — D68.9 COAGULATION DISORDER (H): Status: ACTIVE | Noted: 2022-01-11

## 2022-02-08 PROBLEM — F32.9 MAJOR DEPRESSIVE DISORDER, SINGLE EPISODE, UNSPECIFIED: Status: ACTIVE | Noted: 2022-01-14

## 2022-02-08 PROBLEM — R79.89 OTHER SPECIFIED ABNORMAL FINDINGS OF BLOOD CHEMISTRY: Status: ACTIVE | Noted: 2022-01-11

## 2022-02-08 PROBLEM — I42.9 CARDIOMYOPATHY (H): Status: ACTIVE | Noted: 2022-01-11

## 2022-02-08 PROBLEM — I10 ESSENTIAL HYPERTENSION: Status: ACTIVE | Noted: 2020-03-05

## 2022-02-08 PROBLEM — M43.22 FUSION OF SPINE, CERVICAL REGION: Status: ACTIVE | Noted: 2022-01-11

## 2022-02-08 PROBLEM — E78.2 MIXED HYPERLIPIDEMIA: Status: ACTIVE | Noted: 2020-11-04

## 2022-02-08 PROBLEM — R06.09 DYSPNEA ON EXERTION: Status: RESOLVED | Noted: 2021-10-07 | Resolved: 2022-02-08

## 2022-02-08 PROBLEM — M62.81 GENERALIZED MUSCLE WEAKNESS: Status: RESOLVED | Noted: 2020-12-25 | Resolved: 2022-02-08

## 2022-02-08 PROBLEM — G93.41 METABOLIC ENCEPHALOPATHY: Status: ACTIVE | Noted: 2022-01-11

## 2022-02-08 LAB
ALBUMIN SERPL-MCNC: 3.1 G/DL (ref 3.4–5)
ALBUMIN UR-MCNC: 10 MG/DL
ALP SERPL-CCNC: 107 U/L (ref 40–150)
ALT SERPL W P-5'-P-CCNC: 20 U/L (ref 0–50)
ANION GAP SERPL CALCULATED.3IONS-SCNC: 9 MMOL/L (ref 3–14)
APPEARANCE UR: CLEAR
AST SERPL W P-5'-P-CCNC: 20 U/L (ref 0–45)
BASOPHILS # BLD AUTO: 0.1 10E3/UL (ref 0–0.2)
BASOPHILS NFR BLD AUTO: 1 %
BILIRUB SERPL-MCNC: 0.3 MG/DL (ref 0.2–1.3)
BILIRUB UR QL STRIP: NEGATIVE
BUN SERPL-MCNC: 26 MG/DL (ref 7–30)
CALCIUM SERPL-MCNC: 9.4 MG/DL (ref 8.5–10.1)
CHLORIDE BLD-SCNC: 109 MMOL/L (ref 94–109)
CO2 SERPL-SCNC: 21 MMOL/L (ref 20–32)
COLOR UR AUTO: YELLOW
CREAT SERPL-MCNC: 1.31 MG/DL (ref 0.52–1.04)
EOSINOPHIL # BLD AUTO: 0.8 10E3/UL (ref 0–0.7)
EOSINOPHIL NFR BLD AUTO: 5 %
ERYTHROCYTE [DISTWIDTH] IN BLOOD BY AUTOMATED COUNT: 14.1 % (ref 10–15)
GFR SERPL CREATININE-BSD FRML MDRD: 38 ML/MIN/1.73M2
GLUCOSE BLD-MCNC: 122 MG/DL (ref 70–99)
GLUCOSE UR STRIP-MCNC: NEGATIVE MG/DL
HCT VFR BLD AUTO: 42.1 % (ref 35–47)
HGB BLD-MCNC: 13.2 G/DL (ref 11.7–15.7)
HGB UR QL STRIP: NEGATIVE
IMM GRANULOCYTES # BLD: 0.1 10E3/UL
IMM GRANULOCYTES NFR BLD: 1 %
KETONES UR STRIP-MCNC: NEGATIVE MG/DL
LACTATE SERPL-SCNC: 3.1 MMOL/L (ref 0.7–2)
LEUKOCYTE ESTERASE UR QL STRIP: ABNORMAL
LYMPHOCYTES # BLD AUTO: 3.6 10E3/UL (ref 0.8–5.3)
LYMPHOCYTES NFR BLD AUTO: 25 %
MCH RBC QN AUTO: 30 PG (ref 26.5–33)
MCHC RBC AUTO-ENTMCNC: 31.4 G/DL (ref 31.5–36.5)
MCV RBC AUTO: 96 FL (ref 78–100)
MONOCYTES # BLD AUTO: 1 10E3/UL (ref 0–1.3)
MONOCYTES NFR BLD AUTO: 7 %
MUCOUS THREADS #/AREA URNS LPF: PRESENT /LPF
NEUTROPHILS # BLD AUTO: 8.7 10E3/UL (ref 1.6–8.3)
NEUTROPHILS NFR BLD AUTO: 61 %
NITRATE UR QL: NEGATIVE
NRBC # BLD AUTO: 0 10E3/UL
NRBC BLD AUTO-RTO: 0 /100
PH UR STRIP: 6 [PH] (ref 5–7)
PLATELET # BLD AUTO: 427 10E3/UL (ref 150–450)
POTASSIUM BLD-SCNC: 3.8 MMOL/L (ref 3.4–5.3)
PROT SERPL-MCNC: 7.2 G/DL (ref 6.8–8.8)
RBC # BLD AUTO: 4.4 10E6/UL (ref 3.8–5.2)
RBC URINE: 0 /HPF
SARS-COV-2 RNA RESP QL NAA+PROBE: NEGATIVE
SODIUM SERPL-SCNC: 139 MMOL/L (ref 133–144)
SP GR UR STRIP: 1 (ref 1–1.03)
SQUAMOUS EPITHELIAL: 18 /HPF
TROPONIN I SERPL HS-MCNC: 10 NG/L
UROBILINOGEN UR STRIP-MCNC: NORMAL MG/DL
WBC # BLD AUTO: 14.3 10E3/UL (ref 4–11)
WBC URINE: 33 /HPF

## 2022-02-08 PROCEDURE — 87040 BLOOD CULTURE FOR BACTERIA: CPT | Performed by: EMERGENCY MEDICINE

## 2022-02-08 PROCEDURE — 87086 URINE CULTURE/COLONY COUNT: CPT | Performed by: EMERGENCY MEDICINE

## 2022-02-08 PROCEDURE — 96361 HYDRATE IV INFUSION ADD-ON: CPT

## 2022-02-08 PROCEDURE — 83605 ASSAY OF LACTIC ACID: CPT | Performed by: EMERGENCY MEDICINE

## 2022-02-08 PROCEDURE — 74177 CT ABD & PELVIS W/CONTRAST: CPT | Mod: ME

## 2022-02-08 PROCEDURE — 120N000001 HC R&B MED SURG/OB

## 2022-02-08 PROCEDURE — 250N000011 HC RX IP 250 OP 636: Performed by: EMERGENCY MEDICINE

## 2022-02-08 PROCEDURE — 36415 COLL VENOUS BLD VENIPUNCTURE: CPT | Performed by: EMERGENCY MEDICINE

## 2022-02-08 PROCEDURE — 250N000009 HC RX 250: Performed by: EMERGENCY MEDICINE

## 2022-02-08 PROCEDURE — C9803 HOPD COVID-19 SPEC COLLECT: HCPCS

## 2022-02-08 PROCEDURE — 81001 URINALYSIS AUTO W/SCOPE: CPT | Performed by: EMERGENCY MEDICINE

## 2022-02-08 PROCEDURE — 80053 COMPREHEN METABOLIC PANEL: CPT | Performed by: EMERGENCY MEDICINE

## 2022-02-08 PROCEDURE — 84484 ASSAY OF TROPONIN QUANT: CPT | Performed by: EMERGENCY MEDICINE

## 2022-02-08 PROCEDURE — 258N000003 HC RX IP 258 OP 636: Performed by: EMERGENCY MEDICINE

## 2022-02-08 PROCEDURE — 99223 1ST HOSP IP/OBS HIGH 75: CPT | Mod: AI | Performed by: STUDENT IN AN ORGANIZED HEALTH CARE EDUCATION/TRAINING PROGRAM

## 2022-02-08 PROCEDURE — 85025 COMPLETE CBC W/AUTO DIFF WBC: CPT | Performed by: EMERGENCY MEDICINE

## 2022-02-08 PROCEDURE — 99285 EMERGENCY DEPT VISIT HI MDM: CPT | Mod: 25

## 2022-02-08 PROCEDURE — 87635 SARS-COV-2 COVID-19 AMP PRB: CPT | Performed by: EMERGENCY MEDICINE

## 2022-02-08 PROCEDURE — 96365 THER/PROPH/DIAG IV INF INIT: CPT | Mod: 59

## 2022-02-08 RX ORDER — IOPAMIDOL 755 MG/ML
74 INJECTION, SOLUTION INTRAVASCULAR ONCE
Status: COMPLETED | OUTPATIENT
Start: 2022-02-08 | End: 2022-02-08

## 2022-02-08 RX ORDER — SODIUM CHLORIDE 9 MG/ML
INJECTION, SOLUTION INTRAVENOUS CONTINUOUS
Status: DISCONTINUED | OUTPATIENT
Start: 2022-02-08 | End: 2022-02-09

## 2022-02-08 RX ORDER — MORPHINE SULFATE 2 MG/ML
2 INJECTION, SOLUTION INTRAMUSCULAR; INTRAVENOUS
Status: DISCONTINUED | OUTPATIENT
Start: 2022-02-08 | End: 2022-02-11 | Stop reason: HOSPADM

## 2022-02-08 RX ORDER — ONDANSETRON 2 MG/ML
4 INJECTION INTRAMUSCULAR; INTRAVENOUS EVERY 30 MIN PRN
Status: DISCONTINUED | OUTPATIENT
Start: 2022-02-08 | End: 2022-02-09

## 2022-02-08 RX ORDER — SODIUM CHLORIDE 9 MG/ML
INJECTION, SOLUTION INTRAVENOUS CONTINUOUS
Status: DISCONTINUED | OUTPATIENT
Start: 2022-02-08 | End: 2022-02-08

## 2022-02-08 RX ORDER — PIPERACILLIN SODIUM, TAZOBACTAM SODIUM 3; .375 G/15ML; G/15ML
3.38 INJECTION, POWDER, LYOPHILIZED, FOR SOLUTION INTRAVENOUS ONCE
Status: COMPLETED | OUTPATIENT
Start: 2022-02-08 | End: 2022-02-08

## 2022-02-08 RX ORDER — PIPERACILLIN SODIUM, TAZOBACTAM SODIUM 2; .25 G/10ML; G/10ML
2.25 INJECTION, POWDER, LYOPHILIZED, FOR SOLUTION INTRAVENOUS EVERY 6 HOURS
Status: DISCONTINUED | OUTPATIENT
Start: 2022-02-09 | End: 2022-02-11 | Stop reason: HOSPADM

## 2022-02-08 RX ADMIN — PIPERACILLIN SODIUM AND TAZOBACTAM SODIUM 3.38 G: 3; .375 INJECTION, POWDER, LYOPHILIZED, FOR SOLUTION INTRAVENOUS at 18:14

## 2022-02-08 RX ADMIN — IOPAMIDOL 74 ML: 755 INJECTION, SOLUTION INTRAVENOUS at 17:16

## 2022-02-08 RX ADMIN — SODIUM CHLORIDE 61 ML: 9 INJECTION, SOLUTION INTRAVENOUS at 17:16

## 2022-02-08 RX ADMIN — SODIUM CHLORIDE: 9 INJECTION, SOLUTION INTRAVENOUS at 19:48

## 2022-02-08 RX ADMIN — SODIUM CHLORIDE 1000 ML: 9 INJECTION, SOLUTION INTRAVENOUS at 16:41

## 2022-02-08 ASSESSMENT — ACTIVITIES OF DAILY LIVING (ADL)
ADLS_ACUITY_SCORE: 11
ADLS_ACUITY_SCORE: 12
ADLS_ACUITY_SCORE: 12

## 2022-02-08 NOTE — ED PROVIDER NOTES
History     Chief Complaint:  Abdominal Pain       HPI   Margy Baibn is a 89 year old female who presents with 1 day of abdominal pain.  She tells me that she was at the [facility] earlier today when she noticed abdominal pain that was diffuse to her whole abdomen.  It was constant, did not radiate, but tells me that the belly pain is gone while she is at rest here in the emergency room.  She also tells me she had a mild headache earlier that she thinks was a migraine and she is not sure if this is related to the stomach pain.  The pain is described as achy in nature, and is something she is not had before.  She also arrives with vomit on her person via EMS, no diarrhea noted.    Allergies:  No Known Allergies     Medications:    acetaminophen (TYLENOL) 500 MG tablet  amLODIPine (NORVASC) 2.5 MG tablet  bisacodyl (DULCOLAX) 5 MG EC tablet  Calcium Carbonate (CALCIUM 600 PO)  Cholecalciferol (VITAMIN D) 1000 UNITS capsule  ciprofloxacin (CIPRO) 250 MG tablet  cycloSPORINE (RESTASIS) 0.05 % ophthalmic emulsion  docusate sodium (COLACE) 100 MG capsule  lisinopril (ZESTRIL) 10 MG tablet  metoprolol succinate ER (TOPROL-XL) 50 MG 24 hr tablet  Multiple Vitamins-Minerals (MULTIVITAL) TABS  ondansetron (ZOFRAN) 4 MG tablet  order for DME  oxybutynin ER (DITROPAN XL) 15 MG 24 hr tablet  PARoxetine (PAXIL) 20 MG tablet  polyethylene glycol (MIRALAX) 17 GM/Dose powder  potassium chloride ER (KLOR-CON M) 20 MEQ CR tablet  QUEtiapine (SEROQUEL) 25 MG tablet        Past Medical History:    Past Medical History:   Diagnosis Date     Anxiety      Blepharitis of both eyes      Depression, major, recurrent, in complete remission (H)      Dry eye syndrome      Dyspnea on exertion 10/7/2021     Essential hypertension 3/5/2020     Familial tremor 3/6/2013     Insomnia, unspecified type 11/3/2014     Mixed hyperlipidemia 11/4/2020     Nausea without vomiting 6/16/2015     Tremor, hereditary, benign        Patient  Active Problem List    Diagnosis Date Noted     Renal insufficiency 01/06/2022     Priority: Medium     Traumatic rhabdomyolysis, initial encounter (H) 01/06/2022     Priority: Medium     Urinary tract infection without hematuria, site unspecified 01/06/2022     Priority: Medium     Sepsis, due to unspecified organism, unspecified whether acute organ dysfunction present (H) 01/06/2022     Priority: Medium     Dyspnea on exertion 10/07/2021     Priority: Medium     Generalized muscle weakness 12/25/2020     Priority: Medium     Ceruminosis, bilateral 11/04/2020     Priority: Medium     Mixed hyperlipidemia 11/04/2020     Priority: Medium     Tension headache 11/04/2020     Priority: Medium     Essential hypertension 03/05/2020     Priority: Medium     ACP (advance care planning) 10/23/2015     Priority: Medium     Advance Care Planning 10/23/2015: ACP Review and Resources Provided:  Reviewed chart for advance care plan.  Margy Babin has no plan or code status on file however states presence of ACP document. Copy requested. Added by Rose Gallagher             Nausea without vomiting 06/16/2015     Priority: Medium     Problem list name updated by automated process. Provider to review       Urge incontinence of urine 02/11/2015     Priority: Medium     Insomnia, unspecified type 11/03/2014     Priority: Medium     No CSA on file  Last  check - 02/28/2020 with no concerns.       Dry eye syndrome      Priority: Medium     Blepharitis of both eyes      Priority: Medium     Depression, major, recurrent, in complete remission (H)      Priority: Medium     Anxiety 03/06/2013     Priority: Medium       No CSA on file    RX monitoring program ([place]) reviewed:  reviewed September 22, 2020- no concerns  [place] profile:  [email or web address]         Familial tremor 03/06/2013     Priority: Medium        Past Surgical History:    Past Surgical History:   Procedure Laterality Date     HYSTERECTOMY TOTAL  ABDOMINAL, BILATERAL SALPINGO-OOPHORECTOMY, COMBINED       NECK SURGERY  2009    cervical fusion     ROTATOR CUFF REPAIR RT/LT Right      ZZC TOTAL HIP ARTHROPLASTY Right 1997        Family History:    family history includes Breast Cancer (age of onset: 43) in her daughter; C.A.D. in her father; Emphysema in her sister; Heart Disease in her sister; Heart Disease (age of onset: 80) in her father.    Social History:   reports that she has never smoked. She has never used smokeless tobacco. She reports that she does not drink alcohol and does not use drugs.    PCP: Braeden Sood     Review of Systems   All other systems reviewed and are negative.        Physical Exam     Patient Vitals for the past 24 hrs:   BP Temp Temp src Pulse Resp SpO2   02/08/22 1551 -- 97.8  F (36.6  C) Temporal -- -- --   02/08/22 1547 118/63 -- -- 63 16 100 %        Physical Exam  Vitals: reviewed by me  General: Pt seen on hospitals, pleasant, cooperative, and alert to conversation  Eyes: Tracking well, clear conjunctiva BL  ENT: MMM, midline trachea.   Lungs: No tachypnea, no accessory muscle use. No respiratory distress.   CV: Rate as above  Abd: Soft, does have significant periumbilical tenderness to palpation with distractible guarding.  MSK: no joint effusion.  No evidence of trauma  Skin: No rash  Neuro: Clear speech and no facial droop.  Psych: Not RIS, no e/o AH/VH    Emergency Department Course       Imaging:  CT Abdomen Pelvis w Contrast   Final Result   IMPRESSION:    1.  No evidence for bowel obstruction.   2.  Mild constipation.   3.  Wall thickening involving 2 portions of the transverse colon. This was not not present on the study from 01/06/2022 and may be secondary to incomplete distention. Colitis remains a possibility.    4.  Esophageal hiatal hernia.   5.  Advanced atherosclerotic disease.           Laboratory:  Labs Ordered and Resulted from Time of ED Arrival to Time of ED Departure   COMPREHENSIVE METABOLIC  PANEL - Abnormal       Result Value    Sodium 139      Potassium 3.8      Chloride 109      Carbon Dioxide (CO2) 21      Anion Gap 9      Urea Nitrogen 26      Creatinine 1.31 (*)     Calcium 9.4      Glucose 122 (*)     Alkaline Phosphatase 107      AST 20      ALT 20      Protein Total 7.2      Albumin 3.1 (*)     Bilirubin Total 0.3      GFR Estimate 38 (*)    LACTIC ACID WHOLE BLOOD - Abnormal    Lactic Acid 3.1 (*)    ROUTINE UA WITH MICROSCOPIC REFLEX TO CULTURE - Abnormal    Color Urine Yellow      Appearance Urine Clear      Glucose Urine Negative      Bilirubin Urine Negative      Ketones Urine Negative      Specific Gravity Urine 1.005      Blood Urine Negative      pH Urine 6.0      Protein Albumin Urine 10  (*)     Urobilinogen Urine Normal      Nitrite Urine Negative      Leukocyte Esterase Urine Large (*)     Mucus Urine Present (*)     RBC Urine 0      WBC Urine 33 (*)     Squamous Epithelials Urine 18 (*)    CBC WITH PLATELETS AND DIFFERENTIAL - Abnormal    WBC Count 14.3 (*)     RBC Count 4.40      Hemoglobin 13.2      Hematocrit 42.1      MCV 96      MCH 30.0      MCHC 31.4 (*)     RDW 14.1      Platelet Count 427      % Neutrophils 61      % Lymphocytes 25      % Monocytes 7      % Eosinophils 5      % Basophils 1      % Immature Granulocytes 1      NRBCs per 100 WBC 0      Absolute Neutrophils 8.7 (*)     Absolute Lymphocytes 3.6      Absolute Monocytes 1.0      Absolute Eosinophils 0.8 (*)     Absolute Basophils 0.1      Absolute Immature Granulocytes 0.1      Absolute NRBCs 0.0     TROPONIN I - Normal    Troponin I High Sensitivity 10     COVID-19 VIRUS (CORONAVIRUS) BY PCR - Normal    SARS CoV2 PCR Negative     BLOOD CULTURE   BLOOD CULTURE   URINE CULTURE        Interventions:  Medications   0.9% sodium chloride BOLUS (has no administration in time range)     Followed by   sodium chloride 0.9% infusion (has no administration in time range)   ondansetron (ZOFRAN) injection 4 mg (has no  administration in time range)   morphine (PF) injection 2 mg (has no administration in time range)        Emergency Department Course:  Past medical records, nursing notes, and vitals reviewed.  I performed an exam of the patient and obtained history, as documented above.    Impression & Plan      Medical Decision Making:  This is a very pleasant 89-year-old female who presents the emergency room with abdominal pain, likely due to colitis.  She does have some GI symptoms in the form of her vomiting, but no diarrhea as yet.  The colitis certainly would explain the tenderness she has in her abdomen, and for this reason have given her antibiotics.  She also has leukocytosis and an elevated lactate, in addition to blood cultures and IV fluids, again she did receive antibiotics.  I do think she needs to be monitored very carefully and admitted to the hospital, I do not think he stable to go home with oral antibiotics.  We will plan for admission to the care of Dr. Boyer of the hospitalist team who is very kindly agreed accept care of the patient.  Patient's abdomen is nonsurgical at this time, she is aware of her diagnosis, and continues to mentate appropriately.  Will monitor very carefully till next inpatient bed is available.    Diagnosis:    ICD-10-CM    1. Colitis  K52.9    2. Abdominal pain, generalized  R10.84          2/8/2022   Uziel Rome*        Uziel Rome MD  02/08/22 2147

## 2022-02-09 ENCOUNTER — TELEPHONE (OUTPATIENT)
Dept: FAMILY MEDICINE | Facility: CLINIC | Age: 87
End: 2022-02-09
Payer: MEDICARE

## 2022-02-09 LAB
ANION GAP SERPL CALCULATED.3IONS-SCNC: 7 MMOL/L (ref 3–14)
BUN SERPL-MCNC: 23 MG/DL (ref 7–30)
CALCIUM SERPL-MCNC: 8.4 MG/DL (ref 8.5–10.1)
CHLORIDE BLD-SCNC: 112 MMOL/L (ref 94–109)
CO2 SERPL-SCNC: 21 MMOL/L (ref 20–32)
CREAT SERPL-MCNC: 1.23 MG/DL (ref 0.52–1.04)
ERYTHROCYTE [DISTWIDTH] IN BLOOD BY AUTOMATED COUNT: 14.1 % (ref 10–15)
GFR SERPL CREATININE-BSD FRML MDRD: 41 ML/MIN/1.73M2
GLUCOSE BLD-MCNC: 97 MG/DL (ref 70–99)
HCT VFR BLD AUTO: 38.1 % (ref 35–47)
HGB BLD-MCNC: 11.8 G/DL (ref 11.7–15.7)
LACTATE SERPL-SCNC: 2.3 MMOL/L (ref 0.7–2)
MCH RBC QN AUTO: 29.9 PG (ref 26.5–33)
MCHC RBC AUTO-ENTMCNC: 31 G/DL (ref 31.5–36.5)
MCV RBC AUTO: 97 FL (ref 78–100)
PLATELET # BLD AUTO: 368 10E3/UL (ref 150–450)
POTASSIUM BLD-SCNC: 3.5 MMOL/L (ref 3.4–5.3)
RBC # BLD AUTO: 3.95 10E6/UL (ref 3.8–5.2)
SODIUM SERPL-SCNC: 140 MMOL/L (ref 133–144)
WBC # BLD AUTO: 16.4 10E3/UL (ref 4–11)

## 2022-02-09 PROCEDURE — 80048 BASIC METABOLIC PNL TOTAL CA: CPT | Performed by: STUDENT IN AN ORGANIZED HEALTH CARE EDUCATION/TRAINING PROGRAM

## 2022-02-09 PROCEDURE — 36415 COLL VENOUS BLD VENIPUNCTURE: CPT | Performed by: STUDENT IN AN ORGANIZED HEALTH CARE EDUCATION/TRAINING PROGRAM

## 2022-02-09 PROCEDURE — 250N000013 HC RX MED GY IP 250 OP 250 PS 637: Performed by: HOSPITALIST

## 2022-02-09 PROCEDURE — 83605 ASSAY OF LACTIC ACID: CPT | Performed by: STUDENT IN AN ORGANIZED HEALTH CARE EDUCATION/TRAINING PROGRAM

## 2022-02-09 PROCEDURE — 99232 SBSQ HOSP IP/OBS MODERATE 35: CPT | Performed by: HOSPITALIST

## 2022-02-09 PROCEDURE — 85027 COMPLETE CBC AUTOMATED: CPT | Performed by: STUDENT IN AN ORGANIZED HEALTH CARE EDUCATION/TRAINING PROGRAM

## 2022-02-09 PROCEDURE — 250N000013 HC RX MED GY IP 250 OP 250 PS 637: Performed by: STUDENT IN AN ORGANIZED HEALTH CARE EDUCATION/TRAINING PROGRAM

## 2022-02-09 PROCEDURE — 258N000003 HC RX IP 258 OP 636: Performed by: EMERGENCY MEDICINE

## 2022-02-09 PROCEDURE — 96366 THER/PROPH/DIAG IV INF ADDON: CPT

## 2022-02-09 PROCEDURE — 250N000011 HC RX IP 250 OP 636: Performed by: STUDENT IN AN ORGANIZED HEALTH CARE EDUCATION/TRAINING PROGRAM

## 2022-02-09 PROCEDURE — 120N000001 HC R&B MED SURG/OB

## 2022-02-09 RX ORDER — POLYETHYLENE GLYCOL 3350 17 G/17G
17 POWDER, FOR SOLUTION ORAL DAILY
Status: DISCONTINUED | OUTPATIENT
Start: 2022-02-09 | End: 2022-02-10

## 2022-02-09 RX ORDER — QUETIAPINE FUMARATE 25 MG/1
25 TABLET, FILM COATED ORAL AT BEDTIME
Status: DISCONTINUED | OUTPATIENT
Start: 2022-02-09 | End: 2022-02-11 | Stop reason: HOSPADM

## 2022-02-09 RX ORDER — ONDANSETRON 2 MG/ML
4 INJECTION INTRAMUSCULAR; INTRAVENOUS EVERY 6 HOURS PRN
Status: DISCONTINUED | OUTPATIENT
Start: 2022-02-09 | End: 2022-02-11

## 2022-02-09 RX ORDER — PAROXETINE 20 MG/1
20 TABLET, FILM COATED ORAL AT BEDTIME
Status: DISCONTINUED | OUTPATIENT
Start: 2022-02-09 | End: 2022-02-11 | Stop reason: HOSPADM

## 2022-02-09 RX ORDER — GLIPIZIDE 10 MG/1
1 TABLET ORAL DAILY
Status: DISCONTINUED | OUTPATIENT
Start: 2022-02-09 | End: 2022-02-11 | Stop reason: HOSPADM

## 2022-02-09 RX ORDER — BISACODYL 10 MG
10 SUPPOSITORY, RECTAL RECTAL DAILY PRN
Status: DISCONTINUED | OUTPATIENT
Start: 2022-02-09 | End: 2022-02-11 | Stop reason: HOSPADM

## 2022-02-09 RX ORDER — SODIUM CHLORIDE, SODIUM LACTATE, POTASSIUM CHLORIDE, CALCIUM CHLORIDE 600; 310; 30; 20 MG/100ML; MG/100ML; MG/100ML; MG/100ML
INJECTION, SOLUTION INTRAVENOUS CONTINUOUS
Status: DISCONTINUED | OUTPATIENT
Start: 2022-02-09 | End: 2022-02-09

## 2022-02-09 RX ORDER — POTASSIUM CHLORIDE 1500 MG/1
20 TABLET, EXTENDED RELEASE ORAL DAILY
Status: DISCONTINUED | OUTPATIENT
Start: 2022-02-09 | End: 2022-02-11 | Stop reason: HOSPADM

## 2022-02-09 RX ORDER — ONDANSETRON 4 MG/1
4 TABLET, ORALLY DISINTEGRATING ORAL EVERY 6 HOURS PRN
Status: DISCONTINUED | OUTPATIENT
Start: 2022-02-09 | End: 2022-02-11

## 2022-02-09 RX ORDER — METOPROLOL SUCCINATE 50 MG/1
50 TABLET, EXTENDED RELEASE ORAL DAILY
Status: DISCONTINUED | OUTPATIENT
Start: 2022-02-09 | End: 2022-02-11 | Stop reason: HOSPADM

## 2022-02-09 RX ORDER — LIDOCAINE 40 MG/G
CREAM TOPICAL
Status: DISCONTINUED | OUTPATIENT
Start: 2022-02-09 | End: 2022-02-11 | Stop reason: HOSPADM

## 2022-02-09 RX ORDER — ACETAMINOPHEN 500 MG
1000 TABLET ORAL 3 TIMES DAILY PRN
Status: DISCONTINUED | OUTPATIENT
Start: 2022-02-09 | End: 2022-02-11 | Stop reason: HOSPADM

## 2022-02-09 RX ORDER — AMLODIPINE BESYLATE 2.5 MG/1
2.5 TABLET ORAL DAILY
Status: DISCONTINUED | OUTPATIENT
Start: 2022-02-09 | End: 2022-02-11 | Stop reason: HOSPADM

## 2022-02-09 RX ADMIN — PIPERACILLIN SODIUM AND TAZOBACTAM SODIUM 2.25 G: 2; .25 INJECTION, POWDER, LYOPHILIZED, FOR SOLUTION INTRAVENOUS at 14:44

## 2022-02-09 RX ADMIN — PIPERACILLIN SODIUM AND TAZOBACTAM SODIUM 2.25 G: 2; .25 INJECTION, POWDER, LYOPHILIZED, FOR SOLUTION INTRAVENOUS at 20:30

## 2022-02-09 RX ADMIN — AMLODIPINE BESYLATE 2.5 MG: 2.5 TABLET ORAL at 09:13

## 2022-02-09 RX ADMIN — PIPERACILLIN SODIUM AND TAZOBACTAM SODIUM 2.25 G: 2; .25 INJECTION, POWDER, LYOPHILIZED, FOR SOLUTION INTRAVENOUS at 02:00

## 2022-02-09 RX ADMIN — DEXTRAN 70, GLYCERIN, HYPROMELLOSE 1 DROP: 1; 2; 3 SOLUTION/ DROPS OPHTHALMIC at 09:25

## 2022-02-09 RX ADMIN — QUETIAPINE FUMARATE 25 MG: 25 TABLET ORAL at 23:58

## 2022-02-09 RX ADMIN — PIPERACILLIN SODIUM AND TAZOBACTAM SODIUM 2.25 G: 2; .25 INJECTION, POWDER, LYOPHILIZED, FOR SOLUTION INTRAVENOUS at 09:22

## 2022-02-09 RX ADMIN — POTASSIUM CHLORIDE 20 MEQ: 1500 TABLET, EXTENDED RELEASE ORAL at 09:12

## 2022-02-09 RX ADMIN — METOPROLOL SUCCINATE 50 MG: 50 TABLET, EXTENDED RELEASE ORAL at 09:13

## 2022-02-09 RX ADMIN — POLYETHYLENE GLYCOL 3350 17 G: 17 POWDER, FOR SOLUTION ORAL at 14:44

## 2022-02-09 RX ADMIN — PAROXETINE HYDROCHLORIDE 20 MG: 20 TABLET, FILM COATED ORAL at 23:58

## 2022-02-09 RX ADMIN — SODIUM CHLORIDE: 9 INJECTION, SOLUTION INTRAVENOUS at 06:59

## 2022-02-09 ASSESSMENT — ACTIVITIES OF DAILY LIVING (ADL)
ADLS_ACUITY_SCORE: 11
ADLS_ACUITY_SCORE: 13
ADLS_ACUITY_SCORE: 11
ADLS_ACUITY_SCORE: 13
ADLS_ACUITY_SCORE: 15
ADLS_ACUITY_SCORE: 11
ADLS_ACUITY_SCORE: 11
ADLS_ACUITY_SCORE: 13
ADLS_ACUITY_SCORE: 13
ADLS_ACUITY_SCORE: 15
ADLS_ACUITY_SCORE: 17
ADLS_ACUITY_SCORE: 11
ADLS_ACUITY_SCORE: 13
ADLS_ACUITY_SCORE: 11
ADLS_ACUITY_SCORE: 15
ADLS_ACUITY_SCORE: 15
ADLS_ACUITY_SCORE: 11
ADLS_ACUITY_SCORE: 13
ADLS_ACUITY_SCORE: 11
ADLS_ACUITY_SCORE: 13
ADLS_ACUITY_SCORE: 11

## 2022-02-09 NOTE — PROGRESS NOTES
RECEIVING UNIT ED HANDOFF REVIEW    ED Nurse Handoff Report was reviewed by: Sarita Manzanares RN on February 9, 2022 at 5:24 AM

## 2022-02-09 NOTE — H&P
[facility]    History and Physical - Hospitalist Service       Date of Admission:  2/8/2022    Assessment & Plan      Margy Babin is a 89 year old female admitted on 2/8/2022. She presents with abdominal pain.       Colitis    Abdominal pain, generalized    Acute kidney injury     Lactic acidosis    Assessment: presents with 1 day hx of abdominal pain. CT abdomen shows No evidence for bowel obstruction. Mild constipation.Wall thickening involving 2 portions of the transverse colon. This was not not present on the study from 01/06/2022 and may be secondary to incomplete distention. Colitis remains a possibility.  Creatinine elevated to 1.31 on admission secondary to likely prerenal etiology in the setting of poor p.o. intake and abdominal pain. She has no diarrhea, thus unlikely C diff or viral gastroenteritis.    Plan:   - admit to inpatient  - IV Zosyn  - Consider GI if symptoms not improving tomorrow.   - Follow WBC  - IVF   - ADAT   - Pain control PRN  - Follow vitals/temp      Anxiety    Depression, major, recurrent, in complete remission (H)    Insomnia, unspecified type    Assessment/Plan: Stable, continue prior to admission Seroquel, Paxil once verified.      Essential hypertension    Mixed hyperlipidemia    Assessment: Prior to admission on Norvasc 2.5 mg daily, metoprolol 50 mg daily, lisinopril 10 mg daily.    Plan:   -Continue prior to admission Norvasc and metoprolol, hold lisinopril in the setting of CRISSY       Diet:   ADAT  DVT Prophylaxis: Pneumatic Compression Devices  Pichardo Catheter: Not present  Central Lines: None  Cardiac Monitoring: None  Code Status:  DNR / DNI    Clinically Significant Risk Factors Present on Admission                     Disposition Plan   Expected Discharge:    Anticipated discharge location:  Awaiting care coordination huddle  Delays:            The patient's care was discussed with the Patient and ED Provider.    Gary Trivedi,  MD  Hospitalist Service  [facility]  Securely message with the [employer] Web Console (learn more here)  Text page via [employer] Paging/Directory         ______________________________________________________________________    Chief Complaint     Abdominal Pain    History is obtained from the patient    History of Present Illness     Margy Babin is a 89 year old female with past medical history of hypertension, major depression, hyperlipidemia, who presents for evaluation of abdominal pain.    Patient ports that she was at her department today when she had onset of abdominal pain that was in her entire abdomen.  It was constant with no specific radiation.  Her pain persisted and thus she sought further evaluation in the emergency department.  She denies any recent blood in her stool, no weight loss or night sweats.  She denies any diarrhea.  She did endorse one episode of vomiting, nonbloody emesis.  However she currently denies any nausea or vomiting.  Reports that her current abdominal pain has improved.  She denies any chest pain or shortness of breath.  She denies any recent falls or trauma to her abdomen.  She denies any new rashes.  She denies any jaundice.  She denies any history of kidney stones or gallbladder stones.  She denies any history of liver disease.  She denies any history of pancreatitis, she denies any significant alcohol use.  At this time she has no other complaints.    Review of Systems      The 10 point Review of Systems is negative other than noted in the HPI or here.     Past Medical History    I have reviewed this patient's medical history and updated it with pertinent information if needed.   Past Medical History:   Diagnosis Date     Anxiety      Blepharitis of both eyes      Depression, major, recurrent, in complete remission (H)      Dry eye syndrome      Dyspnea on exertion 10/7/2021     Essential hypertension 3/5/2020     Familial tremor 3/6/2013      Insomnia, unspecified type 11/3/2014    No CSA on file Last  check - 2020 with no concerns.     Mixed hyperlipidemia 2020     Nausea without vomiting 2015     Problem list name updated by automated process. Provider to review     Tremor, hereditary, benign        Past Surgical History   I have reviewed this patient's surgical history and updated it with pertinent information if needed.  Past Surgical History:   Procedure Laterality Date     HYSTERECTOMY TOTAL ABDOMINAL, BILATERAL SALPINGO-OOPHORECTOMY, COMBINED       NECK SURGERY  2009    cervical fusion     ROTATOR CUFF REPAIR RT/LT Right      ZZC TOTAL HIP ARTHROPLASTY Right        Social History   I have reviewed this patient's social history and updated it with pertinent information if needed.  Social History     Tobacco Use     Smoking status: Never Smoker     Smokeless tobacco: Never Used   Substance Use Topics     Alcohol use: No     Alcohol/week: 0.0 standard drinks     Drug use: No       Family History   I have reviewed this patient's family history and updated it with pertinent information if needed.  Family History   Problem Relation Age of Onset     Heart Disease Father 80        MI     C.A.D. Father      Breast Cancer Daughter 43         age 53     Emphysema Sister      Heart Disease Sister        Prior to Admission Medications   Prior to Admission Medications   Prescriptions Last Dose Informant Patient Reported? Taking?   Calcium Carbonate (CALCIUM 600 PO) 2022 at AM Daughter Yes Yes   Sig: Take 1 tablet by mouth daily.   Cholecalciferol (VITAMIN D) 1000 UNITS capsule 2022 at AM Daughter Yes Yes   Sig: Take 1 capsule by mouth daily.   Multiple Vitamins-Minerals (MULTIVITAL) TABS 2022 at AM Daughter Yes Yes   Sig: Take 1 tablet by mouth daily.   PARoxetine (PAXIL) 20 MG tablet 2022 at HS Daughter No Yes   Sig: TAKE 1 TABLET(20 MG) BY MOUTH AT BEDTIME   QUEtiapine (SEROQUEL) 25 MG tablet 2022 at HS Daughter  No Yes   Sig: TAKE 1 TABLET(25 MG) BY MOUTH AT BEDTIME   acetaminophen (TYLENOL) 500 MG tablet Unknown at Unknown time Daughter No Yes   Sig: Take 2 tablets (1,000 mg) by mouth 3 times daily as needed for mild pain   amLODIPine (NORVASC) 2.5 MG tablet 2/8/2022 at AM Daughter No Yes   Sig: Take 1 tablet (2.5 mg) by mouth daily   bisacodyl (DULCOLAX) 5 MG EC tablet Unknown at Unknown time Daughter No Yes   Sig: Take 1 tablet (5 mg) by mouth daily as needed for constipation   ciprofloxacin (CIPRO) 250 MG tablet Not Taking at Unknown time  No No   Sig: Take 1 tablet (250 mg) by mouth 2 times daily for 7 days   Patient not taking: Reported on 2/8/2022   cycloSPORINE (RESTASIS) 0.05 % ophthalmic emulsion 2/8/2022 at AM Daughter Yes Yes   Sig: Place 1 drop into both eyes daily    docusate sodium (COLACE) 100 MG capsule Unknown at Unknown time Daughter No Yes   Sig: Take 1 capsule (100 mg) by mouth 2 times daily as needed for constipation   lisinopril (ZESTRIL) 10 MG tablet 2/8/2022 at AM Daughter No Yes   Sig: Take 1 tablet (10 mg) by mouth daily   metoprolol succinate ER (TOPROL-XL) 50 MG 24 hr tablet 2/8/2022 at AM  No Yes   Sig: Take 1 tablet (50 mg) by mouth daily   ondansetron (ZOFRAN) 4 MG tablet Unknown at Unknown time Daughter No Yes   Sig: Take 1 tablet (4 mg) by mouth every 8 hours as needed for nausea   order for DME Unknown at Unknown time  No Yes   Sig: Equipment being ordered: Walker Wheels () and Walker ()  Treatment Diagnosis: Difficulty ambulating   oxybutynin ER (DITROPAN XL) 15 MG 24 hr tablet 2/8/2022 at AM Daughter No Yes   Sig: TAKE 1 TABLET(15 MG) BY MOUTH DAILY   polyethylene glycol (MIRALAX) 17 GM/Dose powder 2/8/2022 at AM  No Yes   Sig: Take 17 g by mouth daily   potassium chloride ER (KLOR-CON M) 20 MEQ CR tablet 2/8/2022 at AM  No Yes   Sig: Take 1 tablet (20 mEq) by mouth daily      Facility-Administered Medications: None     Allergies   No Known Allergies    Physical Exam   Vital  Signs: Temp: 97.8  F (36.6  C) Temp src: Temporal BP: (!) 151/63 Pulse: 73   Resp: 17 SpO2: 99 % O2 Device: None (Room air)    Weight: 0 lbs 0 oz    Constitutional: awake, alert, cooperative, no apparent distress.   Eyes: Lids and lashes normal, pupils equal, round and reactive to light   ENT: Normocephalic, without obvious abnormality, atraumatic, sinuses nontender on palpation   Hematologic / Lymphatic: no cervical lymphadenopathy   Respiratory: CTABL   Cardiovascular: RRR with no m/r/g   GI: Normal bowel sounds, soft, non-distended, non-tender.   Skin: normal skin color, texture, turgor   Musculoskeletal: There is no redness, warmth, or swelling of the joints. Full range of motion noted.   Neurologic: Awake, alert, oriented to name, place and time. Cranial nerves II-XII are grossly intact. Motor is 5 out of 5 bilaterally. Sensory is intact.   Neuropsychiatric: normal mood and affect      Data   Data reviewed today: I reviewed all medications, new labs and imaging results over the last 24 hours. I personally reviewed the abdominal CT image(s) showing see below.    CT ABDOMEN   IMPRESSION:   1.  No evidence for bowel obstruction.  2.  Mild constipation.  3.  Wall thickening involving 2 portions of the transverse colon. This was not not present on the study from 01/06/2022 and may be secondary to incomplete distention. Colitis remains a possibility.   4.  Esophageal hiatal hernia.  5.  Advanced atherosclerotic disease.    Most Recent 3 CBC's:  Recent Labs   Lab Test 02/08/22  1607 02/03/22  1124 01/20/22  0900   WBC 14.3* 13.3* 10.8   HGB 13.2 13.7 12.6   MCV 96 98 95    472* 430     Most Recent 3 BMP's:  Recent Labs   Lab Test 02/08/22  1607 02/03/22  1124 01/17/22  0839    138 140   POTASSIUM 3.8 5.1 4.5   CHLORIDE 109 108 103   CO2 21 27 23   BUN 26 21 17   CR 1.31* 1.41* 0.95   ANIONGAP 9 3 14   SOBIA 9.4 9.4 9.7   * 103* 91     Most Recent 2 LFT's:  Recent Labs   Lab Test 02/08/22  1607  01/08/22  0608   AST 20 48*   ALT 20 58*   ALKPHOS 107 69   BILITOTAL 0.3 0.4     Most Recent 3 INR's:  Recent Labs   Lab Test 01/06/22  1406   INR 1.27*     Most Recent ESR & CRP:No lab results found.  Recent Results (from the past 24 hour(s))   CT Abdomen Pelvis w Contrast    Narrative    EXAM: CT ABDOMEN PELVIS W CONTRAST  LOCATION: [facility]  DATE/TIME: 2/8/2022 5:07 PM    INDICATION: Abdominal pain. Bowel obstruction suspected  COMPARISON: CT abdomen pelvis 01/06/2022  TECHNIQUE: CT scan of the abdomen and pelvis was performed following injection of IV contrast. Multiplanar reformats were obtained. Dose reduction techniques were used.  CONTRAST: 74 mL Isovue 370    FINDINGS:   LOWER CHEST: Subsegmental atelectasis both lower lobes. Moderate size esophageal hiatal hernia.    HEPATOBILIARY: Normal.    PANCREAS: Normal.    SPLEEN: Normal.    ADRENAL GLANDS: Normal.    KIDNEYS/BLADDER: Cortical scarring and mild atrophy left kidney. Small right renal cyst.    BOWEL: Moderate volume of stool throughout the colon to the level of the rectum. No evidence for obstruction. There is wall thickening involving 2 segments of the transverse colon, incompletely distended. No other inflammatory changes.    LYMPH NODES: Normal.    VASCULATURE: Advanced atherosclerotic disease abdominal aorta and iliac arteries.    PELVIC ORGANS: Normal.    MUSCULOSKELETAL: Total right hip arthroplasty osteopenia. Degenerative disc and facet disease are spine.      Impression    IMPRESSION:   1.  No evidence for bowel obstruction.  2.  Mild constipation.  3.  Wall thickening involving 2 portions of the transverse colon. This was not not present on the study from 01/06/2022 and may be secondary to incomplete distention. Colitis remains a possibility.   4.  Esophageal hiatal hernia.  5.  Advanced atherosclerotic disease.

## 2022-02-09 NOTE — PHARMACY-ADMISSION MEDICATION HISTORY
Pharmacy Medication History  Admission medication history interview status for the 2/8/2022  admission is complete. See EPIC admission navigator for prior to admission medications     Location of Interview: Phone  Medication history sources: Patient, Patient's family/friend (daughter), Surescripts and Care Everywhere    In the past week, patient estimated taking medication this percent of the time: greater than 90%    Additional medication history information:   Patient recently prescribed Ciprofloxacin for UTI on 2/5/22 but has not yet started course.       Time spent in this activity: 30 minutes    Prior to Admission medications    Medication Sig Last Dose Taking? Auth Provider   acetaminophen (TYLENOL) 500 MG tablet Take 2 tablets (1,000 mg) by mouth 3 times daily as needed for mild pain Unknown at Unknown time Yes Yaya Hatfield MD   amLODIPine (NORVASC) 2.5 MG tablet Take 1 tablet (2.5 mg) by mouth daily 2/8/2022 at AM Yes Braeden Sood MD   bisacodyl (DULCOLAX) 5 MG EC tablet Take 1 tablet (5 mg) by mouth daily as needed for constipation Unknown at Unknown time Yes Braeden Sood MD   Calcium Carbonate (CALCIUM 600 PO) Take 1 tablet by mouth daily. 2/8/2022 at AM Yes Reported, Patient   Cholecalciferol (VITAMIN D) 1000 UNITS capsule Take 1 capsule by mouth daily. 2/8/2022 at AM Yes Reported, Patient   cycloSPORINE (RESTASIS) 0.05 % ophthalmic emulsion Place 1 drop into both eyes daily  2/8/2022 at AM Yes Reported, Patient   docusate sodium (COLACE) 100 MG capsule Take 1 capsule (100 mg) by mouth 2 times daily as needed for constipation Unknown at Unknown time Yes Lynn Davies PA-C   lisinopril (ZESTRIL) 10 MG tablet Take 1 tablet (10 mg) by mouth daily 2/8/2022 at AM Yes Cedrick Mcmahon MD   metoprolol succinate ER (TOPROL-XL) 50 MG 24 hr tablet Take 1 tablet (50 mg) by mouth daily 2/8/2022 at AM Yes Stacy James MD   Multiple Vitamins-Minerals (MULTIVITAL) TABS Take 1 tablet by  mouth daily. 2/8/2022 at AM Yes Reported, Patient   ondansetron (ZOFRAN) 4 MG tablet Take 1 tablet (4 mg) by mouth every 8 hours as needed for nausea Unknown at Unknown time Yes Brittani Schafer MD   order for DME Equipment being ordered: Walker Wheels () and Walker ()  Treatment Diagnosis: Difficulty ambulating Unknown at Unknown time Yes Lynn Davies PA-C   oxybutynin ER (DITROPAN XL) 15 MG 24 hr tablet TAKE 1 TABLET(15 MG) BY MOUTH DAILY 2/8/2022 at AM Yes Braeden Sood MD   PARoxetine (PAXIL) 20 MG tablet TAKE 1 TABLET(20 MG) BY MOUTH AT BEDTIME 2/7/2022 at HS Yes Brittani Schafer MD   polyethylene glycol (MIRALAX) 17 GM/Dose powder Take 17 g by mouth daily 2/8/2022 at AM Yes Stacy James MD   potassium chloride ER (KLOR-CON M) 20 MEQ CR tablet Take 1 tablet (20 mEq) by mouth daily 2/8/2022 at AM Yes Stacy James MD   QUEtiapine (SEROQUEL) 25 MG tablet TAKE 1 TABLET(25 MG) BY MOUTH AT BEDTIME 2/7/2022 at HS Yes Braeden Sood MD   ciprofloxacin (CIPRO) 250 MG tablet Take 1 tablet (250 mg) by mouth 2 times daily for 7 days  Patient not taking: Reported on 2/8/2022 Not Taking at Unknown time  Braeden Sood MD       The information provided in this note is only as accurate as the sources available at the time of update(s)

## 2022-02-09 NOTE — TELEPHONE ENCOUNTER
FYI only     Home care called to report that patient is currently in patient at [facility]     They are required to call and FYI PCP     Zuleyka SIDHU Triage RN  [facility]

## 2022-02-09 NOTE — PLAN OF CARE
Pt here with abdominal pain and generalized weaknes. A&Ox4. VSS.  Regular diet, thin liquids.  Up with A1/GB. Denies pain. Pt scoring green on the Aggression Stop Light Tool.  Discharge pending.

## 2022-02-09 NOTE — PROGRESS NOTES
[facility]  Medicine Progress Note - Hospitalist Service    Date of Admission:  2/8/2022    Assessment & Plan            Margy Babin is a 89 year old female admitted on 2/8/2022. She presented with abdominal pain.        Colitis    Abdominal pain, generalized    Acute kidney injury     Lactic acidosis    Assessment: presents with 1 day hx of abdominal pain. CT abdomen shows No evidence for bowel obstruction. Mild constipation.Wall thickening involving 2 portions of the transverse colon. This was not not present on the study from 01/06/2022 and may be secondary to incomplete distention. Colitis remains a possibility.  Creatinine elevated to 1.31 on admission secondary to likely prerenal etiology in the setting of poor p.o. intake and abdominal pain. She has no diarrhea, thus unlikely C diff or viral gastroenteritis.    Plan:   -Continue with IV Zosyn  -Follow leukocytosis, slightly worse today although patient is clinically better, if fever or continues to worsen, will consult GI.  -Continue IVF, follow Cr ->1.2 today, lactic acid also trended down.  -Diet as tolerated  - Pain control PRN      Possible UTI  UA abnormal with 33 white cells and large leukocyte esterase.  Recent hospitalization 1/6-1/11 for UTI, culture grew E. coli pansensitive.  -Antibiotic as above  -Follow urine culture.      Anxiety    Depression, major, recurrent, in complete remission (H)    Insomnia, unspecified type    Assessment/Plan: Stable, continue prior to admission Seroquel, Paxil        Essential hypertension    Mixed hyperlipidemia    Assessment: Prior to admission on Norvasc 2.5 mg daily, metoprolol 50 mg daily, lisinopril 10 mg daily.    Plan:   - Continue prior to admission Norvasc and metoprolol  - Holding lisinopril in the setting of CRISSY.  - PRN labetalol ordered      Diet: Combination Diet Regular Diet Adult    DVT Prophylaxis: Pneumatic Compression Devices  Pichardo Catheter: Not  present  Central Lines: None  Cardiac Monitoring: None  Code Status: No CPR- Do NOT Intubate      Disposition Plan   Expected Discharge: 02/10/2022   As long as leucocytosis/Cr improves and tolerates diet.  Anticipated discharge location:  Awaiting care coordination huddle  Delays:            The patient's care was discussed with the Patient, Patient's Family and RN.    Roro Queen MD  Hospitalist Service  [facility]  Securely message with the Vocera Web Console (learn more here)  Text page via Spotsetter Paging/Directory         Clinically Significant Risk Factors Present on Admission                     ______________________________________________________________________    Interval History   Chart reviewed, evaluated patient this morning.  She reports abdominal pain has subsided.  Denies nausea.  She had bowel movement yesterday without blood or melena.  Patient reports typically she has bowel movement every other day.  -Patient reports she also felt lightheaded yesterday but has subsided since.    Data reviewed today: I reviewed all medications, new labs and imaging results over the last 24 hours. I personally reviewed  report of CT abdomen pelvis from admission, as above.    Physical Exam   Vital Signs: Temp: 97.6  F (36.4  C) Temp src: Oral BP: 128/59 Pulse: 70   Resp: 16 SpO2: 96 % O2 Device: None (Room air)    Weight: 0 lbs 0 oz    General: AAOx3, very pleasant, appears comfortable.  HEENT: PERRLA EOMI. Mucosa moist.   Lungs: Bilateral equal air entry. Clear to auscultation, normal work of breathing.   CVS: S1S2 regular, no tachycardia or murmur.   Abdomen: Soft, NT, ND. BS heard.  MSK: No edema or deformities.  Neuro: AAOX3. CN 2-12 normal. Strength symmetrical.  Skin: No rash.       Data   Recent Labs   Lab 02/09/22  0544 02/08/22  1607 02/03/22  1124   WBC 16.4* 14.3* 13.3*   HGB 11.8 13.2 13.7   MCV 97 96 98    427 472*    139 138   POTASSIUM 3.5 3.8 5.1    CHLORIDE 112* 109 108   CO2 21 21 27   BUN 23 26 21   CR 1.23* 1.31* 1.41*   ANIONGAP 7 9 3   SOBIA 8.4* 9.4 9.4   GLC 97 122* 103*   ALBUMIN  --  3.1*  --    PROTTOTAL  --  7.2  --    BILITOTAL  --  0.3  --    ALKPHOS  --  107  --    ALT  --  20  --    AST  --  20  --      Recent Results (from the past 24 hour(s))   CT Abdomen Pelvis w Contrast    Narrative    EXAM: CT ABDOMEN PELVIS W CONTRAST  LOCATION: [facility]  DATE/TIME: 2/8/2022 5:07 PM    INDICATION: Abdominal pain. Bowel obstruction suspected  COMPARISON: CT abdomen pelvis 01/06/2022  TECHNIQUE: CT scan of the abdomen and pelvis was performed following injection of IV contrast. Multiplanar reformats were obtained. Dose reduction techniques were used.  CONTRAST: 74 mL Isovue 370    FINDINGS:   LOWER CHEST: Subsegmental atelectasis both lower lobes. Moderate size esophageal hiatal hernia.    HEPATOBILIARY: Normal.    PANCREAS: Normal.    SPLEEN: Normal.    ADRENAL GLANDS: Normal.    KIDNEYS/BLADDER: Cortical scarring and mild atrophy left kidney. Small right renal cyst.    BOWEL: Moderate volume of stool throughout the colon to the level of the rectum. No evidence for obstruction. There is wall thickening involving 2 segments of the transverse colon, incompletely distended. No other inflammatory changes.    LYMPH NODES: Normal.    VASCULATURE: Advanced atherosclerotic disease abdominal aorta and iliac arteries.    PELVIC ORGANS: Normal.    MUSCULOSKELETAL: Total right hip arthroplasty osteopenia. Degenerative disc and facet disease are spine.      Impression    IMPRESSION:   1.  No evidence for bowel obstruction.  2.  Mild constipation.  3.  Wall thickening involving 2 portions of the transverse colon. This was not not present on the study from 01/06/2022 and may be secondary to incomplete distention. Colitis remains a possibility.   4.  Esophageal hiatal hernia.  5.  Advanced atherosclerotic disease.     Medications        amLODIPine  2.5 mg Oral Daily     artificial tears  1 drop Both Eyes Daily     metoprolol succinate ER  50 mg Oral Daily     PARoxetine  20 mg Oral At Bedtime     piperacillin-tazobactam  2.25 g Intravenous Q6H     polyethylene glycol  17 g Oral Daily     potassium chloride ER  20 mEq Oral Daily     QUEtiapine  25 mg Oral At Bedtime     sodium chloride (PF)  3 mL Intracatheter Q8H

## 2022-02-09 NOTE — ED NOTES
[facility]  ED Nurse Handoff Report    ED Chief complaint: Abdominal Pain      ED Diagnosis:   Final diagnoses:   Colitis   Abdominal pain, generalized       Code Status: see prior    Allergies: No Known Allergies    Patient Story: Pt developed acute onset of abd pain and vomiting around 1pm. By the diallo she arrived in ED she was feeling better but had some tenderness upon palpation to her abd. She recently was treated for a UTI.  Focused Assessment:  Pt alert and forgetful. VSS. Elevated lactate. Ct neg.   Labs Ordered and Resulted from Time of ED Arrival to Time of ED Departure   COMPREHENSIVE METABOLIC PANEL - Abnormal       Result Value    Sodium 139      Potassium 3.8      Chloride 109      Carbon Dioxide (CO2) 21      Anion Gap 9      Urea Nitrogen 26      Creatinine 1.31 (*)     Calcium 9.4      Glucose 122 (*)     Alkaline Phosphatase 107      AST 20      ALT 20      Protein Total 7.2      Albumin 3.1 (*)     Bilirubin Total 0.3      GFR Estimate 38 (*)    LACTIC ACID WHOLE BLOOD - Abnormal    Lactic Acid 3.1 (*)    ROUTINE UA WITH MICROSCOPIC REFLEX TO CULTURE - Abnormal    Color Urine Yellow      Appearance Urine Clear      Glucose Urine Negative      Bilirubin Urine Negative      Ketones Urine Negative      Specific Gravity Urine 1.005      Blood Urine Negative      pH Urine 6.0      Protein Albumin Urine 10  (*)     Urobilinogen Urine Normal      Nitrite Urine Negative      Leukocyte Esterase Urine Large (*)     Mucus Urine Present (*)     RBC Urine 0      WBC Urine 33 (*)     Squamous Epithelials Urine 18 (*)    CBC WITH PLATELETS AND DIFFERENTIAL - Abnormal    WBC Count 14.3 (*)     RBC Count 4.40      Hemoglobin 13.2      Hematocrit 42.1      MCV 96      MCH 30.0      MCHC 31.4 (*)     RDW 14.1      Platelet Count 427      % Neutrophils 61      % Lymphocytes 25      % Monocytes 7      % Eosinophils 5      % Basophils 1      % Immature Granulocytes 1      NRBCs per 100 WBC 0       Absolute Neutrophils 8.7 (*)     Absolute Lymphocytes 3.6      Absolute Monocytes 1.0      Absolute Eosinophils 0.8 (*)     Absolute Basophils 0.1      Absolute Immature Granulocytes 0.1      Absolute NRBCs 0.0     TROPONIN I - Normal    Troponin I High Sensitivity 10     COVID-19 VIRUS (CORONAVIRUS) BY PCR   BLOOD CULTURE   BLOOD CULTURE   URINE CULTURE         Treatments and/or interventions provided: IV fluid and Antibiotic.  Patient's response to treatments and/or interventions: n/a    To be done/followed up on inpatient unit:  n/a    Does this patient have any cognitive concerns?: n.a    Activity level - Baseline/Home:  Walker  Activity Level - Current:   Stand with Assist    Patient's Preferred language: English   Needed?: No    Isolation: None  Infection: Not Applicable  Patient tested for COVID 19 prior to admission: YES  Bariatric?: No    Vital Signs:   Vitals:    02/08/22 1547 02/08/22 1551 02/08/22 1727   BP: 118/63  106/70   Pulse: 63  69   Resp: 16  24   Temp:  97.8  F (36.6  C)    TempSrc:  Temporal    SpO2: 100%  98%       Cardiac Rhythm:     Was the PSS-3 completed:   Yes  What interventions are required if any?               Family Comments: na  OBS brochure/video discussed/provided to patient/family: N/A              Name of person given brochure if not patient: na              Relationship to patient: n/a    For the majority of the shift this patient's behavior was Green.   Behavioral interventions performed were na.    ED NURSE PHONE NUMBER: [id number]

## 2022-02-10 ENCOUNTER — APPOINTMENT (OUTPATIENT)
Dept: SPEECH THERAPY | Facility: CLINIC | Age: 87
DRG: 392 | End: 2022-02-10
Attending: HOSPITALIST
Payer: MEDICARE

## 2022-02-10 LAB
ANION GAP SERPL CALCULATED.3IONS-SCNC: 4 MMOL/L (ref 3–14)
BACTERIA UR CULT: ABNORMAL
BACTERIA UR CULT: ABNORMAL
BASOPHILS # BLD AUTO: 0.1 10E3/UL (ref 0–0.2)
BASOPHILS NFR BLD AUTO: 1 %
BUN SERPL-MCNC: 10 MG/DL (ref 7–30)
CALCIUM SERPL-MCNC: 8.1 MG/DL (ref 8.5–10.1)
CHLORIDE BLD-SCNC: 113 MMOL/L (ref 94–109)
CO2 SERPL-SCNC: 23 MMOL/L (ref 20–32)
CREAT SERPL-MCNC: 0.99 MG/DL (ref 0.52–1.04)
EOSINOPHIL # BLD AUTO: 0.7 10E3/UL (ref 0–0.7)
EOSINOPHIL NFR BLD AUTO: 6 %
ERYTHROCYTE [DISTWIDTH] IN BLOOD BY AUTOMATED COUNT: 14.3 % (ref 10–15)
GFR SERPL CREATININE-BSD FRML MDRD: 53 ML/MIN/1.73M2
GLUCOSE BLD-MCNC: 102 MG/DL (ref 70–99)
HCT VFR BLD AUTO: 35.7 % (ref 35–47)
HGB BLD-MCNC: 11.2 G/DL (ref 11.7–15.7)
IMM GRANULOCYTES # BLD: 0.1 10E3/UL
IMM GRANULOCYTES NFR BLD: 0 %
LYMPHOCYTES # BLD AUTO: 1.7 10E3/UL (ref 0.8–5.3)
LYMPHOCYTES NFR BLD AUTO: 15 %
MCH RBC QN AUTO: 29.8 PG (ref 26.5–33)
MCHC RBC AUTO-ENTMCNC: 31.4 G/DL (ref 31.5–36.5)
MCV RBC AUTO: 95 FL (ref 78–100)
MONOCYTES # BLD AUTO: 1 10E3/UL (ref 0–1.3)
MONOCYTES NFR BLD AUTO: 8 %
NEUTROPHILS # BLD AUTO: 8.1 10E3/UL (ref 1.6–8.3)
NEUTROPHILS NFR BLD AUTO: 70 %
NRBC # BLD AUTO: 0 10E3/UL
NRBC BLD AUTO-RTO: 0 /100
PLATELET # BLD AUTO: 328 10E3/UL (ref 150–450)
POTASSIUM BLD-SCNC: 4.1 MMOL/L (ref 3.4–5.3)
RBC # BLD AUTO: 3.76 10E6/UL (ref 3.8–5.2)
SODIUM SERPL-SCNC: 140 MMOL/L (ref 133–144)
WBC # BLD AUTO: 11.7 10E3/UL (ref 4–11)

## 2022-02-10 PROCEDURE — 99233 SBSQ HOSP IP/OBS HIGH 50: CPT | Performed by: HOSPITALIST

## 2022-02-10 PROCEDURE — 80048 BASIC METABOLIC PNL TOTAL CA: CPT | Performed by: HOSPITALIST

## 2022-02-10 PROCEDURE — 120N000001 HC R&B MED SURG/OB

## 2022-02-10 PROCEDURE — 85025 COMPLETE CBC W/AUTO DIFF WBC: CPT | Performed by: HOSPITALIST

## 2022-02-10 PROCEDURE — 250N000013 HC RX MED GY IP 250 OP 250 PS 637: Performed by: HOSPITALIST

## 2022-02-10 PROCEDURE — 258N000003 HC RX IP 258 OP 636: Performed by: HOSPITALIST

## 2022-02-10 PROCEDURE — 250N000011 HC RX IP 250 OP 636: Performed by: STUDENT IN AN ORGANIZED HEALTH CARE EDUCATION/TRAINING PROGRAM

## 2022-02-10 PROCEDURE — 92526 ORAL FUNCTION THERAPY: CPT | Mod: GN | Performed by: SPEECH-LANGUAGE PATHOLOGIST

## 2022-02-10 PROCEDURE — 92610 EVALUATE SWALLOWING FUNCTION: CPT | Mod: GN | Performed by: SPEECH-LANGUAGE PATHOLOGIST

## 2022-02-10 PROCEDURE — 250N000013 HC RX MED GY IP 250 OP 250 PS 637: Performed by: STUDENT IN AN ORGANIZED HEALTH CARE EDUCATION/TRAINING PROGRAM

## 2022-02-10 PROCEDURE — 36415 COLL VENOUS BLD VENIPUNCTURE: CPT | Performed by: HOSPITALIST

## 2022-02-10 RX ORDER — FAMOTIDINE 10 MG
10 TABLET ORAL 2 TIMES DAILY
Status: DISCONTINUED | OUTPATIENT
Start: 2022-02-10 | End: 2022-02-11 | Stop reason: HOSPADM

## 2022-02-10 RX ORDER — NALOXONE HYDROCHLORIDE 0.4 MG/ML
0.2 INJECTION, SOLUTION INTRAMUSCULAR; INTRAVENOUS; SUBCUTANEOUS
Status: DISCONTINUED | OUTPATIENT
Start: 2022-02-10 | End: 2022-02-11 | Stop reason: HOSPADM

## 2022-02-10 RX ORDER — NALOXONE HYDROCHLORIDE 0.4 MG/ML
0.4 INJECTION, SOLUTION INTRAMUSCULAR; INTRAVENOUS; SUBCUTANEOUS
Status: DISCONTINUED | OUTPATIENT
Start: 2022-02-10 | End: 2022-02-11 | Stop reason: HOSPADM

## 2022-02-10 RX ORDER — PROCHLORPERAZINE MALEATE 5 MG
5 TABLET ORAL EVERY 6 HOURS PRN
Status: DISCONTINUED | OUTPATIENT
Start: 2022-02-10 | End: 2022-02-11 | Stop reason: HOSPADM

## 2022-02-10 RX ORDER — POLYETHYLENE GLYCOL 3350 17 G/17G
17 POWDER, FOR SOLUTION ORAL DAILY PRN
Status: DISCONTINUED | OUTPATIENT
Start: 2022-02-10 | End: 2022-02-11 | Stop reason: HOSPADM

## 2022-02-10 RX ORDER — PROCHLORPERAZINE 25 MG
12.5 SUPPOSITORY, RECTAL RECTAL EVERY 12 HOURS PRN
Status: DISCONTINUED | OUTPATIENT
Start: 2022-02-10 | End: 2022-02-11 | Stop reason: HOSPADM

## 2022-02-10 RX ORDER — SODIUM CHLORIDE 9 MG/ML
INJECTION, SOLUTION INTRAVENOUS CONTINUOUS
Status: DISCONTINUED | OUTPATIENT
Start: 2022-02-10 | End: 2022-02-11

## 2022-02-10 RX ADMIN — SODIUM CHLORIDE: 9 INJECTION, SOLUTION INTRAVENOUS at 11:54

## 2022-02-10 RX ADMIN — FAMOTIDINE 10 MG: 10 TABLET, FILM COATED ORAL at 22:36

## 2022-02-10 RX ADMIN — ONDANSETRON 4 MG: 2 INJECTION INTRAMUSCULAR; INTRAVENOUS at 08:49

## 2022-02-10 RX ADMIN — PROCHLORPERAZINE EDISYLATE 5 MG: 5 INJECTION INTRAMUSCULAR; INTRAVENOUS at 13:05

## 2022-02-10 RX ADMIN — QUETIAPINE FUMARATE 25 MG: 25 TABLET ORAL at 22:36

## 2022-02-10 RX ADMIN — PIPERACILLIN SODIUM AND TAZOBACTAM SODIUM 2.25 G: 2; .25 INJECTION, POWDER, LYOPHILIZED, FOR SOLUTION INTRAVENOUS at 13:05

## 2022-02-10 RX ADMIN — METOPROLOL SUCCINATE 50 MG: 50 TABLET, EXTENDED RELEASE ORAL at 09:14

## 2022-02-10 RX ADMIN — PIPERACILLIN SODIUM AND TAZOBACTAM SODIUM 2.25 G: 2; .25 INJECTION, POWDER, LYOPHILIZED, FOR SOLUTION INTRAVENOUS at 02:02

## 2022-02-10 RX ADMIN — PIPERACILLIN SODIUM AND TAZOBACTAM SODIUM 2.25 G: 2; .25 INJECTION, POWDER, LYOPHILIZED, FOR SOLUTION INTRAVENOUS at 08:44

## 2022-02-10 RX ADMIN — PIPERACILLIN SODIUM AND TAZOBACTAM SODIUM 2.25 G: 2; .25 INJECTION, POWDER, LYOPHILIZED, FOR SOLUTION INTRAVENOUS at 22:36

## 2022-02-10 RX ADMIN — PAROXETINE HYDROCHLORIDE 20 MG: 20 TABLET, FILM COATED ORAL at 22:36

## 2022-02-10 RX ADMIN — AMLODIPINE BESYLATE 2.5 MG: 2.5 TABLET ORAL at 09:14

## 2022-02-10 RX ADMIN — FAMOTIDINE 10 MG: 10 TABLET, FILM COATED ORAL at 13:05

## 2022-02-10 RX ADMIN — POTASSIUM CHLORIDE 20 MEQ: 1500 TABLET, EXTENDED RELEASE ORAL at 09:13

## 2022-02-10 ASSESSMENT — ACTIVITIES OF DAILY LIVING (ADL)
ADLS_ACUITY_SCORE: 19
ADLS_ACUITY_SCORE: 15
ADLS_ACUITY_SCORE: 15
ADLS_ACUITY_SCORE: 11
ADLS_ACUITY_SCORE: 13
ADLS_ACUITY_SCORE: 15
ADLS_ACUITY_SCORE: 19
ADLS_ACUITY_SCORE: 13
ADLS_ACUITY_SCORE: 15
ADLS_ACUITY_SCORE: 19
ADLS_ACUITY_SCORE: 13
ADLS_ACUITY_SCORE: 15
ADLS_ACUITY_SCORE: 13
ADLS_ACUITY_SCORE: 11
ADLS_ACUITY_SCORE: 13
ADLS_ACUITY_SCORE: 13
ADLS_ACUITY_SCORE: 15

## 2022-02-10 NOTE — PLAN OF CARE
7 to 11 PM    Pt admitted with UTI, abdominal pain, possible colitis.  Resting with eyes-closed. No noted neuro changes. A/O x 4. VSS on RA. Slight slurred speech. Generalized weakness. Purewick in place. Assist of 1 with GB. Regular diet. Daily weights. Monitoring WBC. Currently 16.4.

## 2022-02-10 NOTE — PROGRESS NOTES
"CLINICAL SWALLOW EVALUATION     02/10/22 3812   General Information   Onset of Illness/Injury or Date of Surgery 02/08/22   Referring Physician Roro Queen MD   Patient/Family Therapy Goal Statement (SLP) Patient stated she is fine but agreed to swallow evaluation.   Pertinent History of Current Problem Per MD note:\" Margy Babin is a 89 year old female admitted on 2/8/2022. She presented with abdominal pain.\" Per RN: \" Pt reported choking with lunch, then feeling nauseous. Able to talk and O2 sating 97% on room air. Able to cough and clear up throat. Also had nausea episode after few bits of breakfast.\" RN and CNA reported patient had been tolerating regular textures and thin liquids without swallowing difficulty prior to these episodes. CT abdomen: Moderate size esophageal hiatal hernia.   General Observations Patient alert, cooperative; mild confusion noted. Patient reported eating regular diet/thin liquids at baseline and reported no hx of swallowing difficulty. She reported she never uses straws.   Past History of Dysphagia None per chart review and patient report.   Type of Evaluation   Type of Evaluation Swallow Evaluation   Oral Motor   Oral Musculature anomalies present   Structural Abnormalities none present   Mucosal Quality adequate   Dentition (Oral Motor)   Dentition (Oral Motor) natural dentition   Facial Symmetry (Oral Motor)   Comment, Facial Symmetry (Oral Motor) Disorganized facial movements noted at times.   Lip Function (Oral Motor)   Lip Range of Motion (Oral Motor) WNL   Tongue Function (Oral Motor)   Comment, Tongue Function (Oral Motor) Disorganized tongue movements noted at times.   Jaw Function (Oral Motor)   Jaw Function (Oral Motor) WNL   Cough/Swallow/Gag Reflex (Oral Motor)   Soft Palate/Velum (Oral Motor) WNL   Volitional Throat Clear/Cough (Oral Motor) WNL   Vocal Quality/Secretion Management (Oral Motor)   Vocal Quality (Oral Motor) WFL   Secretion Management " (Oral Motor) WNL   General Swallowing Observations   Current Diet/Method of Nutritional Intake (General Swallowing Observations, NIS) clear liquid diet  (regular diet prior to breakfast and lunch episodes today)   Respiratory Support (General Swallowing Observations) none   Swallowing Evaluation Clinical swallow evaluation   Clinical Swallow Evaluation   Feeding Assistance no assistance needed   Clinical Swallow Evaluation Textures Trialed thin liquids;pureed;minced & moist;solid foods   Clinical Swallow Eval: Thin Liquid Texture Trial   Mode of Presentation, Thin Liquids cup;self-fed   Volume of Liquid or Food Presented 1 ice chip; 4 oz thin water   Oral Phase of Swallow delayed AP movement  (mild)   Pharyngeal Phase of Swallow coughing/choking  (1 instance after initial sip; no other coughing/throat clear)   Diagnostic Statement No other overt aspiration signs with additional sips of thin water.   Clinical Swallow Evaluation: Puree Solid Texture Trial   Mode of Presentation, Puree spoon;self-fed   Volume of Puree Presented 2 bites applesauce   Oral Phase, Puree WFL   Pharyngeal Phase, Puree   (no overt aspiration signs)   Clinical Swallow Eval: Minced & Moist   Mode of Presentation spoon;self-fed   Volume Presented 2 bites   Oral Phase WFL   Pharyngeal Phase   (no overt aspiration signs)   Clinical Swallow Evaluation: Solid Food Texture Trial   Mode of Presentation self-fed   Volume Presented 1/2 katelynn cracker square   Oral Phase impaired mastication  (mild due to oral motor incoordination)   Pharyngeal Phase   (no overt aspiration signs)   Esophageal Phase of Swallow   Patient reports or presents with symptoms of esophageal dysphagia Yes   Esophageal comments soft burping x2   Swallowing Recommendations   Diet Consistency Recommendations regular diet;thin liquids (level 0)   Supervision Level for Intake distant supervision needed   Mode of Delivery Recommendations bolus size, small;slow rate of intake    Monitoring/Assistance Required (Eating/Swallowing) stop eating activities when fatigue is present;monitor for cough or change in vocal quality with intake   Recommended Feeding/Eating Techniques (Swallow Eval) maintain upright posture during/after eating for 30 minutes;provide 6 smaller meals throughout day   Medication Administration Recommendations, Swallowing (SLP) One at a time   General Therapy Interventions   Planned Therapy Interventions Dysphagia Treatment   Dysphagia treatment Instruction of safe swallow strategies   SLP Therapy Assessment/Plan   Criteria for Skilled Therapeutic Interventions Met (SLP Eval) yes;treatment indicated   SLP Diagnosis Mild oropharyngeal dysphagia; suspect esophageal dysfunction   Rehab Potential (SLP Eval) good, to achieve stated therapy goals   Therapy Frequency (SLP Eval) 3 times/wk   Predicted Duration of Therapy Intervention (SLP Eval) 1-2 sessions   Comment, Therapy Assessment/Plan (SLP) Mild oropharyngeal dysphagia characterized by mild oral bolus incoordination and one instance of coughing after initial sip of thin water. No other overt aspiration signs occurred with additional intake of thin liquid, puree, minced/moist or solid textures. No oral residue remained. Patient reported no globus sensation. Note soft burping after oral intake x2. Suspect prior symptoms today of choking sensation could be related to esophageal dysfunction. Recommend consider GI consult for further investigation. Recommend regular diet and thin liquids from oropharyngeal swallow standpoint given swallow and reflux precautions (fully upright position during and 30-60 minutes after oral intake, small bites/sips, slow rate, avoid straws, smaller meals more often).    Therapy Plan Review/Discharge Plan (SLP)   Therapy Plan Review (SLP) evaluation/treatment results reviewed;care plan/treatment goals reviewed;risks/benefits reviewed;current/potential barriers reviewed;participants voiced agreement  with care plan;participants included;patient   Demonstrates Need for Referral to Another Service (SLP) occupational therapist;physical therapist   SLP Discharge Planning    SLP Discharge Recommendation (DC Rec) Transitional Care Facility   SLP Rationale for DC Rec Per PT/OT. Anticipate patient will meet swallow related goals prior to discharge.   SLP Brief overview of current status  Recommend regular diet and thin liquids given swallow and reflux precautions (fully upright position, small bites/sips, slow rate, avoid straws, remain upright 30-60 minutes after meals, smaller meals more often). Please serve pills one at a time. Suspect symptoms more esophageal in nature given CT abdomen findings of moderate esopahgeal hiatal hernia. Recommend consider GI consult to further investigate. SLP to follow up for short course to ensure diet tolerance and for swallow/reflux strategy training.    Total Evaluation Time   Total Evaluation Time (Minutes) 10

## 2022-02-10 NOTE — PLAN OF CARE
A and O x 3, off on day of month. VSS. Denies abdominal tenderness, BS active and audible. Passing flatus and had a large formed BM this morning. Voiding without difficulty. Fair appetite, declined dinner. Up to BR with 1, belt. Daughter visited at bedside, states that slightly slurred speech is baseline. Pt also having tongue and mouth movements that daughter does not feel are abnormal for her. Scoring green on aggression screening tool.

## 2022-02-10 NOTE — PROGRESS NOTES
[facility]  Medicine Progress Note - Hospitalist Service    Date of Admission:  2/8/2022    Assessment & Plan            Margy Babin is a 89 year old female admitted on 2/8/2022. She presented with abdominal pain.        Colitis    Abdominal pain, generalized    Acute kidney injury     Lactic acidosis    Assessment: presents with 1 day hx of abdominal pain. CT abdomen shows No evidence for bowel obstruction. Mild constipation.Wall thickening involving 2 portions of the transverse colon. This was not not present on the study from 01/06/2022 and may be secondary to incomplete distention. Colitis remains a possibility.  Creatinine elevated to 1.31 on admission secondary to likely prerenal etiology in the setting of poor p.o. intake and abdominal pain. She has no diarrhea, thus unlikely C diff or viral gastroenteritis.    Plan:   -Continue with IV Zosyn  -Follow leukocytosis- improved. Abd pain resolved.  -Lactic acid trended down, creatinine also improved.  Continue IVF given n/v, poor p.o. intake.  -CLD and advance as tolerated.  - Pain control PRN      E coli UTI  UA abnormal with 33 white cells and large leukocyte esterase.  Recent hospitalization 1/6-1/11 for UTI, culture grew E. coli pansensitive.  -Noted urine culture is positive for E. coli again, continue antibiotic as above.  P.o. at discharge.    Dysphagia  -Patient has what appears to be akathisia. No other neuro deficit. Neuro check q4 hr.   -SLP eval  -may need GI eval- barium swallow/EGD      Anxiety    Depression, major, recurrent, in complete remission (H)    Insomnia, unspecified type    Assessment/Plan: Stable, continue prior to admission Seroquel, Paxil        Essential hypertension    Mixed hyperlipidemia    Assessment: Prior to admission on Norvasc 2.5 mg daily, metoprolol 50 mg daily, lisinopril 10 mg daily.    Plan:   - Continue prior to admission Norvasc and metoprolol  - Holding lisinopril in the setting  of CRISSY. BP remains controlled.  - PRN labetalol ordered      Diet: Advance Diet as Tolerated: Clear Liquid Diet    DVT Prophylaxis: Pneumatic Compression Devices  Pichardo Catheter: Not present  Central Lines: None  Cardiac Monitoring: None  Code Status: No CPR- Do NOT Intubate      Disposition Plan   Expected Discharge: 1-2 days,  Once diet advanced and tolerates.  Anticipated discharge location:  Awaiting care coordination huddle  Delays:            The patient's care was discussed with the Patient, Patient's Family and RN.    Roro Queen MD  Hospitalist Service  [facility]  Securely message with the Vocera Web Console (learn more here)  Text page via [facility] Paging/Directory         Clinically Significant Risk Factors Present on Admission                  ______________________________________________________________________    Interval History   Chart reviewed, evaluated patient this morning.  Denied abdominal pain or nausea.  Remains afebrile.  Leukocytosis also improved.  Patient felt nauseous and vomited subsequently after eating her meal.  Nursing also reported that patient had some dysphagia.     Data reviewed today: I reviewed all medications, new labs and imaging results over the last 24 hours. I personally reviewed  report of CT abdomen pelvis from admission, as above.    Physical Exam   Vital Signs: Temp: 98  F (36.7  C) Temp src: Oral BP: 134/59 Pulse: 62   Resp: 16 SpO2: 95 % O2 Device: None (Room air)    Weight: 0 lbs 0 oz    General: AAOx3, very pleasant, appears comfortable.  HEENT: PERRLA EOMI. Mucosa dry  Lungs: Bilateral equal air entry. Clear to auscultation, normal work of breathing.   CVS: S1S2 regular, no tachycardia or murmur.   Abdomen: Soft, NT, ND. BS heard.  MSK: No edema or deformities.  Neuro: AAOX3. CN 2-12 normal. Strength symmetrical.  Skin: No rash.       Data   Recent Labs   Lab 02/10/22  1016 02/09/22  0544 02/08/22  1607   WBC 11.7* 16.4* 14.3*   HGB  11.2* 11.8 13.2   MCV 95 97 96    368 427    140 139   POTASSIUM 4.1 3.5 3.8   CHLORIDE 113* 112* 109   CO2 23 21 21   BUN 10 23 26   CR 0.99 1.23* 1.31*   ANIONGAP 4 7 9   SOBIA 8.1* 8.4* 9.4   * 97 122*   ALBUMIN  --   --  3.1*   PROTTOTAL  --   --  7.2   BILITOTAL  --   --  0.3   ALKPHOS  --   --  107   ALT  --   --  20   AST  --   --  20     No results found for this or any previous visit (from the past 24 hour(s)).  Medications     sodium chloride 75 mL/hr at 02/10/22 1154       amLODIPine  2.5 mg Oral Daily     artificial tears  1 drop Both Eyes Daily     famotidine  10 mg Oral BID     metoprolol succinate ER  50 mg Oral Daily     PARoxetine  20 mg Oral At Bedtime     piperacillin-tazobactam  2.25 g Intravenous Q6H     potassium chloride ER  20 mEq Oral Daily     QUEtiapine  25 mg Oral At Bedtime     sodium chloride (PF)  3 mL Intracatheter Q8H

## 2022-02-10 NOTE — PLAN OF CARE
Pt here with abdominal pain. A&Ox4. VSS.  Regular diet, thin liquids. Up with A1/GB. Denies pain. Pt scoring green on the Aggression Stop Light Tool.  Discharge pending.

## 2022-02-10 NOTE — PROVIDER NOTIFICATION
Dr Queen paged at 1256 for      Pt reported choking with lunch, then feeling nauseous. Able to talk and O2 sating 97% on room air. Able to cough and clear up throat.   Also had nausea episode after few bits of breakfast.  Need order for Compazine. Zofran not due

## 2022-02-11 ENCOUNTER — APPOINTMENT (OUTPATIENT)
Dept: SPEECH THERAPY | Facility: CLINIC | Age: 87
DRG: 392 | End: 2022-02-11
Payer: MEDICARE

## 2022-02-11 ENCOUNTER — APPOINTMENT (OUTPATIENT)
Dept: PHYSICAL THERAPY | Facility: CLINIC | Age: 87
DRG: 392 | End: 2022-02-11
Attending: HOSPITALIST
Payer: MEDICARE

## 2022-02-11 ENCOUNTER — APPOINTMENT (OUTPATIENT)
Dept: OCCUPATIONAL THERAPY | Facility: CLINIC | Age: 87
DRG: 392 | End: 2022-02-11
Attending: HOSPITALIST
Payer: MEDICARE

## 2022-02-11 VITALS
TEMPERATURE: 97.2 F | DIASTOLIC BLOOD PRESSURE: 64 MMHG | RESPIRATION RATE: 16 BRPM | SYSTOLIC BLOOD PRESSURE: 138 MMHG | HEART RATE: 70 BPM | OXYGEN SATURATION: 98 %

## 2022-02-11 DIAGNOSIS — F33.42 RECURRENT MAJOR DEPRESSION IN COMPLETE REMISSION (H): ICD-10-CM

## 2022-02-11 LAB
ANION GAP SERPL CALCULATED.3IONS-SCNC: 5 MMOL/L (ref 3–14)
BASOPHILS # BLD AUTO: 0.1 10E3/UL (ref 0–0.2)
BASOPHILS NFR BLD AUTO: 1 %
BUN SERPL-MCNC: 10 MG/DL (ref 7–30)
CALCIUM SERPL-MCNC: 8.3 MG/DL (ref 8.5–10.1)
CHLORIDE BLD-SCNC: 113 MMOL/L (ref 94–109)
CO2 SERPL-SCNC: 22 MMOL/L (ref 20–32)
CREAT SERPL-MCNC: 0.86 MG/DL (ref 0.52–1.04)
EOSINOPHIL # BLD AUTO: 0.8 10E3/UL (ref 0–0.7)
EOSINOPHIL NFR BLD AUTO: 8 %
ERYTHROCYTE [DISTWIDTH] IN BLOOD BY AUTOMATED COUNT: 14.1 % (ref 10–15)
GFR SERPL CREATININE-BSD FRML MDRD: 63 ML/MIN/1.73M2
GLUCOSE BLD-MCNC: 103 MG/DL (ref 70–99)
HCT VFR BLD AUTO: 38.2 % (ref 35–47)
HGB BLD-MCNC: 12 G/DL (ref 11.7–15.7)
IMM GRANULOCYTES # BLD: 0.1 10E3/UL
IMM GRANULOCYTES NFR BLD: 1 %
LYMPHOCYTES # BLD AUTO: 2 10E3/UL (ref 0.8–5.3)
LYMPHOCYTES NFR BLD AUTO: 18 %
MCH RBC QN AUTO: 29.9 PG (ref 26.5–33)
MCHC RBC AUTO-ENTMCNC: 31.4 G/DL (ref 31.5–36.5)
MCV RBC AUTO: 95 FL (ref 78–100)
MONOCYTES # BLD AUTO: 0.7 10E3/UL (ref 0–1.3)
MONOCYTES NFR BLD AUTO: 7 %
NEUTROPHILS # BLD AUTO: 7.1 10E3/UL (ref 1.6–8.3)
NEUTROPHILS NFR BLD AUTO: 65 %
NRBC # BLD AUTO: 0 10E3/UL
NRBC BLD AUTO-RTO: 0 /100
PLATELET # BLD AUTO: 349 10E3/UL (ref 150–450)
POTASSIUM BLD-SCNC: 3.7 MMOL/L (ref 3.4–5.3)
RBC # BLD AUTO: 4.02 10E6/UL (ref 3.8–5.2)
SODIUM SERPL-SCNC: 140 MMOL/L (ref 133–144)
WBC # BLD AUTO: 10.8 10E3/UL (ref 4–11)

## 2022-02-11 PROCEDURE — 85018 HEMOGLOBIN: CPT | Performed by: HOSPITALIST

## 2022-02-11 PROCEDURE — 36415 COLL VENOUS BLD VENIPUNCTURE: CPT | Performed by: HOSPITALIST

## 2022-02-11 PROCEDURE — 99239 HOSP IP/OBS DSCHRG MGMT >30: CPT | Performed by: HOSPITALIST

## 2022-02-11 PROCEDURE — 92526 ORAL FUNCTION THERAPY: CPT | Mod: GN | Performed by: SPEECH-LANGUAGE PATHOLOGIST

## 2022-02-11 PROCEDURE — 250N000013 HC RX MED GY IP 250 OP 250 PS 637: Performed by: STUDENT IN AN ORGANIZED HEALTH CARE EDUCATION/TRAINING PROGRAM

## 2022-02-11 PROCEDURE — 80048 BASIC METABOLIC PNL TOTAL CA: CPT | Performed by: HOSPITALIST

## 2022-02-11 PROCEDURE — 250N000011 HC RX IP 250 OP 636: Performed by: STUDENT IN AN ORGANIZED HEALTH CARE EDUCATION/TRAINING PROGRAM

## 2022-02-11 PROCEDURE — 250N000013 HC RX MED GY IP 250 OP 250 PS 637: Performed by: HOSPITALIST

## 2022-02-11 PROCEDURE — 258N000003 HC RX IP 258 OP 636: Performed by: HOSPITALIST

## 2022-02-11 PROCEDURE — 97161 PT EVAL LOW COMPLEX 20 MIN: CPT | Mod: GP

## 2022-02-11 PROCEDURE — 97165 OT EVAL LOW COMPLEX 30 MIN: CPT | Mod: GO | Performed by: OCCUPATIONAL THERAPIST

## 2022-02-11 RX ADMIN — POTASSIUM CHLORIDE 20 MEQ: 1500 TABLET, EXTENDED RELEASE ORAL at 08:36

## 2022-02-11 RX ADMIN — DEXTRAN 70, GLYCERIN, HYPROMELLOSE 1 DROP: 1; 2; 3 SOLUTION/ DROPS OPHTHALMIC at 08:37

## 2022-02-11 RX ADMIN — METOPROLOL SUCCINATE 50 MG: 50 TABLET, EXTENDED RELEASE ORAL at 08:36

## 2022-02-11 RX ADMIN — FAMOTIDINE 10 MG: 10 TABLET, FILM COATED ORAL at 08:36

## 2022-02-11 RX ADMIN — AMLODIPINE BESYLATE 2.5 MG: 2.5 TABLET ORAL at 08:36

## 2022-02-11 RX ADMIN — PIPERACILLIN SODIUM AND TAZOBACTAM SODIUM 2.25 G: 2; .25 INJECTION, POWDER, LYOPHILIZED, FOR SOLUTION INTRAVENOUS at 09:52

## 2022-02-11 RX ADMIN — PIPERACILLIN SODIUM AND TAZOBACTAM SODIUM 2.25 G: 2; .25 INJECTION, POWDER, LYOPHILIZED, FOR SOLUTION INTRAVENOUS at 01:56

## 2022-02-11 RX ADMIN — SODIUM CHLORIDE: 9 INJECTION, SOLUTION INTRAVENOUS at 06:48

## 2022-02-11 ASSESSMENT — ACTIVITIES OF DAILY LIVING (ADL)
ADLS_ACUITY_SCORE: 17
ADLS_ACUITY_SCORE: 21
ADLS_ACUITY_SCORE: 19
ADLS_ACUITY_SCORE: 21
ADLS_ACUITY_SCORE: 17
ADLS_ACUITY_SCORE: 17
ADLS_ACUITY_SCORE: 19
ADLS_ACUITY_SCORE: 19
ADLS_ACUITY_SCORE: 21
ADLS_ACUITY_SCORE: 17
ADLS_ACUITY_SCORE: 17
ADLS_ACUITY_SCORE: 19
ADLS_ACUITY_SCORE: 17
ADLS_ACUITY_SCORE: 19
PREVIOUS_RESPONSIBILITIES: MEAL PREP;HOUSEKEEPING;LAUNDRY;MEDICATION MANAGEMENT;FINANCES
ADLS_ACUITY_SCORE: 21
ADLS_ACUITY_SCORE: 21

## 2022-02-11 NOTE — PLAN OF CARE
SLP: Meal follow up with breakfast this morning. Reviewed SLP evaluation yesterday and dysphagia symptoms. Pt reported episode was a 'fluke' and she does not typically have any dysphagia symptoms. Scrambled eggs and and thin liquids by straw assessed. No oral difficulty and good timing. No overt s/sx of aspiration. Frequent belching noted throughout the meal. Pt denied any reflux symptoms.     Pt in agreement to continue a regular diet and thin liquids with standard reflux precautions (small bites, slow rate, fully upright and stay upright for 30-60 minutes after eating, consider 6 smaller snacks instead of 3 large meals) and consider GI follow up if pt wishes. Discussed with MD.

## 2022-02-11 NOTE — TELEPHONE ENCOUNTER
LOV 2-3-2022 Barrie    At time of writing patient was IP at [facility]    RT Kristan (R)

## 2022-02-11 NOTE — PLAN OF CARE
Occupational Therapy Discharge Summary    Reason for therapy discharge:    Discharged to home with home therapy.    Progress towards therapy goal(s). See goals on Care Plan in [facility] electronic health record for goal details.  Goals partially met.  Barriers to achieving goals:   discharge from facility and discharge on same date as initial evaluation.    Therapy recommendation(s):    Continued therapy is recommended.  Rationale/Recommendations:  Pt would benefit from continued HHOT to progress activity tolerance needed for ADL independence. Pt had been receiving home health as it takes significant effort for pt to leave her home.

## 2022-02-11 NOTE — PLAN OF CARE
Reason for Admission: UTI, abdominal pain, Colitis         Code status: DNR/DNI   Seizure or isolation precaution: N/A   Cognitive/Mentation: A/Ox 4  Use of CPAP: No  Neuros/CMS: Neuro check was done Q4H. Intact ex garbled speech. Mouth slightly deviated, it's her baseline per report, no facial drooping   CIWA Protocol: NA  VS: stable on RA. Slight HTN and WDL  Tele: N/A  GI: BS active x4,incontinent.  : voiding w/o difficulty. incontinent.  Pulmonary: LS clear   Pain: No pain   Replacement Protocol: N/A         BG checks: N/A   Tests, MRI, CT, PHIL, Echo: No tests pending currently    Drains: NS running at 75ml/hr   Skin: intact   Edema: No edema   Surgical site: N/A   Activity: Assist x 1 with GBW  Diet: regular  with thin  liquids. Takes pills whole   Discharge: pending. Home with home care.

## 2022-02-11 NOTE — PROGRESS NOTES
02/11/22 1000   Quick Adds   Type of Visit Initial PT Evaluation   Living Environment   People in home alone   Current Living Arrangements condominium   Home Accessibility no concerns   Transportation Anticipated agency   Self-Care   Usual Activity Tolerance good   Current Activity Tolerance moderate   Regular Exercise No   Equipment Currently Used at Home walker, rolling   Disability/Function   Fall history within last six months yes   Number of times patient has fallen within last six months 1   General Information   Onset of Illness/Injury or Date of Surgery 02/08/22   Referring Physician Dr. Trivedi   Patient/Family Therapy Goals Statement (PT) To go home   Pertinent History of Current Problem (include personal factors and/or comorbidities that impact the POC) Pt is a 89 year old female admitted with colitis.   Cognition   Orientation Status (Cognition) oriented x 3   Affect/Mental Status (Cognition) WFL   Pain Assessment   Patient Currently in Pain No   Range of Motion (ROM)   ROM Quick Adds ROM WFL   Strength   Manual Muscle Testing Quick Adds Strength WFL   Bed Mobility   Comment (Bed Mobility) Independent   Transfers   Transfer Safety Comments Independent   Gait/Stairs (Locomotion)   Comment (Gait/Stairs) Pt ambulates >150' with FWW modified independent. Pt demonstrates appropriate safety awareness and speed.   Balance   Balance Comments Good in sittting and standing   Clinical Impression   Criteria for Skilled Therapeutic Intervention evaluation only   Clinical Presentation Stable/Uncomplicated   Clinical Presentation Rationale VSS, pain controlled   Clinical Decision Making (Complexity) low complexity   Therapy Frequency (PT) Evaluation only   Predicted Duration of Therapy Intervention (days/wks) 1 day   Risk & Benefits of therapy have been explained evaluation/treatment results reviewed;care plan/treatment goals reviewed;risks/benefits reviewed;current/potential barriers reviewed;participants voiced  agreement with care plan;participants included;patient   PT Discharge Planning    PT Discharge Recommendation (DC Rec) home with home care physical therapy   PT Rationale for DC Rec Pt mobilizing well and demonstrates safe mobility to discharge home with FWW. Pt will benefit from resuming home health PT to further increase independence and endurance.   Total Evaluation Time   Total Evaluation Time (Minutes) 25

## 2022-02-11 NOTE — CONSULTS
"[facility]  Gastroenterology Consultation         Margy Babin  [address]   [place] [address]  89 year old female    Admission Date/Time: 2/8/2022  Primary Care Provider: Braeden Sood  Referring / Attending Physician: Dr. Roro Queen    We were asked to see the patient in consultation by Dr. Roro Queen for evaluation of dysphagia.      CC: Abdominal pain    HPI:  Margy Babin is a 89 year old female who has PMHx of hypertension, anxiety, major depression, hyperlipidemia, who presented for evaluation of abdominal pain on 2/8/2022.  Pain began on 2/8 and was diffuse, she had no weight loss, night sweats, bloody stools, diarrhea, fever, chills. She did report nausea and one episode of vomiting. This has since resolved. She has no history of similar pain and has never been diagnosed with liver disease, gallstones and has had no recent falls or trauma.     Per RN: \" Pt reported choking with lunch, then feeling nauseous. Able to talk and O2 sating 97% on room air. Able to cough and clear up throat. Also had nausea episode after few bits of breakfast.\" RN and CNA reported patient had been tolerating regular textures and thin liquids without swallowing difficulty prior to these episodes. When questioning patient states this was a \"fluke\" and not typical of her. She denies difficulty or pain with swallowing.  She was evaluated by SLP and noted no significant finding. Per there their note :Suspect prior symptoms today of choking sensation could be related to esophageal dysfunction\"     Patient denies history of PUD or esophageal ulcer. Has no prior EGD    She was evaluated with labs and imaging; labs significant for elevated creatinine at 1.31->0.999, lactic acid 3.1->2.3, elevated WBC 14.3k->16.4k->11.7, Hemoglobin 11.2. Blood culture NGTD, COVID 19 negative.    CT abdomen/pelvis with contrast shows; No evidence for bowel obstruction. Mild " constipation.Wall thickening involving 2 portions of the transverse colon. This was not not present on the study from 01/06/2022 and may be secondary to incomplete distention. Esophageal hiatal hernia    ROS: A comprehensive ten point review of systems was negative aside from those in mentioned in the HPI.      PAST MED HX:  I have reviewed this patient's medical history and updated it with pertinent information if needed.   Past Medical History:   Diagnosis Date     Anxiety      Blepharitis of both eyes      Depression, major, recurrent, in complete remission (H)      Dry eye syndrome      Dyspnea on exertion 10/7/2021     Essential hypertension 3/5/2020     Familial tremor 3/6/2013     Insomnia, unspecified type 11/3/2014    No CSA on file Last  check - 02/28/2020 with no concerns.     Mixed hyperlipidemia 11/4/2020     Nausea without vomiting 6/16/2015     Problem list name updated by automated process. Provider to review     Tremor, hereditary, benign        MEDICATIONS:   Prior to Admission Medications   Prescriptions Last Dose Informant Patient Reported? Taking?   Calcium Carbonate (CALCIUM 600 PO) 2/8/2022 at AM Daughter Yes Yes   Sig: Take 1 tablet by mouth daily.   Cholecalciferol (VITAMIN D) 1000 UNITS capsule 2/8/2022 at AM Daughter Yes Yes   Sig: Take 1 capsule by mouth daily.   Multiple Vitamins-Minerals (MULTIVITAL) TABS 2/8/2022 at AM Daughter Yes Yes   Sig: Take 1 tablet by mouth daily.   PARoxetine (PAXIL) 20 MG tablet 2/7/2022 at HS Daughter No Yes   Sig: TAKE 1 TABLET(20 MG) BY MOUTH AT BEDTIME   QUEtiapine (SEROQUEL) 25 MG tablet 2/7/2022 at HS Daughter No Yes   Sig: TAKE 1 TABLET(25 MG) BY MOUTH AT BEDTIME   acetaminophen (TYLENOL) 500 MG tablet Unknown at Unknown time Daughter No Yes   Sig: Take 2 tablets (1,000 mg) by mouth 3 times daily as needed for mild pain   amLODIPine (NORVASC) 2.5 MG tablet 2/8/2022 at AM Daughter No Yes   Sig: Take 1 tablet (2.5 mg) by mouth daily   bisacodyl  (DULCOLAX) 5 MG EC tablet Unknown at Unknown time Daughter No Yes   Sig: Take 1 tablet (5 mg) by mouth daily as needed for constipation   ciprofloxacin (CIPRO) 250 MG tablet Not Taking at Unknown time  No No   Sig: Take 1 tablet (250 mg) by mouth 2 times daily for 7 days   Patient not taking: Reported on 2/8/2022   cycloSPORINE (RESTASIS) 0.05 % ophthalmic emulsion 2/8/2022 at AM Daughter Yes Yes   Sig: Place 1 drop into both eyes daily    docusate sodium (COLACE) 100 MG capsule Unknown at Unknown time Daughter No Yes   Sig: Take 1 capsule (100 mg) by mouth 2 times daily as needed for constipation   lisinopril (ZESTRIL) 10 MG tablet 2/8/2022 at AM Daughter No Yes   Sig: Take 1 tablet (10 mg) by mouth daily   metoprolol succinate ER (TOPROL-XL) 50 MG 24 hr tablet 2/8/2022 at AM  No Yes   Sig: Take 1 tablet (50 mg) by mouth daily   ondansetron (ZOFRAN) 4 MG tablet Unknown at Unknown time Daughter No Yes   Sig: Take 1 tablet (4 mg) by mouth every 8 hours as needed for nausea   order for DME Unknown at Unknown time  No Yes   Sig: Equipment being ordered: Walker Wheels () and Walker ()  Treatment Diagnosis: Difficulty ambulating   oxybutynin ER (DITROPAN XL) 15 MG 24 hr tablet 2/8/2022 at AM Daughter No Yes   Sig: TAKE 1 TABLET(15 MG) BY MOUTH DAILY   polyethylene glycol (MIRALAX) 17 GM/Dose powder 2/8/2022 at AM  No Yes   Sig: Take 17 g by mouth daily   potassium chloride ER (KLOR-CON M) 20 MEQ CR tablet 2/8/2022 at AM  No Yes   Sig: Take 1 tablet (20 mEq) by mouth daily      Facility-Administered Medications: None       ALLERGIES: No Known Allergies    SOCIAL HISTORY:  Social History     Tobacco Use     Smoking status: Never Smoker     Smokeless tobacco: Never Used   Substance Use Topics     Alcohol use: No     Alcohol/week: 0.0 standard drinks     Drug use: No       FAMILY HISTORY:  Family History   Problem Relation Age of Onset     Heart Disease Father 80        MI     C.A.D. Father      Breast Cancer  Daughter 43         age 53     Emphysema Sister      Heart Disease Sister        PHYSICAL EXAM:   General  Alert, oriented and comfortable  Vital Signs with Ranges  Temp: 97.7  F (36.5  C) Temp src: Oral BP: 123/53 Pulse: 64   Resp: 16 SpO2: 96 % O2 Device: None (Room air)    I/O last 3 completed shifts:  In: 170 [P.O.:170]  Out: -     Constitutional: healthy, alert and no distress   Cardiovascular: negative, PMI normal. No lifts, heaves, or thrills. RRR. No murmurs, clicks gallops or rub  Respiratory: negative, Percussion normal. Good diaphragmatic excursion. Lungs clear  Abdomen: Abdomen soft, non-tender. BS normal. No masses, organomegaly          ADDITIONAL COMMENTS:   I reviewed the patient's new clinical lab test results.   Recent Labs   Lab Test 02/10/22  1016 22  0544 22  1607 22  0242 22  1406   WBC 11.7* 16.4* 14.3*   < > 33.1*   HGB 11.2* 11.8 13.2   < > 15.4   MCV 95 97 96   < > 94    368 427   < > 373   INR  --   --   --   --  1.27*    < > = values in this interval not displayed.     Recent Labs   Lab Test 02/10/22  1016 22  0544 22  1607   POTASSIUM 4.1 3.5 3.8   CHLORIDE 113* 112* 109   CO2 23 21 21   BUN 10 23 26   ANIONGAP 4 7 9     Recent Labs   Lab Test 22  1804 22  1607 22  1424 22  0608 22  0552 22  1407 22  1406 22  0506 21  1643 20  1435 17  1412 17  0928   ALBUMIN  --  3.1*  --  2.2* 2.3*  --  3.5  --    < > 3.5   < > 3.5   BILITOTAL  --  0.3  --  0.4 0.6  --  0.9  --    < > 0.3   < > 0.3   ALT  --  20  --  58* 55*  --  85*  --    < > 26   < > 20   AST  --  20  --  48* 62*  --  133*  --    < > 28   < > 17   PROTEIN 10 *  --  Negative  --   --  70 *  --   --   --   --    < >  --    LIPASE  --   --   --   --   --   --  82 63*  --  147   < > 102   AMYLASE  --   --   --   --   --   --   --   --   --   --   --  23*    < > = values in this interval not displayed.       I reviewed  "the patient's new imaging results.        CONSULTATION ASSESSMENT AND PLAN:    Margy \"Flor\" Adrienne is a 89 year old female with admission for abdominal pain that has since resolved and new complaint of dysphagia.   Gi consulted on 2/12/22.    Principal Problem:  Dysphagia  Hiatal hernia  Had episodes of choking and nausea as witnessed by staff.  Has no complaints today  SLP evaluated patient- \"Mild oropharyngeal dysphagia characterized by mild oral bolus incoordination and one instance of coughing after initial sip of thin water. No other overt aspiration signs occurred with additional intake of thin liquid, puree, minced/moist or solid textures. No oral residue remained. Patient reported no globus sensation. Note soft burping after oral intake x2. Suspect prior symptoms today of choking sensation could be related to esophageal dysfunction. Recommend consider GI consult for further investigation. Recommend regular diet and thin liquids from oropharyngeal swallow standpoint given swallow and reflux precautions \"  Suggested GI consult to evaluate for esophageal dysphagia  I discussed with patient options for additional evaluation. Discussed EGD vs barium swallow. We discussed risks and benefits. Patient states she feels fine and this was a fluke. Does not wish to have either procedure but is happy to f/u as an outpatient to make sure no recurrence    -- GI will call patient for outpatient f/u  -- No plans for EGD or barium swallow  -- Recommend to follow SLP instructions    Abdominal pain, generalized  Colitis ?  Constipation  CT noted \"No evidence for bowel obstruction. Mild constipation.Wall thickening involving 2 portions of the transverse colon. This was not not present on the study from 01/06/2022 and may be secondary to incomplete distention. Esophageal hiatal hernia\"  Pain resolved and has no diarrhea    -- Will follow-up as outpatient        [facility]  Office: [phone]  Cell : [phone] (Dr. Benavides)  Cell: [phone] (Shana Pierce PA-C)

## 2022-02-11 NOTE — PLAN OF CARE
A&Ox4. VSS on RA. Neuros intact except baseline slurred speech. Denies chest pain and SOB. Denies pain. Up Ax1 with GB to B/R. Loose stools this shift. Miralax changed to PRN. Had episode of nausea and choking sensation during lunch. Seen by speech. Advanced to reg diet. No swallow issues noted during supper. Possible discharge to home tomorrow.

## 2022-02-11 NOTE — PLAN OF CARE
Piv x 2 removed.  Discharge instructions reviewed with pt and pt granddaughter using teach back.  All questions answered.  Pt left with all belongings.

## 2022-02-11 NOTE — PLAN OF CARE
Reason for Admission: UTI, abdominal pain, Colitis       Code status: DNR/DNI   Seizure or isolation precaution: N/A   Cognitive/Mentation: A/Ox 4  Use of CPAP: No  Neuros/CMS: Neuro check was done Q4H. Intact ex garbled speech. Mouth slightly deviated, it's her baseline per report, no facial drooping   CIWA Protocol: NA  VS: stable  Tele: N/A  GI: BS active x4, passing flatus, incontinent.  : voiding w/o difficulty. incontinent.  Pulmonary: LS clear   Pain: No pain   Replacement Protocol: N/A       BG checks: N/A   Tests, MRI, CT, PHIL, Echo: No tests pending currently    Drains: NS running at 75ml/hr   Skin: intact   Edema: No edema   Surgical site: N/A   Activity: Assist x 1 with GBW  Diet: regular  with thin  liquids. Takes pills whole   Discharge: pending. Home with home care.

## 2022-02-11 NOTE — DISCHARGE SUMMARY
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               Unresulted Labs Ordered in the Past 30 Days of this Admission     Date and Time Order Name Status Description    2/8/2022  5:51 PM Blood Culture Peripheral Blood Preliminary     2/8/2022  5:51 PM Blood Culture Peripheral Blood Preliminary       These results will be followed up by Hospitalist    Discharge Disposition   Discharged to home  Condition at discharge: Stable      Hospital Course     Margy Babin is a 89 year old female admitted on 2/8/2022. She presented with abdominal pain.        Colitis    Abdominal pain, generalized    Acute kidney injury     Lactic acidosis   Presented with 1 day hx of abdominal pain. CT abdomen shows No evidence for bowel obstruction. Mild constipation.Wall thickening involving 2 portions of the transverse colon. This was not not present on the study from 01/06/2022 and may be secondary to incomplete distention. Colitis remains a possibility.  Creatinine elevated to 1.31 on admission secondary to likely prerenal etiology in the setting of poor p.o. intake and abdominal pain. She has no diarrhea, thus unlikely C diff or viral gastroenteritis.     -Continued with IV Zosyn.  -Leukocytosis- resolved. Abd pain resolved.  -Lactic acid trended down, creatinine also improved.     -Tolerating diet.  -discharged home with 7 more days of augmentin       E coli UTI  UA abnormal with 33 white cells and large leukocyte esterase.  Recent hospitalization 1/6-1/11 for UTI, culture grew E. coli pansensitive.  -Noted urine culture is positive for E. coli again, continue antibiotic as above.  P.o. at discharge.    Dysphagia  -Patient has what appears to be akathisia. No other neuro deficit. Neuro check q4 hr.   -SLP eval completed. Patient is tolerating diet.   - discussed with GI, no further work up at this time      Anxiety    Depression, major, recurrent, in complete remission (H)    Insomnia, unspecified type   Stable, continue prior to admission Seroquel, Paxil        Essential hypertension    Mixed hyperlipidemia   Prior to admission on Norvasc 2.5 mg daily, metoprolol 50 mg daily, lisinopril 10 mg daily.  - Continue prior to admission Norvasc and metoprolol  - PTA lisinopril held in the setting of CRISSY. BP remains controlled and so this was discontinued at discharge     Patient was evaluated by PT, OT and SLP desisted.  Discharging home with home care PT OT and RN    Consultations This Hospital Stay   SPEECH LANGUAGE PATH ADULT IP CONSULT  GASTROENTEROLOGY IP CONSULT  PHYSICAL THERAPY ADULT IP CONSULT  OCCUPATIONAL THERAPY ADULT IP CONSULT  CARE MANAGEMENT / SOCIAL WORK IP CONSULT    Code Status   No CPR- Do NOT Intubate    Time Spent on this Encounter   I, Roro Queen MD, personally saw the patient today and spent greater than 30 minutes discharging this patient.       Roro Queen MD  [place] [address] [address]  [facility] [place] [address]  Phone: [phone]  ______________________________________________________________________    Physical Exam   Vital Signs: Temp: 97.2  F (36.2  C) Temp src: Oral BP: 138/64 Pulse: 70   Resp: 16 SpO2: 98 % O2 Device: None (Room air)    Weight: 0 lbs 0 oz    General: AAOx3, very pleasant, appears  comfortable.  HEENT: PERRLA EOMI. Mucosa moist  Lungs: Bilateral equal air entry. Clear to auscultation, normal work of breathing.   CVS: S1S2 regular, no tachycardia or murmur.   Abdomen: Soft, NT, ND. BS heard.  MSK: No edema or deformities.  Neuro: AAOX3. CN 2-12 normal. Strength symmetrical.  Skin: No rash.        Primary Care Physician   Braeden Sood    Discharge Orders      Home Care OT Referral for Hospital Discharge      Home Care PT Referral for Hospital Discharge      Home care nursing referral      Reason for your hospital stay    Colitis, suspect infectious     Follow-up and recommended labs and tests     Follow up with primary care provider, Braeden Sood, within 7-10 days for hospital follow- up.     Activity    Your activity upon discharge: activity as tolerated     MD face to face encounter    Documentation of Face to Face and Certification for Home Health Services    I certify that patient: Margy Babin is under my care and that I, or a nurse practitioner or physician's assistant working with me, had a face-to-face encounter that meets the physician face-to-face encounter requirements with this patient on: 2/11/2022.    This encounter with the patient was in whole, or in part, for the following medical condition, which is the primary reason for home health care: infectious colitis.    I certify that, based on my findings, the following services are medically necessary home health services: Nursing, Occupational Therapy, and Physical Therapy.    My clinical findings support the need for the above services because: Nurse is needed: For complex aftercare of surgical procedures because the patient needs instruction and cannot perform care on their own due to: cognitive impairment, Occupational Therapy Services are needed to assess and treat cognitive ability and address ADL safety due to impairment in decreased independence in her ADLs., and Physical Therapy Services are needed as Pt  will benefit from home health PT to further increase independence and endurance..    Further, I certify that my clinical findings support that this patient is homebound (i.e. absences from home require considerable and taxing effort and are for medical reasons or [facility] services or infrequently or of short duration when for other reasons) because: Requires assistance of another person or specialized equipment to access medical services because patient: Requires supervision of another for safe transfer...    Based on the above findings. I certify that this patient is confined to the home and needs intermittent skilled nursing care, physical therapy and/or speech therapy.  The patient is under my care, and I have initiated the establishment of the plan of care.  This patient will be followed by a physician who will periodically review the plan of care.  Physician/Provider to provide follow up care: Braeden Sood    Attending hospital physician (the Medicare certified [place] provider): Roro Queen MD  Physician Signature: See electronic signature associated with these discharge orders.  Date: 2/11/2022     Diet    Follow this diet upon discharge: Orders Placed This Encounter      Advance Diet as Tolerated: Low fiber diet       Significant Results and Procedures   Most Recent 3 CBC's:Recent Labs   Lab Test 02/11/22  0856 02/10/22  1016 02/09/22  0544   WBC 10.8 11.7* 16.4*   HGB 12.0 11.2* 11.8   MCV 95 95 97    328 368     Most Recent 3 BMP's:Recent Labs   Lab Test 02/11/22  0856 02/10/22  1016 02/09/22  0544    140 140   POTASSIUM 3.7 4.1 3.5   CHLORIDE 113* 113* 112*   CO2 22 23 21   BUN 10 10 23   CR 0.86 0.99 1.23*   ANIONGAP 5 4 7   SOBIA 8.3* 8.1* 8.4*   * 102* 97     Most Recent 2 LFT's:Recent Labs   Lab Test 02/08/22  1607 01/08/22  0608   AST 20 48*   ALT 20 58*   ALKPHOS 107 69   BILITOTAL 0.3 0.4     Most Recent 6 Bacteria Isolates From Any Culture (See EPIC Reports for Culture  Details):Recent Labs   Lab Test 12/25/20  1544 12/25/20  1435 04/23/19  1255 06/19/17  1524 10/23/15  1028 02/11/15  1342   CULT No growth No growth 10,000 to 50,000 colonies/mL  mixed urogenital peewee  Susceptibility testing not routinely done   10,000 to 50,000 colonies/mL mixed urogenital peewee Susceptibility testing not   routinely done   10,000 to 50,000 colonies/mL mixed urogenital peewee 10,000 to 50,000 colonies/mL mixed urogenital peewee  Susceptibility testing not routinely done       Most Recent TSH and T4:Recent Labs   Lab Test 11/23/21  1202   TSH 1.27     Most Recent 6 glucoses:Recent Labs   Lab Test 02/11/22  0856 02/10/22  1016 02/09/22  0544 02/08/22  1607 02/03/22  1124 01/17/22  0839   * 102* 97 122* 103* 91     Most Recent Urinalysis:Recent Labs   Lab Test 02/08/22  1804 02/05/22  1424   COLOR Yellow Yellow   APPEARANCE Clear Clear   URINEGLC Negative Negative   URINEBILI Negative Negative   URINEKETONE Negative Negative   SG 1.005 1.020   UBLD Negative Negative   URINEPH 6.0 5.5   PROTEIN 10 * Negative   UROBILINOGEN  --  0.2   NITRITE Negative Negative   LEUKEST Large* Trace*   RBCU 0 0-2   WBCU 33* 10-25*   ,   Results for orders placed or performed during the hospital encounter of 02/08/22   CT Abdomen Pelvis w Contrast    Narrative    EXAM: CT ABDOMEN PELVIS W CONTRAST  LOCATION: [facility]  DATE/TIME: 2/8/2022 5:07 PM    INDICATION: Abdominal pain. Bowel obstruction suspected  COMPARISON: CT abdomen pelvis 01/06/2022  TECHNIQUE: CT scan of the abdomen and pelvis was performed following injection of IV contrast. Multiplanar reformats were obtained. Dose reduction techniques were used.  CONTRAST: 74 mL Isovue 370    FINDINGS:   LOWER CHEST: Subsegmental atelectasis both lower lobes. Moderate size esophageal hiatal hernia.    HEPATOBILIARY: Normal.    PANCREAS: Normal.    SPLEEN: Normal.    ADRENAL GLANDS: Normal.    KIDNEYS/BLADDER: Cortical scarring and mild  atrophy left kidney. Small right renal cyst.    BOWEL: Moderate volume of stool throughout the colon to the level of the rectum. No evidence for obstruction. There is wall thickening involving 2 segments of the transverse colon, incompletely distended. No other inflammatory changes.    LYMPH NODES: Normal.    VASCULATURE: Advanced atherosclerotic disease abdominal aorta and iliac arteries.    PELVIC ORGANS: Normal.    MUSCULOSKELETAL: Total right hip arthroplasty osteopenia. Degenerative disc and facet disease are spine.      Impression    IMPRESSION:   1.  No evidence for bowel obstruction.  2.  Mild constipation.  3.  Wall thickening involving 2 portions of the transverse colon. This was not not present on the study from 01/06/2022 and may be secondary to incomplete distention. Colitis remains a possibility.   4.  Esophageal hiatal hernia.  5.  Advanced atherosclerotic disease.       Discharge Medications   Current Discharge Medication List      START taking these medications    Details   amoxicillin-clavulanate (AUGMENTIN) 875-125 MG tablet Take 1 tablet by mouth 2 times daily for 7 days  Qty: 14 tablet, Refills: 0    Associated Diagnoses: Colitis         CONTINUE these medications which have NOT CHANGED    Details   acetaminophen (TYLENOL) 500 MG tablet Take 2 tablets (1,000 mg) by mouth 3 times daily as needed for mild pain  Qty: 100 tablet, Refills: 0    Associated Diagnoses: Neck pain      amLODIPine (NORVASC) 2.5 MG tablet Take 1 tablet (2.5 mg) by mouth daily  Qty: 60 tablet, Refills: 1    Associated Diagnoses: Essential hypertension      bisacodyl (DULCOLAX) 5 MG EC tablet Take 1 tablet (5 mg) by mouth daily as needed for constipation  Qty: 60 tablet, Refills: 1    Associated Diagnoses: Constipation, unspecified constipation type      Calcium Carbonate (CALCIUM 600 PO) Take 1 tablet by mouth daily.      Cholecalciferol (VITAMIN D) 1000 UNITS capsule Take 1 capsule by mouth daily.      cycloSPORINE  (RESTASIS) 0.05 % ophthalmic emulsion Place 1 drop into both eyes daily       docusate sodium (COLACE) 100 MG capsule Take 1 capsule (100 mg) by mouth 2 times daily as needed for constipation  Qty: 60 capsule, Refills: 0    Associated Diagnoses: Constipation, unspecified constipation type      metoprolol succinate ER (TOPROL-XL) 50 MG 24 hr tablet Take 1 tablet (50 mg) by mouth daily    Associated Diagnoses: Dyspnea on exertion      Multiple Vitamins-Minerals (MULTIVITAL) TABS Take 1 tablet by mouth daily.      ondansetron (ZOFRAN) 4 MG tablet Take 1 tablet (4 mg) by mouth every 8 hours as needed for nausea  Qty: 90 tablet, Refills: 3    Associated Diagnoses: Nausea without vomiting      order for DME Equipment being ordered: Walker Wheels () and Walker ()  Treatment Diagnosis: Difficulty ambulating  Qty: 1 each, Refills: 0    Associated Diagnoses: Fall, initial encounter      oxybutynin ER (DITROPAN XL) 15 MG 24 hr tablet TAKE 1 TABLET(15 MG) BY MOUTH DAILY  Qty: 90 tablet, Refills: 2    Associated Diagnoses: Urge incontinence of urine      PARoxetine (PAXIL) 20 MG tablet TAKE 1 TABLET(20 MG) BY MOUTH AT BEDTIME  Qty: 90 tablet, Refills: 0    Associated Diagnoses: 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)      polyethylene glycol (MIRALAX) 17 GM/Dose powder Take 17 g by mouth daily  Qty: 510 g    Associated Diagnoses: Urinary tract infection without hematuria, site unspecified      potassium chloride ER (KLOR-CON M) 20 MEQ CR tablet Take 1 tablet (20 mEq) by mouth daily    Associated Diagnoses: NSVT (nonsustained ventricular tachycardia) (H)      QUEtiapine (SEROQUEL) 25 MG tablet TAKE 1 TABLET(25 MG) BY MOUTH AT BEDTIME  Qty: 30 tablet, Refills: 5    Associated Diagnoses: Insomnia, unspecified type         STOP taking these medications       ciprofloxacin (CIPRO) 250 MG tablet Comments:   Reason for Stopping:         lisinopril (ZESTRIL) 10 MG tablet Comments:   Reason for Stopping:              Allergies   No Known Allergies

## 2022-02-11 NOTE — CONSULTS
Care Management Initial Consult    General Information  Assessment completed with: Patient,    Type of CM/SW Visit: Offer D/C Planning    Primary Care Provider verified and updated as needed: Yes   Readmission within the last 30 days:        Reason for Consult: discharge planning  Advance Care Planning:            Communication Assessment  Patient's communication style: spoken language (English or Bilingual)    Hearing Difficulty or Deaf: yes   Wear Glasses or Blind: yes    Cognitive  Cognitive/Neuro/Behavioral: .WDL except  Level of Consciousness: alert  Arousal Level: opens eyes spontaneously  Orientation: oriented x 4  Mood/Behavior: calm,cooperative  Best Language: 0 - No aphasia  Speech: garbled    Living Environment:   People in home: alone     Current living Arrangements: condominium      Able to return to prior arrangements: yes       Family/Social Support:  Care provided by: self  Provides care for:    Marital Status:              Description of Support System: Supportive,Involved         Current Resources:   Patient receiving home care services: Yes  Skilled Home Care Services: Physicial Therapy  Community Resources: None  Equipment currently used at home: walker, rolling  Supplies currently used at home:      Employment/Financial:  Employment Status: retired        Financial Concerns: No concerns identified           Lifestyle & Psychosocial Needs:  Social Determinants of Health     Tobacco Use: Low Risk      Smoking Tobacco Use: Never Smoker     Smokeless Tobacco Use: Never Used   Alcohol Use: Not on file   Financial Resource Strain: Not on file   Food Insecurity: Not on file   Transportation Needs: Not on file   Physical Activity: Not on file   Stress: Not on file   Social Connections: Not on file   Intimate Partner Violence: Not on file   Depression: Not at risk     PHQ-2 Score: 0   Housing Stability: Not on file       Functional Status:  Prior to admission patient needed assistance:               Mental Health Status:          Chemical Dependency Status:                Values/Beliefs:  Spiritual, Cultural Beliefs, Yarsani Practices, Values that affect care:                 Additional Information:  Met with patient to discuss role in discharge planning.  Pt lives at [address] [place] [address]. Indep w/ walker at baseline. Daughter helps with groceries and errands.  Pt recently open to [facility] for RN/PT/OT.  Pt voices no needs at discharge wants to continue with [facility].   Would like Follow-up made with PCP (prefers afternoon)  Following appointment made and added to AVS:  Feb 16, 2022  4:30 PM   (Arrive by 4:15 PM)   ED/Hospital Follow Up with Braeden Sood MD   [facility] [address], [address]   [facility] [address] [phone]     Confirmed with [facility]   ( [phone] that patient is open to [facility] RN/PT/OT.   At discharge fax orders to resume to   [phone]    Of note Pt's daughter is out of town and  Pt is not sure if granddaughter could drive her today.  She was hoping to discharge tomorrow. Granddaughter could drive then. Will await MD rounding and informed patient CC could help with ride if needed.     Care Management Discharge Note    Discharge Date: 02/11/2022       Discharge Disposition: Home,Home Care    Discharge Services: None    Discharge DME: None    Discharge Transportation: family or friend will provide    Private pay costs discussed: Not applicable    PAS Confirmation Code:    Patient/family educated on Medicare website which has current facility and service quality ratings: yes    Education Provided on the Discharge Plan:    Persons Notified of Discharge Plans: Pt   Patient/Family in Agreement with the Plan: yes    Handoff Referral Completed: No    Additional Information:  See  orders are in on discharge. Orders faxed to [facility] at [phone]        Marisel Hernandez RN

## 2022-02-11 NOTE — PROGRESS NOTES
02/11/22 1300   Quick Adds   Type of Visit Initial Occupational Therapy Evaluation   Living Environment   People in home alone   Current Living Arrangements condominium   Home Accessibility no concerns   Transportation Anticipated family or friend will provide   Living Environment Comments Pt reports a walk in and tub shower. Pt uses  walk in with a seat. Pt has raised toilet seats   Self-Care   Usual Activity Tolerance good   Current Activity Tolerance good   Regular Exercise No   Equipment Currently Used at Home walker, rolling   Activity/Exercise/Self-Care Comment Pt is independent with ADLs and mobility with rolling walker at baseline. Prior to hospitalization, pt had HHOT and PT.   Instrumental Activities of Daily Living (IADL)   Previous Responsibilities meal prep;housekeeping;laundry;medication management;finances   IADL Comments Pt reports family is able to assist with groceries and other IADLS at discharge   Disability/Function   Fall history within last six months yes   Number of times patient has fallen within last six months 1   Change in Functional Status Since Onset of Current Illness/Injury yes   General Information   Onset of Illness/Injury or Date of Surgery 02/08/22   Referring Physician Roro Queen MD   Patient/Family Therapy Goal Statement (OT) Pt would like to return home and continue with home OT   Additional Occupational Profile Info/Pertinent History of Current Problem 89 year old female who has PMHx of hypertension, anxiety, major depression, hyperlipidemia, who presented for evaluation of abdominal pain on 2/8/2022.  Pain began on 2/8 and was diffuse, she had no weight loss, night sweats, bloody stools, diarrhea, fever, chills. She did report nausea and one episode of vomiting. This has since resolved. She has no history of similar pain and has never been diagnosed with liver disease, gallstones and has had no recent falls or trauma.    Existing Precautions/Restrictions fall    Cognitive Status Examination   Orientation Status orientation to person, place and time   Affect/Mental Status (Cognitive) WNL   Follows Commands WNL   Cognitive Status Comments Pt demonstrated good insight into hospitilization and prior medical intervention; intact safety awareness when mobilizing   Sensory   Sensory Comments Intact per pt report   Pain Assessment   Patient Currently in Pain No   Posture   Posture Comments Mildly kyphotic   Range of Motion Comprehensive   Comment, General Range of Motion impaired end range shoulder flexion, UE and LE appear within functional limits for daily ADLS   Strength Comprehensive (MMT)   Comment, General Manual Muscle Testing (MMT) Assessment Decreased activity tolerance   Coordination   Coordination Comments Intact   Transfers   Transfer Comments Sit to stand SBA with walker   Balance   Balance Comments Intact with walker   Activities of Daily Living   BADL Assessment bathing;lower body dressing;toileting   Bathing Assessment   Comment (Bathing) Decreased activity tolerance needed for bathing   Lower Body Dressing Assessment   Comment (Lower Body Dressing) SBA in sitting with figure four technique   Toileting   Comment (Toileting) SBA for transfer, frances cares and clothing management, decreased activity tolerance for toileting   Clinical Impression   Criteria for Skilled Therapeutic Interventions Met (OT) yes;meets criteria;skilled treatment is necessary   OT Diagnosis Decline in ADL independence and safety   OT Problem List-Impairments impacting ADL problems related to;activity tolerance impaired;strength   Assessment of Occupational Performance 3-5 Performance Deficits   Identified Performance Deficits Decreased activity tolerance for ADL independence and safety   Planned Therapy Interventions (OT) ADL retraining;progressive activity/exercise   Clinical Decision Making Complexity (OT) low complexity   Therapy Frequency (OT) 1x eval and treat   Predicted Duration of  Therapy 1 day   Anticipated Equipment Needs Upon Discharge (OT)   (N/A)   Risk & Benefits of therapy have been explained evaluation/treatment results reviewed;care plan/treatment goals reviewed;risks/benefits reviewed;current/potential barriers reviewed;participants voiced agreement with care plan;participants included;patient   OT Discharge Planning    OT Discharge Recommendation (DC Rec) Home with assist;home with home care occupational therapy   OT Rationale for DC Rec Pt appears nearing baseline level of function, pt safe to discharge home with assist for higher level IADLs due to impaired activity tolerance. Pt would benefit from HHOT to progress activity tolerance needed for aDL independence and return to prior level of function. Pt had been receiving this prior to hospitilization and was agreeable to recommendations   OT Brief overview of current status  SBA with walker for toilet transfer   Total Evaluation Time (Minutes)   Total Evaluation Time (Minutes) 19

## 2022-02-12 DIAGNOSIS — Z71.89 OTHER SPECIFIED COUNSELING: ICD-10-CM

## 2022-02-13 ENCOUNTER — PATIENT OUTREACH (OUTPATIENT)
Dept: CARE COORDINATION | Facility: CLINIC | Age: 87
End: 2022-02-13
Payer: MEDICARE

## 2022-02-13 LAB
BACTERIA BLD CULT: NO GROWTH
BACTERIA BLD CULT: NO GROWTH

## 2022-02-13 NOTE — PROGRESS NOTES
Clinic Care Coordination Contact  [facility]: Post-Discharge Note  SITUATION                                                      Admission:    Admission Date: 02/08/22   Reason for Admission: Colitis, suspect infectious  Discharge:   Discharge Date: 02/11/22  Discharge Diagnosis: Colitis, suspect infectious    BACKGROUND                                                      Per hospital discharge summary and inpatient provider notes:  Margy Babin is a 89 year old female admitted on 2/8/2022. She presented with abdominal pain.        Colitis    Abdominal pain, generalized    Acute kidney injury     Lactic acidosis   Presented with 1 day hx of abdominal pain. CT abdomen shows No evidence for bowel obstruction. Mild constipation.Wall thickening involving 2 portions of the transverse colon. This was not not present on the study from 01/06/2022 and may be secondary to incomplete distention. Colitis remains a possibility.  Creatinine elevated to 1.31 on admission secondary to likely prerenal etiology in the setting of poor p.o. intake and abdominal pain. She has no diarrhea, thus unlikely C diff or viral gastroenteritis.     -Continued with IV Zosyn.  -Leukocytosis- resolved. Abd pain resolved.  -Lactic acid trended down, creatinine also improved.    -Tolerating diet.  -discharged home with 7 more days of augmentin       E coli UTI  UA abnormal with 33 white cells and large leukocyte esterase.  Recent hospitalization 1/6-1/11 for UTI, culture grew E. coli pansensitive.  -Noted urine culture is positive for E. coli again, continue antibiotic as above.  P.o. at discharge.     Dysphagia  -Patient has what appears to be akathisia. No other neuro deficit. Neuro check q4 hr.   -SLP eval completed. Patient is tolerating diet.   - discussed with GI, no further work up at this time       Anxiety    Depression, major, recurrent, in complete remission (H)    Insomnia, unspecified type   Stable, continue prior to  admission Seroquel, Paxil        Essential hypertension    Mixed hyperlipidemia   Prior to admission on Norvasc 2.5 mg daily, metoprolol 50 mg daily, lisinopril 10 mg daily.  - Continue prior to admission Norvasc and metoprolol  - PTA lisinopril held in the setting of CRISSY. BP remains controlled and so this was discontinued at discharge      Patient was evaluated by PT, OT and SLP desisted.  Discharging home with home care PT OT and RN    ASSESSMENT      Enrollment  Primary Care Care Coordination Status: Declined    Discharge Assessment  How are you doing now that you are home?: Pt reports she went to the emergency room yesterday, but has been feeling better today. She has no questions about her discharge instructions. Pt is aware of when her follow up appointment is.  How are your symptoms? (Red Flag symptoms escalate to triage hotline per guidelines): Improved  Do you feel your condition is stable enough to be safe at home until your provider visit?: Yes  Does the patient have their discharge instructions? : Yes  Does the patient have questions regarding their discharge instructions? : No  Were you started on any new medications or were there changes to any of your previous medications? : Yes  Does the patient have all of their medications?: Yes  Do you have questions regarding any of your medications? : No  Discharge follow-up appointment scheduled within 14 calendar days? : Yes  Discharge Follow Up Appointment Date: 02/16/22  Discharge Follow Up Appointment Scheduled with?: Primary Care Provider              PLAN                                                      Outpatient Plan:     Follow up with primary care provider, Braeden Sood, within 7-10 days for hospital follow- up.         Future Appointments   Date Time Provider Department Center   2/16/2022  4:30 PM Braeden Sood MD [facility]         For any urgent concerns, please contact our 24 hour nurse triage line: [phone] ([phone])        Nuzhat Haley, ROBIN  [phone]  [facility]

## 2022-02-14 ENCOUNTER — TELEPHONE (OUTPATIENT)
Dept: FAMILY MEDICINE | Facility: CLINIC | Age: 87
End: 2022-02-14
Payer: MEDICARE

## 2022-02-14 NOTE — TELEPHONE ENCOUNTER
Returned call.   HC nurse reports patient unable to schedule start of care until 2/17/22.    OK'd requested orders.    Cindy Giron RN

## 2022-02-14 NOTE — TELEPHONE ENCOUNTER
TO PCP:     Home care called, they did not receive re-admission to home care orders for patient until today     Pt discharged from hospital 2/11/22     Asking if they can get the verbal for delay in start of care to 2/16/22 - due to staffing cannot see pt sooner     Please advise if ok to give verbal ok to home care?     Zuleyka SIDHU, Triage RN  [facility]

## 2022-02-15 RX ORDER — PAROXETINE 20 MG/1
20 TABLET, FILM COATED ORAL AT BEDTIME
Qty: 90 TABLET | Refills: 0 | Status: SHIPPED | OUTPATIENT
Start: 2022-02-15 | End: 2022-05-18

## 2022-02-16 ENCOUNTER — OFFICE VISIT (OUTPATIENT)
Dept: FAMILY MEDICINE | Facility: CLINIC | Age: 87
End: 2022-02-16
Payer: MEDICARE

## 2022-02-16 VITALS
OXYGEN SATURATION: 95 % | SYSTOLIC BLOOD PRESSURE: 153 MMHG | HEART RATE: 98 BPM | HEIGHT: 65 IN | RESPIRATION RATE: 18 BRPM | BODY MASS INDEX: 24.83 KG/M2 | WEIGHT: 149 LBS | DIASTOLIC BLOOD PRESSURE: 79 MMHG | TEMPERATURE: 96.9 F

## 2022-02-16 DIAGNOSIS — N39.0 URINARY TRACT INFECTION WITHOUT HEMATURIA, SITE UNSPECIFIED: Primary | ICD-10-CM

## 2022-02-16 DIAGNOSIS — R06.03 ACUTE RESPIRATORY DISTRESS: ICD-10-CM

## 2022-02-16 LAB
ALBUMIN UR-MCNC: 30 MG/DL
APPEARANCE UR: CLEAR
BACTERIA #/AREA URNS HPF: ABNORMAL /HPF
BILIRUB UR QL STRIP: ABNORMAL
COLOR UR AUTO: YELLOW
GLUCOSE UR STRIP-MCNC: NEGATIVE MG/DL
HGB UR QL STRIP: NEGATIVE
KETONES UR STRIP-MCNC: 15 MG/DL
LEUKOCYTE ESTERASE UR QL STRIP: ABNORMAL
NITRATE UR QL: NEGATIVE
PH UR STRIP: 5.5 [PH] (ref 5–7)
RBC #/AREA URNS AUTO: ABNORMAL /HPF
SP GR UR STRIP: 1.02 (ref 1–1.03)
SQUAMOUS #/AREA URNS AUTO: ABNORMAL /LPF
UROBILINOGEN UR STRIP-ACNC: 0.2 E.U./DL
WBC #/AREA URNS AUTO: ABNORMAL /HPF

## 2022-02-16 PROCEDURE — 85379 FIBRIN DEGRADATION QUANT: CPT | Performed by: INTERNAL MEDICINE

## 2022-02-16 PROCEDURE — 99495 TRANSJ CARE MGMT MOD F2F 14D: CPT | Performed by: INTERNAL MEDICINE

## 2022-02-16 PROCEDURE — 83880 ASSAY OF NATRIURETIC PEPTIDE: CPT | Performed by: INTERNAL MEDICINE

## 2022-02-16 PROCEDURE — 81001 URINALYSIS AUTO W/SCOPE: CPT | Performed by: INTERNAL MEDICINE

## 2022-02-16 PROCEDURE — 36415 COLL VENOUS BLD VENIPUNCTURE: CPT | Performed by: INTERNAL MEDICINE

## 2022-02-16 ASSESSMENT — PAIN SCALES - GENERAL: PAINLEVEL: NO PAIN (0)

## 2022-02-16 NOTE — NURSING NOTE
"Margy Babin is a 89 year old patient who comes in today for a Blood Pressure check.  Initial BP:  BP (!) 153/79 (BP Location: Right arm, Patient Position: Sitting, Cuff Size: Adult Regular)   Pulse 98   Temp 96.9  F (36.1  C) (Temporal)   Resp 18   Ht 1.651 m (5' 5\")   Wt 67.6 kg (149 lb)   SpO2 95%   BMI 24.79 kg/m       98  Disposition: provider notified while patient in the clinic  Vianey Lisa CMA       "

## 2022-02-16 NOTE — PROGRESS NOTES
Assessment & Plan     Urinary tract infection without hematuria, site unspecified    - UA with Microscopic reflex to Culture - lab collect; Future  - D dimer, quantitative; Future  - BNP-N terminal pro; Future  - UA with Microscopic reflex to Culture - lab collect  - D dimer, quantitative  - BNP-N terminal pro  - Urine Microscopic    Acute respiratory distress   Improved.  Doubt this relates to or thrombotic disease.  Do suspect that her kyphosis and overall weakness are playing a significant role.  Pulmonary rehabilitation may be beneficial.  Incentive spirometer was given to the patient today    - BNP-N terminal pro; Future  - BNP-N terminal pro             See Patient Instructions    Return in about 4 weeks (around 3/16/2022) for Follow up.    Braeden Sood MD  [facility] [place] Ray is a 89 year old who presents for the following health issues     HPI 89-year-old presents clinic today for follow-up.  She is doing better.  Continues to have shortness of breath.  Her shortness of breath has been going on for over a year.    Recent exacerbation related to urinary tract infection.  Continues to be weak.    Post Discharge Outreach 2/13/2022   Admission Date 2/8/2022   Reason for Admission Colitis, suspect infectious   Discharge Date 2/11/2022   Discharge Diagnosis Colitis, suspect infectious   How are you doing now that you are home? Pt reports she went to the emergency room yesterday, but has been feeling better today. She has no questions about her discharge instructions. Pt is aware of when her follow up appointment is.   How are your symptoms? (Red Flag symptoms escalate to triage hotline per guidelines) Improved   Do you feel your condition is stable enough to be safe at home until your provider visit? Yes   Does the patient have their discharge instructions?  Yes   Does the patient have questions regarding their discharge instructions?  No   Were you started on any new  "medications or were there changes to any of your previous medications?  Yes   Does the patient have all of their medications? Yes   Do you have questions regarding any of your medications?  No   Discharge follow-up appointment scheduled within 14 calendar days?  Yes   Discharge Follow Up Appointment Date 2/16/2022   Discharge Follow Up Appointment Scheduled with? Primary Care Provider     Hospital Follow-up Visit:    Hospital: [facility]  Date of Admission: 2/8/22  Date of Discharge: 2/11/22  Reason(s) for Admission:       Colitis, suspect infectious     Acute kidney injury     Lactic acidosis    E coli UTI    Oropharyngeal dysphagia    Anxiety    Major depression recurrent in complete remission    Insomnia    Essential hypertension    Mixed hyperlipidemia       Was your hospitalization related to COVID-19? No   Problems taking medications regularly:  None  Medication changes since discharge: None  Problems adhering to non-medication therapy:  None    Summary of hospitalization:  [facility] discharge summary reviewed  Diagnostic Tests/Treatments reviewed.  Follow up needed: none  Other Healthcare Providers Involved in Patient s Care:         None  Update since discharge: improved.       Post Discharge Medication Reconciliation: .  Plan of care communicated with patient and family                Review of Systems   Constitutional, HEENT, cardiovascular, pulmonary, gi and gu systems are negative, except as otherwise noted.      Objective    BP (!) 153/79 (BP Location: Right arm, Patient Position: Sitting, Cuff Size: Adult Regular)   Pulse 98   Temp 96.9  F (36.1  C) (Temporal)   Resp 18   Ht 1.651 m (5' 5\")   Wt 67.6 kg (149 lb)   SpO2 95%   BMI 24.79 kg/m    Body mass index is 24.79 kg/m .  Physical Exam   GENERAL: healthy, alert and no distress  NECK: no adenopathy, no asymmetry, masses, or scars and thyroid normal to palpation  RESP: lungs clear to auscultation - no " rales, rhonchi or wheezes  CV: regular rate and rhythm, normal S1 S2, no S3 or S4, no murmur, click or rub, no peripheral edema and peripheral pulses strong  ABDOMEN: soft, nontender, no hepatosplenomegaly, no masses and bowel sounds normal  MS: no gross musculoskeletal defects noted, no edema    Admission on 02/08/2022, Discharged on 02/11/2022   Component Date Value Ref Range Status     Sodium 02/08/2022 139  133 - 144 mmol/L Final     Potassium 02/08/2022 3.8  3.4 - 5.3 mmol/L Final     Chloride 02/08/2022 109  94 - 109 mmol/L Final     Carbon Dioxide (CO2) 02/08/2022 21  20 - 32 mmol/L Final     Anion Gap 02/08/2022 9  3 - 14 mmol/L Final     Urea Nitrogen 02/08/2022 26  7 - 30 mg/dL Final     Creatinine 02/08/2022 1.31 (A) 0.52 - 1.04 mg/dL Final     Calcium 02/08/2022 9.4  8.5 - 10.1 mg/dL Final     Glucose 02/08/2022 122 (A) 70 - 99 mg/dL Final     Alkaline Phosphatase 02/08/2022 107  40 - 150 U/L Final     AST 02/08/2022 20  0 - 45 U/L Final     ALT 02/08/2022 20  0 - 50 U/L Final     Protein Total 02/08/2022 7.2  6.8 - 8.8 g/dL Final     Albumin 02/08/2022 3.1 (A) 3.4 - 5.0 g/dL Final     Bilirubin Total 02/08/2022 0.3  0.2 - 1.3 mg/dL Final     GFR Estimate 02/08/2022 38 (A) >60 mL/min/1.73m2 Final    Effective December 21, 2021 eGFRcr in adults is calculated using the 2021 CKD-EPI creatinine equation which includes age and gender (Carine et al., NEJM, DOI: 10.1056/EHPRec1855182)     Lactic Acid 02/08/2022 3.1 (A) 0.7 - 2.0 mmol/L Final     Color Urine 02/08/2022 Yellow  Colorless, Straw, Light Yellow, Yellow Final     Appearance Urine 02/08/2022 Clear  Clear Final     Glucose Urine 02/08/2022 Negative  Negative mg/dL Final     Bilirubin Urine 02/08/2022 Negative  Negative Final     Ketones Urine 02/08/2022 Negative  Negative mg/dL Final     Specific Gravity Urine 02/08/2022 1.005  1.003 - 1.035 Final     Blood Urine 02/08/2022 Negative  Negative Final     pH Urine 02/08/2022 6.0  5.0 - 7.0 Final      Protein Albumin Urine 02/08/2022 10  (A) Negative mg/dL Final     Urobilinogen Urine 02/08/2022 Normal  Normal, 2.0 mg/dL Final     Nitrite Urine 02/08/2022 Negative  Negative Final     Leukocyte Esterase Urine 02/08/2022 Large (A) Negative Final     Mucus Urine 02/08/2022 Present (A) None Seen /LPF Final     RBC Urine 02/08/2022 0  <=2 /HPF Final     WBC Urine 02/08/2022 33 (A) <=5 /HPF Final     Squamous Epithelials Urine 02/08/2022 18 (A) <=1 /HPF Final     Troponin I High Sensitivity 02/08/2022 10  <54 ng/L Final    This Troponin-I result was obtained using a Siemens Dimension Vista High Sensitivity Troponin-I assay (TNIH). Effective 11/23/21, nine labs/sites in the [facility] switched from a Siemens [place] Contemporary Troponin I assay (CTNI) to a Siemens [place] High-Sensitivity Troponin I assay (TNIH).     WBC Count 02/08/2022 14.3 (A) 4.0 - 11.0 10e3/uL Final     RBC Count 02/08/2022 4.40  3.80 - 5.20 10e6/uL Final     Hemoglobin 02/08/2022 13.2  11.7 - 15.7 g/dL Final     Hematocrit 02/08/2022 42.1  35.0 - 47.0 % Final     MCV 02/08/2022 96  78 - 100 fL Final     MCH 02/08/2022 30.0  26.5 - 33.0 pg Final     MCHC 02/08/2022 31.4 (A) 31.5 - 36.5 g/dL Final     RDW 02/08/2022 14.1  10.0 - 15.0 % Final     Platelet Count 02/08/2022 427  150 - 450 10e3/uL Final     % Neutrophils 02/08/2022 61  % Final     % Lymphocytes 02/08/2022 25  % Final     % Monocytes 02/08/2022 7  % Final     % Eosinophils 02/08/2022 5  % Final     % Basophils 02/08/2022 1  % Final     % Immature Granulocytes 02/08/2022 1  % Final     NRBCs per 100 WBC 02/08/2022 0  <1 /100 Final     Absolute Neutrophils 02/08/2022 8.7 (A) 1.6 - 8.3 10e3/uL Final     Absolute Lymphocytes 02/08/2022 3.6  0.8 - 5.3 10e3/uL Final     Absolute Monocytes 02/08/2022 1.0  0.0 - 1.3 10e3/uL Final     Absolute Eosinophils 02/08/2022 0.8 (A) 0.0 - 0.7 10e3/uL Final     Absolute Basophils 02/08/2022 0.1  0.0 - 0.2 10e3/uL Final     Absolute Immature  Granulocytes 02/08/2022 0.1  <=0.4 10e3/uL Final     Absolute NRBCs 02/08/2022 0.0  10e3/uL Final     Culture 02/08/2022 No Growth   Final     Culture 02/08/2022 No Growth   Final     Culture 02/08/2022 >100,000 CFU/mL Escherichia coli (A)  Final     Culture 02/08/2022 10,000-50,000 CFU/mL Aerococcus urinae (A)  Final    Comment: Identification obtained by MALDI-TOF mass spectrometry research use only database. Test characteristics determined and verified by the [facility].    Aerococcus species is usually susceptible to penicillin, cephalospori                           ns, tetracycline and vancomycin.     SARS CoV2 PCR 02/08/2022 Negative  Negative Final    NEGATIVE: SARS-CoV-2 (COVID-19) RNA not detected, presumed negative.     Sodium 02/09/2022 140  133 - 144 mmol/L Final     Potassium 02/09/2022 3.5  3.4 - 5.3 mmol/L Final     Chloride 02/09/2022 112 (A) 94 - 109 mmol/L Final     Carbon Dioxide (CO2) 02/09/2022 21  20 - 32 mmol/L Final     Anion Gap 02/09/2022 7  3 - 14 mmol/L Final     Urea Nitrogen 02/09/2022 23  7 - 30 mg/dL Final     Creatinine 02/09/2022 1.23 (A) 0.52 - 1.04 mg/dL Final     Calcium 02/09/2022 8.4 (A) 8.5 - 10.1 mg/dL Final     Glucose 02/09/2022 97  70 - 99 mg/dL Final     GFR Estimate 02/09/2022 41 (A) >60 mL/min/1.73m2 Final    Effective December 21, 2021 eGFRcr in adults is calculated using the 2021 CKD-EPI creatinine equation which includes age and gender (Carine et al., NEJM, DOI: 10.1056/AMQFwg3965466)     WBC Count 02/09/2022 16.4 (A) 4.0 - 11.0 10e3/uL Final     RBC Count 02/09/2022 3.95  3.80 - 5.20 10e6/uL Final     Hemoglobin 02/09/2022 11.8  11.7 - 15.7 g/dL Final     Hematocrit 02/09/2022 38.1  35.0 - 47.0 % Final     MCV 02/09/2022 97  78 - 100 fL Final     MCH 02/09/2022 29.9  26.5 - 33.0 pg Final     MCHC 02/09/2022 31.0 (A) 31.5 - 36.5 g/dL Final     RDW 02/09/2022 14.1  10.0 - 15.0 % Final     Platelet Count 02/09/2022 368  150 - 450 10e3/uL  Final     Lactic Acid 02/09/2022 2.3 (A) 0.7 - 2.0 mmol/L Final     Sodium 02/10/2022 140  133 - 144 mmol/L Final     Potassium 02/10/2022 4.1  3.4 - 5.3 mmol/L Final     Chloride 02/10/2022 113 (A) 94 - 109 mmol/L Final     Carbon Dioxide (CO2) 02/10/2022 23  20 - 32 mmol/L Final     Anion Gap 02/10/2022 4  3 - 14 mmol/L Final     Urea Nitrogen 02/10/2022 10  7 - 30 mg/dL Final     Creatinine 02/10/2022 0.99  0.52 - 1.04 mg/dL Final     Calcium 02/10/2022 8.1 (A) 8.5 - 10.1 mg/dL Final     Glucose 02/10/2022 102 (A) 70 - 99 mg/dL Final     GFR Estimate 02/10/2022 53 (A) >60 mL/min/1.73m2 Final    Effective December 21, 2021 eGFRcr in adults is calculated using the 2021 CKD-EPI creatinine equation which includes age and gender (Carine et al., NE, DOI: 10.1056/IIKTah5723661)     WBC Count 02/10/2022 11.7 (A) 4.0 - 11.0 10e3/uL Final     RBC Count 02/10/2022 3.76 (A) 3.80 - 5.20 10e6/uL Final     Hemoglobin 02/10/2022 11.2 (A) 11.7 - 15.7 g/dL Final     Hematocrit 02/10/2022 35.7  35.0 - 47.0 % Final     MCV 02/10/2022 95  78 - 100 fL Final     MCH 02/10/2022 29.8  26.5 - 33.0 pg Final     MCHC 02/10/2022 31.4 (A) 31.5 - 36.5 g/dL Final     RDW 02/10/2022 14.3  10.0 - 15.0 % Final     Platelet Count 02/10/2022 328  150 - 450 10e3/uL Final     % Neutrophils 02/10/2022 70  % Final     % Lymphocytes 02/10/2022 15  % Final     % Monocytes 02/10/2022 8  % Final     % Eosinophils 02/10/2022 6  % Final     % Basophils 02/10/2022 1  % Final     % Immature Granulocytes 02/10/2022 0  % Final     NRBCs per 100 WBC 02/10/2022 0  <1 /100 Final     Absolute Neutrophils 02/10/2022 8.1  1.6 - 8.3 10e3/uL Final     Absolute Lymphocytes 02/10/2022 1.7  0.8 - 5.3 10e3/uL Final     Absolute Monocytes 02/10/2022 1.0  0.0 - 1.3 10e3/uL Final     Absolute Eosinophils 02/10/2022 0.7  0.0 - 0.7 10e3/uL Final     Absolute Basophils 02/10/2022 0.1  0.0 - 0.2 10e3/uL Final     Absolute Immature Granulocytes 02/10/2022 0.1  <=0.4 10e3/uL Final      Absolute NRBCs 02/10/2022 0.0  10e3/uL Final     Sodium 02/11/2022 140  133 - 144 mmol/L Final     Potassium 02/11/2022 3.7  3.4 - 5.3 mmol/L Final     Chloride 02/11/2022 113 (A) 94 - 109 mmol/L Final     Carbon Dioxide (CO2) 02/11/2022 22  20 - 32 mmol/L Final     Anion Gap 02/11/2022 5  3 - 14 mmol/L Final     Urea Nitrogen 02/11/2022 10  7 - 30 mg/dL Final     Creatinine 02/11/2022 0.86  0.52 - 1.04 mg/dL Final     Calcium 02/11/2022 8.3 (A) 8.5 - 10.1 mg/dL Final     Glucose 02/11/2022 103 (A) 70 - 99 mg/dL Final     GFR Estimate 02/11/2022 63  >60 mL/min/1.73m2 Final    Effective December 21, 2021 eGFRcr in adults is calculated using the 2021 CKD-EPI creatinine equation which includes age and gender (Carine et al., NE, DOI: 10.1056/SGEJkj9254572)     WBC Count 02/11/2022 10.8  4.0 - 11.0 10e3/uL Final     RBC Count 02/11/2022 4.02  3.80 - 5.20 10e6/uL Final     Hemoglobin 02/11/2022 12.0  11.7 - 15.7 g/dL Final     Hematocrit 02/11/2022 38.2  35.0 - 47.0 % Final     MCV 02/11/2022 95  78 - 100 fL Final     MCH 02/11/2022 29.9  26.5 - 33.0 pg Final     MCHC 02/11/2022 31.4 (A) 31.5 - 36.5 g/dL Final     RDW 02/11/2022 14.1  10.0 - 15.0 % Final     Platelet Count 02/11/2022 349  150 - 450 10e3/uL Final     % Neutrophils 02/11/2022 65  % Final     % Lymphocytes 02/11/2022 18  % Final     % Monocytes 02/11/2022 7  % Final     % Eosinophils 02/11/2022 8  % Final     % Basophils 02/11/2022 1  % Final     % Immature Granulocytes 02/11/2022 1  % Final     NRBCs per 100 WBC 02/11/2022 0  <1 /100 Final     Absolute Neutrophils 02/11/2022 7.1  1.6 - 8.3 10e3/uL Final     Absolute Lymphocytes 02/11/2022 2.0  0.8 - 5.3 10e3/uL Final     Absolute Monocytes 02/11/2022 0.7  0.0 - 1.3 10e3/uL Final     Absolute Eosinophils 02/11/2022 0.8 (A) 0.0 - 0.7 10e3/uL Final     Absolute Basophils 02/11/2022 0.1  0.0 - 0.2 10e3/uL Final     Absolute Immature Granulocytes 02/11/2022 0.1  <=0.4 10e3/uL Final     Absolute NRBCs  02/11/2022 0.0  10e3/uL Final

## 2022-02-16 NOTE — LETTER
February 20, 2022      Flor Babin  [address]   [place] [address]        Dear ,    There is no indication of heart failure. Your urine infection is resolving.    I hope your breathing improves. Please come in if it does not.    Resulted Orders   UA with Microscopic reflex to Culture - lab collect   Result Value Ref Range    Color Urine Yellow Colorless, Straw, Light Yellow, Yellow    Appearance Urine Clear Clear    Glucose Urine Negative Negative mg/dL    Bilirubin Urine Small (A) Negative    Ketones Urine 15  (A) Negative mg/dL    Specific Gravity Urine 1.025 1.003 - 1.035    Blood Urine Negative Negative    pH Urine 5.5 5.0 - 7.0    Protein Albumin Urine 30  (A) Negative mg/dL    Urobilinogen Urine 0.2 0.2, 1.0 E.U./dL    Nitrite Urine Negative Negative    Leukocyte Esterase Urine Trace (A) Negative   D dimer, quantitative   Result Value Ref Range    D-Dimer Quantitative 0.55 (H) 0.00 - 0.50 ug/mL FEU    Narrative    This D-dimer assay is intended for use in conjunction with a clinical pretest probability assessment model to exclude pulmonary embolism (PE) and deep venous thrombosis (DVT) in outpatients suspected of PE or DVT. The cut-off value is 0.50 ug/mL FEU.   BNP-N terminal pro   Result Value Ref Range    N Terminal Pro BNP Outpatient 395 0 - 450 pg/mL      Comment:      Reference range shown and results flagged as abnormal are for the outpatient, non acute settings. Establishing a baseline value for each individual patient is useful for follow-up.    Suggested inpatient cut points for confirming diagnosis of CHF in an acute setting are:  >450 pg/mL (age 18 to less than 50)  >900 pg/mL (age 50 to less than 75)  >1800 pg/mL (75 yrs and older)    An inpatient or emergency department NT-proPBNP <300 pg/mL effectively rules out acute CHF, with 99% negative predictive value.       Urine Microscopic   Result Value Ref Range    Bacteria Urine None Seen None Seen /HPF    RBC  Urine 0-2 0-2 /HPF /HPF    WBC Urine 5-10 (A) 0-5 /HPF /HPF    Squamous Epithelials Urine Moderate (A) None Seen /LPF    Narrative    Urine Culture not indicated       If you have any questions or concerns, please call the clinic at the number listed above.       Sincerely,      Braeden Sood MD

## 2022-02-16 NOTE — PATIENT INSTRUCTIONS
Margy;  I believe that a fair amount of your shortness of breath relates to weakness and atelectasis or the inability to expand your lungs.  Please utilize the incentive spirometer that I gave you several times a day if possible.  This will help you open up the lung and improve air movement.    Today I am going to check 2 blood tests.  1 test will look at how much fluid you have in your body.  The other tests will calculate the thickness of your blood.    I would like to reassess a urinalysis as well today.    Best regards  Braeden

## 2022-02-17 PROBLEM — N18.30 STAGE 3 CHRONIC KIDNEY DISEASE (H): Status: RESOLVED | Noted: 2019-12-05 | Resolved: 2021-10-07

## 2022-02-17 LAB
D DIMER PPP FEU-MCNC: 0.55 UG/ML FEU (ref 0–0.5)
NT-PROBNP SERPL-MCNC: 395 PG/ML (ref 0–450)

## 2022-02-17 NOTE — TELEPHONE ENCOUNTER
Tash [facility] requesting delay of start until 2/18.  Pt was to be seen today but due to 2 nurses calling in not able to see until tomorrow.    Delay of start until 2/18 order given.    Verito DEL CID RN  EP Triage

## 2022-02-18 NOTE — TELEPHONE ENCOUNTER
Tash with [facility] calling requesting delay in start until 2/21 per pt request. Pt has doctor appointment today and is requesting this be rescheduled to Monday. Verbal approval given.     Cheryl HAYS RN  [facility]

## 2022-02-22 ENCOUNTER — TELEPHONE (OUTPATIENT)
Dept: FAMILY MEDICINE | Facility: CLINIC | Age: 87
End: 2022-02-22

## 2022-02-22 DIAGNOSIS — Z53.9 DIAGNOSIS NOT YET DEFINED: Primary | ICD-10-CM

## 2022-02-22 PROCEDURE — G0180 MD CERTIFICATION HHA PATIENT: HCPCS | Performed by: INTERNAL MEDICINE

## 2022-02-22 NOTE — TELEPHONE ENCOUNTER
Román from [facility] called to report that pt has SN start and PT start today, but then refused all other homecare including any further SN or PT, and OT eval as pt states she doesn't need it/is refusing.      Call back if needed:   [phone]

## 2022-03-01 ENCOUNTER — MEDICAL CORRESPONDENCE (OUTPATIENT)
Dept: HEALTH INFORMATION MANAGEMENT | Facility: CLINIC | Age: 87
End: 2022-03-01
Payer: MEDICARE

## 2022-03-16 ENCOUNTER — OFFICE VISIT (OUTPATIENT)
Dept: FAMILY MEDICINE | Facility: CLINIC | Age: 87
End: 2022-03-16
Payer: MEDICARE

## 2022-03-16 VITALS
BODY MASS INDEX: 23.99 KG/M2 | TEMPERATURE: 97.1 F | DIASTOLIC BLOOD PRESSURE: 83 MMHG | SYSTOLIC BLOOD PRESSURE: 157 MMHG | OXYGEN SATURATION: 95 % | HEIGHT: 65 IN | RESPIRATION RATE: 18 BRPM | HEART RATE: 99 BPM | WEIGHT: 144 LBS

## 2022-03-16 DIAGNOSIS — G47.00 INSOMNIA, UNSPECIFIED TYPE: Primary | ICD-10-CM

## 2022-03-16 DIAGNOSIS — Z87.440 H/O URINARY TRACT INFECTION: ICD-10-CM

## 2022-03-16 PROCEDURE — 99213 OFFICE O/P EST LOW 20 MIN: CPT | Performed by: INTERNAL MEDICINE

## 2022-03-16 RX ORDER — RAMELTEON 8 MG/1
8 TABLET ORAL
Qty: 30 TABLET | Refills: 0 | Status: SHIPPED | OUTPATIENT
Start: 2022-03-16 | End: 2022-04-12

## 2022-03-16 ASSESSMENT — PAIN SCALES - GENERAL: PAINLEVEL: NO PAIN (0)

## 2022-03-16 NOTE — NURSING NOTE
Pt unable to leave enough urine sample on today's visit. Pt will come back Friday 3/18 to leave new UA.   Vianey Lisa CMA

## 2022-03-16 NOTE — PATIENT INSTRUCTIONS
Flor;  Keep working on your incentive spirometer.  If you can get the meter up to 1500 that would be great.    For your sleep I would recommend a trial of Rozerem.  This medication can be taken as needed for issues of insomnia.    I would like to evaluate a urinalysis today to make certain that you have not had any recurrence of your urinary tract infection.    Please return to clinic in 2 months for your preventative health care visit.    Best regards  Braeden

## 2022-03-16 NOTE — PROGRESS NOTES
"  Assessment & Plan     Insomnia, unspecified type    - ramelteon (ROZEREM) 8 MG tablet; Take 1 tablet (8 mg) by mouth nightly as needed for sleep    H/O urinary tract infection    - UA with Microscopic reflex to Culture - lab collect; Future  - UA with Microscopic reflex to Culture - lab collect  - Urine Microscopic Exam  - Urine Culture  - ciprofloxacin (CIPRO) 250 MG tablet; Take 1 tablet (250 mg) by mouth 2 times daily             See Patient Instructions    Return in about 2 months (around 5/16/2022) for Follow up.    Braeden Sood MD  [facility] MARGA Ray is a 89 year old who presents for the following health issues     HPI 89-year-old presents clinic today for follow-up.  Over the course the last 2 weeks has been feeling better.  Last couple of days she has noticed some intermittent fatigue and occasional dysuria.  She denies chest pain.          Review of Systems   Constitutional, HEENT, cardiovascular, pulmonary, gi and gu systems are negative, except as otherwise noted.      Objective    BP (!) 157/83 (BP Location: Right arm, Patient Position: Sitting, Cuff Size: Adult Regular)   Pulse 99   Temp 97.1  F (36.2  C) (Temporal)   Resp 18   Ht 1.651 m (5' 5\")   Wt 65.3 kg (144 lb)   SpO2 95%   BMI 23.96 kg/m    Body mass index is 23.96 kg/m .  Physical Exam   Lungs are clear heart regular rate and rhythm today affect and mood are intact    Office Visit on 02/16/2022   Component Date Value Ref Range Status     Color Urine 02/16/2022 Yellow  Colorless, Straw, Light Yellow, Yellow Final     Appearance Urine 02/16/2022 Clear  Clear Final     Glucose Urine 02/16/2022 Negative  Negative mg/dL Final     Bilirubin Urine 02/16/2022 Small (A) Negative Final     Ketones Urine 02/16/2022 15  (A) Negative mg/dL Final     Specific Gravity Urine 02/16/2022 1.025  1.003 - 1.035 Final     Blood Urine 02/16/2022 Negative  Negative Final     pH Urine 02/16/2022 5.5  5.0 - 7.0 Final     " Protein Albumin Urine 02/16/2022 30  (A) Negative mg/dL Final     Urobilinogen Urine 02/16/2022 0.2  0.2, 1.0 E.U./dL Final     Nitrite Urine 02/16/2022 Negative  Negative Final     Leukocyte Esterase Urine 02/16/2022 Trace (A) Negative Final     D-Dimer Quantitative 02/16/2022 0.55 (A) 0.00 - 0.50 ug/mL FEU Final     N Terminal Pro BNP Outpatient 02/16/2022 395  0 - 450 pg/mL Final    Reference range shown and results flagged as abnormal are for the outpatient, non acute settings. Establishing a baseline value for each individual patient is useful for follow-up.    Suggested inpatient cut points for confirming diagnosis of CHF in an acute setting are:  >450 pg/mL (age 18 to less than 50)  >900 pg/mL (age 50 to less than 75)  >1800 pg/mL (75 yrs and older)    An inpatient or emergency department NT-proPBNP <300 pg/mL effectively rules out acute CHF, with 99% negative predictive value.         Bacteria Urine 02/16/2022 None Seen  None Seen /HPF Final     RBC Urine 02/16/2022 0-2  0-2 /HPF /HPF Final     WBC Urine 02/16/2022 5-10 (A) 0-5 /HPF /HPF Final     Squamous Epithelials Urine 02/16/2022 Moderate (A) None Seen /LPF Final

## 2022-03-18 ENCOUNTER — TELEPHONE (OUTPATIENT)
Dept: FAMILY MEDICINE | Facility: CLINIC | Age: 87
End: 2022-03-18

## 2022-03-18 LAB
ALBUMIN UR-MCNC: NEGATIVE MG/DL
APPEARANCE UR: CLEAR
BACTERIA #/AREA URNS HPF: ABNORMAL /HPF
BILIRUB UR QL STRIP: NEGATIVE
COLOR UR AUTO: YELLOW
GLUCOSE UR STRIP-MCNC: NEGATIVE MG/DL
HGB UR QL STRIP: NEGATIVE
KETONES UR STRIP-MCNC: ABNORMAL MG/DL
LEUKOCYTE ESTERASE UR QL STRIP: ABNORMAL
NITRATE UR QL: POSITIVE
PH UR STRIP: 5.5 [PH] (ref 5–7)
RBC #/AREA URNS AUTO: ABNORMAL /HPF
SP GR UR STRIP: 1.02 (ref 1–1.03)
SQUAMOUS #/AREA URNS AUTO: ABNORMAL /LPF
UROBILINOGEN UR STRIP-ACNC: 0.2 E.U./DL
WBC #/AREA URNS AUTO: ABNORMAL /HPF

## 2022-03-18 PROCEDURE — 81001 URINALYSIS AUTO W/SCOPE: CPT | Performed by: INTERNAL MEDICINE

## 2022-03-18 PROCEDURE — 87086 URINE CULTURE/COLONY COUNT: CPT | Performed by: INTERNAL MEDICINE

## 2022-03-18 PROCEDURE — 87186 SC STD MICRODIL/AGAR DIL: CPT | Performed by: INTERNAL MEDICINE

## 2022-03-18 PROCEDURE — 87088 URINE BACTERIA CULTURE: CPT | Performed by: INTERNAL MEDICINE

## 2022-03-18 RX ORDER — CIPROFLOXACIN 250 MG/1
250 TABLET, FILM COATED ORAL 2 TIMES DAILY
Qty: 10 TABLET | Refills: 0 | Status: SHIPPED | OUTPATIENT
Start: 2022-03-18 | End: 2022-04-25

## 2022-03-20 LAB — BACTERIA UR CULT: ABNORMAL

## 2022-03-21 DIAGNOSIS — R06.09 DYSPNEA ON EXERTION: ICD-10-CM

## 2022-03-21 RX ORDER — METOPROLOL SUCCINATE 50 MG/1
50 TABLET, EXTENDED RELEASE ORAL DAILY
Qty: 90 TABLET | Refills: 1 | Status: ON HOLD | OUTPATIENT
Start: 2022-03-21 | End: 2022-04-28

## 2022-03-21 NOTE — TELEPHONE ENCOUNTER
Pt no longer on Propranolol and needs refill of Metoprolol.     Routing refill request to provider for review/approval because:  Medication is reported/historical    Cheryl HAYS RN  [facility]

## 2022-04-10 DIAGNOSIS — G47.00 INSOMNIA, UNSPECIFIED TYPE: ICD-10-CM

## 2022-04-12 RX ORDER — RAMELTEON 8 MG/1
TABLET ORAL
Qty: 30 TABLET | Refills: 0 | Status: SHIPPED | OUTPATIENT
Start: 2022-04-12 | End: 2022-06-22

## 2022-04-12 NOTE — TELEPHONE ENCOUNTER
Routing refill request to provider for review/approval because:  Drug not on the [facility] refill protocol   Nathalia Norwood RN, BSN

## 2022-04-18 DIAGNOSIS — I10 ESSENTIAL HYPERTENSION: ICD-10-CM

## 2022-04-19 RX ORDER — AMLODIPINE BESYLATE 2.5 MG/1
TABLET ORAL
Qty: 60 TABLET | Refills: 1 | Status: SHIPPED | OUTPATIENT
Start: 2022-04-19 | End: 2022-08-12

## 2022-04-25 ENCOUNTER — APPOINTMENT (OUTPATIENT)
Dept: CT IMAGING | Facility: CLINIC | Age: 87
DRG: 064 | End: 2022-04-25
Payer: MEDICARE

## 2022-04-25 ENCOUNTER — APPOINTMENT (OUTPATIENT)
Dept: CT IMAGING | Facility: CLINIC | Age: 87
DRG: 064 | End: 2022-04-25
Attending: EMERGENCY MEDICINE
Payer: MEDICARE

## 2022-04-25 ENCOUNTER — HOSPITAL ENCOUNTER (INPATIENT)
Facility: CLINIC | Age: 87
LOS: 2 days | Discharge: NURSING, REHAB OR LONG-TERM CARE | DRG: 064 | End: 2022-04-28
Attending: EMERGENCY MEDICINE | Admitting: STUDENT IN AN ORGANIZED HEALTH CARE EDUCATION/TRAINING PROGRAM
Payer: MEDICARE

## 2022-04-25 ENCOUNTER — APPOINTMENT (OUTPATIENT)
Dept: GENERAL RADIOLOGY | Facility: CLINIC | Age: 87
DRG: 064 | End: 2022-04-25
Attending: EMERGENCY MEDICINE
Payer: MEDICARE

## 2022-04-25 DIAGNOSIS — I63.40 CEREBROVASCULAR ACCIDENT (CVA) DUE TO EMBOLISM OF CEREBRAL ARTERY (H): ICD-10-CM

## 2022-04-25 DIAGNOSIS — I48.0 PAROXYSMAL ATRIAL FIBRILLATION (H): ICD-10-CM

## 2022-04-25 DIAGNOSIS — R29.6 MULTIPLE FALLS: ICD-10-CM

## 2022-04-25 DIAGNOSIS — R90.89 ABNORMAL BRAIN CT: ICD-10-CM

## 2022-04-25 DIAGNOSIS — R53.1 WEAKNESS: ICD-10-CM

## 2022-04-25 DIAGNOSIS — I48.20 CHRONIC ATRIAL FIBRILLATION, UNSPECIFIED (H): ICD-10-CM

## 2022-04-25 DIAGNOSIS — R35.1 NOCTURIA: ICD-10-CM

## 2022-04-25 DIAGNOSIS — I44.7 LEFT BUNDLE BRANCH BLOCK: Primary | ICD-10-CM

## 2022-04-25 DIAGNOSIS — J18.9 PNEUMONIA DUE TO INFECTIOUS ORGANISM, UNSPECIFIED LATERALITY, UNSPECIFIED PART OF LUNG: ICD-10-CM

## 2022-04-25 LAB
ALBUMIN SERPL-MCNC: 3.1 G/DL (ref 3.4–5)
ALBUMIN UR-MCNC: 30 MG/DL
ALP SERPL-CCNC: 100 U/L (ref 40–150)
ALT SERPL W P-5'-P-CCNC: 27 U/L (ref 0–50)
AMORPH CRY #/AREA URNS HPF: ABNORMAL /HPF
ANION GAP SERPL CALCULATED.3IONS-SCNC: 11 MMOL/L (ref 3–14)
APPEARANCE UR: CLEAR
AST SERPL W P-5'-P-CCNC: 36 U/L (ref 0–45)
ATRIAL RATE - MUSE: 67 BPM
BASOPHILS # BLD MANUAL: 0.2 10E3/UL (ref 0–0.2)
BASOPHILS NFR BLD MANUAL: 1 %
BILIRUB SERPL-MCNC: 0.7 MG/DL (ref 0.2–1.3)
BILIRUB UR QL STRIP: NEGATIVE
BUN SERPL-MCNC: 20 MG/DL (ref 7–30)
CALCIUM SERPL-MCNC: 9.4 MG/DL (ref 8.5–10.1)
CHLORIDE BLD-SCNC: 104 MMOL/L (ref 94–109)
CK SERPL-CCNC: 359 U/L (ref 30–225)
CO2 SERPL-SCNC: 24 MMOL/L (ref 20–32)
COLOR UR AUTO: YELLOW
CREAT SERPL-MCNC: 0.98 MG/DL (ref 0.52–1.04)
DIASTOLIC BLOOD PRESSURE - MUSE: NORMAL MMHG
EOSINOPHIL # BLD MANUAL: 0.4 10E3/UL (ref 0–0.7)
EOSINOPHIL NFR BLD MANUAL: 2 %
ERYTHROCYTE [DISTWIDTH] IN BLOOD BY AUTOMATED COUNT: 13.5 % (ref 10–15)
GFR SERPL CREATININE-BSD FRML MDRD: 54 ML/MIN/1.73M2
GLUCOSE BLD-MCNC: 98 MG/DL (ref 70–99)
GLUCOSE UR STRIP-MCNC: NEGATIVE MG/DL
HBA1C MFR BLD: 5.8 % (ref 0–5.6)
HCT VFR BLD AUTO: 44.1 % (ref 35–47)
HGB BLD-MCNC: 14.1 G/DL (ref 11.7–15.7)
HGB UR QL STRIP: NEGATIVE
HOLD SPECIMEN: NORMAL
HOLD SPECIMEN: NORMAL
INTERPRETATION ECG - MUSE: NORMAL
KETONES UR STRIP-MCNC: 10 MG/DL
LACTATE SERPL-SCNC: 1.9 MMOL/L (ref 0.7–2)
LACTATE SERPL-SCNC: 2.5 MMOL/L (ref 0.7–2)
LEUKOCYTE ESTERASE UR QL STRIP: NEGATIVE
LYMPHOCYTES # BLD MANUAL: 2.8 10E3/UL (ref 0.8–5.3)
LYMPHOCYTES NFR BLD MANUAL: 16 %
MCH RBC QN AUTO: 29.1 PG (ref 26.5–33)
MCHC RBC AUTO-ENTMCNC: 32 G/DL (ref 31.5–36.5)
MCV RBC AUTO: 91 FL (ref 78–100)
MONOCYTES # BLD MANUAL: 0.7 10E3/UL (ref 0–1.3)
MONOCYTES NFR BLD MANUAL: 4 %
NEUTROPHILS # BLD MANUAL: 13.7 10E3/UL (ref 1.6–8.3)
NEUTROPHILS NFR BLD MANUAL: 77 %
NITRATE UR QL: NEGATIVE
P AXIS - MUSE: NORMAL DEGREES
PH UR STRIP: 5 [PH] (ref 5–7)
PLAT MORPH BLD: ABNORMAL
PLATELET # BLD AUTO: 446 10E3/UL (ref 150–450)
POTASSIUM BLD-SCNC: 3.7 MMOL/L (ref 3.4–5.3)
PR INTERVAL - MUSE: NORMAL MS
PROT SERPL-MCNC: 7.8 G/DL (ref 6.8–8.8)
QRS DURATION - MUSE: 128 MS
QT - MUSE: 430 MS
QTC - MUSE: 477 MS
R AXIS - MUSE: -36 DEGREES
RBC # BLD AUTO: 4.85 10E6/UL (ref 3.8–5.2)
RBC MORPH BLD: ABNORMAL
RBC URINE: 0 /HPF
SARS-COV-2 RNA RESP QL NAA+PROBE: NEGATIVE
SMUDGE CELLS BLD QL SMEAR: PRESENT
SODIUM SERPL-SCNC: 139 MMOL/L (ref 133–144)
SP GR UR STRIP: 1.02 (ref 1–1.03)
SQUAMOUS EPITHELIAL: <1 /HPF
SYSTOLIC BLOOD PRESSURE - MUSE: NORMAL MMHG
T AXIS - MUSE: 125 DEGREES
TROPONIN I SERPL HS-MCNC: 13 NG/L
TROPONIN I SERPL HS-MCNC: 16 NG/L
UROBILINOGEN UR STRIP-MCNC: NORMAL MG/DL
VENTRICULAR RATE- MUSE: 74 BPM
WBC # BLD AUTO: 17.8 10E3/UL (ref 4–11)
WBC URINE: 1 /HPF

## 2022-04-25 PROCEDURE — 36415 COLL VENOUS BLD VENIPUNCTURE: CPT | Performed by: STUDENT IN AN ORGANIZED HEALTH CARE EDUCATION/TRAINING PROGRAM

## 2022-04-25 PROCEDURE — 96366 THER/PROPH/DIAG IV INF ADDON: CPT

## 2022-04-25 PROCEDURE — G0378 HOSPITAL OBSERVATION PER HR: HCPCS

## 2022-04-25 PROCEDURE — 96361 HYDRATE IV INFUSION ADD-ON: CPT

## 2022-04-25 PROCEDURE — G1004 CDSM NDSC: HCPCS

## 2022-04-25 PROCEDURE — 96365 THER/PROPH/DIAG IV INF INIT: CPT

## 2022-04-25 PROCEDURE — 87040 BLOOD CULTURE FOR BACTERIA: CPT | Performed by: EMERGENCY MEDICINE

## 2022-04-25 PROCEDURE — 96375 TX/PRO/DX INJ NEW DRUG ADDON: CPT

## 2022-04-25 PROCEDURE — 250N000013 HC RX MED GY IP 250 OP 250 PS 637: Performed by: STUDENT IN AN ORGANIZED HEALTH CARE EDUCATION/TRAINING PROGRAM

## 2022-04-25 PROCEDURE — 250N000009 HC RX 250: Performed by: STUDENT IN AN ORGANIZED HEALTH CARE EDUCATION/TRAINING PROGRAM

## 2022-04-25 PROCEDURE — U0003 INFECTIOUS AGENT DETECTION BY NUCLEIC ACID (DNA OR RNA); SEVERE ACUTE RESPIRATORY SYNDROME CORONAVIRUS 2 (SARS-COV-2) (CORONAVIRUS DISEASE [COVID-19]), AMPLIFIED PROBE TECHNIQUE, MAKING USE OF HIGH THROUGHPUT TECHNOLOGIES AS DESCRIBED BY CMS-2020-01-R: HCPCS | Performed by: EMERGENCY MEDICINE

## 2022-04-25 PROCEDURE — 250N000011 HC RX IP 250 OP 636: Performed by: STUDENT IN AN ORGANIZED HEALTH CARE EDUCATION/TRAINING PROGRAM

## 2022-04-25 PROCEDURE — 80053 COMPREHEN METABOLIC PANEL: CPT | Performed by: EMERGENCY MEDICINE

## 2022-04-25 PROCEDURE — 81001 URINALYSIS AUTO W/SCOPE: CPT | Performed by: EMERGENCY MEDICINE

## 2022-04-25 PROCEDURE — 87086 URINE CULTURE/COLONY COUNT: CPT | Performed by: EMERGENCY MEDICINE

## 2022-04-25 PROCEDURE — 36415 COLL VENOUS BLD VENIPUNCTURE: CPT | Performed by: EMERGENCY MEDICINE

## 2022-04-25 PROCEDURE — 70496 CT ANGIOGRAPHY HEAD: CPT | Mod: ME

## 2022-04-25 PROCEDURE — 71046 X-RAY EXAM CHEST 2 VIEWS: CPT

## 2022-04-25 PROCEDURE — 85027 COMPLETE CBC AUTOMATED: CPT | Performed by: EMERGENCY MEDICINE

## 2022-04-25 PROCEDURE — 84484 ASSAY OF TROPONIN QUANT: CPT | Performed by: STUDENT IN AN ORGANIZED HEALTH CARE EDUCATION/TRAINING PROGRAM

## 2022-04-25 PROCEDURE — 258N000003 HC RX IP 258 OP 636: Performed by: EMERGENCY MEDICINE

## 2022-04-25 PROCEDURE — 258N000003 HC RX IP 258 OP 636: Performed by: STUDENT IN AN ORGANIZED HEALTH CARE EDUCATION/TRAINING PROGRAM

## 2022-04-25 PROCEDURE — 83605 ASSAY OF LACTIC ACID: CPT | Performed by: STUDENT IN AN ORGANIZED HEALTH CARE EDUCATION/TRAINING PROGRAM

## 2022-04-25 PROCEDURE — 99285 EMERGENCY DEPT VISIT HI MDM: CPT | Mod: 25

## 2022-04-25 PROCEDURE — 83036 HEMOGLOBIN GLYCOSYLATED A1C: CPT | Performed by: STUDENT IN AN ORGANIZED HEALTH CARE EDUCATION/TRAINING PROGRAM

## 2022-04-25 PROCEDURE — 93005 ELECTROCARDIOGRAM TRACING: CPT

## 2022-04-25 PROCEDURE — 99220 PR INITIAL OBSERVATION CARE,LEVEL III: CPT | Performed by: STUDENT IN AN ORGANIZED HEALTH CARE EDUCATION/TRAINING PROGRAM

## 2022-04-25 PROCEDURE — 82550 ASSAY OF CK (CPK): CPT | Performed by: EMERGENCY MEDICINE

## 2022-04-25 PROCEDURE — 250N000011 HC RX IP 250 OP 636: Performed by: EMERGENCY MEDICINE

## 2022-04-25 PROCEDURE — C9803 HOPD COVID-19 SPEC COLLECT: HCPCS

## 2022-04-25 RX ORDER — AZITHROMYCIN 500 MG/1
500 INJECTION, POWDER, LYOPHILIZED, FOR SOLUTION INTRAVENOUS EVERY 24 HOURS
Status: DISCONTINUED | OUTPATIENT
Start: 2022-04-25 | End: 2022-04-25

## 2022-04-25 RX ORDER — CEFTRIAXONE 1 G/1
1 INJECTION, POWDER, FOR SOLUTION INTRAMUSCULAR; INTRAVENOUS ONCE
Status: COMPLETED | OUTPATIENT
Start: 2022-04-25 | End: 2022-04-25

## 2022-04-25 RX ORDER — CEFTRIAXONE 2 G/1
2 INJECTION, POWDER, FOR SOLUTION INTRAMUSCULAR; INTRAVENOUS EVERY 24 HOURS
Status: DISCONTINUED | OUTPATIENT
Start: 2022-04-26 | End: 2022-04-28

## 2022-04-25 RX ORDER — AMLODIPINE BESYLATE 2.5 MG/1
2.5 TABLET ORAL DAILY
Status: DISCONTINUED | OUTPATIENT
Start: 2022-04-25 | End: 2022-04-28

## 2022-04-25 RX ORDER — ONDANSETRON 4 MG/1
4 TABLET, ORALLY DISINTEGRATING ORAL EVERY 6 HOURS PRN
Status: DISCONTINUED | OUTPATIENT
Start: 2022-04-25 | End: 2022-04-28 | Stop reason: HOSPADM

## 2022-04-25 RX ORDER — ACETAMINOPHEN 650 MG/1
650 SUPPOSITORY RECTAL EVERY 6 HOURS PRN
Status: DISCONTINUED | OUTPATIENT
Start: 2022-04-25 | End: 2022-04-28 | Stop reason: HOSPADM

## 2022-04-25 RX ORDER — IOPAMIDOL 755 MG/ML
75 INJECTION, SOLUTION INTRAVASCULAR ONCE
Status: COMPLETED | OUTPATIENT
Start: 2022-04-25 | End: 2022-04-25

## 2022-04-25 RX ORDER — ONDANSETRON 2 MG/ML
4 INJECTION INTRAMUSCULAR; INTRAVENOUS EVERY 6 HOURS PRN
Status: DISCONTINUED | OUTPATIENT
Start: 2022-04-25 | End: 2022-04-28 | Stop reason: HOSPADM

## 2022-04-25 RX ORDER — ACETAMINOPHEN 325 MG/1
650 TABLET ORAL EVERY 6 HOURS PRN
Status: DISCONTINUED | OUTPATIENT
Start: 2022-04-25 | End: 2022-04-28 | Stop reason: HOSPADM

## 2022-04-25 RX ORDER — AZITHROMYCIN 500 MG/1
500 INJECTION, POWDER, LYOPHILIZED, FOR SOLUTION INTRAVENOUS ONCE
Status: COMPLETED | OUTPATIENT
Start: 2022-04-25 | End: 2022-04-25

## 2022-04-25 RX ORDER — ALBUTEROL SULFATE 90 UG/1
2 AEROSOL, METERED RESPIRATORY (INHALATION) EVERY 4 HOURS PRN
Status: DISCONTINUED | OUTPATIENT
Start: 2022-04-25 | End: 2022-04-28 | Stop reason: HOSPADM

## 2022-04-25 RX ADMIN — IOPAMIDOL 75 ML: 755 INJECTION, SOLUTION INTRAVENOUS at 20:52

## 2022-04-25 RX ADMIN — SODIUM CHLORIDE 90 ML: 900 INJECTION INTRAVENOUS at 20:52

## 2022-04-25 RX ADMIN — AMLODIPINE BESYLATE 2.5 MG: 2.5 TABLET ORAL at 18:24

## 2022-04-25 RX ADMIN — SODIUM CHLORIDE 1000 ML: 9 INJECTION, SOLUTION INTRAVENOUS at 12:33

## 2022-04-25 RX ADMIN — SODIUM CHLORIDE 1000 ML: 9 INJECTION, SOLUTION INTRAVENOUS at 18:23

## 2022-04-25 RX ADMIN — AZITHROMYCIN MONOHYDRATE 500 MG: 500 INJECTION, POWDER, LYOPHILIZED, FOR SOLUTION INTRAVENOUS at 14:51

## 2022-04-25 RX ADMIN — CEFTRIAXONE SODIUM 1 G: 1 INJECTION, POWDER, FOR SOLUTION INTRAMUSCULAR; INTRAVENOUS at 14:35

## 2022-04-25 NOTE — ED TRIAGE NOTES
Pt here from assisted living by EMS for fall. Pt had two falls yesterday. Pt fell around 9pm last night and was found this AM down by assisted living staff. Pt states she doesn't know why she fell. Denies pain. No obvious trauma. Denies hitting her head. Pt ambulates with walker. Pt states she has been feeling weak and blood in her urine. Pt alert to person and confused on time and date. Pt states she has base line tremors.

## 2022-04-25 NOTE — PROGRESS NOTES
MD Notification    Notified Person: MD    Notified Person Name: MCKENZIE DIAZ     Notification Date/Time: 04/25/22 6:30 PM    Notification Interaction: Amcom    Purpose of Notification: E.G. 514 new admit triggered sepsis workup for elevated HR and WBC. lactic is 2.5. second 1000 ml fluid bolus running. received ABX in ED. Vitally stable. would you like anything further done?     Orders Received: Recheck lactic at 9:30 PM    Comments:

## 2022-04-25 NOTE — ED PROVIDER NOTES
History   Chief Complaint:  Fall         HPI   Margy Babin is a 89 year old female with history of recent constipation that led to profound diarrhea who presents for evaluation of multiple falls.  I spoke to the patient's daughter,Tina, for the following history:  Patient lives independently at [address] [place] [address].  On Friday, the patient felt constipated.  Patient took a over-the-counter laxative.  The daughter states this laxative was  5 years ago.  On Friday night, the patient had excessive diarrhea and was very weak.  On Saturday, the patient's daughter went to her apartment and helped her clean up after herself.  The patient had recovered partially and was eating and drinking.  So the daughter left her at home in which she thought was a near normal state.  On  patient spoke to her daughter over the phone.  The patient reported that she had fallen but that she was able to get up on her own.  Today, Monday, the patient's granddaughter tried to get a hold of the of the patient over the phone.  Maintenance staff at her building found patient on the floor and called 911.  Patient's granddaughter is Yarelis  ([phone])    Allergies:  No Known Allergies    Medications:   acetaminophen (TYLENOL) 500 MG tablet  amLODIPine (NORVASC) 2.5 MG tablet  bisacodyl (DULCOLAX) 5 MG EC tablet  Calcium Carbonate (CALCIUM 600 PO)  Cholecalciferol (VITAMIN D) 1000 UNITS capsule  ciprofloxacin (CIPRO) 250 MG tablet  cycloSPORINE (RESTASIS) 0.05 % ophthalmic emulsion  docusate sodium (COLACE) 100 MG capsule  metoprolol succinate ER (TOPROL-XL) 50 MG 24 hr tablet  Multiple Vitamins-Minerals (MULTIVITAL) TABS  ondansetron (ZOFRAN) 4 MG tablet  order for DME  oxybutynin ER (DITROPAN XL) 15 MG 24 hr tablet  PARoxetine (PAXIL) 20 MG tablet  polyethylene glycol (MIRALAX) 17 GM/Dose powder  potassium chloride ER (KLOR-CON M) 20 MEQ CR tablet  QUEtiapine (SEROQUEL) 25 MG tablet  ramelteon (ROZEREM) 8 MG  tablet        Past Medical History:    Past Medical History:   Diagnosis Date     Anxiety      Blepharitis of both eyes      Depression, major, recurrent, in complete remission (H)      Dry eye syndrome      Dyspnea on exertion 10/7/2021     Essential hypertension 3/5/2020     Familial tremor 3/6/2013     Insomnia, unspecified type 11/3/2014     Mixed hyperlipidemia 2020     Nausea without vomiting 2015     Tremor, hereditary, benign      Patient Active Problem List   Diagnosis     Anxiety     Familial tremor     Dry eye syndrome     Blepharitis of both eyes     Depression, major, recurrent, in complete remission (H)     Insomnia, unspecified type     ACP (advance care planning)     Essential hypertension     Ceruminosis, bilateral     Mixed hyperlipidemia     Tension headache     Renal insufficiency     Traumatic rhabdomyolysis, initial encounter (H)     Urinary tract infection without hematuria, site unspecified     Sepsis, due to unspecified organism, unspecified whether acute organ dysfunction present (H)     Abdominal pain, generalized     Colitis     Atrial flutter (H)     Cardiomyopathy (H)     Coagulation disorder (H)     Fusion of spine, cervical region     Left bundle branch block     Major depressive disorder, single episode, unspecified     Metabolic encephalopathy     Other specified abnormal findings of blood chemistry     Chronic atrial fibrillation, unspecified (H)        Past Surgical History:    Past Surgical History:   Procedure Laterality Date     HYSTERECTOMY TOTAL ABDOMINAL, BILATERAL SALPINGO-OOPHORECTOMY, COMBINED       NECK SURGERY  2009    cervical fusion     ROTATOR CUFF REPAIR RT/LT Right      ZZC TOTAL HIP ARTHROPLASTY Right         Family History:    Family History   Problem Relation Age of Onset     Heart Disease Father 80        [place]     C.A.D. Father      Breast Cancer Daughter 43         age 53     Emphysema Sister      Heart Disease Sister        Social  History:  PCP: Braeden Sood  Social History     Tobacco Use     Smoking status: Never Smoker     Smokeless tobacco: Never Used   Substance Use Topics     Alcohol use: No     Alcohol/week: 0.0 standard drinks     Drug use: No       Review of Systems  See the HPI, otherwise the rest of the ROS is negative.      Physical Exam     Patient Vitals for the past 24 hrs:   BP Temp Temp src Pulse Resp SpO2 Weight   04/25/22 2330 (!) 147/60 98  F (36.7  C) Oral 94 16 96 % --   04/25/22 2226 -- -- -- -- -- 92 % --   04/25/22 2004 -- -- -- -- -- 98 % --   04/25/22 1930 124/50 98.2  F (36.8  C) Oral 100 20 97 % --   04/25/22 1749 (!) 159/69 -- -- 101 18 97 % --   04/25/22 1724 126/59 97.2  F (36.2  C) Oral 99 24 96 % 64.5 kg (142 lb 4.8 oz)   04/25/22 1615 (!) 148/69 -- -- 95 25 -- --   04/25/22 1600 (!) 144/73 -- -- 102 20 -- --   04/25/22 1545 137/61 -- -- 89 19 -- --   04/25/22 1530 (!) 122/119 -- -- 88 19 -- --   04/25/22 1515 (!) 154/81 -- -- 105 21 -- --   04/25/22 1500 (!) 169/81 -- -- 77 (!) 37 -- --   04/25/22 1445 (!) 179/75 -- -- 73 (!) 47 99 % --   04/25/22 1430 (!) 158/76 -- -- 78 19 100 % --   04/25/22 1415 (!) 156/113 -- -- 75 12 100 % --   04/25/22 1345 (!) 172/69 -- -- 77 16 91 % --   04/25/22 1330 129/86 -- -- 78 14 92 % --   04/25/22 1315 137/67 -- -- 72 (!) 34 98 % --   04/25/22 1300 -- -- -- 75 16 91 % --   04/25/22 1245 -- -- -- 72 20 91 % --   04/25/22 1230 -- -- -- 73 19 93 % --   04/25/22 1215 -- -- -- 69 23 99 % --   04/25/22 1200 -- -- -- 68 22 93 % --   04/25/22 1159 -- -- -- -- -- -- 65.3 kg (143 lb 15.4 oz)   04/25/22 1145 -- -- -- 77 27 -- --   04/25/22 1130 -- -- -- 71 22 -- --   04/25/22 1124 -- -- -- -- -- 92 % --   04/25/22 1122 137/76 98  F (36.7  C) Temporal 79 16 -- --       Physical Exam  General: Alert, No distress. Nontoxic appearance  Head: No signs of trauma.   Mouth/Throat: Oropharynx moist.   Eyes: Conjunctivae are normal. Pupils are equal..   Neck: Normal range of motion.  Lungs  clear to auscultation bilaterally.  CV: Appears well perfused.  Irregular rate and rhythm  Resp:No respiratory distress.   MSK: Normal range of motion. No obvious deformity.   Neuro: The patient is alert and interactive. CANDELARIO. Speech normal. GCS 15  Skin: No lesion or sign of trauma noted.   Psych: normal mood and affect. behavior is normal.       Emergency Department Course     ECG:  ECG taken at 1300, ECG read at 1305 by oJb Velazquez MD  Atrial fibrillation   Left axis deviation   Left bundle branch block   Rate 74 bpm. GA interval *. QRS duration 128. QT/QTc 430/477. P-R-T axes * -36 125.      Imaging:  CTA Head Neck with Contrast   Final Result   IMPRESSION:       HEAD CTA:    1.  Age-indeterminate right basal ganglia infarcts, as seen on noncontrast head CT of earlier same day. MRI is recommended for further characterization. Stroke neurology consultation is also suggested.   2.  Multifocal high-grade central arterial stenoses involving the anterior and posterior circulation, as described.   3.  Severe focal stenosis involving the A3/pre-callosal segment of the left anterior cerebral artery.   4.  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   5.  Multifocal high-grade stenoses involving the bilateral posterior cerebral arteries.      NECK CTA:   1.  Mild atherosclerosis of the bilateral carotid bifurcations without significant stenosis.   2.  Patent cervical vertebral arteries.   3.  Diffusely heterogeneous thyroid gland with multiple small nodules.         The findings were discussed by myself with Dr. Jacinto at approximately 9:50 PM on 04/25/2022.      Chest XR,  PA & LAT   Final Result   IMPRESSION: Subtle streaky opacities at the left lower  lung could be   atypical airspace disease including pneumonia. Normal cardiac   silhouette. No effusions.      PHYLICIA NORTH MD            SYSTEM ID:  [id number]      Head CT w/o contrast   Final Result   IMPRESSION:    1. Diffuse cerebral volume loss and cerebral white matter changes   consistent with chronic small vessel ischemic disease.    2. Interval development of an area of hypodensity involving the right   caudate head and right putamen since the comparison head CT consistent   with a subacute or chronic ischemic infarct.   3. No evidence for acute intracranial pathology.          Radiation dose for this scan was reduced using automated exposure   control, adjustment of the mA and/or kV according to patient size, or   iterative reconstruction technique.      GISELLE CHAVIRA MD            SYSTEM ID:  [id number]      MR Brain w/o & w Contrast    (Results Pending)   MRA Brain ([place]) wo Contrast    (Results Pending)   MRA Neck (Carotids) wo & w Contrast    (Results Pending)      Laboratory:  Labs Ordered and Resulted from Time of ED Arrival to Time of ED Departure   COMPREHENSIVE METABOLIC PANEL - Abnormal       Result Value    Sodium 139      Potassium 3.7      Chloride 104      Carbon Dioxide (CO2) 24      Anion Gap 11      Urea Nitrogen 20      Creatinine 0.98      Calcium 9.4      Glucose 98      Alkaline Phosphatase 100      AST 36      ALT 27      Protein Total 7.8      Albumin 3.1 (*)     Bilirubin Total 0.7      GFR Estimate 54 (*)    CBC WITH PLATELETS AND DIFFERENTIAL - Abnormal    WBC Count 17.8 (*)     RBC Count 4.85      Hemoglobin 14.1      Hematocrit 44.1      MCV 91      MCH 29.1      MCHC 32.0      RDW 13.5      Platelet Count 446     UA MACROSCOPIC WITH REFLEX TO MICRO AND CULTURE - Abnormal    Color Urine Yellow      Appearance Urine Clear      Glucose Urine Negative      Bilirubin Urine Negative      Ketones Urine 10  (*)     Specific Gravity Urine 1.023      Blood Urine Negative      pH  Urine 5.0      Protein Albumin Urine 30  (*)     Urobilinogen Urine Normal      Nitrite Urine Negative      Leukocyte Esterase Urine Negative      Amorphous Crystals Urine Few (*)     RBC Urine 0      WBC Urine 1      Squamous Epithelials Urine <1     CK TOTAL - Abnormal     (*)    DIFFERENTIAL - Abnormal    % Neutrophils 77      % Lymphocytes 16      % Monocytes 4      % Eosinophils 2      % Basophils 1      Absolute Neutrophils 13.7 (*)     Absolute Lymphocytes 2.8      Absolute Monocytes 0.7      Absolute Eosinophils 0.4      Absolute Basophils 0.2      RBC Morphology Confirmed RBC Indices      Platelet Assessment        Value: Automated Count Confirmed. Platelet morphology is normal.    Smudge Cells Present (*)    COVID-19 VIRUS (CORONAVIRUS) BY PCR - Normal    SARS CoV2 PCR Negative     URINE CULTURE   BLOOD CULTURE   BLOOD CULTURE         Interventions:  Medications   0.9% sodium chloride BOLUS (0 mLs Intravenous Stopped 4/25/22 1451)   cefTRIAXone (ROCEPHIN) 1 g vial to attach to  mL bag for ADULTS or NS 50 mL bag for PEDS (0 g Intravenous Stopped 4/25/22 1441)   azithromycin (ZITHROMAX) 500 mg vial to attach to  mL bag (0 mg Intravenous Stopped 4/25/22 1627)   0.9% sodium chloride BOLUS ( Intravenous Restarted 4/25/22 2130)   iopamidol (ISOVUE-370) solution 75 mL (75 mLs Intravenous Given 4/25/22 2052)   Saline Flush (90 mLs Intravenous Given 4/25/22 2052)       Emergency Department Course/Medical Decision Making:  The patient is presenting to the ER with multiple falls and weakness appear to be secondary to pneumonia.  No signs of sepsis.  Her head CT is abnormal and will require further evaluation for possible subacute stroke.  I spoke with the hospitalist who is willing to admit the patient for further evaluation and treatment    Diagnosis:    ICD-10-CM    1. Pneumonia due to infectious organism, unspecified laterality, unspecified part of lung  J18.9    2. Weakness  R53.1    3. Multiple  falls  R29.6    4. Abnormal brain CT  R90.89        Disposition:  Admitted to Dr. Jacinto.         Job Velazquez MD  04/26/22 0123

## 2022-04-25 NOTE — PHARMACY-ADMISSION MEDICATION HISTORY
Pharmacy Medication History  Admission medication history interview status for the 4/25/2022  admission is complete. See EPIC admission navigator for prior to admission medications     Location of Interview: Patient room  Medication history sources: Patient    Significant changes made to the medication list:       In the past week, patient estimated taking medication this percent of the time: greater than 90%    Additional medication history information:        Medication reconciliation completed by provider prior to medication history? No    Time spent in this activity: 15min    Prior to Admission medications    Medication Sig Last Dose Taking? Auth Provider   acetaminophen (TYLENOL) 500 MG tablet Take 2 tablets (1,000 mg) by mouth 3 times daily as needed for mild pain prn Yes Yaya Hatfield MD   amLODIPine (NORVASC) 2.5 MG tablet TAKE 1 TABLET(2.5 MG) BY MOUTH DAILY 4/24/2022 at am Yes Braeden Sood MD   bisacodyl (DULCOLAX) 5 MG EC tablet Take 1 tablet (5 mg) by mouth daily as needed for constipation  Yes Braeden Sood MD   Calcium Carbonate (CALCIUM 600 PO) Take 1 tablet by mouth daily. 4/24/2022 at Unknown time Yes Reported, Patient   Cholecalciferol (VITAMIN D) 1000 UNITS capsule Take 1 capsule by mouth daily. 4/24/2022 at Unknown time Yes Reported, Patient   cycloSPORINE (RESTASIS) 0.05 % ophthalmic emulsion Place 1 drop into both eyes daily  4/24/2022 at Unknown time Yes Reported, Patient   docusate sodium (COLACE) 100 MG capsule Take 1 capsule (100 mg) by mouth 2 times daily as needed for constipation prn Yes Lynn Davies PA-C   metoprolol succinate ER (TOPROL-XL) 50 MG 24 hr tablet Take 1 tablet (50 mg) by mouth daily 4/24/2022 at am Yes Christy Haro MD   Multiple Vitamins-Minerals (MULTIVITAL) TABS Take 1 tablet by mouth daily. 4/24/2022 at Unknown time Yes Reported, Patient   ondansetron (ZOFRAN) 4 MG tablet Take 1 tablet (4 mg) by mouth every 8 hours as needed for nausea prn  Yes Brittani Schafer MD   oxybutynin ER (DITROPAN XL) 15 MG 24 hr tablet TAKE 1 TABLET(15 MG) BY MOUTH DAILY  Yes Braeden Sood MD   PARoxetine (PAXIL) 20 MG tablet Take 1 tablet (20 mg) by mouth At Bedtime  Yes Braeden Sood MD   QUEtiapine (SEROQUEL) 25 MG tablet TAKE 1 TABLET(25 MG) BY MOUTH AT BEDTIME 4/24/2022 at Unknown time Yes Braeden Sood MD   ramelteon (ROZEREM) 8 MG tablet TAKE 1 TABLET(8 MG) BY MOUTH EVERY NIGHT AS NEEDED FOR SLEEP 4/24/2022 at Unknown time Yes Braeden Sood MD   order for DME Equipment being ordered: Walker Wheels () and Walker ()  Treatment Diagnosis: Difficulty ambulating   Lynn Davies PA-C   polyethylene glycol (MIRALAX) 17 GM/Dose powder Take 17 g by mouth daily Stacy Landon MD       The information provided in this note is only as accurate as the sources available at the time of update(s)

## 2022-04-25 NOTE — ED NOTES
[facility]  ED Nurse Handoff Report    ED Chief complaint: Fall      ED Diagnosis:   Final diagnoses:   Pneumonia due to infectious organism, unspecified laterality, unspecified part of lung   Weakness   Multiple falls   Abnormal brain CT       Code Status: not addressed     Allergies: No Known Allergies    Patient Story: falling at  Home frequently, lives alone, yesterday fell twice second time at 9p and layed on floor all night .   Focused Assessment:  Forgetful,no pain, possible pneumonia     Treatments and/or interventions provided: antibiotics, NS   Patient's response to treatments and/or interventions:     To be done/followed up on inpatient unit:      Does this patient have any cognitive concerns?: forgetful Activity level - Baseline/Home:  Unknown  Activity Level - Current:   Unknown    Patient's Preferred language: English   Needed?: No    Isolation: None  Infection: Not Applicable  Patient tested for COVID 19 prior to admission: YES  Bariatric?: No    Vital Signs:   Vitals:    04/25/22 1200 04/25/22 1215 04/25/22 1230 04/25/22 1245   BP:       Pulse: 68 69 73 72   Resp: 22 23 19 20   Temp:       TempSrc:       SpO2: 93% 99% 93% 91%   Weight:           Cardiac Rhythm:     Was the PSS-3 completed:   Yes  What interventions are required if any?               Family Comments:   OBS brochure/video discussed/provided to patient/family: No              Name of person given brochure if not patient:               Relationship to patient:     For the majority of the shift this patient's behavior was Green.   Behavioral interventions performed were .    ED NURSE PHONE NUMBER: [phone]

## 2022-04-25 NOTE — ED NOTES
Bed: ED23  Expected date:   Expected time:   Means of arrival:   Comments:  [place] - 89 F fall weakness eta 1110

## 2022-04-25 NOTE — H&P
"[facility]    History and Physical - Hospitalist Service       Date of Admission:  4/25/2022    Assessment & Plan      Margy Babin is a 89 year old female who lives in independent living who presented to the ED via EMS after being found down at home. Within the ED vitals stable. Labs notable for CK of 359 and WBC of 17.8. CXR revealed possible LLL opacity. CT head resulted without acute pathology however it demonstrated new - since last CT head in January - R caudate and putamen subacute to chronic infarct. Patient will be admitted to observation for further monitoring.    Daughter contacted in ED and relayed that prior to Friday patient in normal state of health which is AOx4, \"sharp as a tack and NOT demented\". She took a laxative on Friday which resulted in diarrhea and incontinence over the weekend. She fell at least once on Saturday which family is aware of. Today daughter called patient and she did not answer her phone which is not normal.  found her down on the ground for unknown length of time. Patient cannot elaborate more on what caused her to fall.    Falls  Eleveated CK  Down for unknown length of time. Suspect her fall due to deconditioning and weakness from diarrhea and possible PNA however cannot rule out syncopal event.    - admit to observation  - monitor on tele, trend troponin  - PT and OT consults  - given 1L bolus in the ED, will repeat another over 10 hours and follow CK level in AM    Community acquired pneumonia  CXR with streaky L sided opacity. 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    - will obtain LDL and A1C  - follow on tele  - echo obtained just 3 months ago, hold on " repeat  - consult placed to stroke neurology on non urgent basis    Possible Afib hx  By chart review she has Afib listed however in discussion with family they report no hx of Afib and it has not been confirmed.  - will follow on tele  - discussed with daughter about possibility of her CVA being from an embolic event    Essential Tremor  Essential HTN  - continue PTA metoprolol    Depression  - continue PTA paxil and seroquel at bedtime    Covid negative       Diet:  regular  DVT Prophylaxis: Pneumatic Compression Devices  Pichardo Catheter: Not present  Central Lines: None  Cardiac Monitoring: None  Code Status:   DNR DNI    Clinically Significant Risk Factors Present on Admission             # Hypoalbuminemia: Albumin = 3.1 g/dL (Ref range: 3.4 - 5.0 g/dL) on admission, will monitor as appropriate          Disposition Plan   Expected Discharge:  1-2 nights  Anticipated discharge location:  Awaiting care coordination huddle  Delays:              Susanne Jacinto DO  Hospitalist Service  [facility]  Securely message with the Vocera Web Console (learn more here)  Text page via [employer] Paging/Directory         ______________________________________________________________________    Chief Complaint   fall    History is obtained from the patient ER doc and family    History of Present Illness   Margy Babin is a 89 year old female who lives in independent living who presented to the ED via EMS after being found down at home. Within the ED vitals stable. Labs notable for CK of 359 and WBC of 17.8. CXR revealed possible LLL opacity. CT head obtained without acute pathology however it demonstrated new since last CT in January- R caudate and putamen subacute to chronic infarct.    Upon my assessment patient is comfortable. She is AOx4. She denies pain. She knows she fell but cannot exactly remember why and for how long she was on the ground. She says she was behind her piano. She denies  "chest pain. NO recent cough. No fevers. She endorses her recent diarrhea after being constipated. No edema. She has a tremor which is chronic.    Daughter contacted in ED and relayed that prior to Friday patient in normal state of health which is AOx4, \"sharp as a tac and NOT demented\". She took a laxative on Friday which resulted in diarrhea and incontinence over the weekend. She fell at least once on Saturday which family is aware of. Today daughter called patient and she did not answer her phone which is not normal.  found her down on the ground for unknown length of time. Patient cannot elaborate more on what caused her to fall.      Review of Systems    The 10 point Review of Systems is negative other than noted in the HPI or here.     Past Medical History    I have reviewed this patient's medical history and updated it with pertinent information if needed.   Past Medical History:   Diagnosis Date     Anxiety      Blepharitis of both eyes      Depression, major, recurrent, in complete remission (H)      Dry eye syndrome      Dyspnea on exertion 10/7/2021     Essential hypertension 3/5/2020     Familial tremor 3/6/2013     Insomnia, unspecified type 11/3/2014    No CSA on file Last  check - 02/28/2020 with no concerns.     Mixed hyperlipidemia 11/4/2020     Nausea without vomiting 6/16/2015     Problem list name updated by automated process. Provider to review     Tremor, hereditary, benign        Past Surgical History   I have reviewed this patient's surgical history and updated it with pertinent information if needed.  Past Surgical History:   Procedure Laterality Date     HYSTERECTOMY TOTAL ABDOMINAL, BILATERAL SALPINGO-OOPHORECTOMY, COMBINED       NECK SURGERY  2009    cervical fusion     ROTATOR CUFF REPAIR RT/LT Right      ZZC TOTAL HIP ARTHROPLASTY Right 1997       Social History   I have reviewed this patient's social history and updated it with pertinent information if needed.  Social " History     Tobacco Use     Smoking status: Never Smoker     Smokeless tobacco: Never Used   Substance Use Topics     Alcohol use: No     Alcohol/week: 0.0 standard drinks     Drug use: No       Family History   I have reviewed this patient's family history and updated it with pertinent information if needed.  Family History   Problem Relation Age of Onset     Heart Disease Father 80        MI     C.A.D. Father      Breast Cancer Daughter 43         age 53     Emphysema Sister      Heart Disease Sister        Prior to Admission Medications   Prior to Admission Medications   Prescriptions Last Dose Informant Patient Reported? Taking?   Calcium Carbonate (CALCIUM 600 PO)  Daughter Yes No   Sig: Take 1 tablet by mouth daily.   Cholecalciferol (VITAMIN D) 1000 UNITS capsule  Daughter Yes No   Sig: Take 1 capsule by mouth daily.   Multiple Vitamins-Minerals (MULTIVITAL) TABS  Daughter Yes No   Sig: Take 1 tablet by mouth daily.   PARoxetine (PAXIL) 20 MG tablet   No No   Sig: Take 1 tablet (20 mg) by mouth At Bedtime   QUEtiapine (SEROQUEL) 25 MG tablet  Daughter No No   Sig: TAKE 1 TABLET(25 MG) BY MOUTH AT BEDTIME   acetaminophen (TYLENOL) 500 MG tablet  Daughter No No   Sig: Take 2 tablets (1,000 mg) by mouth 3 times daily as needed for mild pain   amLODIPine (NORVASC) 2.5 MG tablet   No No   Sig: TAKE 1 TABLET(2.5 MG) BY MOUTH DAILY   bisacodyl (DULCOLAX) 5 MG EC tablet  Daughter No No   Sig: Take 1 tablet (5 mg) by mouth daily as needed for constipation   ciprofloxacin (CIPRO) 250 MG tablet   No No   Sig: Take 1 tablet (250 mg) by mouth 2 times daily   cycloSPORINE (RESTASIS) 0.05 % ophthalmic emulsion  Daughter Yes No   Sig: Place 1 drop into both eyes daily    docusate sodium (COLACE) 100 MG capsule  Daughter No No   Sig: Take 1 capsule (100 mg) by mouth 2 times daily as needed for constipation   metoprolol succinate ER (TOPROL-XL) 50 MG 24 hr tablet   No No   Sig: Take 1 tablet (50 mg) by mouth daily    ondansetron (ZOFRAN) 4 MG tablet  Daughter No No   Sig: Take 1 tablet (4 mg) by mouth every 8 hours as needed for nausea   order for DME   No No   Sig: Equipment being ordered: Walker Wheels () and Walker ()  Treatment Diagnosis: Difficulty ambulating   oxybutynin ER (DITROPAN XL) 15 MG 24 hr tablet  Daughter No No   Sig: TAKE 1 TABLET(15 MG) BY MOUTH DAILY   polyethylene glycol (MIRALAX) 17 GM/Dose powder   No No   Sig: Take 17 g by mouth daily   potassium chloride ER (KLOR-CON M) 20 MEQ CR tablet   No No   Sig: Take 1 tablet (20 mEq) by mouth daily   ramelteon (ROZEREM) 8 MG tablet   No No   Sig: TAKE 1 TABLET(8 MG) BY MOUTH EVERY NIGHT AS NEEDED FOR SLEEP      Facility-Administered Medications: None     Allergies   No Known Allergies    Physical Exam   Vital Signs: Temp: 98  F (36.7  C) Temp src: Temporal BP: 137/76 Pulse: 72   Resp: 20 SpO2: 91 % O2 Device: None (Room air)    Weight: 143 lbs 15.37 oz     Constitutional: Awake, alert, cooperative, no apparent distress.  Eyes: Conjunctiva and pupils examined and normal.  HEENT: Moist mucous membranes, normal dentition.  Respiratory: Clear to auscultation bilaterally, no crackles or wheezing.  Cardiovascular: Regular rate and rhythm, normal S1 and S2, and no murmur noted.  GI: Soft, non-distended, non-tender, normal bowel sounds.  Lymph/Hematologic: No anterior cervical or supraclavicular adenopathy.  Skin: No rashes, no cyanosis, no edema.  Musculoskeletal: No joint swelling, erythema or tenderness.  Neurologic: Cranial nerves 2-12 intact, normal strength and sensation.  Psychiatric: Alert, oriented to person, place and time, no obvious anxiety or depression.    Data   Data reviewed today: I reviewed all medications, new labs and imaging results over the last 24 hours. I personally reviewed EKG read of Afib but NSR with known LBBB    Recent Labs   Lab 04/25/22  1126   WBC 17.8*   HGB 14.1   MCV 91         POTASSIUM 3.7   CHLORIDE 104   CO2  24   BUN 20   CR 0.98   ANIONGAP 11   SOBIA 9.4   GLC 98   ALBUMIN 3.1*   PROTTOTAL 7.8   BILITOTAL 0.7   ALKPHOS 100   ALT 27   AST 36     Recent Results (from the past 24 hour(s))   Head CT w/o contrast    Narrative    CT OF THE HEAD WITHOUT CONTRAST  4/25/2022 1:11 PM     COMPARISON: Head CT 1/6/2022    HISTORY: Head trauma, minor (Age >= 65y).    TECHNIQUE: 5 mm thick axial CT images of the head were acquired  without IV contrast material.    FINDINGS:  There is moderate diffuse cerebral volume loss. There are  extensive confluent areas of decreased density in the cerebral white  matter bilaterally that are consistent with sequela of chronic small  vessel ischemic disease. There has been interval development of an  area of hypodensity involving the right caudate head and right putamen  consistent with a subacute or chronic ischemic infarct. Gray-white  differentiation is otherwise unchanged from the previous study.    The ventricles and basal cisterns are within normal limits in  configuration given the degree of cerebral volume loss.  There is no  midline shift. There are no extra-axial fluid collections.     No intracranial hemorrhage, mass or recent infarct.    The visualized paranasal sinuses are well-aerated. There is no  mastoiditis. There are no fractures of the visualized bones.       Impression    IMPRESSION:   1. Diffuse cerebral volume loss and cerebral white matter changes  consistent with chronic small vessel ischemic disease.   2. Interval development of an area of hypodensity involving the right  caudate head and right putamen since the comparison head CT consistent  with a subacute or chronic ischemic infarct.  3. No evidence for acute intracranial pathology.       Radiation dose for this scan was reduced using automated exposure  control, adjustment of the mA and/or kV according to patient size, or  iterative reconstruction technique.    GISELLE CHAVIRA MD         SYSTEM ID:  [id number]   Chest XR,   PA & LAT    Narrative    CHEST TWO VIEWS  4/25/2022 2:03 PM     HISTORY: High WBC, multiple falls.    COMPARISON: Chest x-ray 1/6/2022.      Impression    IMPRESSION: Subtle streaky opacities at the left lower lung could be  atypical airspace disease including pneumonia. Normal cardiac  silhouette. No effusions.    PHYLICIA NORTH MD         SYSTEM ID:  [id number]

## 2022-04-25 NOTE — PROGRESS NOTES
RECEIVING UNIT ED HANDOFF REVIEW    ED Nurse Handoff Report was reviewed by: Shayy Agustin, RN on April 25, 2022 at 3:50 PM

## 2022-04-26 ENCOUNTER — APPOINTMENT (OUTPATIENT)
Dept: SPEECH THERAPY | Facility: CLINIC | Age: 87
DRG: 064 | End: 2022-04-26
Attending: PHYSICIAN ASSISTANT
Payer: MEDICARE

## 2022-04-26 ENCOUNTER — APPOINTMENT (OUTPATIENT)
Dept: MRI IMAGING | Facility: CLINIC | Age: 87
DRG: 064 | End: 2022-04-26
Payer: MEDICARE

## 2022-04-26 ENCOUNTER — APPOINTMENT (OUTPATIENT)
Dept: PHYSICAL THERAPY | Facility: CLINIC | Age: 87
DRG: 064 | End: 2022-04-26
Attending: STUDENT IN AN ORGANIZED HEALTH CARE EDUCATION/TRAINING PROGRAM
Payer: MEDICARE

## 2022-04-26 LAB
ANION GAP SERPL CALCULATED.3IONS-SCNC: 10 MMOL/L (ref 3–14)
BASOPHILS # BLD AUTO: 0.1 10E3/UL (ref 0–0.2)
BASOPHILS NFR BLD AUTO: 1 %
BUN SERPL-MCNC: 15 MG/DL (ref 7–30)
CALCIUM SERPL-MCNC: 8.4 MG/DL (ref 8.5–10.1)
CHLORIDE BLD-SCNC: 113 MMOL/L (ref 94–109)
CHOLEST SERPL-MCNC: 140 MG/DL
CK SERPL-CCNC: 346 U/L (ref 30–225)
CO2 SERPL-SCNC: 20 MMOL/L (ref 20–32)
CREAT SERPL-MCNC: 0.86 MG/DL (ref 0.52–1.04)
EOSINOPHIL # BLD AUTO: 0.8 10E3/UL (ref 0–0.7)
EOSINOPHIL NFR BLD AUTO: 6 %
ERYTHROCYTE [DISTWIDTH] IN BLOOD BY AUTOMATED COUNT: 13.7 % (ref 10–15)
FASTING STATUS PATIENT QL REPORTED: YES
GFR SERPL CREATININE-BSD FRML MDRD: 63 ML/MIN/1.73M2
GLUCOSE BLD-MCNC: 103 MG/DL (ref 70–99)
HCT VFR BLD AUTO: 37 % (ref 35–47)
HDLC SERPL-MCNC: 46 MG/DL
HGB BLD-MCNC: 12.1 G/DL (ref 11.7–15.7)
IMM GRANULOCYTES # BLD: 0.1 10E3/UL
IMM GRANULOCYTES NFR BLD: 1 %
LDLC SERPL CALC-MCNC: 77 MG/DL
LYMPHOCYTES # BLD AUTO: 1.8 10E3/UL (ref 0.8–5.3)
LYMPHOCYTES NFR BLD AUTO: 14 %
MAGNESIUM SERPL-MCNC: 1.9 MG/DL (ref 1.6–2.3)
MCH RBC QN AUTO: 29.1 PG (ref 26.5–33)
MCHC RBC AUTO-ENTMCNC: 32.7 G/DL (ref 31.5–36.5)
MCV RBC AUTO: 89 FL (ref 78–100)
MONOCYTES # BLD AUTO: 1.2 10E3/UL (ref 0–1.3)
MONOCYTES NFR BLD AUTO: 9 %
NEUTROPHILS # BLD AUTO: 9.1 10E3/UL (ref 1.6–8.3)
NEUTROPHILS NFR BLD AUTO: 69 %
NONHDLC SERPL-MCNC: 94 MG/DL
NRBC # BLD AUTO: 0 10E3/UL
NRBC BLD AUTO-RTO: 0 /100
PLATELET # BLD AUTO: 401 10E3/UL (ref 150–450)
POTASSIUM BLD-SCNC: 3.3 MMOL/L (ref 3.4–5.3)
RBC # BLD AUTO: 4.16 10E6/UL (ref 3.8–5.2)
SODIUM SERPL-SCNC: 143 MMOL/L (ref 133–144)
TRIGL SERPL-MCNC: 86 MG/DL
TROPONIN I SERPL HS-MCNC: 20 NG/L
WBC # BLD AUTO: 13.1 10E3/UL (ref 4–11)

## 2022-04-26 PROCEDURE — 120N000001 HC R&B MED SURG/OB

## 2022-04-26 PROCEDURE — 97530 THERAPEUTIC ACTIVITIES: CPT | Mod: GP | Performed by: PHYSICAL THERAPIST

## 2022-04-26 PROCEDURE — 36415 COLL VENOUS BLD VENIPUNCTURE: CPT | Performed by: STUDENT IN AN ORGANIZED HEALTH CARE EDUCATION/TRAINING PROGRAM

## 2022-04-26 PROCEDURE — 92526 ORAL FUNCTION THERAPY: CPT | Mod: GN | Performed by: SPEECH-LANGUAGE PATHOLOGIST

## 2022-04-26 PROCEDURE — 250N000011 HC RX IP 250 OP 636: Performed by: STUDENT IN AN ORGANIZED HEALTH CARE EDUCATION/TRAINING PROGRAM

## 2022-04-26 PROCEDURE — 82550 ASSAY OF CK (CPK): CPT | Performed by: STUDENT IN AN ORGANIZED HEALTH CARE EDUCATION/TRAINING PROGRAM

## 2022-04-26 PROCEDURE — G0378 HOSPITAL OBSERVATION PER HR: HCPCS

## 2022-04-26 PROCEDURE — 83735 ASSAY OF MAGNESIUM: CPT | Performed by: PHYSICIAN ASSISTANT

## 2022-04-26 PROCEDURE — 250N000009 HC RX 250: Performed by: INTERNAL MEDICINE

## 2022-04-26 PROCEDURE — 250N000013 HC RX MED GY IP 250 OP 250 PS 637: Performed by: INTERNAL MEDICINE

## 2022-04-26 PROCEDURE — 97116 GAIT TRAINING THERAPY: CPT | Mod: GP | Performed by: PHYSICAL THERAPIST

## 2022-04-26 PROCEDURE — 250N000009 HC RX 250: Performed by: PHYSICIAN ASSISTANT

## 2022-04-26 PROCEDURE — A9585 GADOBUTROL INJECTION: HCPCS | Performed by: INTERNAL MEDICINE

## 2022-04-26 PROCEDURE — 258N000003 HC RX IP 258 OP 636: Performed by: STUDENT IN AN ORGANIZED HEALTH CARE EDUCATION/TRAINING PROGRAM

## 2022-04-26 PROCEDURE — 255N000002 HC RX 255 OP 636: Performed by: INTERNAL MEDICINE

## 2022-04-26 PROCEDURE — 93010 ELECTROCARDIOGRAM REPORT: CPT | Performed by: INTERNAL MEDICINE

## 2022-04-26 PROCEDURE — 92610 EVALUATE SWALLOWING FUNCTION: CPT | Mod: GN | Performed by: SPEECH-LANGUAGE PATHOLOGIST

## 2022-04-26 PROCEDURE — 70553 MRI BRAIN STEM W/O & W/DYE: CPT | Mod: MG

## 2022-04-26 PROCEDURE — 96375 TX/PRO/DX INJ NEW DRUG ADDON: CPT

## 2022-04-26 PROCEDURE — G1004 CDSM NDSC: HCPCS

## 2022-04-26 PROCEDURE — 80061 LIPID PANEL: CPT | Performed by: STUDENT IN AN ORGANIZED HEALTH CARE EDUCATION/TRAINING PROGRAM

## 2022-04-26 PROCEDURE — 93005 ELECTROCARDIOGRAM TRACING: CPT

## 2022-04-26 PROCEDURE — 85025 COMPLETE CBC W/AUTO DIFF WBC: CPT | Performed by: STUDENT IN AN ORGANIZED HEALTH CARE EDUCATION/TRAINING PROGRAM

## 2022-04-26 PROCEDURE — 80048 BASIC METABOLIC PNL TOTAL CA: CPT | Performed by: STUDENT IN AN ORGANIZED HEALTH CARE EDUCATION/TRAINING PROGRAM

## 2022-04-26 PROCEDURE — 84484 ASSAY OF TROPONIN QUANT: CPT | Performed by: STUDENT IN AN ORGANIZED HEALTH CARE EDUCATION/TRAINING PROGRAM

## 2022-04-26 PROCEDURE — 250N000013 HC RX MED GY IP 250 OP 250 PS 637: Performed by: NURSE PRACTITIONER

## 2022-04-26 PROCEDURE — 99233 SBSQ HOSP IP/OBS HIGH 50: CPT | Performed by: PHYSICIAN ASSISTANT

## 2022-04-26 PROCEDURE — 250N000013 HC RX MED GY IP 250 OP 250 PS 637: Performed by: PHYSICIAN ASSISTANT

## 2022-04-26 PROCEDURE — 96361 HYDRATE IV INFUSION ADD-ON: CPT

## 2022-04-26 PROCEDURE — 99222 1ST HOSP IP/OBS MODERATE 55: CPT | Mod: FS | Performed by: PSYCHIATRY & NEUROLOGY

## 2022-04-26 PROCEDURE — 97161 PT EVAL LOW COMPLEX 20 MIN: CPT | Mod: GP | Performed by: PHYSICAL THERAPIST

## 2022-04-26 RX ORDER — METOPROLOL TARTRATE 1 MG/ML
2.5-5 INJECTION, SOLUTION INTRAVENOUS EVERY 4 HOURS PRN
Status: DISCONTINUED | OUTPATIENT
Start: 2022-04-26 | End: 2022-04-28 | Stop reason: HOSPADM

## 2022-04-26 RX ORDER — METOPROLOL TARTRATE 1 MG/ML
2.5 INJECTION, SOLUTION INTRAVENOUS EVERY 4 HOURS PRN
Status: DISCONTINUED | OUTPATIENT
Start: 2022-04-26 | End: 2022-04-26

## 2022-04-26 RX ORDER — ATORVASTATIN CALCIUM 10 MG/1
10 TABLET, FILM COATED ORAL EVERY EVENING
Status: DISCONTINUED | OUTPATIENT
Start: 2022-04-26 | End: 2022-04-26

## 2022-04-26 RX ORDER — QUETIAPINE FUMARATE 25 MG/1
25 TABLET, FILM COATED ORAL AT BEDTIME
Status: DISCONTINUED | OUTPATIENT
Start: 2022-04-26 | End: 2022-04-28 | Stop reason: HOSPADM

## 2022-04-26 RX ORDER — METOPROLOL SUCCINATE 50 MG/1
50 TABLET, EXTENDED RELEASE ORAL DAILY
Status: DISCONTINUED | OUTPATIENT
Start: 2022-04-26 | End: 2022-04-27

## 2022-04-26 RX ORDER — ASPIRIN 81 MG/1
81 TABLET ORAL DAILY
Status: DISCONTINUED | OUTPATIENT
Start: 2022-04-26 | End: 2022-04-28 | Stop reason: HOSPADM

## 2022-04-26 RX ORDER — ATORVASTATIN CALCIUM 20 MG/1
20 TABLET, FILM COATED ORAL EVERY EVENING
Status: DISCONTINUED | OUTPATIENT
Start: 2022-04-26 | End: 2022-04-26

## 2022-04-26 RX ORDER — POTASSIUM CHLORIDE 1500 MG/1
40 TABLET, EXTENDED RELEASE ORAL ONCE
Status: COMPLETED | OUTPATIENT
Start: 2022-04-26 | End: 2022-04-26

## 2022-04-26 RX ORDER — GADOBUTROL 604.72 MG/ML
10 INJECTION INTRAVENOUS ONCE
Status: COMPLETED | OUTPATIENT
Start: 2022-04-26 | End: 2022-04-26

## 2022-04-26 RX ORDER — METOPROLOL TARTRATE 1 MG/ML
5 INJECTION, SOLUTION INTRAVENOUS ONCE
Status: COMPLETED | OUTPATIENT
Start: 2022-04-26 | End: 2022-04-26

## 2022-04-26 RX ORDER — ATORVASTATIN CALCIUM 20 MG/1
20 TABLET, FILM COATED ORAL EVERY EVENING
Status: DISCONTINUED | OUTPATIENT
Start: 2022-04-26 | End: 2022-04-28 | Stop reason: HOSPADM

## 2022-04-26 RX ORDER — PAROXETINE 20 MG/1
20 TABLET, FILM COATED ORAL AT BEDTIME
Status: DISCONTINUED | OUTPATIENT
Start: 2022-04-26 | End: 2022-04-28 | Stop reason: HOSPADM

## 2022-04-26 RX ADMIN — METOPROLOL TARTRATE 2.5 MG: 5 INJECTION INTRAVENOUS at 06:50

## 2022-04-26 RX ADMIN — CEFTRIAXONE SODIUM 2 G: 2 INJECTION, POWDER, FOR SOLUTION INTRAMUSCULAR; INTRAVENOUS at 14:16

## 2022-04-26 RX ADMIN — ASPIRIN 81 MG: 81 TABLET, COATED ORAL at 13:34

## 2022-04-26 RX ADMIN — PAROXETINE HYDROCHLORIDE 20 MG: 20 TABLET, FILM COATED ORAL at 21:54

## 2022-04-26 RX ADMIN — POTASSIUM CHLORIDE 40 MEQ: 1500 TABLET, EXTENDED RELEASE ORAL at 10:28

## 2022-04-26 RX ADMIN — ATORVASTATIN CALCIUM 20 MG: 20 TABLET, FILM COATED ORAL at 20:01

## 2022-04-26 RX ADMIN — GADOBUTROL 10 ML: 604.72 INJECTION INTRAVENOUS at 03:14

## 2022-04-26 RX ADMIN — QUETIAPINE FUMARATE 25 MG: 25 TABLET ORAL at 21:54

## 2022-04-26 RX ADMIN — OXYBUTYNIN CHLORIDE 15 MG: 10 TABLET, EXTENDED RELEASE ORAL at 11:55

## 2022-04-26 RX ADMIN — METOPROLOL TARTRATE 5 MG: 5 INJECTION INTRAVENOUS at 08:01

## 2022-04-26 RX ADMIN — AZITHROMYCIN MONOHYDRATE 250 MG: 500 INJECTION, POWDER, LYOPHILIZED, FOR SOLUTION INTRAVENOUS at 12:25

## 2022-04-26 RX ADMIN — METOPROLOL SUCCINATE 50 MG: 50 TABLET, EXTENDED RELEASE ORAL at 05:51

## 2022-04-26 ASSESSMENT — ACTIVITIES OF DAILY LIVING (ADL)
ADLS_ACUITY_SCORE: 17
ADLS_ACUITY_SCORE: 15
ADLS_ACUITY_SCORE: 17
ADLS_ACUITY_SCORE: 16
ADLS_ACUITY_SCORE: 15
ADLS_ACUITY_SCORE: 17
ADLS_ACUITY_SCORE: 16
ADLS_ACUITY_SCORE: 15

## 2022-04-26 NOTE — PROVIDER NOTIFICATION
Dr. Van notifed at [phone] to call back Dr. Mesa from [facility] about CT results - call back number [phone]

## 2022-04-26 NOTE — PROGRESS NOTES
[facility]    Medicine History and Physical - Hospitalist Service       Date of Admission:  4/25/2022    Assessment & Plan   Margy Babin is a 89 year old female who lives in independent living who presented to the ED via EMS after being found down at home found to have LLL opacity concerning for LLL PNA and subacute R CVA.    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  Presented after fall and found down. No focal neuro deficits.  * MRI brain: moderated sized early subacute ischemic infarct R corpus striatum/corona radiata.  * MRA head / neck: multifocal areas severe stenosis involving segments of left sided anterior and posterior circulation  * LDL 77, A1C 5.8    - concerning for embolic pathology after developing AF with RVR overnight HOD#0.  - monitor tele  - echo 1/2022 showed midrange EF 45-50%, mild valve pathology, mild global hypokinesia, no comment on atria morphology; neurology has ordered serial TTE.  - appreciate stroke neuro consult  - hold amlodipine for permissive hypertension; cont pta beta blocker at full dose given af with rvr  - PT / OT / SLP      Paroxysmal Afib with RVR- new diagnosis.  Appears hx AF Jan 2022 and then evaluated by cardiology, but felt likely 2/2 acute sepsis and was not recommended anticoagulation. Overnight HOD#0 this adm, went into AF with RVR with HRs sustained in 160s that improved after IV metoprolol.  - cont PTA metoprolol succinate; plan to increase dose if recurrent episodes over course of day but will hold off to allow for permissive htn as much as able  - PRN metoprolol tartrate for sustained HR >120   - plan heparin drip vs po anticoagulation once ok from stroke neuro standpoint.    Acute LLL CAP.  CXR with streaky L sided opacity. WBC 17. Clinically no signs of pneumonia, however with recent weakness and falls abx treatment initiated.  - rocephin and azithro.  - SLP eval.     Falls.  Eleveated CK.  Two falls possibly  related to weakness from laxative induced diarrhea vs new subacute cva. Patient recalls ambulating with walker at the time, believes she lost her balance -- but also believes she was able to get up and call EMS when H&P indicates she was found down for unknown length of time by . Denies preceding symptoms. Treated with 1L IVF. CK 350s. Kidney function stable. Serial troponin flat and wnl.  * Noted is Zio patch monitoring in 12/2021 with 2 episodes VT up to 15 beats and 16 episodes SVT up to 17 beats. During adm in Jan had 28 beat run NSVT.  - Tele and work up as above.  - Therapy eval and care mngt team following for dispo planning.    Prediabetes. A1C 5.8 (4/26/2022).  - goal <7.0, will not start medication therapy or monitor in house.    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  Chronic LBBB.  Essential HTN.  Essential Tremor.  - PTA metoprolol.     Depression.  - PTA paxil and seroquel at bedtime.     Covid negative.    Clinically Significant Risk Factors Present on Admission             # Hypoalbuminemia: Albumin = 3.1 g/dL (Ref range: 3.4 - 5.0 g/dL) on admission, will monitor as appropriate            Diet: Regular Diet Adult    Pichardo Catheter: Not present    DVT Prophylaxis: Ambulate every shift  Code Status: No CPR- Do NOT Intubate    Expected Discharge:    Anticipated discharge location:  Awaiting care coordination huddle  Delays:      The patient's care was discussed with the Attending Physician, Dr. Jensen, Bedside Nurse, Care Coordinator/, Patient and Patient's Family.    JoAnna K. Barthell, PA-C  Hospitalist Service  [facility]    ______________________________________________________________________    Interval History   AF with RVR sustaining HR 160s this AM improved after total 7.5mg Lopressor IV.  Denies chest pain / discomfort, trouble breathing, palpitations, muscle weakness, paresthesias, speech changes, cough, fever, abd pain,  n/v.    Speech sounds dysarthric, but patient new to writer and no family present.     Data reviewed today: I reviewed all medications, new labs and imaging results over the last 24 hours.    Physical Exam   Vital Signs: Temp: 97.3  F (36.3  C) Temp src: Oral BP: 120/60 Pulse: (!) 168   Resp: 16 SpO2: 95 % O2 Device: None (Room air) Oxygen Delivery: 1 LPM  Weight: 146 lbs 0 oz  Constitutional: Appears stated age, no acute distress. Pleasant female, sleeping on arrival and awakens easily. A&Ox3.  Respiratory: Breath sounds CTA. No increased work of breathing.  Cardiovascular: RRR, no rub or murmur. No peripheral edema.  GI: Soft, non-tender, non-distended.  Skin: Warm, dry, no rashes or lesions. Ecchymosis left lateral knee and elbow.  Neuro: CN 2-12 intact. Nl FNF, but less coordinated on left side. No pronator drift. Nl heal down shin. No facial asymmetry. Dysarthric speech.    Medications       [Held by provider] amLODIPine  2.5 mg Oral Daily     azithromycin  250 mg Intravenous Q24H     cefTRIAXone  2 g Intravenous Q24H     metoprolol succinate ER  50 mg Oral Daily     potassium chloride  40 mEq Oral Once       Data   Recent Labs   Lab 04/26/22  0705 04/25/22  1126   WBC 13.1* 17.8*   HGB 12.1 14.1   MCV 89 91    446    139   POTASSIUM 3.3* 3.7   CHLORIDE 113* 104   CO2 20 24   BUN 15 20   CR 0.86 0.98   ANIONGAP 10 11   SOBIA 8.4* 9.4   * 98   ALBUMIN  --  3.1*   PROTTOTAL  --  7.8   BILITOTAL  --  0.7   ALKPHOS  --  100   ALT  --  27   AST  --  36       Imaging:  Recent Results (from the past 24 hour(s))   Head CT w/o contrast    Narrative    CT OF THE HEAD WITHOUT CONTRAST  4/25/2022 1:11 PM     COMPARISON: Head CT 1/6/2022    HISTORY: Head trauma, minor (Age >= 65y).    TECHNIQUE: 5 mm thick axial CT images of the head were acquired  without IV contrast material.    FINDINGS:  There is moderate diffuse cerebral volume loss. There are  extensive confluent areas of decreased density in the  cerebral white  matter bilaterally that are consistent with sequela of chronic small  vessel ischemic disease. There has been interval development of an  area of hypodensity involving the right caudate head and right putamen  consistent with a subacute or chronic ischemic infarct. Gray-white  differentiation is otherwise unchanged from the previous study.    The ventricles and basal cisterns are within normal limits in  configuration given the degree of cerebral volume loss.  There is no  midline shift. There are no extra-axial fluid collections.     No intracranial hemorrhage, mass or recent infarct.    The visualized paranasal sinuses are well-aerated. There is no  mastoiditis. There are no fractures of the visualized bones.       Impression    IMPRESSION:   1. Diffuse cerebral volume loss and cerebral white matter changes  consistent with chronic small vessel ischemic disease.   2. Interval development of an area of hypodensity involving the right  caudate head and right putamen since the comparison head CT consistent  with a subacute or chronic ischemic infarct.  3. No evidence for acute intracranial pathology.       Radiation dose for this scan was reduced using automated exposure  control, adjustment of the mA and/or kV according to patient size, or  iterative reconstruction technique.    GISELLE CHAVIRA MD         SYSTEM ID:  [id number]   Chest XR,  PA & LAT    Narrative    CHEST TWO VIEWS  4/25/2022 2:03 PM     HISTORY: High WBC, multiple falls.    COMPARISON: Chest x-ray 1/6/2022.      Impression    IMPRESSION: Subtle streaky opacities at the left lower lung could be  atypical airspace disease including pneumonia. Normal cardiac  silhouette. No effusions.    PHYLICIA NORTH MD         SYSTEM ID:  [id number]   CTA Head Neck with Contrast    Narrative    EXAM: CTA  HEAD NECK WITH CONTRAST  LOCATION: [facility]  DATE/TIME: 4/25/2022 8:51 PM    INDICATION: Neuro deficit, acute, stroke suspected  Stroke/TIA, assess intracranial arteries Stroke/TIA, assess extracranial arteries  COMPARISON: Head CT 01/06/2022. Head CT 04/25/2022.  CONTRAST: 75 mL Isovue 370  TECHNIQUE: Head and neck CT angiogram with IV contrast. Axial helical CT images of the head and neck vessels obtained during the arterial phase of intravenous contrast administration. Axial 2D reconstructed images and multiplanar 3D MIP reconstructed   images of the head and neck vessels were performed by the technologist. Dose reduction techniques were used. All stenosis measurements made according to NASCET criteria unless otherwise specified.    FINDINGS:     HEAD CTA:  Mild nonflow limiting atherosclerosis of the carotid siphons. Mild stenosis of the proximal to mid M1 segment of the right middle cerebral artery (series 12 image 30). Mild focal stenosis of the A2 segment of the left anterior cerebral artery. There is a   severe focal stenosis involving the A3/pre-callosal segment of the left anterior cerebral artery (series 9 image 461).    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 There is moderate to severe focal stenosis of one of the inferior division proximal left MCA branches in the left MCA   cistern (series 13 image 47, series 9 image 412). There is a severe focal stenosis of a posterior inferior division left M2 branch (series 9 image 448) located in the upper posterior aspect of the left sylvian fissure (series 13 image 58).    The bilateral intracranial vertebral arteries are patent. Mild multifocal stenosis of the basilar artery. Severe multifocal stenosis involving the P1 and P2 segments of the right posterior cerebral artery. Moderate/severe multifocal stenosis involving   the proximal left posterior cerebral artery. No aneurysm or high-flow vascular malformation is identified.    NONVASCULAR INTRACRANIAL FINDINGS: Please see separate report from  noncontrast head CT of earlier same day for details regarding nonvascular structural intracranial findings. No definite abnormal contrast enhancing intracranial lesions identified.    NECK CTA:  RIGHT CAROTID: No measurable stenosis or dissection. Mild atherosclerosis of the carotid bifurcation.    LEFT CAROTID: No measurable stenosis or dissection. Mild atherosclerosis of the carotid bifurcation.    VERTEBRAL ARTERIES: No focal stenosis or dissection. Balanced vertebral arteries.    AORTIC ARCH: Bovine origin left common carotid artery. No significant stenosis at the origin of the great vessels. Mild atherosclerosis of the aortic arch.    NONVASCULAR STRUCTURES: Multiple bilateral thyroid nodules measuring up to approximately 8 mm in the right thyroid lobe. Diffusely heterogeneous thyroid gland. Posterolateral changes of the cervical spine including posterior fusion instrumentation   spanning C2-C5. Solid C5-C6 interbody fusion. Mild anterolisthesis C4 on C5 probably on a degenerative basis. Multilevel degenerative changes of the cervical and partially visualized thoracic spine. Mild linear atelectasis or scarring in the periphery of   the left lung.      Impression    IMPRESSION:     HEAD CTA:   1.  Age-indeterminate right basal ganglia infarcts, as seen on noncontrast head CT of earlier same day. MRI is recommended for further characterization. Stroke neurology consultation is also suggested.  2.  Multifocal high-grade central arterial stenoses involving the anterior and posterior circulation, as described.  3.  Severe focal stenosis involving the A3/pre-callosal segment of the left anterior cerebral artery.  4.  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  5.  Multifocal high-grade stenoses involving the bilateral posterior cerebral arteries.    NECK CTA:  1.  Mild atherosclerosis of the bilateral carotid bifurcations without significant stenosis.  2.  Patent cervical vertebral arteries.  3.  Diffusely heterogeneous thyroid gland with multiple small nodules.      The findings were discussed by myself with Dr. Jacinto at approximately 9:50 PM on 04/25/2022.   MRA Brain ([place]) wo Contrast    Narrative    EXAM: MR BRAIN W/O and W CONTRAST, MRA BRAIN ([place]) WO CONTRAST, MRA NECK (CAROTIDS) WO and W CONTRAST  LOCATION: [facility]  DATE/TIME: 4/26/2022 2:38 AM    INDICATION: Stroke, follow up  COMPARISON: CTA head and neck 4/25/2022  CONTRAST: 10mL Gadavist  TECHNIQUE:   1) Routine multiplanar multisequence head MRI without and with intravenous contrast.  2) 3D time-of-flight head MRA without intravenous contrast.  3) Neck MRA without and with IV contrast. Stenosis measurements made according to NASCET criteria unless otherwise specified.    FINDINGS:  HEAD MRI:  INTRACRANIAL CONTENTS: There is a 3.5 x 2 x 3.5 cm (AP by TR by CC) zone of restricted diffusion in the right basal ganglia with extension into the right caudate nucleus and right corona. No mass, acute hemorrhage, or extra-axial fluid collections. There   is moderate to severe small vessel ischemic disease in the cerebral white matter and yon. Chronic infarct frontal white matter. Mild to moderate generalized cerebral atrophy. No hydrocephalus. Normal position of the cerebellar tonsils. No pathologic   contrast enhancement.    SELLA: No abnormality accounting for technique.    OSSEOUS STRUCTURES/SOFT TISSUES: Diffusely heterogeneous, but overall benign marrow signal pattern. The major intracranial vascular flow voids are maintained.     ORBITS: No abnormality accounting for technique.     SINUSES/MASTOIDS: Mild mucosal thickening scattered about the  paranasal sinuses. Scattered fluid/membrane thickening in the mastoid air cells bilaterally.     HEAD MRA:  Degraded by motion.  ANTERIOR CIRCULATION: Duplicated left M1 segment. Severe stenosis of the left A3 segment. Moderate to severe focal stenosis of the proximal inferior left M1 segment. No definite branch occlusion. No aneurysm or AVM. Standard [place] anatomy.    POSTERIOR CIRCULATION: Mild multifocal irregularity of the basilar artery. Severe stenosis involving the right P1 and P2 segments of the right posterior cerebral artery as well as multifocal stenosis of the proximal left PCA. No aneurysm or AVM. Balanced   vertebral arteries supply a normal basilar artery.     NECK MRA:   RIGHT CAROTID: No measurable stenosis or dissection.    LEFT CAROTID: No measurable stenosis or dissection.    VERTEBRAL ARTERIES: No focal stenosis or dissection. Balanced vertebral arteries.    AORTIC ARCH: Bovine origin left common carotid artery. No significant stenosis at the origin of the great vessels.      Impression    IMPRESSION:  HEAD MRI:   1.  Moderate-sized early subacute ischemic infarct in the right corpus striatum/corona radiata.  2.  Age-related changes.    HEAD MRA:   1.  No acute branch occlusion, aneurysm or AVM.  2.  Multifocal areas of relatively severe stenosis as detailed above, stable from CTA head from 4/25/2022.  3.  Severe stenosis left A3 segment. Moderate to severe stenosis at the proximal left M1 segment (the left M1 segments are duplicated), severe multifocal stenosis of the right P1 and P2 segments as well as severe short segment stenosis of the left P2   segment.    NECK MRA:  1.  No measurable stenosis or dissection.   MR Brain w/o & w Contrast    Narrative    EXAM: MR BRAIN W/O and W CONTRAST, MRA BRAIN ("[place] [employer] [facility] [place]) WO CONTRAST, MRA NECK (CAROTIDS) WO and W CONTRAST  LOCATION: [facility]  DATE/TIME: 4/26/2022 2:38 AM    INDICATION: Stroke, follow  up  COMPARISON: CTA head and neck 4/25/2022  CONTRAST: 10mL Gadavist  TECHNIQUE:   1) Routine multiplanar multisequence head MRI without and with intravenous contrast.  2) 3D time-of-flight head MRA without intravenous contrast.  3) Neck MRA without and with IV contrast. Stenosis measurements made according to NASCET criteria unless otherwise specified.    FINDINGS:  HEAD MRI:  INTRACRANIAL CONTENTS: There is a 3.5 x 2 x 3.5 cm (AP by TR by CC) zone of restricted diffusion in the right basal ganglia with extension into the right caudate nucleus and right corona. No mass, acute hemorrhage, or extra-axial fluid collections. There   is moderate to severe small vessel ischemic disease in the cerebral white matter and yon. Chronic infarct frontal white matter. Mild to moderate generalized cerebral atrophy. No hydrocephalus. Normal position of the cerebellar tonsils. No pathologic   contrast enhancement.    SELLA: No abnormality accounting for technique.    OSSEOUS STRUCTURES/SOFT TISSUES: Diffusely heterogeneous, but overall benign marrow signal pattern. The major intracranial vascular flow voids are maintained.     ORBITS: No abnormality accounting for technique.     SINUSES/MASTOIDS: Mild mucosal thickening scattered about the paranasal sinuses. Scattered fluid/membrane thickening in the mastoid air cells bilaterally.     HEAD MRA:  Degraded by motion.  ANTERIOR CIRCULATION: Duplicated left M1 segment. Severe stenosis of the left A3 segment. Moderate to severe focal stenosis of the proximal inferior left M1 segment. No definite branch occlusion. No aneurysm or AVM. Standard [place] anatomy.    POSTERIOR CIRCULATION: Mild multifocal irregularity of the basilar artery. Severe stenosis involving the right P1 and P2 segments of the right posterior cerebral artery as well as multifocal stenosis of the proximal left PCA. No aneurysm or AVM. Balanced   vertebral arteries supply a normal basilar artery.     NECK MRA:    RIGHT CAROTID: No measurable stenosis or dissection.    LEFT CAROTID: No measurable stenosis or dissection.    VERTEBRAL ARTERIES: No focal stenosis or dissection. Balanced vertebral arteries.    AORTIC ARCH: Bovine origin left common carotid artery. No significant stenosis at the origin of the great vessels.      Impression    IMPRESSION:  HEAD MRI:   1.  Moderate-sized early subacute ischemic infarct in the right corpus striatum/corona radiata.  2.  Age-related changes.    HEAD MRA:   1.  No acute branch occlusion, aneurysm or AVM.  2.  Multifocal areas of relatively severe stenosis as detailed above, stable from CTA head from 4/25/2022.  3.  Severe stenosis left A3 segment. Moderate to severe stenosis at the proximal left M1 segment (the left M1 segments are duplicated), severe multifocal stenosis of the right P1 and P2 segments as well as severe short segment stenosis of the left P2   segment.    NECK MRA:  1.  No measurable stenosis or dissection.   MRA Neck (Carotids) wo & w Contrast    Narrative    EXAM: MR BRAIN W/O and W CONTRAST, MRA BRAIN ([place]) WO CONTRAST, MRA NECK (CAROTIDS) WO and W CONTRAST  LOCATION: [facility]  DATE/TIME: 4/26/2022 2:38 AM    INDICATION: Stroke, follow up  COMPARISON: CTA head and neck 4/25/2022  CONTRAST: 10mL Gadavist  TECHNIQUE:   1) Routine multiplanar multisequence head MRI without and with intravenous contrast.  2) 3D time-of-flight head MRA without intravenous contrast.  3) Neck MRA without and with IV contrast. Stenosis measurements made according to NASCET criteria unless otherwise specified.    FINDINGS:  HEAD MRI:  INTRACRANIAL CONTENTS: There is a 3.5 x 2 x 3.5 cm (AP by TR by CC) zone of restricted diffusion in the right basal ganglia with extension into the right caudate nucleus and right corona. No mass, acute hemorrhage, or extra-axial fluid collections. There   is moderate to severe small vessel ischemic disease in the  cerebral white matter and yon. Chronic infarct frontal white matter. Mild to moderate generalized cerebral atrophy. No hydrocephalus. Normal position of the cerebellar tonsils. No pathologic   contrast enhancement.    SELLA: No abnormality accounting for technique.    OSSEOUS STRUCTURES/SOFT TISSUES: Diffusely heterogeneous, but overall benign marrow signal pattern. The major intracranial vascular flow voids are maintained.     ORBITS: No abnormality accounting for technique.     SINUSES/MASTOIDS: Mild mucosal thickening scattered about the paranasal sinuses. Scattered fluid/membrane thickening in the mastoid air cells bilaterally.     HEAD MRA:  Degraded by motion.  ANTERIOR CIRCULATION: Duplicated left M1 segment. Severe stenosis of the left A3 segment. Moderate to severe focal stenosis of the proximal inferior left M1 segment. No definite branch occlusion. No aneurysm or AVM. Standard [place] anatomy.    POSTERIOR CIRCULATION: Mild multifocal irregularity of the basilar artery. Severe stenosis involving the right P1 and P2 segments of the right posterior cerebral artery as well as multifocal stenosis of the proximal left PCA. No aneurysm or AVM. Balanced   vertebral arteries supply a normal basilar artery.     NECK MRA:   RIGHT CAROTID: No measurable stenosis or dissection.    LEFT CAROTID: No measurable stenosis or dissection.    VERTEBRAL ARTERIES: No focal stenosis or dissection. Balanced vertebral arteries.    AORTIC ARCH: Bovine origin left common carotid artery. No significant stenosis at the origin of the great vessels.      Impression    IMPRESSION:  HEAD MRI:   1.  Moderate-sized early subacute ischemic infarct in the right corpus striatum/corona radiata.  2.  Age-related changes.    HEAD MRA:   1.  No acute branch occlusion, aneurysm or AVM.  2.  Multifocal areas of relatively severe stenosis as detailed above, stable from CTA head from 4/25/2022.  3.  Severe stenosis left A3 segment. Moderate  to severe stenosis at the proximal left M1 segment (the left M1 segments are duplicated), severe multifocal stenosis of the right P1 and P2 segments as well as severe short segment stenosis of the left P2   segment.    NECK MRA:  1.  No measurable stenosis or dissection.

## 2022-04-26 NOTE — PROGRESS NOTES
04/26/22 1335   Quick Adds   Type of Visit Initial PT Evaluation   Living Environment   People in Home alone   Current Living Arrangements apartment   Home Accessibility no concerns   Living Environment Comments lives alone   Self-Care   Fall history within last six months yes   Number of times patient has fallen within last six months 2  (at least 2; patient unclear how many exactly)   Activity/Exercise/Self-Care Comment normally independent with mobility and cares; daughter sets up meds and grocery shops for noah   General Information   Onset of Illness/Injury or Date of Surgery 04/25/22   Referring Physician Susanne Jacinto, DO   Patient/Family Therapy Goals Statement (PT) return home   Pertinent History of Current Problem (include personal factors and/or comorbidities that impact the POC) per chart: Margy Babin is a 89 year old female who lives in independent living who presented to the ED via EMS after being found down at home found to have LLL opacity concerning for LLL PNA and subacute R CVA.  Patient also noted to have Afib with RVR; see medical record for further information   Existing Precautions/Restrictions fall;oxygen therapy device and L/min  (1L)   Heart Disease Risk Factors Medical history   General Observations Patient in bed; appears forgetful and confused at times   Cognition   Orientation Status (Cognition) person;place  (knew she was in the hospital; thought it was May; didn't give year)   Follows Commands (Cognition) 75-90% accuracy   Safety Deficit (Cognition) awareness of need for assistance;insight into deficits/self-awareness;safety precautions follow-through/compliance;safety precautions awareness;ability to follow commands;at risk behavior observed   Memory Deficit (Cognition) other (see comments)  (appears impaired; defer to OT for further testing)   Cognitive Status Comments appears impaired; defer to OT for further testing   Pain Assessment   Patient Currently in Pain  No   Posture    Posture Comments forward flexed at head and trunk   Range of Motion (ROM)   ROM Comment shoulders appears limited to approximately 90 degrees   Strength (Manual Muscle Testing)   Strength Comments functional weakness noted with mobility   Bed Mobility   Comment, (Bed Mobility) SBA and use of bedrail   Transfers   Comment, (Transfers) CGA for sit<>stand; use of walker   Gait/Stairs (Locomotion)   Comment, (Gait/Stairs) able to ambulate in room with walker and CGA   Balance   Balance Comments impaired; current need for walker and assist; no gross LOB   Coordination   Coordination Comments appears intact during session   Clinical Impression   Criteria for Skilled Therapeutic Intervention Yes, treatment indicated   PT Diagnosis (PT) impaired functional mobility   Influenced by the following impairments impaired cognition; decreased activity tolerance; functional weakness; impaired balance   Functional limitations due to impairments impaired independence with functional mobility and cares secondary to above deficits   Clinical Presentation (PT Evaluation Complexity) Evolving/Changing   Clinical Presentation Rationale clinical judgement; level of assist   Clinical Decision Making (Complexity) low complexity   Planned Therapy Interventions (PT) balance training;bed mobility training;gait training;patient/family education;transfer training;progressive activity/exercise   Anticipated Equipment Needs at Discharge (PT) walker, rolling   Risk & Benefits of therapy have been explained evaluation/treatment results reviewed;care plan/treatment goals reviewed;risks/benefits reviewed;current/potential barriers reviewed;participants voiced agreement with care plan;participants included;patient   Clinical Impression Comments Patient below reported baseline level of function; appears to have > cognitive than physical deficits   PT Discharge Planning   PT Discharge Recommendation (DC Rec) Transitional Care Facility;home  with assist;home with home care physical therapy   PT Rationale for DC Rec Patient below reported baseline level of function and currently requiring assist for all mobility and cares; further limited by impaired cognition; would benefit from PT in hospital and TCU to maximize return to PLOF; appears cognitive deficits currently worse than physical deficits; if patient were to return directly home, would need 24/7 assist for mobility and cares and Home PT   PT Brief overview of current status A x 1 with walker   Total Evaluation Time   Total Evaluation Time (Minutes) 10   Physical Therapy Goals   PT Frequency Daily   PT Predicted Duration/Target Date for Goal Attainment 04/29/22   PT Goals Bed Mobility;Transfers;Gait   PT: Bed Mobility Independent;Supine to/from sit;Rolling   PT: Transfers Modified independent;Sit to/from stand;Bed to/from chair;Assistive device   PT: Gait Modified independent;Rolling walker;Greater than 200 feet

## 2022-04-26 NOTE — PROGRESS NOTES
XCoverage: paged by RN regarding tachycardia on Tele, seems to be in Afib HR varies between -150's; EKG ordered; resume PTA Toprol XL 50 mg po daily, give first dose now; Metoprolol iv prn for HR>120.    Zaina Rose MD

## 2022-04-26 NOTE — PLAN OF CARE
Goal Outcome Evaluation:  Orientation/Cognitive: A&O   Observation Goals (Met/ Not Met): met   Mobility Level/Assist Equipment: ind   Fall Risk (Y/N): N  Behavior Concerns: none   Pain Management: pain free   Tele/VS/O2: NSR  ABNL Lab/BG: n/a  Diet: regular  Bowel/Bladder: continent   Skin Concerns: WDL   Drains/Devices: none   Tests/Procedures for next shift: none   Anticipated DC date & active delays: 4/26/2022   Patient Stated Goal for Today: discharge

## 2022-04-26 NOTE — PROGRESS NOTES
04/26/22 1121   General Information   Onset of Illness/Injury or Date of Surgery 04/25/22   Referring Physician Joanna Barthell, PA-C   Patient/Family Therapy Goal Statement (SLP) Patient did not state.   Pertinent History of Current Problem Margy Babin is a 89 year old female who lives in independent living who presented to the ED via EMS after being found down at home found to have LLL opacity concerning for LLL PNA and subacute R CVA.   General Observations Pleasant mild dysarthria and  leaning hard to the right.   Past History of Dysphagia Patient seen on a recent admission on 2/10/22 due to a choking episode. She discharged on a regular diet and thin liquids at that time.   Type of Evaluation   Type of Evaluation Swallow Evaluation   Oral Motor   Oral Musculature anomalies present   Structural Abnormalities none present   Mucosal Quality adequate   Dentition (Oral Motor)   Dentition (Oral Motor) adequate dentition   Facial Symmetry (Oral Motor)   Facial Symmetry (Oral Motor) right side impairment   Lip Function (Oral Motor)   Lip Range of Motion (Oral Motor) retraction impairment   Lip Strength (Oral Motor) right side;mild impairment   Comment, Lip Function (Oral Motor) Patient reporta alway having a crooked smile.   Tongue Function (Oral Motor)   Tongue Strength (Oral Motor) strength decreased;bilateral;minimal impairment   Tongue Coordination/Speed (Oral Motor) reduced rate   Tongue ROM (Oral Motor) elevation is impaired;protrusion is impaired;lateralization is impaired   Protrusion, Tongue ROM Impairment (Oral Motor) left side;minimal impairment   Lateralization, Tongue ROM Impairment (Oral Motor) bilateral;minimal impairment   Elevation, Tongue ROM Impairment (Oral Motor) bilateral;moderate impairment   Jaw Function (Oral Motor)   Jaw Function (Oral Motor) WNL   Cough/Swallow/Gag Reflex (Oral Motor)   Soft Palate/Velum (Oral Motor) WNL   Volitional Throat Clear/Cough (Oral Motor)  impaired;reduced strength   Volitional Swallow (Oral Motor) mildly delayed   Vocal Quality/Secretion Management (Oral Motor)   Vocal Quality (Oral Motor) dysphonic;strained/strangled;pitch breaks   General Swallowing Observations   Respiratory Support (General Swallowing Observations) nasal cannula;supplemental oxygen   Current Diet/Method of Nutritional Intake (General Swallowing Observations, NIS) regular diet;thin liquids (level 0)   Swallowing Evaluation Clinical swallow evaluation   Clinical Swallow Evaluation   Feeding Assistance set up only required   Clinical Swallow Evaluation Textures Trialed thin liquids;pureed;solid foods   Clinical Swallow Eval: Thin Liquid Texture Trial   Mode of Presentation, Thin Liquids cup;self-fed   Volume of Liquid or Food Presented 4 oz of water   Oral Phase of Swallow premature pharyngeal entry   Pharyngeal Phase of Swallow impaired;reduction in laryngeal movement;repeated swallows   Diagnostic Statement Premature entry suspected without overt Sx of aspiration via the cup, burping noted after the swallow.   Clinical Swallow Evaluation: Puree Solid Texture Trial   Mode of Presentation, Puree spoon;self-fed   Volume of Puree Presented 3 teaspoons fo apple sauce   Oral Phase, Puree WFL   Pharyngeal Phase, Puree impaired;reduction in laryngeal movement   Diagnostic Statement No Sx of aspiration.   Clinical Swallow Evaluation: Solid Food Texture Trial   Mode of Presentation self-fed   Volume Presented Quarter of a hamburger   Oral Phase impaired mastication;delayed AP movement;residue in oral cavity;premature pharyngeal entry   Oral Residue left anterior lateral sulci;right anterior lateral sulci   Pharyngeal Phase impaired;reduction in laryngeal movement;repeated swallows;throat clearing   Diagnostic Statement Prolonged mastication, reduced bolus control with loss into the lateral sulci, decreased awareness and kept adding more despite not clearing.   Esophageal Phase of Swallow    Patient reports or presents with symptoms of esophageal dysphagia Yes   Esophageal comments Patient with moderate size hitial hernia. Burping with oral intake suspect slow motility.   Swallowing Recommendations   Diet Consistency Recommendations soft & bite-sized (level 6);thin liquids (level 0)   Supervision Level for Intake close supervision needed   Mode of Delivery Recommendations bolus size, small;food moistened;no straws;slow rate of intake   Postural Recommendations none   Swallowing Maneuver Recommendations alternate food and liquid intake;extra swallow   Monitoring/Assistance Required (Eating/Swallowing) check mouth frequently for oral residue/pocketing;stop eating activities when fatigue is present;monitor for cough or change in vocal quality with intake   Recommended Feeding/Eating Techniques (Swallow Eval) maintain upright sitting position for eating;maintain upright posture during/after eating for 30 minutes;minimize distractions during oral intake;set-up and prepare tray;provide 6 smaller meals throughout day   Medication Administration Recommendations, Swallowing (SLP) Whole in apple sauce as tolerated.   General Therapy Interventions   Planned Therapy Interventions Dysphagia Treatment   Dysphagia treatment Modified diet education;Instruction of safe swallow strategies   Clinical Impression   Criteria for Skilled Therapeutic Interventions Met (SLP Eval) Yes, treatment indicated   SLP Diagnosis Mild to moderate oral and pharyngeal dysphagia   Risks & Benefits of therapy have been explained evaluation/treatment results reviewed;care plan/treatment goals reviewed;risks/benefits reviewed;current/potential barriers reviewed;participants voiced agreement with care plan;participants included;patient   Clinical Impression Comments Patient presents with mild to moderate oral and pharyngeal dysphagia at bedside secondary to right CVA. Deficits are characterized by reduced bolus control with thin liquids and  suspect premature entry without overt Sx of aspiration via the cup across 4 oz of water. Mastication was prolonged for solid with decreased bolus coordination/control during AP movement resulting in mild to moderate oral residue. This was cleared with multiple swallows followed by a liquid rinse. Throat clearing/weak cough noted after the swallow. Suspect some pharyngeal residue with esophageal dysfunction suspected. Patient increases her risk for aspiration due to decreased oral awareness and continued to add more food into her mouth.     Recommend: 1. downgrade diet to IDDSI soft and bite/size with thin liquids. 2. Up in a chair for all meals and 30 minutes after. No straws, small bites/sips, alternate liquids/solids, check for pocketing of food.   SLP Discharge Planning   SLP Discharge Recommendation Transitional Care Facility   SLP Rationale for DC Rec Patients swallow and communication are belwo her baseline and will need on going ST needs for both.   SLP Brief overview of current status  Mild to moderate dysphagia with esophageal dysfunction suspected.    Total Evaluation Time   Total Evaluation Time (Minutes) 17   SLP Goals   Therapy Frequency (SLP Eval) 5 times/wk   SLP Predicted Duration/Target Date for Goal Attainment 05/03/22   SLP Goals Swallow   SLP: Safely tolerate diet without signs/symptoms of aspiration Thin liquids;With use of swallow precautions;Regular diet;With assistance/supervision

## 2022-04-26 NOTE — PROGRESS NOTES
"Orientation/Cognitive: A/O x3 confused to time  Observation Goals (Met/ Not Met): not met  Mobility Level/Assist Equipment: A1 GB walker  Fall Risk (Y/N): yes  Behavior Concerns: none  Pain Management: denies pain  Tele/VS/O2: BP elevated, rhythm - a fib with LBB. All other VSS.  ABNL Lab/BG: CK, lactic, WBC - see results  Diet: regular, tolerating well ate 100% of dinner  Bowel/Bladder: up with assist. Has been continent this shift.   Skin Concerns: scattered bruising.   Drains/Devices: PIV LUE and RUE. NS running at 100 ml/hr  Tests/Procedures for next shift: Ct  Anticipated DC date & active delays: 1-2 days  Patient Stated Goal for Today: \"not fall\"  "

## 2022-04-26 NOTE — CONSULTS
Patient has pharmacy insurance through [employer].    This plan does not cover Eliquis.    Xarelto:  $14/mo.     Jantoven (warfarin): $4/mo.      SHAHID Lechuga, Pharmacy Technician/Liaison, Discharge Pharmacy, [phone]

## 2022-04-26 NOTE — PROVIDER NOTIFICATION
MD Notification    Notified Person: PA    Notified Person Name: Joanna Barthell     Notification Date/Time: 4/26/2021 7:14am     Notification Interaction: Taboola     Purpose of Notification: Pt's HR still a fib RVR after IV metoprolol given at 6:50am    Orders Received: change PRN to 2.5-5 mg IV metoprolol and give 1 dose of 5mg IV metoprolol now    Comments:

## 2022-04-26 NOTE — PLAN OF CARE
Goal Outcome Evaluation:  Orientation/Cognitive: A&Ox2 disoriented to place and situation  Observation Goals (Met/ Not Met): Not met  Mobility Level/Assist Equipment: Ax1 GB/W  Fall Risk (Y/N): yes  Behavior Concerns: green  Pain Management: denies pain  Tele/VS/O2: VSS on 1 L O2, tele was SB with BBB, at 0530 pt converted to A fib RVR. PO metoprolol given no change in HR, IV metoprolol given at 0650.  ABNL Lab/BG: Troponin 20, , WBC 17.8, MRI completed see results  Diet: reg  Bowel/Bladder: continent   Skin Concerns: scattered bruising  Drains/Devices: 2 PIVs, IV SL after fluid bolus  Tests/Procedures for next shift: PT/OT and social work to see pt  Anticipated DC date & active delays: pending   Patient Stated Goal for Today: rest

## 2022-04-26 NOTE — PROGRESS NOTES
[id number]:    VSS on RA. A&Ox3 - unsure of time. Pt able to follow commands. CT neck done this shift, MRI checklist complete w pt. MRI for tmrw. Ambulating w Ax1, GB, walker. Denies pain. NS @ 100 mL/hr until 0430. Continent of bowel and bladder, urinating in bathroom. On IV abx for LLL pneumonia. Tele - a fib w left bundle branch block. Denies chest pain, SOB.

## 2022-04-26 NOTE — PROGRESS NOTES
"PRIMARY DIAGNOSIS: \"GENERIC\" NURSING  OUTPATIENT/OBSERVATION GOALS TO BE MET BEFORE DISCHARGE:  1. ADLs back to baseline: No    2. Activity and level of assistance: Up with standby assistance.    3. Pain status: Improved-controlled with oral pain medications.    4. Return to near baseline physical activity: No     Discharge Planner Nurse   Safe discharge environment identified: No  Barriers to discharge: Yes PT, OT, SW/CM consults       Entered by: Shayy Agustin 04/25/2022 7:23 PM     Please review provider order for any additional goals.   Nurse to notify provider when observation goals have been met and patient is ready for discharge.  "

## 2022-04-26 NOTE — PROVIDER NOTIFICATION
MD Notification    Notified Person: MD    Notified Person Name: emeli     Notification Date/Time: 4/26/22 3414    Notification Interaction: text page    Purpose of Notification: Pts heart rate sitting between 110-180s. Sits in the 150s. Pt may be in a fib RVR looking at tele. can we get a stat EKG 1st? Thanks     Orders Received: stat EKG ordered, resume home metroprol    Comments:  Repage 0554: FYI EKG showed A fib RVR, PO metoprolol given, will reassess in an hour

## 2022-04-26 NOTE — PROVIDER NOTIFICATION
MD Notification    Notified Person: PA neuro stroke     Notified Person Name: Elvira Hung     Notification Date/Time: 4/26/2022 8:17am      Notification Interaction: web page     Purpose of Notification: PA Joanna Barthell requested I ask if you are okay with patient being on a heparin drip? Pt currently tachycardic A fib RVR . Thanks MR RN     Orders Received: hold off on heparin until stroke team evaluates    Comments:

## 2022-04-27 ENCOUNTER — APPOINTMENT (OUTPATIENT)
Dept: CARDIOLOGY | Facility: CLINIC | Age: 87
DRG: 064 | End: 2022-04-27
Attending: NURSE PRACTITIONER
Payer: MEDICARE

## 2022-04-27 ENCOUNTER — APPOINTMENT (OUTPATIENT)
Dept: PHYSICAL THERAPY | Facility: CLINIC | Age: 87
DRG: 064 | End: 2022-04-27
Payer: MEDICARE

## 2022-04-27 ENCOUNTER — APPOINTMENT (OUTPATIENT)
Dept: SPEECH THERAPY | Facility: CLINIC | Age: 87
DRG: 064 | End: 2022-04-27
Payer: MEDICARE

## 2022-04-27 LAB
BACTERIA UR CULT: NO GROWTH
LVEF ECHO: NORMAL
POTASSIUM BLD-SCNC: 3.8 MMOL/L (ref 3.4–5.3)

## 2022-04-27 PROCEDURE — 999N000111 HC STATISTIC OT IP EVAL DEFER: Performed by: OCCUPATIONAL THERAPIST

## 2022-04-27 PROCEDURE — 250N000011 HC RX IP 250 OP 636: Performed by: STUDENT IN AN ORGANIZED HEALTH CARE EDUCATION/TRAINING PROGRAM

## 2022-04-27 PROCEDURE — 999N000208 ECHOCARDIOGRAM LIMITED

## 2022-04-27 PROCEDURE — 250N000011 HC RX IP 250 OP 636: Performed by: PHYSICIAN ASSISTANT

## 2022-04-27 PROCEDURE — 84132 ASSAY OF SERUM POTASSIUM: CPT | Performed by: PHYSICIAN ASSISTANT

## 2022-04-27 PROCEDURE — 250N000013 HC RX MED GY IP 250 OP 250 PS 637: Performed by: NURSE PRACTITIONER

## 2022-04-27 PROCEDURE — 93308 TTE F-UP OR LMTD: CPT | Mod: 26 | Performed by: INTERNAL MEDICINE

## 2022-04-27 PROCEDURE — 93325 DOPPLER ECHO COLOR FLOW MAPG: CPT | Mod: 26 | Performed by: INTERNAL MEDICINE

## 2022-04-27 PROCEDURE — 99232 SBSQ HOSP IP/OBS MODERATE 35: CPT | Performed by: PHYSICIAN ASSISTANT

## 2022-04-27 PROCEDURE — 120N000001 HC R&B MED SURG/OB

## 2022-04-27 PROCEDURE — 36415 COLL VENOUS BLD VENIPUNCTURE: CPT | Performed by: PHYSICIAN ASSISTANT

## 2022-04-27 PROCEDURE — 255N000002 HC RX 255 OP 636: Performed by: INTERNAL MEDICINE

## 2022-04-27 PROCEDURE — 250N000013 HC RX MED GY IP 250 OP 250 PS 637: Performed by: STUDENT IN AN ORGANIZED HEALTH CARE EDUCATION/TRAINING PROGRAM

## 2022-04-27 PROCEDURE — 250N000013 HC RX MED GY IP 250 OP 250 PS 637: Performed by: PHYSICIAN ASSISTANT

## 2022-04-27 PROCEDURE — 97116 GAIT TRAINING THERAPY: CPT | Mod: GP | Performed by: PHYSICAL THERAPIST

## 2022-04-27 PROCEDURE — 97530 THERAPEUTIC ACTIVITIES: CPT | Mod: GP | Performed by: PHYSICAL THERAPIST

## 2022-04-27 PROCEDURE — 258N000003 HC RX IP 258 OP 636: Performed by: STUDENT IN AN ORGANIZED HEALTH CARE EDUCATION/TRAINING PROGRAM

## 2022-04-27 PROCEDURE — 92526 ORAL FUNCTION THERAPY: CPT | Mod: GN

## 2022-04-27 PROCEDURE — 93321 DOPPLER ECHO F-UP/LMTD STD: CPT | Mod: 26 | Performed by: INTERNAL MEDICINE

## 2022-04-27 RX ORDER — POTASSIUM CHLORIDE 1500 MG/1
20 TABLET, EXTENDED RELEASE ORAL ONCE
Status: COMPLETED | OUTPATIENT
Start: 2022-04-27 | End: 2022-04-27

## 2022-04-27 RX ORDER — FUROSEMIDE 10 MG/ML
20 INJECTION INTRAMUSCULAR; INTRAVENOUS ONCE
Status: COMPLETED | OUTPATIENT
Start: 2022-04-27 | End: 2022-04-27

## 2022-04-27 RX ORDER — METOPROLOL SUCCINATE 50 MG/1
100 TABLET, EXTENDED RELEASE ORAL DAILY
Status: DISCONTINUED | OUTPATIENT
Start: 2022-04-27 | End: 2022-04-28 | Stop reason: HOSPADM

## 2022-04-27 RX ADMIN — METOPROLOL SUCCINATE 100 MG: 50 TABLET, EXTENDED RELEASE ORAL at 08:39

## 2022-04-27 RX ADMIN — QUETIAPINE FUMARATE 25 MG: 25 TABLET ORAL at 22:32

## 2022-04-27 RX ADMIN — HUMAN ALBUMIN MICROSPHERES AND PERFLUTREN 9 ML: 10; .22 INJECTION, SOLUTION INTRAVENOUS at 12:38

## 2022-04-27 RX ADMIN — AZITHROMYCIN MONOHYDRATE 250 MG: 500 INJECTION, POWDER, LYOPHILIZED, FOR SOLUTION INTRAVENOUS at 12:52

## 2022-04-27 RX ADMIN — ALBUTEROL SULFATE 2 PUFF: 90 AEROSOL, METERED RESPIRATORY (INHALATION) at 12:52

## 2022-04-27 RX ADMIN — ATORVASTATIN CALCIUM 20 MG: 20 TABLET, FILM COATED ORAL at 20:09

## 2022-04-27 RX ADMIN — CEFTRIAXONE SODIUM 2 G: 2 INJECTION, POWDER, FOR SOLUTION INTRAMUSCULAR; INTRAVENOUS at 14:09

## 2022-04-27 RX ADMIN — POTASSIUM CHLORIDE 20 MEQ: 1500 TABLET, EXTENDED RELEASE ORAL at 11:03

## 2022-04-27 RX ADMIN — FUROSEMIDE 20 MG: 10 INJECTION, SOLUTION INTRAVENOUS at 14:56

## 2022-04-27 RX ADMIN — PAROXETINE HYDROCHLORIDE 20 MG: 20 TABLET, FILM COATED ORAL at 22:32

## 2022-04-27 RX ADMIN — ACETAMINOPHEN 650 MG: 325 TABLET ORAL at 19:52

## 2022-04-27 RX ADMIN — OXYBUTYNIN CHLORIDE 15 MG: 10 TABLET, EXTENDED RELEASE ORAL at 08:39

## 2022-04-27 RX ADMIN — ASPIRIN 81 MG: 81 TABLET, COATED ORAL at 08:39

## 2022-04-27 ASSESSMENT — ACTIVITIES OF DAILY LIVING (ADL)
ADLS_ACUITY_SCORE: 16
WALKING_OR_CLIMBING_STAIRS_DIFFICULTY: YES
WEAR_GLASSES_OR_BLIND: YES
ADLS_ACUITY_SCORE: 16
ADLS_ACUITY_SCORE: 16
DOING_ERRANDS_INDEPENDENTLY_DIFFICULTY: NO
ADLS_ACUITY_SCORE: 16
NUMBER_OF_TIMES_PATIENT_HAS_FALLEN_WITHIN_LAST_SIX_MONTHS: 2
ADLS_ACUITY_SCORE: 16
CONCENTRATING,_REMEMBERING_OR_MAKING_DECISIONS_DIFFICULTY: NO
ADLS_ACUITY_SCORE: 16
ADLS_ACUITY_SCORE: 16
FALL_HISTORY_WITHIN_LAST_SIX_MONTHS: YES
VISION_MANAGEMENT: PRESCRIPTION GLASSES
ADLS_ACUITY_SCORE: 16
DRESSING/BATHING_DIFFICULTY: YES
ADLS_ACUITY_SCORE: 16
DIFFICULTY_EATING/SWALLOWING: YES
DIFFICULTY_COMMUNICATING: NO

## 2022-04-27 NOTE — PLAN OF CARE
A/Ox2. VSS. Weaned to RA. Neuros intact, slight slurred speech at times. ARZOLA. Expiratory wheezes noted, hosp ordered 1 x dose IV lasix. Up Ax1 GB/W. Impulsive. Tele discontinued today. Echo completed. Tolerating diet. Per neuro pt can start DOAC 5/1. Discharge pending.

## 2022-04-27 NOTE — PLAN OF CARE
Goal Outcome Evaluation:            Shift Report [id number].     Pt confused at start of shift, able to be reoriented, also enjoyed eating a snack before falling asleep. Neuro signs unchanged, speech slightly slurred at times. Respiratory effort increases at times but pt denies feeling SOB. Crackles heard to LLL. 02 via NC @ 1-2L/min for hypoxic episodes, pt does remove oxygen at times, placed on continuous pulse oximetry. Pt is incontinent, also able to ambulate to the bathroom A1 W/Gb for voiding. R PIV SL with int abx. Tele NSR this shift, hypertensive at times. Per Neurology note, pt to start DOAC on 5/1. Echo to be completed today. Discharge to [facility] today or tomorrow.

## 2022-04-27 NOTE — PROGRESS NOTES
Patient is a 89 year old female who lives in independent living who presented to the ED after being found down at home found to have LLL opacity concerning for LLL PNA and subacute R CVA.  She is receiving intermittant ABXs. Heparin is being held.  VSS on Room air except for A Fib RVR.  Patient is on tele.  Early this morning patient's pulse was bouncing up to 160 and she was given an injection of metoprolol which has maintained it at 75.  Patient is up to the bathroom with an assist of 1 and a walker.  She is A&O x 2.  She is disoriented to place and situation.  This evening at bedtime her confusion has increased. She believes her family is nearby in the building and wants to visit them.  Neurochecks are largely normal.  There is some speech distortion.   She is on a diet of soft, small bites and thin liquids and is eating well.  Discharge  to TCU tomorrow pending placement at [facility].

## 2022-04-27 NOTE — PLAN OF CARE
OT: Orders received. Chart reviewed and discussed with care team.  OT defer to next level of care as plan is to discharge to TCU today or tomorrow for decreased cognition and I with ALD's .  Defer discharge recommendations to medical team.  Will complete orders.

## 2022-04-27 NOTE — PROGRESS NOTES
"[facility]    Medicine History and Physical - Hospitalist Service       Date of Admission:  4/25/2022    Assessment & Plan   Margy Babin is a 89 year old female who lives in independent living who presented to the ED via EMS after being found down at home found to have LLL opacity concerning for LLL PNA and subacute R CVA.    Metabolic encephalopathy vs delirium vs unrecognized prior cognitive impairment.  HOD#1 nursing notes indicate disorientation to place and situation and confusion with belief that family in building nearby; confusion worsened as evening went on. No confusion or disorientation during provider exam. Daughter previously noted her mother is usually \"sharp as a tack\".  - Maintain day / night sleep cycles. Bedside sitter if needed.  - Treat below.    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  Presented after fall and found down. No focal neuro deficits.  * MRI brain: moderated sized early subacute ischemic infarct R corpus striatum/corona radiata.  * MRA head / neck: multifocal areas severe stenosis involving segments of left sided anterior and posterior circulation  * LDL 77, A1C 5.8  * TTE 1/2022 showed midrange EF 45-50%, mild valve pathology, mild global hypokinesia, no comment on atria morphology.    - Likely cardioembolic pathology from AF.  - Discontinue tele.  - Long term BP goal normotension <130/80.  - Limited TTE pending.  - Stroke neuro consulted. Recommending atorvastatin 20mg titrated to LDL goal 40-70 and ASA 81mg x 5 days until switch to anticoagulation.  - Restart amlodipine  - PT / OT / SLP recommending TCU.     Paroxysmal Afib with RVR - new diagnosis.  Appears hx AF Jan 2022 and then evaluated by cardiology, but felt likely 2/2 acute sepsis and was not recommended anticoagulation. Overnight HOD#0 this adm, went into AF with RVR with HRs sustained in 160s that improved after IV metoprolol.  - Increase PTA metoprolol succinate from 50 " to 100mg.  - Resume PTA amlodipine given elevated SBPs despite increased dose beta blocker.  - PRN metoprolol tartrate for sustained HR >120   - Plan Xarelto ($14) vs warfarin ($4) (Eliquis not covered by insurance) in conversation with patient / dtr to start May 2 per neuro stroke recommendations.    Acute LLL CAP.  CXR with streaky L sided opacity. WBC 17. Clinically no signs of pneumonia, however empiric abx started given concern for CVA and aspiration on adm.  - rocephin and azithro, initiated 4/25.    Mild-moderate oral adn pharyngeal dysphagia 2/2 CVA.  Suspected esophageal dysfunction.  - SLP recommending downgraded diet as ordered and TCU.    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  Chronic LBBB.  Essential HTN.  Essential Tremor.  - Inc dose PTA metoprolol as above.  - Resume amlodipine.  - Give Lasix 20mg IV one time.  - Monitor I&Os and daily weights.    Falls.  Eleveated CK.  Two falls possibly related to weakness from laxative induced diarrhea vs new subacute cva. Patient recalls ambulating with walker at the time, believes she lost her balance -- but also believes she was able to get up and call EMS when H&P indicates she was found down for unknown length of time by . Denies preceding symptoms. Treated with 1L IVF. CK 350s. Kidney function stable. Serial troponin flat and wnl.  * Noted is Zio patch monitoring in 12/2021 with 2 episodes VT up to 15 beats and 16 episodes SVT up to 17 beats. During adm in Jan had 28 beat run NSVT.  - Tele and work up as above.  - Therapy eval and care mngt team following for dispo planning.    Prediabetes. A1C 5.8 (4/26/2022).  - goal <7.0, will not start medication therapy or monitor in house.     Depression.  - PTA paxil and seroquel at bedtime.     Covid negative.    Clinically Significant Risk Factors Present on Admission                    Diet: Combination Diet Soft and Bite Sized Diet (level 6); Thin Liquids (level 0) (No  carlita)    Pichardo Catheter: Not present    DVT Prophylaxis: Ambulate every shift  Code Status: No CPR- Do NOT Intubate    Expected Discharge: 04/28/2022   Anticipated discharge location:  Awaiting care coordination huddle  Delays:    Anticipate discharge in 1-2 days pending improvement in confusion.    The patient's care was discussed with the Attending Physician, Dr. Jensen, Bedside Nurse, Care Coordinator/, Patient and Patient's Family.    JoAnna K. Barthell, PA-C  Hospitalist Service  [facility]    ______________________________________________________________________    Interval History    Oriented to year alone today (yesterday O x 3). Denies cp, difficulty breathing, coughing, muscle weakness, paresthesias. Requiring O2 earlier in day, since weaned.    Notes reviewed -- confusion noted yesterday, not present during provider eval. TCU recommended.    Data reviewed today: I reviewed all medications, new labs and imaging results over the last 24 hours.    Physical Exam   Vital Signs: Temp: 97.4  F (36.3  C) Temp src: Axillary BP: (!) 161/74 Pulse: 70   Resp: 16 SpO2: 98 % O2 Device: None (Room air) Oxygen Delivery: 1 LPM  Weight: 158 lbs 12.8 oz  Constitutional: Appears stated age, no acute distress. Pleasant female, sleeping on arrival and awakens easily. Oriented only to year; [facility] hospital in [place], unknown reason, and month May (oriented to all yesterday). Mildly dysarthric speech.  Respiratory: Breath sounds with mild inspiratory / expiratory wheezing. No increased work of breathing on room air and O2 sat 94% on bedside monitor.  Cardiovascular: RRR, no rub or murmur. No peripheral edema.  GI: Soft, non-tender, non-distended.  Skin: Warm, dry, no rashes or lesions. Ecchymosis left lateral knee and elbow.    Medications       [Held by provider] amLODIPine  2.5 mg Oral Daily     aspirin  81 mg Oral Daily     atorvastatin  20 mg Oral QPM     azithromycin  250 mg  Intravenous Q24H     cefTRIAXone  2 g Intravenous Q24H     metoprolol succinate ER  100 mg Oral Daily     oxybutynin ER  15 mg Oral Daily     PARoxetine  20 mg Oral At Bedtime     QUEtiapine  25 mg Oral At Bedtime       Data   Recent Labs   Lab 04/26/22  0705 04/25/22  1126   WBC 13.1* 17.8*   HGB 12.1 14.1   MCV 89 91    446    139   POTASSIUM 3.3* 3.7   CHLORIDE 113* 104   CO2 20 24   BUN 15 20   CR 0.86 0.98   ANIONGAP 10 11   SOBIA 8.4* 9.4   * 98   ALBUMIN  --  3.1*   PROTTOTAL  --  7.8   BILITOTAL  --  0.7   ALKPHOS  --  100   ALT  --  27   AST  --  36       Imaging:  No results found for this or any previous visit (from the past 24 hour(s)).

## 2022-04-27 NOTE — PROGRESS NOTES
"      [facility]    Brief Stroke Note    Interval EventsChart reviewed, did not examine patient today. TTE reviewed, inpatient stroke work up complete. Please see my note from 4/26/2022 for full stroke neurology evaluation.    TTE: EF 50-55%, LV normal in size and wall thickness. RV normal. LA is mild to moderately dilated. RA normal.        Plan  - Neurochecks and Vital Signs every 4 hours   - Long term BP goal normotension <130/80 or lower for overall cardiovascular health  - For now continue daily aspirin 81 mg for secondary stroke prevention    -- Will wait 5 days, on May 1st, switch to Xarelto for secondary stroke prevention   - Statin: Lipitor 20 mg, goal LDL 40-70, <40 increases risk of ICH, titrate outpatient with PCP  - Continue telemetry  - Bedside Glucose Monitoring  - A1c within goal <7.0%  - PT/OT/SLP  - Stroke Education  - Euthermia, Euglycemia     Patient Follow-up    - in the next 1-2 week(s) with PCP  - in 4-6 weeks with [facility] or other local neurologist of patient's choice    No further stroke evaluation is recommended, so we will sign off. Please contact us with any additional questions.    KAM Beckford, CNP  Vascular Neurology  To page me or covering stroke neurology team member, click here: [facility]COM   Choose \"On Call\" tab at top, then search dropdown box for \"Neurology Adult\", select location, press Enter, then look for stroke/neuro ICU/telestroke.      "

## 2022-04-28 ENCOUNTER — PATIENT OUTREACH (OUTPATIENT)
Dept: CARE COORDINATION | Facility: CLINIC | Age: 87
End: 2022-04-28
Payer: MEDICARE

## 2022-04-28 VITALS
RESPIRATION RATE: 18 BRPM | WEIGHT: 150.8 LBS | OXYGEN SATURATION: 97 % | HEART RATE: 70 BPM | SYSTOLIC BLOOD PRESSURE: 136 MMHG | TEMPERATURE: 98.1 F | DIASTOLIC BLOOD PRESSURE: 67 MMHG | HEIGHT: 65 IN | BODY MASS INDEX: 25.12 KG/M2

## 2022-04-28 LAB — POTASSIUM BLD-SCNC: 4.1 MMOL/L (ref 3.4–5.3)

## 2022-04-28 PROCEDURE — 36415 COLL VENOUS BLD VENIPUNCTURE: CPT | Performed by: PHYSICIAN ASSISTANT

## 2022-04-28 PROCEDURE — 99239 HOSP IP/OBS DSCHRG MGMT >30: CPT | Performed by: PHYSICIAN ASSISTANT

## 2022-04-28 PROCEDURE — 84132 ASSAY OF SERUM POTASSIUM: CPT | Performed by: PHYSICIAN ASSISTANT

## 2022-04-28 PROCEDURE — 250N000013 HC RX MED GY IP 250 OP 250 PS 637: Performed by: PHYSICIAN ASSISTANT

## 2022-04-28 PROCEDURE — 250N000013 HC RX MED GY IP 250 OP 250 PS 637: Performed by: NURSE PRACTITIONER

## 2022-04-28 PROCEDURE — 250N000013 HC RX MED GY IP 250 OP 250 PS 637: Performed by: INTERNAL MEDICINE

## 2022-04-28 RX ORDER — AMLODIPINE BESYLATE 2.5 MG/1
2.5 TABLET ORAL DAILY
Status: DISCONTINUED | OUTPATIENT
Start: 2022-04-28 | End: 2022-04-28 | Stop reason: HOSPADM

## 2022-04-28 RX ORDER — METOPROLOL SUCCINATE 100 MG/1
100 TABLET, EXTENDED RELEASE ORAL DAILY
DISCHARGE
Start: 2022-04-29 | End: 2022-07-21

## 2022-04-28 RX ORDER — AZITHROMYCIN 250 MG/1
250 TABLET, FILM COATED ORAL ONCE
Qty: 1 TABLET | DISCHARGE
Start: 2022-04-29 | End: 2022-04-29

## 2022-04-28 RX ORDER — AZITHROMYCIN 250 MG/1
250 TABLET, FILM COATED ORAL DAILY
Status: DISCONTINUED | OUTPATIENT
Start: 2022-04-28 | End: 2022-04-28 | Stop reason: HOSPADM

## 2022-04-28 RX ORDER — ATORVASTATIN CALCIUM 20 MG/1
20 TABLET, FILM COATED ORAL EVERY EVENING
DISCHARGE
Start: 2022-04-28 | End: 2022-08-12

## 2022-04-28 RX ADMIN — ASPIRIN 81 MG: 81 TABLET, COATED ORAL at 09:11

## 2022-04-28 RX ADMIN — METOPROLOL SUCCINATE 100 MG: 50 TABLET, EXTENDED RELEASE ORAL at 09:11

## 2022-04-28 RX ADMIN — OXYBUTYNIN CHLORIDE 15 MG: 10 TABLET, EXTENDED RELEASE ORAL at 09:11

## 2022-04-28 RX ADMIN — AMOXICILLIN AND CLAVULANATE POTASSIUM 1 TABLET: 875; 125 TABLET, FILM COATED ORAL at 12:35

## 2022-04-28 RX ADMIN — AZITHROMYCIN MONOHYDRATE 250 MG: 250 TABLET ORAL at 12:35

## 2022-04-28 RX ADMIN — AMLODIPINE BESYLATE 2.5 MG: 2.5 TABLET ORAL at 12:35

## 2022-04-28 ASSESSMENT — ACTIVITIES OF DAILY LIVING (ADL)
ADLS_ACUITY_SCORE: 16

## 2022-04-28 NOTE — PROGRESS NOTES
Clinic Care Coordination Contact  Care Coordination Transition Communication    Referral Source: IP Report    Clinical Data: Patient was hospitalized at [facility]  from 4/25/2022 to 4/28/2022 with diagnosis of Metabolic encephalopathy vs delirium vs unrecognized prior cognitive impairment.     Transition to Facility:              [facility]              Contact name and phone number/fax: [phone]    Plan: RN/SW Care Coordinator will await notification from facility staff informing RN/SW Care Coordinator of patient's discharge plans/needs. RN/SW Care Coordinator will review chart and outreach to facility staff every 4 weeks and as needed.      Jud Miner RN, BSN, PHN  Primary Care / Care Coordinator   [facility] [place]  [email or web address] [email or web address]   [phone]

## 2022-04-28 NOTE — DISCHARGE SUMMARY
Discharge Note    Patient discharged to TCU via EMS/BLS accompanied by no family/friend .  IV: Discontinued  Prescriptions printed and given to patient/family.   Belongings reviewed and sent with patient.   Home medications returned to patient: N/A  Equipment sent with: patient, N/A.   patient verbalizes understanding of discharge instructions. AVS given to patient     A/Ox4 - forgetful. VSS on RA. Neuros intact. Denies pain/nausea. Regular diet, tolerating well. Up A1-SBA w/ GB/W to bathroom. Cont B/B. Up in chair most of day. K+ = 4.1. R/L PIV removed.

## 2022-04-28 NOTE — CONSULTS
Care Management Initial Consult    General Information  Assessment completed with: Tina Bazan  Type of CM/SW Visit: Initial Assessment    Primary Care Provider verified and updated as needed: Yes   Readmission within the last 30 days: no previous admission in last 30 days      Reason for Consult: discharge planning  Advance Care Planning: Advance Care Planning Reviewed: other (see comments) (No acp docs)          Communication Assessment  Patient's communication style: spoken language (English or Bilingual)    Hearing Difficulty or Deaf: yes   Wear Glasses or Blind: yes    Cognitive  Cognitive/Neuro/Behavioral: .WDL except  Level of Consciousness: alert, confused  Arousal Level: opens eyes spontaneously  Orientation: disoriented to, time  Mood/Behavior: calm, cooperative  Best Language: 0 - No aphasia  Speech: clear    Living Environment:   People in home: alone     Current living Arrangements: independent living facility      Able to return to prior arrangements: yes       Family/Social Support:  Care provided by: self  Provides care for: no one  Marital Status:   Children          Description of Support System: Involved, Supportive    Support Assessment: Adequate social supports, Adequate family and caregiver support    Current Resources:   Patient receiving home care services: No     Community Resources: None  Equipment currently used at home: walker, rolling  Supplies currently used at home: None    Employment/Financial:  Employment Status: retired        Financial Concerns:             Lifestyle & Psychosocial Needs:  Social Determinants of Health     Tobacco Use: Low Risk      Smoking Tobacco Use: Never Smoker     Smokeless Tobacco Use: Never Used   Alcohol Use: Not on file   Financial Resource Strain: Not on file   Food Insecurity: Not on file   Transportation Needs: Not on file   Physical Activity: Not on file   Stress: Not on file   Social Connections: Not on file   Intimate Partner Violence: Not  on file   Depression: Not at risk     PHQ-2 Score: 0   Housing Stability: Not on file       Functional Status:  Prior to admission patient needed assistance:              Mental Health Status:          Chemical Dependency Status:                Values/Beliefs:  Spiritual, Cultural Beliefs, Yarsanism Practices, Values that affect care: no               Additional Information:  Per care management/social work consult for discharge planning, patient was admitted on 4/26/2022 with pneumonia due to infectious organism. Tentative date of discharge is either 4/28 or 4/29. Reviewed chart and saw that PT is recommending tcu for patient. Family recommended a referral to [facility] to be [facility] and they accepted for a private room. Called Tina (daughter) to complete initial assessment. She said patient lives in an independent senior living facility. Patient uses a walker and was independent before coming to the hospital. Let her know that [facility] accepted for [facility] room ($58.13) and she said she would like to accept this. She said patient would need a ride set up and is aware of cost. Writer paged hospitalist to see when patient would be ready. Writer let [facility] know that patient would like to go to this facility and just waiting on hospitalist's update.    Will continue to follow.    ROBIN Brown

## 2022-04-28 NOTE — PLAN OF CARE
[id number]  Pt is A&Ox3, disoriented to situation. VSS ex HTN. On RA. C/o headache, gave PRN tylenol, effective. Q4h neuros intact. Up A1 GB+W. On regular diet, tolerating well. K:3.8, recheck in the AM. Discharge pending.

## 2022-04-28 NOTE — DISCHARGE SUMMARY
[facility]  Hospitalist Discharge Summary      Date of Admission:  4/25/2022  Date of Discharge:  4/28/2022  Discharging Provider: JoAnna K. Barthell, PA-C  Discharge Service: Hospitalist Service    Discharge Diagnoses   Metabolic encephalopathy vs delirium vs unrecognized prior cognitive impairment.  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  Paroxysmal Afib with RVR - new diagnosis.  Acute LLL CAP.  Mild-moderate oral adn pharyngeal dysphagia 2/2 CVA.  Suspected esophageal dysfunction.  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  Chronic LBBB.  Essential HTN.  Essential Tremor.  Falls.  Eleveated CK.  Prediabetes.  Depression.  Asymptomatic COVID19 admission screening.    Follow-ups Needed After Discharge   Follow-up Appointments     Follow Up and recommended labs and tests      Follow up with Nursing home physician.  No follow up labs or test are   needed.  - New statin and increased dose beta blocker.  - New aspirin through 4/30, then stop.  - New Xarelto 15mg (due to CrCl 40) to start 5/1.  - Monitor blood pressure, may need increased dose amlodipine for goal SBP   <130.    Follow up with [facility] or other local neurologist   in 4/6 weeks.           Unresulted Labs Ordered in the Past 30 Days of this Admission     Date and Time Order Name Status Description    4/25/2022  2:18 PM Blood Culture Peripheral Blood Preliminary     4/25/2022  2:18 PM Blood Culture Line, venous Preliminary       These results will be followed up by hospitalist service.    Discharge Disposition   Discharged to short-term care facility  Condition at discharge: Stable    Hospital Course   Margy Babin is a 89 year old female who lives in independent living who presented to the ED via EMS after being found down at home found to have LLL opacity concerning for LLL PNA and subacute R CVA.     Mild metabolic encephalopathy vs delirium vs unrecognized prior  "cognitive impairment.  HOD#1 nursing notes indicate disorientation to place and situation and confusion with belief that family in building nearby; confusion worsened as evening went on. No confusion or disorientation during provider exam. Daughter notes her mother is usually \"sharp as a tack\".  - Etiology unclear, discussed with daughter. Demonstrated fluctuating orientation / mentation during adm. No behavioral issues / agitation.  - Maintain day / night sleep cycles.  - OT eval at TCU.     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  Presented after fall and found down. No focal neuro deficits.  * MRI brain: moderated sized early subacute ischemic infarct R corpus striatum/corona radiata.  * MRA head / neck: multifocal areas severe stenosis involving segments of left sided anterior and posterior circulation  * LDL 77, A1C 5.8  * Limited TTE 4/2022 showed midrange EF 50-55%, mild AR, mild-moderate left atrial dilation.     - Likely cardioembolic pathology from AF.  - Long term BP goal normotension <130/80.  - Stroke neuro consulted.  - Recommending atorvastatin 20mg titrated to LDL goal 40-70 and ASA 81mg x 5 days (stop 4/30) until switch to anticoagulation.  - Amlodipine restarted on day of discharge, may need dose increase for goal SBP <130.  - PT / OT / SLP recommending TCU.  - Follow-up in 4-6 weeks with [facility] or other local neurologist of patient's choice.     Paroxysmal Afib with RVR - new diagnosis.  Appears hx AF Jan 2022 and then evaluated by cardiology, but felt likely 2/2 acute sepsis and was not recommended anticoagulation. Overnight HOD#0 this adm, went into AF with RVR with HRs sustained in 160s that improved after IV metoprolol.  - Increase PTA metoprolol succinate from 50 to 100mg.  - PTA amlodipine restarted day of discharge.  - Discussed warfarin vs DOAC with daughter. Start Xarelto ($14/month) (Eliquis not covered by insurance) May 1st.     Acute LLL " CAP.  Possible aspiration. CXR with streaky L sided opacity. WBC 17. Clinically no signs of pneumonia, however empiric abx started given concern for CVA and aspiration on adm. Treated with Rocephin x 3 doses and azithro and will transition to Augmentin to complete 7 day course abx and finish Zpak course.     Mild-moderate oral adn pharyngeal dysphagia 2/2 CVA.  Suspected esophageal dysfunction.  - SLP recommending ongoing therapy at TCU.  - Diet per SLP.     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  Chronic LBBB.  Essential HTN.  Essential Tremor.  - Inc dose PTA metoprolol and resume amlodipine as above.  - Monitor SBP and consider increasing dose amlodipine for SBP goal <130.     Falls.  Eleveated CK.  Two falls possibly related to weakness from laxative induced diarrhea vs new subacute cva. Patient recalls ambulating with walker at the time, believes she lost her balance -- but also believes she was able to get up and call EMS when H&P indicates she was found down for unknown length of time by . Denies preceding symptoms. Treated with 1L IVF. CK 350s. Kidney function stable. Serial troponin flat and wnl.  * Noted is Zio patch monitoring in 12/2021 with 2 episodes VT up to 15 beats and 16 episodes SVT up to 17 beats. During adm in Jan had 28 beat run NSVT.  - Etiology of falls unclear. No events on tele aside from AF RVR since improved. Very likely related to above.    Prediabetes. A1C 5.8 (4/26/2022).  - goal <7.0, will not start medication therapy or monitor in house.     Depression.  - PTA paxil and seroquel at bedtime.     Asymptomatic COVID19 admission screening.    Consultations This Hospital Stay   NEUROLOGY IP CONSULT  CARE MANAGEMENT / SOCIAL WORK IP CONSULT  PHYSICAL THERAPY ADULT IP CONSULT  OCCUPATIONAL THERAPY ADULT IP CONSULT  SPEECH LANGUAGE PATH ADULT IP CONSULT  PHARMACY LIAISON FOR MEDICATION COVERAGE CONSULT  PHYSICAL THERAPY ADULT IP CONSULT  OCCUPATIONAL  THERAPY ADULT IP CONSULT  SPEECH LANGUAGE PATH ADULT IP CONSULT    Code Status   No CPR- Pre-arrest intubation OK    Time Spent on this Encounter   I, JoAnna K. Barthell, PA-C, personally saw the patient today and spent greater than 30 minutes discharging this patient.       JoAnna K. Barthell, PA-C  [place] [address] [facility]  [address]  [place] [address]  Phone: [phone]  ______________________________________________________________________  Physical Exam   Temp: 98.1  F (36.7  C) Temp src: Oral BP: 136/67 Pulse: 70   Resp: 18 SpO2: 97 % O2 Device: None (Room air)    Vitals:    04/26/22 0354 04/27/22 0513 04/28/22 0557   Weight: 66.2 kg (146 lb) 72 kg (158 lb 12.8 oz) 68.4 kg (150 lb 12.8 oz)   Constitutional: Appears stated age, no acute distress. Upright in chair. A&O x 3 this morning, forgetful. Mildly dysarthric speech.  Respiratory: Breath sounds CTA. No increased work of breathing.  Cardiovascular: RRR, no rub or murmur. No peripheral edema.  GI: Soft, non-tender, non-distended.  Skin: Warm, dry, no rashes or lesions.    Primary Care Physician   Braeden Sood    Discharge Orders      General info for SNF    Length of Stay Estimate: Short Term Care: Estimated # of Days <30  Condition at Discharge: Stable  Level of care:skilled   Rehabilitation Potential: Fair  Admission H&P remains valid and up-to-date: Yes  Recent Chemotherapy: N/A  Use Nursing Home Standing Orders: Yes     Mantoux instructions    Give two-step Mantoux (PPD) Per Facility Policy Yes     Reason for your hospital stay    Further evaluation and management of after found down ultimately found to have had recent stroke, irregular heart rhythm called atrial fibrillation, and probable aspiration pneumonia.     Activity - Up with assistive device     Follow Up and recommended labs and tests    Follow up with Nursing home physician.  No follow up labs or test are needed.  - New statin and increased dose beta  blocker.  - New aspirin through 4/30, then stop.  - New Xarelto 15mg (due to CrCl 40) to start 5/1.  - Monitor blood pressure, may need increased dose amlodipine for goal SBP <130.    Follow up with [facility] or other local neurologist in 4/6 weeks.     No CPR- Pre-arrest intubation OK     Physical Therapy Adult Consult    Evaluate and treat as clinically indicated.    Reason:  CVA     Occupational Therapy Adult Consult    Evaluate and treat as clinically indicated.    Reason:  CVA     Speech Language Path Adult Consult    Evaluate and treat as clinically indicated.    Reason:  CVA     Advance Diet as Tolerated    Follow this diet upon discharge: Orders Placed This Encounter      Regular Diet Adult         Significant Results and Procedures   Most Recent 3 CBC's:  Recent Labs   Lab Test 04/26/22  0705 04/25/22  1126 02/11/22  0856   WBC 13.1* 17.8* 10.8   HGB 12.1 14.1 12.0   MCV 89 91 95    446 349     Most Recent 3 BMP's:  Recent Labs   Lab Test 04/28/22  0652 04/27/22  0857 04/26/22  0705 04/25/22  1126 02/11/22  0856   NA  --   --  143 139 140   POTASSIUM 4.1 3.8 3.3* 3.7 3.7   CHLORIDE  --   --  113* 104 113*   CO2  --   --  20 24 22   BUN  --   --  15 20 10   CR  --   --  0.86 0.98 0.86   ANIONGAP  --   --  10 11 5   SOBIA  --   --  8.4* 9.4 8.3*   GLC  --   --  103* 98 103*     Most Recent 2 LFT's:  Recent Labs   Lab Test 04/25/22  1126 02/08/22  1607   AST 36 20   ALT 27 20   ALKPHOS 100 107   BILITOTAL 0.7 0.3     Most Recent 3 Troponin's:  Recent Labs   Lab Test 12/25/20  1435 06/27/17  2103 06/27/17  1520   TROPI <0.015 <0.015  The 99th percentile for upper reference range is 0.045 ug/L.  Troponin values in   the range of 0.045 - 0.120 ug/L may be associated with risks of adverse   clinical events.   <0.015  The 99th percentile for upper reference range is 0.045 ug/L.  Troponin values in   the range of 0.045 - 0.120 ug/L may be associated with risks of adverse   clinical  events.       Most Recent Cholesterol Panel:  Recent Labs   Lab Test 04/26/22  0705   CHOL 140   LDL 77   HDL 46*   TRIG 86     Most Recent Hemoglobin A1c:  Recent Labs   Lab Test 04/25/22  1126   A1C 5.8*   ,   Results for orders placed or performed during the hospital encounter of 04/25/22   Head CT w/o contrast    Narrative    CT OF THE HEAD WITHOUT CONTRAST  4/25/2022 1:11 PM     COMPARISON: Head CT 1/6/2022    HISTORY: Head trauma, minor (Age >= 65y).    TECHNIQUE: 5 mm thick axial CT images of the head were acquired  without IV contrast material.    FINDINGS:  There is moderate diffuse cerebral volume loss. There are  extensive confluent areas of decreased density in the cerebral white  matter bilaterally that are consistent with sequela of chronic small  vessel ischemic disease. There has been interval development of an  area of hypodensity involving the right caudate head and right putamen  consistent with a subacute or chronic ischemic infarct. Gray-white  differentiation is otherwise unchanged from the previous study.    The ventricles and basal cisterns are within normal limits in  configuration given the degree of cerebral volume loss.  There is no  midline shift. There are no extra-axial fluid collections.     No intracranial hemorrhage, mass or recent infarct.    The visualized paranasal sinuses are well-aerated. There is no  mastoiditis. There are no fractures of the visualized bones.       Impression    IMPRESSION:   1. Diffuse cerebral volume loss and cerebral white matter changes  consistent with chronic small vessel ischemic disease.   2. Interval development of an area of hypodensity involving the right  caudate head and right putamen since the comparison head CT consistent  with a subacute or chronic ischemic infarct.  3. No evidence for acute intracranial pathology.       Radiation dose for this scan was reduced using automated exposure  control, adjustment of the mA and/or kV according to  patient size, or  iterative reconstruction technique.    GISELLE CHAVIRA MD         SYSTEM ID:  [id number]   Chest XR,  PA & LAT    Narrative    CHEST TWO VIEWS  4/25/2022 2:03 PM     HISTORY: High WBC, multiple falls.    COMPARISON: Chest x-ray 1/6/2022.      Impression    IMPRESSION: Subtle streaky opacities at the left lower lung could be  atypical airspace disease including pneumonia. Normal cardiac  silhouette. No effusions.    PHYLICIA NORTH MD         SYSTEM ID:  [id number]   MR Brain w/o & w Contrast    Narrative    EXAM: MR BRAIN W/O and W CONTRAST, MRA BRAIN ([place]) WO CONTRAST, MRA NECK (CAROTIDS) WO and W CONTRAST  LOCATION: [facility]  DATE/TIME: 4/26/2022 2:38 AM    INDICATION: Stroke, follow up  COMPARISON: CTA head and neck 4/25/2022  CONTRAST: 10mL Gadavist  TECHNIQUE:   1) Routine multiplanar multisequence head MRI without and with intravenous contrast.  2) 3D time-of-flight head MRA without intravenous contrast.  3) Neck MRA without and with IV contrast. Stenosis measurements made according to NASCET criteria unless otherwise specified.    FINDINGS:  HEAD MRI:  INTRACRANIAL CONTENTS: There is a 3.5 x 2 x 3.5 cm (AP by TR by CC) zone of restricted diffusion in the right basal ganglia with extension into the right caudate nucleus and right corona. No mass, acute hemorrhage, or extra-axial fluid collections. There   is moderate to severe small vessel ischemic disease in the cerebral white matter and yon. Chronic infarct frontal white matter. Mild to moderate generalized cerebral atrophy. No hydrocephalus. Normal position of the cerebellar tonsils. No pathologic   contrast enhancement.    SELLA: No abnormality accounting for technique.    OSSEOUS STRUCTURES/SOFT TISSUES: Diffusely heterogeneous, but overall benign marrow signal pattern. The major intracranial vascular flow voids are maintained.     ORBITS: No abnormality accounting for technique.     SINUSES/MASTOIDS:  Mild mucosal thickening scattered about the paranasal sinuses. Scattered fluid/membrane thickening in the mastoid air cells bilaterally.     HEAD MRA:  Degraded by motion.  ANTERIOR CIRCULATION: Duplicated left M1 segment. Severe stenosis of the left A3 segment. Moderate to severe focal stenosis of the proximal inferior left M1 segment. No definite branch occlusion. No aneurysm or AVM. Standard [place] anatomy.    POSTERIOR CIRCULATION: Mild multifocal irregularity of the basilar artery. Severe stenosis involving the right P1 and P2 segments of the right posterior cerebral artery as well as multifocal stenosis of the proximal left PCA. No aneurysm or AVM. Balanced   vertebral arteries supply a normal basilar artery.     NECK MRA:   RIGHT CAROTID: No measurable stenosis or dissection.    LEFT CAROTID: No measurable stenosis or dissection.    VERTEBRAL ARTERIES: No focal stenosis or dissection. Balanced vertebral arteries.    AORTIC ARCH: Bovine origin left common carotid artery. No significant stenosis at the origin of the great vessels.      Impression    IMPRESSION:  HEAD MRI:   1.  Moderate-sized early subacute ischemic infarct in the right corpus striatum/corona radiata.  2.  Age-related changes.    HEAD MRA:   1.  No acute branch occlusion, aneurysm or AVM.  2.  Multifocal areas of relatively severe stenosis as detailed above, stable from CTA head from 4/25/2022.  3.  Severe stenosis left A3 segment. Moderate to severe stenosis at the proximal left M1 segment (the left M1 segments are duplicated), severe multifocal stenosis of the right P1 and P2 segments as well as severe short segment stenosis of the left P2   segment.    NECK MRA:  1.  No measurable stenosis or dissection.   MRA Brain ([place] of [place]) wo Contrast    Narrative    EXAM: MR BRAIN W/O and W CONTRAST, MRA BRAIN ([place]) WO CONTRAST, MRA NECK (CAROTIDS) WO and W CONTRAST  LOCATION: [facility]  DATE/TIME: 4/26/2022 2:38 AM    INDICATION: Stroke, follow up  COMPARISON: CTA head and neck 4/25/2022  CONTRAST: 10mL Gadavist  TECHNIQUE:   1) Routine multiplanar multisequence head MRI without and with intravenous contrast.  2) 3D time-of-flight head MRA without intravenous contrast.  3) Neck MRA without and with IV contrast. Stenosis measurements made according to NASCET criteria unless otherwise specified.    FINDINGS:  HEAD MRI:  INTRACRANIAL CONTENTS: There is a 3.5 x 2 x 3.5 cm (AP by TR by CC) zone of restricted diffusion in the right basal ganglia with extension into the right caudate nucleus and right corona. No mass, acute hemorrhage, or extra-axial fluid collections. There   is moderate to severe small vessel ischemic disease in the cerebral white matter and yon. Chronic infarct frontal white matter. Mild to moderate generalized cerebral atrophy. No hydrocephalus. Normal position of the cerebellar tonsils. No pathologic   contrast enhancement.    SELLA: No abnormality accounting for technique.    OSSEOUS STRUCTURES/SOFT TISSUES: Diffusely heterogeneous, but overall benign marrow signal pattern. The major intracranial vascular flow voids are maintained.     ORBITS: No abnormality accounting for technique.     SINUSES/MASTOIDS: Mild mucosal thickening scattered about the paranasal sinuses. Scattered fluid/membrane thickening in the mastoid air cells bilaterally.     HEAD MRA:  Degraded by motion.  ANTERIOR CIRCULATION: Duplicated left M1 segment. Severe stenosis of the left A3 segment. Moderate to severe focal stenosis of the proximal inferior left M1 segment. No definite branch occlusion. No aneurysm or AVM. Standard [place] of [place] anatomy.    POSTERIOR CIRCULATION: Mild multifocal irregularity of the basilar artery. Severe stenosis involving the right P1 and P2 segments of the right posterior cerebral artery as well as multifocal stenosis of the proximal left PCA. No aneurysm or AVM.  Balanced   vertebral arteries supply a normal basilar artery.     NECK MRA:   RIGHT CAROTID: No measurable stenosis or dissection.    LEFT CAROTID: No measurable stenosis or dissection.    VERTEBRAL ARTERIES: No focal stenosis or dissection. Balanced vertebral arteries.    AORTIC ARCH: Bovine origin left common carotid artery. No significant stenosis at the origin of the great vessels.      Impression    IMPRESSION:  HEAD MRI:   1.  Moderate-sized early subacute ischemic infarct in the right corpus striatum/corona radiata.  2.  Age-related changes.    HEAD MRA:   1.  No acute branch occlusion, aneurysm or AVM.  2.  Multifocal areas of relatively severe stenosis as detailed above, stable from CTA head from 4/25/2022.  3.  Severe stenosis left A3 segment. Moderate to severe stenosis at the proximal left M1 segment (the left M1 segments are duplicated), severe multifocal stenosis of the right P1 and P2 segments as well as severe short segment stenosis of the left P2   segment.    NECK MRA:  1.  No measurable stenosis or dissection.   MRA Neck (Carotids) wo & w Contrast    Narrative    EXAM: MR BRAIN W/O and W CONTRAST, MRA BRAIN ([place]) WO CONTRAST, MRA NECK (CAROTIDS) WO and W CONTRAST  LOCATION: [facility]  DATE/TIME: 4/26/2022 2:38 AM    INDICATION: Stroke, follow up  COMPARISON: CTA head and neck 4/25/2022  CONTRAST: 10mL Gadavist  TECHNIQUE:   1) Routine multiplanar multisequence head MRI without and with intravenous contrast.  2) 3D time-of-flight head MRA without intravenous contrast.  3) Neck MRA without and with IV contrast. Stenosis measurements made according to NASCET criteria unless otherwise specified.    FINDINGS:  HEAD MRI:  INTRACRANIAL CONTENTS: There is a 3.5 x 2 x 3.5 cm (AP by TR by CC) zone of restricted diffusion in the right basal ganglia with extension into the right caudate nucleus and right corona. No mass, acute hemorrhage, or extra-axial fluid  collections. There   is moderate to severe small vessel ischemic disease in the cerebral white matter and yon. Chronic infarct frontal white matter. Mild to moderate generalized cerebral atrophy. No hydrocephalus. Normal position of the cerebellar tonsils. No pathologic   contrast enhancement.    SELLA: No abnormality accounting for technique.    OSSEOUS STRUCTURES/SOFT TISSUES: Diffusely heterogeneous, but overall benign marrow signal pattern. The major intracranial vascular flow voids are maintained.     ORBITS: No abnormality accounting for technique.     SINUSES/MASTOIDS: Mild mucosal thickening scattered about the paranasal sinuses. Scattered fluid/membrane thickening in the mastoid air cells bilaterally.     HEAD MRA:  Degraded by motion.  ANTERIOR CIRCULATION: Duplicated left M1 segment. Severe stenosis of the left A3 segment. Moderate to severe focal stenosis of the proximal inferior left M1 segment. No definite branch occlusion. No aneurysm or AVM. Standard [place] of [place] anatomy.    POSTERIOR CIRCULATION: Mild multifocal irregularity of the basilar artery. Severe stenosis involving the right P1 and P2 segments of the right posterior cerebral artery as well as multifocal stenosis of the proximal left PCA. No aneurysm or AVM. Balanced   vertebral arteries supply a normal basilar artery.     NECK MRA:   RIGHT CAROTID: No measurable stenosis or dissection.    LEFT CAROTID: No measurable stenosis or dissection.    VERTEBRAL ARTERIES: No focal stenosis or dissection. Balanced vertebral arteries.    AORTIC ARCH: Bovine origin left common carotid artery. No significant stenosis at the origin of the great vessels.      Impression    IMPRESSION:  HEAD MRI:   1.  Moderate-sized early subacute ischemic infarct in the right corpus striatum/corona radiata.  2.  Age-related changes.    HEAD MRA:   1.  No acute branch occlusion, aneurysm or AVM.  2.  Multifocal areas of relatively severe stenosis as detailed above,  stable from CTA head from 4/25/2022.  3.  Severe stenosis left A3 segment. Moderate to severe stenosis at the proximal left M1 segment (the left M1 segments are duplicated), severe multifocal stenosis of the right P1 and P2 segments as well as severe short segment stenosis of the left P2   segment.    NECK MRA:  1.  No measurable stenosis or dissection.   CTA Head Neck with Contrast    Narrative    EXAM: CTA  HEAD NECK WITH CONTRAST  LOCATION: [facility]  DATE/TIME: 4/25/2022 8:51 PM    INDICATION: Neuro deficit, acute, stroke suspected Stroke/TIA, assess intracranial arteries Stroke/TIA, assess extracranial arteries  COMPARISON: Head CT 01/06/2022. Head CT 04/25/2022.  CONTRAST: 75 mL Isovue 370  TECHNIQUE: Head and neck CT angiogram with IV contrast. Axial helical CT images of the head and neck vessels obtained during the arterial phase of intravenous contrast administration. Axial 2D reconstructed images and multiplanar 3D MIP reconstructed   images of the head and neck vessels were performed by the technologist. Dose reduction techniques were used. All stenosis measurements made according to NASCET criteria unless otherwise specified.    FINDINGS:     HEAD CTA:  Mild nonflow limiting atherosclerosis of the carotid siphons. Mild stenosis of the proximal to mid M1 segment of the right middle cerebral artery (series 12 image 30). Mild focal stenosis of the A2 segment of the left anterior cerebral artery. There is a   severe focal stenosis involving the A3/pre-callosal segment of the left anterior cerebral artery (series 9 image 461).    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 There is moderate to severe focal stenosis of one of the inferior division proximal left MCA branches in the left MCA   cistern (series 13 image 47, series 9 image 412). There is a severe focal stenosis of a posterior inferior  division left M2 branch (series 9 image 448) located in the upper posterior aspect of the left sylvian fissure (series 13 image 58).    The bilateral intracranial vertebral arteries are patent. Mild multifocal stenosis of the basilar artery. Severe multifocal stenosis involving the P1 and P2 segments of the right posterior cerebral artery. Moderate/severe multifocal stenosis involving   the proximal left posterior cerebral artery. No aneurysm or high-flow vascular malformation is identified.    NONVASCULAR INTRACRANIAL FINDINGS: Please see separate report from noncontrast head CT of earlier same day for details regarding nonvascular structural intracranial findings. No definite abnormal contrast enhancing intracranial lesions identified.    NECK CTA:  RIGHT CAROTID: No measurable stenosis or dissection. Mild atherosclerosis of the carotid bifurcation.    LEFT CAROTID: No measurable stenosis or dissection. Mild atherosclerosis of the carotid bifurcation.    VERTEBRAL ARTERIES: No focal stenosis or dissection. Balanced vertebral arteries.    AORTIC ARCH: Bovine origin left common carotid artery. No significant stenosis at the origin of the great vessels. Mild atherosclerosis of the aortic arch.    NONVASCULAR STRUCTURES: Multiple bilateral thyroid nodules measuring up to approximately 8 mm in the right thyroid lobe. Diffusely heterogeneous thyroid gland. Posterolateral changes of the cervical spine including posterior fusion instrumentation   spanning C2-C5. Solid C5-C6 interbody fusion. Mild anterolisthesis C4 on C5 probably on a degenerative basis. Multilevel degenerative changes of the cervical and partially visualized thoracic spine. Mild linear atelectasis or scarring in the periphery of   the left lung.      Impression    IMPRESSION:     HEAD CTA:   1.  Age-indeterminate right basal ganglia infarcts, as seen on noncontrast head CT of earlier same day. MRI is recommended for further characterization. Stroke  neurology consultation is also suggested.  2.  Multifocal high-grade central arterial stenoses involving the anterior and posterior circulation, as described.  3.  Severe focal stenosis involving the A3/pre-callosal segment of the left anterior cerebral artery.  4.  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  5.  Multifocal high-grade stenoses involving the bilateral posterior cerebral arteries.    NECK CTA:  1.  Mild atherosclerosis of the bilateral carotid bifurcations without significant stenosis.  2.  Patent cervical vertebral arteries.  3.  Diffusely heterogeneous thyroid gland with multiple small nodules.      The findings were discussed by myself with Dr. Jacinto at approximately 9:50 PM on 2022.   Echocardiogram Limited     Value    LVEF  50-55%    Narrative    [id number]  [id number]  [id number]  [id number]^SHRUTI^PAULINO^HONG     [facility]  Echocardiography Laboratory  [address] [place] [address]     Name: KHANH BRAGG  MRN: [id number]  : 10/02/1929  Study Date: 2022 12:26 PM  Age: 89 yrs  Gender: Female  Patient Location: [facility]  Reason For Study: Stroke  Ordering Physician: PAULINO BAHENA  Referring Physician: Braeden Sood  Performed By: Nick Forte RDCS     BSA: 1.8 m2  Height: 65 in  Weight: 158 lb  HR: 62  BP: 160/66 mmHg  ______________________________________________________________________________  Procedure  Limited Portable Echo Adult. Optison (NDC #5226-2303) given intravenously.  ______________________________________________________________________________  Interpretation Summary     The visual ejection fraction is 50-55%. May have improved a little compared  with most recent  study.  There is mild (1+) aortic regurgitation.  The left atrium is mild to moderately dilated.  ______________________________________________________________________________  Left Ventricle  The left ventricle is normal in size. There is normal left ventricular wall  thickness. A sigmoid septum is present. The visual ejection fraction is 50-  55%. Septal motion is consistent with conduction abnormality.     Right Ventricle  The right ventricle is normal in structure, function and size.     Atria  The left atrium is mild to moderately dilated. Right atrial size is normal.     Mitral Valve  The mitral valve leaflets are mildly thickened. There is mild (1+) mitral  regurgitation.     Tricuspid Valve  Normal tricuspid valve. There is trace to mild tricuspid regurgitation. Right  ventricular systolic pressure is normal.     Aortic Valve  There is mild trileaflet aortic sclerosis. There is mild (1+) aortic  regurgitation. This aortic regurgitation is central valvular.     Pulmonic Valve  There is trace pulmonic valvular regurgitation.     Pericardium  There is no pericardial effusion.     Rhythm  Sinus rhythm was noted.     ______________________________________________________________________________  Doppler Measurements & Calculations  AI P1/2t: 461.6 msec     TR max miranda: 236.3 cm/sec  TR max P.3 mmHg     ______________________________________________________________________________  Report approved by: Lorie Howe 2022 03:16 PM               Discharge Medications   Current Discharge Medication List      START taking these medications    Details   amoxicillin-clavulanate (AUGMENTIN) 875-125 MG tablet Take 1 tablet by mouth every 12 hours for 4 days  Qty: 7 tablet, Refills: 0    Associated Diagnoses: Pneumonia due to infectious organism, unspecified laterality, unspecified part of lung      aspirin (ASA) 81 MG EC tablet Take 1 tablet (81 mg) by mouth daily for 2 days (Last dose on , on  will  discontinue)  Qty: 2 tablet, Refills: 0    Associated Diagnoses: Cerebrovascular accident (CVA) due to embolism of cerebral artery (H)      atorvastatin (LIPITOR) 20 MG tablet Take 1 tablet (20 mg) by mouth every evening    Associated Diagnoses: Cerebrovascular accident (CVA) due to embolism of cerebral artery (H)      azithromycin (ZITHROMAX) 250 MG tablet Take 1 tablet (250 mg) by mouth once for 1 dose on 4/29 to complete Zpak course  Qty: 1 tablet    Associated Diagnoses: Pneumonia due to infectious organism, unspecified laterality, unspecified part of lung      rivaroxaban ANTICOAGULANT (XARELTO) 15 MG TABS tablet Take 1 tablet (15 mg) by mouth daily (with dinner) starting Ravinder, May 1st.    Associated Diagnoses: Paroxysmal atrial fibrillation (H)         CONTINUE these medications which have CHANGED    Details   metoprolol succinate ER (TOPROL-XL) 100 MG 24 hr tablet Take 1 tablet (100 mg) by mouth daily    Associated Diagnoses: Paroxysmal atrial fibrillation (H)         CONTINUE these medications which have NOT CHANGED    Details   acetaminophen (TYLENOL) 500 MG tablet Take 2 tablets (1,000 mg) by mouth 3 times daily as needed for mild pain  Qty: 100 tablet, Refills: 0    Associated Diagnoses: Neck pain      amLODIPine (NORVASC) 2.5 MG tablet TAKE 1 TABLET(2.5 MG) BY MOUTH DAILY  Qty: 60 tablet, Refills: 1    Associated Diagnoses: Essential hypertension      bisacodyl (DULCOLAX) 5 MG EC tablet Take 1 tablet (5 mg) by mouth daily as needed for constipation  Qty: 60 tablet, Refills: 1    Associated Diagnoses: Constipation, unspecified constipation type      Calcium Carbonate (CALCIUM 600 PO) Take 1 tablet by mouth daily.      Cholecalciferol (VITAMIN D) 1000 UNITS capsule Take 1 capsule by mouth daily.      cycloSPORINE (RESTASIS) 0.05 % ophthalmic emulsion Place 1 drop into both eyes daily       docusate sodium (COLACE) 100 MG capsule Take 1 capsule (100 mg) by mouth 2 times daily as needed for  constipation  Qty: 60 capsule, Refills: 0    Associated Diagnoses: Constipation, unspecified constipation type      Multiple Vitamins-Minerals (MULTIVITAL) TABS Take 1 tablet by mouth daily.      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)      QUEtiapine (SEROQUEL) 25 MG tablet TAKE 1 TABLET(25 MG) BY MOUTH AT BEDTIME  Qty: 30 tablet, Refills: 5    Associated Diagnoses: Insomnia, unspecified type      ramelteon (ROZEREM) 8 MG tablet TAKE 1 TABLET(8 MG) BY MOUTH EVERY NIGHT AS NEEDED FOR SLEEP  Qty: 30 tablet, Refills: 0    Associated Diagnoses: Insomnia, unspecified type      order for DME Equipment being ordered: Walker Wheels () and Walker ()  Treatment Diagnosis: Difficulty ambulating  Qty: 1 each, Refills: 0    Associated Diagnoses: Fall, initial encounter      polyethylene glycol (MIRALAX) 17 GM/Dose powder Take 17 g by mouth daily  Qty: 510 g    Associated Diagnoses: Urinary tract infection without hematuria, site unspecified           Allergies   No Known Allergies

## 2022-04-28 NOTE — PLAN OF CARE
[id number]  A/Ox3 - disoriented to situation. VSS on RA ex htn. Denies pain/nausea. Up A1 GB/W to bathroom. Pt is continent, at times is unable to make it to the bathroom. On K+ protocol w/ re-check in morning. Neuro checks intact. R/L PIV SL. Discharge pending improvement.

## 2022-04-28 NOTE — LETTER
[facility]   To:   [facility]          Please give to facility    From:   Jud Miner RN  Care Coordinator   [facility]   P: [phone]  [email or web address]   Patient Name:  Estefania Babin YOB: 1929   Admit date: 4/28/2022      *Information Needed:  Please contact me when the patient will discharge (or if they will move to long term care)- include the discharge date, disposition, and main diagnosis   - If the patient is discharged with home care services, please provide the name of the agency    Also- Please inform me if a care conference is being held.   Phone, Fax or Email with information        Jud Miner RN, BSN, PHN  Primary Care / Care Coordinator   [facility]  E-mail [email or web address]   [phone]                Thank you

## 2022-04-28 NOTE — PROGRESS NOTES
Care Management Discharge Note    Discharge Date: 04/28/2022       Discharge Disposition: Transitional Care    Discharge Services: None    Discharge DME: None    Discharge Transportation:      Private pay costs discussed: private room/amenity fees, transportation    PAS Confirmation Code:  [id number]  Patient/family educated on Medicare website which has current facility and service quality ratings: no    Education Provided on the Discharge Plan:  patient  Persons Notified of Discharge Plans: yes  Patient/Family in Agreement with the Plan: yes    Handoff Referral Completed: Yes    Additional Information:  Hospitalist said patient was ready to discharge today. Writer called [facility] and scheduled a w/c ride for 1400. Writer called [facility] and let them know of the ride time. Writer called Tina (daughter) and let her know of the ride time for patient to go to [facility]. Writer completed PAS and put in chart. Received discharge orders, faxed orders to [facility].    PAS-RR    D: Per DHS regulation, SW completed and submitted PAS-RR to [place] [employer] Direct Connect via the Senior LinkAge Line.  PAS-RR confirmation # is :[id number]    I: SW spoke with patient and they are aware a PAS-RR has been submitted.  SW reviewed with patient that they may be contacted for a follow up appointment within 10 days of hospital discharge if their SNF stay is < 30 days.  Contact information for Senior LinkAge Line was also provided.    A: Patient verbalized understanding.    P: Further questions may be directed to [facility] LinkAge Line at #[phone], option #4 for PAS-RR staff.    ROBIN Brown

## 2022-04-29 NOTE — PLAN OF CARE
Physical Therapy Discharge Summary    Reason for therapy discharge:    Discharged to transitional care facility.    Progress towards therapy goal(s). See goals on Care Plan in [facility] electronic health record for goal details.  Goals partially met.  Barriers to achieving goals:   discharge from facility.    Therapy recommendation(s):    Continued therapy is recommended.  Rationale/Recommendations:  Pt will benefit from cont skilled PT at TCU to address mobility deficits.

## 2022-04-30 LAB
ATRIAL RATE - MUSE: 174 BPM
BACTERIA BLD CULT: NO GROWTH
BACTERIA BLD CULT: NO GROWTH
DIASTOLIC BLOOD PRESSURE - MUSE: NORMAL MMHG
INTERPRETATION ECG - MUSE: NORMAL
P AXIS - MUSE: NORMAL DEGREES
PR INTERVAL - MUSE: NORMAL MS
QRS DURATION - MUSE: 132 MS
QT - MUSE: 246 MS
QTC - MUSE: 437 MS
R AXIS - MUSE: -43 DEGREES
SYSTOLIC BLOOD PRESSURE - MUSE: NORMAL MMHG
T AXIS - MUSE: 168 DEGREES
VENTRICULAR RATE- MUSE: 190 BPM

## 2022-05-03 ENCOUNTER — PATIENT OUTREACH (OUTPATIENT)
Dept: NURSING | Facility: CLINIC | Age: 87
End: 2022-05-03
Payer: MEDICARE

## 2022-05-03 NOTE — PROGRESS NOTES
Clinic Care Coordination Contact  Care Conference    Care Coordinator attended the care conference via phone    Plan:   -Stable weight, regular diet with thin liquids  -Swallowing is going good; no concerns at this time  -Physical Therapy/OT/SLP rehab; upper body strengthening, SBA with walking, transferring;   -Balance scale = 25/56, SLUM 10/30, CPT 4.25/5.6  -Lives in assisted living; needs to increase physical safety checks and possible long term care  -[facility] is in the assisted living; possible home care services with assisted living  -Possible PCA hours during the day  -Needs Life Alert  -STEFF Maldonado will be seeing her today  -New blood thinner medication; increased to Metoprolol 100 mg   -Two more weeks of rehab for strengthening and balance concerns    Patient enrolled in Ambulatory Care Coordination No      Jud Miner RN, BSN, PHN  Primary Care / Care Coordinator   [facility]  E-mail [email or web address]   [phone]

## 2022-05-14 DIAGNOSIS — G47.00 INSOMNIA, UNSPECIFIED TYPE: ICD-10-CM

## 2022-05-14 NOTE — TELEPHONE ENCOUNTER
Summary: TAKE 1 TABLET(25 MG) BY MOUTH AT BEDTIME, Disp-30 tablet, R-5, E-Prescribe     Start: 11/23/2021    Ord/Sold: 11/23/2021

## 2022-05-16 ENCOUNTER — TELEPHONE (OUTPATIENT)
Dept: FAMILY MEDICINE | Facility: CLINIC | Age: 87
End: 2022-05-16
Payer: MEDICARE

## 2022-05-16 DIAGNOSIS — F33.42 RECURRENT MAJOR DEPRESSION IN COMPLETE REMISSION (H): ICD-10-CM

## 2022-05-16 NOTE — TELEPHONE ENCOUNTER
Any notified of PCP response below     Zuleyka SIDHU, Triage RN  [facility]

## 2022-05-16 NOTE — TELEPHONE ENCOUNTER
Summary: Take 1 tablet (20 mg) by mouth At Bedtime, Disp-90 tablet, R-0, E-Prescribe     Dose, Route, Frequency: 20 mg, Oral, AT BEDTIME    Start: 2/15/2022    Ord/Sold: 2/15/2022

## 2022-05-16 NOTE — TELEPHONE ENCOUNTER
Home care called     Pt supposed to have SN, HHA, PT, OT for home care      They received everything except SN on the referral     TCU forgot to include skilled nursing on the referral     Asking if ok to add skilled nursing for home care?     No VM, ask for Any on call back     Zuleyka SIDHU, Triage RN  [facility]

## 2022-05-17 ENCOUNTER — PATIENT OUTREACH (OUTPATIENT)
Dept: NURSING | Facility: CLINIC | Age: 87
End: 2022-05-17

## 2022-05-17 ENCOUNTER — OFFICE VISIT (OUTPATIENT)
Dept: FAMILY MEDICINE | Facility: CLINIC | Age: 87
End: 2022-05-17
Payer: MEDICARE

## 2022-05-17 VITALS
HEART RATE: 60 BPM | SYSTOLIC BLOOD PRESSURE: 164 MMHG | RESPIRATION RATE: 16 BRPM | TEMPERATURE: 97.2 F | HEIGHT: 65 IN | OXYGEN SATURATION: 96 % | DIASTOLIC BLOOD PRESSURE: 72 MMHG | WEIGHT: 143 LBS | BODY MASS INDEX: 23.82 KG/M2

## 2022-05-17 DIAGNOSIS — I63.431 CEREBROVASCULAR ACCIDENT (CVA) DUE TO EMBOLISM OF RIGHT POSTERIOR CEREBRAL ARTERY (H): ICD-10-CM

## 2022-05-17 DIAGNOSIS — J18.9 COMMUNITY ACQUIRED PNEUMONIA OF LEFT LOWER LOBE OF LUNG: ICD-10-CM

## 2022-05-17 DIAGNOSIS — I48.0 PAROXYSMAL ATRIAL FIBRILLATION (H): Primary | ICD-10-CM

## 2022-05-17 DIAGNOSIS — F33.42 RECURRENT MAJOR DEPRESSION IN COMPLETE REMISSION (H): ICD-10-CM

## 2022-05-17 PROCEDURE — 99214 OFFICE O/P EST MOD 30 MIN: CPT | Performed by: INTERNAL MEDICINE

## 2022-05-17 RX ORDER — MEMANTINE HYDROCHLORIDE 5 MG/1
5 TABLET ORAL 2 TIMES DAILY
Qty: 60 TABLET | Refills: 1 | Status: SHIPPED | OUTPATIENT
Start: 2022-05-17 | End: 2022-07-26

## 2022-05-17 RX ORDER — PAROXETINE 20 MG/1
20 TABLET, FILM COATED ORAL AT BEDTIME
Qty: 90 TABLET | Refills: 0 | Status: CANCELLED | OUTPATIENT
Start: 2022-05-17

## 2022-05-17 ASSESSMENT — PAIN SCALES - GENERAL: PAINLEVEL: NO PAIN (0)

## 2022-05-17 NOTE — PROGRESS NOTES
Clinic Care Coordination Contact    FABIANA LOPEZ spoke briefly with aidee Wilder's dtr. Tina shared that she and pt at currently at [facility] for intake into respite care. Tina requested to return call to FABIANA LOPEZ after they are done.    Plan: FABIANA LOPEZ will await return call.    Aretha Funez [facility]  Clinic Care Coordinator  [facility]  [phone]  [email or web address]

## 2022-05-17 NOTE — PROGRESS NOTES
Clinic Care Coordination Contact    Follow Up Progress Note      Assessment: FABIANA LOPEZ received call back from pt's dtr, Tina. Tina explained pt's [facility] is getting new windows. She needs to relocate for 2 weeks and it was planned for pt to stay in a hotel during this time. But now due to recent stroke this doesn't seem like a good idea. This is all starting within a day.     Currently the plan is for pt to stay at [facility] for respite care for 1 month (as this is the minimum). She is able to move in ASA. There is a lot of paperwork needing to be completed with PCP support, pt needs a rapid COVID test, TB test, and a nurse assessment through [facility] (set up for tomorrow). FABIANA LOPEZ encouraged Tina to bring paperwork to clinic now. FABIANA LOPEZ shared she can request labs while speaking with the  but this may not be able to be accommodated. FABIANA LOPEZ encouraged her to ask if this could be completed through Urgent Care.     FABIANA LOPEZ inquired about pt overall wellbeing. Pt is super tired. They just picked up medication.      Plan: No further outreaches from FABIANA LOPEZ. CC RN will outreach to pt upon discharge from [facility].    Aretha Funez [facility]  Clinic Care Coordinator  [facility]  [phone]  [email or web address]

## 2022-05-17 NOTE — PATIENT INSTRUCTIONS
Flor;  As we discussed, stop your multivitamin and do not initiate atorvastatin.  It is okay to take the oxybutynin in the evening.    I would like to initiate a medication called memantine which may give you more energy.  Also may improve your intestinal function.    I have referred you to care coordination/stroke nurse.  Somebody should be in contact with you today to discuss further TCU care.    I like to obtain a urinalysis today.    Return to clinic in 4 weeks.    Best regards  Braeden

## 2022-05-17 NOTE — TELEPHONE ENCOUNTER
Routing refill request to provider for review/approval because:  Failed protocol due to:   BP Readings from Last 3 Encounters:   05/17/22 (!) 164/72   04/28/22 136/67   03/16/22 (!) 157/83     Fawn Poon RN

## 2022-05-17 NOTE — PROGRESS NOTES
Assessment & Plan     Paroxysmal atrial fibrillation (H)  Patient appears to be in normal sinus rhythm currently.  Suspect that this may have related to dehydration and electrolyte disturbance related to diarrhea..    Very likely the etiology of her CVA which was posterior cerebral and very likely cardioembolic.    Doing well on Xarelto.  No nausea.  No bleeding.    Cerebrovascular accident (CVA) due to embolism of right posterior cerebral artery (H)  Significant fatigue is noted by the patient.  Unfortunately the patient is to be moved from her assisted living situation to a hotel due to renovation.  Obviously this is a recipe for confusion and significant stress.  I suspect that an additional 2 weeks of TCU care may be incredibly important status post CVA in the setting.    - memantine (NAMENDA) 5 MG tablet; Take 1 tablet (5 mg) by mouth 2 times daily  - Specialty Care Coordination Referral (Stroke RN); Future    Community acquired pneumonia of left lower lobe of lung  History of recurrent UTIs would like to reassess given recent hospitalization  - UA with Microscopic reflex to Culture - lab collect; Future  - UA with Microscopic reflex to Culture - lab collect             See Patient Instructions    Return in about 4 weeks (around 6/14/2022) for Follow up.    Braeden Sood MD  [facility] MARGA Ray is a 89 year old who presents for the following health issues  accompanied by her daughter. [facility] Follow-up Visit:    Hospital/Nursing Home/ Rehab Facility: [facility]  Date of Admission: 04/25/2022  Date of Discharge: 04/28/2022  Then to [facility]    Reason(s) for Admission: Weakness     Abnormal brain CT     Multiple falls     Pneumonia due to infectious organism, unspecified laterality, unspecified part of lung          Was your hospitalization related to COVID-19? No   Problems taking medications regularly:  None  Medication  "changes since discharge: None  Problems adhering to non-medication therapy:  None    Summary of hospitalization:  CareEverywhere information obtained and reviewed  Diagnostic Tests/Treatments reviewed.  Follow up needed: Yes.  Needs follow-up with neurology  Other Healthcare Providers Involved in Patient s Care:         yes needs care coordination regarding current living situation.  Would suggest a TCU setting for an additional 2 weeks  Update since discharge: stable.       Post Discharge Medication Reconciliation: .  Plan of care communicated with patient and family                  Review of Systems   Constitutional, HEENT, cardiovascular, pulmonary, gi and gu systems are negative, except as otherwise noted.      Objective    BP (!) 164/72 (BP Location: Right arm, Patient Position: Chair, Cuff Size: Adult Regular)   Pulse 60   Temp 97.2  F (36.2  C) (Temporal)   Resp 16   Ht 1.651 m (5' 5\")   Wt 64.9 kg (143 lb)   SpO2 96%   BMI 23.80 kg/m    Body mass index is 23.8 kg/m .  Physical Exam   Moderate dysarthria noted on examination    Level of consciousness diminished compared to previous    Lung fields are clear    Heart S1 is 2 regular rate and rhythm pulse is 60    Extremities no edema  Affect is flat, mood/fatigue    Reflexes brisk upper extremity reflexes strength intact bilaterally    Admission on 04/25/2022, Discharged on 04/28/2022   Component Date Value Ref Range Status     Sodium 04/25/2022 139  133 - 144 mmol/L Final     Potassium 04/25/2022 3.7  3.4 - 5.3 mmol/L Final     Chloride 04/25/2022 104  94 - 109 mmol/L Final     Carbon Dioxide (CO2) 04/25/2022 24  20 - 32 mmol/L Final     Anion Gap 04/25/2022 11  3 - 14 mmol/L Final     Urea Nitrogen 04/25/2022 20  7 - 30 mg/dL Final     Creatinine 04/25/2022 0.98  0.52 - 1.04 mg/dL Final     Calcium 04/25/2022 9.4  8.5 - 10.1 mg/dL Final     Glucose 04/25/2022 98  70 - 99 mg/dL Final     Alkaline Phosphatase 04/25/2022 100  40 - 150 U/L Final     AST " 04/25/2022 36  0 - 45 U/L Final     ALT 04/25/2022 27  0 - 50 U/L Final     Protein Total 04/25/2022 7.8  6.8 - 8.8 g/dL Final     Albumin 04/25/2022 3.1 (A) 3.4 - 5.0 g/dL Final     Bilirubin Total 04/25/2022 0.7  0.2 - 1.3 mg/dL Final     GFR Estimate 04/25/2022 54 (A) >60 mL/min/1.73m2 Final    Effective December 21, 2021 eGFRcr in adults is calculated using the 2021 CKD-EPI creatinine equation which includes age and gender (Carine et al., NE, DOI: 10.1056/WSKGcn6344784)     Ventricular Rate 04/25/2022 74  BPM Final     Atrial Rate 04/25/2022 67  BPM Final     QRS Duration 04/25/2022 128  ms Final     QT 04/25/2022 430  ms Final     QTc 04/25/2022 477  ms Final     R AXIS 04/25/2022 -36  degrees Final     T Axis 04/25/2022 125  degrees Final     Interpretation ECG 04/25/2022    Final                    Value:Atrial fibrillation  Left axis deviation  Left bundle branch block  Abnormal ECG  When compared with ECG of 07-JAN-2022 00:28,  Atrial fibrillation has replaced Sinus rhythm  T wave inversion less evident in Lateral leads  Confirmed by GENERATED REPORT, COMPUTER ([id number]),  Anna Stone ([id number]) on 4/25/2022 3:07:39 PM       WBC Count 04/25/2022 17.8 (A) 4.0 - 11.0 10e3/uL Final     RBC Count 04/25/2022 4.85  3.80 - 5.20 10e6/uL Final     Hemoglobin 04/25/2022 14.1  11.7 - 15.7 g/dL Final     Hematocrit 04/25/2022 44.1  35.0 - 47.0 % Final     MCV 04/25/2022 91  78 - 100 fL Final     MCH 04/25/2022 29.1  26.5 - 33.0 pg Final     MCHC 04/25/2022 32.0  31.5 - 36.5 g/dL Final     RDW 04/25/2022 13.5  10.0 - 15.0 % Final     Platelet Count 04/25/2022 446  150 - 450 10e3/uL Final     Hold Specimen 04/25/2022 [facility]   Final     Hold Specimen 04/25/2022 [facility]   Final     Color Urine 04/25/2022 Yellow  Colorless, Straw, Light Yellow, Yellow Final     Appearance Urine 04/25/2022 Clear  Clear Final     Glucose Urine 04/25/2022 Negative  Negative mg/dL Final     Bilirubin Urine 04/25/2022 Negative  Negative Final      Ketones Urine 04/25/2022 10  (A) Negative mg/dL Final     Specific Gravity Urine 04/25/2022 1.023  1.003 - 1.035 Final     Blood Urine 04/25/2022 Negative  Negative Final     pH Urine 04/25/2022 5.0  5.0 - 7.0 Final     Protein Albumin Urine 04/25/2022 30  (A) Negative mg/dL Final     Urobilinogen Urine 04/25/2022 Normal  Normal, 2.0 mg/dL Final     Nitrite Urine 04/25/2022 Negative  Negative Final     Leukocyte Esterase Urine 04/25/2022 Negative  Negative Final     Amorphous Crystals Urine 04/25/2022 Few (A) None Seen /HPF Final     RBC Urine 04/25/2022 0  <=2 /HPF Final     WBC Urine 04/25/2022 1  <=5 /HPF Final     Squamous Epithelials Urine 04/25/2022 <1  <=1 /HPF Final     CK 04/25/2022 359 (A) 30 - 225 U/L Final     % Neutrophils 04/25/2022 77  % Final     % Lymphocytes 04/25/2022 16  % Final     % Monocytes 04/25/2022 4  % Final     % Eosinophils 04/25/2022 2  % Final     % Basophils 04/25/2022 1  % Final     Absolute Neutrophils 04/25/2022 13.7 (A) 1.6 - 8.3 10e3/uL Final     Absolute Lymphocytes 04/25/2022 2.8  0.8 - 5.3 10e3/uL Final     Absolute Monocytes 04/25/2022 0.7  0.0 - 1.3 10e3/uL Final     Absolute Eosinophils 04/25/2022 0.4  0.0 - 0.7 10e3/uL Final     Absolute Basophils 04/25/2022 0.2  0.0 - 0.2 10e3/uL Final     RBC Morphology 04/25/2022 Confirmed RBC Indices   Final     Platelet Assessment 04/25/2022 Automated Count Confirmed. Platelet morphology is normal.  Automated Count Confirmed. Platelet morphology is normal. Final     Smudge Cells 04/25/2022 Present (A) None Seen Final     Culture 04/25/2022 No Growth   Final     Culture 04/25/2022 No Growth   Final     Culture 04/25/2022 No Growth   Final     SARS CoV2 PCR 04/25/2022 Negative  Negative Final    NEGATIVE: SARS-CoV-2 (COVID-19) RNA not detected, presumed negative.     Lactic Acid 04/25/2022 2.5 (A) 0.7 - 2.0 mmol/L Final     Hemoglobin A1C 04/25/2022 5.8 (A) 0.0 - 5.6 % Final    Normal <5.7%   Prediabetes 5.7-6.4%    Diabetes 6.5% or  higher     Note: Adopted from ADA consensus guidelines.     Troponin I High Sensitivity 04/25/2022 13  <54 ng/L Final    This Troponin-I result was obtained using a Siemens Dimension Vista High Sensitivity Troponin-I assay (TNIH). Effective 11/23/21, nine labs/sites in the [facility] switched from a Siemens Thornton Contemporary Troponin I assay (CTNI) to a Siemens Thornton High-Sensitivity Troponin I assay (TNIH).     Lactic Acid 04/25/2022 1.9  0.7 - 2.0 mmol/L Final     Troponin I High Sensitivity 04/25/2022 16  <54 ng/L Final    This Troponin-I result was obtained using a Siemens Dimension Vista High Sensitivity Troponin-I assay (TNIH). Effective 11/23/21, nine labs/sites in the [facility] switched from a Siemens Thornton Contemporary Troponin I assay (CTNI) to a Siemens Thornton High-Sensitivity Troponin I assay (TNIH).     Troponin I High Sensitivity 04/26/2022 20  <54 ng/L Final    This Troponin-I result was obtained using a Siemens Dimension Vista High Sensitivity Troponin-I assay (TNIH). Effective 11/23/21, nine labs/sites in the [facility] switched from a Siemens Thornton Contemporary Troponin I assay (CTNI) to a Siemens Thornton High-Sensitivity Troponin I assay (TNIH).     Sodium 04/26/2022 143  133 - 144 mmol/L Final     Potassium 04/26/2022 3.3 (A) 3.4 - 5.3 mmol/L Final     Chloride 04/26/2022 113 (A) 94 - 109 mmol/L Final     Carbon Dioxide (CO2) 04/26/2022 20  20 - 32 mmol/L Final     Anion Gap 04/26/2022 10  3 - 14 mmol/L Final     Urea Nitrogen 04/26/2022 15  7 - 30 mg/dL Final     Creatinine 04/26/2022 0.86  0.52 - 1.04 mg/dL Final     Calcium 04/26/2022 8.4 (A) 8.5 - 10.1 mg/dL Final     Glucose 04/26/2022 103 (A) 70 - 99 mg/dL Final     GFR Estimate 04/26/2022 63  >60 mL/min/1.73m2 Final    Effective December 21, 2021 eGFRcr in adults is calculated using the 2021 CKD-EPI creatinine equation which includes age and gender (Carine et al., NEJM, DOI: 10.1056/GJRAou9168164)     Cholesterol  04/26/2022 140  <200 mg/dL Final     Triglycerides 04/26/2022 86  <150 mg/dL Final     Direct Measure HDL 04/26/2022 46 (A) >=50 mg/dL Final     LDL Cholesterol Calculated 04/26/2022 77  <=100 mg/dL Final     Non HDL Cholesterol 04/26/2022 94  <130 mg/dL Final     Patient Fasting > 8hrs? 04/26/2022 Yes   Final     CK 04/26/2022 346 (A) 30 - 225 U/L Final     Ventricular Rate 04/26/2022 190  BPM Final     Atrial Rate 04/26/2022 174  BPM Final     QRS Duration 04/26/2022 132  ms Final     QT 04/26/2022 246  ms Final     QTc 04/26/2022 437  ms Final     R AXIS 04/26/2022 -43  degrees Final     T Axis 04/26/2022 168  degrees Final     Interpretation ECG 04/26/2022    Final                    Value:Atrial fibrillation with rapid ventricular response  Left axis deviation  Left bundle branch block  Abnormal ECG  When compared with ECG of 25-APR-2022 13:00,  Atrial fibrillation has replaced sinus rhythm  Confirmed by MD DELLA, ALFREDO ([id number]),  CINDY DAI ([id number]) on 4/30/2022 5:57:36 PM       WBC Count 04/26/2022 13.1 (A) 4.0 - 11.0 10e3/uL Final     RBC Count 04/26/2022 4.16  3.80 - 5.20 10e6/uL Final     Hemoglobin 04/26/2022 12.1  11.7 - 15.7 g/dL Final     Hematocrit 04/26/2022 37.0  35.0 - 47.0 % Final     MCV 04/26/2022 89  78 - 100 fL Final     MCH 04/26/2022 29.1  26.5 - 33.0 pg Final     MCHC 04/26/2022 32.7  31.5 - 36.5 g/dL Final     RDW 04/26/2022 13.7  10.0 - 15.0 % Final     Platelet Count 04/26/2022 401  150 - 450 10e3/uL Final     % Neutrophils 04/26/2022 69  % Final     % Lymphocytes 04/26/2022 14  % Final     % Monocytes 04/26/2022 9  % Final     % Eosinophils 04/26/2022 6  % Final     % Basophils 04/26/2022 1  % Final     % Immature Granulocytes 04/26/2022 1  % Final     NRBCs per 100 WBC 04/26/2022 0  <1 /100 Final     Absolute Neutrophils 04/26/2022 9.1 (A) 1.6 - 8.3 10e3/uL Final     Absolute Lymphocytes 04/26/2022 1.8  0.8 - 5.3 10e3/uL Final     Absolute Monocytes 04/26/2022 1.2  0.0 - 1.3 10e3/uL  Final     Absolute Eosinophils 04/26/2022 0.8 (A) 0.0 - 0.7 10e3/uL Final     Absolute Basophils 04/26/2022 0.1  0.0 - 0.2 10e3/uL Final     Absolute Immature Granulocytes 04/26/2022 0.1  <=0.4 10e3/uL Final     Absolute NRBCs 04/26/2022 0.0  10e3/uL Final     Magnesium 04/26/2022 1.9  1.6 - 2.3 mg/dL Final     Potassium 04/27/2022 3.8  3.4 - 5.3 mmol/L Final     LVEF  04/27/2022 50-55%   Final     Potassium 04/28/2022 4.1  3.4 - 5.3 mmol/L Final

## 2022-05-17 NOTE — PROGRESS NOTES
Clinic Care Coordination Contact  Care Team Conversations    CC JESSICA spoke with [facility] SW[facility]. Pt discharged on 5/13 due to no longer needing TCU. SWer explained that [facility]'t do respite care which is what pt needs at this time due to situation at [facility].     Kristyr suggested [place] [facility] [place]. Kristyr spoke with pt's dtr eariler today and suggested this.     Plan: FABIANA LOPEZ will outreach to pt and dtr.    Aretha Funez [facility]  Clinic Care Coordinator  [facility]  [phone]  [email or web address]

## 2022-05-18 ENCOUNTER — LAB (OUTPATIENT)
Dept: LAB | Facility: CLINIC | Age: 87
End: 2022-05-18
Payer: MEDICARE

## 2022-05-18 DIAGNOSIS — Z11.52 ENCOUNTER FOR SCREENING FOR COVID-19: ICD-10-CM

## 2022-05-18 LAB
ALBUMIN UR-MCNC: 30 MG/DL
APPEARANCE UR: CLEAR
BACTERIA #/AREA URNS HPF: ABNORMAL /HPF
BILIRUB UR QL STRIP: NEGATIVE
COLOR UR AUTO: YELLOW
GLUCOSE UR STRIP-MCNC: NEGATIVE MG/DL
HGB UR QL STRIP: ABNORMAL
KETONES UR STRIP-MCNC: NEGATIVE MG/DL
LEUKOCYTE ESTERASE UR QL STRIP: ABNORMAL
NITRATE UR QL: POSITIVE
PH UR STRIP: 5 [PH] (ref 5–7)
RBC #/AREA URNS AUTO: ABNORMAL /HPF
SP GR UR STRIP: >=1.03 (ref 1–1.03)
SQUAMOUS #/AREA URNS AUTO: ABNORMAL /LPF
UROBILINOGEN UR STRIP-ACNC: 0.2 E.U./DL
WBC #/AREA URNS AUTO: ABNORMAL /HPF

## 2022-05-18 PROCEDURE — U0003 INFECTIOUS AGENT DETECTION BY NUCLEIC ACID (DNA OR RNA); SEVERE ACUTE RESPIRATORY SYNDROME CORONAVIRUS 2 (SARS-COV-2) (CORONAVIRUS DISEASE [COVID-19]), AMPLIFIED PROBE TECHNIQUE, MAKING USE OF HIGH THROUGHPUT TECHNOLOGIES AS DESCRIBED BY CMS-2020-01-R: HCPCS

## 2022-05-18 PROCEDURE — 87086 URINE CULTURE/COLONY COUNT: CPT | Performed by: INTERNAL MEDICINE

## 2022-05-18 PROCEDURE — 81001 URINALYSIS AUTO W/SCOPE: CPT | Performed by: INTERNAL MEDICINE

## 2022-05-18 PROCEDURE — U0005 INFEC AGEN DETEC AMPLI PROBE: HCPCS

## 2022-05-18 RX ORDER — PAROXETINE 20 MG/1
20 TABLET, FILM COATED ORAL AT BEDTIME
Qty: 90 TABLET | Refills: 0 | Status: SHIPPED | OUTPATIENT
Start: 2022-05-18 | End: 2022-08-12

## 2022-05-18 RX ORDER — QUETIAPINE FUMARATE 25 MG/1
TABLET, FILM COATED ORAL
Qty: 30 TABLET | Refills: 1 | Status: SHIPPED | OUTPATIENT
Start: 2022-05-18 | End: 2022-08-02

## 2022-05-18 NOTE — TELEPHONE ENCOUNTER
Routing refill request to provider for review/approval because:  Labs not current:  PHQ  Pt is schedule in July but was due in May. Do you need to see patient sooner  Nathalia MILLER RN, BSN

## 2022-05-18 NOTE — TELEPHONE ENCOUNTER
Second encounter (duplicate encounter) was created on 5- (encounter was closed).    LOV 5- Barrie    Appointments in Next Year    May 18, 2022 12:00 PM  LAB with CS LAB  [facility] ([facility] ) [phone]   May 18, 2022 12:15 PM  LAB with CS LAB  [facility] ([facility] ) [phone]   Jul 06, 2022 11:30 AM  (Arrive by 11:10 AM)  Provider Visit with Braeden Sood MD  [facility] ([facility] ) [phone]        RT Kristan (R)

## 2022-05-19 ENCOUNTER — MEDICAL CORRESPONDENCE (OUTPATIENT)
Dept: HEALTH INFORMATION MANAGEMENT | Facility: CLINIC | Age: 87
End: 2022-05-19

## 2022-05-19 DIAGNOSIS — I48.0 PAROXYSMAL ATRIAL FIBRILLATION (H): ICD-10-CM

## 2022-05-19 LAB — SARS-COV-2 RNA RESP QL NAA+PROBE: NEGATIVE

## 2022-05-20 ENCOUNTER — NURSE TRIAGE (OUTPATIENT)
Dept: NURSING | Facility: CLINIC | Age: 87
End: 2022-05-20
Payer: MEDICARE

## 2022-05-20 LAB — BACTERIA UR CULT: NORMAL

## 2022-05-20 NOTE — CONFIDENTIAL NOTE
P.T. orders balance, strengthening, gait training fall prevention. One time week for one week, two times per weeks for  3 weeks one time week for two weeks and a follow up in 2 weeks.   Need verbal phone call and fax the rest

## 2022-05-23 ENCOUNTER — PATIENT OUTREACH (OUTPATIENT)
Dept: CARE COORDINATION | Facility: CLINIC | Age: 87
End: 2022-05-23
Payer: MEDICARE

## 2022-05-23 ENCOUNTER — TELEPHONE (OUTPATIENT)
Dept: FAMILY MEDICINE | Facility: CLINIC | Age: 87
End: 2022-05-23
Payer: MEDICARE

## 2022-05-23 DIAGNOSIS — M62.81 GENERALIZED MUSCLE WEAKNESS: Primary | ICD-10-CM

## 2022-05-23 NOTE — PROGRESS NOTES
Clinic Care Coordination Contact  Care Team Conversations    RNCC received an email from ROBIN Baldwin at [facility] dated 5/12/2022 stating the patient will be discharging back to her condo on 5/13/2022 with [facility] providing RN/HHA/PT/OT services.    Home care services are in place and patient has reached to PCP with a scheduled appointment on 7/6/2022.  JESSICA Montgomery from [facility] has been in contact with patient/daughter.    No further RNCC care coordination outreaches at this time.     Jud Miner RN, BSN, PHN  Primary Care / Care Coordinator   [facility]  E-mail [email or web address]   [phone]

## 2022-05-23 NOTE — CONFIDENTIAL NOTE
Patient Contact     Attempt #: 1     Was call answered?  No.  Left non-detail message on voicemail with information to call triage RN    *VM did not indicate that VM was confidential.      Josefina Rodas, RN  [facility]

## 2022-05-23 NOTE — TELEPHONE ENCOUNTER
PCP: no future lab order in chart, pended UA order. Please review.       Writer called Tor-  at [place]    Attempt #: 1     Was call answered?  No.  Left detailed message on voicemail with information to collect a UA and to call triage RN if any further questions.      Josefina Rodas, RN  [facility]

## 2022-05-23 NOTE — TELEPHONE ENCOUNTER
Care Director calling to update PCP of patient fall 5/23/22 at 6:10am.    Pt sitting on side of bed with feet dangling, slid off side of bed, landing on buttocks on floor.   Care Director reports 0 injuries.     Vitals at time of fall:   /63  T 97.5  P 62  R 16   O2  98% on RA

## 2022-05-24 NOTE — TELEPHONE ENCOUNTER
Tor-  at [place] is calling back - will need UA order signed & then faxed.    Please fax to:  [phone]

## 2022-05-24 NOTE — CONFIDENTIAL NOTE
Patient Contact     Attempt #: 2     Was call answered?  Yes, writer gave verbal approval for orders listed below.       Josefina Rodas RN  [facility]

## 2022-05-24 NOTE — TELEPHONE ENCOUNTER
Faxed order     Called and notified facility     Zuleyka SIDHU, Triage RN  [facility]

## 2022-05-25 DIAGNOSIS — Z53.9 DIAGNOSIS NOT YET DEFINED: Primary | ICD-10-CM

## 2022-05-25 PROCEDURE — G0180 MD CERTIFICATION HHA PATIENT: HCPCS | Performed by: INTERNAL MEDICINE

## 2022-05-26 ENCOUNTER — MEDICAL CORRESPONDENCE (OUTPATIENT)
Dept: FAMILY MEDICINE | Facility: CLINIC | Age: 87
End: 2022-05-26

## 2022-05-26 ENCOUNTER — TELEPHONE (OUTPATIENT)
Dept: FAMILY MEDICINE | Facility: CLINIC | Age: 87
End: 2022-05-26
Payer: MEDICARE

## 2022-05-26 NOTE — TELEPHONE ENCOUNTER
Home care called for orders:     OT 1x/5 weeks     ADLs, therapeutic activities, therapy exercises, cognitive testing PRN, caregiver education    Verbal given     Zuleyka SIDHU, Triage RN  [facility]

## 2022-06-01 ENCOUNTER — TELEPHONE (OUTPATIENT)
Dept: FAMILY MEDICINE | Facility: CLINIC | Age: 87
End: 2022-06-01
Payer: MEDICARE

## 2022-06-01 NOTE — TELEPHONE ENCOUNTER
[facility] calling to request OT visits 1x/week for 5 weeks to address ADLs and adaptive equipment.    Verbal approval given per protocol.

## 2022-06-05 ENCOUNTER — HEALTH MAINTENANCE LETTER (OUTPATIENT)
Age: 87
End: 2022-06-05

## 2022-06-21 DIAGNOSIS — G47.00 INSOMNIA, UNSPECIFIED TYPE: ICD-10-CM

## 2022-06-22 RX ORDER — RAMELTEON 8 MG/1
TABLET ORAL
Qty: 30 TABLET | Refills: 0 | Status: SHIPPED | OUTPATIENT
Start: 2022-06-22 | End: 2022-08-12

## 2022-06-22 NOTE — TELEPHONE ENCOUNTER
Routing refill request to provider for review/approval because:  Drug not on the [facility] refill protocol     ramelteon (ROZEREM) 8 MG tablet  Last Written Prescription Date:  4/12/22  Last Fill Quantity: 30,   # refills: 0      Last OV: 5/17/22

## 2022-06-24 DIAGNOSIS — R11.0 NAUSEA WITHOUT VOMITING: ICD-10-CM

## 2022-06-24 RX ORDER — ONDANSETRON 4 MG/1
4 TABLET, FILM COATED ORAL EVERY 8 HOURS PRN
Qty: 90 TABLET | Refills: 1 | Status: SHIPPED | OUTPATIENT
Start: 2022-06-24 | End: 2022-08-12

## 2022-06-24 NOTE — TELEPHONE ENCOUNTER
LOV 5- Barrie    Appointments in Next Year    Jul 06, 2022 11:30 AM  (Arrive by 11:10 AM)  Provider Visit with Braeden Sood MD  [facility] ([facility] ) [phone]        RT Kristan (R)

## 2022-06-24 NOTE — TELEPHONE ENCOUNTER
Prescription approved per [facility] Refill Protocol.  Fawn Poon, RN  [facility] Triage Nurse

## 2022-07-05 ENCOUNTER — MEDICAL CORRESPONDENCE (OUTPATIENT)
Dept: FAMILY MEDICINE | Facility: CLINIC | Age: 87
End: 2022-07-05

## 2022-07-07 ENCOUNTER — TELEPHONE (OUTPATIENT)
Dept: FAMILY MEDICINE | Facility: CLINIC | Age: 87
End: 2022-07-07

## 2022-07-07 NOTE — TELEPHONE ENCOUNTER
RN from home called, stated that [facility] needs a signed med list from provider.     Requests signed med list to be faxed to [facility] to [phone].

## 2022-07-12 NOTE — TELEPHONE ENCOUNTER
Faxed med list signed by Dr. Barlow to [facility] to [phone]. Copy placed in accordion folder (pink pod).      Salma AGOSTO MA on 7/12/2022 at 11:06 AM

## 2022-07-18 ENCOUNTER — MEDICAL CORRESPONDENCE (OUTPATIENT)
Dept: HEALTH INFORMATION MANAGEMENT | Facility: CLINIC | Age: 87
End: 2022-07-18

## 2022-07-21 DIAGNOSIS — I48.0 PAROXYSMAL ATRIAL FIBRILLATION (H): ICD-10-CM

## 2022-07-21 RX ORDER — METOPROLOL SUCCINATE 100 MG/1
100 TABLET, EXTENDED RELEASE ORAL DAILY
Qty: 90 TABLET | Refills: 3 | Status: SHIPPED | OUTPATIENT
Start: 2022-07-21 | End: 2022-08-12

## 2022-07-21 NOTE — TELEPHONE ENCOUNTER
Patient is currently taking Metoprolol and then found out that it was discontinued when she got out of the hospital in April.  She says she has never felt better and would needs a refill ASAP as she is completely out if you want her to continue to take it.  Please advise.  Sofie Diane CMA

## 2022-07-21 NOTE — TELEPHONE ENCOUNTER
[facility] called stating that [facility] never received the below signed med list they would like this resent to fax number below they would also like a copy sent to [phone] [facility] RN

## 2022-07-23 DIAGNOSIS — I63.431 CEREBROVASCULAR ACCIDENT (CVA) DUE TO EMBOLISM OF RIGHT POSTERIOR CEREBRAL ARTERY (H): ICD-10-CM

## 2022-07-26 ENCOUNTER — OFFICE VISIT (OUTPATIENT)
Dept: FAMILY MEDICINE | Facility: CLINIC | Age: 87
End: 2022-07-26
Payer: MEDICARE

## 2022-07-26 VITALS
HEART RATE: 68 BPM | HEIGHT: 65 IN | BODY MASS INDEX: 26.61 KG/M2 | TEMPERATURE: 98.2 F | OXYGEN SATURATION: 97 % | DIASTOLIC BLOOD PRESSURE: 74 MMHG | SYSTOLIC BLOOD PRESSURE: 152 MMHG | RESPIRATION RATE: 16 BRPM | WEIGHT: 159.7 LBS

## 2022-07-26 DIAGNOSIS — R35.1 NOCTURIA: Primary | ICD-10-CM

## 2022-07-26 DIAGNOSIS — I63.40 CEREBROVASCULAR ACCIDENT (CVA) DUE TO EMBOLISM OF CEREBRAL ARTERY (H): ICD-10-CM

## 2022-07-26 LAB
ALBUMIN UR-MCNC: NEGATIVE MG/DL
APPEARANCE UR: CLEAR
BACTERIA #/AREA URNS HPF: ABNORMAL /HPF
BILIRUB UR QL STRIP: NEGATIVE
COLOR UR AUTO: YELLOW
GLUCOSE UR STRIP-MCNC: NEGATIVE MG/DL
HGB UR QL STRIP: ABNORMAL
KETONES UR STRIP-MCNC: NEGATIVE MG/DL
LEUKOCYTE ESTERASE UR QL STRIP: NEGATIVE
NITRATE UR QL: POSITIVE
PH UR STRIP: 6 [PH] (ref 5–7)
RBC #/AREA URNS AUTO: ABNORMAL /HPF
SP GR UR STRIP: 1.02 (ref 1–1.03)
SQUAMOUS #/AREA URNS AUTO: ABNORMAL /LPF
UROBILINOGEN UR STRIP-ACNC: 0.2 E.U./DL
WBC #/AREA URNS AUTO: ABNORMAL /HPF

## 2022-07-26 PROCEDURE — 99214 OFFICE O/P EST MOD 30 MIN: CPT | Performed by: INTERNAL MEDICINE

## 2022-07-26 PROCEDURE — 87086 URINE CULTURE/COLONY COUNT: CPT

## 2022-07-26 PROCEDURE — 81001 URINALYSIS AUTO W/SCOPE: CPT | Performed by: INTERNAL MEDICINE

## 2022-07-26 RX ORDER — OXYBUTYNIN CHLORIDE 15 MG/1
15 TABLET, EXTENDED RELEASE ORAL DAILY
Qty: 90 TABLET | Refills: 1 | Status: SHIPPED | OUTPATIENT
Start: 2022-07-26 | End: 2022-07-26

## 2022-07-26 RX ORDER — MEMANTINE HYDROCHLORIDE 5 MG/1
5 TABLET ORAL 2 TIMES DAILY
Qty: 180 TABLET | Refills: 3 | Status: SHIPPED | OUTPATIENT
Start: 2022-07-26 | End: 2022-08-12

## 2022-07-26 RX ORDER — OXYBUTYNIN CHLORIDE 10 MG/1
20 TABLET, EXTENDED RELEASE ORAL DAILY
Qty: 120 TABLET | Refills: 3 | Status: SHIPPED | OUTPATIENT
Start: 2022-07-26 | End: 2022-11-04

## 2022-07-26 ASSESSMENT — PAIN SCALES - GENERAL: PAINLEVEL: NO PAIN (0)

## 2022-07-26 NOTE — PROGRESS NOTES
"  Assessment & Plan     Nocturia  We will rule out urinary tract infection.  In this instance multiple factors may be playing.  She no doubt has an element of vascular dementia and she is in a new setting.  Her medications are taken differently.  She may have more fluids daily related to improve nutritional status.  Other possibilities would include her recent CVA which involved the substantia radiata.  - UA with Microscopic reflex to Culture - lab collect; Future  - oxybutynin ER (DITROPAN XL) 10 MG 24 hr tablet; Take 2 tablets (20 mg) by mouth daily    Cerebrovascular accident (CVA) due to embolism of cerebral artery (H)  Stable.  Ambulatory abilities have not changed.  No change in level of consciousness.  Appetite is good.  Only changes been to the nocturia and enuresis.               BMI:   Estimated body mass index is 26.58 kg/m  as calculated from the following:    Height as of this encounter: 1.651 m (5' 5\").    Weight as of this encounter: 72.4 kg (159 lb 11.2 oz).       See Patient Instructions  Patient Instructions   Flor;  Am glad you are doing well at [facility].    Regarding your nighttime urination, I would like to increase your oxybutynin to 20 mg of extended release daily.  This should reduce your tendency for urination in the evening.    Today I would also like to reevaluate your urinalysis.    Otherwise continue current medications.  A new prescription for memantine and the increased dose of oxybutynin was sent into your pharmacy    See you in 2 months    Best regards  Braeden      Return in about 2 months (around 9/26/2022).    Braeden Sood MD  [facility] MARGA Ray is a 89 year old, presenting for the following health issues:  Follow Up      HPI   Recheck medication          Review of Systems   Constitutional, HEENT, cardiovascular, pulmonary, gi and gu systems are negative, except as otherwise noted.      Objective    BP (!) 152/74 (BP Location: Right arm, " "Cuff Size: Adult Large)   Pulse 68   Temp 98.2  F (36.8  C) (Tympanic)   Resp 16   Ht 1.651 m (5' 5\")   Wt 72.4 kg (159 lb 11.2 oz)   SpO2 97%   BMI 26.58 kg/m    Body mass index is 26.58 kg/m .  Physical Exam   GENERAL: healthy, alert and no distress  EYES: Eyes grossly normal to inspection, PERRL and conjunctivae and sclerae normal  NECK: no adenopathy, no asymmetry, masses, or scars and thyroid normal to palpation  RESP: lungs clear to auscultation - no rales, rhonchi or wheezes  CV: regular rate and rhythm, normal S1 S2, no S3 or S4, no murmur, click or rub, no peripheral edema and peripheral pulses strong  MS: no gross musculoskeletal defects noted, no edema  SKIN: no suspicious lesions or rashes  NEURO: Normal strength and tone, mentation intact and speech normal  PSYCH: mentation appears normal, affect normal/bright    Lab on 05/18/2022   Component Date Value Ref Range Status     SARS CoV2 PCR 05/18/2022 Negative  Negative, Testing sent to reference lab. Results will be returned via unsolicited result Final    NEGATIVE: SARS-CoV-2 (COVID-19) RNA not detected, presumed negative.                   .  ..  "

## 2022-07-26 NOTE — PATIENT INSTRUCTIONS
Flor;  Am glad you are doing well at [place].    Regarding your nighttime urination, I would like to increase your oxybutynin to 20 mg of extended release daily.  This should reduce your tendency for urination in the evening.    Today I would also like to reevaluate your urinalysis.    Otherwise continue current medications.  A new prescription for memantine and the increased dose of oxybutynin was sent into your pharmacy    See you in 2 months    Best regards  Braeden

## 2022-07-27 RX ORDER — MEMANTINE HYDROCHLORIDE 5 MG/1
TABLET ORAL
Qty: 60 TABLET | Refills: 1 | OUTPATIENT
Start: 2022-07-27

## 2022-07-27 NOTE — TELEPHONE ENCOUNTER
memantine (NAMENDA) 5 MG tablet     The original prescription was reordered on 7/26/2022 by Braeden Sood MD. Renewing this prescription may not be appropriate.     Rx refused. Duplicate/early request. Pharmacy notified.    Kristen Mendez RN  [facility]

## 2022-07-27 NOTE — TELEPHONE ENCOUNTER
Bobby from [facility] calling stating he nor hussain  received med lists.     Please fax to [phone]. He would like for a callback from person who faxed to verify that it was done.    Dorcas HOPE RN  EP Triage

## 2022-07-27 NOTE — TELEPHONE ENCOUNTER
Received. Spoke w Bobby from [facility] to confirm they received med list.  Vianey Lisa, CMA

## 2022-07-28 ENCOUNTER — TELEPHONE (OUTPATIENT)
Dept: FAMILY MEDICINE | Facility: CLINIC | Age: 87
End: 2022-07-28

## 2022-07-28 DIAGNOSIS — N39.0 URINARY TRACT INFECTION WITHOUT HEMATURIA, SITE UNSPECIFIED: Primary | ICD-10-CM

## 2022-07-28 DIAGNOSIS — M54.2 NECK PAIN: ICD-10-CM

## 2022-07-28 RX ORDER — CEFUROXIME AXETIL 500 MG/1
500 TABLET ORAL 2 TIMES DAILY
Qty: 14 TABLET | Refills: 0 | Status: SHIPPED | OUTPATIENT
Start: 2022-07-28 | End: 2022-08-08

## 2022-07-28 RX ORDER — ACETAMINOPHEN 500 MG
TABLET ORAL
Qty: 200 TABLET | Refills: 11 | Status: SHIPPED | OUTPATIENT
Start: 2022-07-28 | End: 2022-12-13

## 2022-07-28 NOTE — TELEPHONE ENCOUNTER
Faxed lab results, providers message and copy of scripts to Fax: [phone] . Called [facility] and talked to SAY Jackson with lab results and orders.

## 2022-07-28 NOTE — TELEPHONE ENCOUNTER
TO PCP:     Sunshine with [facility] facility called     Pt's family requested an adjustment in Acetaminophen    CURRENT RX: 1000mg three times daily as needed   REQUESTED RX: 1000mg in the AM, and 1000mg as needed in the afternoon and evening     Asking that we:     Fax orders to: [phone]    Send script also to pharmacy: [facility]     Pended Rx     Zuleyka SIDHU, Triage RN  [facility]

## 2022-07-29 DIAGNOSIS — G47.00 INSOMNIA, UNSPECIFIED TYPE: ICD-10-CM

## 2022-07-29 LAB
BACTERIA UR CULT: ABNORMAL
BACTERIA UR CULT: ABNORMAL

## 2022-08-02 ENCOUNTER — TELEPHONE (OUTPATIENT)
Dept: FAMILY MEDICINE | Facility: CLINIC | Age: 87
End: 2022-08-02

## 2022-08-02 RX ORDER — QUETIAPINE FUMARATE 25 MG/1
TABLET, FILM COATED ORAL
Qty: 30 TABLET | Refills: 10 | Status: SHIPPED | OUTPATIENT
Start: 2022-08-02 | End: 2022-11-18

## 2022-08-02 NOTE — TELEPHONE ENCOUNTER
Alexa, nurse with [facility] called for clarification on Atorvastatin prescription. She states this was previously on hold and wants to know if provider wants to start this again? This is listed as transitional on med list. Please review/advise.     Facility number: [phone]    Cheryl HAYS RN  [facility]

## 2022-08-06 DIAGNOSIS — N39.0 URINARY TRACT INFECTION WITHOUT HEMATURIA, SITE UNSPECIFIED: ICD-10-CM

## 2022-08-08 RX ORDER — CEFUROXIME AXETIL 500 MG/1
500 TABLET ORAL 2 TIMES DAILY
Qty: 14 TABLET | Refills: 0 | Status: SHIPPED | OUTPATIENT
Start: 2022-08-08 | End: 2022-09-01

## 2022-08-10 DIAGNOSIS — Z78.9 TAKES DIETARY SUPPLEMENTS: Primary | ICD-10-CM

## 2022-08-10 DIAGNOSIS — G47.00 INSOMNIA, UNSPECIFIED TYPE: ICD-10-CM

## 2022-08-10 DIAGNOSIS — I48.0 PAROXYSMAL ATRIAL FIBRILLATION (H): ICD-10-CM

## 2022-08-10 DIAGNOSIS — H04.123 DRY EYES: ICD-10-CM

## 2022-08-10 DIAGNOSIS — N39.41 URGE INCONTINENCE OF URINE: ICD-10-CM

## 2022-08-10 DIAGNOSIS — F03.90 DEMENTIA (H): ICD-10-CM

## 2022-08-10 DIAGNOSIS — N39.0 URINARY TRACT INFECTION WITHOUT HEMATURIA, SITE UNSPECIFIED: ICD-10-CM

## 2022-08-10 DIAGNOSIS — F33.42 RECURRENT MAJOR DEPRESSION IN COMPLETE REMISSION (H): ICD-10-CM

## 2022-08-10 DIAGNOSIS — I10 ESSENTIAL HYPERTENSION: ICD-10-CM

## 2022-08-10 DIAGNOSIS — K59.00 CONSTIPATION, UNSPECIFIED CONSTIPATION TYPE: ICD-10-CM

## 2022-08-10 DIAGNOSIS — R11.0 NAUSEA WITHOUT VOMITING: ICD-10-CM

## 2022-08-10 DIAGNOSIS — I63.40 CEREBROVASCULAR ACCIDENT (CVA) DUE TO EMBOLISM OF CEREBRAL ARTERY (H): ICD-10-CM

## 2022-08-12 RX ORDER — CHOLECALCIFEROL (VITAMIN D3) 25 MCG
CAPSULE ORAL
Qty: 30 CAPSULE | Refills: 10 | Status: ON HOLD | OUTPATIENT
Start: 2022-08-12 | End: 2024-04-28

## 2022-08-12 RX ORDER — AMLODIPINE BESYLATE 2.5 MG/1
TABLET ORAL
Qty: 30 TABLET | Refills: 10 | Status: SHIPPED | OUTPATIENT
Start: 2022-08-12 | End: 2022-10-27

## 2022-08-12 RX ORDER — MEMANTINE HYDROCHLORIDE 5 MG/1
TABLET ORAL
Qty: 60 TABLET | Refills: 10 | Status: SHIPPED | OUTPATIENT
Start: 2022-08-12 | End: 2022-12-19

## 2022-08-12 RX ORDER — BISACODYL 5 MG
TABLET, DELAYED RELEASE (ENTERIC COATED) ORAL
Qty: 30 TABLET | Refills: 10 | Status: SHIPPED | OUTPATIENT
Start: 2022-08-12

## 2022-08-12 RX ORDER — CYCLOSPORINE 0.5 MG/ML
EMULSION OPHTHALMIC
Qty: 5.5 ML | Refills: 3 | Status: SHIPPED | OUTPATIENT
Start: 2022-08-12 | End: 2022-10-24

## 2022-08-12 RX ORDER — MULTIVITAMIN WITH FOLIC ACID 400 MCG
TABLET ORAL
Qty: 30 TABLET | Refills: 10 | Status: SHIPPED | OUTPATIENT
Start: 2022-08-12 | End: 2022-09-01

## 2022-08-12 RX ORDER — METOPROLOL SUCCINATE 100 MG/1
TABLET, EXTENDED RELEASE ORAL
Qty: 30 TABLET | Refills: 10 | Status: SHIPPED | OUTPATIENT
Start: 2022-08-12 | End: 2022-11-15

## 2022-08-12 RX ORDER — RAMELTEON 8 MG/1
TABLET ORAL
Qty: 30 TABLET | Refills: 10 | Status: SHIPPED | OUTPATIENT
Start: 2022-08-12

## 2022-08-12 RX ORDER — OXYBUTYNIN CHLORIDE 15 MG/1
TABLET, EXTENDED RELEASE ORAL
Qty: 30 TABLET | Refills: 10 | Status: SHIPPED | OUTPATIENT
Start: 2022-08-12 | End: 2022-09-01

## 2022-08-12 RX ORDER — ATORVASTATIN CALCIUM 20 MG/1
TABLET, FILM COATED ORAL
Qty: 30 TABLET | Refills: 10 | Status: SHIPPED | OUTPATIENT
Start: 2022-08-12 | End: 2022-09-01

## 2022-08-12 RX ORDER — DOCUSATE SODIUM 100 MG/1
CAPSULE, LIQUID FILLED ORAL
Qty: 60 CAPSULE | Refills: 10 | Status: SHIPPED | OUTPATIENT
Start: 2022-08-12

## 2022-08-12 RX ORDER — PAROXETINE 20 MG/1
TABLET, FILM COATED ORAL
Qty: 30 TABLET | Refills: 10 | Status: SHIPPED | OUTPATIENT
Start: 2022-08-12 | End: 2023-01-04

## 2022-08-12 RX ORDER — POLYETHYLENE GLYCOL 3350 17 G/17G
POWDER, FOR SOLUTION ORAL
Qty: 238 G | Refills: 10 | Status: SHIPPED | OUTPATIENT
Start: 2022-08-12 | End: 2022-09-01

## 2022-08-12 RX ORDER — ONDANSETRON 4 MG/1
TABLET, FILM COATED ORAL
Qty: 10 TABLET | Refills: 10 | Status: SHIPPED | OUTPATIENT
Start: 2022-08-12

## 2022-08-12 NOTE — TELEPHONE ENCOUNTER
Prescription approved per [facility] Refill Protocol. - bisacodyl, docusate sodium, memantine, ondansetron, oxybutynin, polyethylene glycol.      Amlodipine, metoprolol -   BP Readings from Last 3 Encounters:   07/26/22 (!) 152/74   05/17/22 (!) 164/72   04/28/22 136/67     Paroxetine -  PHQ 12/4/2019 6/19/2020 10/7/2021   PHQ-9 Total Score - 0 4   Q9: Thoughts of better off dead/self-harm past 2 weeks Not at all Not at all Not at all     Not on protocol:  Ramelteon, restasis.    Multivitamin is not on current medication list.    Atorvastatin -  Abnormal creatinine kinase.   Rani Stephenson RN

## 2022-09-01 ENCOUNTER — MEDICAL CORRESPONDENCE (OUTPATIENT)
Dept: HEALTH INFORMATION MANAGEMENT | Facility: CLINIC | Age: 87
End: 2022-09-01

## 2022-09-01 ENCOUNTER — APPOINTMENT (OUTPATIENT)
Dept: GENERAL RADIOLOGY | Facility: CLINIC | Age: 87
DRG: 871 | End: 2022-09-01
Attending: EMERGENCY MEDICINE
Payer: MEDICARE

## 2022-09-01 ENCOUNTER — APPOINTMENT (OUTPATIENT)
Dept: CT IMAGING | Facility: CLINIC | Age: 87
DRG: 871 | End: 2022-09-01
Attending: EMERGENCY MEDICINE
Payer: MEDICARE

## 2022-09-01 ENCOUNTER — HOSPITAL ENCOUNTER (INPATIENT)
Facility: CLINIC | Age: 87
LOS: 5 days | Discharge: NURSING, REHAB OR LONG-TERM CARE | DRG: 871 | End: 2022-09-07
Attending: EMERGENCY MEDICINE | Admitting: HOSPITALIST
Payer: MEDICARE

## 2022-09-01 DIAGNOSIS — R11.2 NON-INTRACTABLE VOMITING WITH NAUSEA, UNSPECIFIED VOMITING TYPE: ICD-10-CM

## 2022-09-01 DIAGNOSIS — J96.01 ACUTE RESPIRATORY FAILURE WITH HYPOXIA (H): ICD-10-CM

## 2022-09-01 DIAGNOSIS — K51.00 PANCOLITIS (H): ICD-10-CM

## 2022-09-01 DIAGNOSIS — J69.0 ASPIRATION PNEUMONITIS (H): ICD-10-CM

## 2022-09-01 DIAGNOSIS — A41.81 SEPSIS DUE TO ENTEROCOCCUS (H): ICD-10-CM

## 2022-09-01 DIAGNOSIS — J18.9 PNEUMONIA DUE TO INFECTIOUS ORGANISM, UNSPECIFIED LATERALITY, UNSPECIFIED PART OF LUNG: Primary | ICD-10-CM

## 2022-09-01 LAB
ABO/RH(D): NORMAL
ALBUMIN SERPL-MCNC: 3.2 G/DL (ref 3.4–5)
ALP SERPL-CCNC: 113 U/L (ref 40–150)
ALT SERPL W P-5'-P-CCNC: 23 U/L (ref 0–50)
ANION GAP SERPL CALCULATED.3IONS-SCNC: 8 MMOL/L (ref 3–14)
ANTIBODY SCREEN: NEGATIVE
APTT PPP: 30 SECONDS (ref 22–38)
AST SERPL W P-5'-P-CCNC: 17 U/L (ref 0–45)
BASOPHILS # BLD AUTO: 0.1 10E3/UL (ref 0–0.2)
BASOPHILS NFR BLD AUTO: 1 %
BILIRUB SERPL-MCNC: 0.1 MG/DL (ref 0.2–1.3)
BUN SERPL-MCNC: 34 MG/DL (ref 7–30)
CALCIUM SERPL-MCNC: 8.7 MG/DL (ref 8.5–10.1)
CHLORIDE BLD-SCNC: 108 MMOL/L (ref 94–109)
CO2 SERPL-SCNC: 25 MMOL/L (ref 20–32)
CREAT SERPL-MCNC: 0.86 MG/DL (ref 0.52–1.04)
D DIMER PPP FEU-MCNC: 0.37 UG/ML FEU (ref 0–0.5)
EOSINOPHIL # BLD AUTO: 0.6 10E3/UL (ref 0–0.7)
EOSINOPHIL NFR BLD AUTO: 6 %
ERYTHROCYTE [DISTWIDTH] IN BLOOD BY AUTOMATED COUNT: 15.1 % (ref 10–15)
GFR SERPL CREATININE-BSD FRML MDRD: 63 ML/MIN/1.73M2
GLUCOSE BLD-MCNC: 141 MG/DL (ref 70–99)
HCO3 BLDV-SCNC: 26 MMOL/L (ref 21–28)
HCT VFR BLD AUTO: 39.3 % (ref 35–47)
HGB BLD-MCNC: 12.2 G/DL (ref 11.7–15.7)
IMM GRANULOCYTES # BLD: 0 10E3/UL
IMM GRANULOCYTES NFR BLD: 0 %
INR PPP: 2.16 (ref 0.85–1.15)
LACTATE BLD-SCNC: 1.8 MMOL/L
LYMPHOCYTES # BLD AUTO: 3.3 10E3/UL (ref 0.8–5.3)
LYMPHOCYTES NFR BLD AUTO: 31 %
MAGNESIUM SERPL-MCNC: 2.3 MG/DL (ref 1.6–2.3)
MCH RBC QN AUTO: 27.2 PG (ref 26.5–33)
MCHC RBC AUTO-ENTMCNC: 31 G/DL (ref 31.5–36.5)
MCV RBC AUTO: 88 FL (ref 78–100)
MONOCYTES # BLD AUTO: 0.5 10E3/UL (ref 0–1.3)
MONOCYTES NFR BLD AUTO: 4 %
NEUTROPHILS # BLD AUTO: 6 10E3/UL (ref 1.6–8.3)
NEUTROPHILS NFR BLD AUTO: 58 %
NRBC # BLD AUTO: 0 10E3/UL
NRBC BLD AUTO-RTO: 0 /100
NT-PROBNP SERPL-MCNC: 554 PG/ML (ref 0–1800)
PCO2 BLDV: 52 MM HG (ref 40–50)
PH BLDV: 7.31 [PH] (ref 7.32–7.43)
PLATELET # BLD AUTO: 409 10E3/UL (ref 150–450)
PO2 BLDV: 45 MM HG (ref 25–47)
POTASSIUM BLD-SCNC: 3.5 MMOL/L (ref 3.4–5.3)
PROT SERPL-MCNC: 7 G/DL (ref 6.8–8.8)
RBC # BLD AUTO: 4.49 10E6/UL (ref 3.8–5.2)
SAO2 % BLDV: 76 % (ref 94–100)
SODIUM SERPL-SCNC: 141 MMOL/L (ref 133–144)
SPECIMEN EXPIRATION DATE: NORMAL
TROPONIN I SERPL HS-MCNC: 8 NG/L
WBC # BLD AUTO: 10.4 10E3/UL (ref 4–11)

## 2022-09-01 PROCEDURE — 250N000011 HC RX IP 250 OP 636: Performed by: EMERGENCY MEDICINE

## 2022-09-01 PROCEDURE — 36415 COLL VENOUS BLD VENIPUNCTURE: CPT | Performed by: EMERGENCY MEDICINE

## 2022-09-01 PROCEDURE — 85025 COMPLETE CBC W/AUTO DIFF WBC: CPT | Performed by: EMERGENCY MEDICINE

## 2022-09-01 PROCEDURE — 94660 CPAP INITIATION&MGMT: CPT

## 2022-09-01 PROCEDURE — 85730 THROMBOPLASTIN TIME PARTIAL: CPT | Performed by: EMERGENCY MEDICINE

## 2022-09-01 PROCEDURE — 84484 ASSAY OF TROPONIN QUANT: CPT | Performed by: EMERGENCY MEDICINE

## 2022-09-01 PROCEDURE — 84145 PROCALCITONIN (PCT): CPT | Performed by: EMERGENCY MEDICINE

## 2022-09-01 PROCEDURE — 83880 ASSAY OF NATRIURETIC PEPTIDE: CPT | Performed by: EMERGENCY MEDICINE

## 2022-09-01 PROCEDURE — 86901 BLOOD TYPING SEROLOGIC RH(D): CPT | Performed by: EMERGENCY MEDICINE

## 2022-09-01 PROCEDURE — 250N000011 HC RX IP 250 OP 636

## 2022-09-01 PROCEDURE — C9803 HOPD COVID-19 SPEC COLLECT: HCPCS

## 2022-09-01 PROCEDURE — 93308 TTE F-UP OR LMTD: CPT

## 2022-09-01 PROCEDURE — 99285 EMERGENCY DEPT VISIT HI MDM: CPT | Mod: 25

## 2022-09-01 PROCEDURE — 5A09357 ASSISTANCE WITH RESPIRATORY VENTILATION, LESS THAN 24 CONSECUTIVE HOURS, CONTINUOUS POSITIVE AIRWAY PRESSURE: ICD-10-PCS | Performed by: EMERGENCY MEDICINE

## 2022-09-01 PROCEDURE — 82040 ASSAY OF SERUM ALBUMIN: CPT | Performed by: EMERGENCY MEDICINE

## 2022-09-01 PROCEDURE — 96375 TX/PRO/DX INJ NEW DRUG ADDON: CPT

## 2022-09-01 PROCEDURE — 82803 BLOOD GASES ANY COMBINATION: CPT

## 2022-09-01 PROCEDURE — 76604 US EXAM CHEST: CPT

## 2022-09-01 PROCEDURE — 87149 DNA/RNA DIRECT PROBE: CPT | Performed by: EMERGENCY MEDICINE

## 2022-09-01 PROCEDURE — 85610 PROTHROMBIN TIME: CPT | Performed by: EMERGENCY MEDICINE

## 2022-09-01 PROCEDURE — 74177 CT ABD & PELVIS W/CONTRAST: CPT | Mod: MG

## 2022-09-01 PROCEDURE — 87637 SARSCOV2&INF A&B&RSV AMP PRB: CPT | Performed by: EMERGENCY MEDICINE

## 2022-09-01 PROCEDURE — 85379 FIBRIN DEGRADATION QUANT: CPT | Performed by: EMERGENCY MEDICINE

## 2022-09-01 PROCEDURE — 87077 CULTURE AEROBIC IDENTIFY: CPT | Performed by: EMERGENCY MEDICINE

## 2022-09-01 PROCEDURE — 250N000009 HC RX 250: Performed by: EMERGENCY MEDICINE

## 2022-09-01 PROCEDURE — 80053 COMPREHEN METABOLIC PANEL: CPT | Performed by: EMERGENCY MEDICINE

## 2022-09-01 PROCEDURE — 96376 TX/PRO/DX INJ SAME DRUG ADON: CPT

## 2022-09-01 PROCEDURE — 83735 ASSAY OF MAGNESIUM: CPT | Performed by: EMERGENCY MEDICINE

## 2022-09-01 PROCEDURE — 71045 X-RAY EXAM CHEST 1 VIEW: CPT

## 2022-09-01 PROCEDURE — 93005 ELECTROCARDIOGRAM TRACING: CPT

## 2022-09-01 RX ORDER — ONDANSETRON 2 MG/ML
4 INJECTION INTRAMUSCULAR; INTRAVENOUS ONCE
Status: COMPLETED | OUTPATIENT
Start: 2022-09-01 | End: 2022-09-01

## 2022-09-01 RX ORDER — ATORVASTATIN CALCIUM 20 MG/1
20 TABLET, FILM COATED ORAL DAILY
COMMUNITY
End: 2022-10-28

## 2022-09-01 RX ORDER — IOPAMIDOL 755 MG/ML
84 INJECTION, SOLUTION INTRAVASCULAR ONCE
Status: COMPLETED | OUTPATIENT
Start: 2022-09-01 | End: 2022-09-01

## 2022-09-01 RX ORDER — ONDANSETRON 2 MG/ML
INJECTION INTRAMUSCULAR; INTRAVENOUS
Status: COMPLETED
Start: 2022-09-01 | End: 2022-09-01

## 2022-09-01 RX ORDER — NITROGLYCERIN 20 MG/100ML
10-200 INJECTION INTRAVENOUS CONTINUOUS
Status: DISCONTINUED | OUTPATIENT
Start: 2022-09-01 | End: 2022-09-01

## 2022-09-01 RX ADMIN — ONDANSETRON 4 MG: 2 INJECTION INTRAMUSCULAR; INTRAVENOUS at 23:25

## 2022-09-01 RX ADMIN — ONDANSETRON 4 MG: 2 INJECTION INTRAMUSCULAR; INTRAVENOUS at 22:50

## 2022-09-01 RX ADMIN — IOPAMIDOL 84 ML: 755 INJECTION, SOLUTION INTRAVENOUS at 23:07

## 2022-09-01 RX ADMIN — SODIUM CHLORIDE 90 ML: 9 INJECTION, SOLUTION INTRAVENOUS at 23:07

## 2022-09-01 ASSESSMENT — ENCOUNTER SYMPTOMS
WHEEZING: 1
FEVER: 0
SHORTNESS OF BREATH: 1
COUGH: 0

## 2022-09-01 ASSESSMENT — ACTIVITIES OF DAILY LIVING (ADL): ADLS_ACUITY_SCORE: 35

## 2022-09-02 ENCOUNTER — APPOINTMENT (OUTPATIENT)
Dept: SPEECH THERAPY | Facility: CLINIC | Age: 87
DRG: 871 | End: 2022-09-02
Attending: INTERNAL MEDICINE
Payer: MEDICARE

## 2022-09-02 PROBLEM — R11.2 NON-INTRACTABLE VOMITING WITH NAUSEA, UNSPECIFIED VOMITING TYPE: Status: ACTIVE | Noted: 2022-09-02

## 2022-09-02 PROBLEM — J69.0 ASPIRATION PNEUMONITIS (H): Status: ACTIVE | Noted: 2022-09-02

## 2022-09-02 PROBLEM — K51.00 PANCOLITIS (H): Status: ACTIVE | Noted: 2022-09-02

## 2022-09-02 LAB
BASE EXCESS BLDA CALC-SCNC: -2.2 MMOL/L (ref -9–1.8)
ENTEROCOCCUS FAECALIS: DETECTED
ENTEROCOCCUS FAECIUM: NOT DETECTED
FLUAV RNA SPEC QL NAA+PROBE: NEGATIVE
FLUBV RNA RESP QL NAA+PROBE: NEGATIVE
HCO3 BLD-SCNC: 24 MMOL/L (ref 21–28)
HCO3 BLDV-SCNC: 22 MMOL/L (ref 21–28)
HOLD SPECIMEN: NORMAL
LACTATE BLD-SCNC: 1.3 MMOL/L
LACTATE SERPL-SCNC: 2.1 MMOL/L (ref 0.7–2)
LACTATE SERPL-SCNC: 3.4 MMOL/L (ref 0.7–2)
LISTERIA SPECIES (DETECTED/NOT DETECTED): NOT DETECTED
O2/TOTAL GAS SETTING VFR VENT: 95 %
OXYHGB MFR BLD: 93 % (ref 92–100)
PCO2 BLD: 45 MM HG (ref 35–45)
PCO2 BLDV: 51 MM HG (ref 40–50)
PH BLD: 7.34 [PH] (ref 7.35–7.45)
PH BLDV: 7.24 [PH] (ref 7.32–7.43)
PO2 BLD: 71 MM HG (ref 80–105)
PO2 BLDV: 29 MM HG (ref 25–47)
PROCALCITONIN SERPL-MCNC: <0.05 NG/ML
RSV RNA SPEC NAA+PROBE: NEGATIVE
SAO2 % BLDV: 43 % (ref 94–100)
SARS-COV-2 RNA RESP QL NAA+PROBE: NEGATIVE
STAPHYLOCOCCUS AUREUS: NOT DETECTED
STAPHYLOCOCCUS EPIDERMIDIS: NOT DETECTED
STAPHYLOCOCCUS LUGDUNENSIS: NOT DETECTED
STAPHYLOCOCCUS SPECIES: NOT DETECTED
STREPTOCOCCUS AGALACTIAE: NOT DETECTED
STREPTOCOCCUS ANGINOSUS GROUP: NOT DETECTED
STREPTOCOCCUS PNEUMONIAE: NOT DETECTED
STREPTOCOCCUS PYOGENES: NOT DETECTED
STREPTOCOCCUS SPECIES: NOT DETECTED

## 2022-09-02 PROCEDURE — 92610 EVALUATE SWALLOWING FUNCTION: CPT | Mod: GN

## 2022-09-02 PROCEDURE — 120N000013 HC R&B IMCU

## 2022-09-02 PROCEDURE — 999N000157 HC STATISTIC RCP TIME EA 10 MIN

## 2022-09-02 PROCEDURE — 82803 BLOOD GASES ANY COMBINATION: CPT

## 2022-09-02 PROCEDURE — 82805 BLOOD GASES W/O2 SATURATION: CPT | Performed by: EMERGENCY MEDICINE

## 2022-09-02 PROCEDURE — 93010 ELECTROCARDIOGRAM REPORT: CPT | Performed by: INTERNAL MEDICINE

## 2022-09-02 PROCEDURE — 250N000011 HC RX IP 250 OP 636: Performed by: EMERGENCY MEDICINE

## 2022-09-02 PROCEDURE — 250N000011 HC RX IP 250 OP 636

## 2022-09-02 PROCEDURE — 250N000013 HC RX MED GY IP 250 OP 250 PS 637: Performed by: HOSPITALIST

## 2022-09-02 PROCEDURE — 96365 THER/PROPH/DIAG IV INF INIT: CPT | Mod: 59

## 2022-09-02 PROCEDURE — 99223 1ST HOSP IP/OBS HIGH 75: CPT | Mod: AI | Performed by: HOSPITALIST

## 2022-09-02 PROCEDURE — 93005 ELECTROCARDIOGRAM TRACING: CPT

## 2022-09-02 PROCEDURE — 258N000003 HC RX IP 258 OP 636: Performed by: INTERNAL MEDICINE

## 2022-09-02 PROCEDURE — 250N000013 HC RX MED GY IP 250 OP 250 PS 637: Performed by: INTERNAL MEDICINE

## 2022-09-02 PROCEDURE — 250N000011 HC RX IP 250 OP 636: Performed by: INTERNAL MEDICINE

## 2022-09-02 PROCEDURE — 36415 COLL VENOUS BLD VENIPUNCTURE: CPT | Performed by: INTERNAL MEDICINE

## 2022-09-02 PROCEDURE — 258N000003 HC RX IP 258 OP 636: Performed by: HOSPITALIST

## 2022-09-02 PROCEDURE — 92526 ORAL FUNCTION THERAPY: CPT | Mod: GN

## 2022-09-02 PROCEDURE — 83605 ASSAY OF LACTIC ACID: CPT | Performed by: INTERNAL MEDICINE

## 2022-09-02 RX ORDER — RAMELTEON 8 MG/1
8 TABLET ORAL
Status: DISCONTINUED | OUTPATIENT
Start: 2022-09-02 | End: 2022-09-07 | Stop reason: HOSPADM

## 2022-09-02 RX ORDER — DOCUSATE SODIUM 100 MG/1
100 CAPSULE, LIQUID FILLED ORAL 2 TIMES DAILY PRN
Status: DISCONTINUED | OUTPATIENT
Start: 2022-09-02 | End: 2022-09-07 | Stop reason: HOSPADM

## 2022-09-02 RX ORDER — PIPERACILLIN SODIUM, TAZOBACTAM SODIUM 3; .375 G/15ML; G/15ML
3.38 INJECTION, POWDER, LYOPHILIZED, FOR SOLUTION INTRAVENOUS EVERY 6 HOURS
Status: DISCONTINUED | OUTPATIENT
Start: 2022-09-02 | End: 2022-09-04

## 2022-09-02 RX ORDER — LIDOCAINE 40 MG/G
CREAM TOPICAL
Status: DISCONTINUED | OUTPATIENT
Start: 2022-09-02 | End: 2022-09-07 | Stop reason: HOSPADM

## 2022-09-02 RX ORDER — CEFTRIAXONE 2 G/1
2 INJECTION, POWDER, FOR SOLUTION INTRAMUSCULAR; INTRAVENOUS EVERY 24 HOURS
Status: DISCONTINUED | OUTPATIENT
Start: 2022-09-03 | End: 2022-09-02

## 2022-09-02 RX ORDER — BISACODYL 5 MG
5 TABLET, DELAYED RELEASE (ENTERIC COATED) ORAL DAILY PRN
Status: DISCONTINUED | OUTPATIENT
Start: 2022-09-02 | End: 2022-09-07 | Stop reason: HOSPADM

## 2022-09-02 RX ORDER — AZITHROMYCIN 500 MG/1
500 INJECTION, POWDER, LYOPHILIZED, FOR SOLUTION INTRAVENOUS ONCE
Status: COMPLETED | OUTPATIENT
Start: 2022-09-02 | End: 2022-09-02

## 2022-09-02 RX ORDER — AZITHROMYCIN 500 MG/1
500 INJECTION, POWDER, LYOPHILIZED, FOR SOLUTION INTRAVENOUS EVERY 24 HOURS
Status: DISCONTINUED | OUTPATIENT
Start: 2022-09-03 | End: 2022-09-02

## 2022-09-02 RX ORDER — LANOLIN ALCOHOL/MO/W.PET/CERES
3 CREAM (GRAM) TOPICAL
Status: DISCONTINUED | OUTPATIENT
Start: 2022-09-02 | End: 2022-09-02

## 2022-09-02 RX ORDER — PROCHLORPERAZINE MALEATE 5 MG
5 TABLET ORAL EVERY 6 HOURS PRN
Status: DISCONTINUED | OUTPATIENT
Start: 2022-09-02 | End: 2022-09-07 | Stop reason: HOSPADM

## 2022-09-02 RX ORDER — PAROXETINE 20 MG/1
20 TABLET, FILM COATED ORAL AT BEDTIME
Status: DISCONTINUED | OUTPATIENT
Start: 2022-09-02 | End: 2022-09-07 | Stop reason: HOSPADM

## 2022-09-02 RX ORDER — ONDANSETRON 2 MG/ML
INJECTION INTRAMUSCULAR; INTRAVENOUS
Status: COMPLETED
Start: 2022-09-02 | End: 2022-09-02

## 2022-09-02 RX ORDER — GLIPIZIDE 10 MG/1
1 TABLET ORAL 2 TIMES DAILY
Status: DISCONTINUED | OUTPATIENT
Start: 2022-09-02 | End: 2022-09-07 | Stop reason: HOSPADM

## 2022-09-02 RX ORDER — MEMANTINE HYDROCHLORIDE 5 MG/1
5 TABLET ORAL 2 TIMES DAILY
Status: DISCONTINUED | OUTPATIENT
Start: 2022-09-02 | End: 2022-09-07 | Stop reason: HOSPADM

## 2022-09-02 RX ORDER — VITAMIN B COMPLEX
1000 TABLET ORAL DAILY
Status: DISCONTINUED | OUTPATIENT
Start: 2022-09-02 | End: 2022-09-07 | Stop reason: HOSPADM

## 2022-09-02 RX ORDER — PROCHLORPERAZINE 25 MG
12.5 SUPPOSITORY, RECTAL RECTAL EVERY 12 HOURS PRN
Status: DISCONTINUED | OUTPATIENT
Start: 2022-09-02 | End: 2022-09-07 | Stop reason: HOSPADM

## 2022-09-02 RX ORDER — LIDOCAINE 40 MG/G
CREAM TOPICAL
Status: DISCONTINUED | OUTPATIENT
Start: 2022-09-02 | End: 2022-09-02

## 2022-09-02 RX ORDER — METOPROLOL SUCCINATE 50 MG/1
100 TABLET, EXTENDED RELEASE ORAL DAILY
Status: DISCONTINUED | OUTPATIENT
Start: 2022-09-02 | End: 2022-09-07 | Stop reason: HOSPADM

## 2022-09-02 RX ORDER — ACETAMINOPHEN 500 MG
1000 TABLET ORAL
Status: DISCONTINUED | OUTPATIENT
Start: 2022-09-03 | End: 2022-09-07 | Stop reason: HOSPADM

## 2022-09-02 RX ORDER — CEFTRIAXONE 2 G/1
2 INJECTION, POWDER, FOR SOLUTION INTRAMUSCULAR; INTRAVENOUS ONCE
Status: COMPLETED | OUTPATIENT
Start: 2022-09-02 | End: 2022-09-02

## 2022-09-02 RX ORDER — CARBOXYMETHYLCELLULOSE SODIUM 5 MG/ML
1 SOLUTION/ DROPS OPHTHALMIC
Status: DISCONTINUED | OUTPATIENT
Start: 2022-09-02 | End: 2022-09-07 | Stop reason: HOSPADM

## 2022-09-02 RX ORDER — AMLODIPINE BESYLATE 2.5 MG/1
2.5 TABLET ORAL DAILY
Status: DISCONTINUED | OUTPATIENT
Start: 2022-09-02 | End: 2022-09-07 | Stop reason: HOSPADM

## 2022-09-02 RX ORDER — ATORVASTATIN CALCIUM 20 MG/1
20 TABLET, FILM COATED ORAL DAILY
Status: DISCONTINUED | OUTPATIENT
Start: 2022-09-02 | End: 2022-09-07 | Stop reason: HOSPADM

## 2022-09-02 RX ORDER — ACETAMINOPHEN 650 MG/1
650 SUPPOSITORY RECTAL EVERY 6 HOURS PRN
Status: DISCONTINUED | OUTPATIENT
Start: 2022-09-02 | End: 2022-09-07 | Stop reason: HOSPADM

## 2022-09-02 RX ORDER — NITROGLYCERIN 0.4 MG/1
0.4 TABLET SUBLINGUAL EVERY 5 MIN PRN
Status: DISCONTINUED | OUTPATIENT
Start: 2022-09-02 | End: 2022-09-07 | Stop reason: HOSPADM

## 2022-09-02 RX ORDER — SODIUM CHLORIDE 9 MG/ML
INJECTION, SOLUTION INTRAVENOUS CONTINUOUS
Status: ACTIVE | OUTPATIENT
Start: 2022-09-02 | End: 2022-09-02

## 2022-09-02 RX ORDER — ACETAMINOPHEN 325 MG/1
650 TABLET ORAL EVERY 6 HOURS PRN
Status: DISCONTINUED | OUTPATIENT
Start: 2022-09-02 | End: 2022-09-07 | Stop reason: HOSPADM

## 2022-09-02 RX ORDER — OXYBUTYNIN CHLORIDE 10 MG/1
20 TABLET, EXTENDED RELEASE ORAL DAILY
Status: DISCONTINUED | OUTPATIENT
Start: 2022-09-02 | End: 2022-09-07 | Stop reason: HOSPADM

## 2022-09-02 RX ORDER — QUETIAPINE FUMARATE 25 MG/1
25 TABLET, FILM COATED ORAL AT BEDTIME
Status: DISCONTINUED | OUTPATIENT
Start: 2022-09-02 | End: 2022-09-07 | Stop reason: HOSPADM

## 2022-09-02 RX ADMIN — CEFTRIAXONE SODIUM 2 G: 2 INJECTION, POWDER, FOR SOLUTION INTRAMUSCULAR; INTRAVENOUS at 00:46

## 2022-09-02 RX ADMIN — MEMANTINE 5 MG: 5 TABLET ORAL at 12:56

## 2022-09-02 RX ADMIN — PIPERACILLIN AND TAZOBACTAM 3.38 G: 3; .375 INJECTION, POWDER, FOR SOLUTION INTRAVENOUS at 21:39

## 2022-09-02 RX ADMIN — ONDANSETRON 4 MG: 2 INJECTION INTRAMUSCULAR; INTRAVENOUS at 01:38

## 2022-09-02 RX ADMIN — ATORVASTATIN CALCIUM 20 MG: 10 TABLET, FILM COATED ORAL at 12:56

## 2022-09-02 RX ADMIN — MEMANTINE 5 MG: 5 TABLET ORAL at 21:40

## 2022-09-02 RX ADMIN — QUETIAPINE FUMARATE 25 MG: 25 TABLET ORAL at 21:40

## 2022-09-02 RX ADMIN — RIVAROXABAN 15 MG: 15 TABLET, FILM COATED ORAL at 18:04

## 2022-09-02 RX ADMIN — QUETIAPINE FUMARATE 25 MG: 25 TABLET ORAL at 05:11

## 2022-09-02 RX ADMIN — SODIUM CHLORIDE 1000 ML: 9 INJECTION, SOLUTION INTRAVENOUS at 13:47

## 2022-09-02 RX ADMIN — Medication 1000 UNITS: at 12:56

## 2022-09-02 RX ADMIN — ACETAMINOPHEN 650 MG: 325 TABLET ORAL at 05:12

## 2022-09-02 RX ADMIN — DEXTRAN 70, GLYCERIN, HYPROMELLOSE 1 DROP: 1; 2; 3 SOLUTION/ DROPS OPHTHALMIC at 13:09

## 2022-09-02 RX ADMIN — PAROXETINE HYDROCHLORIDE 20 MG: 20 TABLET, FILM COATED ORAL at 21:40

## 2022-09-02 RX ADMIN — OXYBUTYNIN 20 MG: 10 TABLET, FILM COATED, EXTENDED RELEASE ORAL at 12:56

## 2022-09-02 RX ADMIN — AMLODIPINE BESYLATE 2.5 MG: 2.5 TABLET ORAL at 12:56

## 2022-09-02 RX ADMIN — METOPROLOL SUCCINATE 100 MG: 100 TABLET, EXTENDED RELEASE ORAL at 12:56

## 2022-09-02 RX ADMIN — DEXTRAN 70, GLYCERIN, HYPROMELLOSE 1 DROP: 1; 2; 3 SOLUTION/ DROPS OPHTHALMIC at 21:39

## 2022-09-02 RX ADMIN — PIPERACILLIN AND TAZOBACTAM 3.38 G: 3; .375 INJECTION, POWDER, FOR SOLUTION INTRAVENOUS at 14:40

## 2022-09-02 RX ADMIN — PAROXETINE HYDROCHLORIDE 20 MG: 20 TABLET, FILM COATED ORAL at 05:12

## 2022-09-02 RX ADMIN — SODIUM CHLORIDE: 9 INJECTION, SOLUTION INTRAVENOUS at 05:03

## 2022-09-02 RX ADMIN — AZITHROMYCIN MONOHYDRATE 500 MG: 500 INJECTION, POWDER, LYOPHILIZED, FOR SOLUTION INTRAVENOUS at 01:30

## 2022-09-02 ASSESSMENT — ACTIVITIES OF DAILY LIVING (ADL)
ADLS_ACUITY_SCORE: 45
EQUIPMENT_CURRENTLY_USED_AT_HOME: WALKER, ROLLING
SWALLOWING: 2-->DIFFICULTY SWALLOWING FOODS
SWALLOWING: 2-->DIFFICULTY SWALLOWING LIQUIDS
ADLS_ACUITY_SCORE: 45
TRANSFERRING: 1-->ASSISTANCE (EQUIPMENT/PERSON) NEEDED
DRESS: 0-->ASSISTANCE NEEDED (DEVELOPMENTALLY APPROPRIATE)
EATING: 0-->INDEPENDENT
ADLS_ACUITY_SCORE: 45
ADLS_ACUITY_SCORE: 45
BATHING: 1-->ASSISTANCE NEEDED
ADLS_ACUITY_SCORE: 45
WALKING_OR_CLIMBING_STAIRS: AMBULATION DIFFICULTY, ASSISTANCE 1 PERSON
CONCENTRATING,_REMEMBERING_OR_MAKING_DECISIONS_DIFFICULTY: YES
DRESSING/BATHING_DIFFICULTY: YES
ADLS_ACUITY_SCORE: 45
TOILETING_ISSUES: NO
ADLS_ACUITY_SCORE: 35
EATING: 0-->INDEPENDENT
VISION_MANAGEMENT: GLASSES
ADLS_ACUITY_SCORE: 35
ADLS_ACUITY_SCORE: 45
WALKING_OR_CLIMBING_STAIRS_DIFFICULTY: YES
ADLS_ACUITY_SCORE: 45
NUMBER_OF_TIMES_PATIENT_HAS_FALLEN_WITHIN_LAST_SIX_MONTHS: 0
TRANSFERRING: 0-->ASSISTANCE NEEDED (DEVELOPMENTALLY APPROPRIATE)
DRESSING/BATHING: BATHING DIFFICULTY, ASSISTANCE 1 PERSON
EATING/SWALLOWING: EATING;SWALLOWING SOLID FOOD
ADLS_ACUITY_SCORE: 45
DIFFICULTY_EATING/SWALLOWING: YES
ADLS_ACUITY_SCORE: 45
DRESS: 1-->ASSISTANCE (EQUIPMENT/PERSON) NEEDED
FALL_HISTORY_WITHIN_LAST_SIX_MONTHS: NO
WEAR_GLASSES_OR_BLIND: YES
CHANGE_IN_FUNCTIONAL_STATUS_SINCE_ONSET_OF_CURRENT_ILLNESS/INJURY: NO
DOING_ERRANDS_INDEPENDENTLY_DIFFICULTY: NO

## 2022-09-02 NOTE — ED TRIAGE NOTES
[facility]  ED Arrival Note      Means of Arrival: EMS  Comes from: Nursing Home    Story: found to be in respiratory distress with hypoxia by NH staff. Initial SPO2 reading was 65%. Improvement in route with CPAP.         EMS/PD Interventions: EKG, PIV, Oxygen and CPAP  EMS Medications: Duoneb    Meets Stroke Criteria? No  Meets Trauma Criteria? No      Directed to: Stabilization room  Belongings: In room locker             Triage Assessment     Row Name 09/01/22 1450       Triage Assessment (Adult)    Airway WDL WDL       Respiratory WDL    Respiratory WDL X   SOB, tahcypnea, respiratory distress       Cardiac WDL    Cardiac WDL X;chest pain       Cognitive/Neuro/Behavioral WDL    Cognitive/Neuro/Behavioral WDL WDL

## 2022-09-02 NOTE — H&P
[facility]    History and Physical  Hospitalist     Date of Admission:  9/1/2022    Assessment & Plan   Margy Babin is a 89 year old female with a past medical history of heart failure with reduced ejection fraction, essential hypertension, chronic left bundle branch block, atrial fibrillation on anticoagulation with Xarelto, depression, history of stroke presents to hospital with acute hypoxic respiratory failure secondary to aspiration pneumonitis.    Acute hypoxic respiratory failure  Suspected aspiration pneumonitis  Patient presented with acute onset hypoxic respiratory failure.  Found to have groundglass opacities interpreted as multifocal bronchopneumonia.  Given history of vomiting prior to hypoxia concern is for aspiration. The patient improved in the er with bipap, but then vomited again. Will avoid bipap due to aspiration risk. I confirmed with the patient and her naomi Wilder, the patient is DNRI/DNI. Will avoid further use of bipap given aspiration risk. will treat with hiflow oxygen and focus on keeping her comfortable.    -admit to imc, continuous pulse oximetry  -Supplemental O2,  -Continue with antibiotics  -Follow-up blood culture    Mild pancolitis  Seen on ct. patient denies any abdominal pain or recent diarrhea. Blood work without signs of infection.   -send stool cultures if she develops diarrhea.    Prolonged qtc  -Avoid qt prolonging medications  -monitor on tele  -f/u repeat ekg in am    Atrial fibrillation on anticoagulation  After recent stroke in April 2022 the patient was started on Xarelto.  -Resume PTA meds once med rec is complete    Heart failure with reduced ejection fraction  Essential tremor  Chronic left bundle branch block  Appears euvolemic on exam.  TTE April 25, 2022: EF 50 to 55%.  Mild aortic regurgitation.  Left atrium mild to moderately dilated.  -Resume PTA meds once med rec is complete    Depression  -Resume PTA meds once med rec  is complete    Mild to moderate oral pharyngeal dysphagia secondary to stroke  After her stroke in April the patient was diagnosed with a mild to moderate oropharyngeal dysphagia.  on paperwork sent with the patient as she appears to have been on her a regular diet.  -N.p.o. while on BiPAP.   -Follow-up speech pathology consult    High risk for delirium  -Seroquel scheduled at night  -Maintain sleep-wake cycle  -Avoid interruptions at night    Code Status   Dnr/dni-patient was alert and oriented to person, place, date and situation.  I discussed with her the possibility that her respiratory status may decline requiring intubation and mechanical ventilation.  I explained to her that if that situation arose and she did not want intubation or mechanical ventilation she could die.  The patient verbalized understanding and informing that she would not want intubation.   DVT ppx: xarelto  Expected length of stay greater than 2 days    Primary Care Physician   Braeden Sood    Chief Complaint   Respiratory distress    History obtained from the medical chart.  Unable to get history from the patient as she is on BiPAP.    History of Present Illness   Margy Babin is a 89 year old female with a past medical history of systolic heart failure, atrial fibrillation on anticoagulation with Xarelto, hypertension, presented to hospital with acute respiratory failure and hypoxia.  At her care facility the patient was complaining of nausea so she was administered Zofran.  She did have an episode of vomiting and developed respiratory distress.  Upon EMS arrival her oxygen saturation was found to be 67% on room air and improved to 78% on nonrebreather and with DuoNeb treatment.  She was placed on CPAP and brought to the hospital.  She was noted to have generalized wheezing and an upper respiratory gurgling on exam.  In the emergency room the patient was placed on BiPAP with improvement of hypoxia. The patient had an episode  of vomiting while on bipap. Given the risk of aspiration bipap was discontinued. Patient was transferred to the floor and placed on high flow.    Past Medical History    I have reviewed this patient's medical history and updated it with pertinent information if needed.   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     Mixed hyperlipidemia 2020     Nausea without vomiting 2015     Problem list name updated by automated process. Provider to review     Tremor, hereditary, benign        Past Surgical History   I have reviewed this patient's surgical history and updated it with pertinent information if needed.  Past Surgical History:   Procedure Laterality Date     HYSTERECTOMY TOTAL ABDOMINAL, BILATERAL SALPINGO-OOPHORECTOMY, COMBINED       NECK SURGERY      cervical fusion     ROTATOR CUFF REPAIR RT/LT Right      ZZC TOTAL HIP ARTHROPLASTY Right        Prior to Admission Medications   Prior to Admission Medications   Prescriptions Last Dose Informant Patient Reported? Taking?   Cholecalciferol (VITAMIN D HIGH POTENCY) 25 MCG (1000 UT) CAPS   No Yes   Si CAP BY MOUTH DAILY   PARoxetine (PAXIL) 20 MG tablet   No Yes   Si TAB BY MOUTH AT BEDTIME   QUEtiapine (SEROQUEL) 25 MG tablet   No Yes   Si TAB BY MOUTH AT BEDTIME   RESTASIS 0.05 % ophthalmic emulsion   No Yes   Sig: INSTILL 1 DROP INTO BOTH EYES DAILY   acetaminophen (TYLENOL) 500 MG tablet   No Yes   Sig: Take 2 tablets (1,000 mg) by mouth every morning. May also take 2 tablets (1,000 mg) 2 times daily as needed for mild pain.    amLODIPine (NORVASC) 2.5 MG tablet   No Yes   Si TAB BY MOUTH DAILY   atorvastatin (LIPITOR) 20 MG tablet Unknown at on hold, not on facility list  Yes Yes   Sig: Take 20 mg by mouth daily   bisacodyl (DULCOLAX) 5 MG EC tablet   No Yes   Si TAB BY MOUTH DAILY AS NEEDED FOR CONSTIPATION   docusate sodium (COLACE) 100 MG capsule   No Yes   Sig: TAKE 1 CAP BY MOUTH TWICE DAILY AS NEEDED FOR CONSTIPATION   memantine (NAMENDA) 5 MG tablet   No Yes   Si TAB BY MOUTH TWICE DAILY   metoprolol succinate ER (TOPROL XL) 100 MG 24 hr tablet   No Yes   Si TAB BY MOUTH DAILY   ondansetron (ZOFRAN) 4 MG tablet   No Yes   Si TAB BY MOUTH EVERY 8 HOURS AS NEEDED FOR NAUSEA   order for DME   No No   Sig: Equipment being ordered: Walker Wheels () and Walker ()  Treatment Diagnosis: Difficulty ambulating   oxybutynin ER (DITROPAN XL) 10 MG 24 hr tablet   No Yes   Sig: Take 2 tablets (20 mg) by mouth daily   ramelteon (ROZEREM) 8 MG tablet   No Yes   Si TAB BY MOUTH EVERY NIGHT AS NEEDED FOR SLEEP   rivaroxaban ANTICOAGULANT (XARELTO) 15 MG TABS tablet   No Yes   Sig: Take 1 tablet (15 mg) by mouth daily (with dinner)      Facility-Administered Medications: None     Allergies   No Known Allergies    Social History   I have reviewed this patient's social history and updated it with pertinent information if needed. Margy Babin  reports that she has never smoked. She has never used smokeless tobacco. She reports that she does not drink alcohol and does not use drugs.    Family History   I have reviewed this patient's family history and updated it with pertinent information if needed.   Family History   Problem Relation Age of Onset     Heart Disease Father 80        MI     C.A.D. Father      Breast Cancer Daughter 43         age 53     Emphysema Sister      Heart Disease Sister        Review of Systems   Unable to perform as patient is on BiPAP.    Physical Exam   Temp: 98.2  F (36.8  C)  Temp src: Temporal BP: (!) 147/54 Pulse: 82   Resp: 10 SpO2: 90 %      Vital Signs with Ranges  Temp:  [98.2  F (36.8  C)] 98.2  F (36.8  C)  Pulse:  [66-97] 82  Resp:  [9-33] 10  BP: (127-147)/(54-79) 147/54  SpO2:  [73 %-100 %] 90 %  167 lbs 8.79 oz  Physical Exam  Vitals reviewed.   Constitutional:       Appearance: Normal appearance.      Comments: Very pleasant elderly lady appears stated age seen resting in bed in the emergency room on BiPAP in no apparent distress.   HENT:      Head: Normocephalic and atraumatic.      Mouth/Throat:      Mouth: Mucous membranes are moist.      Pharynx: Oropharynx is clear.   Eyes:      Extraocular Movements: Extraocular movements intact.      Conjunctiva/sclera: Conjunctivae normal.      Pupils: Pupils are equal, round, and reactive to light.   Cardiovascular:      Rate and Rhythm: Normal rate and regular rhythm.      Pulses: Normal pulses.      Heart sounds: Normal heart sounds. No murmur heard.  Pulmonary:      Effort: Pulmonary effort is normal.      Breath sounds: Normal breath sounds. No wheezing, rhonchi or rales.   Abdominal:      General: Abdomen is flat. Bowel sounds are normal.      Palpations: Abdomen is soft. There is no mass.      Tenderness: There is no abdominal tenderness. There is no guarding.   Musculoskeletal:         General: No swelling. Normal range of motion.      Cervical back: Normal range of motion and neck supple.   Skin:     General: Skin is warm and dry.   Neurological:      General: No focal deficit present.      Mental Status: She is alert and oriented to person, place, and time. Mental status is at baseline.      Cranial Nerves: No cranial nerve deficit.      Comments: Oriented to person place date and situation.

## 2022-09-02 NOTE — ED NOTES
Bed: [facility]  Expected date: 9/1/22  Expected time: 10:30 PM  Means of arrival: Ambulance  Comments:  Sheeba 2 89F sob. CPAP

## 2022-09-02 NOTE — PROGRESS NOTES
"Sepsis Evaluation Progress Note    I was called to see Margy Babin due to abnormal vital signs triggering the Sepsis SIRS screening alert. She is known to have an infection.     Physical Exam   Vital Signs:  Temp: 97.7  F (36.5  C) Temp src: Axillary BP: 111/56 Pulse: 86   Resp: 20 SpO2: 92 % O2 Device: High Flow Nasal Cannula (HFNC) Oxygen Delivery: 25 LPM     General: in no acute distress  Mental Status: baseline mental status.     Remainder of physical exam is significant for respiratory failure and aspiration pneumonia     Data   Lactic Acid   Date Value Ref Range Status   09/02/2022 3.4 (H) 0.7 - 2.0 mmol/L Final   04/25/2022 1.9 0.7 - 2.0 mmol/L Final   12/25/2020 1.4 0.7 - 2.0 mmol/L Final   12/25/2020 3.0 (H) 0.7 - 2.0 mmol/L Final     Lactic Acid POCT   Date Value Ref Range Status   09/02/2022 1.3 <=2.0 mmol/L Final   09/01/2022 1.8 <=2.0 mmol/L Final       Assessment & Plan   Margy Babin meets SIRS criteria but does NOT have a lactate >2 or other evidence of acute organ damage.  These vital sign, lab and physical exam findings constitute a diagnosis of SEPSIS.    Sepsis Time-Zero (time Sepsis diagnosis confirmed):  Aspiration pneumonia   09/02/22    Anti-infectives (From now, onward)    Start     Dose/Rate Route Frequency Ordered Stop    09/03/22 0000  azithromycin (ZITHROMAX) 250 mg in sodium chloride 0.9 % 250 mL intermittent infusion         250 mg  over 1 Hours Intravenous EVERY 24 HOURS 09/02/22 0354 09/06/22 2359    09/02/22 1400  piperacillin-tazobactam (ZOSYN) 3.375 g vial to attach to  mL bag        Note to Pharmacy: For [facility], [facility] and [facility]: For Zosyn-naive patients, use the \"Zosyn initial dose + extended infusion\" order panel.    3.375 g  over 30 Minutes Intravenous EVERY 6 HOURS 09/02/22 1339          Current antibiotic coverage is appropriate for source of infection.     Disposition: The patient will remain on the current unit. We will continue to monitor this patient " closely..  Jessenia Jensen MD    Sepsis Criteria   Sepsis: 2+ SIRS criteria due to infection  Severe Sepsis: Sepsis AND 1+ new sign of acute organ dysfunction (Note: lactate >2 or acute encephalopathy each qualify as organ dysfunction)  Septic Shock: Sepsis AND hypotension despite volume resuscitation with 30 ml/kg crystalloid or lactate >=4  Note: HYPOTENSION is defined as 2 BP readings measured 3 hrs apart that have a SBP <90, MAP <65, or decrease >40 mmHg, occurring 6 hrs before or after t-zero

## 2022-09-02 NOTE — PROGRESS NOTES
Clinical Swallow Evaluation   09/02/22 1021   General Information   Onset of Illness/Injury or Date of Surgery 09/01/22   Referring Physician Jessenia Jensen MD   Patient/Family Therapy Goal Statement (SLP) None stated   Pertinent History of Current Problem Per provider's documentation - Margy Babin is a 89 year old female with a past medical history of heart failure with reduced ejection fraction, essential hypertension, chronic left bundle branch block, atrial fibrillation on anticoagulation with Xarelto, depression, history of stroke presents to hospital with acute hypoxic respiratory failure secondary to aspiration pneumonitis.   General Observations Pt is alert and agreeable to SLP evaluation. No family is present.   Past History of Dysphagia Pt has been evaluated by SLP department in the past. Most recently in April with recommendations for soft and bite sized diet and thin liquids. Currently, there is concern for aspiration secondary to two episodes of emesis.   Type of Evaluation   Type of Evaluation Swallow Evaluation   Oral Motor   Oral Musculature generally intact   Structural Abnormalities none present   Mucosal Quality adequate   Dentition (Oral Motor)   Dentition (Oral Motor) adequate dentition   Facial Symmetry (Oral Motor)   Facial Symmetry (Oral Motor) WNL   Lip Function (Oral Motor)   Lip Range of Motion (Oral Motor) WNL   Tongue Function (Oral Motor)   Tongue ROM (Oral Motor) WNL   Vocal Quality/Secretion Management (Oral Motor)   Vocal Quality (Oral Motor) WFL   Secretion Management (Oral Motor) WNL   General Swallowing Observations   Respiratory Support (General Swallowing Observations) nasal cannula;supplemental oxygen  (HFNC)   Current Diet/Method of Nutritional Intake (General Swallowing Observations, NIS) NPO   Swallowing Evaluation Clinical swallow evaluation   Clinical Swallow Evaluation   Feeding Assistance minimal assistance required   Clinical Swallow Evaluation Textures  Trialed thin liquids;pureed;solid foods   Clinical Swallow Eval: Thin Liquid Texture Trial   Mode of Presentation, Thin Liquids cup;straw;self-fed;fed by clinician   Volume of Liquid or Food Presented 4 oz   Oral Phase of Swallow WFL   Pharyngeal Phase of Swallow intact   Diagnostic Statement No overt s/sx of aspiration, no changes in breath sounds or vocal quality   Clinical Swallow Evaluation: Puree Solid Texture Trial   Mode of Presentation, Puree spoon;self-fed   Volume of Puree Presented 4 oz   Oral Phase, Puree WFL   Pharyngeal Phase, Puree intact   Diagnostic Statement Throat clear x 1, no additional s/sx of aspiration. Pt denied any difficulty, single swallows per bolus.   Clinical Swallow Evaluation: Solid Food Texture Trial   Mode of Presentation self-fed   Volume Presented 1 cracker   Oral Phase impaired mastication;residue in oral cavity  (mildly impaired, dry mouth)   Oral Residue mid posterior tongue   Pharyngeal Phase intact   Diagnostic Statement No overt s/sx of aspiration, mildly prolonged oral clearance and mild residue d/t sticky and dry mouth. Pt denied any difficulty   Esophageal Phase of Swallow   Patient reports or presents with symptoms of esophageal dysphagia No   Swallowing Recommendations   Diet Consistency Recommendations soft & bite-sized (level 6);thin liquids (level 0)   Supervision Level for Intake close supervision needed   Mode of Delivery Recommendations bolus size, small;food moistened;slow rate of intake   Swallowing Maneuver Recommendations alternate food and liquid intake   Monitoring/Assistance Required (Eating/Swallowing) check mouth frequently for oral residue/pocketing;cue for finger/lingual sweep if oral pocketing present;stop eating activities when fatigue is present;monitor for cough or change in vocal quality with intake   Recommended Feeding/Eating Techniques (Swallow Eval) maintain upright sitting position for eating;maintain upright posture during/after eating for  30 minutes;minimize distractions during oral intake   Medication Administration Recommendations, Swallowing (SLP) As per pt preference   Instrumental Assessment Recommendations instrumental evaluation not recommended at this time   General Therapy Interventions   Planned Therapy Interventions Dysphagia Treatment   Dysphagia treatment Modified diet education;Instruction of safe swallow strategies   Clinical Impression   Criteria for Skilled Therapeutic Interventions Met (SLP Elias) Yes, treatment indicated   SLP Diagnosis Oropharyngeal dysphagia   Risks & Benefits of therapy have been explained evaluation/treatment results reviewed;care plan/treatment goals reviewed;participants voiced agreement with care plan;participants included;patient   Clinical Impression Comments Pt seen for clinical swallow evaluation. She consumed thin liquids, puree, and regular solids. Throat clear x 1 with puree, no additional overt s/sx of aspiration across trials. No changes in breath sounds or vocal quality noted. Pt denies any difficulty and mostly single swallows per bolus were observed. There is concern for aspiration secondary to emesis. Oropharyngeal swallow is mildly impaired but suspect not the source for aspiration concern. Recommend soft and bite sized diet (6) and thin liquids. Complete upright position, PO only when respiratory status is stable, slow rate, small bites/sips. Initiate SLP services for dysphagia.   SLP Discharge Planning   SLP Discharge Recommendation Transitional Care Facility;home with assist   SLP Rationale for DC Rec Dysphagia, slightly below baseline   SLP Brief overview of current status  Oropharyngeal swallow is mildly impaired but suspect not the source for aspiration concern. Recommend soft and bite sized diet (6) and thin liquids. Complete upright position, PO only when respiratory status is stable, slow rate, small bites/sips. Initiate SLP services for dysphagia.    Total Evaluation Time   Total  Evaluation Time (Minutes) 10

## 2022-09-02 NOTE — ED PROVIDER NOTES
History   Chief Complaint:  Respiratory Distress    The history is provided by the patient and the EMS personnel. The history is limited by the condition of the patient.      Margy Baibn is a 89 year old female not on blood thinners with history of lobar pneumonia, cardiomyopathy, systolic CHF, atrial fibrillation, prediabetes, renal insufficiency, HTN who presents with respiratory distress. EMS reports that one hour prior to emergency department arrival (2130), Flor was complaining of nausea so she was administered Zofran at her living facility. Twenty minutes prior to emergency department arrival her care facility went to recheck on her and she was in respiratory distress. EMS reports oxygen saturation of 67% on room air on arrival that improved to 78% after a treatment with a non-rebreather mask, duoneb treatment, and CPAP. They note a wide QRS on 12-lead and generalized pulmonary wheezes and upper respiratory gurgling. Flor reports that she is still nauseous with dyspnea but without pain, cough, and fever. She notes that she felt at baseline prior to today. No chest pain or abdominal pain.    Review of Systems   Unable to perform ROS: Severe respiratory distress   Constitutional: Negative for fever.   Respiratory: Positive for shortness of breath and wheezing. Negative for cough.    All other systems reviewed and are negative.    Allergies:  The patient has no known allergies.     Medications:  Atorvastatin   Bisacodyl   Ondansetron   Oxybutynin  Paroxetine   Ramelteon    Amlodipine   Memantine   Metoprolol succinate     Past Medical History:     Cerebral infarction   Dysphagia   Lobar pneumonia   Generalized muscle weakness   Gait abnormality   Cardiomyopathy   Systolic CHF   Chronic atrial fibrillation   Prediabetes   Colitis   Cervicalgia   Major depressive disorder   Hypertensive CKD stage 3   Atrial flutter   Metabolic encephalopathy   Dyspnea   SVT  Insomnia   Anxiety    Acidosis   Tremor    HLD  Urge incontinence   UTI  E. Coli   Sepsis   Rhabdomyolysis   Acute kidney failure   Left bundle branch block   HTN    Past Surgical History:    Total abdominal hysterectomy, bilateral salpingo-oophorectomy, combined   Cervical fusion   Right rotator cuff repair   Right total hip arthroplasty     Family History:    Father- MI, CAD  Sister- emphysema, heart disease   Daughter- breast cancer    Social History:  The patient presents to the ED alone via EMS from a care facility.   PCP: Braeden Sood MD    Physical Exam     Patient Vitals for the past 24 hrs:   BP Temp Temp src Pulse Resp SpO2 Weight   09/02/22 0429 116/68 -- -- 91 17 (!) 89 % --   09/02/22 0412 -- 99.2  F (37.3  C) Axillary -- -- 96 % --   09/02/22 0245 -- -- -- 78 20 96 % --   09/02/22 0230 130/55 -- -- 80 19 97 % --   09/02/22 0215 -- -- -- 79 24 96 % --   09/02/22 0200 133/56 -- -- 82 26 91 % --   09/02/22 0155 -- -- -- 82 10 90 % --   09/02/22 0150 -- -- -- 85 13 93 % --   09/02/22 0145 -- -- -- 85 (!) 33 90 % --   09/02/22 0143 -- -- -- 87 25 (!) 84 % --   09/02/22 0130 (!) 147/54 -- -- 78 22 96 % --   09/02/22 0115 -- -- -- 68 9 93 % --   09/02/22 0100 134/79 -- -- 66 12 98 % --   09/02/22 0045 -- -- -- 67 12 100 % --   09/02/22 0030 (!) 145/72 -- -- 81 27 100 % --   09/02/22 0015 -- -- -- 80 16 100 % --   09/02/22 0000 -- -- -- 73 19 100 % --   09/01/22 2345 -- -- -- 84 25 98 % --   09/01/22 2330 (!) 144/66 -- -- 77 -- 100 % --   09/01/22 2300 (!) 141/69 98.2  F (36.8  C) Temporal 86 -- 90 % --   09/01/22 2255 137/65 -- -- 77 21 94 % --   09/01/22 2250 135/66 -- -- 93 29 92 % --   09/01/22 2246 -- -- -- -- -- -- 76 kg (167 lb 8.8 oz)   09/01/22 2245 127/64 -- -- 77 20 91 % --   09/01/22 2240 133/61 -- -- 86 16 (!) 89 % --   09/01/22 2239 -- -- -- 87 23 (!) 88 % --   09/01/22 2238 -- -- -- 89 (!) 32 (!) 87 % --   09/01/22 2237 (!) 144/68 -- -- 87 27 (!) 84 % --   09/01/22 2236 (!) 147/71 -- -- 97 -- (!) 73 % --   09/01/22 2235 -- -- -- --  -- (!) 79 % --   09/01/22 2234 -- -- -- -- -- (!) 79 % --     Physical Exam  General: Appears ill. Significant respiratory distress.   Head:  Scalp, face, and head appear normal  Eyes:  Pupils are equal, round, reactive to light    Conjunctivae non-injected and sclerae white  ENT:    The external nose is normal    Pinnae are normal  Neck:  Normal range of motion    There is no rigidity noted    Trachea is in the midline  CV:  Regular rate and rhythm     Normal S1/S2, no S3/S4    No murmur or rub. Radial pulses 2+ bilaterally.  Resp:  Lungs are equal bilaterally  + tachypnea    Significant increased work of breathing with accessory muscle use.    Diffuse rales bilaterally with scant associated wheezing. No rhonchi.  GI:  Abdomen is soft, no rigidity or guarding    No distension, or mass    No tenderness or rebound tenderness   MS:  Normal muscular tone    Symmetric motor strength    No lower extremity edema. No calf swelling or tenderness.  Skin:  No rash or acute skin lesions noted  Neuro: Awake and alert  Speech is normal and fluent  Moves all extremities spontaneously  Psych:  Normal affect. Appropriate interactions.      Emergency Department Course   ECG:  ECG taken at 2241, ECG read at 2305  Sinus rhythm with occasional premature ventricular complexes   Left axis deviation   Left bundle branch block   Abnormal ECG  No change compared to EKG dated 01/07/2022   Rate 86 bpm. WI interval 182 ms. QRS duration 152 ms. QT/QTc 440/526 ms. P-R-T axes 74 -34 121.    Imaging:  CT Chest (PE) Abdomen Pelvis w Contrast   Final Result   IMPRESSION:   1.  No acute pulmonary embolism.   2.  Mild multifocal bronchopneumonia of the left upper lobe and bilateral lower lobes.   3.  Mild pancolitis.   4.  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 Distal patency appears preserved.   5.  Additional nonacute findings, as detailed above.               XR Chest Port 1 View   Final Result    IMPRESSION: Normal heart size and pulmonary vascularity. Diffuse interstitial opacities throughout both lungs can be seen with underlying interstitial fibrotic change. No acute alveolar infiltrates. Aortic calcification. Degenerative changes in the spine    and shoulders.      POC US CHEST B-SCAN   Final Result   Limited Bedside Lung Ultrasound, performed and interpreted by myself. Nile Alexandre MD   Indication: shortness of breath and hypoxia      With the patient positioned upright, bilateral anterior, posterior and lateral lung fields were examined for evidence of thoracic free fluid, pulmonary consolidation, and pulmonary edema. Additional parasternal cardiac views obtained and negative for pericardial fluid.          Findings: Limited views of select lung fields showed A-lines consisting with normal lung artifact. No B-lines. No free fluid. No pericardial fluid.      IMPRESSION: Normal lung ultrasound without evidence of effusion, or pulmonary edema.           Report per radiology    Laboratory:  Labs Ordered and Resulted from Time of ED Arrival to Time of ED Departure   INR - Abnormal       Result Value    INR 2.16 (*)    COMPREHENSIVE METABOLIC PANEL - Abnormal    Sodium 141      Potassium 3.5      Chloride 108      Carbon Dioxide (CO2) 25      Anion Gap 8      Urea Nitrogen 34 (*)     Creatinine 0.86      Calcium 8.7      Glucose 141 (*)     Alkaline Phosphatase 113      AST 17      ALT 23      Protein Total 7.0      Albumin 3.2 (*)     Bilirubin Total 0.1 (*)     GFR Estimate 63     CBC WITH PLATELETS AND DIFFERENTIAL - Abnormal    WBC Count 10.4      RBC Count 4.49      Hemoglobin 12.2      Hematocrit 39.3      MCV 88      MCH 27.2      MCHC 31.0 (*)     RDW 15.1 (*)     Platelet Count 409      % Neutrophils 58      % Lymphocytes 31      % Monocytes 4      % Eosinophils 6      % Basophils 1      % Immature Granulocytes 0      NRBCs per 100 WBC 0      Absolute Neutrophils 6.0      Absolute Lymphocytes 3.3       Absolute Monocytes 0.5      Absolute Eosinophils 0.6      Absolute Basophils 0.1      Absolute Immature Granulocytes 0.0      Absolute NRBCs 0.0     ISTAT GASES LACTATE VENOUS POCT - Abnormal    Lactic Acid POCT 1.8      Bicarbonate Venous POCT 26      O2 Sat, Venous POCT 76 (*)     pCO2V Venous POCT 52 (*)     pH Venous POCT 7.31 (*)     pO2 Venous POCT 45     ISTAT GASES LACTATE VENOUS POCT - Abnormal    Lactic Acid POCT 1.3      Bicarbonate Venous POCT 22      O2 Sat, Venous POCT 43 (*)     pCO2V Venous POCT 51 (*)     pH Venous POCT 7.24 (*)     pO2 Venous POCT 29     D DIMER QUANTITATIVE - Normal    D-Dimer Quantitative 0.37     PARTIAL THROMBOPLASTIN TIME - Normal    aPTT 30     TROPONIN I - Normal    Troponin I High Sensitivity 8     MAGNESIUM - Normal    Magnesium 2.3     NT PROBNP INPATIENT - Normal    N terminal Pro BNP Inpatient 554     INFLUENZA A/B & SARS-COV2 PCR MULTIPLEX - Normal    Influenza A PCR Negative      Influenza B PCR Negative      RSV PCR Negative      SARS CoV2 PCR Negative     PROCALCITONIN - Normal    Procalcitonin <0.05     TYPE AND SCREEN, ADULT    ABO/RH(D) O POS      Antibody Screen Negative      SPECIMEN EXPIRATION DATE [id number]     BLOOD CULTURE   BLOOD CULTURE   ABO/RH TYPE AND SCREEN     Procedures    Limited Bedside Screening Ultrasound   PERFORMED BY: Dr. Alexandre, Nile HOPE MD  BODY AREA IMAGED: Chest   INDICATIONS: Respiratory Distress     FINDINGS: Findings: Limited views of select lung fields showed A-lines consisting with normal lung artifact. No B-lines. No free fluid. No pericardial fluid.     IMPRESSION: Normal lung ultrasound without evidence of effusion, or pulmonary edema.  Emergency Department Course:       Reviewed:  I reviewed nursing notes, vitals and past medical history    Assessments:  2232 I obtained history and examined the patient as noted above.   2250 I completed the bedside ultrasound as noted above.    0042 I rechecked the patient and explained  findings.   0137 I rechecked the patient.     Consults:  0119 I consulted with chris Smith, regarding the patient's history and presentation here in the emergency department who accepted the patient for admission.  0253 I consulted with chris Smith, regarding the patient's updated laboratory results and presentation in the emergency department.     Interventions:  2325 Zofran 4 MG IV   2250 Zofran 4 MG IV   0046 Rocephin 2 G IV  0130 Azithromycin 500 MG IV    Disposition:  The patient was admitted to the hospital under the care of chris Smith.     Impression & Plan     Medical Decision Making:  Margy Babin is a 89 year old female who presents for evaluation of acute hypoxic respiratory failure.  Symptoms started with nausea and presumably vomiting at her care facility.  Staff then found her short time later in severe respiratory distress.  On arrival to the emergency department patient presented on EMS CPAP with significant increased work of breathing.  Upon removal of EMS CPAP oxygen saturations dropped to 70% on room air.  Patient was placed on BiPAP 18/8 100% FiO2 with slow improvement of her oxygenation to 89 to 91% pulse oximetry reading.  Broad differential diagnosis is considered.  Bedside lung ultrasound with limited cardiac views was obtained and was negative for pleural effusions and pulmonary edema.  There is no pericardial effusion.  CT scan of the chest abdomen and pelvis was obtained.  This was negative for pulmonary embolism, pneumothorax.  No pleural effusions.  There is mucous plugging within the distal branches of the lower bronchial system.  No large airspace consolidation.  There are patchy groundglass opacities favoring multifocal bronchopneumonia.  COVID-19 testing is negative.  CT images of the abdomen reveal evidence of mild pancolitis.  No other acute intra-abdominal findings.  Patient continued to have multiple episodes of vomiting in  the emergency department which was nonbloody and nonbilious.  No evidence for bowel obstruction or volvulus on CT. CMP is unremarkable.  Troponin and BNP are normal.  EKG with sinus rhythm and no evidence of acute ischemia or dysrhythmia.  Patient has chronic left bundle branch block which is unchanged.  CBC without leukocytosis or anemia.  Procalcitonin is negative.  Patient has no fever.  Given the CT findings likely etiology of her acute respiratory failure is aspiration pneumonitis.  Patient was covered with ceftriaxone and azithromycin.  The case was discussed with the hospitalist and the patient was admitted in critical but stabilized condition.    Critical Care Time:   Upon my evaluation, this patient had a critical illness with high probability of imminent or life-threatening deterioration due to acute hypoxic respiratory failure, which required my direct attention, intervention, and personal management.    I have personally provided 45 minutes of critical care time exclusive of time spent on separately billable procedures. Time includes review of laboratory data, radiology results, discussion with consultants, reassessment of the patient and monitoring for potential decompensation. Interventions were performed as documented above.     Diagnosis:    ICD-10-CM    1. Acute respiratory failure with hypoxia (H)  J96.01    2. Pancolitis (H)  K51.00    3. Non-intractable vomiting with nausea, unspecified vomiting type  R11.2    4. Aspiration pneumonitis (H)  J69.0      Scribe Disclosure:  IChandrika, am serving as a scribe at 10:36 PM on 9/1/2022 to document services personally performed by Nile Alexandre MD based on my observations and the provider's statements to me.     Nile Alexandre MD  09/02/22 0643

## 2022-09-02 NOTE — ED NOTES
Patient had another episode of emesis. Zofran given. MD suggested trial off BIPAP. Patient failed the trial and is now back on BIPAP. 16/8, FiO2 90%. Will continue to monitor.

## 2022-09-02 NOTE — PLAN OF CARE
Pt A&Ox4, forgetful at times, VSS on 6L Oxymask sating >90%. Tele: NSR with BBB. LS diminished with occasional expiratory wheezes. BS +, no BM this shift, with minimal urine output, bladder scan 182 ml. Pt denies pain. Pt tolerating soft bite size (level 6) 1:1 feeding and thin liquid diet with adequte oral intake. SBA to commode. Continue plan of care.   Goal Outcome Evaluation:  Plan of Care Reviewed With: patient   Overall Patient Progress: improving  Outcome Evaluation: Pt weaned from HHFNC to 6L Oxymask, sating >90%, tolerating at this time

## 2022-09-02 NOTE — PROVIDER NOTIFICATION
MD Notification    Notified Person: MD    Notified Person Name: Camila    Notification Date/Time: 9-2-22 at 1331    Notification Interaction: [employer] messaging    Purpose of Notification: lactic critical at 3.4    Orders Received: 500ml bolus, 2x ABX scheduled    Comments:

## 2022-09-02 NOTE — PLAN OF CARE
VSS on high flow nasal cannula at 45% on 25LPM. Tele NSR. A/Ox4, ex forgetful at times. Skin intact with blanchable erythema on coccyx, mepilex in place. Denies pain. Up with Ax1, and GB.  Soft bite sized diet with thin liquids 1:1 supervision. BS active, Flatus +. Voiding, incontinent at times. LS diminished. Plan for ABX and speech following. Lactic 3.4 on shift, 500ml bolus infused.

## 2022-09-02 NOTE — PROVIDER NOTIFICATION
MD Notification  Notified Person: MD  Notified Person Name: KYLEE Jensen  Notification Date/Time: 9/2 at 1654   Notification Interaction: [employer] messaging   Purpose of Notification: Critical lab, blood culture from yesterday at 1132 - positive on first day of incubation gram positive cocci with pairs and chains. Most recent lactic 2.1   Orders Received: no new ordered required at this time, continue plan of care

## 2022-09-02 NOTE — PROGRESS NOTES
No charge note today as patient was admitted earlier today by Dr. Melvin. 89 year old female with a past medical history of heart failure with reduced ejection fraction, essential hypertension, chronic left bundle branch block, atrial fibrillation on anticoagulation with Xarelto, depression, history of stroke presents to hospital with acute hypoxic respiratory failure secondary to aspiration pneumonitis.  Seen by speech pathology and was placed on dysphagia diet.  Currently on high flow nasal cannula 25 L of oxygen and resting comfortably in bed.  Wean off of oxygen as tolerated.  Encourage incentive  Spirometry.  Continue current cares    Jessenia Jensen MD

## 2022-09-02 NOTE — PHARMACY-ADMISSION MEDICATION HISTORY
Pharmacy Medication History  Admission medication history interview status for the 9/1/2022  admission is complete. See EPIC admission navigator for prior to admission medications     Location of Interview: Outside patient room but on unit  Medication history sources: MAR (med list from [facility])    Significant changes made to the medication list:  Removed oxybutynin 15 mg tablet (dose was increased to 20 mg which is on facility list)    Additional medication history information:   On 8/2/22 SNF RN called pt provider to clarify restarting atorvastatin. Provider noted to restart medication, however no new orders appear to be sent to the facility. [facility] does not have atorvastatin on their medication list.     Medication reconciliation completed by provider prior to medication history? No    Time spent in this activity: 15 min     Prior to Admission medications    Medication Sig Last Dose Taking? Auth Provider Long Term End Date   acetaminophen (TYLENOL) 500 MG tablet Take 2 tablets (1,000 mg) by mouth every morning. May also take 2 tablets (1,000 mg) 2 times daily as needed for mild pain.  Yes Braeden Sood MD     amLODIPine (NORVASC) 2.5 MG tablet 1 TAB BY MOUTH DAILY  Yes Braeden Sood MD Yes    atorvastatin (LIPITOR) 20 MG tablet Take 20 mg by mouth daily Unknown at on hold, not on facility list Yes Unknown, Entered By History No    bisacodyl (DULCOLAX) 5 MG EC tablet 1 TAB BY MOUTH DAILY AS NEEDED FOR CONSTIPATION  Yes Braeden Sood MD     Cholecalciferol (VITAMIN D HIGH POTENCY) 25 MCG (1000 UT) CAPS 1 CAP BY MOUTH DAILY  Yes Braeden Sood MD     docusate sodium (COLACE) 100 MG capsule TAKE 1 CAP BY MOUTH TWICE DAILY AS NEEDED FOR CONSTIPATION  Yes Braeden Sood MD     memantine (NAMENDA) 5 MG tablet 1 TAB BY MOUTH TWICE DAILY  Yes Braeden Sood MD     metoprolol succinate ER (TOPROL XL) 100 MG 24 hr tablet 1 TAB BY MOUTH DAILY  Yes Braeden Sood MD Yes    ondansetron  (ZOFRAN) 4 MG tablet 1 TAB BY MOUTH EVERY 8 HOURS AS NEEDED FOR NAUSEA  Yes Braeden Sood MD     oxybutynin ER (DITROPAN XL) 10 MG 24 hr tablet Take 2 tablets (20 mg) by mouth daily  Yes Braeden Sood MD     PARoxetine (PAXIL) 20 MG tablet 1 TAB BY MOUTH AT BEDTIME  Yes Braeden Sood MD Yes    QUEtiapine (SEROQUEL) 25 MG tablet 1 TAB BY MOUTH AT BEDTIME  Yes Braeden Sood MD Yes    ramelteon (ROZEREM) 8 MG tablet 1 TAB BY MOUTH EVERY NIGHT AS NEEDED FOR SLEEP  Yes Braeden Sood MD No    RESTASIS 0.05 % ophthalmic emulsion INSTILL 1 DROP INTO BOTH EYES DAILY  Yes Braeden Sood MD     rivaroxaban ANTICOAGULANT (XARELTO) 15 MG TABS tablet Take 1 tablet (15 mg) by mouth daily (with dinner)  Yes Braeden Sood MD Yes    order for DME Equipment being ordered: Walker Wheels () and Walker ()  Treatment Diagnosis: Difficulty ambulating   Lynn Davies PA-C       The information provided in this note is only as accurate as the sources available at the time of update(s)     Renetta August, FrnacisD, BCPS

## 2022-09-02 NOTE — PLAN OF CARE
([id number]) Transferred to [facility] floor, due to acute hypoxic resp. Fail. & aspiration pneumonia. Pt. Alert and oriented x4. Vital signs stable on 90% HFNC @ 30 LPM. Assist of 2. NPO diet. Lung sounds diminished. Bowel sounds audible. x1 BM on shift, voiding adequately via purewick. Small closed healed pressure ulcer noted on coccyx &  Blanchable redness. Covered w/ Mepilex. Pain managed with PRN tylenol. Denies nausea. No emesis since arrival to floor. Tele: .

## 2022-09-03 ENCOUNTER — APPOINTMENT (OUTPATIENT)
Dept: SPEECH THERAPY | Facility: CLINIC | Age: 87
DRG: 871 | End: 2022-09-03
Payer: MEDICARE

## 2022-09-03 ENCOUNTER — APPOINTMENT (OUTPATIENT)
Dept: PHYSICAL THERAPY | Facility: CLINIC | Age: 87
DRG: 871 | End: 2022-09-03
Attending: INTERNAL MEDICINE
Payer: MEDICARE

## 2022-09-03 LAB
ANION GAP SERPL CALCULATED.3IONS-SCNC: 5 MMOL/L (ref 3–14)
BUN SERPL-MCNC: 24 MG/DL (ref 7–30)
CALCIUM SERPL-MCNC: 8.1 MG/DL (ref 8.5–10.1)
CHLORIDE BLD-SCNC: 112 MMOL/L (ref 94–109)
CO2 SERPL-SCNC: 26 MMOL/L (ref 20–32)
CREAT SERPL-MCNC: 0.97 MG/DL (ref 0.52–1.04)
ERYTHROCYTE [DISTWIDTH] IN BLOOD BY AUTOMATED COUNT: 15.2 % (ref 10–15)
GFR SERPL CREATININE-BSD FRML MDRD: 55 ML/MIN/1.73M2
GLUCOSE BLD-MCNC: 105 MG/DL (ref 70–99)
HCT VFR BLD AUTO: 34.1 % (ref 35–47)
HGB BLD-MCNC: 10.4 G/DL (ref 11.7–15.7)
LACTATE SERPL-SCNC: 1.2 MMOL/L (ref 0.7–2)
MCH RBC QN AUTO: 27.4 PG (ref 26.5–33)
MCHC RBC AUTO-ENTMCNC: 30.5 G/DL (ref 31.5–36.5)
MCV RBC AUTO: 90 FL (ref 78–100)
PLATELET # BLD AUTO: 316 10E3/UL (ref 150–450)
POTASSIUM BLD-SCNC: 4 MMOL/L (ref 3.4–5.3)
RBC # BLD AUTO: 3.8 10E6/UL (ref 3.8–5.2)
SODIUM SERPL-SCNC: 143 MMOL/L (ref 133–144)
WBC # BLD AUTO: 18.6 10E3/UL (ref 4–11)

## 2022-09-03 PROCEDURE — 97530 THERAPEUTIC ACTIVITIES: CPT | Mod: GP

## 2022-09-03 PROCEDURE — 97161 PT EVAL LOW COMPLEX 20 MIN: CPT | Mod: GP

## 2022-09-03 PROCEDURE — 99222 1ST HOSP IP/OBS MODERATE 55: CPT | Performed by: INTERNAL MEDICINE

## 2022-09-03 PROCEDURE — 92526 ORAL FUNCTION THERAPY: CPT | Mod: GN

## 2022-09-03 PROCEDURE — 87040 BLOOD CULTURE FOR BACTERIA: CPT | Performed by: INTERNAL MEDICINE

## 2022-09-03 PROCEDURE — 83605 ASSAY OF LACTIC ACID: CPT | Performed by: INTERNAL MEDICINE

## 2022-09-03 PROCEDURE — 36415 COLL VENOUS BLD VENIPUNCTURE: CPT | Performed by: INTERNAL MEDICINE

## 2022-09-03 PROCEDURE — 99233 SBSQ HOSP IP/OBS HIGH 50: CPT | Performed by: INTERNAL MEDICINE

## 2022-09-03 PROCEDURE — 97110 THERAPEUTIC EXERCISES: CPT | Mod: GP

## 2022-09-03 PROCEDURE — 250N000013 HC RX MED GY IP 250 OP 250 PS 637: Performed by: INTERNAL MEDICINE

## 2022-09-03 PROCEDURE — 36415 COLL VENOUS BLD VENIPUNCTURE: CPT | Performed by: HOSPITALIST

## 2022-09-03 PROCEDURE — 80048 BASIC METABOLIC PNL TOTAL CA: CPT | Performed by: HOSPITALIST

## 2022-09-03 PROCEDURE — 85018 HEMOGLOBIN: CPT | Performed by: HOSPITALIST

## 2022-09-03 PROCEDURE — 258N000003 HC RX IP 258 OP 636: Performed by: HOSPITALIST

## 2022-09-03 PROCEDURE — 250N000011 HC RX IP 250 OP 636: Performed by: INTERNAL MEDICINE

## 2022-09-03 PROCEDURE — 250N000013 HC RX MED GY IP 250 OP 250 PS 637: Performed by: HOSPITALIST

## 2022-09-03 PROCEDURE — 250N000011 HC RX IP 250 OP 636: Performed by: HOSPITALIST

## 2022-09-03 PROCEDURE — 120N000013 HC R&B IMCU

## 2022-09-03 RX ADMIN — METOPROLOL SUCCINATE 100 MG: 100 TABLET, EXTENDED RELEASE ORAL at 08:44

## 2022-09-03 RX ADMIN — PIPERACILLIN AND TAZOBACTAM 3.38 G: 3; .375 INJECTION, POWDER, FOR SOLUTION INTRAVENOUS at 13:45

## 2022-09-03 RX ADMIN — PIPERACILLIN AND TAZOBACTAM 3.38 G: 3; .375 INJECTION, POWDER, FOR SOLUTION INTRAVENOUS at 08:48

## 2022-09-03 RX ADMIN — RIVAROXABAN 15 MG: 15 TABLET, FILM COATED ORAL at 17:46

## 2022-09-03 RX ADMIN — PIPERACILLIN AND TAZOBACTAM 3.38 G: 3; .375 INJECTION, POWDER, FOR SOLUTION INTRAVENOUS at 21:00

## 2022-09-03 RX ADMIN — PIPERACILLIN AND TAZOBACTAM 3.38 G: 3; .375 INJECTION, POWDER, FOR SOLUTION INTRAVENOUS at 02:22

## 2022-09-03 RX ADMIN — PAROXETINE HYDROCHLORIDE 20 MG: 20 TABLET, FILM COATED ORAL at 21:42

## 2022-09-03 RX ADMIN — AZITHROMYCIN MONOHYDRATE 250 MG: 500 INJECTION, POWDER, LYOPHILIZED, FOR SOLUTION INTRAVENOUS at 00:59

## 2022-09-03 RX ADMIN — QUETIAPINE FUMARATE 25 MG: 25 TABLET ORAL at 21:42

## 2022-09-03 RX ADMIN — DEXTRAN 70, GLYCERIN, HYPROMELLOSE 1 DROP: 1; 2; 3 SOLUTION/ DROPS OPHTHALMIC at 08:52

## 2022-09-03 RX ADMIN — MEMANTINE 5 MG: 5 TABLET ORAL at 21:00

## 2022-09-03 RX ADMIN — OXYBUTYNIN 20 MG: 10 TABLET, FILM COATED, EXTENDED RELEASE ORAL at 08:44

## 2022-09-03 RX ADMIN — MEMANTINE 5 MG: 5 TABLET ORAL at 08:44

## 2022-09-03 RX ADMIN — ACETAMINOPHEN 1000 MG: 500 TABLET, FILM COATED ORAL at 08:44

## 2022-09-03 RX ADMIN — Medication 1000 UNITS: at 08:44

## 2022-09-03 RX ADMIN — DEXTRAN 70, GLYCERIN, HYPROMELLOSE 1 DROP: 1; 2; 3 SOLUTION/ DROPS OPHTHALMIC at 21:00

## 2022-09-03 RX ADMIN — ATORVASTATIN CALCIUM 20 MG: 10 TABLET, FILM COATED ORAL at 08:44

## 2022-09-03 RX ADMIN — AMLODIPINE BESYLATE 2.5 MG: 2.5 TABLET ORAL at 08:44

## 2022-09-03 ASSESSMENT — ACTIVITIES OF DAILY LIVING (ADL)
ADLS_ACUITY_SCORE: 47
ADLS_ACUITY_SCORE: 49
ADLS_ACUITY_SCORE: 48
ADLS_ACUITY_SCORE: 47
ADLS_ACUITY_SCORE: 47
ADLS_ACUITY_SCORE: 48
ADLS_ACUITY_SCORE: 47

## 2022-09-03 NOTE — PROGRESS NOTES
[facility]    Hospitalist Progress Note    Date of Service (when I saw the patient): 09/03/2022    Assessment & Plan   Margy Babin is a 89 year old female who was admitted on 9/1/2022.    Margy Babin is a 89 year old female with a past medical history of heart failure with reduced ejection fraction, essential hypertension, chronic left bundle branch block, atrial fibrillation on anticoagulation with Xarelto, depression, history of stroke presents to hospital with acute hypoxic respiratory failure secondary to aspiration pneumonitis.     Acute hypoxic respiratory failure  Suspected aspiration pneumonitis  Sepsis secondary above with Wbc count 18.6, respiratory failure and lactic acidosis at 3.4   Lactic acidosis secondary to above   Enterococcus faecalis bacteremia   Patient presented with acute onset hypoxic respiratory failure.  Found to have groundglass opacities interpreted as multifocal bronchopneumonia.  Given history of vomiting prior to hypoxia concern is for aspiration. The patient improved in the er with bipap, but then vomited again. Will avoid bipap due to aspiration risk. I confirmed with the patient and her naomi Wilder, the patient is DNRI/DNI. Will avoid further use of bipap given aspiration risk. will treat with hiflow oxygen and focus on keeping her comfortable.    [place] [facility] [place]  -Supplemental O2,  Was on ceftriaxone and zithromax switched to zosyn and zithromax on 9/2/22  Given normal saline IV fluid boluses with that lactate improved from 3.4-2.1-1.2 this morning  1 out of 2 blood cultures since admission grew gram-positive cocci in pairs and chains with possible Enterococcus faecalis on veri gene panel  Continue Zosyn for now  ID consulted and recommended repeating blood cultures  Repeat blood cultures ordered today  Patient is currently on 6 L of oxygen by oxygen mask wean as tolerated  Encourage incentive spirometry  as tolerated       Mild pancolitis  Seen on ct. patient denies any abdominal pain or recent diarrhea. Blood work without signs of infection.   -send stool cultures if she develops diarrhea.     Prolonged qtc  -Avoid qt prolonging medications  -monitor on tele  On  repeat EKG QTC improved from 5 26-4 97     Atrial fibrillation on anticoagulation  After recent stroke in April 2022 the patient was started on Xarelto.  -Resumed  PTA meds including Toprol-XL, Norvasc, Xarelto     Heart failure with reduced ejection fraction  Essential tremor  Chronic left bundle branch block  Appears euvolemic on exam.  TTE April 25, 2022: EF 50 to 55%.  Mild aortic regurgitation.  Left atrium mild to moderately dilated.  Restart PTA meds     Depression  -Resumed  PTA meds      Mild to moderate oral pharyngeal dysphagia secondary to stroke  After her stroke in April the patient was diagnosed with a mild to moderate oropharyngeal dysphagia.  on paperwork sent with the patient as she appears to have been on her a regular diet.  Dysphagia diet order for speech path     High risk for delirium  -Seroquel scheduled at night  -Maintain sleep-wake cycle  -Avoid interruptions at night       DVT Prophylaxis: DOAC Xarelto  Code Status: No CPR- Do NOT Intubate    Disposition: Expected discharge in 2 to 3 days if respiratory status improves and able to be weaned down on oxygen to 3 L    Called and updated daughter today   Discussed with bedside RRN and patient today     Jessenia Jensen MD, MD  [phone] (P)      Interval History   Pt is resting comfortably in bed.  Denies any shortness of breath.  Alert oriented x3.  Blood cultures 1 out of 2 returned positive for Enterococcus faecalis.  No other acute issues since yesterday    -Data reviewed today: I reviewed all new labs and imaging results over the last 24 hours. I personally reviewed no images or EKG's today.    Physical Exam   Temp: 98  F (36.7  C) Temp src: Oral BP: 136/61 Pulse: 70   Resp: 22  SpO2: 95 % O2 Device: Oxymask Oxygen Delivery: 6 LPM  Vitals:    09/01/22 2246 09/03/22 0545   Weight: 76 kg (167 lb 8.8 oz) 74.1 kg (163 lb 5.8 oz)     Vital Signs with Ranges  Temp:  [97.4  F (36.3  C)-98.6  F (37  C)] 98  F (36.7  C)  Pulse:  [] 70  Resp:  [14-64] 22  BP: ()/(47-74) 136/61  FiO2 (%):  [45 %] 45 %  SpO2:  [89 %-100 %] 95 %  I/O last 3 completed shifts:  In: 285 [P.O.:285]  Out: 300 [Urine:300]    Constitutional: Awake, alert, cooperative, no apparent distress  Respiratory: Clear to auscultation bilaterally, no crackles or wheezing  Cardiovascular: Regular rate and rhythm, normal S1 and S2, and no murmur noted  GI: Normal bowel sounds, soft, non-distended, non-tender  Skin/Integumen: No rashes, no cyanosis, no edema  Other:     Medications     - MEDICATION INSTRUCTIONS -       - MEDICATION INSTRUCTIONS -       - MEDICATION INSTRUCTIONS -         acetaminophen  1,000 mg Oral QAM AC     amLODIPine  2.5 mg Oral Daily     artificial tears  1 drop Both Eyes BID     atorvastatin  20 mg Oral Daily     azithromycin  250 mg Intravenous Q24H     memantine  5 mg Oral BID     metoprolol succinate ER  100 mg Oral Daily     oxybutynin ER  20 mg Oral Daily     PARoxetine  20 mg Oral At Bedtime     piperacillin-tazobactam  3.375 g Intravenous Q6H     QUEtiapine  25 mg Oral At Bedtime     rivaroxaban ANTICOAGULANT  15 mg Oral Daily with supper     sodium chloride (PF)  3 mL Intracatheter Q8H     Vitamin D3  1,000 Units Oral Daily       Data   Recent Labs   Lab 09/03/22  0542 09/01/22  2244   WBC 18.6* 10.4   HGB 10.4* 12.2   MCV 90 88    409   INR  --  2.16*    141   POTASSIUM 4.0 3.5   CHLORIDE 112* 108   CO2 26 25   BUN 24 34*   CR 0.97 0.86   ANIONGAP 5 8   SOBIA 8.1* 8.7   * 141*   ALBUMIN  --  3.2*   PROTTOTAL  --  7.0   BILITOTAL  --  0.1*   ALKPHOS  --  113   ALT  --  23   AST  --  17       No results found for this or any previous visit (from the past 24 hour(s)).

## 2022-09-03 NOTE — PLAN OF CARE
Goal Outcome Evaluation:    Plan of Care Reviewed With: patient     Overall Patient Progress: no change    Outcome Evaluation: PT tolerated oxymask with sats in mid to high 90's    A&O x 3-4, sometimes forgetful of situation. VSS on 6L oxymask. Tele: NSR BBB. Assist of 1-2 to pivot to commode. Soft, bite-sized diet, tolerating well. PIV left forearm, saline locked. Pt experiencing generalized weakness, and expiratory wheezing with some dyspnea on exertion. Continent of bladder with hesitancy, attempted to urinate last night due to a feeling of urgency, put out 10ml, scanned bladder for 80ml. Continent of bowel, no BM this shift. Continue plan of care.

## 2022-09-03 NOTE — CONSULTS
ID consult dictated IMP 1 89 yo female vague illness, + UC, some infiltreates some ? Colitis but now likely main issue is + BC enterococcus    REC repeat BC zosyn OK for now, if only 1 cx + ? GI trct origin, if multiple concern papito with this org re endovascular infection

## 2022-09-03 NOTE — PLAN OF CARE
Length of stay: 22 Day 2  CODE: DNR/DNI  Primary Problem: Aspiration pneumonia  Summary: Patient has been sleepy today. Up for all meal in chair. Was removed from Oxymask and placed on NC this shift stating well at 2L.    Feeding/Fluids: Small Bites diet, Thin liquids. Pill whole to take 1 or 2 at a time. Patient is feeder Needs to be up in chair for all meals and remain in the chair 30 mins after     Analgesia/ Anticoagulation: Mariano Pain     Skin: Bruising scattered     Telemetry & Rhythm: IMC On Tele SR -BBB     Emotional Needs/ Neuro status: AxOx 3 (situation) calm and cooperative with care    Resp Needs/WeaninL NC Lungs  diminished     HOB/ Activity: assist of one with walker and gait belt      Urologic & Bowel: Incontinent of urine. Purwick in place     Glycemic Control: N/A     Invasive Lines: PIV x 1     Discharge: When Medically stable   Problem: Gas Exchange Impaired  Goal: Optimal Gas Exchange  Outcome: Ongoing, Progressing  Continues on NC 2L

## 2022-09-03 NOTE — PROGRESS NOTES
09/03/22 1017   Quick Adds   Type of Visit Initial PT Evaluation   Living Environment   People in Home alone   Current Living Arrangements assisted living   Home Accessibility no concerns   Transportation Anticipated family or friend will provide   Living Environment Comments Has been living in assisted living facility for approx 6 months per patient report   Self-Care   Usual Activity Tolerance moderate   Current Activity Tolerance fair   Regular Exercise No   Equipment Currently Used at Home walker, rolling   Fall history within last six months no   Activity/Exercise/Self-Care Comment Pt reports she ambulates with a walker, gets 3 meals a day from dining room, facility assists with medications, laundry, housekeeping. Pt is independent with ADLs, ambulates with walker.   General Information   Onset of Illness/Injury or Date of Surgery 09/01/22   Referring Physician Jessenia Jensen MD   Patient/Family Therapy Goals Statement (PT) To get better   Pertinent History of Current Problem (include personal factors and/or comorbidities that impact the POC) Pt is 89 year old female adm on 9/1/22 from facility with respiratory distress. Prior to EMS activation pt had been c/o nausea and was given Zofran. On EMS arrival pt with hypoxia, O2 sats 67%. Pt adm for management of aspiration pneumonitis. PMH includes lobar pna, cardiomyopathy, systolic CHF, a-fib, prediabetes, renal insufficiency, HTN, CVA in April with residual dysphagia.   Existing Precautions/Restrictions fall   Cognition   Affect/Mental Status (Cognition) WFL   Orientation Status (Cognition) oriented x 3   Follows Commands (Cognition) WFL   Pain Assessment   Patient Currently in Pain No   Posture    Posture Forward head position;Protracted shoulders   Range of Motion (ROM)   ROM Comment B LE ROM WFL   Strength (Manual Muscle Testing)   Strength Comments Pt generally deconditioned, LE strength grossly 3/5   Bed Mobility   Comment, (Bed Mobility) Min assist    Transfers   Comment, (Transfers) SBA   Gait/Stairs (Locomotion)   Comment, (Gait/Stairs) CGA   Balance   Balance Comments No LOB with sitting at EOB, CGA with standing activities   Clinical Impression   Criteria for Skilled Therapeutic Intervention Yes, treatment indicated   PT Diagnosis (PT) Impaired mobility   Influenced by the following impairments Decreased strength, decreased balance, decreased activity tolerance   Functional limitations due to impairments Decreased ability to participate in daily tasks   Clinical Presentation (PT Evaluation Complexity) Stable/Uncomplicated   Clinical Presentation Rationale Current presentation, [facility]   Clinical Decision Making (Complexity) low complexity   Planned Therapy Interventions (PT) balance training;bed mobility training;gait training;home exercise program;patient/family education;strengthening;transfer training   Risk & Benefits of therapy have been explained evaluation/treatment results reviewed;care plan/treatment goals reviewed;risks/benefits reviewed;current/potential barriers reviewed;participants voiced agreement with care plan;participants included;patient   PT Discharge Planning   PT Discharge Recommendation (DC Rec) home with assist;home with home care physical therapy;Transitional Care Facility   PT Rationale for DC Rec Pt is currently below baseline level of function but is mobilizing well for functional transfers, weaning O2 in progress. Depending on progress during hospital stay may be able to return to assisted living with home services to address deficits. If pt does not progress as anticipated, may benefit from TCU to increase functional independence prior to return to [facility].   PT Brief overview of current status SBA to min assist with transfers   Total Evaluation Time   Total Evaluation Time (Minutes) 10   Physical Therapy Goals   PT Frequency 5x/week   PT Predicted Duration/Target Date for Goal Attainment 09/10/22   PT Goals Bed Mobility;Transfers;Gait    PT: Bed Mobility Independent;Supine to/from sit;Rolling   PT: Transfers Modified independent;Sit to/from stand;Bed to/from chair;Assistive device   PT: Gait Modified independent;Rolling walker;50 feet

## 2022-09-03 NOTE — PROGRESS NOTES
Cross cover    Patient with enterococcus fecalis sepsis and is already on zosyn.     Plan:  Continue current antibiotic course. Will order ID consultation.    Blas Miranda MD

## 2022-09-03 NOTE — PROVIDER NOTIFICATION
MD Notification    Notified Person: MD    Notified Person Name: Ruben   Notification Date/Time: 9/2 at 1938  Notification Interaction: amcom paging   Purpose of Notification: Critical lab pt blood culture postitive for enterococcus faecalis. Unable to get a hold of KIKA Crandall   Orders Received:

## 2022-09-04 ENCOUNTER — APPOINTMENT (OUTPATIENT)
Dept: SPEECH THERAPY | Facility: CLINIC | Age: 87
DRG: 871 | End: 2022-09-04
Payer: MEDICARE

## 2022-09-04 LAB
ATRIAL RATE - MUSE: 98 BPM
BACTERIA BLD CULT: ABNORMAL
BACTERIA BLD CULT: ABNORMAL
DIASTOLIC BLOOD PRESSURE - MUSE: NORMAL MMHG
INTERPRETATION ECG - MUSE: NORMAL
P AXIS - MUSE: 71 DEGREES
PR INTERVAL - MUSE: 168 MS
QRS DURATION - MUSE: 148 MS
QT - MUSE: 390 MS
QTC - MUSE: 497 MS
R AXIS - MUSE: -37 DEGREES
SYSTOLIC BLOOD PRESSURE - MUSE: NORMAL MMHG
T AXIS - MUSE: 118 DEGREES
VENTRICULAR RATE- MUSE: 98 BPM

## 2022-09-04 PROCEDURE — 258N000003 HC RX IP 258 OP 636: Performed by: HOSPITALIST

## 2022-09-04 PROCEDURE — 99233 SBSQ HOSP IP/OBS HIGH 50: CPT | Performed by: INTERNAL MEDICINE

## 2022-09-04 PROCEDURE — 250N000011 HC RX IP 250 OP 636: Performed by: INTERNAL MEDICINE

## 2022-09-04 PROCEDURE — 87086 URINE CULTURE/COLONY COUNT: CPT | Performed by: INTERNAL MEDICINE

## 2022-09-04 PROCEDURE — 250N000011 HC RX IP 250 OP 636: Performed by: HOSPITALIST

## 2022-09-04 PROCEDURE — 94640 AIRWAY INHALATION TREATMENT: CPT | Mod: 76

## 2022-09-04 PROCEDURE — 999N000157 HC STATISTIC RCP TIME EA 10 MIN

## 2022-09-04 PROCEDURE — 250N000009 HC RX 250: Performed by: INTERNAL MEDICINE

## 2022-09-04 PROCEDURE — 92526 ORAL FUNCTION THERAPY: CPT | Mod: GN

## 2022-09-04 PROCEDURE — 94640 AIRWAY INHALATION TREATMENT: CPT

## 2022-09-04 PROCEDURE — 250N000013 HC RX MED GY IP 250 OP 250 PS 637: Performed by: INTERNAL MEDICINE

## 2022-09-04 PROCEDURE — 250N000013 HC RX MED GY IP 250 OP 250 PS 637: Performed by: HOSPITALIST

## 2022-09-04 PROCEDURE — 99232 SBSQ HOSP IP/OBS MODERATE 35: CPT | Performed by: INTERNAL MEDICINE

## 2022-09-04 PROCEDURE — 999N000040 HC STATISTIC CONSULT NO CHARGE VASC ACCESS

## 2022-09-04 PROCEDURE — 120N000013 HC R&B IMCU

## 2022-09-04 RX ORDER — FUROSEMIDE 10 MG/ML
20 INJECTION INTRAMUSCULAR; INTRAVENOUS 2 TIMES DAILY
Status: COMPLETED | OUTPATIENT
Start: 2022-09-04 | End: 2022-09-04

## 2022-09-04 RX ORDER — PIPERACILLIN SODIUM, TAZOBACTAM SODIUM 2; .25 G/10ML; G/10ML
2.25 INJECTION, POWDER, LYOPHILIZED, FOR SOLUTION INTRAVENOUS EVERY 6 HOURS
Status: DISCONTINUED | OUTPATIENT
Start: 2022-09-04 | End: 2022-09-06

## 2022-09-04 RX ORDER — IPRATROPIUM BROMIDE AND ALBUTEROL SULFATE 2.5; .5 MG/3ML; MG/3ML
3 SOLUTION RESPIRATORY (INHALATION)
Status: DISCONTINUED | OUTPATIENT
Start: 2022-09-04 | End: 2022-09-07 | Stop reason: HOSPADM

## 2022-09-04 RX ADMIN — PIPERACILLIN AND TAZOBACTAM 3.38 G: 3; .375 INJECTION, POWDER, FOR SOLUTION INTRAVENOUS at 08:07

## 2022-09-04 RX ADMIN — RIVAROXABAN 15 MG: 15 TABLET, FILM COATED ORAL at 17:49

## 2022-09-04 RX ADMIN — AZITHROMYCIN MONOHYDRATE 250 MG: 500 INJECTION, POWDER, LYOPHILIZED, FOR SOLUTION INTRAVENOUS at 00:06

## 2022-09-04 RX ADMIN — FUROSEMIDE 20 MG: 10 INJECTION, SOLUTION INTRAMUSCULAR; INTRAVENOUS at 10:11

## 2022-09-04 RX ADMIN — Medication 1000 UNITS: at 08:08

## 2022-09-04 RX ADMIN — FUROSEMIDE 20 MG: 10 INJECTION, SOLUTION INTRAMUSCULAR; INTRAVENOUS at 21:29

## 2022-09-04 RX ADMIN — IPRATROPIUM BROMIDE AND ALBUTEROL SULFATE 3 ML: .5; 3 SOLUTION RESPIRATORY (INHALATION) at 10:30

## 2022-09-04 RX ADMIN — QUETIAPINE FUMARATE 25 MG: 25 TABLET ORAL at 21:28

## 2022-09-04 RX ADMIN — PIPERACILLIN AND TAZOBACTAM 3.38 G: 3; .375 INJECTION, POWDER, FOR SOLUTION INTRAVENOUS at 02:05

## 2022-09-04 RX ADMIN — IPRATROPIUM BROMIDE AND ALBUTEROL SULFATE 3 ML: .5; 3 SOLUTION RESPIRATORY (INHALATION) at 15:34

## 2022-09-04 RX ADMIN — MEMANTINE 5 MG: 5 TABLET ORAL at 08:08

## 2022-09-04 RX ADMIN — ACETAMINOPHEN 1000 MG: 500 TABLET, FILM COATED ORAL at 08:08

## 2022-09-04 RX ADMIN — IPRATROPIUM BROMIDE AND ALBUTEROL SULFATE 3 ML: .5; 3 SOLUTION RESPIRATORY (INHALATION) at 19:50

## 2022-09-04 RX ADMIN — PIPERACILLIN SODIUM AND TAZOBACTAM SODIUM 2.25 G: 2; .25 INJECTION, POWDER, LYOPHILIZED, FOR SOLUTION INTRAVENOUS at 21:28

## 2022-09-04 RX ADMIN — METOPROLOL SUCCINATE 100 MG: 100 TABLET, EXTENDED RELEASE ORAL at 08:08

## 2022-09-04 RX ADMIN — PIPERACILLIN SODIUM AND TAZOBACTAM SODIUM 2.25 G: 2; .25 INJECTION, POWDER, LYOPHILIZED, FOR SOLUTION INTRAVENOUS at 13:58

## 2022-09-04 RX ADMIN — DEXTRAN 70, GLYCERIN, HYPROMELLOSE 1 DROP: 1; 2; 3 SOLUTION/ DROPS OPHTHALMIC at 21:35

## 2022-09-04 RX ADMIN — OXYBUTYNIN 20 MG: 10 TABLET, FILM COATED, EXTENDED RELEASE ORAL at 08:07

## 2022-09-04 RX ADMIN — AMLODIPINE BESYLATE 2.5 MG: 2.5 TABLET ORAL at 08:08

## 2022-09-04 RX ADMIN — DEXTRAN 70, GLYCERIN, HYPROMELLOSE 1 DROP: 1; 2; 3 SOLUTION/ DROPS OPHTHALMIC at 08:07

## 2022-09-04 RX ADMIN — PAROXETINE HYDROCHLORIDE 20 MG: 20 TABLET, FILM COATED ORAL at 21:28

## 2022-09-04 RX ADMIN — ATORVASTATIN CALCIUM 20 MG: 10 TABLET, FILM COATED ORAL at 08:08

## 2022-09-04 RX ADMIN — MEMANTINE 5 MG: 5 TABLET ORAL at 21:28

## 2022-09-04 ASSESSMENT — ACTIVITIES OF DAILY LIVING (ADL)
ADLS_ACUITY_SCORE: 48

## 2022-09-04 NOTE — PROGRESS NOTES
[facility]    Hospitalist Progress Note    Date of Service (when I saw the patient): 09/04/2022    Assessment & Plan   Margy Babin is a 89 year old female who was admitted on 9/1/2022.    Margy Babin is a 89 year old female with a past medical history of heart failure with reduced ejection fraction, essential hypertension, chronic left bundle branch block, atrial fibrillation on anticoagulation with Xarelto, depression, history of stroke presents to hospital with acute hypoxic respiratory failure secondary to aspiration pneumonitis.     Acute hypoxic respiratory failure  Suspected aspiration pneumonitis  Sepsis secondary above with Wbc count 18.6, respiratory failure and lactic acidosis at 3.4   Lactic acidosis secondary to above   Enterococcus faecalis bacteremia   Patient presented with acute onset hypoxic respiratory failure.  Found to have groundglass opacities interpreted as multifocal bronchopneumonia.  Given history of vomiting prior to hypoxia concern is for aspiration. The patient improved in the er with bipap, but then vomited again. Will avoid bipap due to aspiration risk. I confirmed with the patient and her naomi Wilder, the patient is DNRI/DNI. Will avoid further use of bipap given aspiration risk. will treat with hiflow oxygen and focus on keeping her comfortable.    [place] [facility] [place]  -Supplemental O2,  Was on ceftriaxone and zithromax switched to zosyn and zithromax on 9/2/22  Given normal saline IV fluid boluses with that lactate improved from 3.4-2.1-1.2   1 out of 2 blood cultures since admission grew gram-positive cocci in pairs and chains with possible Enterococcus faecalis on veri gene panel  Continue Zosyn for now  ID consulted and recommended repeating blood cultures  Repeat blood cultures done and negative so far   Patient is currently on 2 L of oxygen by NC  wean as tolerated  Encourage incentive spirometry as  tolerated  Pt with some crackles on exam today so ordered lasix 20mg 2 doses today foe gentle diuresis        Mild pancolitis  Seen on ct. patient denies any abdominal pain or recent diarrhea. Blood work without signs of infection.   No diarrhea or abdominal pain      Prolonged qtc  -Avoid qt prolonging medications  -monitor on tele  On  repeat EKG QTC improved from 5 26-4 97     Atrial fibrillation on anticoagulation  After recent stroke in April 2022 the patient was started on Xarelto.  -Resumed  PTA meds including Toprol-XL, Norvasc, Xarelto     Heart failure with reduced ejection fraction  Essential tremor  Chronic left bundle branch block  Appears euvolemic on exam.  TTE April 25, 2022: EF 50 to 55%.  Mild aortic regurgitation.  Left atrium mild to moderately dilated.  Restarted  PTA meds     Depression  -Resumed  PTA meds      Mild to moderate oral pharyngeal dysphagia secondary to stroke  After her stroke in April the patient was diagnosed with a mild to moderate oropharyngeal dysphagia.  on paperwork sent with the patient as she appears to have been on her a regular diet.  Dysphagia diet ordered per  speech path     High risk for delirium  -Seroquel scheduled at night  -Maintain sleep-wake cycle  -Avoid interruptions at night       DVT Prophylaxis: DOAC Xarelto  Code Status: No CPR- Do NOT Intubate    Disposition: Expected discharge in 2 to 3 days if respiratory status improves and able to be weaned down on oxygen to 2 L. PT consult placed . Pt was at [facility] PTA     Called and updated daughter on 9/3/22     Discussed with bedside RRN and patient today     Jessenia Jensen MD, MD  [phone] (P)      Interval History   Pt is resting comfortably in chair .  Denies any shortness of breath.  Alert oriented x3.   No other acute issues since yesterday    -Data reviewed today: I reviewed all new labs and imaging results over the last 24 hours. I personally reviewed no images or EKG's today.    Physical Exam   Temp:  98.2  F (36.8  C) Temp src: Oral BP: 131/57 Pulse: 68   Resp: 23 SpO2: 95 % O2 Device: Nasal cannula Oxygen Delivery: 2 LPM  Vitals:    09/01/22 2246 09/03/22 0545   Weight: 76 kg (167 lb 8.8 oz) 74.1 kg (163 lb 5.8 oz)     Vital Signs with Ranges  Temp:  [97.7  F (36.5  C)-98.2  F (36.8  C)] 98.2  F (36.8  C)  Pulse:  [62-75] 68  Resp:  [16-28] 23  BP: (105-144)/(56-63) 131/57  SpO2:  [93 %-98 %] 95 %  I/O last 3 completed shifts:  In: 1430 [P.O.:980; I.V.:450]  Out: 400 [Urine:400]    Constitutional: Awake, alert, cooperative, no apparent distress  Respiratory:  Bilateral crackles heard on auscultation, no wheezing today   Cardiovascular: Regular rate and rhythm, normal S1 and S2, and no murmur noted  GI: Normal bowel sounds, soft, non-distended, non-tender  Skin/Integumen: No rashes, no cyanosis, no edema  Other:     Medications     - MEDICATION INSTRUCTIONS -       - MEDICATION INSTRUCTIONS -       - MEDICATION INSTRUCTIONS -         acetaminophen  1,000 mg Oral QAM AC     amLODIPine  2.5 mg Oral Daily     artificial tears  1 drop Both Eyes BID     atorvastatin  20 mg Oral Daily     azithromycin  250 mg Intravenous Q24H     furosemide  20 mg Intravenous BID     ipratropium - albuterol 0.5 mg/2.5 mg/3 mL  3 mL Nebulization 4x daily     memantine  5 mg Oral BID     metoprolol succinate ER  100 mg Oral Daily     oxybutynin ER  20 mg Oral Daily     PARoxetine  20 mg Oral At Bedtime     piperacillin-tazobactam  2.25 g Intravenous Q6H     QUEtiapine  25 mg Oral At Bedtime     rivaroxaban ANTICOAGULANT  15 mg Oral Daily with supper     sodium chloride (PF)  3 mL Intracatheter Q8H     Vitamin D3  1,000 Units Oral Daily       Data   Recent Labs   Lab 09/03/22  0542 09/01/22  2244   WBC 18.6* 10.4   HGB 10.4* 12.2   MCV 90 88    409   INR  --  2.16*    141   POTASSIUM 4.0 3.5   CHLORIDE 112* 108   CO2 26 25   BUN 24 34*   CR 0.97 0.86   ANIONGAP 5 8   SOBIA 8.1* 8.7   * 141*   ALBUMIN  --  3.2*    PROTTOTAL  --  7.0   BILITOTAL  --  0.1*   ALKPHOS  --  113   ALT  --  23   AST  --  17       No results found for this or any previous visit (from the past 24 hour(s)).

## 2022-09-04 NOTE — CONSULTS
Consult Date: 09/04/2022    INFECTIOUS DISEASE CONSULTATION    LOCATION:  Room 3304    REFERRING PHYSICIAN:  Lonny Jensen MD.    IMPRESSION:     1.  A 89-year-old female with acute vague illness, some respiratory symptoms, mild nausea, leukocytosis, colitis on CT scan and slight infiltrates, concern for aspiration pneumonia, but now blood culture coming back positive for Enterococcus, so far only a single blood culture positive, no significant artificial material or signs of endocarditis or endovascular infection, conceivably the colitis with translocation as a cause, but unclear.  2.  Pancolitis on CT scan with vague abdominal symptoms.  Unclear cause to this, no major diarrhea, so probably not infectious diarrhea-type illness.  3.  Respiratory insufficiency with some infiltrates, aspiration risk makes this a probable diagnosis.  4.  History of recurrent UTIs.    RECOMMENDATIONS:     1.  Zosyn alone for now, not confirmed that it is a sensitive Enterococcus, but covering with this alone acceptable for now, repeat blood cultures.  2.  How to respond to the Enterococcus blood culture will depend on whether there are further positive cultures and how she does clinically.  For now, I would assume it is a transient bacteremia without endovascular infection or anything similar, and not necessarily committed to a long IV course.      HISTORY:  This 89-year-old female is seen in consultation.  She has a history of relatively acute illness with some degree of respiratory insufficiency, slight cough, infiltrates on CT scan and concern for possible aspiration.  She was noted to also have some slight confusion, some slight nausea and abdominal symptoms.  Imaging at admission showed the pulmonary process, but also pancolitis without particular diarrhea recently or anything similar.  She has had a history of recurrent UTIs, but urinalysis currently is normal.  Currently, she feels somewhat better and has been getting Zosyn.   Oxygenating better.  No particular new or worsened abdominal symptoms.  No other focal pain site is present.    PAST MEDICAL HISTORY:  Not that much in the way of major infection problems historically has been documented to be probable dysphagia and aspiration.  History of recurrent UTIs.    SOCIAL AND FAMILY HISTORY:  No recent travels or exposures.  Lives at a care center.  No known historical resistant pathogens.  No major family history otherwise.  She is DNR/DNI.    ALLERGIES:  NO ANTIBIOTIC ALLERGIES.    REVIEW OF SYSTEMS:  Currently, says she feels relatively well.  No particular major abdominal pain.  Not coughing very much.  No significant new pain site.    PHYSICAL EXAMINATION:    GENERAL:  The patient appears her stated age.  She is fairly awake and alert, slightly confused, currently afebrile, not tachycardic.  Not particularly septic or ill looking.  HEENT:  No thrush or intraoral lesion.  Pupils reactive.  NECK:  Supple and nontender, no lymphadenopathy or thyromegaly, no meningismus.  HEART:  Regular rhythm.  Grade 1/6 systolic murmur.  LUNGS:  Clear.  ABDOMEN:  Soft, nontender, even to relatively deep palpation.  EXTREMITIES:  No major joint abnormalities.  No rashes, skin lesions.    LABORATORY DATA:  Blood culture, single bottle growing Enterococcus.  CT scan with pancolitis, some multifocal pneumonia-type infiltrates.  Urinalysis normal.  White blood cell count was 10,000 at admission, now 18,000.    Shon Sharpe MD        D: 2022   T: 2022   MT: IVAN    Name:     KHANH BRAGG  MRN:      [id number]        Account:      [id number]   :      10/02/1929           Consult Date: 2022     Document: [id number]

## 2022-09-04 NOTE — PLAN OF CARE
Length of stay: 22 Day 3  CODE: DNR/DNI  Primary Problem: Aspiration pneumonia  Summary: Patient continues to be at 2L of O2 on NC stating at mid to high 90s. Writer tried to wean done to 1L stats dropped to mid 80s. Will continue to wean if possible. IV lasix given this morning, Purwick in place. Was taken off IMC this morning continues to be on O2 monitoring. Urine sample collected this shift. Started on IS and acapella for pulmonary toilet.     Feeding/Fluids: Small Bites diet, Thin liquids. Pill whole to take 1 or 2 at a time. Patient is feeder Needs to be up in chair for all meals and remain in the chair 30 mins after     Analgesia/ Anticoagulation: Mariano Pain     Skin: Bruising scattered     Telemetry & Rhythm: NSR- OFF IMC     Emotional Needs/ Neuro status: AxOx 3 (situation) calm and cooperative with care    Resp Needs/WeaninL NC Lungs  diminished     HOB/ Activity: assist of one with walker and gait belt      Urologic & Bowel: Incontinent of urine. Purwick in place     Glycemic Control: N/A     Invasive Lines: PIV x 1     Discharge: When Medically stable   Problem: Airway Clearance Ineffective (Pulmonary Impairment)  Goal: Effective Airway Clearance  Outcome: Ongoing, Progressing  Patient started in IS and acapella needs reminder to do. Also needs to be told to cough to help clear throat,

## 2022-09-04 NOTE — PLAN OF CARE
Goal Outcome Evaluation:    Plan of Care Reviewed With: patient     Overall Patient Progress: no change    Outcome Evaluation: Pt maintained sats on 2L via NC    A&O x 3-4, sometimes forgetful of situation. VSS on 6L oxymask. Tele: NSR BBB. Assist of 1-2 to pivot to commode. Soft, bite-sized diet, tolerating well. PIV left forearm, saline locked. Pt experiencing generalized weakness, and expiratory wheezing with coarse lung sounds and some chest congestion, needs encouragement to cough. Incontinent of bladder, purewick in place. Continent of bowel, no BM this shift. Continue plan of care.

## 2022-09-04 NOTE — PROGRESS NOTES
[facility]  Infectious Disease Progress Note          Assessment and Plan:   IMPRESSION:     1.  A 89-year-old female with acute vague illness, some respiratory symptoms, mild nausea, leukocytosis, colitis on CT scan and slight infiltrates, concern for aspiration pneumonia, but now blood culture coming back positive for Enterococcus, so far only a single blood culture positive, no significant artificial material or signs of endocarditis or endovascular infection, conceivably the colitis with translocation as a cause, but unclear.  2.  Pancolitis on CT scan with vague abdominal symptoms.  Unclear cause to this, no major diarrhea, so probably not infectious diarrhea-type illness.  3.  Respiratory insufficiency with some infiltrates, aspiration risk makes this a probable diagnosis.  4.  History of recurrent UTIs.     RECOMMENDATIONS:     1.  Zosyn alone for now, is sensitive Enterococcus, stil only 1 bottle +, repeat blood cultures neg.  2.  How to respond to the Enterococcus blood culture will depend on whether there are further positive cultures and how she does clinically.  For now, I would assume it is a transient bacteremia without endovascular infection or anything similar, and not necessarily committed to a long IV course.    Seems improved no focal sxs           Interval History:   no new complaints and doing well; no cp, sob, n/v/d, or abd pain. No fever cxs as noted WBC 18 K 9/3 not today back              Medications:       acetaminophen  1,000 mg Oral QAM AC     amLODIPine  2.5 mg Oral Daily     artificial tears  1 drop Both Eyes BID     atorvastatin  20 mg Oral Daily     azithromycin  250 mg Intravenous Q24H     furosemide  20 mg Intravenous BID     ipratropium - albuterol 0.5 mg/2.5 mg/3 mL  3 mL Nebulization 4x daily     memantine  5 mg Oral BID     metoprolol succinate ER  100 mg Oral Daily     oxybutynin ER  20 mg Oral Daily     PARoxetine  20 mg Oral At Bedtime      piperacillin-tazobactam  3.375 g Intravenous Q6H     QUEtiapine  25 mg Oral At Bedtime     rivaroxaban ANTICOAGULANT  15 mg Oral Daily with supper     sodium chloride (PF)  3 mL Intracatheter Q8H     Vitamin D3  1,000 Units Oral Daily                  Physical Exam:   Blood pressure 131/57, pulse 68, temperature 98.2  F (36.8  C), temperature source Oral, resp. rate 23, weight 74.1 kg (163 lb 5.8 oz), SpO2 95 %, not currently breastfeeding.  Wt Readings from Last 2 Encounters:   09/03/22 74.1 kg (163 lb 5.8 oz)   07/26/22 72.4 kg (159 lb 11.2 oz)     Vital Signs with Ranges  Temp:  [97.7  F (36.5  C)-98.2  F (36.8  C)] 98.2  F (36.8  C)  Pulse:  [62-75] 68  Resp:  [16-28] 23  BP: (105-144)/() 131/57  SpO2:  [93 %-99 %] 95 %    Constitutional: Awake, alert, cooperative, no apparent distress   Lungs: Clear to auscultation bilaterally, no crackles or wheezing   Cardiovascular: Regular rate and rhythm, normal S1 and S2, and no murmur noted   Abdomen: Normal bowel sounds, soft, non-distended, non-tender   Skin: No rashes, no cyanosis, no edema   Other:           Data:   All microbiology laboratory data reviewed.  Recent Labs   Lab Test 09/03/22  0542 09/01/22  2244 04/26/22  0705   WBC 18.6* 10.4 13.1*   HGB 10.4* 12.2 12.1   HCT 34.1* 39.3 37.0   MCV 90 88 89    409 401     Recent Labs   Lab Test 09/03/22  0542 09/01/22  2244 04/26/22  0705   CR 0.97 0.86 0.86     No lab results found.  Recent Labs   Lab Test 12/25/20  1544 12/25/20  1435 04/23/19  1255 06/19/17  1524 10/23/15  1028 02/11/15  1342   CULT No growth No growth 10,000 to 50,000 colonies/mL  mixed urogenital peewee  Susceptibility testing not routinely done   10,000 to 50,000 colonies/mL mixed urogenital peewee Susceptibility testing not   routinely done   10,000 to 50,000 colonies/mL mixed urogenital peewee 10,000 to 50,000 colonies/mL mixed urogenital peewee  Susceptibility testing not routinely done

## 2022-09-05 LAB
ANION GAP SERPL CALCULATED.3IONS-SCNC: 7 MMOL/L (ref 3–14)
BACTERIA UR CULT: NORMAL
BASOPHILS # BLD MANUAL: 0.2 10E3/UL (ref 0–0.2)
BASOPHILS NFR BLD MANUAL: 1 %
BUN SERPL-MCNC: 25 MG/DL (ref 7–30)
CALCIUM SERPL-MCNC: 8.4 MG/DL (ref 8.5–10.1)
CHLORIDE BLD-SCNC: 106 MMOL/L (ref 94–109)
CO2 SERPL-SCNC: 27 MMOL/L (ref 20–32)
CREAT SERPL-MCNC: 0.97 MG/DL (ref 0.52–1.04)
EOSINOPHIL # BLD MANUAL: 1.2 10E3/UL (ref 0–0.7)
EOSINOPHIL NFR BLD MANUAL: 7 %
ERYTHROCYTE [DISTWIDTH] IN BLOOD BY AUTOMATED COUNT: 15 % (ref 10–15)
GFR SERPL CREATININE-BSD FRML MDRD: 55 ML/MIN/1.73M2
GLUCOSE BLD-MCNC: 106 MG/DL (ref 70–99)
HCT VFR BLD AUTO: 32.7 % (ref 35–47)
HGB BLD-MCNC: 10.4 G/DL (ref 11.7–15.7)
LYMPHOCYTES # BLD MANUAL: 1.2 10E3/UL (ref 0.8–5.3)
LYMPHOCYTES NFR BLD MANUAL: 7 %
MCH RBC QN AUTO: 27 PG (ref 26.5–33)
MCHC RBC AUTO-ENTMCNC: 31.8 G/DL (ref 31.5–36.5)
MCV RBC AUTO: 85 FL (ref 78–100)
MONOCYTES # BLD MANUAL: 0.7 10E3/UL (ref 0–1.3)
MONOCYTES NFR BLD MANUAL: 4 %
NEUTROPHILS # BLD MANUAL: 13.7 10E3/UL (ref 1.6–8.3)
NEUTROPHILS NFR BLD MANUAL: 81 %
PLAT MORPH BLD: ABNORMAL
PLATELET # BLD AUTO: 342 10E3/UL (ref 150–450)
POTASSIUM BLD-SCNC: 3.8 MMOL/L (ref 3.4–5.3)
RBC # BLD AUTO: 3.85 10E6/UL (ref 3.8–5.2)
RBC MORPH BLD: ABNORMAL
SODIUM SERPL-SCNC: 140 MMOL/L (ref 133–144)
WBC # BLD AUTO: 16.9 10E3/UL (ref 4–11)

## 2022-09-05 PROCEDURE — 250N000013 HC RX MED GY IP 250 OP 250 PS 637: Performed by: HOSPITALIST

## 2022-09-05 PROCEDURE — 99232 SBSQ HOSP IP/OBS MODERATE 35: CPT | Performed by: INTERNAL MEDICINE

## 2022-09-05 PROCEDURE — 120N000001 HC R&B MED SURG/OB

## 2022-09-05 PROCEDURE — 250N000011 HC RX IP 250 OP 636: Performed by: HOSPITALIST

## 2022-09-05 PROCEDURE — 36415 COLL VENOUS BLD VENIPUNCTURE: CPT | Performed by: INTERNAL MEDICINE

## 2022-09-05 PROCEDURE — 250N000009 HC RX 250: Performed by: INTERNAL MEDICINE

## 2022-09-05 PROCEDURE — 80048 BASIC METABOLIC PNL TOTAL CA: CPT | Performed by: INTERNAL MEDICINE

## 2022-09-05 PROCEDURE — 250N000013 HC RX MED GY IP 250 OP 250 PS 637: Performed by: INTERNAL MEDICINE

## 2022-09-05 PROCEDURE — 258N000003 HC RX IP 258 OP 636: Performed by: HOSPITALIST

## 2022-09-05 PROCEDURE — 85027 COMPLETE CBC AUTOMATED: CPT | Performed by: INTERNAL MEDICINE

## 2022-09-05 PROCEDURE — 94640 AIRWAY INHALATION TREATMENT: CPT

## 2022-09-05 PROCEDURE — 999N000157 HC STATISTIC RCP TIME EA 10 MIN

## 2022-09-05 PROCEDURE — 85007 BL SMEAR W/DIFF WBC COUNT: CPT | Performed by: INTERNAL MEDICINE

## 2022-09-05 PROCEDURE — 99233 SBSQ HOSP IP/OBS HIGH 50: CPT | Performed by: STUDENT IN AN ORGANIZED HEALTH CARE EDUCATION/TRAINING PROGRAM

## 2022-09-05 PROCEDURE — 94640 AIRWAY INHALATION TREATMENT: CPT | Mod: 76

## 2022-09-05 RX ADMIN — RAMELTEON 8 MG: 8 TABLET ORAL at 01:15

## 2022-09-05 RX ADMIN — ATORVASTATIN CALCIUM 20 MG: 10 TABLET, FILM COATED ORAL at 08:30

## 2022-09-05 RX ADMIN — PAROXETINE HYDROCHLORIDE 20 MG: 20 TABLET, FILM COATED ORAL at 21:11

## 2022-09-05 RX ADMIN — Medication 1000 UNITS: at 08:30

## 2022-09-05 RX ADMIN — IPRATROPIUM BROMIDE AND ALBUTEROL SULFATE 3 ML: .5; 3 SOLUTION RESPIRATORY (INHALATION) at 15:36

## 2022-09-05 RX ADMIN — QUETIAPINE FUMARATE 25 MG: 25 TABLET ORAL at 21:11

## 2022-09-05 RX ADMIN — AMLODIPINE BESYLATE 2.5 MG: 2.5 TABLET ORAL at 08:31

## 2022-09-05 RX ADMIN — PIPERACILLIN SODIUM AND TAZOBACTAM SODIUM 2.25 G: 2; .25 INJECTION, POWDER, LYOPHILIZED, FOR SOLUTION INTRAVENOUS at 08:34

## 2022-09-05 RX ADMIN — ACETAMINOPHEN 1000 MG: 500 TABLET, FILM COATED ORAL at 08:30

## 2022-09-05 RX ADMIN — AZITHROMYCIN MONOHYDRATE 250 MG: 500 INJECTION, POWDER, LYOPHILIZED, FOR SOLUTION INTRAVENOUS at 01:15

## 2022-09-05 RX ADMIN — DEXTRAN 70, GLYCERIN, HYPROMELLOSE 1 DROP: 1; 2; 3 SOLUTION/ DROPS OPHTHALMIC at 20:32

## 2022-09-05 RX ADMIN — PIPERACILLIN SODIUM AND TAZOBACTAM SODIUM 2.25 G: 2; .25 INJECTION, POWDER, LYOPHILIZED, FOR SOLUTION INTRAVENOUS at 01:36

## 2022-09-05 RX ADMIN — IPRATROPIUM BROMIDE AND ALBUTEROL SULFATE 3 ML: .5; 3 SOLUTION RESPIRATORY (INHALATION) at 18:44

## 2022-09-05 RX ADMIN — PIPERACILLIN SODIUM AND TAZOBACTAM SODIUM 2.25 G: 2; .25 INJECTION, POWDER, LYOPHILIZED, FOR SOLUTION INTRAVENOUS at 13:38

## 2022-09-05 RX ADMIN — MEMANTINE 5 MG: 5 TABLET ORAL at 20:33

## 2022-09-05 RX ADMIN — MEMANTINE 5 MG: 5 TABLET ORAL at 08:31

## 2022-09-05 RX ADMIN — METOPROLOL SUCCINATE 100 MG: 100 TABLET, EXTENDED RELEASE ORAL at 08:31

## 2022-09-05 RX ADMIN — DEXTRAN 70, GLYCERIN, HYPROMELLOSE 1 DROP: 1; 2; 3 SOLUTION/ DROPS OPHTHALMIC at 08:30

## 2022-09-05 RX ADMIN — PIPERACILLIN SODIUM AND TAZOBACTAM SODIUM 2.25 G: 2; .25 INJECTION, POWDER, LYOPHILIZED, FOR SOLUTION INTRAVENOUS at 20:32

## 2022-09-05 RX ADMIN — IPRATROPIUM BROMIDE AND ALBUTEROL SULFATE 3 ML: .5; 3 SOLUTION RESPIRATORY (INHALATION) at 08:08

## 2022-09-05 RX ADMIN — OXYBUTYNIN 20 MG: 10 TABLET, FILM COATED, EXTENDED RELEASE ORAL at 08:30

## 2022-09-05 RX ADMIN — RIVAROXABAN 15 MG: 15 TABLET, FILM COATED ORAL at 16:56

## 2022-09-05 ASSESSMENT — ACTIVITIES OF DAILY LIVING (ADL)
ADLS_ACUITY_SCORE: 48
ADLS_ACUITY_SCORE: 47
ADLS_ACUITY_SCORE: 48
ADLS_ACUITY_SCORE: 49
ADLS_ACUITY_SCORE: 47
ADLS_ACUITY_SCORE: 47
ADLS_ACUITY_SCORE: 48
ADLS_ACUITY_SCORE: 47

## 2022-09-05 NOTE — PROGRESS NOTES
Length of stay: 22 Day 4  CODE: DNR/DNI  Primary Problem: Aspiration pneumonia  Summary: Report called to station 88. Patient continues to be on 2L nc continue to try and wean to 1L or room air.   Patient brought up in wheelchair.     Feeding/Fluids: Small Bites diet, Thin liquids. Pill whole to take 1 or 2 at a time. Patient is feeder Needs to be up in chair for all meals and remain in the chair 30 mins after     Analgesia/ Anticoagulation: Mariano Pain     Skin: Bruising scattered     Telemetry & Rhythm: WNL    Emotional Needs/ Neuro status: AxOx 3 (situation) calm and cooperative with care    Resp Needs/WeaninL NC Lungs  coarse and diminished.     HOB/ Activity: assist of one with walker and gait belt      Urologic & Bowel: Incontinent of urine. Purwick in place     Glycemic Control: N/A     Invasive Lines: PIV x 1     Discharge: When Medically stable

## 2022-09-05 NOTE — PLAN OF CARE
Goal Outcome Evaluation:    Plan of Care Reviewed With: patient          Outcome Evaluation: Pt came confused    A&O x 3-4, sometimes forgetful of situation/ cannot remember reason for admission. VSS on 2L NC. Tele: NSR BBB. Assist of 1-2 to pivot to commode. Soft, bite-sized diet, tolerating well. PIV left wrist, saline locked. Pt experiencing generalized weakness, and expiratory wheezing with coarse lung sounds and some chest congestion, needs encouragement to cough. Became confused around 0100 and tried to exit bed several times throughout the night, easily redirectable. Incontinent of bladder, purewick in place. Continent of bowel, no BM this shift. Continue plan of care.

## 2022-09-05 NOTE — PROGRESS NOTES
[facility]  Infectious Disease Progress Note          Assessment and Plan:   IMPRESSION:     1.  A 89-year-old female with acute vague illness, some respiratory symptoms, mild nausea, leukocytosis, colitis on CT scan and slight infiltrates, concern for aspiration pneumonia, but now blood culture coming back positive for Enterococcus, so far only a single blood culture positive, no significant artificial material or signs of endocarditis or endovascular infection, conceivably the colitis with translocation as a cause, but unclear.  2.  Pancolitis on CT scan with vague abdominal symptoms.  Unclear cause to this, no major diarrhea, so probably not infectious diarrhea-type illness.  3.  Respiratory insufficiency with some infiltrates, aspiration risk makes this a probable diagnosis.  4.  History of recurrent UTIs.     RECOMMENDATIONS:     1.  Zosyn alone for now, is sensitive Enterococcus, still only 1 bottle +, repeat blood cultures neg.  2.  How to respond to the Enterococcus blood culture will depend on whether there are further positive cultures and how she does clinically.  For now, I would assume it is a transient bacteremia without endovascular infection or anything similar, and not necessarily committed to a long IV course.    Seems improved no focal sxs           Interval History:   no new complaints and doing well; no cp, sob, n/v/d, or abd pain. No fever cxs as noted WBC 17 K               Medications:       acetaminophen  1,000 mg Oral QAM AC     amLODIPine  2.5 mg Oral Daily     artificial tears  1 drop Both Eyes BID     atorvastatin  20 mg Oral Daily     azithromycin  250 mg Intravenous Q24H     ipratropium - albuterol 0.5 mg/2.5 mg/3 mL  3 mL Nebulization 4x daily     memantine  5 mg Oral BID     metoprolol succinate ER  100 mg Oral Daily     oxybutynin ER  20 mg Oral Daily     PARoxetine  20 mg Oral At Bedtime     piperacillin-tazobactam  2.25 g Intravenous Q6H     QUEtiapine  25  mg Oral At Bedtime     rivaroxaban ANTICOAGULANT  15 mg Oral Daily with supper     sodium chloride (PF)  3 mL Intracatheter Q8H     Vitamin D3  1,000 Units Oral Daily                  Physical Exam:   Blood pressure 125/53, pulse 71, temperature 97.7  F (36.5  C), temperature source Oral, resp. rate 16, weight 72.7 kg (160 lb 3.2 oz), SpO2 93 %, not currently breastfeeding.  Wt Readings from Last 2 Encounters:   09/05/22 72.7 kg (160 lb 3.2 oz)   07/26/22 72.4 kg (159 lb 11.2 oz)     Vital Signs with Ranges  Temp:  [97.3  F (36.3  C)-98.4  F (36.9  C)] 97.7  F (36.5  C)  Pulse:  [71-83] 71  Resp:  [16-20] 16  BP: (113-125)/(48-53) 125/53  SpO2:  [93 %-95 %] 93 %    Constitutional: Awake, alert, cooperative, no apparent distress   Lungs: Clear to auscultation bilaterally, no crackles or wheezing   Cardiovascular: Regular rate and rhythm, normal S1 and S2, and no murmur noted   Abdomen: Normal bowel sounds, soft, non-distended, non-tender   Skin: No rashes, no cyanosis, no edema   Other:           Data:   All microbiology laboratory data reviewed.  Recent Labs   Lab Test 09/05/22 0449 09/03/22  0542 09/01/22  2244   WBC 16.9* 18.6* 10.4   HGB 10.4* 10.4* 12.2   HCT 32.7* 34.1* 39.3   MCV 85 90 88    316 409     Recent Labs   Lab Test 09/05/22  0449 09/03/22  0542 09/01/22  2244   CR 0.97 0.97 0.86     No lab results found.  Recent Labs   Lab Test 12/25/20  1544 12/25/20  1435 04/23/19  1255 06/19/17  1524 10/23/15  1028 02/11/15  1342   CULT No growth No growth 10,000 to 50,000 colonies/mL  mixed urogenital peewee  Susceptibility testing not routinely done   10,000 to 50,000 colonies/mL mixed urogenital peewee Susceptibility testing not   routinely done   10,000 to 50,000 colonies/mL mixed urogenital peewee 10,000 to 50,000 colonies/mL mixed urogenital peewee  Susceptibility testing not routinely done

## 2022-09-05 NOTE — PLAN OF CARE
Goal Outcome Evaluation:    Plan of Care Reviewed With: patient      Pt arrived from station 33 today sunita 1330. A/o to self, place and time. Disoriented to situation. VSS, on 2l oxygen. Up with 1 walker/GB. Ambulated to the bathroom X1. IV abx for pneumonia. Glasses present. Plan for discharge back to [facility] when medically ready.

## 2022-09-05 NOTE — PLAN OF CARE
[id number]  Attempted to wean off oxygen but dipped in high 80s on RA, 2L NC remains.  Pt on regular diet, up in chair for meals.  Able to feed herself.  A&OX4 but did not remember events when she was admitted to hospital.  [place].  Up with GB/walker.  Continues on antibiotics at this time.  PT/SWS following.

## 2022-09-05 NOTE — PROGRESS NOTES
Patient is on 2L NC spo2 of 95. BS are clear and diminished. The patient received NEB treatments as ordered.    Tomás Nguyễn, RT  9/5/2022

## 2022-09-05 NOTE — PROGRESS NOTES
[facility]    Medicine Progress Note - Hospitalist Service    Date of Admission:  9/1/2022    Assessment & Plan          Margy Babin is a 89 year old female with a past medical history of heart failure with reduced ejection fraction, essential hypertension, chronic left bundle branch block, atrial fibrillation on anticoagulation with Xarelto, depression, history of stroke presents to hospital with acute hypoxic respiratory failure secondary to aspiration pneumonitis.     Acute hypoxic respiratory failure  Suspected aspiration pneumonitis  Sepsis secondary above with Wbc count 18.6, respiratory failure and lactic acidosis at 3.4   Lactic acidosis secondary to above, resolved  Enterococcus faecalis bacteremia   Patient presented with acute onset hypoxic respiratory failure.  Found to have groundglass opacities interpreted as multifocal bronchopneumonia.  Given history of vomiting prior to hypoxia concern is for aspiration. The patient improved in the er with bipap, but then vomited again.    * 1 out of 2 blood cultures on admission with E.faecalis  -continuous pulse oximetry  -Supplemental O2  -No BIPAP due to recurrent aspiration and aspiration on bipap in ED  -Switched to zosyn and zithromax on 9/2/22  -appreciate ID consult  -follow blood cultures    -Encourage incentive spirometry as tolerated  -received diuresis 9/4 without improvement of O2 needs 9/5.      Mild pancolitis  * Seen on ct. patient denies any abdominal pain or recent diarrhea. Blood work without signs of infection.   * No diarrhea or abdominal pain   - monitor  - abx as above     Prolonged qtc  -Avoid qt prolonging medications  -monitor on tele  On  repeat EKG QTC improved from 526 to 497     Atrial fibrillation on anticoagulation  After recent stroke in April 2022 the patient was started on Xarelto.  -Resumed  PTA meds including Toprol-XL, Norvasc, Xarelto     Heart failure with reduced ejection  fraction  Essential tremor  Chronic left bundle branch block  Appears euvolemic on exam.  TTE April 25, 2022: EF 50 to 55%.  Mild aortic regurgitation.  Left atrium mild to moderately dilated.  Restarted  PTA meds     Depression  -Resumed  PTA meds      Mild to moderate oral pharyngeal dysphagia secondary to stroke  After her stroke in April the patient was diagnosed with a mild to moderate oropharyngeal dysphagia.  on paperwork sent with the patient as she appears to have been on her a regular diet.  Dysphagia diet ordered per  speech path     High risk for delirium  -Seroquel scheduled at night  -Maintain sleep-wake cycle  -Avoid interruptions at night       Diet: Room Service  Combination Diet Regular Diet    DVT Prophylaxis: DOAC  Pichardo Catheter: Not present  Central Lines: None  Cardiac Monitoring: ACTIVE order. Indication: bipap  Code Status: No CPR- Do NOT Intubate      Disposition Plan      Expected Discharge Date: 09/05/2022        Discharge Comments: 9/2- Return to [facility]        The patient's care was discussed with the Bedside Nurse and Patient.    Dionte Mueller MD  Hospitalist Service  [facility]  Securely message with the Vocera Web Console (learn more here)  Text page via [employer] Paging/Directory         Clinically Significant Risk Factors Present on Admission                     ______________________________________________________________________    Interval History   Feels well. No sob. Notes productive cough. No f/c/r. Wonders when she'll be able to leave.     Data reviewed today: I reviewed all medications, new labs and imaging results over the last 24 hours. I personally reviewed no images or EKG's today.    Physical Exam   Vital Signs: Temp: (P) 97.7  F (36.5  C) Temp src: (P) Oral BP: (P) 125/53 Pulse: (P) 71   Resp: (P) 16 SpO2: (P) 93 % O2 Device: (P) Nasal cannula with humidification Oxygen Delivery: (P) 2 LPM  Weight: 160 lbs 3.2 oz  Constitutional: Awake, alert,  cooperative, no apparent cardiopulmonary distress.  Eyes: Conjunctiva and pupils examined and normal.  HEENT: Moist mucous membranes, normal dentition.  Respiratory: coarse. Wet cough noted.   Cardiovascular: Regular rate and rhythm, normal S1 and S2, and no murmur noted.  GI: Soft, non-distended, non-tender, normal bowel sounds.  Lymph/Hematologic: No anterior cervical or supraclavicular adenopathy.  Skin: No rashes, no cyanosis, no edema noted on exposed skin.  Musculoskeletal: No joint swelling, erythema or tenderness. No gross bony abnormalities  Neurologic: Cranial nerves 2-12 grossly intact, normal strength and sensation.  Psychiatric: Alert, oriented to person, place and time, no obvious anxiety or depression.      Data   Recent Labs   Lab 09/05/22  0449 09/03/22  0542 09/01/22  2244   WBC 16.9* 18.6* 10.4   HGB 10.4* 10.4* 12.2   MCV 85 90 88    316 409   INR  --   --  2.16*    143 141   POTASSIUM 3.8 4.0 3.5   CHLORIDE 106 112* 108   CO2 27 26 25   BUN 25 24 34*   CR 0.97 0.97 0.86   ANIONGAP 7 5 8   SOBIA 8.4* 8.1* 8.7   * 105* 141*   ALBUMIN  --   --  3.2*   PROTTOTAL  --   --  7.0   BILITOTAL  --   --  0.1*   ALKPHOS  --   --  113   ALT  --   --  23   AST  --   --  17     No results found for this or any previous visit (from the past 24 hour(s)).  Medications     - MEDICATION INSTRUCTIONS -       - MEDICATION INSTRUCTIONS -       - MEDICATION INSTRUCTIONS -         acetaminophen  1,000 mg Oral QAM AC     amLODIPine  2.5 mg Oral Daily     artificial tears  1 drop Both Eyes BID     atorvastatin  20 mg Oral Daily     azithromycin  250 mg Intravenous Q24H     ipratropium - albuterol 0.5 mg/2.5 mg/3 mL  3 mL Nebulization 4x daily     memantine  5 mg Oral BID     metoprolol succinate ER  100 mg Oral Daily     oxybutynin ER  20 mg Oral Daily     PARoxetine  20 mg Oral At Bedtime     piperacillin-tazobactam  2.25 g Intravenous Q6H     QUEtiapine  25 mg Oral At Bedtime     rivaroxaban  ANTICOAGULANT  15 mg Oral Daily with supper     sodium chloride (PF)  3 mL Intracatheter Q8H     Vitamin D3  1,000 Units Oral Daily

## 2022-09-06 ENCOUNTER — APPOINTMENT (OUTPATIENT)
Dept: SPEECH THERAPY | Facility: CLINIC | Age: 87
DRG: 871 | End: 2022-09-06
Payer: MEDICARE

## 2022-09-06 LAB
ANION GAP SERPL CALCULATED.3IONS-SCNC: 6 MMOL/L (ref 3–14)
BUN SERPL-MCNC: 21 MG/DL (ref 7–30)
CALCIUM SERPL-MCNC: 8.9 MG/DL (ref 8.5–10.1)
CHLORIDE BLD-SCNC: 107 MMOL/L (ref 94–109)
CO2 SERPL-SCNC: 28 MMOL/L (ref 20–32)
CREAT SERPL-MCNC: 0.71 MG/DL (ref 0.52–1.04)
ERYTHROCYTE [DISTWIDTH] IN BLOOD BY AUTOMATED COUNT: 14.9 % (ref 10–15)
GFR SERPL CREATININE-BSD FRML MDRD: 79 ML/MIN/1.73M2
GLUCOSE BLD-MCNC: 96 MG/DL (ref 70–99)
HCT VFR BLD AUTO: 37.5 % (ref 35–47)
HGB BLD-MCNC: 11.5 G/DL (ref 11.7–15.7)
MCH RBC QN AUTO: 27.1 PG (ref 26.5–33)
MCHC RBC AUTO-ENTMCNC: 30.7 G/DL (ref 31.5–36.5)
MCV RBC AUTO: 88 FL (ref 78–100)
PLATELET # BLD AUTO: 411 10E3/UL (ref 150–450)
POTASSIUM BLD-SCNC: 3.8 MMOL/L (ref 3.4–5.3)
RBC # BLD AUTO: 4.25 10E6/UL (ref 3.8–5.2)
SODIUM SERPL-SCNC: 141 MMOL/L (ref 133–144)
WBC # BLD AUTO: 14.4 10E3/UL (ref 4–11)

## 2022-09-06 PROCEDURE — 999N000157 HC STATISTIC RCP TIME EA 10 MIN

## 2022-09-06 PROCEDURE — 999N000040 HC STATISTIC CONSULT NO CHARGE VASC ACCESS

## 2022-09-06 PROCEDURE — 36415 COLL VENOUS BLD VENIPUNCTURE: CPT | Performed by: STUDENT IN AN ORGANIZED HEALTH CARE EDUCATION/TRAINING PROGRAM

## 2022-09-06 PROCEDURE — 99233 SBSQ HOSP IP/OBS HIGH 50: CPT | Performed by: SPECIALIST

## 2022-09-06 PROCEDURE — 36569 INSJ PICC 5 YR+ W/O IMAGING: CPT

## 2022-09-06 PROCEDURE — 250N000011 HC RX IP 250 OP 636: Performed by: SPECIALIST

## 2022-09-06 PROCEDURE — 85027 COMPLETE CBC AUTOMATED: CPT | Performed by: STUDENT IN AN ORGANIZED HEALTH CARE EDUCATION/TRAINING PROGRAM

## 2022-09-06 PROCEDURE — 99232 SBSQ HOSP IP/OBS MODERATE 35: CPT | Performed by: STUDENT IN AN ORGANIZED HEALTH CARE EDUCATION/TRAINING PROGRAM

## 2022-09-06 PROCEDURE — 94640 AIRWAY INHALATION TREATMENT: CPT | Mod: 76

## 2022-09-06 PROCEDURE — 258N000003 HC RX IP 258 OP 636: Performed by: HOSPITALIST

## 2022-09-06 PROCEDURE — 250N000009 HC RX 250: Performed by: INTERNAL MEDICINE

## 2022-09-06 PROCEDURE — 250N000013 HC RX MED GY IP 250 OP 250 PS 637: Performed by: INTERNAL MEDICINE

## 2022-09-06 PROCEDURE — 250N000009 HC RX 250: Performed by: SPECIALIST

## 2022-09-06 PROCEDURE — 272N000433 HC KIT CATH IV 18 OR 20G CM, POWERGLIDE W MAX BARRIER

## 2022-09-06 PROCEDURE — 250N000011 HC RX IP 250 OP 636: Performed by: HOSPITALIST

## 2022-09-06 PROCEDURE — 94640 AIRWAY INHALATION TREATMENT: CPT

## 2022-09-06 PROCEDURE — 120N000001 HC R&B MED SURG/OB

## 2022-09-06 PROCEDURE — 80048 BASIC METABOLIC PNL TOTAL CA: CPT | Performed by: STUDENT IN AN ORGANIZED HEALTH CARE EDUCATION/TRAINING PROGRAM

## 2022-09-06 PROCEDURE — 250N000013 HC RX MED GY IP 250 OP 250 PS 637: Performed by: HOSPITALIST

## 2022-09-06 PROCEDURE — 92526 ORAL FUNCTION THERAPY: CPT | Mod: GN

## 2022-09-06 RX ORDER — AMPICILLIN AND SULBACTAM 2; 1 G/1; G/1
3 INJECTION, POWDER, FOR SOLUTION INTRAMUSCULAR; INTRAVENOUS EVERY 6 HOURS
Status: DISCONTINUED | OUTPATIENT
Start: 2022-09-06 | End: 2022-09-07

## 2022-09-06 RX ADMIN — IPRATROPIUM BROMIDE AND ALBUTEROL SULFATE 3 ML: .5; 3 SOLUTION RESPIRATORY (INHALATION) at 16:27

## 2022-09-06 RX ADMIN — QUETIAPINE FUMARATE 25 MG: 25 TABLET ORAL at 21:11

## 2022-09-06 RX ADMIN — Medication 1000 UNITS: at 10:02

## 2022-09-06 RX ADMIN — DEXTRAN 70, GLYCERIN, HYPROMELLOSE 1 DROP: 1; 2; 3 SOLUTION/ DROPS OPHTHALMIC at 21:10

## 2022-09-06 RX ADMIN — IPRATROPIUM BROMIDE AND ALBUTEROL SULFATE 3 ML: .5; 3 SOLUTION RESPIRATORY (INHALATION) at 07:35

## 2022-09-06 RX ADMIN — AMPICILLIN SODIUM AND SULBACTAM SODIUM 3 G: 2; 1 INJECTION, POWDER, FOR SOLUTION INTRAMUSCULAR; INTRAVENOUS at 13:58

## 2022-09-06 RX ADMIN — AMLODIPINE BESYLATE 2.5 MG: 2.5 TABLET ORAL at 10:02

## 2022-09-06 RX ADMIN — ATORVASTATIN CALCIUM 20 MG: 10 TABLET, FILM COATED ORAL at 10:02

## 2022-09-06 RX ADMIN — METOPROLOL SUCCINATE 100 MG: 100 TABLET, EXTENDED RELEASE ORAL at 10:02

## 2022-09-06 RX ADMIN — AZITHROMYCIN MONOHYDRATE 250 MG: 500 INJECTION, POWDER, LYOPHILIZED, FOR SOLUTION INTRAVENOUS at 01:09

## 2022-09-06 RX ADMIN — AMPICILLIN SODIUM AND SULBACTAM SODIUM 3 G: 2; 1 INJECTION, POWDER, FOR SOLUTION INTRAMUSCULAR; INTRAVENOUS at 21:10

## 2022-09-06 RX ADMIN — OXYBUTYNIN 20 MG: 10 TABLET, FILM COATED, EXTENDED RELEASE ORAL at 10:02

## 2022-09-06 RX ADMIN — PAROXETINE HYDROCHLORIDE 20 MG: 20 TABLET, FILM COATED ORAL at 21:11

## 2022-09-06 RX ADMIN — RIVAROXABAN 15 MG: 15 TABLET, FILM COATED ORAL at 18:17

## 2022-09-06 RX ADMIN — DEXTRAN 70, GLYCERIN, HYPROMELLOSE 1 DROP: 1; 2; 3 SOLUTION/ DROPS OPHTHALMIC at 10:07

## 2022-09-06 RX ADMIN — ACETAMINOPHEN 1000 MG: 500 TABLET, FILM COATED ORAL at 10:02

## 2022-09-06 RX ADMIN — LIDOCAINE HYDROCHLORIDE 1 ML: 10 INJECTION, SOLUTION INFILTRATION; PERINEURAL at 11:30

## 2022-09-06 RX ADMIN — PIPERACILLIN SODIUM AND TAZOBACTAM SODIUM 2.25 G: 2; .25 INJECTION, POWDER, LYOPHILIZED, FOR SOLUTION INTRAVENOUS at 02:18

## 2022-09-06 RX ADMIN — IPRATROPIUM BROMIDE AND ALBUTEROL SULFATE 3 ML: .5; 3 SOLUTION RESPIRATORY (INHALATION) at 20:36

## 2022-09-06 RX ADMIN — MEMANTINE 5 MG: 5 TABLET ORAL at 21:11

## 2022-09-06 RX ADMIN — IPRATROPIUM BROMIDE AND ALBUTEROL SULFATE 3 ML: .5; 3 SOLUTION RESPIRATORY (INHALATION) at 12:29

## 2022-09-06 RX ADMIN — AMPICILLIN SODIUM AND SULBACTAM SODIUM 3 G: 2; 1 INJECTION, POWDER, FOR SOLUTION INTRAMUSCULAR; INTRAVENOUS at 10:05

## 2022-09-06 RX ADMIN — MEMANTINE 5 MG: 5 TABLET ORAL at 10:02

## 2022-09-06 ASSESSMENT — ACTIVITIES OF DAILY LIVING (ADL)
DEPENDENT_IADLS:: COOKING;MEAL PREPARATION;MEDICATION MANAGEMENT;TRANSPORTATION
ADLS_ACUITY_SCORE: 49
ADLS_ACUITY_SCORE: 47
ADLS_ACUITY_SCORE: 48
ADLS_ACUITY_SCORE: 47
ADLS_ACUITY_SCORE: 49
ADLS_ACUITY_SCORE: 48
ADLS_ACUITY_SCORE: 49
ADLS_ACUITY_SCORE: 49
ADLS_ACUITY_SCORE: 47
ADLS_ACUITY_SCORE: 47

## 2022-09-06 NOTE — PROCEDURES
[facility]    Single Lumen Midline Placement    Date/Time: 9/6/2022 11:40 AM  Performed by: Jud Freeman RN  Authorized by: Ledy Lobo MD   Indications: vascular access      UNIVERSAL PROTOCOL   Site Marked: Yes  Prior Images Obtained and Reviewed:  Yes  Required items: Required blood products, implants, devices and special equipment available    Patient identity confirmed:  Verbally with patient  NA - No sedation, light sedation, or local anesthesia  Confirmation Checklist:  Patient's identity using two indicators, relevant allergies, procedure was appropriate and matched the consent or emergent situation and correct equipment/implants were available  Time out: Immediately prior to the procedure a time out was called    Universal Protocol: the Joint Commission Universal Protocol was followed    Preparation: Patient was prepped and draped in usual sterile fashion    ESBL (mL):  0     ANESTHESIA    Anesthesia: Local infiltration  Local Anesthetic:  Lidocaine 1% without epinephrine  Anesthetic Total (mL):  1      SEDATION    Patient Sedated: No        Preparation: skin prepped with ChloraPrep  Skin prep agent: skin prep agent completely dried prior to procedure  Sterile barriers: maximum sterile barriers were used: cap, mask, sterile gown, sterile gloves, and large sterile sheet  Hand hygiene: hand hygiene performed prior to central venous catheter insertion  Type of line used: Midline  Catheter type: single lumen  Lumen type: non-valved  Catheter size: 4 Fr  Brand: Bard  Lot number: [id number]  Placement method: MST and ultrasound  Number of attempts: 1  Difficulty threading catheter: no  Successful placement: yes  Orientation: right    Location: basilic vein  Arm circumference: adults 10 cm  Extremity circumference: 29  Visible catheter length: 2  Internal length: 10 cm  Total catheter length: 12  Dressing and securement: chlorhexidine disc applied, occlusive dressing applied, site  cleansed, statlock, sterile dressing applied and transparent dressing  Post procedure assessment: blood return through all ports  PROCEDURE   Patient Tolerance:  Patient tolerated the procedure well with no immediate complicationsDescribe Procedure: Successful placement of single lumen midline catheter. Line has good blood return and flushes easily. Midline is ready for immediate use. Bedside RN notified.

## 2022-09-06 NOTE — CONSULTS
Care Management Initial Consult    General Information  Assessment completed with: Patient, Family, Other, Daughter Tnia chloé Lentz RN at [facility]  Type of CM/SW Visit: Initial Assessment    Primary Care Provider verified and updated as needed: Yes   Readmission within the last 30 days: no previous admission in last 30 days      Reason for Consult: discharge planning  Advance Care Planning: Advance Care Planning Reviewed: no concerns identified          Communication Assessment  Patient's communication style: spoken language (English or Bilingual)    Hearing Difficulty or Deaf: no   Wear Glasses or Blind: yes    Cognitive  Cognitive/Neuro/Behavioral: .WDL except  Level of Consciousness: alert  Arousal Level: opens eyes spontaneously  Orientation: disoriented to, situation  Mood/Behavior: cooperative, calm  Best Language: 0 - No aphasia  Speech: clear    Living Environment:   People in home: facility resident     Current living Arrangements: assisted living  Name of Facility: [facility]   Able to return to prior arrangements:         Family/Social Support:  Care provided by: self, other (see comments)  Provides care for: no one     Children, Other (specify) (grandchildren)          Description of Support System: Supportive, Involved    Support Assessment: Adequate family and caregiver support    Current Resources:   Patient receiving home care services: No     Community Resources: None  Equipment currently used at home: grab bar, toilet, grab bar, tub/shower, walker, standard  Supplies currently used at home:      Employment/Financial:  Employment Status: retired        Financial Concerns: No concerns identified           Lifestyle & Psychosocial Needs:  Social Determinants of Health     Tobacco Use: Low Risk      Smoking Tobacco Use: Never Smoker     Smokeless Tobacco Use: Never Used   Alcohol Use: Not on file   Financial Resource Strain: Not on file   Food Insecurity: Not on file   Transportation Needs:  Not on file   Physical Activity: Not on file   Stress: Not on file   Social Connections: Not on file   Intimate Partner Violence: Not on file   Depression: Not at risk     PHQ-2 Score: 0   Housing Stability: Not on file       Functional Status:  Prior to admission patient needed assistance:   Dependent ADLs:: Ambulation-walker, Bathing  Dependent IADLs:: Cooking, Meal Preparation, Medication Management, Transportation       Mental Health Status:  Mental Health Status: No Current Concerns       Chemical Dependency Status:  Chemical Dependency Status: No Current Concerns             Values/Beliefs:  Spiritual, Cultural Beliefs, Church Practices, Values that affect care: no               Additional Information:  Met with patient to discuss discharge planning and need for IV antibiotics until 9/16.  Pt shared that she lives at [facility].  Pt is independent in ADL's.  Uses a walker at baseline.  Has grab bars in shower and near toilet. Has meals provided.  Pt stated that she does her own medications.  Discussed the option of doing antibiotics at home and the process of home infusion.  Pt shared that she felt she would be able to do this with teaching from home infusion.  Pt drowsy during visit but alert and oriented.  RNCC expressed concern about pt managing home infusion on her own due to living alone and having recent hospitalization and asked if pt ok with RN speaking with daughter, Tina, and pt in agreement.  Pt shared that she has been to [facility] is the past and would want to go there if that is recommended at discharge.    Spoke with pt daughter, Tina.  Tina stated that pt medications are given by staff at [facility] and she does not do them herself.  She did not feel that this would be something pt would be able to manage on her own.  Tina stated that [facility] has skilled nursing and would like to verify that they would not be able to provide IV antibiotics for patient. Explained to Tina that home infusion  is not usually done by staff at [facility]'s.  Tina also in agreement that [facility] is the first choice if needs TCU for managing IV antibiotics and/or for therapies to gain strength before returning to [facility].    Left message with SAY Lentz at [facility],([phone]) to inquire if it is possible for them to be able to provide skilled RN for IV antibiotics.    Mariah Gaytan RN, BS  Care Coordinator  [email or web address]

## 2022-09-06 NOTE — PROGRESS NOTES
[facility]    Medicine Progress Note - Hospitalist Service    Date of Admission:  9/1/2022    Assessment & Plan          Margy Babin is a 89 year old female with a past medical history of heart failure with reduced ejection fraction, essential hypertension, chronic left bundle branch block, atrial fibrillation on anticoagulation with Xarelto, depression, history of stroke presents to hospital with acute hypoxic respiratory failure secondary to aspiration pneumonitis.     Acute hypoxic respiratory failure, improving  Aspiration pneumonitis, improving  Sepsis secondary above (WBC, lactate, tachycardia, tachypnea, hypotension)  Lactic acidosis secondary to above, resolved  Enterococcus faecalis bacteremia   Patient presented with acute onset hypoxic respiratory failure.  Found to have groundglass opacities interpreted as multifocal bronchopneumonia.  Given history of vomiting prior to hypoxia concern is for aspiration. The patient improved in the er with bipap, but then vomited again.    * 1 out of 2 blood cultures on admission with E.faecalis  -continuous pulse oximetry  -Supplemental O2  -No BIPAP due to recurrent aspiration and aspiration on bipap in ED  -Switched to zosyn and zithromax on 9/2/22  -zosyn to unasyn on 9/6. Plan 14 days abx (until 9/17)  -appreciate ID consult  -follow blood cultures    -Encourage incentive spirometry as tolerated  -received diuresis 9/4 without improvement of O2 needs 9/5.      Mild pancolitis  * Seen on ct. patient denies any abdominal pain or recent diarrhea. Blood work without signs of infection.   * No diarrhea or abdominal pain   - monitor  - abx as above     Prolonged qtc  -Avoid qt prolonging medications  -monitor on tele  On  repeat EKG QTC improved from 526 to 497     Atrial fibrillation on anticoagulation  After recent stroke in April 2022 the patient was started on Xarelto.  -Resumed  PTA meds including Toprol-XL, Norvasc,  Xarelto     Heart failure with reduced ejection fraction  Essential tremor  Chronic left bundle branch block  Appears euvolemic on exam.  TTE April 25, 2022: EF 50 to 55%.  Mild aortic regurgitation.  Left atrium mild to moderately dilated.  Restarted  PTA meds     Depression  -Resumed  PTA meds      Mild to moderate oral pharyngeal dysphagia secondary to stroke  After her stroke in April the patient was diagnosed with a mild to moderate oropharyngeal dysphagia.  on paperwork sent with the patient as she appears to have been on her a regular diet.  Dysphagia diet ordered per  speech path     High risk for delirium  -Seroquel scheduled at night  -Maintain sleep-wake cycle  -Avoid interruptions at night       Diet: Room Service  Combination Diet Regular Diet    DVT Prophylaxis: DOAC  Pichardo Catheter: Not present  Central Lines: None  Cardiac Monitoring: None  Code Status: No CPR- Do NOT Intubate      Disposition Plan      Expected Discharge Date: 09/07/2022        Discharge Comments: 9/2- Return to [facility]        The patient's care was discussed with the Bedside Nurse and Patient.    Dionte Mueller MD  Hospitalist Service  [facility]  Securely message with the Vocera Web Console (learn more here)  Text page via [facility] Paging/Directory         Clinically Significant Risk Factors Present on Admission                      ______________________________________________________________________    Interval History   Feels well. No sob. Notes productive cough. No f/c/r. Wonders when she'll be able to leave. O2 needs decreasing. Midline placed.     Data reviewed today: I reviewed all medications, new labs and imaging results over the last 24 hours. I personally reviewed no images or EKG's today.    Physical Exam   Vital Signs: Temp: 97.8  F (36.6  C) Temp src: Oral BP: (!) 155/70 Pulse: 76   Resp: 18 SpO2: 95 % O2 Device: Nasal cannula Oxygen Delivery: 1 LPM  Weight: 160 lbs 3.2 oz  Constitutional:  Awake, alert, cooperative, no apparent cardiopulmonary distress.  Eyes: Conjunctiva and pupils examined and normal.  HEENT: Moist mucous membranes, normal dentition.  Respiratory: coarse. Wet cough noted.   Cardiovascular: Regular rate and rhythm, normal S1 and S2, and no murmur noted.  GI: Soft, non-distended, non-tender, normal bowel sounds.  Lymph/Hematologic: No anterior cervical or supraclavicular adenopathy.  Skin: No rashes, no cyanosis, no edema noted on exposed skin.  Musculoskeletal: No joint swelling, erythema or tenderness. No gross bony abnormalities  Neurologic: Cranial nerves 2-12 grossly intact, normal strength and sensation.  Psychiatric: Alert, oriented to person, place and time, no obvious anxiety or depression.      Data   Recent Labs   Lab 09/05/22  0449 09/03/22  0542 09/01/22  2244   WBC 16.9* 18.6* 10.4   HGB 10.4* 10.4* 12.2   MCV 85 90 88    316 409   INR  --   --  2.16*    143 141   POTASSIUM 3.8 4.0 3.5   CHLORIDE 106 112* 108   CO2 27 26 25   BUN 25 24 34*   CR 0.97 0.97 0.86   ANIONGAP 7 5 8   SOBIA 8.4* 8.1* 8.7   * 105* 141*   ALBUMIN  --   --  3.2*   PROTTOTAL  --   --  7.0   BILITOTAL  --   --  0.1*   ALKPHOS  --   --  113   ALT  --   --  23   AST  --   --  17     No results found for this or any previous visit (from the past 24 hour(s)).  Medications     - MEDICATION INSTRUCTIONS -       - MEDICATION INSTRUCTIONS -       - MEDICATION INSTRUCTIONS -         acetaminophen  1,000 mg Oral QAM AC     amLODIPine  2.5 mg Oral Daily     ampicillin-sulbactam  3 g Intravenous Q6H     artificial tears  1 drop Both Eyes BID     atorvastatin  20 mg Oral Daily     ipratropium - albuterol 0.5 mg/2.5 mg/3 mL  3 mL Nebulization 4x daily     memantine  5 mg Oral BID     metoprolol succinate ER  100 mg Oral Daily     oxybutynin ER  20 mg Oral Daily     PARoxetine  20 mg Oral At Bedtime     QUEtiapine  25 mg Oral At Bedtime     rivaroxaban ANTICOAGULANT  15 mg Oral Daily with supper      sodium chloride (PF)  3 mL Intracatheter Q8H     Vitamin D3  1,000 Units Oral Daily

## 2022-09-06 NOTE — PLAN OF CARE
Goal Outcome Evaluation:    [id number]    Pt mildly hypertensive, otherwise AVSS on 1 LPM via NC. Expiratory wheezes noted in upper bases, lower bases diminished. Dry, non-productive cough. On scheduled neb treatments. Continuous pulse ox in place, O2 sats 97%. A+Ox4, intermittently forgetful. Ambulating Ax1 w/ gait belt+walker. L PIV SL.  R midline placed & SL. Healed pressure injury on coccyx, mepilex in place. Denies pain. Tolerating regular diet w/o nausea or vomiting. Upright in chair w/ meals and 30 mins after eating to prevent aspiration. Incontinent of B/B, utilizing purewick. LBM 9/5. ID, SLP, and SW following. Will continue with plan of care.

## 2022-09-06 NOTE — PLAN OF CARE
Goal Outcome Evaluation:        Pt A/O x3, disoriented to situation. Very pleasant. VSS on 3L NC ex HTN. Usually on 1L NC but desats to mid-80s at night. Denies pain/N/V. Regular diet, up to chair for meals. [place]. A1 gb/w, ambulated to bathroom this shift. Mostly continent B/B, sometimes incontinent of urine, purewick placed for bedtime. Small BM this shift. L PIV SL with int abx. Discharge pending improvement.

## 2022-09-06 NOTE — PROGRESS NOTES
[facility]    Infectious Disease Progress Note    Date of Service : 09/06/2022     Assessment:  1.  A 89-year-old female with acute vague illness, some respiratory symptoms, mild nausea, leukocytosis, colitis on CT scan and slight infiltrates, concern for aspiration pneumonia, but now blood culture coming back positive for Enterococcus, so far only a single blood culture positive, no significant artificial material or signs of endocarditis or endovascular infection, conceivably the colitis with translocation as a cause, but unclear.  2.  Pancolitis on CT scan with vague abdominal symptoms.  Unclear cause to this, no major diarrhea, so probably not infectious diarrhea-type illness.  3.  Respiratory insufficiency with some infiltrates, aspiration risk makes this a probable diagnosis.  4.  History of recurrent UTIs    Recommendations:  1. Zosyn to Unasyn. Treat broadly for 5-7 days then transition to IV Ampicillin to compete treatment for bacteremia  2. Plan midline for IV antibiotics for 2 weeks until 9/16 -day 4/14 today  Follow clinical status and labs    Ledy Lobo MD    Interval History   Patient was seen and examined, chart reviewed  Has remained afebrile, leukocytosis is improving, denies abdominal pain or diarrhea, tolerating antibiotics without side effects. Still requiring oxygen but breathing comfortably, denies cough, no rash    A 10 point ROS was done and is negative other than noted in the interval history above     Physical Exam   Temp: 97.3  F (36.3  C) Temp src: Oral BP: (!) 156/79 Pulse: 79   Resp: 18 SpO2: 97 % O2 Device: Nasal cannula Oxygen Delivery: 1 LPM  Vitals:    09/01/22 2246 09/03/22 0545 09/05/22 0649   Weight: 76 kg (167 lb 8.8 oz) 74.1 kg (163 lb 5.8 oz) 72.7 kg (160 lb 3.2 oz)     Vital Signs with Ranges  Temp:  [97.3  F (36.3  C)-98.5  F (36.9  C)] 97.3  F (36.3  C)  Pulse:  [76-87] 79  Resp:  [18-20] 18  BP: (120-156)/(66-90) 156/79  SpO2:  [88 %-99 %] 97  %    Constitutional: Awake, alert, cooperative, no apparent distress  Lungs: Clear to auscultation bilaterally, no crackles or wheezing  Cardiovascular: Regular rate and rhythm, normal S1 and S2, and no murmur noted  Abdomen: Normal bowel sounds, soft, non-distended, non-tender  Skin: No rashes, no cyanosis, no edema    Other:    Medications     - MEDICATION INSTRUCTIONS -       - MEDICATION INSTRUCTIONS -       - MEDICATION INSTRUCTIONS -         acetaminophen  1,000 mg Oral QAM AC     amLODIPine  2.5 mg Oral Daily     artificial tears  1 drop Both Eyes BID     atorvastatin  20 mg Oral Daily     ipratropium - albuterol 0.5 mg/2.5 mg/3 mL  3 mL Nebulization 4x daily     memantine  5 mg Oral BID     metoprolol succinate ER  100 mg Oral Daily     oxybutynin ER  20 mg Oral Daily     PARoxetine  20 mg Oral At Bedtime     piperacillin-tazobactam  2.25 g Intravenous Q6H     QUEtiapine  25 mg Oral At Bedtime     rivaroxaban ANTICOAGULANT  15 mg Oral Daily with supper     sodium chloride (PF)  3 mL Intracatheter Q8H     Vitamin D3  1,000 Units Oral Daily       Data   All microbiology laboratory data reviewed.  Recent Labs   Lab Test 09/05/22  0449 09/03/22  0542 09/01/22  2244   WBC 16.9* 18.6* 10.4   HGB 10.4* 10.4* 12.2   HCT 32.7* 34.1* 39.3   MCV 85 90 88    316 409     Recent Labs   Lab Test 09/05/22  0449 09/03/22  0542 09/01/22  2244   CR 0.97 0.97 0.86     Microbiology  9/1 blood cx E.fecalis 1 set 1/4 bottles positive  Peripheral Blood          0 Result Notes    Culture Positive on the 1st day of incubation Abnormal        Enterococcus faecalis Panic     1 of 2 bottles        Resulting Agency: [facility]       Susceptibility     Enterococcus faecalis     TRENT     Ampicillin <=2 ug/mL Susceptible     Gentamicin Synergy Susceptible... Susceptible 1     Penicillin 2 ug/mL Susceptible     Vancomycin 1 ug/mL Susceptible                 Imaging  9/1 CT chest pe  EXAM: CT CHEST PE ABDOMEN PELVIS W CONTRAST  LOCATION: [facility]  DATE/TIME: 9/1/2022 11:32 PM     INDICATION: Respiratory failure; persistent vomiting  COMPARISON: CT abdomen/pelvis 2 2022; CT chest, abdomen, pelvis 10/24/2015.  TECHNIQUE: CT chest pulmonary angiogram and routine CT abdomen pelvis with IV contrast. Arterial phase through the chest and venous phase through the abdomen and pelvis. Multiplanar reformats and MIP reconstructions were performed. Dose reduction   techniques were used.   CONTRAST: 84 mL Isovue 370     FINDINGS: Patient was imaged with arm(s) at side, limiting study quality.     ANGIOGRAM CHEST: No acute pulmonary embolism.     No CT evidence of right heart strain.     Thoracic aorta is normal in course and caliber. Mild-to-moderate atheromatous disease.     LUNGS AND PLEURA: Trachea and large airways are patent. There is mild mucous plugging within the distal branches of the left lower lobe bronchial system. No large focus of airspace consolidation. There are patchy groundglass opacities within the upper   lobe and both lower lobes, favoring a multifocal bronchopneumonia.     No suspicious pulmonary nodule. Small calcified granuloma right upper lobe.     No pneumothorax.      No pleural effusion.     MEDIASTINUM/AXILLAE: Mild prominence of mediastinal lymph nodes, unchanged from 2015. No mediastinal/hilar lymphadenopathy.      Thoracic esophagus is mildly patulous. Small hiatal hernia.       No axillary lymphadenopathy.     Chest wall is unremarkable.     Heart is normal in size.   No pericardial effusion. 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     CORONARY ARTERY CALCIFICATION: Mild.     HEPATOBILIARY: Liver enhances normally and is normal in size.     Gallbladder is normal.     No intrahepatic or intrahepatic biliary ductal dilatation.     PANCREAS: Enhances normally. No peripancreatic inflammatory fat stranding.     SPLEEN: Enhances normally. Normal  size.     ADRENAL GLANDS: Normal.     KIDNEYS: Both kidneys enhance symmetrically, without hydronephrosis.     No nephroureterolithiasis. Low-attenuation subcentimeter renal lesion(s). These are compatible with small benign cysts and no specific imaging evaluation or follow-up is recommended.     Urinary bladder is unremarkable.     PELVIC ORGANS: Hysterectomy.     BOWEL: Mild pancolitis. No evidence of a mechanical bowel obstruction. Appendix not visualized.     No intraperitoneal free fluid or free air.     LYMPH NODES: No suspicious abdominal or pelvic lymphadenopathy.     VASCULATURE: No abdominal aortic aneurysm.      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 Distal patency is preserved.     MUSCULOSKELETAL: A mild compression fracture of the T6 vertebral body appears unchanged from lateral chest radiograph 04/25/2022. Right hip arthroplasty is present.     OTHER: No additionally significant abnormalities.                                                                      IMPRESSION:  1.  No acute pulmonary embolism.  2.  Mild multifocal bronchopneumonia of the left upper lobe and bilateral lower lobes.  3.  Mild pancolitis.  4.  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 Distal patency appears preserved.  5.  Additional nonacute findings, as detailed above.

## 2022-09-06 NOTE — PROGRESS NOTES
Care Management Discharge Note    Discharge Date: 09/07/2022     Discharge Disposition: Skilled Nursing Facility    [facility]  [address] [place] [address]    Discharge Services: None    Discharge DME:  Oxygen    Discharge Transportation:  Wheelchair Ride at 1:00 on 9/7.    Private pay costs discussed: private room/amenity fees and transportation costs    PAS Confirmation Code:    Patient/family educated on Medicare website which has current facility and service quality ratings:      Education Provided on the Discharge Plan:    Persons Notified of Discharge Plans: Patient  Patient/Family in Agreement with the Plan: yes    Handoff Referral Completed: No    Additional Information:  SW received a call from [facility] with [facility] and they would be able to accept patient to their facility on 9/7 at 1:00. Per CC note, patient in agreement with placement and would like to have transport arranged. SW placed call to  Transport to arrange a wheelchair ride at 1:00 on 9/7.  Updated facility of discharge time.     REJI Pacheco, LGSW  [phone]  [facility]

## 2022-09-06 NOTE — PLAN OF CARE
A+Ox4 VSS on 1L O2. Lung sounds have crackles and expiratory wheezes. Denies SOB and pain. Bowels active, no BM overnight. Voiding adequately via purewick. Up w/ 1 and walker. Tolerating regular diet.

## 2022-09-06 NOTE — PROGRESS NOTES
Care Management Follow Up    Length of Stay (days): 4    Expected Discharge Date: 09/07/2022     Concerns to be Addressed:       Patient plan of care discussed at interdisciplinary rounds: Yes    Anticipated Discharge Disposition: Skilled Nursing Facility     Anticipated Discharge Services: None  Anticipated Discharge DME:      Patient/family educated on Medicare website which has current facility and service quality ratings:    Education Provided on the Discharge Plan:    Patient/Family in Agreement with the Plan: yes    Referrals Placed by CM/SW:  [facility]  Private pay costs discussed: private room/amenity fees, transportation cost    Additional Information:  Met with pt and discussed her going to a TCU at discharge to manage the IV antibiotics. Informed her that [facility] has accepted her when ready for discharge.  Explained to pt that [facility] has a private room that is available tomorrow but there is a daily cost of $58.13.  Pt requested that I call her daughter, Tina and discuss the daily cost with her but in agreement with going to [facility].    Tina stated she would prefer pt have a private room and agreeable to the $58.13 daily fee.  Explained that if patient is medically ready for discharge tomorrow the room is available.  Tina again asked about the possibility of pt returning to [facility] and having nursing provide the IV antibiotics.  Again explained that this isn't usually done by staff at the [facility] but I did leave a message with Tor, the RN at [facility] per her request earlier today and will reach out to him again-left message.    Tina stated she prefers pt have a wheelchair ride set up at discharge.  Tina given cost for wheelchair transport and she shared that she has had it set up for her mom in previous hospitalizations and is ok with cost.    Mariah Gaytan RN, BS  Care Coordinator  [email or web address]

## 2022-09-06 NOTE — PROGRESS NOTES
Speech Language Therapy Discharge Summary    Reason for therapy discharge:    All goals and outcomes met, no further needs identified.    Progress towards therapy goal(s). See goals on Care Plan in [facility] electronic health record for goal details.  Goals met    Therapy recommendation(s):    No further therapy is recommended. Continue regular textures and thin liquids. Alternate liquids/solids and upright posture during intake and for 30 minutes following.

## 2022-09-07 ENCOUNTER — LAB REQUISITION (OUTPATIENT)
Dept: LAB | Facility: CLINIC | Age: 87
End: 2022-09-07
Payer: MEDICARE

## 2022-09-07 VITALS
OXYGEN SATURATION: 96 % | SYSTOLIC BLOOD PRESSURE: 114 MMHG | DIASTOLIC BLOOD PRESSURE: 76 MMHG | RESPIRATION RATE: 18 BRPM | TEMPERATURE: 98.1 F | WEIGHT: 160.5 LBS | HEART RATE: 52 BPM | BODY MASS INDEX: 26.71 KG/M2

## 2022-09-07 DIAGNOSIS — R78.81 BACTEREMIA: ICD-10-CM

## 2022-09-07 LAB
BACTERIA BLD CULT: NO GROWTH
ERYTHROCYTE [DISTWIDTH] IN BLOOD BY AUTOMATED COUNT: 14.6 % (ref 10–15)
HCT VFR BLD AUTO: 33.8 % (ref 35–47)
HGB BLD-MCNC: 10.6 G/DL (ref 11.7–15.7)
MCH RBC QN AUTO: 27 PG (ref 26.5–33)
MCHC RBC AUTO-ENTMCNC: 31.4 G/DL (ref 31.5–36.5)
MCV RBC AUTO: 86 FL (ref 78–100)
PLAT MORPH BLD: NORMAL
PLATELET # BLD AUTO: 407 10E3/UL (ref 150–450)
RBC # BLD AUTO: 3.93 10E6/UL (ref 3.8–5.2)
RBC MORPH BLD: NORMAL
WBC # BLD AUTO: 12.8 10E3/UL (ref 4–11)

## 2022-09-07 PROCEDURE — 250N000013 HC RX MED GY IP 250 OP 250 PS 637: Performed by: HOSPITALIST

## 2022-09-07 PROCEDURE — 250N000009 HC RX 250: Performed by: INTERNAL MEDICINE

## 2022-09-07 PROCEDURE — 250N000011 HC RX IP 250 OP 636: Performed by: SPECIALIST

## 2022-09-07 PROCEDURE — 250N000013 HC RX MED GY IP 250 OP 250 PS 637: Performed by: INTERNAL MEDICINE

## 2022-09-07 PROCEDURE — 999N000157 HC STATISTIC RCP TIME EA 10 MIN

## 2022-09-07 PROCEDURE — 99239 HOSP IP/OBS DSCHRG MGMT >30: CPT | Performed by: STUDENT IN AN ORGANIZED HEALTH CARE EDUCATION/TRAINING PROGRAM

## 2022-09-07 PROCEDURE — 94640 AIRWAY INHALATION TREATMENT: CPT

## 2022-09-07 PROCEDURE — 999N000190 HC STATISTIC VAT ROUNDS

## 2022-09-07 PROCEDURE — 85014 HEMATOCRIT: CPT | Performed by: STUDENT IN AN ORGANIZED HEALTH CARE EDUCATION/TRAINING PROGRAM

## 2022-09-07 PROCEDURE — 99233 SBSQ HOSP IP/OBS HIGH 50: CPT | Performed by: SPECIALIST

## 2022-09-07 RX ORDER — AMPICILLIN 2 G/1
2 INJECTION, POWDER, FOR SOLUTION INTRAVENOUS EVERY 6 HOURS
Status: DISCONTINUED | OUTPATIENT
Start: 2022-09-07 | End: 2022-09-07 | Stop reason: HOSPADM

## 2022-09-07 RX ORDER — AMPICILLIN 2 G/1
2 INJECTION, POWDER, FOR SOLUTION INTRAVENOUS EVERY 6 HOURS
DISCHARGE
Start: 2022-09-07 | End: 2022-09-16

## 2022-09-07 RX ADMIN — IPRATROPIUM BROMIDE AND ALBUTEROL SULFATE 3 ML: .5; 3 SOLUTION RESPIRATORY (INHALATION) at 12:03

## 2022-09-07 RX ADMIN — AMPICILLIN SODIUM AND SULBACTAM SODIUM 3 G: 2; 1 INJECTION, POWDER, FOR SOLUTION INTRAMUSCULAR; INTRAVENOUS at 03:01

## 2022-09-07 RX ADMIN — IPRATROPIUM BROMIDE AND ALBUTEROL SULFATE 3 ML: .5; 3 SOLUTION RESPIRATORY (INHALATION) at 07:22

## 2022-09-07 RX ADMIN — MEMANTINE 5 MG: 5 TABLET ORAL at 09:15

## 2022-09-07 RX ADMIN — METOPROLOL SUCCINATE 100 MG: 100 TABLET, EXTENDED RELEASE ORAL at 09:15

## 2022-09-07 RX ADMIN — DEXTRAN 70, GLYCERIN, HYPROMELLOSE 1 DROP: 1; 2; 3 SOLUTION/ DROPS OPHTHALMIC at 09:16

## 2022-09-07 RX ADMIN — AMLODIPINE BESYLATE 2.5 MG: 2.5 TABLET ORAL at 09:15

## 2022-09-07 RX ADMIN — ATORVASTATIN CALCIUM 20 MG: 10 TABLET, FILM COATED ORAL at 09:15

## 2022-09-07 RX ADMIN — AMPICILLIN SODIUM 2 G: 2 INJECTION, POWDER, FOR SOLUTION INTRAMUSCULAR; INTRAVENOUS at 10:30

## 2022-09-07 RX ADMIN — ACETAMINOPHEN 1000 MG: 500 TABLET, FILM COATED ORAL at 09:15

## 2022-09-07 RX ADMIN — Medication 1000 UNITS: at 09:15

## 2022-09-07 RX ADMIN — OXYBUTYNIN 20 MG: 10 TABLET, FILM COATED, EXTENDED RELEASE ORAL at 09:15

## 2022-09-07 ASSESSMENT — ACTIVITIES OF DAILY LIVING (ADL)
ADLS_ACUITY_SCORE: 46
ADLS_ACUITY_SCORE: 49
ADLS_ACUITY_SCORE: 44
ADLS_ACUITY_SCORE: 44
ADLS_ACUITY_SCORE: 50
ADLS_ACUITY_SCORE: 46
ADLS_ACUITY_SCORE: 46

## 2022-09-07 NOTE — PROGRESS NOTES
Patient is on RA Spo2 94-95%. BS are clear and diminished. All nebs given as ordered.     Tomás Nguyễn, RT  9/7/2022

## 2022-09-07 NOTE — PLAN OF CARE
Goal Outcome Evaluation: [id number]    A&O x4, calm and cooperative, forgetful. On 1L NC to start, was able to wean off and O2 sats >90% on RA, continuous pulse ox in place. Mild hypertension with first set of vitals.  VS q4 as ordered. Purewick in place. Had one incontinent stool. Denies pain. Dry, occasional, non-productive cough. Expiratory wheezes. Midline SL with positive blood return. PIV SL. Intermittent antibiotic given x1. On a regular diet.    Expected discharge today-  transport set up for 1300.

## 2022-09-07 NOTE — PROGRESS NOTES
Care Management Discharge Note    Discharge Date: 09/07/2022     Discharge Disposition: Skilled Nursing Facility     [facility]  [address] [place] [address]     Discharge Services: None     Discharge DME:  Oxygen     Discharge Transportation:  Wheelchair Ride at 1:00 on 9/7.     Private pay costs discussed: private room/amenity fees and transportation costs     PAS Confirmation Code:  PAS-RR    D: Per DHS regulation, SW completed and submitted PAS-RR to [place] [employer] Direct Connect via the Senior LinkAge Line.  PAS-RR confirmation # is : [id number]    P: Further questions may be directed to [facility] LinkAge Line at #[phone], option #4 for PAS-RR staff.    Patient/family educated on Medicare website which has current facility and service quality ratings:       Education Provided on the Discharge Plan:    Persons Notified of Discharge Plans: Patient, daughter  Patient/Family in Agreement with the Plan: yes     Handoff Referral Completed: No     Additional Information:  Received discharge orders for patient to discharge to TCU today. Faxed discharge orders to facility.     REJI Pacheco, LGSW  [phone]  [facility]

## 2022-09-07 NOTE — PLAN OF CARE
Physical Therapy Discharge Summary    Reason for therapy discharge:    Discharged to transitional care facility.    Progress towards therapy goal(s). See goals on Care Plan in [facility] electronic health record for goal details.  Goals partially met.  Barriers to achieving goals:   discharge from facility.    Therapy recommendation(s):    Continued therapy is recommended.  Rationale/Recommendations:  To progress functional mobility towards independence.

## 2022-09-07 NOTE — PROGRESS NOTES
[facility]    Infectious Disease Progress Note    Date of Service: 09/07/2022       Assessment:  1.  A 89-year-old female with acute vague illness, some respiratory symptoms, nausea, leukocytosis, colitis on CT scan and slight infiltrates, concern for aspiration pneumonia, but now blood culture positive for Enterococcus, no significant artificial material or signs of endocarditis or endovascular infection, conceivably the colitis with translocation as a cause.  2.  Pancolitis on CT scan with vague abdominal symptoms.  Unclear cause to this, no major diarrhea, so probably not infectious diarrhea-type illness.  3.  Respiratory insufficiency with some infiltrates, aspiration risk  4.  History of recurrent UTIs     Recommendations:  1. Transition to IV Ampicillin 2 grams Q6H to compete treatment for bacteremia. (Has completed 5 days of appropriate treatment for pneumonia, therefore reasonable to de-escalate therapy to Ampicillin now)  2. Midline in place  3. IV antibiotics for 2 weeks until 9/16 . Midline removal upon completion of antibiotics  OPIV antibiotics ordered in epic for discharge planning    Discharge planned today, ID will sign off      Ledy Lobo MD    Interval History   Resting, no new complaints, tolerating antibiotics without side effects, denies nausea, vomiting or diarrhea. Breathing comfortably    Physical Exam   Temp: 98.3  F (36.8  C) Temp src: Oral BP: 116/65 Pulse: 84   Resp: 18 SpO2: 93 % O2 Device: None (Room air) Oxygen Delivery: 1 LPM  Vitals:    09/03/22 0545 09/05/22 0649 09/07/22 0535   Weight: 74.1 kg (163 lb 5.8 oz) 72.7 kg (160 lb 3.2 oz) 72.8 kg (160 lb 8 oz)     Vital Signs with Ranges  Temp:  [97.6  F (36.4  C)-98.3  F (36.8  C)] 98.3  F (36.8  C)  Pulse:  [75-88] 84  Resp:  [17-18] 18  BP: (116-152)/(65-71) 116/65  SpO2:  [93 %-97 %] 93 %    Constitutional: Awake, alert, cooperative, no apparent distress  Lungs: Clear to auscultation bilaterally, no crackles  or wheezing  Cardiovascular: Regular rate and rhythm, normal S1 and S2, and no murmur noted  Abdomen: Normal bowel sounds, soft, non-distended, non-tender  Skin: No rashes, no cyanosis, no edema    Other:    Medications     - MEDICATION INSTRUCTIONS -       - MEDICATION INSTRUCTIONS -       - MEDICATION INSTRUCTIONS -         acetaminophen  1,000 mg Oral QAM AC     amLODIPine  2.5 mg Oral Daily     ampicillin-sulbactam  3 g Intravenous Q6H     artificial tears  1 drop Both Eyes BID     atorvastatin  20 mg Oral Daily     ipratropium - albuterol 0.5 mg/2.5 mg/3 mL  3 mL Nebulization 4x daily     memantine  5 mg Oral BID     metoprolol succinate ER  100 mg Oral Daily     oxybutynin ER  20 mg Oral Daily     PARoxetine  20 mg Oral At Bedtime     QUEtiapine  25 mg Oral At Bedtime     rivaroxaban ANTICOAGULANT  15 mg Oral Daily with supper     sodium chloride (PF)  10 mL Intracatheter Q8H     sodium chloride (PF)  3 mL Intracatheter Q8H     Vitamin D3  1,000 Units Oral Daily       Data   All microbiology laboratory data reviewed.  Recent Labs   Lab Test 09/07/22 0542 09/06/22 0825 09/05/22 0449   WBC 12.8* 14.4* 16.9*   HGB 10.6* 11.5* 10.4*   HCT 33.8* 37.5 32.7*   MCV 86 88 85    411 342     Recent Labs   Lab Test 09/06/22 0825 09/05/22 0449 09/03/22  0542   CR 0.71 0.97 0.97     Microbiology  9/1 blood cx E.fecalis 1 set 1/4 bottles positive  Peripheral Blood            0 Result Notes     Culture Positive on the 1st day of incubation Abnormal         Enterococcus faecalis Panic     1 of 2 bottles         Resulting Agency: [facility]         Susceptibility              Enterococcus faecalis       TRENT       Ampicillin <=2 ug/mL Susceptible       Gentamicin Synergy Susceptible... Susceptible 1       Penicillin 2 ug/mL Susceptible       Vancomycin 1 ug/mL Susceptible                    Imaging  9/1 CT chest pe  EXAM: CT CHEST PE ABDOMEN PELVIS W CONTRAST  LOCATION: [facility]  DATE/TIME:  9/1/2022 11:32 PM     INDICATION: Respiratory failure; persistent vomiting  COMPARISON: CT abdomen/pelvis 2 2022; CT chest, abdomen, pelvis 10/24/2015.  TECHNIQUE: CT chest pulmonary angiogram and routine CT abdomen pelvis with IV contrast. Arterial phase through the chest and venous phase through the abdomen and pelvis. Multiplanar reformats and MIP reconstructions were performed. Dose reduction   techniques were used.   CONTRAST: 84 mL Isovue 370     FINDINGS: Patient was imaged with arm(s) at side, limiting study quality.     ANGIOGRAM CHEST: No acute pulmonary embolism.     No CT evidence of right heart strain.     Thoracic aorta is normal in course and caliber. Mild-to-moderate atheromatous disease.     LUNGS AND PLEURA: Trachea and large airways are patent. There is mild mucous plugging within the distal branches of the left lower lobe bronchial system. No large focus of airspace consolidation. There are patchy groundglass opacities within the upper   lobe and both lower lobes, favoring a multifocal bronchopneumonia.     No suspicious pulmonary nodule. Small calcified granuloma right upper lobe.     No pneumothorax.      No pleural effusion.     MEDIASTINUM/AXILLAE: Mild prominence of mediastinal lymph nodes, unchanged from 2015. No mediastinal/hilar lymphadenopathy.      Thoracic esophagus is mildly patulous. Small hiatal hernia.       No axillary lymphadenopathy.     Chest wall is unremarkable.     Heart is normal in size.   No pericardial effusion. 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     CORONARY ARTERY CALCIFICATION: Mild.     HEPATOBILIARY: Liver enhances normally and is normal in size.     Gallbladder is normal.     No intrahepatic or intrahepatic biliary ductal dilatation.     PANCREAS: Enhances normally. No peripancreatic inflammatory fat stranding.     SPLEEN: Enhances normally. Normal size.     ADRENAL GLANDS: Normal.     KIDNEYS:  Both kidneys enhance symmetrically, without hydronephrosis.     No nephroureterolithiasis. Low-attenuation subcentimeter renal lesion(s). These are compatible with small benign cysts and no specific imaging evaluation or follow-up is recommended.     Urinary bladder is unremarkable.     PELVIC ORGANS: Hysterectomy.     BOWEL: Mild pancolitis. No evidence of a mechanical bowel obstruction. Appendix not visualized.     No intraperitoneal free fluid or free air.     LYMPH NODES: No suspicious abdominal or pelvic lymphadenopathy.     VASCULATURE: No abdominal aortic aneurysm.      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 Distal patency is preserved.     MUSCULOSKELETAL: A mild compression fracture of the T6 vertebral body appears unchanged from lateral chest radiograph 04/25/2022. Right hip arthroplasty is present.     OTHER: No additionally significant abnormalities.                                                                      IMPRESSION:  1.  No acute pulmonary embolism.  2.  Mild multifocal bronchopneumonia of the left upper lobe and bilateral lower lobes.  3.  Mild pancolitis.  4.  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 Distal patency appears preserved.  5.  Additional nonacute findings, as detailed above.

## 2022-09-07 NOTE — DISCHARGE SUMMARY
[facility]  Hospitalist Discharge Summary      Date of Admission:  9/1/2022  Date of Discharge:  9/7/2022  Discharging Provider: Dionte Mueller MD  Discharge Service: Hospitalist Service    Discharge Diagnoses   As below    Follow-ups Needed After Discharge   Follow-up Appointments     Follow Up and recommended labs and tests      Follow up with Nursing home physician.  No follow up labs or test are   needed.           Unresulted Labs Ordered in the Past 30 Days of this Admission     Date and Time Order Name Status Description    9/3/2022 12:20 PM Blood Culture Hand, Left Preliminary     9/3/2022 12:20 PM Blood Culture Hand, Right Preliminary       These results will be followed up by hospitalist    Discharge Disposition   Discharged to transitional care, [facility]  Condition at discharge: Stable      Hospital Course   Margy Babin is a 89 year old female with a past medical history of heart failure with reduced ejection fraction, essential hypertension, chronic left bundle branch block, atrial fibrillation on anticoagulation with Xarelto, depression, history of stroke presents to hospital with acute hypoxic respiratory failure secondary to aspiration pneumonitis.     Acute hypoxic respiratory failure, resolved  Aspiration pneumonitis, improving  Aspiration pneumonia  Sepsis secondary above (WBC, lactate, tachycardia, tachypnea, hypotension)  Lactic acidosis secondary to above, resolved  Enterococcus faecalis bacteremia   Patient presented with acute onset hypoxic respiratory failure.  Found to have groundglass opacities interpreted as multifocal bronchopneumonia.  Given history of vomiting prior to hypoxia concern is for aspiration. The patient improved in the er with bipap, but then vomited again.    * 1 out of 2 blood cultures on admission with E.faecalis  -continuous pulse oximetry  -Supplemental O2  -No BIPAP due to recurrent aspiration and aspiration on bipap in  ED  -Switched to zosyn and zithromax on 9/2/22  -zosyn to unasyn on 9/6. Discharge on ampicillin q6h. Plan 14 days abx (until 9/16)  -appreciate ID consult  -follow blood cultures, no growth on repeat cultures at time of discharge    - off O2 at discharge     Mild pancolitis  * Seen on ct. patient denies any abdominal pain or recent diarrhea. abdominal exam benign  * No diarrhea or abdominal pain   - monitor  - abx as above     Prolonged qtc  -Avoid qt prolonging medications  -monitor on tele  On  repeat EKG QTC improved from 526 to 497     Atrial fibrillation on anticoagulation  After recent stroke in April 2022 the patient was started on Xarelto.  -Resumed  PTA meds including Toprol-XL, Norvasc, Xarelto     Heart failure with reduced ejection fraction  Essential tremor  Chronic left bundle branch block  Appears euvolemic on exam.  TTE April 25, 2022: EF 50 to 55%.  Mild aortic regurgitation.  Left atrium mild to moderately dilated.  Restarted  PTA meds     Depression  -Resumed  PTA meds      Mild to moderate oral pharyngeal dysphagia secondary to stroke  After her stroke in April the patient was diagnosed with a mild to moderate oropharyngeal dysphagia.  on paperwork sent with the patient as she appears to have been on her a regular diet.  Dysphagia diet ordered per  speech path     Consultations This Hospital Stay   CARE MANAGEMENT / SOCIAL WORK IP CONSULT  SPEECH LANGUAGE PATH ADULT IP CONSULT  INFECTIOUS DISEASES IP CONSULT  PHYSICAL THERAPY ADULT IP CONSULT  VASCULAR ACCESS ADULT IP CONSULT  VASCULAR ACCESS ADULT IP CONSULT  VASCULAR ACCESS ADULT IP CONSULT  VASCULAR ACCESS ADULT IP CONSULT  PHYSICAL THERAPY ADULT IP CONSULT  OCCUPATIONAL THERAPY ADULT IP CONSULT    Code Status   No CPR- Do NOT Intubate    Time Spent on this Encounter   I, Dionte Mueller MD, personally saw the patient today and spent greater than 30 minutes discharging this patient.       Dionte Mueller MD  [facility]  [address]  [address]  [place] [address]  Phone: [phone]  ______________________________________________________________________    Physical Exam   Vital Signs: Temp: 98.1  F (36.7  C) Temp src: Oral BP: 114/76 Pulse: 52   Resp: 18 SpO2: 100 % O2 Device: Nasal cannula Oxygen Delivery: 1 LPM  Weight: 160 lbs 8 oz  Constitutional: Awake, alert, cooperative, no apparent cardiopulmonary distress.  Eyes: Conjunctiva and pupils examined and normal.  HEENT: Moist mucous membranes, normal dentition.  Respiratory: coarse. Wet cough noted.   Cardiovascular: Regular rate and rhythm, normal S1 and S2, and no murmur noted.  GI: Soft, non-distended, non-tender, normal bowel sounds.  Lymph/Hematologic: No anterior cervical or supraclavicular adenopathy.  Skin: No rashes, no cyanosis, no edema noted on exposed skin.  Musculoskeletal: No joint swelling, erythema or tenderness. No gross bony abnormalities  Neurologic: Cranial nerves 2-12 grossly intact, normal strength and sensation.  Psychiatric: Alert, oriented to person, place and time, no obvious anxiety or depression.          Primary Care Physician   Braeden Sood    Discharge Orders      General info for SNF    Length of Stay Estimate: Short Term Care: Estimated # of Days <30  Condition at Discharge: Improving  Level of care:skilled   Rehabilitation Potential: Good  Admission H&P remains valid and up-to-date: Yes  Recent Chemotherapy: N/A  Use Nursing Home Standing Orders: Yes     Mantoux instructions    Give two-step Mantoux (PPD) Per Facility Policy Yes     Follow Up and recommended labs and tests    Follow up with Nursing home physician.  No follow up labs or test are needed.     Reason for your hospital stay    Bacteremia     Activity - Up with nursing assistance     IV access    Midline, right arm.     Physical Therapy Adult Consult    Evaluate and treat as clinically indicated.    Reason:  deconditioning     Occupational Therapy Adult Consult     Evaluate and treat as clinically indicated.    Reason:  deconditioning     Diet    Follow this diet upon discharge: Orders Placed This Encounter      Room Service      Combination Diet Regular Diet       Significant Results and Procedures   Most Recent 3 CBC's:Recent Labs   Lab Test 09/07/22  0542 09/06/22  0825 09/05/22  0449   WBC 12.8* 14.4* 16.9*   HGB 10.6* 11.5* 10.4*   MCV 86 88 85    411 342     Most Recent 3 BMP's:Recent Labs   Lab Test 09/06/22  0825 09/05/22  0449 09/03/22  0542    140 143   POTASSIUM 3.8 3.8 4.0   CHLORIDE 107 106 112*   CO2 28 27 26   BUN 21 25 24   CR 0.71 0.97 0.97   ANIONGAP 6 7 5   SOBIA 8.9 8.4* 8.1*   GLC 96 106* 105*     7-Day Micro Results     Collected Updated Procedure Result Status      09/04/2022 1003 09/05/2022 1402 Urine Culture [[id number]]   Urine, Clean Catch    Final result Component Value   Culture 10,000-50,000 CFU/mL Mixture of urogenital peewee               09/03/2022 1412 09/06/2022 1704 Blood Culture Hand, Left [[id number]]   Blood from Hand, Left    Preliminary result Component Value   Culture No growth after 3 days  [P]                09/03/2022 1407 09/06/2022 1704 Blood Culture Hand, Right [[id number]]   Blood from Hand, Right    Preliminary result Component Value   Culture No growth after 3 days  [P]                09/01/2022 2332 09/07/2022 0448 Blood Culture Peripheral Blood [[id number]]   Peripheral Blood    Final result Component Value   Culture No Growth               09/01/2022 2332 09/04/2022 0720 Blood Culture Peripheral Blood [[id number]]    (Abnormal)   Peripheral Blood    Final result Component Value   Culture Positive on the 1st day of incubation    Enterococcus faecalis    1 of 2 bottles        Susceptibility      Enterococcus faecalis      TRENT      Ampicillin <=2 ug/mL Susceptible      Gentamicin Synergy Susceptible ug/mL Susceptible  [1]       Penicillin 2 ug/mL Susceptible      Vancomycin 1 ug/mL Susceptible                    [1]  No high level gentamicin resistance found - therefore combination therapy with an aminoglycoside may be indicated for serious enterococcal infections such as bacteremia and endocarditis.                 09/01/2022 2332 09/02/2022 1926 Verigene GP Panel [[id number]]    (Abnormal)   Peripheral Blood    Final result Component Value   Staphylococcus species Not Detected   Staphylococcus aureus Not Detected   Staphylococcus epidermidis Not Detected   Staphylococcus lugdunensis Not Detected   Enterococcus faecalis Detected   Positive for Enterococcus faecalis and negative for Zach/vanB genes (not resistant to vancomycin) by Corteraigene multiplex nucleic acid test. Final identification and antimicrobial susceptibility testing will be verified by standard methods.   Enterococcus faecium Not Detected   Streptococcus species Not Detected   Streptococcus agalactiae Not Detected   Streptococcus anginosus group Not Detected   Streptococcus pneumoniae Not Detected   Streptococcus pyogenes Not Detected   Listeria species Not Detected            09/01/2022 2326 09/02/2022 0028 Symptomatic; Unknown Influenza A/B & SARS-CoV2 (COVID-19) Virus PCR Multiplex Nasopharyngeal [[id number]]    Swab from Nasopharyngeal    Final result Component Value   Influenza A PCR Negative   Influenza B PCR Negative   RSV PCR Negative   SARS CoV2 PCR Negative   NEGATIVE: SARS-CoV-2 (COVID-19) RNA not detected, presumed negative.              ,   Results for orders placed or performed during the hospital encounter of 09/01/22   XR Chest Port 1 View    Narrative    EXAM: XR CHEST PORT 1 VIEW  LOCATION: [facility]  DATE/TIME: 9/1/2022 10:54 PM    INDICATION: Respiratory failure  COMPARISON: 04/25/2022      Impression    IMPRESSION: Normal heart size and pulmonary vascularity. Diffuse interstitial opacities throughout both lungs can be seen with underlying interstitial fibrotic change. No acute alveolar infiltrates.  Aortic calcification. Degenerative changes in the spine   and shoulders.   CT Chest (PE) Abdomen Pelvis w Contrast    Narrative    EXAM: CT CHEST PE ABDOMEN PELVIS W CONTRAST  LOCATION: [facility]  DATE/TIME: 9/1/2022 11:32 PM    INDICATION: Respiratory failure; persistent vomiting  COMPARISON: CT abdomen/pelvis 2 2022; CT chest, abdomen, pelvis 10/24/2015.  TECHNIQUE: CT chest pulmonary angiogram and routine CT abdomen pelvis with IV contrast. Arterial phase through the chest and venous phase through the abdomen and pelvis. Multiplanar reformats and MIP reconstructions were performed. Dose reduction   techniques were used.   CONTRAST: 84 mL Isovue 370    FINDINGS: Patient was imaged with arm(s) at side, limiting study quality.    ANGIOGRAM CHEST: No acute pulmonary embolism.    No CT evidence of right heart strain.    Thoracic aorta is normal in course and caliber. Mild-to-moderate atheromatous disease.    LUNGS AND PLEURA: Trachea and large airways are patent. There is mild mucous plugging within the distal branches of the left lower lobe bronchial system. No large focus of airspace consolidation. There are patchy groundglass opacities within the upper   lobe and both lower lobes, favoring a multifocal bronchopneumonia.     No suspicious pulmonary nodule. Small calcified granuloma right upper lobe.    No pneumothorax.     No pleural effusion.     MEDIASTINUM/AXILLAE: Mild prominence of mediastinal lymph nodes, unchanged from 2015. No mediastinal/hilar lymphadenopathy.     Thoracic esophagus is mildly patulous. Small hiatal hernia.      No axillary lymphadenopathy.    Chest wall is unremarkable.    Heart is normal in size.   No pericardial effusion. 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    CORONARY ARTERY CALCIFICATION: Mild.    HEPATOBILIARY: Liver enhances normally and is normal in size.    Gallbladder is normal.    No  intrahepatic or intrahepatic biliary ductal dilatation.    PANCREAS: Enhances normally. No peripancreatic inflammatory fat stranding.    SPLEEN: Enhances normally. Normal size.    ADRENAL GLANDS: Normal.    KIDNEYS: Both kidneys enhance symmetrically, without hydronephrosis.    No nephroureterolithiasis. Low-attenuation subcentimeter renal lesion(s). These are compatible with small benign cysts and no specific imaging evaluation or follow-up is recommended.    Urinary bladder is unremarkable.    PELVIC ORGANS: Hysterectomy.    BOWEL: Mild pancolitis. No evidence of a mechanical bowel obstruction. Appendix not visualized.    No intraperitoneal free fluid or free air.    LYMPH NODES: No suspicious abdominal or pelvic lymphadenopathy.    VASCULATURE: No abdominal aortic aneurysm.     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 Distal patency is preserved.    MUSCULOSKELETAL: A mild compression fracture of the T6 vertebral body appears unchanged from lateral chest radiograph 04/25/2022. Right hip arthroplasty is present.    OTHER: No additionally significant abnormalities.      Impression    IMPRESSION:  1.  No acute pulmonary embolism.  2.  Mild multifocal bronchopneumonia of the left upper lobe and bilateral lower lobes.  3.  Mild pancolitis.  4.  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 Distal patency appears preserved.  5.  Additional nonacute findings, as detailed above.         POC US CHEST B-SCAN    Impression    Limited Bedside Lung Ultrasound, performed and interpreted by myself. Nile Alexandre MD  Indication: shortness of breath and hypoxia    With the patient positioned upright, bilateral anterior, posterior and lateral lung fields were examined for evidence of thoracic free fluid, pulmonary consolidation, and pulmonary edema. Additional parasternal  cardiac views obtained and negative for pericardial fluid.       Findings: Limited views of select lung fields showed A-lines consisting with normal lung artifact. No B-lines. No free fluid. No pericardial fluid.    IMPRESSION: Normal lung ultrasound without evidence of effusion, or pulmonary edema.           Discharge Medications   Current Discharge Medication List      START taking these medications    Details   ampicillin (OMNIPEN) 2 g vial Inject 2 g into the vein every 6 hours for 9 days    Comments: Check weekly CBC/diff, creatinine, AST, -results to intermed consultants -Dr Lobo  Associated Diagnoses: Sepsis due to Enterococcus (H)      guaiFENesin (ROBITUSSIN) 20 mg/mL SOLN solution Take 10 mLs by mouth every 4 hours as needed for cough    Associated Diagnoses: Pneumonia due to infectious organism, unspecified laterality, unspecified part of lung         CONTINUE these medications which have NOT CHANGED    Details   acetaminophen (TYLENOL) 500 MG tablet Take 2 tablets (1,000 mg) by mouth every morning. May also take 2 tablets (1,000 mg) 2 times daily as needed for mild pain.  Qty: 200 tablet, Refills: 11    Associated Diagnoses: Neck pain      amLODIPine (NORVASC) 2.5 MG tablet 1 TAB BY MOUTH DAILY  Qty: 30 tablet, Refills: 10    Associated Diagnoses: Essential hypertension      atorvastatin (LIPITOR) 20 MG tablet Take 20 mg by mouth daily      bisacodyl (DULCOLAX) 5 MG EC tablet 1 TAB BY MOUTH DAILY AS NEEDED FOR CONSTIPATION  Qty: 30 tablet, Refills: 10    Associated Diagnoses: Constipation, unspecified constipation type      Cholecalciferol (VITAMIN D HIGH POTENCY) 25 MCG (1000 UT) CAPS 1 CAP BY MOUTH DAILY  Qty: 30 capsule, Refills: 10    Associated Diagnoses: Takes dietary supplements      docusate sodium (COLACE) 100 MG capsule TAKE 1 CAP BY MOUTH TWICE DAILY AS NEEDED FOR CONSTIPATION  Qty: 60 capsule, Refills: 10    Associated Diagnoses: Constipation, unspecified constipation type      memantine  (NAMENDA) 5 MG tablet 1 TAB BY MOUTH TWICE DAILY  Qty: 60 tablet, Refills: 10    Associated Diagnoses: Dementia (H)      metoprolol succinate ER (TOPROL XL) 100 MG 24 hr tablet 1 TAB BY MOUTH DAILY  Qty: 30 tablet, Refills: 10    Associated Diagnoses: Paroxysmal atrial fibrillation (H)      ondansetron (ZOFRAN) 4 MG tablet 1 TAB BY MOUTH EVERY 8 HOURS AS NEEDED FOR NAUSEA  Qty: 10 tablet, Refills: 10    Associated Diagnoses: Nausea without vomiting      oxybutynin ER (DITROPAN XL) 10 MG 24 hr tablet Take 2 tablets (20 mg) by mouth daily  Qty: 120 tablet, Refills: 3    Associated Diagnoses: Nocturia      PARoxetine (PAXIL) 20 MG tablet 1 TAB BY MOUTH AT BEDTIME  Qty: 30 tablet, Refills: 10    Associated Diagnoses: 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)      QUEtiapine (SEROQUEL) 25 MG tablet 1 TAB BY MOUTH AT BEDTIME  Qty: 30 tablet, Refills: 10    Associated Diagnoses: Insomnia, unspecified type      ramelteon (ROZEREM) 8 MG tablet 1 TAB BY MOUTH EVERY NIGHT AS NEEDED FOR SLEEP  Qty: 30 tablet, Refills: 10    Associated Diagnoses: Insomnia, unspecified type      RESTASIS 0.05 % ophthalmic emulsion INSTILL 1 DROP INTO BOTH EYES DAILY  Qty: 5.5 mL, Refills: 3    Associated Diagnoses: Dry eyes      rivaroxaban ANTICOAGULANT (XARELTO) 15 MG TABS tablet Take 1 tablet (15 mg) by mouth daily (with dinner)  Qty: 90 tablet, Refills: 1    Associated Diagnoses: Paroxysmal atrial fibrillation (H)      order for DME Equipment being ordered: Walker Wheels () and Walker ()  Treatment Diagnosis: Difficulty ambulating  Qty: 1 each, Refills: 0    Associated Diagnoses: Fall, initial encounter           Allergies   No Known Allergies

## 2022-09-07 NOTE — PLAN OF CARE
Shift Note: 09/06/2022 [id number]    A/O x4, very pleasant. Does request periodic check-ins, otherwise she feels ignored. Up x1 w/ GB, walker. Up to chair for meals. Tolerating regular diet. Incont b/b, aidenck in place, LBM yesterday. ARZOLA, denies SOB. 1L O2 via NC. Intermittent nebs. Takes meds whole w/ water. ROSA 1 day.    Current LDAs:  - L PIV, SL  - R midline, SL, good BR  - [place], good UOP

## 2022-09-08 ENCOUNTER — PATIENT OUTREACH (OUTPATIENT)
Dept: CARE COORDINATION | Facility: CLINIC | Age: 87
End: 2022-09-08

## 2022-09-08 LAB
AST SERPL W P-5'-P-CCNC: 23 U/L (ref 10–35)
BACTERIA BLD CULT: NO GROWTH
BACTERIA BLD CULT: NO GROWTH
BASOPHILS # BLD AUTO: 0.1 10E3/UL (ref 0–0.2)
BASOPHILS NFR BLD AUTO: 1 %
CREAT SERPL-MCNC: 0.83 MG/DL (ref 0.51–0.95)
EOSINOPHIL # BLD AUTO: 1.2 10E3/UL (ref 0–0.7)
EOSINOPHIL NFR BLD AUTO: 9 %
ERYTHROCYTE [DISTWIDTH] IN BLOOD BY AUTOMATED COUNT: 15.1 % (ref 10–15)
GFR SERPL CREATININE-BSD FRML MDRD: 66 ML/MIN/1.73M2
HCT VFR BLD AUTO: 35 % (ref 35–47)
HGB BLD-MCNC: 10.9 G/DL (ref 11.7–15.7)
IMM GRANULOCYTES # BLD: 0 10E3/UL
IMM GRANULOCYTES NFR BLD: 0 %
LYMPHOCYTES # BLD AUTO: 1.7 10E3/UL (ref 0.8–5.3)
LYMPHOCYTES NFR BLD AUTO: 13 %
MCH RBC QN AUTO: 27.2 PG (ref 26.5–33)
MCHC RBC AUTO-ENTMCNC: 31.1 G/DL (ref 31.5–36.5)
MCV RBC AUTO: 87 FL (ref 78–100)
MONOCYTES # BLD AUTO: 0.9 10E3/UL (ref 0–1.3)
MONOCYTES NFR BLD AUTO: 7 %
NEUTROPHILS # BLD AUTO: 9.1 10E3/UL (ref 1.6–8.3)
NEUTROPHILS NFR BLD AUTO: 70 %
NRBC # BLD AUTO: 0 10E3/UL
NRBC BLD AUTO-RTO: 0 /100
PLATELET # BLD AUTO: 427 10E3/UL (ref 150–450)
RBC # BLD AUTO: 4.01 10E6/UL (ref 3.8–5.2)
WBC # BLD AUTO: 13 10E3/UL (ref 4–11)

## 2022-09-08 PROCEDURE — 82565 ASSAY OF CREATININE: CPT

## 2022-09-08 PROCEDURE — 85025 COMPLETE CBC W/AUTO DIFF WBC: CPT

## 2022-09-08 PROCEDURE — P9604 ONE-WAY ALLOW PRORATED TRIP: HCPCS

## 2022-09-08 PROCEDURE — 84450 TRANSFERASE (AST) (SGOT): CPT

## 2022-09-08 PROCEDURE — 36415 COLL VENOUS BLD VENIPUNCTURE: CPT

## 2022-09-08 NOTE — PROGRESS NOTES
Clinic Care Coordination Contact  Care Coordination Transition Communication    Clinical Data: [facility]  Hospitalist Discharge Summary       Date of Admission:  9/1/2022  Date of Discharge:  9/7/2022  Discharging Provider: Dionte Mueller MD  Discharge Service: Hospitalist Service        Discharge Diagnoses    Acute hypoxic respiratory failure, resolved  Aspiration pneumonitis, improving  Aspiration pneumonia  Sepsis secondary above (WBC, lactate, tachycardia, tachypnea, hypotension)  Lactic acidosis secondary to above, resolved  Enterococcus faecalis bacteremia     Transition to Facility:              [facility]              Contact name and phone number/fax: [phone]    Plan: RN/SW Care Coordinator will await notification from facility staff informing RN/SW Care Coordinator of patient's discharge plans/needs. RN/SW Care Coordinator will review chart and outreach to facility staff every 4 weeks and as needed. Fax sent to TCU with my contact information requesting notification upon discharge.    Aretha Funez [facility]  Clinic Care Coordinator  [facility]  [phone]  [email or web address]

## 2022-09-08 NOTE — LETTER
[facility]   To:   [facility]          Please give to facility    From:   JESSICA Montgomery Care Coordinator [facility]     Patient Name:  Margy Babin  YOB: 1929    Admit date: 9/7/2022      *Information Needed:  Please contact me when the patient will discharge (or if they will move to long term care)- include the discharge date, disposition, and main diagnosis   - If the patient is discharged with home care services, please provide the name of the agency    Also- Please inform me if a care conference is being held.   Phone, Fax or Email with information       Thank You,   JOSE Montgomery, MSW  Clinic Care Coordinator  [facility] [place] [place] [place]  Ph: [phone]  [email or web address]

## 2022-09-08 NOTE — PROGRESS NOTES
General acute hospital    Background: Transitional Care Management program auto-identified and prompting a chart review by [facility] team.    Assessment: Upon chart review, CCR Team member will cancel/close this episode of Transitional Care Management program due to reason below:    Patient discharged to TCU/ARU/LTACH and is established within [facility]. Referral created for Primary Care-Care Coordination program.    Plan: Transitional Care Management episode closed per reason above.      Bailey Ibarra MA  Connected Care Resource [place], [facility]    *Connected Care Resource Team does NOT follow patient ongoing. Referrals are identified based on internal discharge reports and the outreach is to ensure patient has an understanding of their discharge instructions.

## 2022-09-13 ENCOUNTER — LAB REQUISITION (OUTPATIENT)
Dept: LAB | Facility: CLINIC | Age: 87
End: 2022-09-13
Payer: MEDICARE

## 2022-09-13 DIAGNOSIS — R78.81 BACTEREMIA: ICD-10-CM

## 2022-09-14 ENCOUNTER — LAB REQUISITION (OUTPATIENT)
Dept: LAB | Facility: CLINIC | Age: 87
End: 2022-09-14
Payer: MEDICARE

## 2022-09-14 DIAGNOSIS — R78.81 BACTEREMIA: ICD-10-CM

## 2022-09-15 LAB
AST SERPL W P-5'-P-CCNC: 35 U/L (ref 10–35)
BASOPHILS # BLD AUTO: 0.2 10E3/UL (ref 0–0.2)
BASOPHILS NFR BLD AUTO: 1 %
CREAT SERPL-MCNC: 0.9 MG/DL (ref 0.51–0.95)
EOSINOPHIL # BLD AUTO: 0.8 10E3/UL (ref 0–0.7)
EOSINOPHIL NFR BLD AUTO: 6 %
ERYTHROCYTE [DISTWIDTH] IN BLOOD BY AUTOMATED COUNT: 15.1 % (ref 10–15)
GFR SERPL CREATININE-BSD FRML MDRD: 60 ML/MIN/1.73M2
HCT VFR BLD AUTO: 36 % (ref 35–47)
HGB BLD-MCNC: 10.8 G/DL (ref 11.7–15.7)
IMM GRANULOCYTES # BLD: 0.1 10E3/UL
IMM GRANULOCYTES NFR BLD: 0 %
LYMPHOCYTES # BLD AUTO: 1.5 10E3/UL (ref 0.8–5.3)
LYMPHOCYTES NFR BLD AUTO: 11 %
MCH RBC QN AUTO: 26.9 PG (ref 26.5–33)
MCHC RBC AUTO-ENTMCNC: 30 G/DL (ref 31.5–36.5)
MCV RBC AUTO: 90 FL (ref 78–100)
MONOCYTES # BLD AUTO: 0.8 10E3/UL (ref 0–1.3)
MONOCYTES NFR BLD AUTO: 6 %
NEUTROPHILS # BLD AUTO: 10.2 10E3/UL (ref 1.6–8.3)
NEUTROPHILS NFR BLD AUTO: 76 %
NRBC # BLD AUTO: 0 10E3/UL
NRBC BLD AUTO-RTO: 0 /100
PLATELET # BLD AUTO: 456 10E3/UL (ref 150–450)
RBC # BLD AUTO: 4.02 10E6/UL (ref 3.8–5.2)
WBC # BLD AUTO: 13.5 10E3/UL (ref 4–11)

## 2022-09-15 PROCEDURE — 36415 COLL VENOUS BLD VENIPUNCTURE: CPT

## 2022-09-15 PROCEDURE — 82565 ASSAY OF CREATININE: CPT

## 2022-09-15 PROCEDURE — 84450 TRANSFERASE (AST) (SGOT): CPT

## 2022-09-15 PROCEDURE — 85025 COMPLETE CBC W/AUTO DIFF WBC: CPT

## 2022-09-15 PROCEDURE — P9604 ONE-WAY ALLOW PRORATED TRIP: HCPCS

## 2022-09-20 ENCOUNTER — LAB REQUISITION (OUTPATIENT)
Dept: LAB | Facility: CLINIC | Age: 87
End: 2022-09-20
Payer: MEDICARE

## 2022-09-20 DIAGNOSIS — I69.391 DYSPHAGIA FOLLOWING CEREBRAL INFARCTION: ICD-10-CM

## 2022-09-21 ENCOUNTER — LAB REQUISITION (OUTPATIENT)
Dept: LAB | Facility: CLINIC | Age: 87
End: 2022-09-21
Payer: MEDICARE

## 2022-09-21 DIAGNOSIS — R78.81 BACTEREMIA: ICD-10-CM

## 2022-09-21 LAB
AST SERPL W P-5'-P-CCNC: 28 U/L (ref 10–35)
BASOPHILS # BLD AUTO: 0.1 10E3/UL (ref 0–0.2)
BASOPHILS NFR BLD AUTO: 1 %
CREAT SERPL-MCNC: 1.06 MG/DL (ref 0.51–0.95)
EOSINOPHIL # BLD AUTO: 0.8 10E3/UL (ref 0–0.7)
EOSINOPHIL NFR BLD AUTO: 7 %
ERYTHROCYTE [DISTWIDTH] IN BLOOD BY AUTOMATED COUNT: 15 % (ref 10–15)
GFR SERPL CREATININE-BSD FRML MDRD: 49 ML/MIN/1.73M2
HCT VFR BLD AUTO: 38.1 % (ref 35–47)
HGB BLD-MCNC: 11.4 G/DL (ref 11.7–15.7)
IMM GRANULOCYTES # BLD: 0.1 10E3/UL
IMM GRANULOCYTES NFR BLD: 1 %
LYMPHOCYTES # BLD AUTO: 1.9 10E3/UL (ref 0.8–5.3)
LYMPHOCYTES NFR BLD AUTO: 18 %
MCH RBC QN AUTO: 26.5 PG (ref 26.5–33)
MCHC RBC AUTO-ENTMCNC: 29.9 G/DL (ref 31.5–36.5)
MCV RBC AUTO: 89 FL (ref 78–100)
MONOCYTES # BLD AUTO: 0.9 10E3/UL (ref 0–1.3)
MONOCYTES NFR BLD AUTO: 8 %
NEUTROPHILS # BLD AUTO: 6.9 10E3/UL (ref 1.6–8.3)
NEUTROPHILS NFR BLD AUTO: 65 %
NRBC # BLD AUTO: 0 10E3/UL
NRBC BLD AUTO-RTO: 0 /100
PLATELET # BLD AUTO: 507 10E3/UL (ref 150–450)
RBC # BLD AUTO: 4.3 10E6/UL (ref 3.8–5.2)
WBC # BLD AUTO: 10.7 10E3/UL (ref 4–11)

## 2022-09-21 PROCEDURE — 84450 TRANSFERASE (AST) (SGOT): CPT

## 2022-09-21 PROCEDURE — 85025 COMPLETE CBC W/AUTO DIFF WBC: CPT

## 2022-09-21 PROCEDURE — 82565 ASSAY OF CREATININE: CPT

## 2022-09-21 PROCEDURE — P9604 ONE-WAY ALLOW PRORATED TRIP: HCPCS

## 2022-09-21 PROCEDURE — 36415 COLL VENOUS BLD VENIPUNCTURE: CPT

## 2022-09-23 ENCOUNTER — TELEPHONE (OUTPATIENT)
Dept: FAMILY MEDICINE | Facility: CLINIC | Age: 87
End: 2022-09-23

## 2022-09-23 NOTE — TELEPHONE ENCOUNTER
"Gopi, nurse calling from [facility] ([phone]).   Gopi reports patient came from to [facility] today from TCU with 2L of oxygen via nasal cannula which patient wears all the time.   Gopi is requesting orders for the oxygen.   She reports the oxygen is new for the patient.   It was started when patient was in hospital for pneumonia and continued while she was in TCU to receive IV antibiotics.     Patient was in [facility] from 9/1/2022-9/7/2022.    \"Patient presented with acute onset hypoxic respiratory failure.  Found to have groundglass opacities interpreted as multifocal bronchopneumonia\"      Needs orders faxed to:[phone]   Please also call Gopi back to inform her of provider's orders.       Denita Hayes RN BSN MSN  [facility]      "

## 2022-09-23 NOTE — LETTER
[facility]  [address]  [place] [address]  [phone]                                                                                               Date: 9/26/2022    Margy Babin                                                                               [address]  [place] [address]              To [facility],       This is to confirm that it is okay for Margy Babin to continue to wear 2L oxygen via nasal cannula.         Sincerely,    Braeden Sood MD

## 2022-09-24 ENCOUNTER — MEDICAL CORRESPONDENCE (OUTPATIENT)
Dept: HEALTH INFORMATION MANAGEMENT | Facility: CLINIC | Age: 87
End: 2022-09-24

## 2022-09-26 ENCOUNTER — TELEPHONE (OUTPATIENT)
Dept: FAMILY MEDICINE | Facility: CLINIC | Age: 87
End: 2022-09-26

## 2022-09-26 NOTE — TELEPHONE ENCOUNTER
Call to Gopi nurse at [facility]. Spoke with Tor. Tor informed of Dr. Sood below response, okay with continuing oxygen. Tor reports they need the provider to enter the orders and then have this faxed to [phone].    This RN created a letter, pended it, and routed it to Dr. Sood to sign if he agrees.     Once nursing receives the approval/signed letter back from Dr. Sood, please fax to [facility] at [phone].      Provider, please see routed pended letter for requesting continued oxygen orders. Please sign if agree.     Routing to triage to follow up.     Thank you!      Denita Hayes, RN BSN MSN  [facility]

## 2022-09-26 NOTE — TELEPHONE ENCOUNTER
Samina, PT from [facility] health calling for verbal orders    2x a week for 1 and 1x a week for 3 weeks, 2x a month for 1 month.    Order originated in hospital. PCPplease advise if you are ok with  following patient for home care and with the orders listed above.     Callback: [phone]- ok to leave detailed VM.       Josefina Rodas RN  [facility]

## 2022-09-27 NOTE — TELEPHONE ENCOUNTER
Call to Samina physical therapy from [facility] at [phone]. Detailed message left with Dr. Sood's below response.     Denita Hayes, RN BSN MSN  [facility]

## 2022-09-28 NOTE — TELEPHONE ENCOUNTER
Writer called and spoke with SAY Nguyễn at [facility].    Writer confirmed with Gopi that letter would be sufficient for O2 orders.     Writer verified fax number with Gopi and faxed letter to [phone] with Attn: Alexa     Advised RN to call back to clinic with any further questions/concerns.    Ramesh Ching RN  [facility]

## 2022-09-30 NOTE — TELEPHONE ENCOUNTER
Samina, PT from [facility] called and is requesting a medical diagnosis warranting the PT so that they can enter it into the chart. Please call Samina back with the medical diagnosis.  Routing high priority per PT request.    Norah Singletary RN  [facility] Triage Team

## 2022-10-01 NOTE — TELEPHONE ENCOUNTER
Deconditioning due to;  Acute hypoxic respiratory failure, resolved  Aspiration pneumonitis, improving  Aspiration pneumonia  Sepsis secondary above (WBC, lactate, tachycardia, tachypnea, hypotension)  Lactic acidosis secondary to above, resolved  Enterococcus faecalis bacteremia

## 2022-10-03 ENCOUNTER — TELEPHONE (OUTPATIENT)
Dept: FAMILY MEDICINE | Facility: CLINIC | Age: 87
End: 2022-10-03

## 2022-10-03 NOTE — TELEPHONE ENCOUNTER
Reason for call: appointment. PT needs her 2 month follow up with Braeden Sood MD. No openings/no schedule coming up. Please reach out to pts daughter, Tina to schedule.     Phone number to reach patient: [phone]  Best Time:  Tina is in meetings all day today but will look out for the phone call, otherwise leave a voicemail.    Can we leave a detailed message on this number?  YES     Travel screening: Not applicable

## 2022-10-03 NOTE — TELEPHONE ENCOUNTER
Call to Samina, physical therapy from [facility] at [phone]. Samina informed of Dr. Fernandez's below response. Samina verbalized understanding.       Denita Hayes, RN BSN MSN  [facility]

## 2022-10-06 NOTE — TELEPHONE ENCOUNTER
Spoke with pt's daughter and scheduled an appointment with  on 11/04/2022.      Tammi Lizarraga CMA

## 2022-10-07 DIAGNOSIS — Z53.9 DIAGNOSIS NOT YET DEFINED: Primary | ICD-10-CM

## 2022-10-07 PROCEDURE — G0180 MD CERTIFICATION HHA PATIENT: HCPCS | Performed by: INTERNAL MEDICINE

## 2022-10-10 ENCOUNTER — PATIENT OUTREACH (OUTPATIENT)
Dept: CARE COORDINATION | Facility: CLINIC | Age: 87
End: 2022-10-10

## 2022-10-10 NOTE — LETTER
[facility] CARE COORDINATION  [address] [place] [address]    October 10, 2022    Margy Babin  [address]  [facility] [address]      Dear Tina,        I am a clinic care coordinator who works with Braeden Sood MD with the [facility]. I wanted to thank you for spending the time to talk with me.  Below is a description of clinic care coordination and how I can further assist you.       The clinic care coordination team is made up of a registered nurse, , financial resource worker and community health worker who understand the health care system. The goal of clinic care coordination is to help you manage your health and improve access to the health care system. Our team works alongside your provider to assist you in determining your health and social needs. We can help you obtain health care and community resources, providing you with necessary information and education. We can work with you through any barriers and develop a care plan that helps coordinate and strengthen the communication between you and your care team.    Please feel free to contact me with any questions or concerns regarding care coordination and what we can offer.      We are focused on providing you with the highest-quality healthcare experience possible.    Sincerely,     Aretha Funez, [facility]  Clinic Care Coordinator  [facility]  [phone]

## 2022-10-10 NOTE — PROGRESS NOTES
Clinic Care Coordination Contact  [facility]: Post-Discharge Note  SITUATION                                                      Admission:    Admission Date: 09/01/22   Reason for Admission: respiratory distress  Discharge:   Discharge Date: 09/07/22  Discharge Diagnosis: acute hypoxic respiratory failure    BACKGROUND                                                      Per hospital discharge summary and inpatient provider notes:    Margy Babin is a 89 year old female with a past medical history of heart failure with reduced ejection fraction, essential hypertension, chronic left bundle branch block, atrial fibrillation on anticoagulation with Xarelto, depression, history of stroke presents to hospital with acute hypoxic respiratory failure secondary to aspiration pneumonitis.     Acute hypoxic respiratory failure, resolved  Aspiration pneumonitis, improving  Aspiration pneumonia  Sepsis secondary above (WBC, lactate, tachycardia, tachypnea, hypotension)  Lactic acidosis secondary to above, resolved  Enterococcus faecalis bacteremia   Patient presented with acute onset hypoxic respiratory failure.  Found to have groundglass opacities interpreted as multifocal bronchopneumonia.  Given history of vomiting prior to hypoxia concern is for aspiration. The patient improved in the er with bipap, but then vomited again.    * 1 out of 2 blood cultures on admission with E.faecalis  -continuous pulse oximetry  -Supplemental O2  -No BIPAP due to recurrent aspiration and aspiration on bipap in ED  -Switched to zosyn and zithromax on 9/2/22  -zosyn to unasyn on 9/6. Discharge on ampicillin q6h. Plan 14 days abx (until 9/16)  -appreciate ID consult  -follow blood cultures, no growth on repeat cultures at time of discharge    - off O2 at discharge     Mild pancolitis  * Seen on ct. patient denies any abdominal pain or recent diarrhea. abdominal exam benign  * No diarrhea or abdominal pain   - monitor  - abx as  above    ASSESSMENT           Discharge Assessment  How are you doing now that you are home?: Doing well at [facility] and has Home Care.  How are your symptoms? (Red Flag symptoms escalate to triage hotline per guidelines): Improved  Do you feel your condition is stable enough to be safe at home until your provider visit?: Yes  Does the patient have their discharge instructions? : Yes  Does the patient have questions regarding their discharge instructions? : No  Were you started on any new medications or were there changes to any of your previous medications? : No  Does the patient have all of their medications?: Yes  Do you have questions regarding any of your medications? : No  Do you have all of your needed medical supplies or equipment (DME)?  (i.e. oxygen tank, CPAP, cane, etc.): Yes (Pt was not discharged with O2 and seems to be doing fine. Dtr was unsure if Home Care is measuring O2 level. CC SW recommended she call Home Care agency to discuss checking pt's O2 at next visit.)  Discharge follow-up appointment scheduled within 14 calendar days? : No  Is patient agreeable to assistance with scheduling? : No (Pt has appointment with PCP 11/4. Dtr shared pt may transition to in-house provider)         Post-op (Clinicians Only)  Did the patient have surgery or a procedure: No  Fever: No  Chills: No  Eating & Drinking: eating and drinking without complaints/concerns  PO Intake: regular diet    PLAN                                                      Outpatient Plan:      Future Appointments   Date Time Provider Department Center   11/4/2022 11:00 AM Colin Lake MD [facility]         For any urgent concerns, please contact our 24 hour nurse triage line: [phone] ([phone])       Aretha Funez [facility]  Clinic Care Coordinator  [facility]  [phone]  [email or web address]

## 2022-10-11 ENCOUNTER — TELEPHONE (OUTPATIENT)
Dept: FAMILY MEDICINE | Facility: CLINIC | Age: 87
End: 2022-10-11

## 2022-10-11 NOTE — TELEPHONE ENCOUNTER
Alexa nurse from [facility] called requesting discontinuation of orders from Dr. Sood. She states the pt was recently hospitalized and was discharged with oxygen. The pt does not need the O2, her saturations are at 98% without it and she is doing well without the O2. She has not been needing it. If approved, see fax below.     Need to fax: [phone]    Cheryl HAYS RN  [facility]

## 2022-10-15 ENCOUNTER — HEALTH MAINTENANCE LETTER (OUTPATIENT)
Age: 87
End: 2022-10-15

## 2022-10-21 ENCOUNTER — OFFICE VISIT (OUTPATIENT)
Dept: FAMILY MEDICINE | Facility: CLINIC | Age: 87
End: 2022-10-21
Payer: MEDICARE

## 2022-10-21 VITALS
HEART RATE: 78 BPM | OXYGEN SATURATION: 96 % | WEIGHT: 163 LBS | RESPIRATION RATE: 16 BRPM | SYSTOLIC BLOOD PRESSURE: 134 MMHG | TEMPERATURE: 97.4 F | BODY MASS INDEX: 27.16 KG/M2 | HEIGHT: 65 IN | DIASTOLIC BLOOD PRESSURE: 67 MMHG

## 2022-10-21 DIAGNOSIS — H61.23 BILATERAL IMPACTED CERUMEN: ICD-10-CM

## 2022-10-21 DIAGNOSIS — Z87.09 H/O ASPIRATION PNEUMONITIS: Primary | ICD-10-CM

## 2022-10-21 PROCEDURE — 99212 OFFICE O/P EST SF 10 MIN: CPT | Mod: 25 | Performed by: INTERNAL MEDICINE

## 2022-10-21 PROCEDURE — 69209 REMOVE IMPACTED EAR WAX UNI: CPT | Performed by: INTERNAL MEDICINE

## 2022-10-21 ASSESSMENT — PAIN SCALES - GENERAL: PAINLEVEL: NO PAIN (0)

## 2022-10-21 NOTE — PROGRESS NOTES
"  Assessment & Plan     H/O aspiration pneumonitis  Doing well breathing is back to normal.  O2 saturations between 96 and 97% on room air.  She does not complain of dyspnea.    Bilateral impacted cerumen  Has significant impaction of cerumen.  Will be removed with a water lavage today.  - REMOVE IMPACTED CERUMEN           MED REC REQUIRED  Post Medication Reconciliation Status:   BMI:   Estimated body mass index is 27.12 kg/m  as calculated from the following:    Height as of this encounter: 1.651 m (5' 5\").    Weight as of this encounter: 73.9 kg (163 lb).       See Patient Instructions    Return in about 3 months (around 1/21/2023) for Follow up.    Braeden Sood MD  [facility] MAGRA Ray is a 89 year old, presenting for the following health issues:  Hospital F/U  Patient recently hospitalized in the setting of gastritis, nausea and vomiting, and aspiration pneumonitis.    Currently she is doing well.  She has had no issues with swallowing abnormality at her care center.  She is eating a regular diet.    Her breathing has normalized.  Her saturations are 96 to 97% on room air and she denies dyspnea.  Her appetite is good.    History of Present Illness       Reason for visit:  Final check up with Dr. Sood and plan for smooth transition to new doctor; discuss recent hospital stay and health condition since then    She eats 4 or more servings of fruits and vegetables daily.She consumes 1 sweetened beverage(s) daily.She exercises with enough effort to increase her heart rate 9 or less minutes per day.  She exercises with enough effort to increase her heart rate 3 or less days per week.   She is taking medications regularly.       Post Discharge Outreach 10/10/2022   Admission Date 9/1/2022   Reason for Admission respiratory distress   Discharge Date 9/7/2022   Discharge Diagnosis acute hypoxic respiratory failure   How are you doing now that you are home? Doing well at [facility] and has " Home Care.   How are your symptoms? (Red Flag symptoms escalate to triage hotline per guidelines) Improved   Do you feel your condition is stable enough to be safe at home until your provider visit? Yes   Does the patient have their discharge instructions?  Yes   Does the patient have questions regarding their discharge instructions?  No   Were you started on any new medications or were there changes to any of your previous medications?  No   Does the patient have all of their medications? Yes   Do you have questions regarding any of your medications?  No   Do you have all of your needed medical supplies or equipment (DME)?  (i.e. oxygen tank, CPAP, cane, etc.) Yes   Discharge follow-up appointment scheduled within 14 calendar days?  No   Discharge Follow Up Appointment Date -   Discharge Follow Up Appointment Scheduled with? -     Hospital Follow-up Visit:    Hospital/Nursing Home/ Rehab Facility: [facility]  Date of Admission: 9/1/2022  Date of Discharge: 9/7/2022  Reason(s) for Admission: Acute hypoxic respiratory failure, resolved  Aspiration pneumonitis, improving  Aspiration pneumonia  Sepsis secondary above (WBC, lactate, tachycardia, tachypnea, hypotension)  Lactic acidosis secondary to above, resolved  Enterococcus faecalis bacteremia  Mild pancolitis  Prolonged qtc  Atrial fibrillation on anticoagulation  Heart failure with reduced ejection fraction  Essential tremor  Chronic left bundle branch block  Depression  Mild to moderate oral pharyngeal dysphagia secondary to stroke  Was your hospitalization related to COVID-19? No   Problems taking medications regularly:  None  Medication changes since discharge: None  Problems adhering to non-medication therapy:  None    Summary of hospitalization:    Diagnostic Tests/Treatments reviewed.  Follow up needed: none  Other Healthcare Providers Involved in Patient s Care:         None  Update since discharge: improved.         Plan of care  "communicated with patient and family               Review of Systems   Constitutional, HEENT, cardiovascular, pulmonary, gi and gu systems are negative, except as otherwise noted.      Objective    /67 (BP Location: Right arm, Patient Position: Sitting, Cuff Size: Adult Regular)   Pulse 78   Temp 97.4  F (36.3  C) (Oral)   Resp 16   Ht 1.651 m (5' 5\")   Wt 73.9 kg (163 lb)   SpO2 96%   BMI 27.12 kg/m    Body mass index is 27.12 kg/m .  Physical Exam   Lungs are clear  Heart regular rate and rhythm  Affect and mood are normal  Bilateral cerumen impaction    Lab Requisition on 09/21/2022   Component Date Value Ref Range Status     Creatinine 09/21/2022 1.06 (H)  0.51 - 0.95 mg/dL Final     GFR Estimate 09/21/2022 49 (L)  >60 mL/min/1.73m2 Final    Effective December 21, 2021 eGFRcr in adults is calculated using the 2021 CKD-EPI creatinine equation which includes age and gender (Carine et al., [facility], DOI: 10.1056/SHZUyp9553320)     AST 09/21/2022 28  10 - 35 U/L Final     WBC Count 09/21/2022 10.7  4.0 - 11.0 10e3/uL Final     RBC Count 09/21/2022 4.30  3.80 - 5.20 10e6/uL Final     Hemoglobin 09/21/2022 11.4 (L)  11.7 - 15.7 g/dL Final     Hematocrit 09/21/2022 38.1  35.0 - 47.0 % Final     MCV 09/21/2022 89  78 - 100 fL Final     MCH 09/21/2022 26.5  26.5 - 33.0 pg Final     MCHC 09/21/2022 29.9 (L)  31.5 - 36.5 g/dL Final     RDW 09/21/2022 15.0  10.0 - 15.0 % Final     Platelet Count 09/21/2022 507 (H)  150 - 450 10e3/uL Final     % Neutrophils 09/21/2022 65  % Final     % Lymphocytes 09/21/2022 18  % Final     % Monocytes 09/21/2022 8  % Final     % Eosinophils 09/21/2022 7  % Final     % Basophils 09/21/2022 1  % Final     % Immature Granulocytes 09/21/2022 1  % Final     NRBCs per 100 WBC 09/21/2022 0  <1 /100 Final     Absolute Neutrophils 09/21/2022 6.9  1.6 - 8.3 10e3/uL Final     Absolute Lymphocytes 09/21/2022 1.9  0.8 - 5.3 10e3/uL Final     Absolute Monocytes 09/21/2022 0.9  0.0 - 1.3 10e3/uL " Final     Absolute Eosinophils 09/21/2022 0.8 (H)  0.0 - 0.7 10e3/uL Final     Absolute Basophils 09/21/2022 0.1  0.0 - 0.2 10e3/uL Final     Absolute Immature Granulocytes 09/21/2022 0.1  <=0.4 10e3/uL Final     Absolute NRBCs 09/21/2022 0.0  10e3/uL Final

## 2022-10-21 NOTE — PATIENT INSTRUCTIONS
Flor;  I think you are doing very well.    You have significant amount of wax in your ears.  We will wash this out today with a water lavage.    I would recommend that you follow-up with  in the future.    Best regards.  It has been a pleasure being your physician.    Braeden

## 2022-10-21 NOTE — NURSING NOTE
Patient identified using two patient identifiers.  Ear exam showing wax occlusion completed by provider.  Solution: warm water was placed in the bilateral ear(s) via irrigation tool: elephant ear.  Vianey Lisa, [facility]

## 2022-10-24 DIAGNOSIS — H04.123 DRY EYES: ICD-10-CM

## 2022-10-24 RX ORDER — CYCLOSPORINE 0.5 MG/ML
EMULSION OPHTHALMIC
Qty: 30 EACH | Refills: 10 | Status: ON HOLD | OUTPATIENT
Start: 2022-10-24 | End: 2024-04-28

## 2022-10-24 NOTE — TELEPHONE ENCOUNTER
Routing refill request to provider for review/approval because:  Drug not on the [facility] refill protocol   Cheryl HAYS RN  [facility]

## 2022-10-27 ENCOUNTER — TELEPHONE (OUTPATIENT)
Dept: FAMILY MEDICINE | Facility: CLINIC | Age: 87
End: 2022-10-27

## 2022-10-27 DIAGNOSIS — I10 ESSENTIAL HYPERTENSION: ICD-10-CM

## 2022-10-27 RX ORDER — AMLODIPINE BESYLATE 2.5 MG/1
2.5 TABLET ORAL DAILY
Qty: 30 TABLET | Refills: 10 | Status: SHIPPED | OUTPATIENT
Start: 2022-10-27 | End: 2023-08-09

## 2022-10-27 NOTE — TELEPHONE ENCOUNTER
Alexa DEAL from [facility] to request refill of amlodipine    Current script for 2.5 mg daily     Sunshine states that records at the [facility] represent dosage of amlodipine had been increased to 5 mg daily as of 9/22/22    PCP please clarify if patient should be taking 5 mg daily? Pharmacy and retirement in need of new order. Pended medication for PCP to please review & clarify dosage     Preferred pharmacy:   [facility], [address]    Please route back to triage to fax orders to below     Order to be faxed [place] [place] : [phone]    Ramesh Ching RN  [facility]

## 2022-10-28 DIAGNOSIS — E78.5 HYPERLIPIDEMIA LDL GOAL <100: Primary | ICD-10-CM

## 2022-10-28 NOTE — TELEPHONE ENCOUNTER
Spoke to facility and confirmed 2.5 mg dosage per PCP.     Pastora Tim RN  [facility]

## 2022-10-31 RX ORDER — ATORVASTATIN CALCIUM 20 MG/1
TABLET, FILM COATED ORAL
Qty: 30 TABLET | Refills: 11 | OUTPATIENT
Start: 2022-10-31

## 2022-10-31 RX ORDER — ATORVASTATIN CALCIUM 20 MG/1
20 TABLET, FILM COATED ORAL DAILY
Qty: 90 TABLET | Refills: 3 | Status: SHIPPED | OUTPATIENT
Start: 2022-10-31

## 2022-10-31 NOTE — TELEPHONE ENCOUNTER
Routing refill request to provider for review/approval because:  Labs not current:    Creatinine   Date Value Ref Range Status   09/21/2022 1.06 (H) 0.51 - 0.95 mg/dL Final   12/25/2020 0.76 0.52 - 1.04 mg/dL Final     Medication is reported    Sade Tyler RN

## 2022-10-31 NOTE — TELEPHONE ENCOUNTER
Denied request, duplicate. Multiple requests, this has been sent to the provider.   Cheryl HAYS RN  [facility]

## 2022-11-02 DIAGNOSIS — R35.1 NOCTURIA: ICD-10-CM

## 2022-11-04 RX ORDER — OXYBUTYNIN CHLORIDE 10 MG/1
TABLET, EXTENDED RELEASE ORAL
Qty: 180 TABLET | Refills: 0 | Status: SHIPPED | OUTPATIENT
Start: 2022-11-04 | End: 2022-12-07

## 2022-11-04 NOTE — TELEPHONE ENCOUNTER
Appointments in Next Year    Dec 21, 2022  1:30 PM  (Arrive by 1:10 PM)  Provider Visit with Colin Lake MD  [facility] ([facility] ) [phone]        Medication filled 1 time as pt needs establish care visit. Patient is already scheduled for upcoming appointment.    Kristen Mendez RN  [facility]

## 2022-11-15 DIAGNOSIS — I48.0 PAROXYSMAL ATRIAL FIBRILLATION (H): ICD-10-CM

## 2022-11-15 RX ORDER — METOPROLOL SUCCINATE 100 MG/1
TABLET, EXTENDED RELEASE ORAL
Qty: 30 TABLET | Refills: 10 | Status: SHIPPED | OUTPATIENT
Start: 2022-08-12

## 2022-11-15 NOTE — TELEPHONE ENCOUNTER
30 tablets with 10 refills send on 8/12/2022    No change in pharmacy.     Prescription approved per [facility] Refill Protocol.    Resending with original start date.     Denita Hayes RN BSN MSN  [facility]

## 2022-11-15 NOTE — TELEPHONE ENCOUNTER
Sunshine with [facility] calling for refill request. She is now aware Dr. Sood no longer in clinic. Pt does have appointment next month with Dr. Lake.     Routing refill request to provider for review/approval because:  Labs out of range/not current:  Creat, Platelets    Cheryl HAYS RN  [facility]

## 2022-11-16 DIAGNOSIS — G47.00 INSOMNIA, UNSPECIFIED TYPE: ICD-10-CM

## 2022-11-18 RX ORDER — RIVAROXABAN 15 MG/1
TABLET, FILM COATED ORAL
Refills: 4 | OUTPATIENT
Start: 2022-11-18

## 2022-11-18 RX ORDER — QUETIAPINE FUMARATE 25 MG/1
TABLET, FILM COATED ORAL
Qty: 90 TABLET | Refills: 0 | Status: SHIPPED | OUTPATIENT
Start: 2022-11-18 | End: 2023-01-24

## 2022-11-18 NOTE — TELEPHONE ENCOUNTER
Appointments in Next Year    Dec 21, 2022  1:30 PM  (Arrive by 1:10 PM)  Provider Visit with Colin Lake MD  [facility] ([facility] ) [phone]        Medication filled 1 time as pt is due for a follow-up in clinic. Patient is already scheduled for upcoming appointment. to est with new PCP  Requested Prescriptions   Pending Prescriptions Disp Refills     QUEtiapine (SEROQUEL) 25 MG tablet [Pharmacy Med Name: QUETIAPINE FUMARATE F/C 25MG TABLET] 30 tablet 4     Si TAB BY MOUTH AT BEDTIME (DX: INSOMNIA)       Antipsychotic Medications Passed - 2022  1:06 AM        Passed - Blood pressure under 140/90 in past 12 months     BP Readings from Last 3 Encounters:   10/21/22 134/67   22 114/76   22 (!) 152/74                 Passed - Patient is 12 years of age or older        Passed - Lipid panel on file within the past 12 months     Recent Labs   Lab Test 22  0705   CHOL 140   TRIG 86   HDL 46*   LDL 77   NHDL 94               Passed - CBC on file in past 12 months     Recent Labs   Lab Test 22  0543   WBC 10.7   RBC 4.30   HGB 11.4*   HCT 38.1   *                 Passed - Heart Rate on file within past 12 months     Pulse Readings from Last 3 Encounters:   10/21/22 78   22 52   22 68               Passed - A1c or Glucose on file in past 12 months     Recent Labs   Lab Test 22  0825 22  0705 22  1126   GLC 96   < > 98   A1C  --   --  5.8*    < > = values in this interval not displayed.       Please review patients last 3 weights. If a weight gain of >10 lbs exists, you may refill the prescription once after instructing the patient to schedule an appointment within the next 30 days.    Wt Readings from Last 3 Encounters:   10/21/22 73.9 kg (163 lb)   22 72.8 kg (160 lb 8 oz)   22 72.4 kg (159 lb 11.2 oz)             Passed - Medication is active on med list        Passed - Patient is not  "pregnant        Passed - No positve pregnancy test on file in past 12 months        Passed - Recent (6 mo) or future (30 days) visit within the authorizing provider's specialty     Patient had office visit in the last 6 months or has a visit in the next 30 days with authorizing provider or within the authorizing provider's specialty.  See \"Patient Info\" tab in inbasket, or \"Choose Columns\" in Meds & Orders section of the refill encounter.                 "

## 2022-12-06 DIAGNOSIS — R35.1 NOCTURIA: ICD-10-CM

## 2022-12-07 RX ORDER — OXYBUTYNIN CHLORIDE 10 MG/1
TABLET, EXTENDED RELEASE ORAL
Qty: 180 TABLET | Refills: 2 | Status: SHIPPED | OUTPATIENT
Start: 2022-12-07 | End: 2023-08-09

## 2022-12-16 DIAGNOSIS — F03.90 DEMENTIA (H): ICD-10-CM

## 2022-12-19 ENCOUNTER — MEDICAL CORRESPONDENCE (OUTPATIENT)
Dept: HEALTH INFORMATION MANAGEMENT | Facility: CLINIC | Age: 87
End: 2022-12-19

## 2022-12-19 RX ORDER — MEMANTINE HYDROCHLORIDE 5 MG/1
TABLET ORAL
Qty: 60 TABLET | Refills: 10 | Status: SHIPPED | OUTPATIENT
Start: 2022-12-19 | End: 2023-08-07

## 2022-12-27 ENCOUNTER — OFFICE VISIT (OUTPATIENT)
Dept: FAMILY MEDICINE | Facility: CLINIC | Age: 87
End: 2022-12-27
Payer: MEDICARE

## 2022-12-27 VITALS
RESPIRATION RATE: 18 BRPM | SYSTOLIC BLOOD PRESSURE: 137 MMHG | HEIGHT: 65 IN | DIASTOLIC BLOOD PRESSURE: 73 MMHG | HEART RATE: 68 BPM | WEIGHT: 159 LBS | BODY MASS INDEX: 26.49 KG/M2 | OXYGEN SATURATION: 98 %

## 2022-12-27 DIAGNOSIS — I10 ESSENTIAL HYPERTENSION: Primary | ICD-10-CM

## 2022-12-27 DIAGNOSIS — I48.0 PAROXYSMAL ATRIAL FIBRILLATION (H): ICD-10-CM

## 2022-12-27 DIAGNOSIS — F03.90 DEMENTIA, UNSPECIFIED DEMENTIA SEVERITY, UNSPECIFIED DEMENTIA TYPE, UNSPECIFIED WHETHER BEHAVIORAL, PSYCHOTIC, OR MOOD DISTURBANCE OR ANXIETY (H): ICD-10-CM

## 2022-12-27 PROBLEM — K52.9 NONINFECTIVE GASTROENTERITIS AND COLITIS, UNSPECIFIED: Status: ACTIVE | Noted: 2022-04-28

## 2022-12-27 PROBLEM — I69.398 OTHER SEQUELAE OF CEREBRAL INFARCTION: Status: ACTIVE | Noted: 2022-04-28

## 2022-12-27 PROBLEM — I47.10 SUPRAVENTRICULAR TACHYCARDIA (H): Status: ACTIVE | Noted: 2022-01-11

## 2022-12-27 PROBLEM — Z79.899 OTHER LONG TERM (CURRENT) DRUG THERAPY: Status: ACTIVE | Noted: 2022-04-28

## 2022-12-27 PROBLEM — I45.81 LONG QT SYNDROME: Status: ACTIVE | Noted: 2022-09-07

## 2022-12-27 PROBLEM — R73.03 PREDIABETES: Status: ACTIVE | Noted: 2022-04-28

## 2022-12-27 PROBLEM — Z91.81 HISTORY OF FALLING: Status: ACTIVE | Noted: 2022-04-28

## 2022-12-27 PROBLEM — R53.81 OTHER MALAISE: Status: ACTIVE | Noted: 2022-09-07

## 2022-12-27 PROBLEM — R13.12 DYSPHAGIA, OROPHARYNGEAL PHASE: Status: ACTIVE | Noted: 2022-04-28

## 2022-12-27 PROBLEM — R26.9 UNSPECIFIED ABNORMALITIES OF GAIT AND MOBILITY: Status: ACTIVE | Noted: 2022-04-28

## 2022-12-27 PROBLEM — Z90.710 ACQUIRED ABSENCE OF BOTH CERVIX AND UTERUS: Status: ACTIVE | Noted: 2022-04-28

## 2022-12-27 PROBLEM — B95.2 ENTEROCOCCUS AS THE CAUSE OF DISEASES CLASSIFIED ELSEWHERE: Status: ACTIVE | Noted: 2022-09-09

## 2022-12-27 PROBLEM — R78.81 BACTEREMIA: Status: ACTIVE | Noted: 2022-09-07

## 2022-12-27 PROBLEM — R35.1 NOCTURIA: Status: ACTIVE | Noted: 2022-09-07

## 2022-12-27 PROBLEM — E88.09 OTHER DISORDERS OF PLASMA-PROTEIN METABOLISM, NOT ELSEWHERE CLASSIFIED: Status: ACTIVE | Noted: 2022-04-28

## 2022-12-27 PROBLEM — R29.6 REPEATED FALLS: Status: ACTIVE | Noted: 2022-04-28

## 2022-12-27 LAB
ANION GAP SERPL CALCULATED.3IONS-SCNC: 13 MMOL/L (ref 7–15)
BUN SERPL-MCNC: 25.2 MG/DL (ref 8–23)
CALCIUM SERPL-MCNC: 9.4 MG/DL (ref 8.2–9.6)
CHLORIDE SERPL-SCNC: 105 MMOL/L (ref 98–107)
CREAT SERPL-MCNC: 0.99 MG/DL (ref 0.51–0.95)
DEPRECATED HCO3 PLAS-SCNC: 25 MMOL/L (ref 22–29)
GFR SERPL CREATININE-BSD FRML MDRD: 53 ML/MIN/1.73M2
GLUCOSE SERPL-MCNC: 110 MG/DL (ref 70–99)
POTASSIUM SERPL-SCNC: 5.3 MMOL/L (ref 3.4–5.3)
SODIUM SERPL-SCNC: 143 MMOL/L (ref 136–145)

## 2022-12-27 PROCEDURE — 99214 OFFICE O/P EST MOD 30 MIN: CPT | Performed by: INTERNAL MEDICINE

## 2022-12-27 PROCEDURE — 80048 BASIC METABOLIC PNL TOTAL CA: CPT | Performed by: INTERNAL MEDICINE

## 2022-12-27 PROCEDURE — 36415 COLL VENOUS BLD VENIPUNCTURE: CPT | Performed by: INTERNAL MEDICINE

## 2022-12-27 ASSESSMENT — PATIENT HEALTH QUESTIONNAIRE - PHQ9: SUM OF ALL RESPONSES TO PHQ QUESTIONS 1-9: 3

## 2022-12-27 ASSESSMENT — PAIN SCALES - GENERAL: PAINLEVEL: NO PAIN (0)

## 2022-12-27 NOTE — PROGRESS NOTES
"  Assessment & Plan     Essential hypertension  Stable, labs.  RTC 4 M  - Basic metabolic panel  (Ca, Cl, CO2, Creat, Gluc, K, Na, BUN)    Dementia, unspecified dementia severity, unspecified dementia type, unspecified whether behavioral, psychotic, or mood disturbance or anxiety (H)  Medications noted. Lives in facility.  Has walker.      Paroxysmal atrial fibrillation (H)  On DOAC and B blocker. Continue.    Has seen cards in the past.       BMI:   Estimated body mass index is 26.46 kg/m  as calculated from the following:    Height as of this encounter: 1.651 m (5' 5\").    Weight as of this encounter: 72.1 kg (159 lb).           Return in about 4 months (around 4/27/2023) for Follow up.    Colin Lake MD  [facility] MARGA Ray is a 89 year old accompanied by her grand daughter, presenting for the following health issues:  Establish Care    Pt is new to me.  Was seeing Dr. Sood.    Dementia  Medications noted.  Lives in a facility.  Stable.  No falls. Uses a walker.     HTN  The patient has a history of hypertension.  Blood pressure is noted as below.  Compliance with medication is noted.  Side effects of medication are not described.  Symptoms of uncontrolled hypertension including chest pain, dizziness, and vision changes have not been observed.    A fib:  On DOAC and B blocker.  Stable.      History of Present Illness       Reason for visit:  Introduction to new doctor    She eats 4 or more servings of fruits and vegetables daily.She consumes 2 sweetened beverage(s) daily.She exercises with enough effort to increase her heart rate 9 or less minutes per day.  She exercises with enough effort to increase her heart rate 3 or less days per week.   She is taking medications regularly.             Review of Systems         Objective    /73 (BP Location: Right arm, Patient Position: Sitting, Cuff Size: Adult Large)   Pulse 68   Resp 18   Ht 1.651 m (5' 5\")   Wt 72.1 kg " (159 lb)   SpO2 98%   BMI 26.46 kg/m    Body mass index is 26.46 kg/m .  Physical Exam   GENERAL: healthy, alert and no distress  NECK: no adenopathy, no asymmetry, masses, or scars and thyroid normal to palpation  RESP: lungs clear to auscultation - no rales, rhonchi or wheezes  CV: regular rate and rhythm, normal S1 S2, no S3 or S4, no murmur, click or rub, no peripheral edema and peripheral pulses strong  ABDOMEN: soft, nontender, no hepatosplenomegaly, no masses and bowel sounds normal  MS: no gross musculoskeletal defects noted, no edema

## 2023-01-04 DIAGNOSIS — F33.42 RECURRENT MAJOR DEPRESSION IN COMPLETE REMISSION (H): ICD-10-CM

## 2023-01-04 RX ORDER — PAROXETINE 20 MG/1
TABLET, FILM COATED ORAL
Qty: 30 TABLET | Refills: 3 | Status: SHIPPED | OUTPATIENT
Start: 2023-01-04 | End: 2023-05-08

## 2023-01-18 DIAGNOSIS — M54.2 NECK PAIN: ICD-10-CM

## 2023-01-18 RX ORDER — ACETAMINOPHEN 500 MG
TABLET ORAL
Qty: 60 TABLET | Refills: 0 | Status: SHIPPED | OUTPATIENT
Start: 2023-01-18 | End: 2023-02-24

## 2023-01-24 DIAGNOSIS — G47.00 INSOMNIA, UNSPECIFIED TYPE: ICD-10-CM

## 2023-01-24 RX ORDER — QUETIAPINE FUMARATE 25 MG/1
TABLET, FILM COATED ORAL
Qty: 30 TABLET | Refills: 1 | Status: SHIPPED | OUTPATIENT
Start: 2023-01-24 | End: 2023-04-05

## 2023-02-24 DIAGNOSIS — M54.2 NECK PAIN: ICD-10-CM

## 2023-02-24 RX ORDER — ACETAMINOPHEN 500 MG
TABLET ORAL
Qty: 60 TABLET | Refills: 1 | Status: SHIPPED | OUTPATIENT
Start: 2023-02-24 | End: 2023-02-28

## 2023-02-24 NOTE — TELEPHONE ENCOUNTER
Sunshine from [facility] calling to request a refill of the following sent to pharmacy.     acetaminophen (ACETAMINOPHEN EXTRA STRENGTH) 500 MG tablet 60 tablet 0     Pended medication and routing to refill pool, confirmed with [place] that orders do not need to be faxed to facility, just a refill to be sent.    Ramesh Ching RN  [facility]

## 2023-02-28 DIAGNOSIS — M54.2 NECK PAIN: ICD-10-CM

## 2023-02-28 RX ORDER — ACETAMINOPHEN 500 MG
TABLET ORAL
Qty: 60 TABLET | Refills: 1 | Status: SHIPPED | OUTPATIENT
Start: 2023-02-28 | End: 2023-10-11

## 2023-03-30 NOTE — TELEPHONE ENCOUNTER
Writer reviewing open encounters. Per chart review, refill of acetaminophen sent to pharmacy by PCP on 2/28/23.    Closing encounter.    Ramesh Ching RN  [facility]

## 2023-04-04 DIAGNOSIS — I48.0 PAROXYSMAL ATRIAL FIBRILLATION (H): ICD-10-CM

## 2023-04-04 RX ORDER — RIVAROXABAN 15 MG/1
TABLET, FILM COATED ORAL
Qty: 30 TABLET | Refills: 4 | OUTPATIENT
Start: 2023-04-04

## 2023-04-05 NOTE — TELEPHONE ENCOUNTER
Outpatient Medication Detail     Disp Refills Start End    rivaroxaban ANTICOAGULANT (XARELTO) 15 MG TABS tablet 90 tablet 1 4/5/2023     Sig - Route: Take 1 tablet (15 mg) by mouth daily (with dinner) - Oral    Sent to pharmacy as: Rivaroxaban 15 MG Oral Tablet (XARELTO)    Class: E-Prescribe    E-Prescribing Status: Transmission to pharmacy failed (4/5/2023 12:23 PM CDT)      Pharmacy    [facility], [address] [place] [id number]   Phone: [phone] Fax: [phone]   Hours: Not open 24 hours     Rx failed - resent as it was sent by PCP     Kristen Mendez RN  [facility]

## 2023-04-14 ENCOUNTER — OFFICE VISIT (OUTPATIENT)
Dept: FAMILY MEDICINE | Facility: CLINIC | Age: 88
End: 2023-04-14
Payer: MEDICARE

## 2023-04-14 ENCOUNTER — NURSE TRIAGE (OUTPATIENT)
Dept: FAMILY MEDICINE | Facility: CLINIC | Age: 88
End: 2023-04-14

## 2023-04-14 VITALS
TEMPERATURE: 98 F | BODY MASS INDEX: 27.36 KG/M2 | OXYGEN SATURATION: 97 % | WEIGHT: 164.4 LBS | HEART RATE: 67 BPM | SYSTOLIC BLOOD PRESSURE: 132 MMHG | RESPIRATION RATE: 20 BRPM | DIASTOLIC BLOOD PRESSURE: 60 MMHG

## 2023-04-14 DIAGNOSIS — R10.13 EPIGASTRIC DISCOMFORT: Primary | ICD-10-CM

## 2023-04-14 LAB
ALBUMIN SERPL BCG-MCNC: 3.7 G/DL (ref 3.5–5.2)
ALP SERPL-CCNC: 120 U/L (ref 35–104)
ALT SERPL W P-5'-P-CCNC: 16 U/L (ref 10–35)
ANION GAP SERPL CALCULATED.3IONS-SCNC: 13 MMOL/L (ref 7–15)
AST SERPL W P-5'-P-CCNC: 24 U/L (ref 10–35)
BASOPHILS # BLD AUTO: 0.1 10E3/UL (ref 0–0.2)
BASOPHILS NFR BLD AUTO: 1 %
BILIRUB SERPL-MCNC: 0.2 MG/DL
BUN SERPL-MCNC: 24.9 MG/DL (ref 8–23)
CALCIUM SERPL-MCNC: 9.1 MG/DL (ref 8.2–9.6)
CHLORIDE SERPL-SCNC: 104 MMOL/L (ref 98–107)
CREAT SERPL-MCNC: 1.1 MG/DL (ref 0.51–0.95)
DEPRECATED HCO3 PLAS-SCNC: 22 MMOL/L (ref 22–29)
EOSINOPHIL # BLD AUTO: 0.8 10E3/UL (ref 0–0.7)
EOSINOPHIL NFR BLD AUTO: 8 %
ERYTHROCYTE [DISTWIDTH] IN BLOOD BY AUTOMATED COUNT: 15.5 % (ref 10–15)
GFR SERPL CREATININE-BSD FRML MDRD: 47 ML/MIN/1.73M2
GLUCOSE SERPL-MCNC: 104 MG/DL (ref 70–99)
HCT VFR BLD AUTO: 33.1 % (ref 35–47)
HGB BLD-MCNC: 10 G/DL (ref 11.7–15.7)
IMM GRANULOCYTES # BLD: 0 10E3/UL
IMM GRANULOCYTES NFR BLD: 0 %
LIPASE SERPL-CCNC: 28 U/L (ref 13–60)
LYMPHOCYTES # BLD AUTO: 2.7 10E3/UL (ref 0.8–5.3)
LYMPHOCYTES NFR BLD AUTO: 25 %
MCH RBC QN AUTO: 23.3 PG (ref 26.5–33)
MCHC RBC AUTO-ENTMCNC: 30.2 G/DL (ref 31.5–36.5)
MCV RBC AUTO: 77 FL (ref 78–100)
MONOCYTES # BLD AUTO: 1.1 10E3/UL (ref 0–1.3)
MONOCYTES NFR BLD AUTO: 10 %
NEUTROPHILS # BLD AUTO: 6.3 10E3/UL (ref 1.6–8.3)
NEUTROPHILS NFR BLD AUTO: 57 %
PLATELET # BLD AUTO: 491 10E3/UL (ref 150–450)
POTASSIUM SERPL-SCNC: 4.8 MMOL/L (ref 3.4–5.3)
PROT SERPL-MCNC: 6.9 G/DL (ref 6.4–8.3)
RBC # BLD AUTO: 4.3 10E6/UL (ref 3.8–5.2)
SODIUM SERPL-SCNC: 139 MMOL/L (ref 136–145)
WBC # BLD AUTO: 11.1 10E3/UL (ref 4–11)

## 2023-04-14 PROCEDURE — 99214 OFFICE O/P EST MOD 30 MIN: CPT | Performed by: PHYSICIAN ASSISTANT

## 2023-04-14 PROCEDURE — 36415 COLL VENOUS BLD VENIPUNCTURE: CPT | Performed by: PHYSICIAN ASSISTANT

## 2023-04-14 PROCEDURE — 85025 COMPLETE CBC W/AUTO DIFF WBC: CPT | Performed by: PHYSICIAN ASSISTANT

## 2023-04-14 PROCEDURE — 83690 ASSAY OF LIPASE: CPT | Performed by: PHYSICIAN ASSISTANT

## 2023-04-14 PROCEDURE — 80053 COMPREHEN METABOLIC PANEL: CPT | Performed by: PHYSICIAN ASSISTANT

## 2023-04-14 ASSESSMENT — PAIN SCALES - GENERAL: PAINLEVEL: SEVERE PAIN (7)

## 2023-04-14 NOTE — PROGRESS NOTES
Assessment & Plan     Epigastric discomfort  Overall she appears well today.  No signs of dehydration.  Ambulating appropriately and steadily with her walker.  Nontender abdomen however does endorse some mild discomfort with palpation.  Will check CBC, CMP, lipase and urinalysis.  Encouraged diligent hydration.  Appears as though her feeling of nausea has improved.  Was able to eat this afternoon without issue.  May trial Pepcid 20 mg twice daily.  Reviewed signs and symptoms with her and her granddaughter that warrant urgent/emergent reevaluation.  I will contact them with the results we will decide whether imaging is warranted.  Declined at this time.  Comfortable with plan.  - CBC with platelets and differential  - Comprehensive metabolic panel (BMP + Alb, Alk Phos, ALT, AST, Total. Bili, TP)  - Lipase  - UA Macro with Reflex to Micro and Culture - lab collect      30 minutes spent by me on the date of the encounter doing chart review, review of test results, interpretation of tests, patient visit and documentation        Return for follow-up per plan.    The likelihood of other entities in the differential is insufficient to justify any further testing for them at this time. This was explained to the patient. The patient was advised that persistent or worsening symptoms would require further evaluation. Patient advised to call the office and if unable to reach to go to the emergency room if they develop any new or worsening symptoms. Expressed understanding and agreement with above stated plan.     [facility]   Margydick Babin is a 89 year old female presenting for the following health issues:  Patient presents with:  Pain Management: Patient here for complaints of pain.  Gastrointestinal Problem: Patient having upset stomach x 1 week.    Here today with her granddaughter for evaluation of a 1 week history of epigastric abdominal discomfort.   "Denies pain.  No inciting event that she can think of that might of caused her discomfort to begin.  Endorses a feeling of nausea and emesis during the week.  Decreased appetite.  Bowels are moving regularly.  No blood in stool.  Denies urinary symptoms.  Afebrile.  No chills, shortness of breath, chest pain, weakness.  No OTC meds used.  Describes her discomfort as \"upset \".  Not sharp.  No radiation.  Lives in a assisted living facility who set up the appointment for her today.      Review of Systems   Constitutional, HEENT, cardiovascular, pulmonary, GI, , musculoskeletal, neuro, skin, endocrine and psych systems are negative, except as otherwise noted.      Objective    /60 (BP Location: Right arm, Patient Position: Sitting, Cuff Size: Adult Regular)   Pulse 67   Temp 98  F (36.7  C) (Oral)   Resp 20   Wt 74.6 kg (164 lb 6.4 oz)   SpO2 97%   BMI 27.36 kg/m    Data Unavailable  164 lbs 6.4 oz    No Known Allergies  Current Outpatient Medications   Medication Sig Dispense Refill     amLODIPine (NORVASC) 2.5 MG tablet Take 1 tablet (2.5 mg) by mouth daily 30 tablet 10     atorvastatin (LIPITOR) 20 MG tablet Take 1 tablet (20 mg) by mouth daily 90 tablet 3     bisacodyl (DULCOLAX) 5 MG EC tablet 1 TAB BY MOUTH DAILY AS NEEDED FOR CONSTIPATION 30 tablet 10     Cholecalciferol (VITAMIN D HIGH POTENCY) 25 MCG (1000 UT) CAPS 1 CAP BY MOUTH DAILY 30 capsule 10     docusate sodium (COLACE) 100 MG capsule TAKE 1 CAP BY MOUTH TWICE DAILY AS NEEDED FOR CONSTIPATION 60 capsule 10     guaiFENesin (ROBITUSSIN) 20 mg/mL SOLN solution Take 10 mLs by mouth every 4 hours as needed for cough       memantine (NAMENDA) 5 MG tablet 1 TAB BY MOUTH TWICE DAILY (DX: DEMENTIA) 60 tablet 10     metoprolol succinate ER (TOPROL XL) 100 MG 24 hr tablet 1 TAB BY MOUTH DAILY (DX: HYPERTENSION) 30 tablet 10     ondansetron (ZOFRAN) 4 MG tablet 1 TAB BY MOUTH EVERY 8 HOURS AS NEEDED FOR NAUSEA 10 tablet 10     order for DME Equipment " being ordered: Walker Wheels () and Walker ()  Treatment Diagnosis: Difficulty ambulating 1 each 0     oxybutynin ER (DITROPAN XL) 10 MG 24 hr tablet 2 TABS (20MG) BY MOUTH DAILY (DX: NOCTURIA) 180 tablet 2     PARoxetine (PAXIL) 20 MG tablet 1 TAB BY MOUTH AT BEDTIME Strength: 20 mg 30 tablet 3     QUEtiapine (SEROQUEL) 25 MG tablet 1 TAB BY MOUTH AT BEDTIME (DX: INSOMNIA) 30 tablet 1     ramelteon (ROZEREM) 8 MG tablet 1 TAB BY MOUTH EVERY NIGHT AS NEEDED FOR SLEEP 30 tablet 10     RESTASIS 0.05 % ophthalmic emulsion INSTILL 1 DROP INTO BOTH EYES DAILY (DX: DRY EYES) ++CLAUDETTE++ 30 each 10     rivaroxaban ANTICOAGULANT (XARELTO) 15 MG TABS tablet Take 1 tablet (15 mg) by mouth daily (with dinner) 90 tablet 1     acetaminophen (ACETAMINOPHEN EXTRA STRENGTH) 500 MG tablet 2 TABS (1000MG) BY MOUTH DAILY AND 2 TABS (1000MG) BY MOUTH EVERY 12 HOURS AS NEEDED FOR PAIN - NOT TO EXCEED 4000MG APAP/24HRS STRENGTH: 500 MG 60 tablet 1     Past Medical History:   Diagnosis Date     Anxiety      Blepharitis of both eyes      Cerebrovascular accident (CVA) due to embolism (H) 04/2022    corpus striatum     Chronic atrial fibrillation (H)      Depression, major, recurrent, in complete remission (H)      Dry eye syndrome      Dyspnea on exertion 10/07/2021     Essential hypertension 03/05/2020     Familial tremor 03/06/2013     Insomnia, unspecified type 11/03/2014    No CSA on file Last  check - 02/28/2020 with no concerns.     Mixed hyperlipidemia 11/04/2020     Nausea without vomiting 06/16/2015     Problem list name updated by automated process. Provider to review     Tremor, hereditary, benign      Past Surgical History:   Procedure Laterality Date     HYSTERECTOMY TOTAL ABDOMINAL, BILATERAL SALPINGO-OOPHORECTOMY, COMBINED       NECK SURGERY  2009    cervical fusion     ROTATOR CUFF REPAIR RT/LT Right      ZZC TOTAL HIP ARTHROPLASTY Right 1997       Physical Exam   GENERAL: healthy, alert and no distress  EYES: Eyes  grossly normal to inspection, PERRL and conjunctivae and sclerae normal.  No jaundice.  NECK: no adenopathy, no asymmetry, masses, or scars and thyroid normal to palpation  RESP: lungs clear to auscultation - no rales, rhonchi or wheezes  CV: regular rate and rhythm, normal S1 S2, no S3 or S4, no murmur, click or rub, no peripheral edema and peripheral pulses strong  ABDOMEN: soft, nontender, no hepatosplenomegaly, no masses and bowel sounds normal.  Does endorse some discomfort over her epigastric region.  Negative Landa sign.  Negative Rovsing/McBurney point tenderness.  MS: no gross musculoskeletal defects noted, no edema  SKIN: no suspicious lesions or rashes  NEURO: Normal strength and tone, mentation intact and speech normal  PSYCH: mentation appears normal, affect normal/bright      Answers for HPI/ROS submitted by the patient on 4/14/2023  How many servings of fruits and vegetables do you eat daily?: 2-3  On average, how many sweetened beverages do you drink each day (Examples: soda, juice, sweet tea, etc.  Do NOT count diet or artificially sweetened beverages)?: 2  How many minutes a day do you exercise enough to make your heart beat faster?: 9 or less  How many days a week do you exercise enough to make your heart beat faster?: 3 or less  What is the reason for your visit today?: stomach ache wont go away  When did your symptoms begin?: 3-7 days ago  What are your symptoms?: stomach ache  How would you describe these symptoms?: Mild  Are your symptoms:: Worsening  Have you had these symptoms before?: Yes  Have you tried or received treatment for these symptoms before?: No  Is there anything that makes you feel worse?: no  Is there anything that makes you feel better?: no

## 2023-04-14 NOTE — TELEPHONE ENCOUNTER
"Patient is in long term facility. Family is requesting appointment for patient to be seen for abdominal discomfort.     According to facility RN, she has had moderate abdominal pain for a few days. She is not showing signs of dehydration.     They are unable to test for UTI in facility.     Appointment scheduled today with [facility] provider.     Reason for Disposition    [1] MILD-MODERATE pain AND [2] constant AND [3] present > 2 hours    Answer Assessment - Initial Assessment Questions  1. LOCATION: \"Where does it hurt?\"      \"stomach pain\" per facility RN  2. RADIATION: \"Does the pain shoot anywhere else?\" (e.g., chest, back)      No  3. ONSET: \"When did the pain begin?\" (e.g., minutes, hours or days ago)       A few days.   4. SUDDEN: \"Gradual or sudden onset?\"      Gradual.  5. PATTERN \"Does the pain come and go, or is it constant?\"     - If constant: \"Is it getting better, staying the same, or worsening?\"       (Note: Constant means the pain never goes away completely; most serious pain is constant and it progresses)      - If intermittent: \"How long does it last?\" \"Do you have pain now?\"      (Note: Intermittent means the pain goes away completely between bouts)      Constant.  6. SEVERITY: \"How bad is the pain?\"  (e.g., Scale 1-10; mild, moderate, or severe)    - MILD (1-3): doesn't interfere with normal activities, abdomen soft and not tender to touch     - MODERATE (4-7): interferes with normal activities or awakens from sleep, abdomen tender to touch     - SEVERE (8-10): excruciating pain, doubled over, unable to do any normal activities       Moderate.  7. RECURRENT SYMPTOM: \"Have you ever had this type of stomach pain before?\" If Yes, ask: \"When was the last time?\" and \"What happened that time?\"       No  8. CAUSE: \"What do you think is causing the stomach pain?\"      Not sure? UTI? No urinary symptoms except discomfort in abdomin area.  Not constipated or diarrhea. No nausea and vomiting.   9. " "RELIEVING/AGGRAVATING FACTORS: \"What makes it better or worse?\" (e.g., movement, antacids, bowel movement)      No.  10. OTHER SYMPTOMS: \"Do you have any other symptoms?\" (e.g., back pain, diarrhea, fever, urination pain, vomiting)        No.   11. PREGNANCY: \"Is there any chance you are pregnant?\" \"When was your last menstrual period?\"        N/A    Protocols used: ABDOMINAL PAIN - FEMALE-A-    Pastora Tim RN  -[facility]     "

## 2023-04-17 ENCOUNTER — TELEPHONE (OUTPATIENT)
Dept: FAMILY MEDICINE | Facility: CLINIC | Age: 88
End: 2023-04-17

## 2023-04-17 NOTE — RESULT ENCOUNTER NOTE
Can we call the family to see how she is doing since last Friday?  Labs more or less look stable.  Slightly more anemic than 6 months ago.  Otherwise her electrolytes, kidney function, and liver function are stable.

## 2023-04-17 NOTE — TELEPHONE ENCOUNTER
Uziel Carl PA-C  [facility] Triage Im  Can we call the family to see how she is doing since last Friday?  Labs more or less look stable.  Slightly more anemic than 6 months ago.  Otherwise her electrolytes, kidney function, and liver function are stable.     Triage called patients daughter Tina. She is in [facility] but will have patients granddaughter Yarelis call clinic back with health update.

## 2023-04-18 NOTE — TELEPHONE ENCOUNTER
Patient Contact - daughter Tina (C2C)    Attempt # 2    Was call answered?  No. Not available - unable to leave VM    Triage to recall - please follow up on patient status since appointment - see provider note below     Ramesh Ching RN  [facility]

## 2023-04-19 NOTE — TELEPHONE ENCOUNTER
FYI:     Spoke with patient     Doing okay - feels fine today     Pain is resolved     Notified her of lab results below    Zuleyka SIDHU, Triage RN  [facility]

## 2023-05-03 ENCOUNTER — APPOINTMENT (OUTPATIENT)
Dept: GENERAL RADIOLOGY | Facility: CLINIC | Age: 88
End: 2023-05-03
Attending: EMERGENCY MEDICINE
Payer: MEDICARE

## 2023-05-03 ENCOUNTER — MEDICAL CORRESPONDENCE (OUTPATIENT)
Dept: HEALTH INFORMATION MANAGEMENT | Facility: CLINIC | Age: 88
End: 2023-05-03

## 2023-05-03 ENCOUNTER — HOSPITAL ENCOUNTER (EMERGENCY)
Facility: CLINIC | Age: 88
Discharge: HOME | End: 2023-05-04
Attending: EMERGENCY MEDICINE | Admitting: EMERGENCY MEDICINE
Payer: MEDICARE

## 2023-05-03 ENCOUNTER — NURSE TRIAGE (OUTPATIENT)
Dept: FAMILY MEDICINE | Facility: CLINIC | Age: 88
End: 2023-05-03
Payer: MEDICARE

## 2023-05-03 DIAGNOSIS — M54.50 LOW BACK PAIN, UNSPECIFIED BACK PAIN LATERALITY, UNSPECIFIED CHRONICITY, UNSPECIFIED WHETHER SCIATICA PRESENT: ICD-10-CM

## 2023-05-03 DIAGNOSIS — N30.00 ACUTE CYSTITIS WITHOUT HEMATURIA: ICD-10-CM

## 2023-05-03 DIAGNOSIS — M25.561 ACUTE PAIN OF RIGHT KNEE: ICD-10-CM
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
## 2023-05-03 LAB
ANION GAP SERPL CALCULATED.3IONS-SCNC: 11 MMOL/L (ref 7–15)
BASOPHILS # BLD AUTO: 0.1 10E3/UL (ref 0–0.2)
BASOPHILS NFR BLD AUTO: 1 %
BUN SERPL-MCNC: 16.5 MG/DL (ref 8–23)
CALCIUM SERPL-MCNC: 8.7 MG/DL (ref 8.2–9.6)
CHLORIDE SERPL-SCNC: 99 MMOL/L (ref 98–107)
CREAT SERPL-MCNC: 1.06 MG/DL (ref 0.51–0.95)
DEPRECATED HCO3 PLAS-SCNC: 26 MMOL/L (ref 22–29)
EOSINOPHIL # BLD AUTO: 0.9 10E3/UL (ref 0–0.7)
EOSINOPHIL NFR BLD AUTO: 8 %
ERYTHROCYTE [DISTWIDTH] IN BLOOD BY AUTOMATED COUNT: 15.6 % (ref 10–15)
GFR SERPL CREATININE-BSD FRML MDRD: 49 ML/MIN/1.73M2
GLUCOSE SERPL-MCNC: 137 MG/DL (ref 70–99)
HCT VFR BLD AUTO: 30.9 % (ref 35–47)
HGB BLD-MCNC: 9.3 G/DL (ref 11.7–15.7)
IMM GRANULOCYTES # BLD: 0 10E3/UL
IMM GRANULOCYTES NFR BLD: 0 %
LYMPHOCYTES # BLD AUTO: 1.5 10E3/UL (ref 0.8–5.3)
LYMPHOCYTES NFR BLD AUTO: 13 %
MCH RBC QN AUTO: 22.2 PG (ref 26.5–33)
MCHC RBC AUTO-ENTMCNC: 30.1 G/DL (ref 31.5–36.5)
MCV RBC AUTO: 74 FL (ref 78–100)
MONOCYTES # BLD AUTO: 1.2 10E3/UL (ref 0–1.3)
MONOCYTES NFR BLD AUTO: 11 %
NEUTROPHILS # BLD AUTO: 7.8 10E3/UL (ref 1.6–8.3)
NEUTROPHILS NFR BLD AUTO: 67 %
NRBC # BLD AUTO: 0 10E3/UL
NRBC BLD AUTO-RTO: 0 /100
PLATELET # BLD AUTO: 490 10E3/UL (ref 150–450)
POTASSIUM SERPL-SCNC: 4.5 MMOL/L (ref 3.4–5.3)
RBC # BLD AUTO: 4.19 10E6/UL (ref 3.8–5.2)
SODIUM SERPL-SCNC: 136 MMOL/L (ref 136–145)
WBC # BLD AUTO: 11.6 10E3/UL (ref 4–11)

## 2023-05-03 PROCEDURE — 71046 X-RAY EXAM CHEST 2 VIEWS: CPT

## 2023-05-03 PROCEDURE — 90471 IMMUNIZATION ADMIN: CPT | Performed by: EMERGENCY MEDICINE

## 2023-05-03 PROCEDURE — 80048 BASIC METABOLIC PNL TOTAL CA: CPT | Performed by: EMERGENCY MEDICINE

## 2023-05-03 PROCEDURE — 85004 AUTOMATED DIFF WBC COUNT: CPT | Performed by: EMERGENCY MEDICINE

## 2023-05-03 PROCEDURE — 93005 ELECTROCARDIOGRAM TRACING: CPT

## 2023-05-03 PROCEDURE — 250N000011 HC RX IP 250 OP 636: Performed by: EMERGENCY MEDICINE

## 2023-05-03 PROCEDURE — 36415 COLL VENOUS BLD VENIPUNCTURE: CPT | Performed by: EMERGENCY MEDICINE

## 2023-05-03 PROCEDURE — 90715 TDAP VACCINE 7 YRS/> IM: CPT | Performed by: EMERGENCY MEDICINE

## 2023-05-03 PROCEDURE — 72100 X-RAY EXAM L-S SPINE 2/3 VWS: CPT

## 2023-05-03 PROCEDURE — 99285 EMERGENCY DEPT VISIT HI MDM: CPT | Mod: CS,25

## 2023-05-03 PROCEDURE — 87637 SARSCOV2&INF A&B&RSV AMP PRB: CPT | Performed by: EMERGENCY MEDICINE

## 2023-05-03 PROCEDURE — 73562 X-RAY EXAM OF KNEE 3: CPT | Mod: RT

## 2023-05-03 PROCEDURE — C9803 HOPD COVID-19 SPEC COLLECT: HCPCS

## 2023-05-03 RX ADMIN — CLOSTRIDIUM TETANI TOXOID ANTIGEN (FORMALDEHYDE INACTIVATED), CORYNEBACTERIUM DIPHTHERIAE TOXOID ANTIGEN (FORMALDEHYDE INACTIVATED), BORDETELLA PERTUSSIS TOXOID ANTIGEN (GLUTARALDEHYDE INACTIVATED), BORDETELLA PERTUSSIS FILAMENTOUS HEMAGGLUTININ ANTIGEN (FORMALDEHYDE INACTIVATED), BORDETELLA PERTUSSIS PERTACTIN ANTIGEN, AND BORDETELLA PERTUSSIS FIMBRIAE 2/3 ANTIGEN 0.5 ML: 5; 2; 2.5; 5; 3; 5 INJECTION, SUSPENSION INTRAMUSCULAR at 23:45

## 2023-05-03 ASSESSMENT — ENCOUNTER SYMPTOMS
FEVER: 0
SHORTNESS OF BREATH: 0
VOMITING: 0
COUGH: 1
RHINORRHEA: 1
WOUND: 1
DIARRHEA: 0
ARTHRALGIAS: 1
SORE THROAT: 0
NECK PAIN: 0
BACK PAIN: 1
ABDOMINAL PAIN: 0

## 2023-05-03 ASSESSMENT — ACTIVITIES OF DAILY LIVING (ADL): ADLS_ACUITY_SCORE: 35

## 2023-05-03 NOTE — TELEPHONE ENCOUNTER
Nurse Triage SBAR    Is this a 2nd Level Triage? YES, LICENSED PRACTITIONER REVIEW IS REQUIRED    Situation: [facility] calling asking for order for portable XR due to new onset SOB    Background: Pt having new sob starting yesterday with expiratory wheeze audible with stethescope, his priscila concern is pneumonia.   Mild shortness of breathe since yesterday, denies chest heaviness or pain.  Has not covid tested yet, but will right after this call.     Assessment: Temp 100.2   /64  Pulse 72  RR 24  95% RA   Covid test.     Protocol Recommended Disposition:   Go To Office Now    Recommendation: Please advise/order if CXR is ok, otherwise advise other disposition for pt?      Routed to provider    Does the patient meet one of the following criteria for ADS visit consideration? 16+ years old, with an [facility] PCP     TIP  Providers, please consider if this condition is appropriate for management at one of our Acute and Diagnostic Services sites.     If patient is a good candidate, please use dotphrase <dot>triageresponse and select Refer to ADS to document.    Reason for Disposition    Longstanding difficulty breathing (e.g., CHF, COPD, emphysema) and worse than normal    Additional Information    Negative: SEVERE difficulty breathing (e.g., struggling for each breath, speaks in single words, pulse > 120)    Negative: Breathing stopped and hasn't returned    Negative: Choking on something    Negative: Bluish (or gray) lips or face    Negative: Difficult to awaken or acting confused (e.g., disoriented, slurred speech)    Negative: Passed out (i.e., fainted, collapsed and was not responding)    Negative: Wheezing started suddenly after medicine, an allergic food, or bee sting    Negative: Stridor    Negative: Slow, shallow and weak breathing    Negative: Sounds like a life-threatening emergency to the triager    Negative: Chest pain    Negative: Wheezing (high pitched whistling sound) and previous asthma attacks or use of  "asthma medicines    Negative: Difficulty breathing and within 14 days of COVID-19 Exposure    Negative: Difficulty breathing and only present when coughing    Negative: Difficulty breathing and only from stuffy nose    Negative: Difficulty breathing and only from stuffy nose or runny nose from common cold    Negative: MODERATE difficulty breathing (e.g., speaks in phrases, SOB even at rest, pulse 100-120) of new-onset or worse than normal    Negative: Oxygen level (e.g., pulse oximetry) 90 percent or lower    Negative: Wheezing can be heard across the room    Negative: Drooling or spitting out saliva (because can't swallow)    Negative: Any history of prior \"blood clot\" in leg or lungs    Negative: Illness requiring prolonged bedrest in past month (e.g., immobilization, long hospital stay)    Negative: Hip or leg fracture (broken bone) in past month (or had cast on leg or ankle in past month)    Negative: Major surgery in the past month    Negative: Long-distance travel in past month (e.g., car, bus, train, plane; with trip lasting 6 or more hours)    Negative: Cancer treatment in past six months (or has cancer now)    Negative: Extra heart beats OR irregular heart beating (i.e., \"palpitations\")    Protocols used: BREATHING DIFFICULTY-A-[place]      "

## 2023-05-03 NOTE — TELEPHONE ENCOUNTER
Gave verbal for CXR per verbal for Román Tavares, RN  [facility] RN Triage Team

## 2023-05-04 ENCOUNTER — APPOINTMENT (OUTPATIENT)
Dept: CT IMAGING | Facility: CLINIC | Age: 88
End: 2023-05-04
Attending: EMERGENCY MEDICINE
Payer: MEDICARE

## 2023-05-04 VITALS
SYSTOLIC BLOOD PRESSURE: 144 MMHG | DIASTOLIC BLOOD PRESSURE: 72 MMHG | RESPIRATION RATE: 15 BRPM | TEMPERATURE: 98.2 F | HEART RATE: 71 BPM | OXYGEN SATURATION: 92 %

## 2023-05-04 LAB
ALBUMIN UR-MCNC: 30 MG/DL
APPEARANCE UR: ABNORMAL
ATRIAL RATE - MUSE: 64 BPM
BACTERIA #/AREA URNS HPF: ABNORMAL /HPF
BILIRUB UR QL STRIP: NEGATIVE
COLOR UR AUTO: YELLOW
DIASTOLIC BLOOD PRESSURE - MUSE: NORMAL MMHG
FLUAV RNA SPEC QL NAA+PROBE: NEGATIVE
FLUBV RNA RESP QL NAA+PROBE: NEGATIVE
GLUCOSE UR STRIP-MCNC: NEGATIVE MG/DL
HGB UR QL STRIP: ABNORMAL
HOLD SPECIMEN: NORMAL
INTERPRETATION ECG - MUSE: NORMAL
KETONES UR STRIP-MCNC: NEGATIVE MG/DL
LEUKOCYTE ESTERASE UR QL STRIP: ABNORMAL
MUCOUS THREADS #/AREA URNS LPF: PRESENT /LPF
NITRATE UR QL: NEGATIVE
P AXIS - MUSE: 57 DEGREES
PH UR STRIP: 5 [PH] (ref 5–7)
PR INTERVAL - MUSE: 164 MS
QRS DURATION - MUSE: 144 MS
QT - MUSE: 462 MS
QTC - MUSE: 476 MS
R AXIS - MUSE: -31 DEGREES
RBC URINE: 4 /HPF
RSV RNA SPEC NAA+PROBE: NEGATIVE
SARS-COV-2 RNA RESP QL NAA+PROBE: NEGATIVE
SP GR UR STRIP: 1.03 (ref 1–1.03)
SYSTOLIC BLOOD PRESSURE - MUSE: NORMAL MMHG
T AXIS - MUSE: 113 DEGREES
UROBILINOGEN UR STRIP-MCNC: NORMAL MG/DL
VENTRICULAR RATE- MUSE: 64 BPM
WBC CLUMPS #/AREA URNS HPF: PRESENT /HPF
WBC URINE: 150 /HPF

## 2023-05-04 PROCEDURE — 250N000011 HC RX IP 250 OP 636: Performed by: EMERGENCY MEDICINE

## 2023-05-04 PROCEDURE — 250N000013 HC RX MED GY IP 250 OP 250 PS 637: Performed by: EMERGENCY MEDICINE

## 2023-05-04 PROCEDURE — 87088 URINE BACTERIA CULTURE: CPT | Performed by: EMERGENCY MEDICINE

## 2023-05-04 PROCEDURE — 70450 CT HEAD/BRAIN W/O DYE: CPT | Mod: ME

## 2023-05-04 PROCEDURE — 81001 URINALYSIS AUTO W/SCOPE: CPT | Performed by: EMERGENCY MEDICINE

## 2023-05-04 PROCEDURE — 96365 THER/PROPH/DIAG IV INF INIT: CPT

## 2023-05-04 RX ORDER — CEPHALEXIN 500 MG/1
500 CAPSULE ORAL 2 TIMES DAILY
Qty: 12 CAPSULE | Refills: 0 | Status: SHIPPED | OUTPATIENT
Start: 2023-05-05 | End: 2023-05-11

## 2023-05-04 RX ORDER — CEFTRIAXONE 1 G/1
1 INJECTION, POWDER, FOR SOLUTION INTRAMUSCULAR; INTRAVENOUS ONCE
Status: COMPLETED | OUTPATIENT
Start: 2023-05-04 | End: 2023-05-04

## 2023-05-04 RX ORDER — ACETAMINOPHEN 500 MG
1000 TABLET ORAL ONCE
Status: COMPLETED | OUTPATIENT
Start: 2023-05-04 | End: 2023-05-04

## 2023-05-04 RX ADMIN — CEFTRIAXONE SODIUM 1 G: 1 INJECTION, POWDER, FOR SOLUTION INTRAMUSCULAR; INTRAVENOUS at 03:47

## 2023-05-04 RX ADMIN — ACETAMINOPHEN 1000 MG: 500 TABLET ORAL at 03:45

## 2023-05-04 ASSESSMENT — ACTIVITIES OF DAILY LIVING (ADL)
ADLS_ACUITY_SCORE: 35

## 2023-05-04 NOTE — DISCHARGE INSTRUCTIONS
Start antibiotics on Friday morning for a bladder infection  Use walker to help with balance and avoid a fall  Tylenol as needed for pain  Follow up with primary care provider

## 2023-05-04 NOTE — ED TRIAGE NOTES
Patient sustained a fall earlier tonight.  Unsure of how she fell.  Complains of mild lower back pain.  Abrasion to posterior scalp

## 2023-05-04 NOTE — ED NOTES
[facility]  ED Nurse Handoff Report    ED Chief complaint: Fall      ED Diagnosis:   Final diagnoses:   None       Code Status: unknown    Allergies: No Known Allergies    Patient Story: Patient presents from assisted living with reports of falling  Focused Assessment:  Labs, x-ray,c-scan and urine    Treatments and/or interventions provided: eval and treat  Patient's response to treatments and/or interventions: comfortable    To be done/followed up on inpatient unit:  monitor and meds    Does this patient have any cognitive concerns?: none    Activity level - Baseline/Home:  Stand with Assist uses walker  Activity Level - Current:   Stand with Assist    Patient's Preferred language: English   Needed?: No    Isolation: None  Infection: Not Applicable  Patient tested for COVID 19 prior to admission: YES  Bariatric?: No    Vital Signs:   Vitals:    05/03/23 2300 05/03/23 2330   BP: 120/52 119/52   Pulse: 65 64   Resp: 18    Temp: 98.2  F (36.8  C)    TempSrc: Temporal    SpO2: 98% 94%       Cardiac Rhythm:     Was the PSS-3 completed:   Yes  What interventions are required if any?               Family Comments: none present  OBS brochure/video discussed/provided to patient/family: N/A              Name of person given brochure if not patient:               Relationship to patient:     For the majority of the shift this patient's behavior was Green.   Behavioral interventions performed were none.    ED NURSE PHONE NUMBER: *[id number]

## 2023-05-04 NOTE — ED PROVIDER NOTES
History     Chief Complaint:  Fall       HPI   Margy Babin is a 89 year old female anticoagulated on Xarelto with a history of hypertension and atrial fibrillation who presents for evaluation following a fall. Flor states that earlier this evening she was walking across her apartment to answer her phone she tripped and fell. Flor reports initially hitting her buttock and falling backward, also hitting her head. During evaluation, Flor endorses some right knee pain, back pain, and a laceration to her posterior scalp. Flor also notes that she has been ill with a cold lately, having symptoms of rhinorrhea and a cough. She otherwise denies any fever, sore throat, shortness of breath, chest pain, abdominal pain, diarrhea, emesis, hip pain, or neck pain.     Independent Historian:   None - Patient Only    ROS:  Review of Systems   Constitutional: Negative for fever.   HENT: Positive for rhinorrhea. Negative for sore throat.    Respiratory: Positive for cough. Negative for shortness of breath.    Cardiovascular: Negative for chest pain.   Gastrointestinal: Negative for abdominal pain, diarrhea and vomiting.   Musculoskeletal: Positive for arthralgias (right knee) and back pain. Negative for neck pain.   Skin: Positive for wound (scalp).   Neurological:        (+) head trauma   All other systems reviewed and are negative.      Allergies:  No Known Allergies     Medications:    Xarelto   Amlodipine  Atorvastatin  Memantine   Metoprolol succinate  Oxybutynin  Paroxetine   Seroquel     Past Medical History:    Anxiety  Stroke  Chronic atrial fibrillation  Depression  Hypertension  Tremor  Insomnia  Hyperlipidemia  Renal insufficiency  Rhabdomyolysis   Colitis   Cardiomyopathy   Left bundle branch block   Pancolitis   Long QT syndrome   Dementia     Past Surgical History:    KAYLYNN and BSO  Cervical fusion  Right rotator cuff repair  Right SHIRA     Family History:    MI - father  CAD - father  Breast cancer -  daughter  Emphysema - sister  Heart disease - sister    Social History:  Patient arrived via EMS.  Patient is unaccompanied in the ED.  Patient ambulates with a walker at baseline.   PCP: Colin Lake     Physical Exam     Patient Vitals for the past 24 hrs:   BP Temp Temp src Pulse Resp SpO2   05/04/23 0458 126/58 -- -- 70 19 --   05/04/23 0401 (!) 146/58 -- -- 72 28 96 %   05/04/23 0357 -- -- -- -- -- 99 %   05/04/23 0307 -- -- -- 69 12 --   05/04/23 0257 124/63 -- -- -- -- --   05/03/23 2330 119/52 -- -- 64 -- 94 %   05/03/23 2300 120/52 98.2  F (36.8  C) Temporal 65 18 98 %        Physical Exam  General: Sitting up in bed  Eyes:  The pupils are equal and round    Conjunctivae and sclerae are normal  ENT:    Wearing a mask  Neck:  Normal range of motion, no midline neck tenderness  CV:  Regular rate, regular rhythm     Skin warm and well perfused   Resp:  Non labored breathing on room air    No tachypnea    No cough heard    Diminished breath sounds in the bases  GI:  Abdomen is soft, there is no rigidity    No distension    No rebound tenderness     No abdominal tenderness  MS:  Nontender thoracic spine.  Slight tenderness on lumbar spine as well as right knee.  Skin:  Abrasion on posterior scalp  Neuro:   Awake, alert.      Speech is normal and fluent.    Face is symmetric.     Moves all extremities equally  Psych: Normal affect.  Appropriate interactions.    Emergency Department Course   ECG  ECG taken at 2326, ECG read at 2338  Normal sinus rhythm  Left axis deviation  Let bundle branch block  Abnormal ECG   No significant change as compared to prior, dated 9/2/22.  Rate 64 bpm. IN interval 164 ms. QRS duration 144 ms. QT/QTc 462/476 ms. P-R-T axes 57 -31 113.     Imaging:  CT Head w/o Contrast   Final Result   IMPRESSION:   1.  No CT evidence for acute intracranial process.   2.  Chronic changes as above.      XR Knee Right 3 Views   Final Result   IMPRESSION: No acute fracture or dislocation. Moderate  severe tricompartmental osteoarthrosis. Severe joint space narrowing medial compartment. Moderate joint space narrowing lateral compartment. Severe narrowing patellofemoral joint with articular    contour remodeling. No joint effusion.      XR Chest 2 Views   Final Result   IMPRESSION: Negative chest.      Lumbar spine XR, 2-3 views   Final Result   IMPRESSION: Diffuse bony demineralization. Transitional lumbosacral anatomy with sacralization of the L5 vertebral body. Slight right apex curvature of the lumbar spine. Grade 1 anterolisthesis of L3 on L4. Slight height loss of the T12 vertebral body is    stable. The vertebral body heights are otherwise preserved. Diffuse degenerative disc height loss with vacuum disc changes. Diffuse facet arthropathy. Atherosclerotic calcification of the abdominal aorta. Degenerative changes of the sacroiliac joints.         Report per radiology    Laboratory:  Labs Ordered and Resulted from Time of ED Arrival to Time of ED Departure   BASIC METABOLIC PANEL - Abnormal       Result Value    Sodium 136      Potassium 4.5      Chloride 99      Carbon Dioxide (CO2) 26      Anion Gap 11      Urea Nitrogen 16.5      Creatinine 1.06 (*)     Calcium 8.7      Glucose 137 (*)     GFR Estimate 49 (*)    ROUTINE UA WITH MICROSCOPIC REFLEX TO CULTURE - Abnormal    Color Urine Yellow      Appearance Urine Slightly Cloudy (*)     Glucose Urine Negative      Bilirubin Urine Negative      Ketones Urine Negative      Specific Gravity Urine 1.029      Blood Urine Small (*)     pH Urine 5.0      Protein Albumin Urine 30 (*)     Urobilinogen Urine Normal      Nitrite Urine Negative      Leukocyte Esterase Urine Large (*)     Bacteria Urine Many (*)     WBC Clumps Urine Present (*)     Mucus Urine Present (*)     RBC Urine 4 (*)     WBC Urine 150 (*)    CBC WITH PLATELETS AND DIFFERENTIAL - Abnormal    WBC Count 11.6 (*)     RBC Count 4.19      Hemoglobin 9.3 (*)     Hematocrit 30.9 (*)     MCV 74 (*)      MCH 22.2 (*)     MCHC 30.1 (*)     RDW 15.6 (*)     Platelet Count 490 (*)     % Neutrophils 67      % Lymphocytes 13      % Monocytes 11      % Eosinophils 8      % Basophils 1      % Immature Granulocytes 0      NRBCs per 100 WBC 0      Absolute Neutrophils 7.8      Absolute Lymphocytes 1.5      Absolute Monocytes 1.2      Absolute Eosinophils 0.9 (*)     Absolute Basophils 0.1      Absolute Immature Granulocytes 0.0      Absolute NRBCs 0.0     INFLUENZA A/B, RSV, & SARS-COV2 PCR - Normal    Influenza A PCR Negative      Influenza B PCR Negative      RSV PCR Negative      SARS CoV2 PCR Negative     URINE CULTURE        Procedures   none    Emergency Department Course & Assessments:       Interventions:  Medications   Tdap (tetanus-diphtheria-acell pertussis) (ADACEL) injection 0.5 mL (0.5 mLs Intramuscular $Given 5/3/23 2811)   cefTRIAXone (ROCEPHIN) 1 g vial to attach to  mL bag for ADULTS or NS 50 mL bag for PEDS (0 g Intravenous Stopped 5/4/23 1187)   acetaminophen (TYLENOL) tablet 1,000 mg (1,000 mg Oral $Given 5/4/23 4791)        Assessments:  2311 I obtained history and examined the patient as noted above.   0314 I rechecked and updated the patient.   0456 I rechecked the patient. At this time, the patient was deemed safe to discharge home and she agreed to the plan of care.    Independent Interpretation (X-rays, CTs, rhythm strip):  I independently reviewed chest xray - no pneumonia seen    Consultations/Discussion of Management or Tests:  None        Social Determinants of Health affecting care:   Stress/Adjustment Disorders    Disposition:  The patient was discharged to home.     Impression & Plan    CMS Diagnoses: None      Medical Decision Making:  Margy Babin is a 89-year-old female who presented to the emergency department after a fall.  She reports that she tripped and fell.  She did hit her head.  CT shows no acute pathology.  She has no midline neck tenderness and has full range  of motion.  Was complaining of some mild lower back pain and x-ray shows no acute fracture.  Also had right knee pain with no fracture seen on x-ray.  Has had a runny nose and COVID test was negative.  Chest x-ray was clear.  Does have evidence of urinary tract infection.  Given IV antibiotics in the emergency department and discharged home on oral antibiotics.  Patient was able to ambulate here in the emergency department with a walker.  Feels that she can manage at home.  Recommend follow-up with primary care provider.    Diagnosis:    ICD-10-CM    1. Acute cystitis without hematuria  N30.00       2. Fall, initial encounter  W19.XXXA       3. Acute pain of right knee  M25.561       4. Low back pain, unspecified back pain laterality, unspecified chronicity, unspecified whether sciatica present  M54.50            Discharge Medications:  New Prescriptions    CEPHALEXIN (KEFLEX) 500 MG CAPSULE    Take 1 capsule (500 mg) by mouth 2 times daily for 6 days          Scribe Disclosure:  I, Shanda Mendoza, am serving as a scribe at 11:17 PM on 5/3/2023 to document services personally performed by Angella Álvarez MD based on my observations and the provider's statements to me.     5/3/2023   Angella Álvarez MD Goertz, Maria Kristine, MD  05/04/23 0615

## 2023-05-05 ENCOUNTER — HOSPITAL ENCOUNTER (EMERGENCY)
Facility: CLINIC | Age: 88
Discharge: HOME | End: 2023-05-05
Attending: EMERGENCY MEDICINE | Admitting: EMERGENCY MEDICINE
Payer: MEDICARE

## 2023-05-05 ENCOUNTER — APPOINTMENT (OUTPATIENT)
Dept: GENERAL RADIOLOGY | Facility: CLINIC | Age: 88
End: 2023-05-05
Attending: EMERGENCY MEDICINE
Payer: MEDICARE

## 2023-05-05 VITALS
RESPIRATION RATE: 20 BRPM | TEMPERATURE: 98.3 F | SYSTOLIC BLOOD PRESSURE: 138 MMHG | BODY MASS INDEX: 27.49 KG/M2 | DIASTOLIC BLOOD PRESSURE: 57 MMHG | HEIGHT: 65 IN | WEIGHT: 165 LBS | HEART RATE: 75 BPM | OXYGEN SATURATION: 96 %

## 2023-05-05 DIAGNOSIS — F33.42 RECURRENT MAJOR DEPRESSION IN COMPLETE REMISSION (H): ICD-10-CM

## 2023-05-05 DIAGNOSIS — B34.9 VIRAL SYNDROME: ICD-10-CM

## 2023-05-05 LAB
ANION GAP SERPL CALCULATED.3IONS-SCNC: 7 MMOL/L (ref 7–15)
ATRIAL RATE - MUSE: 71 BPM
BACTERIA UR CULT: ABNORMAL
BASOPHILS # BLD AUTO: 0.1 10E3/UL (ref 0–0.2)
BASOPHILS NFR BLD AUTO: 1 %
BUN SERPL-MCNC: 25.3 MG/DL (ref 8–23)
CALCIUM SERPL-MCNC: 8.5 MG/DL (ref 8.2–9.6)
CHLORIDE SERPL-SCNC: 101 MMOL/L (ref 98–107)
CREAT SERPL-MCNC: 1.18 MG/DL (ref 0.51–0.95)
DEPRECATED HCO3 PLAS-SCNC: 26 MMOL/L (ref 22–29)
DIASTOLIC BLOOD PRESSURE - MUSE: NORMAL MMHG
EOSINOPHIL # BLD AUTO: 1 10E3/UL (ref 0–0.7)
EOSINOPHIL NFR BLD AUTO: 8 %
ERYTHROCYTE [DISTWIDTH] IN BLOOD BY AUTOMATED COUNT: 15.7 % (ref 10–15)
FLUAV RNA SPEC QL NAA+PROBE: NEGATIVE
FLUBV RNA RESP QL NAA+PROBE: NEGATIVE
GFR SERPL CREATININE-BSD FRML MDRD: 43 ML/MIN/1.73M2
GLUCOSE SERPL-MCNC: 90 MG/DL (ref 70–99)
HCT VFR BLD AUTO: 30.2 % (ref 35–47)
HGB BLD-MCNC: 8.7 G/DL (ref 11.7–15.7)
HOLD SPECIMEN: NORMAL
HOLD SPECIMEN: NORMAL
IMM GRANULOCYTES # BLD: 0.1 10E3/UL
IMM GRANULOCYTES NFR BLD: 0 %
INTERPRETATION ECG - MUSE: NORMAL
LYMPHOCYTES # BLD AUTO: 2.9 10E3/UL (ref 0.8–5.3)
LYMPHOCYTES NFR BLD AUTO: 23 %
MCH RBC QN AUTO: 21.3 PG (ref 26.5–33)
MCHC RBC AUTO-ENTMCNC: 28.8 G/DL (ref 31.5–36.5)
MCV RBC AUTO: 74 FL (ref 78–100)
MONOCYTES # BLD AUTO: 1.7 10E3/UL (ref 0–1.3)
MONOCYTES NFR BLD AUTO: 13 %
NEUTROPHILS # BLD AUTO: 6.9 10E3/UL (ref 1.6–8.3)
NEUTROPHILS NFR BLD AUTO: 55 %
NRBC # BLD AUTO: 0 10E3/UL
NRBC BLD AUTO-RTO: 0 /100
NT-PROBNP SERPL-MCNC: 505 PG/ML (ref 0–1800)
P AXIS - MUSE: 39 DEGREES
PLATELET # BLD AUTO: 522 10E3/UL (ref 150–450)
POTASSIUM SERPL-SCNC: 4.7 MMOL/L (ref 3.4–5.3)
PR INTERVAL - MUSE: 162 MS
QRS DURATION - MUSE: 142 MS
QT - MUSE: 450 MS
QTC - MUSE: 489 MS
R AXIS - MUSE: -41 DEGREES
RBC # BLD AUTO: 4.08 10E6/UL (ref 3.8–5.2)
RSV RNA SPEC NAA+PROBE: NEGATIVE
SARS-COV-2 RNA RESP QL NAA+PROBE: NEGATIVE
SODIUM SERPL-SCNC: 134 MMOL/L (ref 136–145)
SYSTOLIC BLOOD PRESSURE - MUSE: NORMAL MMHG
T AXIS - MUSE: 116 DEGREES
TROPONIN T SERPL HS-MCNC: 18 NG/L
TROPONIN T SERPL HS-MCNC: 24 NG/L
VENTRICULAR RATE- MUSE: 71 BPM
WBC # BLD AUTO: 12.7 10E3/UL (ref 4–11)

## 2023-05-05 PROCEDURE — 84484 ASSAY OF TROPONIN QUANT: CPT | Performed by: EMERGENCY MEDICINE

## 2023-05-05 PROCEDURE — 94640 AIRWAY INHALATION TREATMENT: CPT

## 2023-05-05 PROCEDURE — 87637 SARSCOV2&INF A&B&RSV AMP PRB: CPT | Performed by: EMERGENCY MEDICINE

## 2023-05-05 PROCEDURE — 250N000011 HC RX IP 250 OP 636: Performed by: EMERGENCY MEDICINE

## 2023-05-05 PROCEDURE — 83880 ASSAY OF NATRIURETIC PEPTIDE: CPT | Performed by: EMERGENCY MEDICINE

## 2023-05-05 PROCEDURE — 84484 ASSAY OF TROPONIN QUANT: CPT | Mod: 91 | Performed by: EMERGENCY MEDICINE

## 2023-05-05 PROCEDURE — 93005 ELECTROCARDIOGRAM TRACING: CPT

## 2023-05-05 PROCEDURE — 71046 X-RAY EXAM CHEST 2 VIEWS: CPT

## 2023-05-05 PROCEDURE — 99285 EMERGENCY DEPT VISIT HI MDM: CPT | Mod: 25,CS

## 2023-05-05 PROCEDURE — 96374 THER/PROPH/DIAG INJ IV PUSH: CPT

## 2023-05-05 PROCEDURE — 36415 COLL VENOUS BLD VENIPUNCTURE: CPT | Performed by: EMERGENCY MEDICINE

## 2023-05-05 PROCEDURE — 80048 BASIC METABOLIC PNL TOTAL CA: CPT | Performed by: EMERGENCY MEDICINE

## 2023-05-05 PROCEDURE — C9803 HOPD COVID-19 SPEC COLLECT: HCPCS

## 2023-05-05 PROCEDURE — 85025 COMPLETE CBC W/AUTO DIFF WBC: CPT | Performed by: EMERGENCY MEDICINE

## 2023-05-05 PROCEDURE — 250N000009 HC RX 250: Performed by: EMERGENCY MEDICINE

## 2023-05-05 RX ORDER — METHYLPREDNISOLONE SODIUM SUCCINATE 125 MG/2ML
125 INJECTION, POWDER, LYOPHILIZED, FOR SOLUTION INTRAMUSCULAR; INTRAVENOUS ONCE
Status: COMPLETED | OUTPATIENT
Start: 2023-05-05 | End: 2023-05-05

## 2023-05-05 RX ORDER — IPRATROPIUM BROMIDE AND ALBUTEROL SULFATE 2.5; .5 MG/3ML; MG/3ML
1 SOLUTION RESPIRATORY (INHALATION) EVERY 4 HOURS PRN
Qty: 30 ML | Refills: 1 | Status: ON HOLD | OUTPATIENT
Start: 2023-05-05 | End: 2024-05-09

## 2023-05-05 RX ORDER — PREDNISONE 20 MG/1
40 TABLET ORAL DAILY
Qty: 10 TABLET | Refills: 0 | Status: SHIPPED | OUTPATIENT
Start: 2023-05-05 | End: 2023-05-10

## 2023-05-05 RX ORDER — IPRATROPIUM BROMIDE AND ALBUTEROL SULFATE 2.5; .5 MG/3ML; MG/3ML
3 SOLUTION RESPIRATORY (INHALATION) ONCE
Status: COMPLETED | OUTPATIENT
Start: 2023-05-05 | End: 2023-05-05

## 2023-05-05 RX ADMIN — IPRATROPIUM BROMIDE AND ALBUTEROL SULFATE 3 ML: .5; 3 SOLUTION RESPIRATORY (INHALATION) at 13:39

## 2023-05-05 RX ADMIN — METHYLPREDNISOLONE SODIUM SUCCINATE 125 MG: 125 INJECTION, POWDER, FOR SOLUTION INTRAMUSCULAR; INTRAVENOUS at 13:38

## 2023-05-05 ASSESSMENT — ACTIVITIES OF DAILY LIVING (ADL)
ADLS_ACUITY_SCORE: 35

## 2023-05-05 ASSESSMENT — ENCOUNTER SYMPTOMS
FEVER: 0
COUGH: 1
SHORTNESS OF BREATH: 1

## 2023-05-05 NOTE — ED TRIAGE NOTES
Pt presents by [facility] from [facility]. She reports for the past week she has had cough and yesterday began feeling SOB. She denies productive cough and denies recent fevers. Pt states she has been having n/v/d the past week as well. She reports feeling generally weak. She has audible wheezing and coarse lung sounds. EMS gave 1 duoneb with some improvement with room air sat     Triage Assessment     Row Name 05/05/23 1328       Triage Assessment (Adult)    Airway WDL WDL       Respiratory WDL    Respiratory WDL X  cough, sob and wheezing       Skin Circulation/Temperature WDL    Skin Circulation/Temperature WDL WDL       Cardiac WDL    Cardiac WDL WDL       Peripheral/Neurovascular WDL    Peripheral Neurovascular WDL WDL       Cognitive/Neuro/Behavioral WDL    Cognitive/Neuro/Behavioral WDL WDL

## 2023-05-05 NOTE — ED PROVIDER NOTES
History   Chief Complaint:  Shortness of Breath     The history is provided by the patient and the EMS personnel.      Margy Babin is a 89 year old female on Xarelto with a history of Afib, stroke and hypertension who presents with shortness of breath. Patient reports that her shortness of breath began a few days ago and is present at rest. She states that she has had a non productive cough for a week. No change in her symptoms with positional changes. No CP or pleuritic component. No fevers or new leg swelling. EMS reports that en route, the patient's sats on room air were 93% and improved after being given a nebulizer. No history of asthma or COPD. Nonsmoker. She lives at [facility] in [place].     Independent Historian:   EMS - They report supplemental history    Review of External Notes: I reviewed patient's ED note from 05/03/23 where she was seen for acute cystitis. She was discharged home on Keflex.    ROS:  Review of Systems   Constitutional: Negative for fever.   Respiratory: Positive for cough and shortness of breath.    Cardiovascular: Negative for leg swelling.   All other systems reviewed and are negative.    Allergies:  No Known Allergies      Medications:    Xarelto   Amlodipine  Atorvastatin  Memantine   Metoprolol succinate  Oxybutynin  Paroxetine   Seroquel      Past Medical History:    Anxiety  Stroke  Chronic atrial fibrillation  Depression  Hypertension  Tremor  Insomnia  Hyperlipidemia  Renal insufficiency  Rhabdomyolysis   Colitis   Cardiomyopathy   Left bundle branch block   Pancolitis   Long QT syndrome   Dementia      Past Surgical History:    KAYLYNN and BSO  Cervical fusion  Right rotator cuff repair  Right SHIRA      Family History:    MI - father  CAD - father    Social History:  Presents alone via EMS  Lives in assisted living  PCP: Colin Lake     Physical Exam     Patient Vitals for the past 24 hrs:   BP Temp Temp src Pulse Resp SpO2 Height Weight   05/05/23 1628 -- -- -- 71  "14 91 % -- --   05/05/23 1625 -- -- -- 71 14 94 % -- --   05/05/23 1623 -- -- -- 73 14 97 % -- --   05/05/23 1600 126/52 -- -- 76 17 -- -- --   05/05/23 1529 135/61 -- -- 80 19 -- -- --   05/05/23 1510 125/89 -- -- 75 16 -- -- --   05/05/23 1455 134/47 -- -- 72 21 94 % -- --   05/05/23 1440 121/52 -- -- 70 15 93 % -- --   05/05/23 1405 -- -- -- 70 15 93 % -- --   05/05/23 1400 129/57 -- -- 71 17 95 % -- --   05/05/23 1325 121/54 98.3  F (36.8  C) Oral 69 22 92 % 1.651 m (5' 5\") 74.8 kg (165 lb)        Physical Exam  General/Appearance: appears stated age, well-groomed, appears comfortable  Eyes: EOMI, no scleral injection, no icterus  ENT: MMM  Neck: supple, nl ROM, no stiffness  Cardiovascular: RRR, nl S1S2, no m/r/g, 2+ pulses in all 4 extremities, cap refill <2sec  Respiratory: CTAB, good air movement throughout, diffuse expiratory wheezes  Back: no lesions  GI: abd soft, non-distended, nttp,  no HSM, no rebound, no guarding, nl BS  MSK: CANDELARIO, good tone, no bony abnormality  Skin: warm and well-perfused, no rash, no edema, no ecchymosis, nl turgor  Neuro: GCS 15, alert and oriented, no gross focal neuro deficits  Psych: interacts appropriately  Heme: no petechia, no purpura, no active bleeding        Emergency Department Course   ECG  ECG results from 05/05/23 @ 1423   EKG 12-lead, tracing only     Value    Systolic Blood Pressure     Diastolic Blood Pressure     Ventricular Rate 71    Atrial Rate 71    WI Interval 162    QRS Duration 142        QTc 489    P Axis 39    R AXIS -41    T Axis 116    Interpretation ECG      Sinus rhythm  Left axis deviation  Left bundle branch block  Read by me @ 1436       Imaging:  XR Chest 2 Views   Final Result   IMPRESSION: Minimal fissural thickening on the right similar to   previous. Minimal scattered interstitial changes similar to previous.   There are no acute infiltrates. The cardiac silhouette is not   enlarged. Pulmonary vasculature is unremarkable.      ELI" MD WINSOME            SYSTEM ID:  [id number]         Report per radiology    Laboratory:  Labs Ordered and Resulted from Time of ED Arrival to Time of ED Departure   BASIC METABOLIC PANEL - Abnormal       Result Value    Sodium 134 (*)     Potassium 4.7      Chloride 101      Carbon Dioxide (CO2) 26      Anion Gap 7      Urea Nitrogen 25.3 (*)     Creatinine 1.18 (*)     Calcium 8.5      Glucose 90      GFR Estimate 43 (*)    TROPONIN T, HIGH SENSITIVITY - Abnormal    Troponin T, High Sensitivity 24 (*)    CBC WITH PLATELETS AND DIFFERENTIAL - Abnormal    WBC Count 12.7 (*)     RBC Count 4.08      Hemoglobin 8.7 (*)     Hematocrit 30.2 (*)     MCV 74 (*)     MCH 21.3 (*)     MCHC 28.8 (*)     RDW 15.7 (*)     Platelet Count 522 (*)     % Neutrophils 55      % Lymphocytes 23      % Monocytes 13      % Eosinophils 8      % Basophils 1      % Immature Granulocytes 0      NRBCs per 100 WBC 0      Absolute Neutrophils 6.9      Absolute Lymphocytes 2.9      Absolute Monocytes 1.7 (*)     Absolute Eosinophils 1.0 (*)     Absolute Basophils 0.1      Absolute Immature Granulocytes 0.1      Absolute NRBCs 0.0     TROPONIN T, HIGH SENSITIVITY - Abnormal    Troponin T, High Sensitivity 18 (*)    NT PROBNP INPATIENT - Normal    N terminal Pro BNP Inpatient 505     INFLUENZA A/B, RSV, & SARS-COV2 PCR - Normal    Influenza A PCR Negative      Influenza B PCR Negative      RSV PCR Negative      SARS CoV2 PCR Negative        Emergency Department Course & Assessments:     Interventions:  Medications   ipratropium - albuterol 0.5 mg/2.5 mg/3 mL (DUONEB) neb solution 3 mL (3 mLs Nebulization $Given 5/5/23 1339)   methylPREDNISolone sodium succinate (solu-MEDROL) injection 125 mg (125 mg Intravenous $Given 5/5/23 1338)      Assessments:  1324 I obtained history and examined the patient, as above.    Independent Interpretation (X-rays, CTs, rhythm strip):  I reviewed chest xray which showed no acute abnormality    Consultations/Discussion  of Management or Tests:  None     Social Determinants of Health affecting care:   None and Transportation    Disposition:  The patient was discharged to home.     Impression & Plan    Medical Decision Making:  This patient is a pleasant 89-year-old female on Xarelto for A-fib who presents with shortness of breath and cough.  She has diffuse wheezing throughout all lung fields.  Work-up here has been relatively unremarkable.  There is no evidence of pneumonia, influenza, COVID, RSV.  Without pleuritic component, hypoxia, tachycardia and with the wheezing I have low suspicion for PE so did not order D-dimer.  Her troponin is minimally elevated however not trending upwards and I have low suspicion this is ACS.  BNP is unremarkable also so this is less suggestive of heart failure.  Overall I think this likely is a viral syndrome.  She will be sent home with a compressor and nebulizer in addition to some DuoNebs and steroids.  She feels comfortable with this plan.    Diagnosis:    ICD-10-CM    1. Viral syndrome  B34.9 NEBULIZER, WITH COMPRESSOR           Discharge Medications:  New Prescriptions    IPRATROPIUM - ALBUTEROL 0.5 MG/2.5 MG/3 ML (DUONEB) 0.5-2.5 (3) MG/3ML NEB SOLUTION    Take 1 vial (3 mLs) by nebulization every 4 hours as needed for shortness of breath    PREDNISONE (DELTASONE) 20 MG TABLET    Take 2 tablets (40 mg) by mouth daily for 5 days      Scribe Disclosure:  Lucie COLON, am serving as a scribe at 1:31 PM on 5/5/2023 to document services personally performed by Milly Garcia MD based on my observations and the provider's statements to me.        Milly Garcia MD  05/05/23 5728

## 2023-05-08 RX ORDER — PAROXETINE 20 MG/1
TABLET, FILM COATED ORAL
Qty: 90 TABLET | Refills: 0 | Status: SHIPPED | OUTPATIENT
Start: 2023-05-08

## 2023-05-15 ENCOUNTER — APPOINTMENT (OUTPATIENT)
Dept: GENERAL RADIOLOGY | Facility: CLINIC | Age: 88
DRG: 871 | End: 2023-05-15
Attending: EMERGENCY MEDICINE
Payer: MEDICARE

## 2023-05-15 ENCOUNTER — APPOINTMENT (OUTPATIENT)
Dept: CT IMAGING | Facility: CLINIC | Age: 88
DRG: 871 | End: 2023-05-15
Attending: EMERGENCY MEDICINE
Payer: MEDICARE

## 2023-05-15 ENCOUNTER — HOSPITAL ENCOUNTER (INPATIENT)
Facility: CLINIC | Age: 88
LOS: 6 days | Discharge: NURSING, REHAB OR LONG-TERM CARE | DRG: 871 | End: 2023-05-21
Attending: EMERGENCY MEDICINE | Admitting: HOSPITALIST
Payer: MEDICARE

## 2023-05-15 DIAGNOSIS — Z87.440 PERSONAL HISTORY OF URINARY TRACT INFECTION: ICD-10-CM

## 2023-05-15 DIAGNOSIS — A41.9 SEPSIS, DUE TO UNSPECIFIED ORGANISM, UNSPECIFIED WHETHER ACUTE ORGAN DYSFUNCTION PRESENT (H): ICD-10-CM

## 2023-05-15 DIAGNOSIS — J18.9 PNEUMONIA OF LEFT LOWER LOBE DUE TO INFECTIOUS ORGANISM: ICD-10-CM

## 2023-05-15 LAB
ALBUMIN SERPL BCG-MCNC: 3.3 G/DL (ref 3.5–5.2)
ALBUMIN UR-MCNC: NEGATIVE MG/DL
ALP SERPL-CCNC: 88 U/L (ref 35–104)
ALT SERPL W P-5'-P-CCNC: 11 U/L (ref 10–35)
ANION GAP SERPL CALCULATED.3IONS-SCNC: 15 MMOL/L (ref 7–15)
APPEARANCE UR: CLEAR
AST SERPL W P-5'-P-CCNC: 13 U/L (ref 10–35)
ATRIAL RATE - MUSE: 66 BPM
BASE EXCESS BLDV CALC-SCNC: 4.1 MMOL/L (ref -7.7–1.9)
BASOPHILS # BLD AUTO: 0 10E3/UL (ref 0–0.2)
BASOPHILS NFR BLD AUTO: 0 %
BILIRUB SERPL-MCNC: 0.3 MG/DL
BILIRUB UR QL STRIP: NEGATIVE
BUN SERPL-MCNC: 29.3 MG/DL (ref 8–23)
CALCIUM SERPL-MCNC: 8.6 MG/DL (ref 8.2–9.6)
CHLORIDE SERPL-SCNC: 99 MMOL/L (ref 98–107)
COLOR UR AUTO: ABNORMAL
CREAT SERPL-MCNC: 1.03 MG/DL (ref 0.51–0.95)
DEPRECATED HCO3 PLAS-SCNC: 22 MMOL/L (ref 22–29)
DIASTOLIC BLOOD PRESSURE - MUSE: NORMAL MMHG
EOSINOPHIL # BLD AUTO: 0.4 10E3/UL (ref 0–0.7)
EOSINOPHIL NFR BLD AUTO: 1 %
ERYTHROCYTE [DISTWIDTH] IN BLOOD BY AUTOMATED COUNT: 15.9 % (ref 10–15)
GFR SERPL CREATININE-BSD FRML MDRD: 50 ML/MIN/1.73M2
GLUCOSE SERPL-MCNC: 112 MG/DL (ref 70–99)
GLUCOSE UR STRIP-MCNC: NEGATIVE MG/DL
HCO3 BLDV-SCNC: 27 MMOL/L (ref 21–28)
HCT VFR BLD AUTO: 26.1 % (ref 35–47)
HGB BLD-MCNC: 7.7 G/DL (ref 11.7–15.7)
HGB UR QL STRIP: NEGATIVE
HOLD SPECIMEN: NORMAL
IMM GRANULOCYTES # BLD: 0.3 10E3/UL
IMM GRANULOCYTES NFR BLD: 1 %
INTERPRETATION ECG - MUSE: NORMAL
IRON BINDING CAPACITY (ROCHE): 382 UG/DL (ref 240–430)
IRON SATN MFR SERPL: 4 % (ref 15–46)
IRON SERPL-MCNC: 16 UG/DL (ref 37–145)
KETONES UR STRIP-MCNC: ABNORMAL MG/DL
LACTATE SERPL-SCNC: 3.6 MMOL/L (ref 0.7–2)
LACTATE SERPL-SCNC: 4.2 MMOL/L (ref 0.7–2)
LEUKOCYTE ESTERASE UR QL STRIP: NEGATIVE
LYMPHOCYTES # BLD AUTO: 3 10E3/UL (ref 0.8–5.3)
LYMPHOCYTES NFR BLD AUTO: 11 %
MCH RBC QN AUTO: 21 PG (ref 26.5–33)
MCHC RBC AUTO-ENTMCNC: 29.5 G/DL (ref 31.5–36.5)
MCV RBC AUTO: 71 FL (ref 78–100)
MONOCYTES # BLD AUTO: 2.2 10E3/UL (ref 0–1.3)
MONOCYTES NFR BLD AUTO: 8 %
MUCOUS THREADS #/AREA URNS LPF: PRESENT /LPF
NEUTROPHILS # BLD AUTO: 21.1 10E3/UL (ref 1.6–8.3)
NEUTROPHILS NFR BLD AUTO: 79 %
NITRATE UR QL: NEGATIVE
NRBC # BLD AUTO: 0 10E3/UL
NRBC BLD AUTO-RTO: 0 /100
O2/TOTAL GAS SETTING VFR VENT: 4 %
P AXIS - MUSE: 74 DEGREES
PCO2 BLDV: 32 MM HG (ref 40–50)
PH BLDV: 7.53 [PH] (ref 7.32–7.43)
PH UR STRIP: 7.5 [PH] (ref 5–7)
PLATELET # BLD AUTO: 685 10E3/UL (ref 150–450)
PO2 BLDV: <10 MM HG (ref 25–47)
POTASSIUM SERPL-SCNC: 4.2 MMOL/L (ref 3.4–5.3)
PR INTERVAL - MUSE: 164 MS
PROT SERPL-MCNC: 6.1 G/DL (ref 6.4–8.3)
QRS DURATION - MUSE: 150 MS
QT - MUSE: 446 MS
QTC - MUSE: 467 MS
R AXIS - MUSE: -26 DEGREES
RBC # BLD AUTO: 3.67 10E6/UL (ref 3.8–5.2)
RBC URINE: 1 /HPF
RETICS # AUTO: 0.09 10E6/UL (ref 0.03–0.1)
RETICS/RBC NFR AUTO: 2.5 % (ref 0.5–2)
SODIUM SERPL-SCNC: 136 MMOL/L (ref 136–145)
SP GR UR STRIP: 1.02 (ref 1–1.03)
SQUAMOUS EPITHELIAL: 1 /HPF
SYSTOLIC BLOOD PRESSURE - MUSE: NORMAL MMHG
T AXIS - MUSE: 134 DEGREES
TROPONIN T SERPL HS-MCNC: 25 NG/L
UROBILINOGEN UR STRIP-MCNC: NORMAL MG/DL
VENTRICULAR RATE- MUSE: 66 BPM
WBC # BLD AUTO: 27.1 10E3/UL (ref 4–11)
WBC URINE: 1 /HPF

## 2023-05-15 PROCEDURE — 82728 ASSAY OF FERRITIN: CPT | Performed by: HOSPITALIST

## 2023-05-15 PROCEDURE — 80053 COMPREHEN METABOLIC PANEL: CPT | Performed by: EMERGENCY MEDICINE

## 2023-05-15 PROCEDURE — 250N000009 HC RX 250: Performed by: HOSPITALIST

## 2023-05-15 PROCEDURE — 99285 EMERGENCY DEPT VISIT HI MDM: CPT | Mod: 25

## 2023-05-15 PROCEDURE — 36415 COLL VENOUS BLD VENIPUNCTURE: CPT | Performed by: EMERGENCY MEDICINE

## 2023-05-15 PROCEDURE — 83550 IRON BINDING TEST: CPT | Performed by: HOSPITALIST

## 2023-05-15 PROCEDURE — 87637 SARSCOV2&INF A&B&RSV AMP PRB: CPT | Performed by: INTERNAL MEDICINE

## 2023-05-15 PROCEDURE — 70450 CT HEAD/BRAIN W/O DYE: CPT | Mod: MA

## 2023-05-15 PROCEDURE — 81001 URINALYSIS AUTO W/SCOPE: CPT | Performed by: EMERGENCY MEDICINE

## 2023-05-15 PROCEDURE — 36415 COLL VENOUS BLD VENIPUNCTURE: CPT | Performed by: HOSPITALIST

## 2023-05-15 PROCEDURE — 250N000011 HC RX IP 250 OP 636: Performed by: EMERGENCY MEDICINE

## 2023-05-15 PROCEDURE — 258N000003 HC RX IP 258 OP 636: Performed by: EMERGENCY MEDICINE

## 2023-05-15 PROCEDURE — 85025 COMPLETE CBC W/AUTO DIFF WBC: CPT | Performed by: EMERGENCY MEDICINE

## 2023-05-15 PROCEDURE — 96365 THER/PROPH/DIAG IV INF INIT: CPT

## 2023-05-15 PROCEDURE — 83605 ASSAY OF LACTIC ACID: CPT | Performed by: HOSPITALIST

## 2023-05-15 PROCEDURE — 85045 AUTOMATED RETICULOCYTE COUNT: CPT | Performed by: HOSPITALIST

## 2023-05-15 PROCEDURE — 87040 BLOOD CULTURE FOR BACTERIA: CPT | Performed by: EMERGENCY MEDICINE

## 2023-05-15 PROCEDURE — 71046 X-RAY EXAM CHEST 2 VIEWS: CPT

## 2023-05-15 PROCEDURE — 99222 1ST HOSP IP/OBS MODERATE 55: CPT | Mod: A1 | Performed by: HOSPITALIST

## 2023-05-15 PROCEDURE — 83605 ASSAY OF LACTIC ACID: CPT | Performed by: EMERGENCY MEDICINE

## 2023-05-15 PROCEDURE — 84484 ASSAY OF TROPONIN QUANT: CPT | Performed by: EMERGENCY MEDICINE

## 2023-05-15 PROCEDURE — 82803 BLOOD GASES ANY COMBINATION: CPT | Performed by: EMERGENCY MEDICINE

## 2023-05-15 PROCEDURE — C9803 HOPD COVID-19 SPEC COLLECT: HCPCS

## 2023-05-15 PROCEDURE — 120N000001 HC R&B MED SURG/OB

## 2023-05-15 RX ORDER — IPRATROPIUM BROMIDE AND ALBUTEROL SULFATE 2.5; .5 MG/3ML; MG/3ML
1 SOLUTION RESPIRATORY (INHALATION) EVERY 4 HOURS PRN
Status: DISCONTINUED | OUTPATIENT
Start: 2023-05-15 | End: 2023-05-16

## 2023-05-15 RX ORDER — ACETAMINOPHEN 500 MG
1000 TABLET ORAL EVERY 12 HOURS PRN
COMMUNITY

## 2023-05-15 RX ORDER — CEFTRIAXONE 2 G/1
2 INJECTION, POWDER, FOR SOLUTION INTRAMUSCULAR; INTRAVENOUS ONCE
Status: COMPLETED | OUTPATIENT
Start: 2023-05-15 | End: 2023-05-15

## 2023-05-15 RX ORDER — VANCOMYCIN HYDROCHLORIDE 1 G/200ML
1000 INJECTION, SOLUTION INTRAVENOUS EVERY 24 HOURS
Status: DISCONTINUED | OUTPATIENT
Start: 2023-05-16 | End: 2023-05-17

## 2023-05-15 RX ADMIN — SODIUM CHLORIDE 2244 ML: 9 INJECTION, SOLUTION INTRAVENOUS at 13:53

## 2023-05-15 RX ADMIN — VANCOMYCIN HYDROCHLORIDE 1500 MG: 10 INJECTION, POWDER, LYOPHILIZED, FOR SOLUTION INTRAVENOUS at 14:46

## 2023-05-15 RX ADMIN — CEFTRIAXONE SODIUM 2 G: 2 INJECTION, POWDER, FOR SOLUTION INTRAMUSCULAR; INTRAVENOUS at 14:03

## 2023-05-15 RX ADMIN — IPRATROPIUM BROMIDE AND ALBUTEROL SULFATE 3 ML: .5; 3 SOLUTION RESPIRATORY (INHALATION) at 19:59

## 2023-05-15 ASSESSMENT — ACTIVITIES OF DAILY LIVING (ADL)
ADLS_ACUITY_SCORE: 37

## 2023-05-15 NOTE — PHARMACY-ADMISSION MEDICATION HISTORY
Pharmacist Admission Medication History    Admission medication history is complete. The information provided in this note is only as accurate as the sources available at the time of the update.    Medication reconciliation/reorder completed by provider prior to medication history? No    Information Source(s): Facility ([facility]/) medication list/MAR via phone    Pertinent Information: Pt did not have any PO meds today. [facility] sent a list, called to verify last doses.       Medication Affordability: n/a       Allergies reviewed with patient and updates made in EHR: no    Medication History Completed By: Marietta Loza [facility] 5/15/2023 5:10 PM    Prior to Admission medications    Medication Sig Last Dose Taking? Auth Provider Long Term End Date   acetaminophen (ACETAMINOPHEN EXTRA STRENGTH) 500 MG tablet 2 TABS (1000MG) BY MOUTH DAILY AND 2 TABS (1000MG) BY MOUTH EVERY 12 HOURS AS NEEDED FOR PAIN - NOT TO EXCEED 4000MG APAP/24HRS STRENGTH: 500 MG 5/14/2023 Yes Colin Lake MD     acetaminophen (TYLENOL) 500 MG tablet Take 1,000 mg by mouth every 12 hours as needed for mild pain prn med Yes Unknown, Entered By History     amLODIPine (NORVASC) 2.5 MG tablet Take 1 tablet (2.5 mg) by mouth daily 5/14/2023 Yes Braeden Sood MD Yes    atorvastatin (LIPITOR) 20 MG tablet Take 1 tablet (20 mg) by mouth daily 5/14/2023 Yes Connor Barlow MD Yes    bisacodyl (DULCOLAX) 5 MG EC tablet 1 TAB BY MOUTH DAILY AS NEEDED FOR CONSTIPATION prn Yes Braeden Sood MD     Cholecalciferol (VITAMIN D HIGH POTENCY) 25 MCG (1000 UT) CAPS 1 CAP BY MOUTH DAILY 5/14/2023 Yes Braeden Sood MD     docusate sodium (COLACE) 100 MG capsule TAKE 1 CAP BY MOUTH TWICE DAILY AS NEEDED FOR CONSTIPATION prn Yes Braeden Sood MD     guaiFENesin (ROBITUSSIN) 20 mg/mL SOLN solution Take 10 mLs by mouth every 4 hours as needed for cough prn Yes Dionte Mueller MD     ipratropium - albuterol 0.5 mg/2.5 mg/3 mL  (DUONEB) 0.5-2.5 (3) MG/3ML neb solution Take 1 vial (3 mLs) by nebulization every 4 hours as needed for shortness of breath prn Yes Milly Garcia MD Yes    memantine (NAMENDA) 5 MG tablet 1 TAB BY MOUTH TWICE DAILY (DX: DEMENTIA) 5/14/2023 Yes Christy Haro MD     metoprolol succinate ER (TOPROL XL) 100 MG 24 hr tablet 1 TAB BY MOUTH DAILY (DX: HYPERTENSION) 5/14/2023 Yes Christy Haro MD Yes    ondansetron (ZOFRAN) 4 MG tablet 1 TAB BY MOUTH EVERY 8 HOURS AS NEEDED FOR NAUSEA prn Yes Braeden Sood MD     oxybutynin ER (DITROPAN XL) 10 MG 24 hr tablet 2 TABS (20MG) BY MOUTH DAILY (DX: NOCTURIA) 5/14/2023 Yes Christy Haro MD     PARoxetine (PAXIL) 20 MG tablet 1 TAB BY MOUTH AT BEDTIME Strength: 20 mg 5/14/2023 Yes Colin Lake MD Yes    QUEtiapine (SEROQUEL) 25 MG tablet 1 TAB BY MOUTH AT BEDTIME (DX: INSOMNIA) 5/14/2023 Yes Christy Haro MD Yes    ramelteon (ROZEREM) 8 MG tablet 1 TAB BY MOUTH EVERY NIGHT AS NEEDED FOR SLEEP prn Yes Braeden Sood MD No    RESTASIS 0.05 % ophthalmic emulsion INSTILL 1 DROP INTO BOTH EYES DAILY (DX: DRY EYES) ++CLAUDETTE++ 5/14/2023 Yes Braeden Sood MD     rivaroxaban ANTICOAGULANT (XARELTO) 15 MG TABS tablet Take 1 tablet (15 mg) by mouth daily (with dinner) 5/14/2023 Yes Colin Lake MD Yes    order for DME Equipment being ordered: Walker Wheels () and Walker ()  Treatment Diagnosis: Difficulty ambulating   Lynn Davies PA-C

## 2023-05-15 NOTE — ED PROVIDER NOTES
History     Chief Complaint:  Generalized Weakness       HPI   Margy Babin is a 89 year old female with a history of recent ER visits for a fall and shortness of breath who presents with increasing weakness over 1 week..  The patient was brought into the emergency department via EMS.  They documented normal vital signs with an irregular heart rate consistent with atrial fibrillation.    The patient was seen in the [facility] ED on May 5, 2023 with complaints of shortness of breath.  Chest x-ray was negative.  The patient was treated with DuoNeb and's IV steroids.  She was discharged with a diagnosis of viral syndrome.    The patient was seen in the ED on May 3, 2023 with complaints of a fall.  Head CT was negative.  Chest, lumbar spine, and knee x-rays were negative.  The patient was diagnosed with acute cystitis and discharged home on oral Keflex.  The urine culture from that visit shows Klebsiella pneumonia that is resistant to ampicillin and probably nitrofurantoin but sensitive to cefazolin      Independent Historian:   Caregiver - They report Increasing weakness over 1 week (staff from [facility])    Review of External Notes:   Lisa Tavares RN  Encounter Date:  5/3/2023  Nurse Triage SBAR  Is this a 2nd Level Triage? YES, LICENSED PRACTITIONER REVIEW IS REQUIRED  Situation: [facility] calling asking for order for portable XR due to new onset SOB  Background: Pt having new sob starting yesterday with expiratory wheeze audible with stethescope, his priscila concern is pneumonia.   Mild shortness of breathe since yesterday, denies chest heaviness or pain.  Has not covid tested yet, but will right after this call.   Assessment: Temp 100.2   /64, Pulse 72  RR 24  95% RA   Covid test.      Protocol Recommended Disposition:   Go To Office Now     Recommendation: Please advise/order if CXR is ok, otherwise advise other disposition for pt?       Routed to provider     Does the patient meet  "one of the following criteria for ADS visit consideration? 16+ years old, with an [facility] PCP      TIP  Providers, please consider if this condition is appropriate for management at one of our Acute and Diagnostic Services sites.      If patient is a good candidate, please use dotphrase <dot>triageresponse and select Refer to ADS to document.     Reason for Disposition    Longstanding difficulty breathing (e.g., CHF, COPD, emphysema) and worse than normal    Additional Information    Negative: SEVERE difficulty breathing (e.g., struggling for each breath, speaks in single words, pulse > 120)    Negative: Breathing stopped and hasn't returned    Negative: Choking on something    Negative: Bluish (or gray) lips or face    Negative: Difficult to awaken or acting confused (e.g., disoriented, slurred speech)    Negative: Passed out (i.e., fainted, collapsed and was not responding)    Negative: Wheezing started suddenly after medicine, an allergic food, or bee sting    Negative: Stridor    Negative: Slow, shallow and weak breathing    Negative: Sounds like a life-threatening emergency to the triager    Negative: Chest pain    Negative: Wheezing (high pitched whistling sound) and previous asthma attacks or use of asthma medicines    Negative: Difficulty breathing and within 14 days of COVID-19 Exposure    Negative: Difficulty breathing and only present when coughing    Negative: Difficulty breathing and only from stuffy nose    Negative: Difficulty breathing and only from stuffy nose or runny nose from common cold    Negative: MODERATE difficulty breathing (e.g., speaks in phrases, SOB even at rest, pulse 100-120) of new-onset or worse than normal    Negative: Oxygen level (e.g., pulse oximetry) 90 percent or lower    Negative: Wheezing can be heard across the room    Negative: Drooling or spitting out saliva (because can't swallow)    Negative: Any history of prior \"blood clot\" in leg or lungs    Negative: Illness requiring " "prolonged bedrest in past month (e.g., immobilization, long hospital stay)    Negative: Hip or leg fracture (broken bone) in past month (or had cast on leg or ankle in past month)    Negative: Major surgery in the past month    Negative: Long-distance travel in past month (e.g., car, bus, train, plane; with trip lasting 6 or more hours)    Negative: Cancer treatment in past six months (or has cancer now)    Negative: Extra heart beats OR irregular heart beating (i.e., \"palpitations\")    Protocols used: BREATHING DIFFICULTY-A-OH    ROS:  See the HPI, otherwise the rest of the ROS is negative.      Allergies:  No Known Allergies     Medications:    acetaminophen (ACETAMINOPHEN EXTRA STRENGTH) 500 MG tablet  amLODIPine (NORVASC) 2.5 MG tablet  atorvastatin (LIPITOR) 20 MG tablet  bisacodyl (DULCOLAX) 5 MG EC tablet  Cholecalciferol (VITAMIN D HIGH POTENCY) 25 MCG (1000 UT) CAPS  docusate sodium (COLACE) 100 MG capsule  guaiFENesin (ROBITUSSIN) 20 mg/mL SOLN solution  ipratropium - albuterol 0.5 mg/2.5 mg/3 mL (DUONEB) 0.5-2.5 (3) MG/3ML neb solution  memantine (NAMENDA) 5 MG tablet  metoprolol succinate ER (TOPROL XL) 100 MG 24 hr tablet  ondansetron (ZOFRAN) 4 MG tablet  order for DME  oxybutynin ER (DITROPAN XL) 10 MG 24 hr tablet  PARoxetine (PAXIL) 20 MG tablet  QUEtiapine (SEROQUEL) 25 MG tablet  ramelteon (ROZEREM) 8 MG tablet  RESTASIS 0.05 % ophthalmic emulsion  rivaroxaban ANTICOAGULANT (XARELTO) 15 MG TABS tablet        Past Medical History:    Past Medical History:   Diagnosis Date     Anxiety      Blepharitis of both eyes      Cerebrovascular accident (CVA) due to embolism (H) 04/2022     Chronic atrial fibrillation (H)      Depression, major, recurrent, in complete remission (H)      Dry eye syndrome      Dyspnea on exertion 10/07/2021     Essential hypertension 03/05/2020     Familial tremor 03/06/2013     Insomnia, unspecified type 11/03/2014     Mixed hyperlipidemia 11/04/2020     Nausea without " vomiting 06/16/2015     Tremor, hereditary, benign        Past Surgical History:    Past Surgical History:   Procedure Laterality Date     HYSTERECTOMY TOTAL ABDOMINAL, BILATERAL SALPINGO-OOPHORECTOMY, COMBINED       NECK SURGERY  2009    cervical fusion     ROTATOR CUFF REPAIR RT/LT Right      ZZC TOTAL HIP ARTHROPLASTY Right 1997        Family History:    family history includes Breast Cancer (age of onset: 43) in her daughter; C.A.D. in her father; Emphysema in her sister; Heart Disease in her sister; Heart Disease (age of onset: 80) in her father.    Social History:   reports that she has never smoked. She has never used smokeless tobacco. She reports that she does not drink alcohol and does not use drugs.  PCP: [facility]     Physical Exam     Patient Vitals for the past 24 hrs:   BP Pulse SpO2   05/15/23 1220 (!) 114/94 90 91 %        Physical Exam  General: Alert, No distress. Nontoxic appearance  Head: No signs of trauma.   Mouth/Throat: Oropharynx moist.   Eyes: Conjunctivae are normal. Pupils are equal..   Neck: Normal range of motion.    CV: Appears well perfused.  Normal rate.  Irregular rhythm  Resp: No respiratory distress.  Clear to auscultation bilaterally  Abd: Soft, nontender, nondistended  MSK: Normal range of motion. No obvious deformity.   Neuro: The patient is alert and interactive. CANDELARIO. Speech normal. GCS 15.  Right upper extremity resting tremor.  Skin: No lesion or sign of trauma noted.   Psych: normal mood and affect. behavior is normal.       Emergency Department Course     ECG results from 05/15/23   EKG 12 lead     Value    Systolic Blood Pressure     Diastolic Blood Pressure     Ventricular Rate 66    Atrial Rate 66    RI Interval 164    QRS Duration 150        QTc 467    P Axis 74    R AXIS -26    T Axis 134    Interpretation ECG      Sinus rhythm  Left bundle branch block  Abnormal ECG  When compared with ECG of 05-MAY-2023 14:26,  No significant change was found  Confirmed  by GENERATED REPORT, COMPUTER ([id number]),  AbdiasFahad trippley ([id number]) on 5/15/2023 3:43:12 PM           Imaging:  Head CT w/o contrast   Final Result   IMPRESSION: Diffuse cerebral volume loss and cerebral white matter   changes consistent with chronic small vessel ischemic disease. No   evidence for acute intracranial pathology.          Radiation dose for this scan was reduced using automated exposure   control, adjustment of the mA and/or kV according to patient size, or   iterative reconstruction technique      GISELLE CHAVIRA MD            SYSTEM ID:  [id number]      XR Chest 2 Views   Final Result   IMPRESSION: Stable enlargement of the cardiac silhouette. Questionable   left basilar opacity, could represent consolidation versus overlying   structure. No definite pleural effusion or pneumothorax. Bones are   unchanged. Stable posttraumatic changes of the right acromioclavicular   joint.      REAGAN ECHOLS MD            SYSTEM ID:  [id number]           Laboratory:  Labs Ordered and Resulted from Time of ED Arrival to Time of ED Departure   COMPREHENSIVE METABOLIC PANEL - Abnormal       Result Value    Sodium 136      Potassium 4.2      Chloride 99      Carbon Dioxide (CO2) 22      Anion Gap 15      Urea Nitrogen 29.3 (*)     Creatinine 1.03 (*)     Calcium 8.6      Glucose 112 (*)     Alkaline Phosphatase 88      AST 13      ALT 11      Protein Total 6.1 (*)     Albumin 3.3 (*)     Bilirubin Total 0.3      GFR Estimate 50 (*)    TROPONIN T, HIGH SENSITIVITY - Abnormal    Troponin T, High Sensitivity 25 (*)    LACTIC ACID WHOLE BLOOD - Abnormal    Lactic Acid 3.6 (*)    ROUTINE UA WITH MICROSCOPIC REFLEX TO CULTURE - Abnormal    Color Urine Light Yellow      Appearance Urine Clear      Glucose Urine Negative      Bilirubin Urine Negative      Ketones Urine Trace (*)     Specific Gravity Urine 1.020      Blood Urine Negative      pH Urine 7.5 (*)     Protein Albumin Urine Negative      Urobilinogen Urine Normal       Nitrite Urine Negative      Leukocyte Esterase Urine Negative      Mucus Urine Present (*)     RBC Urine 1      WBC Urine 1      Squamous Epithelials Urine 1     CBC WITH PLATELETS AND DIFFERENTIAL - Abnormal    WBC Count 27.1 (*)     RBC Count 3.67 (*)     Hemoglobin 7.7 (*)     Hematocrit 26.1 (*)     MCV 71 (*)     MCH 21.0 (*)     MCHC 29.5 (*)     RDW 15.9 (*)     Platelet Count 685 (*)     % Neutrophils 79      % Lymphocytes 11      % Monocytes 8      % Eosinophils 1      % Basophils 0      % Immature Granulocytes 1      NRBCs per 100 WBC 0      Absolute Neutrophils 21.1 (*)     Absolute Lymphocytes 3.0      Absolute Monocytes 2.2 (*)     Absolute Eosinophils 0.4      Absolute Basophils 0.0      Absolute Immature Granulocytes 0.3      Absolute NRBCs 0.0     BLOOD GAS VENOUS - Abnormal    pH Venous 7.53 (*)     pCO2 Venous 32 (*)     pO2 Venous <10 (*)     Bicarbonate Venous 27      Base Excess/Deficit (+/-) 4.1 (*)     FIO2 4     LACTIC ACID WHOLE BLOOD   BLOOD CULTURE   BLOOD CULTURE      Emergency Department Course & Assessments:    Interventions:  Medications   ipratropium - albuterol 0.5 mg/2.5 mg/3 mL (DUONEB) neb solution 3 mL (3 mLs Nebulization $Given 5/15/23 1959)   0.9% sodium chloride BOLUS (0 mLs Intravenous Stopped 5/15/23 1749)   cefTRIAXone (ROCEPHIN) 2 g vial to attach to  ml bag for ADULTS or NS 50 ml bag for PEDS (0 g Intravenous Stopped 5/15/23 1535)   vancomycin (VANCOCIN) 1,500 mg in 0.9% NaCl 250 mL intermittent infusion (0 mg Intravenous Stopped 5/15/23 1749)      Independent Interpretation (X-rays, CTs, rhythm strip):  Chest x-ray shows no evidence of pneumothorax, agree probable infiltrate left lower lobe    Consultations/Discussion of Management or Tests:  I consulted with the admitting hospitalist     Social Determinants of Health affecting care:   Healthcare Access/Compliance, Transportation and Stress/Adjustment Disorders    Disposition:  The patient was admitted to the  [facility] under the care of Dr. Cole.     Impression & Plan    CMS Diagnoses:   The patient has signs of Severe Sepsis    The patient has signs of Severe Sepsis as evidenced by:    1. 2 SIRS criteria, AND  2. Suspected infection, AND   3. Organ dysfunction: Lactic Acidosis with value >2.0    Time severe sepsis diagnosis confirmed: 1239  05/15/23 as this was the time when Lactate resulted, and the level was > 2.0    3 Hour Severe Sepsis Bundle Completion:    1. Initial Lactic Acid Result:   Recent Labs   Lab Test 05/15/23  1239 09/03/22  1156 09/02/22  1623   LACT 3.6* 1.2 2.1*     2. Blood Cultures before Antibiotics: Yes  3. Broad Spectrum Antibiotics Administered:  yes       Anti-infectives (From admission through now)    Start     Dose/Rate Route Frequency Ordered Stop    05/15/23 1335  vancomycin (VANCOCIN) 1,500 mg in 0.9% NaCl 250 mL intermittent infusion         1,500 mg  over 90 Minutes Intravenous ONCE 05/15/23 1330 05/15/23 1749    05/15/23 1320  cefTRIAXone (ROCEPHIN) 2 g vial to attach to  ml bag for ADULTS or NS 50 ml bag for PEDS         2 g  over 30 Minutes Intravenous ONCE 05/15/23 1319 05/15/23 1535          4. Is initial hypotension present?     No (IV fluid bolus NOT required).                       Medical Decision Making:  This patient is brought in to the ER with a history of increasing weakness over the last 1 week.  The patient has recently been in the ER twice for a fall and shortness of breath.  The patient has evidence of sepsis with an elevated lactic acid but her blood pressure is normal.  Patient was started on broad-spectrum antibiotics to cover for possible UTI versus pneumonia.  The patient will be admitted to the hospital for further evaluation and treatment    Critical Care time:  was 30 minutes for this patient excluding procedures.    Diagnosis:    ICD-10-CM    1. Sepsis, due to unspecified organism, unspecified whether acute organ dysfunction present (H)  A41.9       2.  Pneumonia of left lower lobe due to infectious organism  J18.9       3. Personal history of urinary tract infection  Z87.440               Job Velazquez MD  05/15/23 2013

## 2023-05-15 NOTE — H&P
[facility]    History and Physical - Hospitalist Service       Date of Admission:  5/15/2023    Assessment & Plan      Margy Babin is a 89 year old female admitted on 5/15/2023.     Sepsis, community-acquired pneumonia versus healthcare associated pneumonia, acute physical deconditioning, acute hypoxic respiratory failure: Patient started on vancomycin and ceftriaxone in the emergency department.  Patient with memory impairment therefore history of present illness and review of systems may be limited.  Patient requiring 3 L oxygen on admission.  Per report, patient with increasing weakness over the past week, patient was brought to the emergency department, patient with an irregular heartbeat and atrial fibrillation, patient was seen previously on May 5, also for shortness of breath, patient was given steroids and DuoNebs and was diagnosed with a viral syndrome, patient was also seen on May 3 with a fall and a head CT which was negative per report, in addition multiple x-rays were negative, patient was diagnosed with cystitis and discharged on Keflex, culture from that visit showed Klebsiella, sensitive to cefazolin.  Lactic acid is 3.6 on admission, troponin is 25, total protein is 6.1.  White blood cell count is 27.1 on admission.  Last x-ray on admission shows stable enlargement of the cardiac silhouette, questionable left basilar opacity, see report for further details.  CT of the head completed on admission shows diffuse cerebral volume loss, see report for further details.  Patient previously hospitalized and discharged on September 7, 2022, see report of discharge summary for further details  Plan:  -Vancomycin and ceftriaxone  -IV fluids.  -Serial lactic acid.  -Close hemodynamic monitoring.  -Supportive cares.  -Follow cultures.  -Therapies consulted.  -Pulmonary hygiene.  -Wean oxygen as able.  -Incentive spirometer.  -Acapella.  -Procalcitonin.  -Urinalysis ordered in  "ED, pending.     History of dysphagia secondary to stroke: Discharge summary in September 2022 did notes oral pharyngeal dysphagia  -Speech therapy consultation.    Cardiac, elevated troponin, hypertension, hyperlipidemia, history of atrial fibrillation on chronic anticoagulation, history of heart failure with preserved ejection fraction: Patient with elevated troponin at 25 on admission.  Previous echocardiogram showed ejection fraction of 50 to 55% with moderately dilated left atrium, completed in April 2022.  -Serial troponin.  -EKG.  -Echocardiogram  -Continue prior to admission amlodipine when verified by pharmacy.  -Continue prior to admission statin when verified by pharmacy.  -Telemetry.  -Continue prior to admission metoprolol when verified by pharmacy.  -Continue prior to admission Xarelto when verified by pharmacy.  -Hemoccult.    Acute on chronic anemia: Microcytic: Patient with a hemoglobin of 7.7 on admission, slightly lower than baseline.  No report of external blood loss.  MCV is 71.  -Ferritin.  Iron studies, reticulocyte index.  -Consider peripheral smear and bone marrow as clinically indicated.    Chronic kidney disease, stage II: Patient with a creatinine of 1.03, approximate baseline.  -Avoid nephrotoxins.    Dementia, delirium: Patient at risk for delirium.  -Delirium precautions         DVT Prophylaxis: Xarelto.  Code Status:  DNR/DNI.    Clinically Significant Risk Factors Present on Admission              # Hypoalbuminemia: Lowest albumin = 3.3 g/dL at 5/15/2023 12:39 PM, will monitor as appropriate  # Drug Induced Coagulation Defect: home medication list includes an anticoagulant medication    # Hypertension: Noted on problem list   # Dementia: noted on problem list    # Overweight: Estimated body mass index is 27.46 kg/m  as calculated from the following:    Height as of 5/5/23: 1.651 m (5' 5\").    Weight as of this encounter: 74.8 kg (165 lb).            Disposition Plan           Marcelino " Nicolasa Cole DO  Hospitalist Service  [facility]  Securely message with [employer] (more info)  Text page via [facility] Paging/Directory     ______________________________________________________________________    Chief Complaint   Shortness of breath.    History of Present Illness   Margy Babin is a 89 year old female admitted on 5/15/2023. Patient started on vancomycin and ceftriaxone in the emergency department.  Patient with memory impairment therefore history of present illness and review of systems may be limited.  Patient requiring 3 L oxygen on admission.  Per report, patient with increasing weakness over the past week, patient was brought to the emergency department, patient with an irregular heartbeat and atrial fibrillation, patient was seen previously on May 5, also for shortness of breath, patient was given steroids and DuoNebs and was diagnosed with a viral syndrome, patient was also seen on May 3 with a fall and a head CT which was negative per report, in addition multiple x-rays were negative, patient was diagnosed with cystitis and discharged on Keflex, culture from that visit showed Klebsiella, sensitive to cefazolin.  Lactic acid is 3.6 on admission, troponin is 25, total protein is 6.1.  White blood cell count is 27.1 on admission.  Last x-ray on admission shows stable enlargement of the cardiac silhouette, questionable left basilar opacity, see report for further details.  CT of the head completed on admission shows diffuse cerebral volume loss, see report for further details.  Patient previously hospitalized and discharged on September 7, 2022, see report of discharge summary for further details      Past Medical History    Past Medical History:   Diagnosis Date     Anxiety      Blepharitis of both eyes      Cerebrovascular accident (CVA) due to embolism (H) 04/2022    corpus striatum     Chronic atrial fibrillation (H)      Depression, major, recurrent, in  complete remission (H)      Dry eye syndrome      Dyspnea on exertion 10/07/2021     Essential hypertension 2020     Familial tremor 2013     Insomnia, unspecified type 2014    No CSA on file Last  check - 2020 with no concerns.     Mixed hyperlipidemia 2020     Nausea without vomiting 2015     Problem list name updated by automated process. Provider to review     Tremor, hereditary, benign        Past Surgical History   Past Surgical History:   Procedure Laterality Date     HYSTERECTOMY TOTAL ABDOMINAL, BILATERAL SALPINGO-OOPHORECTOMY, COMBINED       NECK SURGERY      cervical fusion     ROTATOR CUFF REPAIR RT/LT Right      ZZC TOTAL HIP ARTHROPLASTY Right        Prior to Admission Medications   Prior to Admission Medications   Prescriptions Last Dose Informant Patient Reported? Taking?   Cholecalciferol (VITAMIN D HIGH POTENCY) 25 MCG (1000 UT) CAPS   No No   Si CAP BY MOUTH DAILY   PARoxetine (PAXIL) 20 MG tablet   No No   Si TAB BY MOUTH AT BEDTIME Strength: 20 mg   QUEtiapine (SEROQUEL) 25 MG tablet   No No   Si TAB BY MOUTH AT BEDTIME (DX: INSOMNIA)   RESTASIS 0.05 % ophthalmic emulsion   No No   Sig: INSTILL 1 DROP INTO BOTH EYES DAILY (DX: DRY EYES) ++CLAUDETTE++   acetaminophen (ACETAMINOPHEN EXTRA STRENGTH) 500 MG tablet   No No   Si TABS (1000MG) BY MOUTH DAILY AND 2 TABS (1000MG) BY MOUTH EVERY 12 HOURS AS NEEDED FOR PAIN - NOT TO EXCEED 4000MG APAP/24HRS STRENGTH: 500 MG   amLODIPine (NORVASC) 2.5 MG tablet   No No   Sig: Take 1 tablet (2.5 mg) by mouth daily   atorvastatin (LIPITOR) 20 MG tablet   No No   Sig: Take 1 tablet (20 mg) by mouth daily   bisacodyl (DULCOLAX) 5 MG EC tablet   No No   Si TAB BY MOUTH DAILY AS NEEDED FOR CONSTIPATION   docusate sodium (COLACE) 100 MG capsule   No No   Sig: TAKE 1 CAP BY MOUTH TWICE DAILY AS NEEDED FOR CONSTIPATION   guaiFENesin (ROBITUSSIN) 20 mg/mL SOLN solution   No No   Sig: Take 10 mLs by mouth  every 4 hours as needed for cough   ipratropium - albuterol 0.5 mg/2.5 mg/3 mL (DUONEB) 0.5-2.5 (3) MG/3ML neb solution   No No   Sig: Take 1 vial (3 mLs) by nebulization every 4 hours as needed for shortness of breath   memantine (NAMENDA) 5 MG tablet   No No   Si TAB BY MOUTH TWICE DAILY (DX: DEMENTIA)   metoprolol succinate ER (TOPROL XL) 100 MG 24 hr tablet   No No   Si TAB BY MOUTH DAILY (DX: HYPERTENSION)   ondansetron (ZOFRAN) 4 MG tablet   No No   Si TAB BY MOUTH EVERY 8 HOURS AS NEEDED FOR NAUSEA   order for DME   No No   Sig: Equipment being ordered: Walker Wheels () and Walker ()  Treatment Diagnosis: Difficulty ambulating   oxybutynin ER (DITROPAN XL) 10 MG 24 hr tablet   No No   Si TABS (20MG) BY MOUTH DAILY (DX: NOCTURIA)   ramelteon (ROZEREM) 8 MG tablet   No No   Si TAB BY MOUTH EVERY NIGHT AS NEEDED FOR SLEEP   rivaroxaban ANTICOAGULANT (XARELTO) 15 MG TABS tablet   No No   Sig: Take 1 tablet (15 mg) by mouth daily (with dinner)      Facility-Administered Medications: None        Physical Exam   Vital Signs: Temp: 97.9  F (36.6  C) Temp src: Temporal BP: (!) 114/94 Pulse: 67   Resp: 22 SpO2: 90 % O2 Device: None (Room air)    Weight: 165 lbs 0 oz    GENERAL: Alert and confused. NAD. Conversational, appropriate.   HEENT: Normocephalic. Nares normal.  Oxygen mask in place.  LUNGS: Coarse with decrement to bases. No dyspnea at rest.   HEART: Irregular rate. Extremities perfused.   ABDOMEN: Soft, nontender, and nondistended. Positive bowel sounds.   EXTREMITIES: Minimal LE edema noted.   NEUROLOGIC: Moves extremities x4 spontaneously, follows commands    Medical Decision Making       56 MINUTES SPENT BY ME on the date of service doing chart review, history, exam, documentation & further activities per the note.      Data     I have personally reviewed the following data over the past 24 hrs:    27.1 (H)  \   7.7 (L)   / 685 (H)     136 99 29.3 (H) /  112 (H)   4.2 22 1.03  (H) \       ALT: 11 AST: 13 AP: 88 TBILI: 0.3   ALB: 3.3 (L) TOT PROTEIN: 6.1 (L) LIPASE: N/A       Trop: 25 (H) BNP: N/A       Procal: N/A CRP: N/A Lactic Acid: 3.6 (H)         Imaging results reviewed over the past 24 hrs:   Recent Results (from the past 24 hour(s))   XR Chest 2 Views    Narrative    XR CHEST 2 VIEWS   5/15/2023 1:19 PM     HISTORY: Shortness of breath, weak    COMPARISON: Chest radiograph 5/5/2023      Impression    IMPRESSION: Stable enlargement of the cardiac silhouette. Questionable  left basilar opacity, could represent consolidation versus overlying  structure. No definite pleural effusion or pneumothorax. Bones are  unchanged. Stable posttraumatic changes of the right acromioclavicular  joint.    REAGAN ECHOLS MD         SYSTEM ID:  [id number]   Head CT w/o contrast    Narrative    CT OF THE HEAD WITHOUT CONTRAST 5/15/2023 1:30 PM     COMPARISON: Head CT 5/4/2023    HISTORY: Fall. Trauma. Pain.    TECHNIQUE: 5 mm thick axial CT images of the head were acquired  without IV contrast material.    FINDINGS: There is moderate diffuse cerebral volume loss. There are  subtle patchy areas of decreased density in the cerebral white matter  bilaterally that are consistent with sequela of chronic small vessel  ischemic disease.     The ventricles and basal cisterns are within normal limits in  configuration given the degree of cerebral volume loss. There is no  midline shift. There are no extra-axial fluid collections.     No intracranial hemorrhage, mass or recent infarct.    The visualized paranasal sinuses are well-aerated. There is no  mastoiditis. There are no fractures of the visualized bones.       Impression    IMPRESSION: Diffuse cerebral volume loss and cerebral white matter  changes consistent with chronic small vessel ischemic disease. No  evidence for acute intracranial pathology.       Radiation dose for this scan was reduced using automated exposure  control, adjustment of the mA  and/or kV according to patient size, or  iterative reconstruction technique    GISELLE CHAVIRA MD         SYSTEM ID:  [id number]

## 2023-05-15 NOTE — ED TRIAGE NOTES
Presents via EMS from [facility]. Staff reported to EMS generalized weakness for 1 week. Fell 2 weeks ago. EMS reports atrial fibrillation rate 70's.

## 2023-05-15 NOTE — ED NOTES
[facility]  ED Nurse Handoff Report    ED Chief complaint: Generalized Weakness      ED Diagnosis:   Final diagnoses:   Sepsis, due to unspecified organism, unspecified whether acute organ dysfunction present (H)   Pneumonia of left lower lobe due to infectious organism   Personal history of urinary tract infection       Code Status: hospitalist to address    Allergies: No Known Allergies    Patient Story: Presents via EMS from [facility]. Staff reported to EMS generalized weakness for 1 week. Fell 2 weeks ago. EMS reports atrial fibrillation rate 70's.       Focused Assessment:  Alert to person, place and situation. Skin intact. Bilateral antecubital IV sites. On 3L oxymask. purewick in place. Lungs clear. NSR with LBBB    Treatments and/or interventions provided: labs, IV medications- please see MAR.   Patient's response to treatments and/or interventions: stable    To be done/followed up on inpatient unit:  admission orders    Does this patient have any cognitive concerns?: disoriented to time    Activity level - Baseline/Home:  Unknown  Activity Level - Current:   Unknown    Patient's Preferred language: English   Needed?: No    Isolation: None  Infection: Not Applicable  Patient tested for COVID 19 prior to admission: NO  Bariatric?: No    Vital Signs:   Vitals:    05/15/23 1600 05/15/23 1630 05/15/23 1745 05/15/23 1748   BP:       Pulse: 84 87 75 75   Resp: (!) 42 30 (!) 45 (!) 36   Temp:       TempSrc:       SpO2: 94% 100% 97% 99%   Weight:         Labs Ordered and Resulted from Time of ED Arrival to Time of ED Departure   COMPREHENSIVE METABOLIC PANEL - Abnormal       Result Value    Sodium 136      Potassium 4.2      Chloride 99      Carbon Dioxide (CO2) 22      Anion Gap 15      Urea Nitrogen 29.3 (*)     Creatinine 1.03 (*)     Calcium 8.6      Glucose 112 (*)     Alkaline Phosphatase 88      AST 13      ALT 11      Protein Total 6.1 (*)     Albumin 3.3 (*)     Bilirubin Total 0.3       GFR Estimate 50 (*)    TROPONIN T, HIGH SENSITIVITY - Abnormal    Troponin T, High Sensitivity 25 (*)    LACTIC ACID WHOLE BLOOD - Abnormal    Lactic Acid 3.6 (*)    ROUTINE UA WITH MICROSCOPIC REFLEX TO CULTURE - Abnormal    Color Urine Light Yellow      Appearance Urine Clear      Glucose Urine Negative      Bilirubin Urine Negative      Ketones Urine Trace (*)     Specific Gravity Urine 1.020      Blood Urine Negative      pH Urine 7.5 (*)     Protein Albumin Urine Negative      Urobilinogen Urine Normal      Nitrite Urine Negative      Leukocyte Esterase Urine Negative      Mucus Urine Present (*)     RBC Urine 1      WBC Urine 1      Squamous Epithelials Urine 1     CBC WITH PLATELETS AND DIFFERENTIAL - Abnormal    WBC Count 27.1 (*)     RBC Count 3.67 (*)     Hemoglobin 7.7 (*)     Hematocrit 26.1 (*)     MCV 71 (*)     MCH 21.0 (*)     MCHC 29.5 (*)     RDW 15.9 (*)     Platelet Count 685 (*)     % Neutrophils 79      % Lymphocytes 11      % Monocytes 8      % Eosinophils 1      % Basophils 0      % Immature Granulocytes 1      NRBCs per 100 WBC 0      Absolute Neutrophils 21.1 (*)     Absolute Lymphocytes 3.0      Absolute Monocytes 2.2 (*)     Absolute Eosinophils 0.4      Absolute Basophils 0.0      Absolute Immature Granulocytes 0.3      Absolute NRBCs 0.0     BLOOD GAS VENOUS - Abnormal    pH Venous 7.53 (*)     pCO2 Venous 32 (*)     pO2 Venous <10 (*)     Bicarbonate Venous 27      Base Excess/Deficit (+/-) 4.1 (*)     FIO2 4     LACTIC ACID WHOLE BLOOD   BLOOD CULTURE   BLOOD CULTURE       Cardiac Rhythm:     Was the PSS-3 completed:   Yes  What interventions are required if any?               Family Comments: none in ER  OBS brochure/video discussed/provided to patient/family: N/A              Name of person given brochure if not patient:               Relationship to patient:     For the majority of the shift this patient's behavior was Green.   Behavioral interventions performed were   none.    ED NURSE PHONE NUMBER: *[phone]

## 2023-05-16 ENCOUNTER — APPOINTMENT (OUTPATIENT)
Dept: GENERAL RADIOLOGY | Facility: CLINIC | Age: 88
DRG: 871 | End: 2023-05-16
Attending: INTERNAL MEDICINE
Payer: MEDICARE

## 2023-05-16 ENCOUNTER — APPOINTMENT (OUTPATIENT)
Dept: SPEECH THERAPY | Facility: CLINIC | Age: 88
DRG: 871 | End: 2023-05-16
Attending: HOSPITALIST
Payer: MEDICARE

## 2023-05-16 LAB
ABO/RH(D): NORMAL
ALBUMIN SERPL BCG-MCNC: 2.9 G/DL (ref 3.5–5.2)
ALLEN'S TEST: YES
ALLEN'S TEST: YES
ALP SERPL-CCNC: 78 U/L (ref 35–104)
ALT SERPL W P-5'-P-CCNC: 8 U/L (ref 10–35)
ANION GAP SERPL CALCULATED.3IONS-SCNC: 10 MMOL/L (ref 7–15)
ANTIBODY SCREEN: NEGATIVE
AST SERPL W P-5'-P-CCNC: 18 U/L (ref 10–35)
BASE EXCESS BLDA CALC-SCNC: -0.9 MMOL/L (ref -9–1.8)
BASE EXCESS BLDA CALC-SCNC: -1.4 MMOL/L (ref -9–1.8)
BILIRUB SERPL-MCNC: 0.4 MG/DL
BLD PROD TYP BPU: NORMAL
BLOOD COMPONENT TYPE: NORMAL
BUN SERPL-MCNC: 20.9 MG/DL (ref 8–23)
CALCIUM SERPL-MCNC: 7.9 MG/DL (ref 8.2–9.6)
CHLORIDE SERPL-SCNC: 103 MMOL/L (ref 98–107)
CODING SYSTEM: NORMAL
CREAT SERPL-MCNC: 0.97 MG/DL (ref 0.51–0.95)
CROSSMATCH: NORMAL
DEPRECATED HCO3 PLAS-SCNC: 24 MMOL/L (ref 22–29)
ERYTHROCYTE [DISTWIDTH] IN BLOOD BY AUTOMATED COUNT: 16.2 % (ref 10–15)
FERRITIN SERPL-MCNC: 43 NG/ML (ref 11–328)
FLUAV RNA SPEC QL NAA+PROBE: NEGATIVE
FLUBV RNA RESP QL NAA+PROBE: NEGATIVE
GFR SERPL CREATININE-BSD FRML MDRD: 54 ML/MIN/1.73M2
GLUCOSE SERPL-MCNC: 104 MG/DL (ref 70–99)
HCO3 BLD-SCNC: 23 MMOL/L (ref 21–28)
HCO3 BLD-SCNC: 23 MMOL/L (ref 21–28)
HCO3 BLDV-SCNC: 20 MMOL/L (ref 21–28)
HCT VFR BLD AUTO: 22.2 % (ref 35–47)
HGB BLD-MCNC: 6.6 G/DL (ref 11.7–15.7)
HGB BLD-MCNC: 6.7 G/DL (ref 11.7–15.7)
ISSUE DATE AND TIME: NORMAL
LACTATE BLD-SCNC: 1.7 MMOL/L
LACTATE SERPL-SCNC: 1.5 MMOL/L (ref 0.7–2)
MCH RBC QN AUTO: 21.1 PG (ref 26.5–33)
MCHC RBC AUTO-ENTMCNC: 30.2 G/DL (ref 31.5–36.5)
MCV RBC AUTO: 70 FL (ref 78–100)
MRSA DNA SPEC QL NAA+PROBE: NEGATIVE
NT-PROBNP SERPL-MCNC: 707 PG/ML (ref 0–1800)
O2/TOTAL GAS SETTING VFR VENT: 10 %
O2/TOTAL GAS SETTING VFR VENT: 90 %
PCO2 BLD: 34 MM HG (ref 35–45)
PCO2 BLD: 35 MM HG (ref 35–45)
PCO2 BLDV: 26 MM HG (ref 40–50)
PH BLD: 7.43 [PH] (ref 7.35–7.45)
PH BLD: 7.44 [PH] (ref 7.35–7.45)
PH BLDV: 7.48 [PH] (ref 7.32–7.43)
PLATELET # BLD AUTO: 591 10E3/UL (ref 150–450)
PO2 BLD: 131 MM HG (ref 80–105)
PO2 BLD: 22 MM HG (ref 80–105)
PO2 BLDV: 17 MM HG (ref 25–47)
POTASSIUM SERPL-SCNC: 3.3 MMOL/L (ref 3.4–5.3)
POTASSIUM SERPL-SCNC: 3.5 MMOL/L (ref 3.4–5.3)
PROT SERPL-MCNC: 5.5 G/DL (ref 6.4–8.3)
RBC # BLD AUTO: 3.18 10E6/UL (ref 3.8–5.2)
RSV RNA SPEC NAA+PROBE: NEGATIVE
SA TARGET DNA: POSITIVE
SAO2 % BLDV: 29 % (ref 94–100)
SARS-COV-2 RNA RESP QL NAA+PROBE: NEGATIVE
SODIUM SERPL-SCNC: 137 MMOL/L (ref 136–145)
SPECIMEN EXPIRATION DATE: NORMAL
TROPONIN T SERPL HS-MCNC: 28 NG/L
TROPONIN T SERPL HS-MCNC: 32 NG/L
UNIT ABO/RH: NORMAL
UNIT NUMBER: NORMAL
UNIT STATUS: NORMAL
UNIT TYPE ISBT: 5100
WBC # BLD AUTO: 22.2 10E3/UL (ref 4–11)

## 2023-05-16 PROCEDURE — 999N000157 HC STATISTIC RCP TIME EA 10 MIN

## 2023-05-16 PROCEDURE — 86850 RBC ANTIBODY SCREEN: CPT | Performed by: NURSE PRACTITIONER

## 2023-05-16 PROCEDURE — 250N000011 HC RX IP 250 OP 636: Performed by: INTERNAL MEDICINE

## 2023-05-16 PROCEDURE — 92610 EVALUATE SWALLOWING FUNCTION: CPT | Mod: GN | Performed by: SPEECH-LANGUAGE PATHOLOGIST

## 2023-05-16 PROCEDURE — 250N000009 HC RX 250: Performed by: NURSE PRACTITIONER

## 2023-05-16 PROCEDURE — 87641 MR-STAPH DNA AMP PROBE: CPT | Performed by: HOSPITALIST

## 2023-05-16 PROCEDURE — 99233 SBSQ HOSP IP/OBS HIGH 50: CPT | Performed by: HOSPITALIST

## 2023-05-16 PROCEDURE — 250N000013 HC RX MED GY IP 250 OP 250 PS 637: Performed by: HOSPITALIST

## 2023-05-16 PROCEDURE — 94640 AIRWAY INHALATION TREATMENT: CPT

## 2023-05-16 PROCEDURE — 82803 BLOOD GASES ANY COMBINATION: CPT

## 2023-05-16 PROCEDURE — 36415 COLL VENOUS BLD VENIPUNCTURE: CPT | Performed by: HOSPITALIST

## 2023-05-16 PROCEDURE — 80053 COMPREHEN METABOLIC PANEL: CPT | Performed by: HOSPITALIST

## 2023-05-16 PROCEDURE — 94640 AIRWAY INHALATION TREATMENT: CPT | Mod: 76

## 2023-05-16 PROCEDURE — 84132 ASSAY OF SERUM POTASSIUM: CPT | Performed by: HOSPITALIST

## 2023-05-16 PROCEDURE — 84484 ASSAY OF TROPONIN QUANT: CPT | Performed by: HOSPITALIST

## 2023-05-16 PROCEDURE — 99207 PR APP CREDIT; MD BILLING SHARED VISIT: CPT | Performed by: NURSE PRACTITIONER

## 2023-05-16 PROCEDURE — 99207 PR APP CREDIT; MD BILLING SHARED VISIT: CPT | Performed by: INTERNAL MEDICINE

## 2023-05-16 PROCEDURE — 250N000011 HC RX IP 250 OP 636: Performed by: HOSPITALIST

## 2023-05-16 PROCEDURE — 83880 ASSAY OF NATRIURETIC PEPTIDE: CPT | Performed by: NURSE PRACTITIONER

## 2023-05-16 PROCEDURE — 83605 ASSAY OF LACTIC ACID: CPT | Performed by: INTERNAL MEDICINE

## 2023-05-16 PROCEDURE — 99418 PROLNG IP/OBS E/M EA 15 MIN: CPT | Performed by: HOSPITALIST

## 2023-05-16 PROCEDURE — 86923 COMPATIBILITY TEST ELECTRIC: CPT | Performed by: HOSPITALIST

## 2023-05-16 PROCEDURE — 82803 BLOOD GASES ANY COMBINATION: CPT | Performed by: NURSE PRACTITIONER

## 2023-05-16 PROCEDURE — 36600 WITHDRAWAL OF ARTERIAL BLOOD: CPT

## 2023-05-16 PROCEDURE — 71046 X-RAY EXAM CHEST 2 VIEWS: CPT

## 2023-05-16 PROCEDURE — 85018 HEMOGLOBIN: CPT | Performed by: HOSPITALIST

## 2023-05-16 PROCEDURE — 83605 ASSAY OF LACTIC ACID: CPT

## 2023-05-16 PROCEDURE — 85027 COMPLETE CBC AUTOMATED: CPT | Performed by: HOSPITALIST

## 2023-05-16 PROCEDURE — P9016 RBC LEUKOCYTES REDUCED: HCPCS | Performed by: HOSPITALIST

## 2023-05-16 PROCEDURE — 120N000001 HC R&B MED SURG/OB

## 2023-05-16 RX ORDER — FUROSEMIDE 10 MG/ML
20 INJECTION INTRAMUSCULAR; INTRAVENOUS ONCE
Status: COMPLETED | OUTPATIENT
Start: 2023-05-16 | End: 2023-05-16

## 2023-05-16 RX ORDER — PAROXETINE 20 MG/1
20 TABLET, FILM COATED ORAL AT BEDTIME
Status: DISCONTINUED | OUTPATIENT
Start: 2023-05-16 | End: 2023-05-21 | Stop reason: HOSPADM

## 2023-05-16 RX ORDER — CEFTRIAXONE 1 G/1
1 INJECTION, POWDER, FOR SOLUTION INTRAMUSCULAR; INTRAVENOUS EVERY 24 HOURS
Status: DISCONTINUED | OUTPATIENT
Start: 2023-05-16 | End: 2023-05-16

## 2023-05-16 RX ORDER — POTASSIUM CHLORIDE 1.5 G/1.58G
20 POWDER, FOR SOLUTION ORAL ONCE
Status: COMPLETED | OUTPATIENT
Start: 2023-05-16 | End: 2023-05-16

## 2023-05-16 RX ORDER — CEFTRIAXONE 2 G/1
2 INJECTION, POWDER, FOR SOLUTION INTRAMUSCULAR; INTRAVENOUS EVERY 24 HOURS
Status: DISCONTINUED | OUTPATIENT
Start: 2023-05-16 | End: 2023-05-21

## 2023-05-16 RX ORDER — AMOXICILLIN 250 MG
2 CAPSULE ORAL 2 TIMES DAILY PRN
Status: DISCONTINUED | OUTPATIENT
Start: 2023-05-16 | End: 2023-05-21 | Stop reason: HOSPADM

## 2023-05-16 RX ORDER — MEMANTINE HYDROCHLORIDE 5 MG/1
5 TABLET ORAL 2 TIMES DAILY
Status: DISCONTINUED | OUTPATIENT
Start: 2023-05-16 | End: 2023-05-21 | Stop reason: HOSPADM

## 2023-05-16 RX ORDER — QUETIAPINE FUMARATE 25 MG/1
25 TABLET, FILM COATED ORAL AT BEDTIME
Status: DISCONTINUED | OUTPATIENT
Start: 2023-05-16 | End: 2023-05-21 | Stop reason: HOSPADM

## 2023-05-16 RX ORDER — IPRATROPIUM BROMIDE AND ALBUTEROL SULFATE 2.5; .5 MG/3ML; MG/3ML
1 SOLUTION RESPIRATORY (INHALATION)
Status: DISCONTINUED | OUTPATIENT
Start: 2023-05-16 | End: 2023-05-21 | Stop reason: HOSPADM

## 2023-05-16 RX ORDER — ACETAMINOPHEN 325 MG/1
650 TABLET ORAL EVERY 6 HOURS PRN
Status: DISCONTINUED | OUTPATIENT
Start: 2023-05-16 | End: 2023-05-21 | Stop reason: HOSPADM

## 2023-05-16 RX ORDER — AMLODIPINE BESYLATE 2.5 MG/1
2.5 TABLET ORAL DAILY
Status: DISCONTINUED | OUTPATIENT
Start: 2023-05-16 | End: 2023-05-21 | Stop reason: HOSPADM

## 2023-05-16 RX ORDER — BISACODYL 10 MG
10 SUPPOSITORY, RECTAL RECTAL DAILY PRN
Status: DISCONTINUED | OUTPATIENT
Start: 2023-05-16 | End: 2023-05-21 | Stop reason: HOSPADM

## 2023-05-16 RX ORDER — ACETAMINOPHEN 650 MG/1
650 SUPPOSITORY RECTAL EVERY 6 HOURS PRN
Status: DISCONTINUED | OUTPATIENT
Start: 2023-05-16 | End: 2023-05-21 | Stop reason: HOSPADM

## 2023-05-16 RX ORDER — CARBOXYMETHYLCELLULOSE SODIUM 5 MG/ML
1 SOLUTION/ DROPS OPHTHALMIC DAILY
Status: DISCONTINUED | OUTPATIENT
Start: 2023-05-16 | End: 2023-05-21 | Stop reason: HOSPADM

## 2023-05-16 RX ORDER — LIDOCAINE 40 MG/G
CREAM TOPICAL
Status: DISCONTINUED | OUTPATIENT
Start: 2023-05-16 | End: 2023-05-21 | Stop reason: HOSPADM

## 2023-05-16 RX ORDER — ONDANSETRON 4 MG/1
4 TABLET, ORALLY DISINTEGRATING ORAL EVERY 6 HOURS PRN
Status: DISCONTINUED | OUTPATIENT
Start: 2023-05-16 | End: 2023-05-21 | Stop reason: HOSPADM

## 2023-05-16 RX ORDER — AMOXICILLIN 250 MG
1 CAPSULE ORAL 2 TIMES DAILY PRN
Status: DISCONTINUED | OUTPATIENT
Start: 2023-05-16 | End: 2023-05-21 | Stop reason: HOSPADM

## 2023-05-16 RX ORDER — DOXYCYCLINE 100 MG/10ML
100 INJECTION, POWDER, LYOPHILIZED, FOR SOLUTION INTRAVENOUS EVERY 12 HOURS
Status: DISCONTINUED | OUTPATIENT
Start: 2023-05-16 | End: 2023-05-21

## 2023-05-16 RX ORDER — OLANZAPINE 5 MG/1
5 TABLET, ORALLY DISINTEGRATING ORAL
Status: DISCONTINUED | OUTPATIENT
Start: 2023-05-16 | End: 2023-05-21 | Stop reason: HOSPADM

## 2023-05-16 RX ORDER — ATORVASTATIN CALCIUM 20 MG/1
20 TABLET, FILM COATED ORAL DAILY
Status: DISCONTINUED | OUTPATIENT
Start: 2023-05-16 | End: 2023-05-21 | Stop reason: HOSPADM

## 2023-05-16 RX ORDER — POLYETHYLENE GLYCOL 3350 17 G/17G
17 POWDER, FOR SOLUTION ORAL DAILY PRN
Status: DISCONTINUED | OUTPATIENT
Start: 2023-05-16 | End: 2023-05-21 | Stop reason: HOSPADM

## 2023-05-16 RX ORDER — SODIUM CHLORIDE 9 MG/ML
INJECTION, SOLUTION INTRAVENOUS CONTINUOUS
Status: DISCONTINUED | OUTPATIENT
Start: 2023-05-16 | End: 2023-05-16

## 2023-05-16 RX ORDER — FUROSEMIDE 10 MG/ML
40 INJECTION INTRAMUSCULAR; INTRAVENOUS ONCE
Status: COMPLETED | OUTPATIENT
Start: 2023-05-16 | End: 2023-05-16

## 2023-05-16 RX ORDER — ONDANSETRON 2 MG/ML
4 INJECTION INTRAMUSCULAR; INTRAVENOUS EVERY 6 HOURS PRN
Status: DISCONTINUED | OUTPATIENT
Start: 2023-05-16 | End: 2023-05-21 | Stop reason: HOSPADM

## 2023-05-16 RX ORDER — METOPROLOL SUCCINATE 25 MG/1
100 TABLET, EXTENDED RELEASE ORAL DAILY
Status: DISCONTINUED | OUTPATIENT
Start: 2023-05-16 | End: 2023-05-21 | Stop reason: HOSPADM

## 2023-05-16 RX ADMIN — IPRATROPIUM BROMIDE AND ALBUTEROL SULFATE 3 ML: .5; 3 SOLUTION RESPIRATORY (INHALATION) at 05:34

## 2023-05-16 RX ADMIN — IPRATROPIUM BROMIDE AND ALBUTEROL SULFATE 3 ML: .5; 3 SOLUTION RESPIRATORY (INHALATION) at 07:42

## 2023-05-16 RX ADMIN — MEMANTINE 5 MG: 5 TABLET ORAL at 21:20

## 2023-05-16 RX ADMIN — METOPROLOL SUCCINATE 100 MG: 25 TABLET, EXTENDED RELEASE ORAL at 09:41

## 2023-05-16 RX ADMIN — VANCOMYCIN HYDROCHLORIDE 1000 MG: 1 INJECTION, SOLUTION INTRAVENOUS at 17:56

## 2023-05-16 RX ADMIN — ATORVASTATIN CALCIUM 20 MG: 20 TABLET, FILM COATED ORAL at 09:41

## 2023-05-16 RX ADMIN — QUETIAPINE FUMARATE 25 MG: 25 TABLET ORAL at 05:08

## 2023-05-16 RX ADMIN — FUROSEMIDE 20 MG: 10 INJECTION, SOLUTION INTRAMUSCULAR; INTRAVENOUS at 01:58

## 2023-05-16 RX ADMIN — AMLODIPINE BESYLATE 2.5 MG: 2.5 TABLET ORAL at 09:41

## 2023-05-16 RX ADMIN — DOXYCYCLINE 100 MG: 100 INJECTION, POWDER, LYOPHILIZED, FOR SOLUTION INTRAVENOUS at 12:16

## 2023-05-16 RX ADMIN — FUROSEMIDE 40 MG: 10 INJECTION, SOLUTION INTRAMUSCULAR; INTRAVENOUS at 14:43

## 2023-05-16 RX ADMIN — QUETIAPINE FUMARATE 25 MG: 25 TABLET ORAL at 21:20

## 2023-05-16 RX ADMIN — IPRATROPIUM BROMIDE AND ALBUTEROL SULFATE 3 ML: .5; 3 SOLUTION RESPIRATORY (INHALATION) at 12:01

## 2023-05-16 RX ADMIN — CARBOXYMETHYLCELLULOSE SODIUM 1 DROP: 5 SOLUTION/ DROPS OPHTHALMIC at 18:02

## 2023-05-16 RX ADMIN — MEMANTINE 5 MG: 5 TABLET ORAL at 09:41

## 2023-05-16 RX ADMIN — CEFTRIAXONE SODIUM 2 G: 2 INJECTION, POWDER, FOR SOLUTION INTRAMUSCULAR; INTRAVENOUS at 17:27

## 2023-05-16 RX ADMIN — POTASSIUM CHLORIDE 20 MEQ: 1.5 POWDER, FOR SOLUTION ORAL at 14:44

## 2023-05-16 RX ADMIN — IPRATROPIUM BROMIDE AND ALBUTEROL SULFATE 3 ML: .5; 3 SOLUTION RESPIRATORY (INHALATION) at 15:14

## 2023-05-16 RX ADMIN — PAROXETINE HYDROCHLORIDE 20 MG: 20 TABLET, FILM COATED ORAL at 21:20

## 2023-05-16 ASSESSMENT — ACTIVITIES OF DAILY LIVING (ADL)
ADLS_ACUITY_SCORE: 37
ADLS_ACUITY_SCORE: 46
ADLS_ACUITY_SCORE: 37
ADLS_ACUITY_SCORE: 42
ADLS_ACUITY_SCORE: 37
ADLS_ACUITY_SCORE: 46
ADLS_ACUITY_SCORE: 42
ADLS_ACUITY_SCORE: 46
ADLS_ACUITY_SCORE: 41
ADLS_ACUITY_SCORE: 42
ADLS_ACUITY_SCORE: 46
ADLS_ACUITY_SCORE: 37

## 2023-05-16 NOTE — PROGRESS NOTES
RRT was called for the patient due to unreliable pulse ox and ABG was ordered but was unable to obtain despite multiple attempts by two RTs and a Nurse Practitioner.

## 2023-05-16 NOTE — CODE/RAPID RESPONSE
[facility]    RRT Note  5/16/2023   Time Called: 0305    RRT called for: Possible hypoxia, inability to reliable get pulse oxymeter readings     Background:  Margy Babin who is a 89 year old female who caem to ER on 5/15 at 1400 via EMS from her assisted living center with observed shortness of breath and weakness.    She had 2 prior ER visits on May 3, and may 5 for weakness, shortness of breath and on May 3 ER - UTI (Klebsiella)   This admit- notable for tachypnea, dificult time getting accurate sats due to tremors.    Labs showed leukocytosis and mildly elevated lactate concerning for infection.  VBG done x2 in ER in past hours - respiratory alkalosis - with Pc02 of 26, and pH of 7.48.     Given total of 3 liters of IV fluid thus far over past 12 hours for sepsis, and then 20 mg of lasix at 0130.   Given vanc and rocephin at 1320 on 5/15 after cultures done.    Other data- echo in 2022 - no systolic heart failure.    ECG with chronic LBBB  Chronically anticoagulated for afib.      RRT Assessment:  Upon arrival - pt is laying upright in bed, but position of comfort is tilted to right. Significant kyphosis noted.  She appears to have work of breathing, but denies shortness of breath.  Able to speak clearly , oxymask in place.  Exam:     Vitals - HR in 80's, BP is 140/80   Due to cool hands and tremor - unable to get a reliable sat - with a forehead sticker - sats at times are 100%.  I was also able to use earlobe sticker to get an oxygen sat of 100% with HR of 74    Lungs - mild wheeze on right, basilar crackles present  CV - reg, abd - soft, non tender, no masses or guarding.  Ext are cool with mild/chronic edema.      Interventions -  - unable to obtain ABG (multiple attempts per RT, CNP) - the ABG that is in Epic at 0410  is NOT arterial   - duoneb - schedule QID, including now  - check BNP      Diagnosis:  1.  Sepsis - source TBD  2.  Respiratory distress - multifactorial -  sepsis, fluid overload, Kyphosis and age  3. Anemia - progressive since early may      Plan -   - consider repeat dose of lasix 20 mg at 0600 depending on respiratory status and urine output  - spot check sats with earlobe or forehead.   - continue antibiotics for sepsis, monitor cultures.   - echo ordered for am   - consider more work up for sepsis source with CT scan of c/a/p   - CBC ordered for am (follow up anemia - if hemoglobin <7 - consider transfusion and work up for blood loss, and hold rivaroxiban if hemoglobin dropping   - type and screen added  - discussed with [facility] med MD -DR Jackson          Data:  Had CXR prior to arrival on medical jackson on 5/16 -   (Unchanged from 5/15 12 hours prior) - trace effusions, no focal infiltrates or significant CHF    Lacate - peak of 4. 2 at 2249 on 5/15; downtrended to 1.5 with IV fluids  Troponin 25 ->  28, ECG 5/15 - SR with chronic LBBB   VBG - resp alkalosis   Echo from 2022 - normal EF of 55%, mild aortic regurg  Hemoglobin 7.7 (8.7 on 5/5 and 9.3 on 5/3)   WBC 27k             Discussed with DR Jackson.     Interval History     Margy Babin is a 89 year old female who was admitted on 5/15/2023 for weakness and shortness of breath  She has dementia and resides at assisted living facility.  Having trouble the past few weeks of weakness, falls, dyspnea and had several ER visits for this.  Day of ER presentation - concern for sepsis due to leukocytosis, prior UTI on 5/5, and mildy elevated serum lactate.  Given IV fluids, pancultured, and started on vanc/rocephin.    Medical history significant for: HTN, afib (anticoagulated) dementia, CKD 2    Code Status: No CPR- Do NOT Intubate    Allergies   No Known Allergies    ABG:  -  Recent Labs   Lab 05/16/23  0024 05/15/23  1403   PH 7.48*  --    O2PER  --  4       Troponin:    Recent Labs   Lab Test 12/25/20  1435   TROPI <0.015       IMAGING: (X-ray/CT/MRI)   Recent Results (from the past 24 hour(s))   XR  Chest 2 Views    Narrative    XR CHEST 2 VIEWS   5/15/2023 1:19 PM     HISTORY: Shortness of breath, weak    COMPARISON: Chest radiograph 5/5/2023      Impression    IMPRESSION: Stable enlargement of the cardiac silhouette. Questionable  left basilar opacity, could represent consolidation versus overlying  structure. No definite pleural effusion or pneumothorax. Bones are  unchanged. Stable posttraumatic changes of the right acromioclavicular  joint.    REAGAN ECHOLS MD         SYSTEM ID:  [id number]   Head CT w/o contrast    Narrative    CT OF THE HEAD WITHOUT CONTRAST 5/15/2023 1:30 PM     COMPARISON: Head CT 5/4/2023    HISTORY: Fall. Trauma. Pain.    TECHNIQUE: 5 mm thick axial CT images of the head were acquired  without IV contrast material.    FINDINGS: There is moderate diffuse cerebral volume loss. There are  subtle patchy areas of decreased density in the cerebral white matter  bilaterally that are consistent with sequela of chronic small vessel  ischemic disease.     The ventricles and basal cisterns are within normal limits in  configuration given the degree of cerebral volume loss. There is no  midline shift. There are no extra-axial fluid collections.     No intracranial hemorrhage, mass or recent infarct.    The visualized paranasal sinuses are well-aerated. There is no  mastoiditis. There are no fractures of the visualized bones.       Impression    IMPRESSION: Diffuse cerebral volume loss and cerebral white matter  changes consistent with chronic small vessel ischemic disease. No  evidence for acute intracranial pathology.       Radiation dose for this scan was reduced using automated exposure  control, adjustment of the mA and/or kV according to patient size, or  iterative reconstruction technique    GISELLE CHAVIRA MD         SYSTEM ID:  [id number]   XR Chest 2 Views    Narrative    EXAM: XR CHEST 2 VIEWS  LOCATION: [facility]  DATE/TIME: 5/16/2023 1:07 AM  CDT    INDICATION: respiratory failure  COMPARISON: 05/05/2023      Impression    IMPRESSION: Trace bilateral pleural effusions. No consolidation, pleural effusion or pneumothorax. Cardiomediastinal contour is normal.    Pulmonary vascularity is within normal limits. Widening of the right acromioclavicular joint is redemonstrated.       CBC with Diff:  Recent Labs   Lab Test 05/15/23  1239 09/03/22  0542 09/01/22  2244   WBC 27.1*   < > 10.4   HGB 7.7*   < > 12.2   MCV 71*   < > 88   *   < > 409   INR  --   --  2.16*    < > = values in this interval not displayed.        Lactic Acid:    Lab Results   Component Value Date    LACT 1.5 05/16/2023    LACT 1.7 05/16/2023    LACT 1.4 12/25/2020           Comprehensive Metabolic Panel:  Recent Labs   Lab 05/15/23  1239      POTASSIUM 4.2   CHLORIDE 99   CO2 22   ANIONGAP 15   *   BUN 29.3*   CR 1.03*   GFRESTIMATED 50*   SOBIA 8.6   PROTTOTAL 6.1*   ALBUMIN 3.3*   BILITOTAL 0.3   ALKPHOS 88   AST 13   ALT 11       INR:    Recent Labs   Lab Test 09/01/22  2244   INR 2.16*       D-DIMER:  No results found for: DIMER    BNP:  No results found for: BNP    UA:  Recent Labs   Lab 05/15/23  1750   COLOR Light Yellow   APPEARANCE Clear   URINEGLC Negative   URINEBILI Negative   URINEKETONE Trace*   SG 1.020   UBLD Negative   URINEPH 7.5*   PROTEIN Negative   NITRITE Negative   LEUKEST Negative   RBCU 1   WBCU 1       Time Spent on this Encounter   I spent 30  minutes on the unit/floor managing the care of Margy Babin. Over 50% of my time was spent counseling the patient and/or coordinating care regarding services listed in this note.    Felisa Robin, KAM CNP

## 2023-05-16 NOTE — PROVIDER NOTIFICATION
MD Notification    Notified Person: Hospitalist    Notified Person Name: Dr. Penaloza    Notification Date/Time: 05/16/23 0830    Notification Interaction: vocera messaged    Purpose of Notification: critical hgb 6.7    Orders Received: recheck it, and then give 1 unit.    Comments: place xarelto on hold

## 2023-05-16 NOTE — PROGRESS NOTES
[facility]    Medicine Progress Note - Hospitalist Service    Date of Admission:  5/15/2023    Assessment & Plan   Margy Babin is a 89 year old female admitted on 5/15/2023.  Past history of HFrEF, A-fib, chronic LBBB, HTN, CVA with dysphagia, essential tremor, possible memory impairment who presents with report of progressive weakness x1 week.  Found to have sepsis and acute hypoxic respiratory failure due to pneumonia.  Note seen in ED 5/3 following fall and diagnosed with UTI (discharged on keflex); seen again in ED 5/5 for dyspnea with negative work-up and discharged with nebs and steroid course for suspected viral syndrome.       Severe sepsis and acute hypoxic respiratory failure due to CAP vs HCAP  Lactic acidosis  Acute physical deconditioning   * Per report, patient with increasing weakness over the past week; hypoxic to mid-70's, RR >30, VBG with low pO2, leukocytosis of 27 and lactate 3.6 on arrival  * admission CXR 5/15 with questionable left basilar opacity; though repeat CXR 5/16 without clear infiltrate  * RRT overnight for hypoxia, seems largely due to poor oximetry readings, given lasix 20 mg IV with concern for CHF (received 3L volume resuscitation at arrival) though BNP on 707    - continue vancomycin and ceftriaxone; check MRSA nasal swab  - lactate normalized with IVF, WBC improved to 22, afebrile  - blood cultures ngtd  - Wean oxygen as able; currently on 8L  - encourage IS and acapella  - TTE pending  - VBG this AM looking good  - give lasix 40 mg IV with blood transfusion    Hypokalemia  - replace per protocol     History of dysphagia secondary to stroke  Discharge summary in September 2022 notes oral pharyngeal dysphagia  -Speech therapy consultation.     Elevated troponin  Hypertension  Hyperlipidemia  Atrial fibrillation on chronic anticoagulation  HFrEF  TTE 4/2022 with EF 50-55% with moderately dilated left atrium.  * serial trop flat ~30; EKG  "showing known LBBB  - Echocardiogram  - continue PTA amlodipine, atorvastatin, metoprolol  - hold rivaroxaban due to anemia   -Telemetry     Acute on chronic anemia: Microcytic:   Patient with a hemoglobin of 7.7 on admission, slightly lower than baseline.  No report of external blood loss.  MCV is 71.  * iron studies consistent with iron deficiency  - plan for IV iron prior to discharge once euvolemic  - hgb 6.7 this AM with repeat 6.6, suspect dilutional but will transfuse 1U PRBC  - no signs of bleeding    Thrombocytosis   - platelet >500; monitor     Chronic kidney disease, stage II  Patient with a creatinine of 1.03, approximate baseline.  -Avoid nephrotoxins.  -monitor BMP     Possible dementia  - daughter reports mother is typically \"sharp as a tack\", stating that her current disorientation to date is unusual  - continue PTA Seroquel and Memantine       Diet: Combination Diet Low Saturated Fat Na <2400mg Diet, No Caffeine Diet    DVT Prophylaxis: Pneumatic Compression Devices  Pichardo Catheter: Not present  Lines: None     Cardiac Monitoring: ACTIVE order. Indication: QTc prolonging medication (48 hours)  Code Status: No CPR- Do NOT Intubate      Clinically Significant Risk Factors Present on Admission        # Hypokalemia: Lowest K = 3.3 mmol/L in last 2 days, will replace as needed       # Hypoalbuminemia: Lowest albumin = 2.9 g/dL at 5/16/2023  7:03 AM, will monitor as appropriate  # Drug Induced Coagulation Defect: home medication list includes an anticoagulant medication    # Hypertension: Noted on problem list   # Acute Respiratory Failure: based on blood gas results.  Continue supplemental oxygen as needed  # Dementia: noted on problem list    # Overweight: Estimated body mass index is 27.44 kg/m  as calculated from the following:    Height as of 5/5/23: 1.651 m (5' 5\").    Weight as of this encounter: 74.8 kg (164 lb 14.5 oz).            Disposition Plan      Expected Discharge Date: 05/18/2023          "         Blas Penaloza MD  Hospitalist Service  [facility]  Securely message with [employer] (more info)  Text page via [employer] Paging/Directory   ______________________________________________________________________    Interval History   Denies any shortness of breath and reports she is feeling hungry.  Otherwise feeling at her baseline status.     Physical Exam   Vital Signs: Temp: 97.9  F (36.6  C) Temp src: Axillary BP: 128/49 Pulse: 87   Resp: 18 SpO2: 100 % O2 Device: Oxymask Oxygen Delivery: 8 LPM  Weight: 164 lbs 14.47 oz    General Appearance: Well nourished female in NAD  Respiratory: few basilar crackles, no wheezing or tachypnea   Cardiovascular: RRR, normal s1/s2 without murmur  GI: normal bowel sounds  Skin:   Other: Alert and appropriate, cranial nerves grossly intact      Medical Decision Making       40 MINUTES SPENT BY ME on the date of service doing chart review, history, exam, documentation & further activities per the note.  MANAGEMENT DISCUSSED with the following over the past 24 hours: bedside RN, patient's daughter       Data     I have personally reviewed the following data over the past 24 hrs:    22.2 (H)  \   6.6 (LL)   / 591 (H)     137 103 20.9 /  104 (H)   3.3 (L) 24 0.97 (H) \       ALT: 8 (L) AST: 18 AP: 78 TBILI: 0.4   ALB: 2.9 (L) TOT PROTEIN: 5.5 (L) LIPASE: N/A       Trop: 32 (H) BNP: 707       Procal: N/A CRP: N/A Lactic Acid: 1.5       Ferritin:  N/A % Retic:  N/A LDH:  N/A       Imaging results reviewed over the past 24 hrs:   Recent Results (from the past 24 hour(s))   XR Chest 2 Views    Narrative    EXAM: XR CHEST 2 VIEWS  LOCATION: [facility]  DATE/TIME: 5/16/2023 1:07 AM CDT    INDICATION: respiratory failure  COMPARISON: 05/05/2023      Impression    IMPRESSION: Trace bilateral pleural effusions. No consolidation, pleural effusion or pneumothorax. Cardiomediastinal contour is normal.    Pulmonary vascularity is within normal  limits. Widening of the right acromioclavicular joint is redemonstrated.

## 2023-05-16 NOTE — PROGRESS NOTES
05/16/23 1031   Appointment Info   Signing Clinician's Name / Credentials (SLP) Jud Torres MS CCC SLP   General Information   Onset of Illness/Injury or Date of Surgery 05/15/23   Referring Physician Marcelino Bowen   Patient/Family Therapy Goal Statement (SLP) Patient did not state.   Pertinent History of Current Problem Per MD note; Margy Babin is a 89 year old female admitted on 5/15/2023. Sepsis, community-acquired pneumonia versus healthcare associated pneumonia, acute physical deconditioning, acute hypoxic respiratory failure: Patient started on vancomycin and ceftriaxone in the emergency department.  Patient with memory impairment therefore history of present illness and review of systems may be limited.  Patient requiring 3 L oxygen on admission.  Per report, patient with increasing weakness over the past week, patient was brought to the emergency department, patient with an irregular heartbeat and atrial fibrillation, patient was seen previously on May 5, also for shortness of breath, patient was given steroids and DuoNebs and was diagnosed with a viral syndrome, patient was also seen on May 3 with a fall and a head CT which was negative per report, in addition multiple x-rays were negative, patient was diagnosed with cystitis and discharged on Keflex, culture from that visit showed Klebsiella, sensitive to cefazolin.  Lactic acid is 3.6 on admission, troponin is 25, total protein is 6.1.  White blood cell count is 27.1 on admission.  Last x-ray on admission shows stable enlargement of the cardiac silhouette, questionable left basilar opacity, see report for further details.  CT of the head completed on admission shows diffuse cerebral volume loss, see report for further details.  Patient previously hospitalized and discharged on September 7, 2022, see report of discharge summary for further details   General Observations Fatigued but cooperative.   Type of Evaluation   Type of  Evaluation Swallow Evaluation   Oral Motor   Oral Musculature unable to assess due to poor participation/comprehension   Structural Abnormalities none present   Mucosal Quality dry   Dentition (Oral Motor)   Dentition (Oral Motor) natural dentition;adequate dentition   General Swallowing Observations   Past History of Dysphagia Patient seen 4/26/22 and 9/2/22 and a soft bite size diet with thin liquids was recommended and discharged on a regular diet.   Respiratory Support (General Swallowing Observations) oxygen mask;supplemental oxygen;nasal cannula   Current Diet/Method of Nutritional Intake (General Swallowing Observations, NIS) regular diet;thin liquids (level 0)   Swallowing Evaluation Clinical swallow evaluation   Clinical Swallow Evaluation   Feeding Assistance dependent   Clinical Swallow Evaluation Textures Trialed thin liquids;pureed;solid foods   Clinical Swallow Eval: Thin Liquid Texture Trial   Mode of Presentation, Thin Liquids cup;fed by clinician   Volume of Liquid or Food Presented 4 oz of water   Oral Phase of Swallow premature pharyngeal entry   Pharyngeal Phase of Swallow impaired;repeated swallows   Diagnostic Statement Suspect premature spillage but no immediate cough via the cup. Coughing after trials via the straw.   Clinical Swallow Evaluation: Puree Solid Texture Trial   Mode of Presentation, Puree spoon;fed by clinician   Volume of Puree Presented Oatmeal and pudding   Oral Phase, Puree delayed AP movement;premature pharyngeal entry   Pharyngeal Phase, Puree intact   Clinical Swallow Evaluation: Solid Food Texture Trial   Mode of Presentation spoon;fed by clinician   Volume Presented Pudding with cracker mixed   Oral Phase impaired mastication;delayed AP movement;residue in oral cavity;premature pharyngeal entry   Oral Residue mid posterior tongue   Pharyngeal Phase impaired;repeated swallows   Diagnostic Statement Minimal to mild oral residue after the swallow.   Esophageal Phase of  Swallow   Patient reports or presents with symptoms of esophageal dysphagia Yes   Esophageal comments CNA reported belching/burping with breakfast.   Swallowing Recommendations   Diet Consistency Recommendations soft & bite-sized (level 6);thin liquids (level 0)   Supervision Level for Intake 1:1 supervision needed   Mode of Delivery Recommendations bolus size, small;food moistened;no straws;slow rate of intake   Postural Recommendations none   Swallowing Maneuver Recommendations alternate food and liquid intake;extra swallow   Monitoring/Assistance Required (Eating/Swallowing) check mouth frequently for oral residue/pocketing;stop eating activities when fatigue is present;monitor for cough or change in vocal quality with intake   Recommended Feeding/Eating Techniques (Swallow Eval) maintain upright sitting position for eating;maintain upright posture during/after eating for 30 minutes;minimize distractions during oral intake;provide assist with feeding;set-up and prepare tray   Medication Administration Recommendations, Swallowing (SLP) crush medications if able or given in apple sauce if unable to crush.   Instrumental Assessment Recommendations VFSS (videofluoroscopic swallowing study)   Comment, Swallowing Recommendations Pending diet tolerance and ability to advance a VFSS maybe indicated.   General Therapy Interventions   Planned Therapy Interventions Dysphagia Treatment   Dysphagia treatment Modified diet education;Instruction of safe swallow strategies   Clinical Impression   Criteria for Skilled Therapeutic Interventions Met (SLP Eval) Yes, treatment indicated   SLP Diagnosis Mild to moderate oral and pharyngeal dysphagia   Risks & Benefits of therapy have been explained evaluation/treatment results reviewed;care plan/treatment goals reviewed;risks/benefits reviewed;current/potential barriers reviewed;participants voiced agreement with care plan;participants included;patient   Clinical Impression Comments  Patient presents with mild to moderate oral and pharyngeal dysphagia at bedside secondary to deconditioning in the setting of pneumonia. patient demonstrated premature entry of thin liquids via the cup and straw. No overt sx of aspiration via the cup, but had throat clearing and delayed cough via the straw. Slow AP movement with oatmeal and no oral residue after the swallow. Mastication was mildly prolonged for a soft solids with decreased bolus control during AP movement with minimal oral residue after the swallow. Fatigue and respiratory needs also impacting swallow function.     Recommend: 1. downgrade diet to IDDSI level 6 soft/bite size with thin liquids. 2. Upright, small bites/sips, check for oral residue and alternate liquids/solids.     SLP Total Evaluation Time   Eval: oral/pharyngeal swallow function, clinical swallow Minutes (01812) 16   SLP Goals   Therapy Frequency (SLP Eval) 5 times/wk   SLP Predicted Duration/Target Date for Goal Attainment 05/30/23   SLP Goals Swallow   SLP: Safely tolerate diet without signs/symptoms of aspiration Thin liquids;With use of swallow precautions;With assistance/supervision;Regular diet   SLP Discharge Planning   SLP Plan meal f/u on level 6 with thin liquids. Advance as able.   SLP Discharge Recommendation Transitional Care Facility;home with home care speech therapy   SLP Rationale for DC Rec Patient's swallow function  is below her baseline diet of regular.   SLP Brief overview of current status  Mild to moderate oral and pharyngeal dysphagia diet downgraded to IDDSI level 6 soft/bite size with thin and no straws. Crush medications if able.   Total Session Time   Total Session Time (sum of timed and untimed services) 16

## 2023-05-16 NOTE — PHARMACY-VANCOMYCIN DOSING SERVICE
Pharmacy Vancomycin Initial Note  Date of Service May 15, 2023  Patient's  10/2/1929  89 year old, female    Indication: Healthcare-Associated Pneumonia    Current estimated CrCl = Estimated Creatinine Clearance: 34.5 mL/min (A) (based on SCr of 1.03 mg/dL (H)).    Creatinine for last 3 days  5/15/2023: 12:39 PM Creatinine 1.03 mg/dL    Recent Vancomycin Level(s) for last 3 days  No results found for requested labs within last 3 days.      Vancomycin IV Administrations (past 72 hours)                   vancomycin (VANCOCIN) 1,500 mg in 0.9% NaCl 250 mL intermittent infusion (mg) 1,500 mg New Bag 05/15/23 1446                Nephrotoxins and other renal medications (From now, onward)    Start     Dose/Rate Route Frequency Ordered Stop    23 1500  vancomycin (VANCOCIN) 1000 mg in dextrose 5% 200 mL PREMIX         1,000 mg  200 mL/hr over 1 Hours Intravenous EVERY 24 HOURS 05/15/23 2228            Contrast Orders - past 72 hours (72h ago, onward)    None          [employer] Prediction of Planned Initial Vancomycin Regimen  Loading dose: 1500 mg 5/15 1500  Regimen: 1000 mg IV every 24 hours.  Start time: 1500 on 2023  Exposure target: AUC24 (range)400-600 mg/L.hr   AUC24,ss: 524 mg/L.hr  Probability of AUC24 > 400: 78 %  Ctrough,ss: 16.9 mg/L  Probability of Ctrough,ss > 20: 34 %  Probability of nephrotoxicity (Lodise MARY ANN ): 13 %          Plan:  1. Start vancomycin 1000 mg IV q24h.   2. Vancomycin monitoring method: AUC  3. Vancomycin therapeutic monitoring goal: 400-600 mg*h/L  4. Pharmacy will check vancomycin levels as appropriate in 1-3 Days.    5. Serum creatinine levels will be ordered daily for the first week of therapy and at least twice weekly for subsequent weeks.      Joanne Villatoro, FrancisD

## 2023-05-16 NOTE — PLAN OF CARE
Goal Outcome Evaluation:    Summary: 5/16 0200-0730    Primary Diagnosis:Sepsis/  Orientation: A/O x 3, person, place, situation  Aggression Stop Light: Green  Mobility: [employer]  Pain Management: Denies pain   Diet: Comb diet low saturated fat/Na< 2400  Bowel/Bladder: Purewick in place with adequate output. Incontinent of bowel. No BM this shift.   Abnormal Lab/Assessments: Troponin 28, p02 22. Sp02 82 ( see RRT note for more detail)  Drain/Device/Wound: RPIV   Consults: Speech path/ PT  D/C Day/Goals/Place: Pending     Shift Note: Patient arrived to unit at 0200. Upon arrival, vital signs were assessed and SpO2 fluctuated between high 70s - low 80s. RRT called. See RRT note for more details. Patient VSS at 0500 on oxy mask with sp02 sensor on ear lobe.

## 2023-05-16 NOTE — PROGRESS NOTES
Ruiz's test performed prior to radial ABG draw. Collateral circulation confirmed. ABG from Right radial artery. On 10 LPM Oxymask

## 2023-05-16 NOTE — PROGRESS NOTES
.RECEIVING UNIT ED HANDOFF REVIEW    ED Nurse Handoff Report was reviewed by: Payam Philip RN on May 16, 2023 at 2:07 AM

## 2023-05-16 NOTE — PROGRESS NOTES
Brief note:    Updated emergency department as patient's lactic acid returned at 4.2.  Lactic acid greater than 4 would be called to house team/RRT given increased risk of mortality.  Discussed with nursing.    An i-STAT lactic acid was subsequently rechecked, this returned normal at 1.7, though patient with alkalosis and hypoxemia on VBG.    Evaluated patient, she has received approximately 3 L of IV fluid since admission given pneumonia with sepsis.  History of heart failure with reduced ejection fraction, though most recently in the 50 to 55% range.    She is tachypneic, utilizing accessory muscles, sitting upright.  Her bilateral upper extremities are cool to the touch suggesting heart failure exacerbation.  She does not report any shortness of breath, believes her work of breathing has actually improved since arrival.  When I inquired about orthopnea, she reports that she does have worsening breathing when she lays flat.    Wheezing and crackles in lung fields concerning for developed heart failure exacerbation (w/ underlying pneumonia)    Stat two-view chest x-ray given concern for interval development of heart failure.  Recheck troponin (LBBB at baseline)  Will recheck lab draw lactic acid. Certainly her clinical status/evaluation concerning for hypoperfusion state, and would treat as such.  Anticipate diuresis; page when CXR available.    Satnam Jackson MD  12:48 AM    Lasix 20 mg x1 IV ordered  CXR w/ some pleural effusions, but on re-eval, respiratory rate has improved slightly (prior to lasix). For now okay for current bed (8th floor), but closely monitor as may need to transition to step down bed if respiratory status does not continue to improve.    Total 25 minutes in care including repeated bedside evaluations, chart review, orders, discussion w/ ER staff.

## 2023-05-17 ENCOUNTER — APPOINTMENT (OUTPATIENT)
Dept: CARDIOLOGY | Facility: CLINIC | Age: 88
DRG: 871 | End: 2023-05-17
Attending: HOSPITALIST
Payer: MEDICARE

## 2023-05-17 ENCOUNTER — APPOINTMENT (OUTPATIENT)
Dept: SPEECH THERAPY | Facility: CLINIC | Age: 88
DRG: 871 | End: 2023-05-17
Payer: MEDICARE

## 2023-05-17 ENCOUNTER — APPOINTMENT (OUTPATIENT)
Dept: PHYSICAL THERAPY | Facility: CLINIC | Age: 88
DRG: 871 | End: 2023-05-17
Attending: HOSPITALIST
Payer: MEDICARE

## 2023-05-17 LAB
ANION GAP SERPL CALCULATED.3IONS-SCNC: 8 MMOL/L (ref 7–15)
BUN SERPL-MCNC: 13 MG/DL (ref 8–23)
CALCIUM SERPL-MCNC: 8.2 MG/DL (ref 8.2–9.6)
CHLORIDE SERPL-SCNC: 107 MMOL/L (ref 98–107)
CREAT SERPL-MCNC: 0.85 MG/DL (ref 0.51–0.95)
DEPRECATED HCO3 PLAS-SCNC: 24 MMOL/L (ref 22–29)
ERYTHROCYTE [DISTWIDTH] IN BLOOD BY AUTOMATED COUNT: 18.2 % (ref 10–15)
GFR SERPL CREATININE-BSD FRML MDRD: 64 ML/MIN/1.73M2
GLUCOSE SERPL-MCNC: 96 MG/DL (ref 70–99)
HCT VFR BLD AUTO: 25.8 % (ref 35–47)
HGB BLD-MCNC: 7.8 G/DL (ref 11.7–15.7)
LVEF ECHO: NORMAL
MCH RBC QN AUTO: 22.5 PG (ref 26.5–33)
MCHC RBC AUTO-ENTMCNC: 30.2 G/DL (ref 31.5–36.5)
MCV RBC AUTO: 74 FL (ref 78–100)
PLATELET # BLD AUTO: 542 10E3/UL (ref 150–450)
POTASSIUM SERPL-SCNC: 3.8 MMOL/L (ref 3.4–5.3)
RBC # BLD AUTO: 3.47 10E6/UL (ref 3.8–5.2)
SODIUM SERPL-SCNC: 139 MMOL/L (ref 136–145)
WBC # BLD AUTO: 17 10E3/UL (ref 4–11)

## 2023-05-17 PROCEDURE — 250N000013 HC RX MED GY IP 250 OP 250 PS 637: Performed by: HOSPITALIST

## 2023-05-17 PROCEDURE — 82310 ASSAY OF CALCIUM: CPT | Performed by: HOSPITALIST

## 2023-05-17 PROCEDURE — 250N000011 HC RX IP 250 OP 636: Performed by: HOSPITALIST

## 2023-05-17 PROCEDURE — 94640 AIRWAY INHALATION TREATMENT: CPT | Mod: 76

## 2023-05-17 PROCEDURE — 94640 AIRWAY INHALATION TREATMENT: CPT

## 2023-05-17 PROCEDURE — 97530 THERAPEUTIC ACTIVITIES: CPT | Mod: GP

## 2023-05-17 PROCEDURE — 97161 PT EVAL LOW COMPLEX 20 MIN: CPT | Mod: GP

## 2023-05-17 PROCEDURE — 82947 ASSAY GLUCOSE BLOOD QUANT: CPT | Performed by: HOSPITALIST

## 2023-05-17 PROCEDURE — 120N000001 HC R&B MED SURG/OB

## 2023-05-17 PROCEDURE — 999N000157 HC STATISTIC RCP TIME EA 10 MIN

## 2023-05-17 PROCEDURE — 93306 TTE W/DOPPLER COMPLETE: CPT | Mod: 26 | Performed by: INTERNAL MEDICINE

## 2023-05-17 PROCEDURE — 250N000009 HC RX 250: Performed by: NURSE PRACTITIONER

## 2023-05-17 PROCEDURE — 92526 ORAL FUNCTION THERAPY: CPT | Mod: GN

## 2023-05-17 PROCEDURE — 93306 TTE W/DOPPLER COMPLETE: CPT

## 2023-05-17 PROCEDURE — 85027 COMPLETE CBC AUTOMATED: CPT | Performed by: HOSPITALIST

## 2023-05-17 PROCEDURE — 36415 COLL VENOUS BLD VENIPUNCTURE: CPT | Performed by: HOSPITALIST

## 2023-05-17 PROCEDURE — 99232 SBSQ HOSP IP/OBS MODERATE 35: CPT | Performed by: HOSPITALIST

## 2023-05-17 RX ADMIN — IPRATROPIUM BROMIDE AND ALBUTEROL SULFATE 3 ML: .5; 3 SOLUTION RESPIRATORY (INHALATION) at 14:57

## 2023-05-17 RX ADMIN — CARBOXYMETHYLCELLULOSE SODIUM 1 DROP: 5 SOLUTION/ DROPS OPHTHALMIC at 09:39

## 2023-05-17 RX ADMIN — MEMANTINE 5 MG: 5 TABLET ORAL at 22:05

## 2023-05-17 RX ADMIN — ATORVASTATIN CALCIUM 20 MG: 20 TABLET, FILM COATED ORAL at 09:38

## 2023-05-17 RX ADMIN — DOXYCYCLINE 100 MG: 100 INJECTION, POWDER, LYOPHILIZED, FOR SOLUTION INTRAVENOUS at 00:33

## 2023-05-17 RX ADMIN — IPRATROPIUM BROMIDE AND ALBUTEROL SULFATE 3 ML: .5; 3 SOLUTION RESPIRATORY (INHALATION) at 07:42

## 2023-05-17 RX ADMIN — METOPROLOL SUCCINATE 100 MG: 25 TABLET, EXTENDED RELEASE ORAL at 09:38

## 2023-05-17 RX ADMIN — QUETIAPINE FUMARATE 25 MG: 25 TABLET ORAL at 22:05

## 2023-05-17 RX ADMIN — CEFTRIAXONE SODIUM 2 G: 2 INJECTION, POWDER, FOR SOLUTION INTRAMUSCULAR; INTRAVENOUS at 17:26

## 2023-05-17 RX ADMIN — PAROXETINE HYDROCHLORIDE 20 MG: 20 TABLET, FILM COATED ORAL at 22:05

## 2023-05-17 RX ADMIN — IPRATROPIUM BROMIDE AND ALBUTEROL SULFATE 3 ML: .5; 3 SOLUTION RESPIRATORY (INHALATION) at 20:07

## 2023-05-17 RX ADMIN — MEMANTINE 5 MG: 5 TABLET ORAL at 09:38

## 2023-05-17 RX ADMIN — DOXYCYCLINE 100 MG: 100 INJECTION, POWDER, LYOPHILIZED, FOR SOLUTION INTRAVENOUS at 12:21

## 2023-05-17 RX ADMIN — ACETAMINOPHEN 650 MG: 325 TABLET ORAL at 22:07

## 2023-05-17 RX ADMIN — AMLODIPINE BESYLATE 2.5 MG: 2.5 TABLET ORAL at 09:38

## 2023-05-17 RX ADMIN — IPRATROPIUM BROMIDE AND ALBUTEROL SULFATE 3 ML: .5; 3 SOLUTION RESPIRATORY (INHALATION) at 11:13

## 2023-05-17 ASSESSMENT — ACTIVITIES OF DAILY LIVING (ADL)
ADLS_ACUITY_SCORE: 46

## 2023-05-17 NOTE — PROGRESS NOTES
05/17/23 0900   Appointment Info   Signing Clinician's Name / Credentials (PT) Milly Chris DPT   Living Environment   People in Home facility resident   Current Living Arrangements assisted living   Home Accessibility no concerns   Self-Care   Usual Activity Tolerance moderate   Current Activity Tolerance fair   Equipment Currently Used at Home walker, rolling   Fall history within last six months yes   Number of times patient has fallen within last six months 2   Activity/Exercise/Self-Care Comment Pt reports Mona with FWW for mobility. Reports staff at her [facility] assist her with bathing and dressing. Gets meals from [facility].   General Information   Onset of Illness/Injury or Date of Surgery 05/15/23   Referring Physician Marcelino Cole, DO   Pertinent History of Current Problem (include personal factors and/or comorbidities that impact the POC) Per chart: Margy Babin is a 89 year old female admitted on 5/15/2023.  Past history of HFrEF, A-fib, chronic LBBB, HTN, CVA with dysphagia, essential tremor, possible memory impairment who presents with report of progressive weakness x1 week.  Found to have sepsis and acute hypoxic respiratory failure due to pneumonia.  Note seen in ED 5/3 following fall and diagnosed with UTI (discharged on keflex); seen again in ED 5/5 for dyspnea with negative work-up and discharged with nebs and steroid course for suspected viral syndrome   Existing Precautions/Restrictions fall;oxygen therapy device and L/min  (1 LPM via NC at rest)   Cognition   Affect/Mental Status (Cognition) confused   Orientation Status (Cognition) oriented to;person;place   Follows Commands (Cognition) WFL   Cognitive Status Comments Sleeping upon PT arrival, requires increased time and cues for arousal   Pain Assessment   Patient Currently in Pain No   Integumentary/Edema   Integumentary/Edema Comments defer to nursing for full skin assessment, age-related integumentary changes noted    Posture    Posture Forward head position;Protracted shoulders   Range of Motion (ROM)   Range of Motion ROM is WFL   Strength (Manual Muscle Testing)   Strength Comments Decreased BLE functional strength and endurance   Bed Mobility   Comment, (Bed Mobility) modA sup>sit   Transfers   Transfers sit-stand transfer   Comment, (Transfers) CGA with FWW   Gait/Stairs (Locomotion)   Comment, (Gait/Stairs) Pt amb 5 ft with CGA at FWW. Appears somewhat effortful, no overt instability noted, slow pace   Balance   Balance Comments Falls risk and history, requires FWW and phys assist for safe mobility at this time   Sensory Examination   Sensory Perception patient reports no sensory changes   Sensory Perception Comments BLE light touch intact   Clinical Impression   Criteria for Skilled Therapeutic Intervention Yes, treatment indicated   PT Diagnosis (PT) impaired IND with functional mobility   Influenced by the following impairments decreased activity tolerance, strength, balance   Functional limitations due to impairments impaired bed mobility, transfers, ambulation   Clinical Presentation (PT Evaluation Complexity) Stable/Uncomplicated   Clinical Presentation Rationale Based on current presentation, PMH, social support   Clinical Decision Making (Complexity) low complexity   Planned Therapy Interventions (PT) balance training;bed mobility training;gait training;home exercise program;patient/family education;strengthening;transfer training;progressive activity/exercise;home program guidelines   Risk & Benefits of therapy have been explained evaluation/treatment results reviewed;care plan/treatment goals reviewed;risks/benefits reviewed;current/potential barriers reviewed;participants voiced agreement with care plan;participants included;patient   PT Total Evaluation Time   PT Marianaal, Low Complexity Minutes (78354) 8   Physical Therapy Goals   PT Frequency 5x/week   PT Predicted Duration/Target Date for Goal Attainment  05/24/23   PT Goals Bed Mobility;Transfers;Gait   PT: Bed Mobility Supervision/stand-by assist;Supine to/from sit   PT: Transfers Supervision/stand-by assist;Sit to/from stand;Bed to/from chair;Assistive device   PT: Gait Supervision/stand-by assist;Assistive device;Rolling walker;Greater than 200 feet   Interventions   Interventions Quick Adds Gait Training;Therapeutic Activity   Therapeutic Activity   Therapeutic Activities: dynamic activities to improve functional performance Minutes (63386) 23   Symptoms Noted During/After Treatment Fatigue;Shortness of breath   Treatment Detail/Skilled Intervention Pt on 1 L O2 via NC at rest, O2 sats 97%. Weaned down to RA to trial mobility, O2 sats stable. Pt cued for supine>sit, requires step-by-step cuing for sequencing and modA. SBA for sitting balance EOB, O2 sats 94%. Pt cued for sit<>Stand x4 throughout session, CGA at FWW, requires cues for safe hand placement. Cued for standing march for pre-gait, but pt reporting she does not have the energy this AM. Pt cued for amb to and from BR, 15 ft x2. Cues for safe walker use, upright posture. Amb appears effortful, slow pace noted. Pt cued for toilet transfer with CGA and use of grab bar. ModA to manage clothing. Pt cued to trial pericares without assist, but requests that PT assist, maxA. MaxA to thread clean brief, pt able to pull up in standing. Pt with increased SOB following mobility, difficulty getting O2 sats via finger pulse ox, eventually O2 sats reading 89-91% via ear clip. Pt placed back on 1 L due to SOB. Remained up in chair with alarm armed and needs in reach. RNs updated.   PT Discharge Planning   PT Plan progress bed mob, sit<>stand reps, progress gait as able   PT Discharge Recommendation (DC Rec) Transitional Care Facility   PT Rationale for DC Rec Pt below reported Mona baseline for mobility.Currently Ax1 with FWW for room level mobility. Limitations in activity tolerance, strength, and balance. Rec TCU to  address continued deficits in mobility. If pt were to return to [facility], would currently require Ax1 for all mobility and cares,  PT to address continued deficits.   PT Brief overview of current status Ax1 FWW   Total Session Time   Timed Code Treatment Minutes 23   Total Session Time (sum of timed and untimed services) 31

## 2023-05-17 NOTE — PROGRESS NOTES
[facility]    Medicine Progress Note - Hospitalist Service    Date of Admission:  5/15/2023    Assessment & Plan   Margy Babin is a 89 year old female admitted on 5/15/2023.  Past history of HFrEF, A-fib, chronic LBBB, HTN, CVA with dysphagia, essential tremor, possible memory impairment who presents with report of progressive weakness x1 week.  Found to have sepsis and acute hypoxic respiratory failure due to pneumonia.  Note seen in ED 5/3 following fall and diagnosed with UTI (discharged on keflex); seen again in ED 5/5 for dyspnea with negative work-up and discharged with nebs and steroid course for suspected viral syndrome.       Severe sepsis and acute hypoxic respiratory failure due to CAP vs HCAP  Lactic acidosis  Acute physical deconditioning   * Per report, patient with increasing weakness over the past week; hypoxic to mid-70's, RR >30, VBG with low pO2, leukocytosis of 27 and lactate 3.6 on arrival  * admission CXR 5/15 with questionable left basilar opacity; though repeat CXR 5/16 without clear infiltrate  * RRT overnight for hypoxia, seems largely due to poor oximetry readings, given lasix 20 mg IV with concern for CHF (received 3L volume resuscitation at arrival) though BNP only 707; received additional 40 mg IV lasix with blood transfusion 5/16    - continue ceftriaxone and doxycycline  - WBC down to 17; afebrile  - blood cultures ngtd and MRSA nasal swab negative, discontinue vanco  - down to 1L oxygen this AM; wean as able  - encourage IS and acapella  - TTE pending  - PT eval    Hypokalemia  - replace per protocol    Acute on chronic anemia: Microcytic  Patient with a hemoglobin of 7.7 with MCV 71 on admission.  Chart review shows hgb trending down from 11g/dL since 9/2022.  No report of external blood loss.    * iron studies consistent with iron deficiency  * s/p 1U PRBC 5/16 for hgb <7  - appropriate response to transfusion yesterday with hgb 7.8 this AM  -  "plan for IV iron prior to discharge once euvolemic  - no signs of bleeding    Thrombocytosis   Admission plt 685.  Platelet mildly elevated since 9/2022, possibly related to iron deficiency.     - plt trending down, monitor     Suspected type II NSTEMI  Hypertension  Hyperlipidemia  Atrial fibrillation on chronic anticoagulation  HFrEF  TTE 4/2022 with EF 50-55% with moderately dilated left atrium.  * serial trop flat ~30; EKG showing known LBBB  - Echocardiogram pending  - continue PTA amlodipine, atorvastatin, metoprolol  - hold rivaroxaban due to anemia      Chronic kidney disease, stage II  Patient with a creatinine of 1.03, approximate baseline.  -Avoid nephrotoxins.  -Cr stable at 0.8 05/17/23     History of dysphagia secondary to stroke  Discharge summary in September 2022 notes oral pharyngeal dysphagia  - SLP recommending dysphagia level 6 with thin liquids    Possible dementia  * daughter reports mother is typically \"sharp as a tack\", stating that her disorientation to date on 5/16 is unusual.  Chart review shows history of confusion in setting of acute illness.   - continue PTA Seroquel and Memantine       Diet: Combination Diet Soft and Bite Sized Diet (level 6); Thin Liquids (level 0) (no straws); Low Saturated Fat Na <2400mg Diet, No Caffeine Diet    DVT Prophylaxis: Pneumatic Compression Devices  Pichardo Catheter: Not present  Lines: None     Cardiac Monitoring: ACTIVE order. Indication: QTc prolonging medication (48 hours)  Code Status: No CPR- Do NOT Intubate      Clinically Significant Risk Factors        # Hypokalemia: Lowest K = 3.3 mmol/L in last 2 days, will replace as needed       # Hypoalbuminemia: Lowest albumin = 2.9 g/dL at 5/16/2023  7:03 AM, will monitor as appropriate     # Hypertension: Noted on problem list     # Dementia: noted on problem list    # Overweight: Estimated body mass index is 27.93 kg/m  as calculated from the following:    Height as of this encounter: 1.626 m (5' 4\").    " Weight as of this encounter: 73.8 kg (162 lb 11.2 oz)., PRESENT ON ADMISSION          Disposition Plan     Expected Discharge Date: 05/19/2023                  Blas Penaloza MD  Hospitalist Service  [facility]  Securely message with [employer] (more info)  Text page via [employer] Paging/Directory   ______________________________________________________________________    Interval History   She was woken from sleep this morning.  Reports having a good night and sleeping well.  Denies any dyspnea or confusion, offers no other complaints.  States she would be open to considering EGD and colonoscopy.     Physical Exam   Vital Signs: Temp: 98.6  F (37  C) Temp src: Oral BP: 126/50 Pulse: 82   Resp: 16 SpO2: 95 % O2 Device: Nasal cannula Oxygen Delivery: 1 LPM  Weight: 162 lbs 11.19 oz    General Appearance: Well nourished female in NAD  Respiratory: few basilar crackles more prominent on right (though currently lying on right side in bed), no wheezing or tachypnea   Cardiovascular: RRR, normal s1/s2 without murmur  GI: normal bowel sounds  Skin: no edema  Other: Alert and appropriate, cranial nerves grossly intact, oriented to person, place and year     Medical Decision Making       45 MINUTES SPENT BY ME on the date of service doing chart review, history, exam, documentation & further activities per the note.  MANAGEMENT DISCUSSED with the following over the past 24 hours: daughter, bedside nurse   Tests ORDERED & REVIEWED in the past 24 hours:  - BMP  - CBC  - blood cultures  Medical complexity over the past 24 hours:  - Prescription DRUG MANAGEMENT performed      Data     I have personally reviewed the following data over the past 24 hrs:    17.0 (H)  \   7.8 (L)   / 542 (H)     139 107 13.0 /  96   3.8 24 0.85 \       ALT: N/A AST: N/A AP: N/A TBILI: N/A   ALB: N/A TOT PROTEIN: N/A LIPASE: N/A       Trop: N/A BNP: N/A       Ferritin:  N/A % Retic:  N/A LDH:  N/A       Imaging results reviewed over  the past 24 hrs:   No results found for this or any previous visit (from the past 24 hour(s)).

## 2023-05-17 NOTE — PLAN OF CARE
[id number]    A&Ox3. Disoriented to time. VSS. Weaned to RA from 10L throughout shift.  Tele NSR w/ BBB. Critical hemoglobin 6.7, recheck 6.6, MD notified, see note. 1 unit RBCs given, tolerated well with dose of lasix given prior. Recheck in AM. Good UOP, purewick in place. SLP consulted- Soft and bite sized diet, thin liquids, and low sodium diet. Below coccyx area small break in skin, mepilex in place. Pt appeared exhausted throughout shift and did not get out of bed, T/R as needed. 2 Right PIV. IV antibiotics given. Discharge pending improvement- PT to consult tomorrow.

## 2023-05-17 NOTE — PLAN OF CARE
[id number] shift    A&Ox3. Disoriented to time. VSS 2L NC while sleeping this shift, was on RA.  Tele NSR w/ BBB. Good UOP, purewick in place. No BM this shift, need stool sample for occult stool blood. No signs of bleeding noted. Blanchable redness coccyx, mepilex in place. T/R. 2 Right PIV saline locked; IV antibiotics given. Discharge pending improvement- PT to consult today.

## 2023-05-18 ENCOUNTER — APPOINTMENT (OUTPATIENT)
Dept: PHYSICAL THERAPY | Facility: CLINIC | Age: 88
DRG: 871 | End: 2023-05-18
Payer: MEDICARE

## 2023-05-18 LAB
ANION GAP SERPL CALCULATED.3IONS-SCNC: 8 MMOL/L (ref 7–15)
BUN SERPL-MCNC: 13.9 MG/DL (ref 8–23)
CALCIUM SERPL-MCNC: 8.1 MG/DL (ref 8.2–9.6)
CHLORIDE SERPL-SCNC: 107 MMOL/L (ref 98–107)
CREAT SERPL-MCNC: 0.75 MG/DL (ref 0.51–0.95)
DEPRECATED HCO3 PLAS-SCNC: 25 MMOL/L (ref 22–29)
ERYTHROCYTE [DISTWIDTH] IN BLOOD BY AUTOMATED COUNT: 19.1 % (ref 10–15)
GFR SERPL CREATININE-BSD FRML MDRD: 74 ML/MIN/1.73M2
GLUCOSE SERPL-MCNC: 85 MG/DL (ref 70–99)
HCT VFR BLD AUTO: 25.8 % (ref 35–47)
HGB BLD-MCNC: 7.7 G/DL (ref 11.7–15.7)
MCH RBC QN AUTO: 22.4 PG (ref 26.5–33)
MCHC RBC AUTO-ENTMCNC: 29.8 G/DL (ref 31.5–36.5)
MCV RBC AUTO: 75 FL (ref 78–100)
PLATELET # BLD AUTO: 580 10E3/UL (ref 150–450)
POTASSIUM SERPL-SCNC: 3.5 MMOL/L (ref 3.4–5.3)
RBC # BLD AUTO: 3.43 10E6/UL (ref 3.8–5.2)
SODIUM SERPL-SCNC: 140 MMOL/L (ref 136–145)
WBC # BLD AUTO: 14.8 10E3/UL (ref 4–11)

## 2023-05-18 PROCEDURE — 97116 GAIT TRAINING THERAPY: CPT | Mod: GP

## 2023-05-18 PROCEDURE — 250N000011 HC RX IP 250 OP 636: Performed by: HOSPITALIST

## 2023-05-18 PROCEDURE — 94640 AIRWAY INHALATION TREATMENT: CPT

## 2023-05-18 PROCEDURE — 250N000013 HC RX MED GY IP 250 OP 250 PS 637: Performed by: HOSPITALIST

## 2023-05-18 PROCEDURE — 36415 COLL VENOUS BLD VENIPUNCTURE: CPT | Performed by: HOSPITALIST

## 2023-05-18 PROCEDURE — 999N000157 HC STATISTIC RCP TIME EA 10 MIN

## 2023-05-18 PROCEDURE — 97530 THERAPEUTIC ACTIVITIES: CPT | Mod: GP

## 2023-05-18 PROCEDURE — 99232 SBSQ HOSP IP/OBS MODERATE 35: CPT | Performed by: HOSPITALIST

## 2023-05-18 PROCEDURE — 250N000009 HC RX 250: Performed by: NURSE PRACTITIONER

## 2023-05-18 PROCEDURE — 82310 ASSAY OF CALCIUM: CPT | Performed by: HOSPITALIST

## 2023-05-18 PROCEDURE — 120N000001 HC R&B MED SURG/OB

## 2023-05-18 PROCEDURE — 94640 AIRWAY INHALATION TREATMENT: CPT | Mod: 76

## 2023-05-18 PROCEDURE — 85027 COMPLETE CBC AUTOMATED: CPT | Performed by: HOSPITALIST

## 2023-05-18 RX ADMIN — DOXYCYCLINE 100 MG: 100 INJECTION, POWDER, LYOPHILIZED, FOR SOLUTION INTRAVENOUS at 13:32

## 2023-05-18 RX ADMIN — DOXYCYCLINE 100 MG: 100 INJECTION, POWDER, LYOPHILIZED, FOR SOLUTION INTRAVENOUS at 00:57

## 2023-05-18 RX ADMIN — MEMANTINE 5 MG: 5 TABLET ORAL at 09:06

## 2023-05-18 RX ADMIN — PAROXETINE HYDROCHLORIDE 20 MG: 20 TABLET, FILM COATED ORAL at 21:15

## 2023-05-18 RX ADMIN — IPRATROPIUM BROMIDE AND ALBUTEROL SULFATE 3 ML: .5; 3 SOLUTION RESPIRATORY (INHALATION) at 15:19

## 2023-05-18 RX ADMIN — CEFTRIAXONE SODIUM 2 G: 2 INJECTION, POWDER, FOR SOLUTION INTRAMUSCULAR; INTRAVENOUS at 16:32

## 2023-05-18 RX ADMIN — IPRATROPIUM BROMIDE AND ALBUTEROL SULFATE 3 ML: .5; 3 SOLUTION RESPIRATORY (INHALATION) at 06:59

## 2023-05-18 RX ADMIN — ATORVASTATIN CALCIUM 20 MG: 20 TABLET, FILM COATED ORAL at 09:06

## 2023-05-18 RX ADMIN — IPRATROPIUM BROMIDE AND ALBUTEROL SULFATE 3 ML: .5; 3 SOLUTION RESPIRATORY (INHALATION) at 19:04

## 2023-05-18 RX ADMIN — IPRATROPIUM BROMIDE AND ALBUTEROL SULFATE 3 ML: .5; 3 SOLUTION RESPIRATORY (INHALATION) at 11:23

## 2023-05-18 RX ADMIN — CARBOXYMETHYLCELLULOSE SODIUM 1 DROP: 5 SOLUTION/ DROPS OPHTHALMIC at 09:06

## 2023-05-18 RX ADMIN — AMLODIPINE BESYLATE 2.5 MG: 2.5 TABLET ORAL at 09:06

## 2023-05-18 RX ADMIN — RIVAROXABAN 15 MG: 15 TABLET, FILM COATED ORAL at 16:32

## 2023-05-18 RX ADMIN — METOPROLOL SUCCINATE 100 MG: 25 TABLET, EXTENDED RELEASE ORAL at 09:06

## 2023-05-18 RX ADMIN — MEMANTINE 5 MG: 5 TABLET ORAL at 21:15

## 2023-05-18 RX ADMIN — QUETIAPINE FUMARATE 25 MG: 25 TABLET ORAL at 21:15

## 2023-05-18 ASSESSMENT — ACTIVITIES OF DAILY LIVING (ADL)
ADLS_ACUITY_SCORE: 48
ADLS_ACUITY_SCORE: 46
ADLS_ACUITY_SCORE: 48
ADLS_ACUITY_SCORE: 48
ADLS_ACUITY_SCORE: 46
ADLS_ACUITY_SCORE: 46
ADLS_ACUITY_SCORE: 48
ADLS_ACUITY_SCORE: 48
ADLS_ACUITY_SCORE: 46
ADLS_ACUITY_SCORE: 48
ADLS_ACUITY_SCORE: 46
ADLS_ACUITY_SCORE: 48

## 2023-05-18 NOTE — PLAN OF CARE
[id number]    A&Ox2, disoriented to time and situation. VSS. Tele SR w/ BBB. Weaned to RA. Easy to chew, thin liquid, 2g sodium diet. 2 PIV in right forearm. IV antibiotics given. Echo done today. No BM this shift, still need stool sample. Up Ax1 with gait belt and walker- up to chair twice. PT consulted and recommend TCU or increased assistance at [facility]. Discharge pending improvement.

## 2023-05-18 NOTE — CONSULTS
Care Management Initial Consult    General Information  Assessment completed with: Patient, Children, Patient and Daughter, Tina  Type of CM/SW Visit: Initial Assessment    Primary Care Provider verified and updated as needed: No   Readmission within the last 30 days: no previous admission in last 30 days      Reason for Consult: discharge planning  Advance Care Planning:            Communication Assessment  Patient's communication style: spoken language (English or Bilingual)    Hearing Difficulty or Deaf: yes   Wear Glasses or Blind: yes    Cognitive  Cognitive/Neuro/Behavioral: .WDL except  Level of Consciousness: alert  Arousal Level: opens eyes spontaneously  Orientation: disoriented to, situation  Mood/Behavior: calm, cooperative  Best Language: 0 - No aphasia  Speech: clear, logical    Living Environment:   People in home: alone     Current living Arrangements: assisted living  Name of Facility: [facility]   Able to return to prior arrangements: yes       Family/Social Support:  Care provided by:  (facility)  Provides care for: no one, unable/limited ability to care for self  Marital Status:   Facility resident(s)/Staff, Children          Description of Support System: Supportive, Involved    Support Assessment: Adequate family and caregiver support    Current Resources:   Patient receiving home care services: No     Community Resources: None  Equipment currently used at home: walker, standard  Supplies currently used at home: None    Employment/Financial:  Employment Status: retired        Financial Concerns: No concerns identified   Referral to Financial Worker: No       Does the patient's insurance plan have a 3 day qualifying hospital stay waiver?  Yes   Will the waiver be used for post-acute placement? No    Lifestyle & Psychosocial Needs:  Social Determinants of Health     Tobacco Use: Low Risk  (4/14/2023)    Patient History      Smoking Tobacco Use: Never      Smokeless Tobacco Use: Never       Passive Exposure: Not on file   Alcohol Use: Not on file   Financial Resource Strain: Not on file   Food Insecurity: Not on file   Transportation Needs: Not on file   Physical Activity: Not on file   Stress: Not on file   Social Connections: Not on file   Intimate Partner Violence: Not on file   Depression: Not at risk (12/27/2022)    PHQ-2      PHQ-2 Score: 0   Housing Stability: Not on file       Functional Status:  Prior to admission patient needed assistance:   Dependent ADLs:: Ambulation-walker  Dependent IADLs:: Cleaning, Cooking, Meal Preparation, Money Management       Mental Health Status:  Mental Health Status: No Current Concerns       Chemical Dependency Status:  Chemical Dependency Status: No Current Concerns             Values/Beliefs:  Spiritual, Cultural Beliefs, Sabianism Practices, Values that affect care: yes       Cultural/Sabianism Practices Patient Routinely Participates In: prayer       Additional Information:  CM Initial Consult     Margy Babin is a 89 year old female admitted on 5/15/2023. Patient started on vancomycin and ceftriaxone in the emergency department.  Patient with memory impairment therefore history of present illness and review of systems may be limited.  Patient requiring 3 L oxygen on admission.  Per report, patient with increasing weakness over the past week, patient was brought to the emergency department, patient with an irregular heartbeat and atrial fibrillation, patient was seen previously on May 5, also for shortness of breath, patient was given steroids and DuoNebs and was diagnosed with a viral syndrome, patient was also seen on May 3 with a fall and a head CT which was negative per report, in addition multiple x-rays were negative, patient was diagnosed with cystitis and discharged on Keflex, culture from that visit showed Klebsiella, sensitive to cefazolin.  Lactic acid is 3.6 on admission, troponin is 25, total protein is 6.1.  White blood cell  count is 27.1 on admission.  Last x-ray on admission shows stable enlargement of the cardiac silhouette, questionable left basilar opacity, see report for further details.  CT of the head completed on admission shows diffuse cerebral volume loss, see report for further details.  Patient previously hospitalized and discharged on September 7, 2022, see report of discharge summary for further details.    Writer met with the patient in their room. Writer stated their name, position, and reason for contact. Patient confirmed that she came from the [facility]. Patient stated that she lives there alone for the last 6 months. Patient stated that hs derick moves around with a standard walker. Patient stated that she has no grab bars in the shower and toilet and has no shower chair. Patient stated that she is independent to shower herself and clean her apartment there. Patient stated that she received meals there 3 times a day. 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 Patient stated no financial worries and receives her income at the moment from social security and pension form her . Patient stated that they do not want to go to a TCU, has never been to a TCU, and they would prefer to go back to [place]. Writer explained that if the patient were to go back home, because she is going back with a higher level of need, she would need a higher level of care that would require and increase in the bill. Patient stated that she didn't want that either. Patient began saying no to everything. Writer confirmed with the patient if it was ok to contact the daughter; Patient confirmed.     Writer called Patient's Daughter, Tina [phone]. Daughter stated that the patient has been living at [facility], but for 1 year now. Daughter stated that the patient does have grab bars for the shower and toilet, including a shower chair. Daughter stated that the patient gets assistance in cleaning her apartment and is  "\"prompted\" to shower on the weekends. Daughter stated that the patient is currently receiving basic levels of care from the facility. Daughter stated that the patient might've been confused to what the writer was talking about. Daughter staed that the patient has been to [place] and would like the patient to go back there due to the familiar environment. Daughter states that they want a private room for her and the daughter would pay for it. Daughter stated that they would want the writer to set up transportation via [facility] and is aware of the private pay. Daughter stated that she will stop by later today. Writer will send out that referral and follow up.    ROBIN Ribera  [facility]  Social Work          "

## 2023-05-18 NOTE — PROGRESS NOTES
Patient alert, forgetful at times, up with assist of one, IV antibiotics, may need new IV access, right ac present.

## 2023-05-18 NOTE — PROGRESS NOTES
[facility]    Medicine Progress Note - Hospitalist Service    Date of Admission:  5/15/2023    Assessment & Plan   Margy Babin is a 89 year old female admitted on 5/15/2023.  Past history of HFrEF, A-fib, chronic LBBB, HTN, CVA with dysphagia, essential tremor, possible memory impairment who presents with report of progressive weakness x1 week.  Found to have sepsis and acute hypoxic respiratory failure due to pneumonia.  Note seen in ED 5/3 following fall and diagnosed with UTI (discharged on keflex); seen again in ED 5/5 for dyspnea with negative work-up and discharged with nebs and steroid course for suspected viral syndrome.       Severe sepsis and acute hypoxic respiratory failure due to CAP, resolving  Lactic acidosis, resolved  Acute physical deconditioning   * Per report, patient with increasing weakness over the past week; hypoxic to mid-70's, RR >30, VBG with low pO2, leukocytosis of 27 and lactate 3.6 on arrival  * admission CXR 5/15 with questionable left basilar opacity; though repeat CXR 5/16 without clear infiltrate  * RRT overnight 5/16 for hypoxia, seems largely due to poor oximetry readings, given lasix 20 mg IV with concern for CHF (received 3L volume resuscitation at arrival) though BNP only 707; received additional 40 mg IV lasix with blood transfusion 5/16  * TTE 5/17 with EF 55% without WMA, grade 1 diastolic dysfunction    - continue ceftriaxone and doxycycline  - WBC down to 14.9, remains afebrile  - blood cultures ngtd   - weaned to room air  - encourage IS and acapella  - therapies recommending TCU, discussed with SW 05/18/23     Hypokalemia, resolved  - replace per protocol    Acute on chronic anemia: Microcytic  Patient with a hemoglobin of 7.7 with MCV 71 on admission.  Chart review shows hgb trending down from 11g/dL since 9/2022.  No report of external blood loss.    * iron studies consistent with iron deficiency  * s/p 1U PRBC 5/16 for hgb <7  -  "hgb stable 7-8 g/dl 05/18/23   - consider IV iron tomorrow if infection continuing to resolve  - no signs of bleeding, will resume Xarelto 05/18/23 and repeat CBC in AM  - patient reports being open EGD/colonoscopy in outpatient follow up; discussed with daughter to follow up with PCP regarding this    Thrombocytosis   Admission plt 685.  Platelet mildly elevated since 9/2022, possibly related to iron deficiency.     - plt stable 500-600     Suspected type II NSTEMI  Hypertension  Hyperlipidemia  Atrial fibrillation on chronic anticoagulation  HFrEF  TTE 4/2022 with EF 50-55% with moderately dilated left atrium.  * serial trop flat ~30; EKG showing known LBBB  * echo as above  - continue PTA amlodipine, atorvastatin, metoprolol  - resume Xarelto 05/18/23       Chronic kidney disease, stage II  Patient with a creatinine of 1.03, approximate baseline.  -Avoid nephrotoxins.  -Cr stable about 0.8 05/18/23     History of dysphagia secondary to stroke  Discharge summary in September 2022 notes oral pharyngeal dysphagia  - SLP recommending dysphagia level 7 with thin liquids    Possible dementia  * daughter reports mother is typically \"sharp as a tack\", stating that her disorientation to date on 5/16 is unusual.  Chart review shows history of confusion in setting of acute illness.   - continue PTA Seroquel and Memantine       Diet: Room Service  Combination Diet Easy to Chew (level 7); Thin Liquids (level 0); 2 gm NA Diet    DVT Prophylaxis: Pneumatic Compression Devices  Pichardo Catheter: Not present  Lines: None     Cardiac Monitoring: ACTIVE order. Indication: QTc prolonging medication (48 hours)  Code Status: No CPR- Do NOT Intubate      Clinically Significant Risk Factors              # Hypoalbuminemia: Lowest albumin = 2.9 g/dL at 5/16/2023  7:03 AM, will monitor as appropriate     # Hypertension: Noted on problem list     # Dementia: noted on problem list    # Overweight: Estimated body mass index is 27.93 kg/m  as " "calculated from the following:    Height as of this encounter: 1.626 m (5' 4\").    Weight as of this encounter: 73.8 kg (162 lb 11.2 oz)., PRESENT ON ADMISSION          Disposition Plan      Expected Discharge Date: 05/19/2023    Discharge Delays: Placement - TCU              Blas Penaloza MD  Hospitalist Service  [facility]  Securely message with [employer] (more info)  Text page via [employer] Paging/Directory   ______________________________________________________________________    Interval History   Feeling well, reports a non-productive cough.  Denies fever/chills.  No bowel movement in 2 days.  No other complaints.  Open to TCU.     Physical Exam   Vital Signs: Temp: 98  F (36.7  C) Temp src: Oral BP: (!) 142/59 Pulse: 86   Resp: 18 SpO2: 97 % O2 Device: None (Room air)    Weight: 162 lbs 11.19 oz    General Appearance: Well nourished female in NAD  Respiratory: lungs CTAB, no wheezes or crackles  Cardiovascular: RRR, normal s1/s2 without murmur  GI: normal bowel sounds  Skin: no edema  Other: Alert and appropriate, cranial nerves grossly intact, formal orientation not assessed today    Medical Decision Making       30 MINUTES SPENT BY ME on the date of service doing chart review, history, exam, documentation & further activities per the note.  MANAGEMENT DISCUSSED with the following over the past 24 hours: bedside RN, left voicemail for daughter   Tests ORDERED & REVIEWED in the past 24 hours:  - BMP  - CBC  - cultures  Medical complexity over the past 24 hours:  - Prescription DRUG MANAGEMENT performed      Data     I have personally reviewed the following data over the past 24 hrs:    14.8 (H)  \   7.7 (L)   / 580 (H)     140 107 13.9 /  85   3.5 25 0.75 \       Imaging results reviewed over the past 24 hrs:   Recent Results (from the past 24 hour(s))   Echocardiogram Complete   Result Value    LVEF  55%    Narrative    [id number]  [id number]  [id number]  [id number]^GAVINO^TREASURE^LOGAN   "   [facility]  Echocardiography Laboratory  [address] [place] [address]     Name: KHANH BRAGG  MRN: [id number]  : 10/02/1929  Study Date: 2023 01:27 PM  Age: 89 yrs  Gender: Female  Patient Location: [facility]  Reason For Study: SOB  Ordering Physician: TREASURE MANCIA  Performed By: Dario Haley RDCS     BSA: 1.8 m2  Height: 64 in  Weight: 164 lb  HR: 78  BP: 128/49 mmHg  ______________________________________________________________________________  Procedure  Complete Portable Echo Adult.  ______________________________________________________________________________  Interpretation Summary     1. The left ventricle is normal in structure, function and size. The visual  ejection fraction is estimated at 55%.  2. The right ventricle is normal in structure, function and size.  3. There is mild to moderate (1-2+) tricuspid regurgitation. The right  ventricular systolic pressure is approximated at 38mmHg plus the right atrial  pressure.     Echo 22 showed EF 50-55%.  ______________________________________________________________________________  Left Ventricle  The left ventricle is normal in structure, function and size. There is normal  left ventricular wall thickness. A sigmoid septum is present. The visual  ejection fraction is estimated at 55%. Grade I or early diastolic dysfunction.  Normal left ventricular wall motion.     Right Ventricle  The right ventricle is normal in structure, function and size.     Atria  The left atrium is mildly dilated. Right atrial size is normal. There is no  atrial shunt seen.     Mitral Valve  There is mild (1+) mitral regurgitation.     Tricuspid Valve  There is mild to moderate (1-2+) tricuspid regurgitation. The right  ventricular systolic pressure is approximated at 38mmHg plus the right atrial  pressure.     Aortic Valve  There is mild (1+) aortic regurgitation.     Pulmonic Valve  The pulmonic valve is normal in  structure and function.     Vessels  Normal ascending, transverse (arch), and descending aorta. The inferior vena  cava was normal in size with preserved respiratory variability.     Pericardium  There is no pericardial effusion.     Rhythm  Sinus rhythm was noted.  ______________________________________________________________________________  MMode/2D Measurements & Calculations     IVSd: 1.2 cm  LVIDd: 4.1 cm  LVIDs: 2.5 cm  LVPWd: 0.97 cm  FS: 37.9 %  LV mass(C)d: 145.1 grams  LV mass(C)dI: 80.7 grams/m2  Ao root diam: 2.9 cm  LA dimension: 4.7 cm  asc Aorta Diam: 3.2 cm  LA/Ao: 1.6  LVOT diam: 1.9 cm  LVOT area: 2.7 cm2  LA Volume (BP): 63.9 ml  LA Volume Index (BP): 35.5 ml/m2  RWT: 0.48     Doppler Measurements & Calculations  MV E max derek: 84.4 cm/sec  MV A max derek: 109.0 cm/sec  MV E/A: 0.77  MV dec time: 0.21 sec  AI P1/2t: 371.8 msec  LV V1 max P.6 mmHg  LV V1 max: 118.0 cm/sec  LV V1 VTI: 22.5 cm  SV(LVOT): 60.5 ml  SI(LVOT): 33.7 ml/m2  TR max derek: 309.5 cm/sec  TR max P.3 mmHg  E/E' av.1  Lateral E/e': 13.1  Medial E/e': 13.1  RV S Derek: 12.8 cm/sec     ______________________________________________________________________________  Report approved by: Lorie Lainez 2023 02:34 PM

## 2023-05-18 NOTE — PROGRESS NOTES
Summary: Acute hypoxic respiratory failure  Date/time: 5/17/23 1900-5/18/23 8764  Primary Diagnosis:Sepsis/CAP vs HCAP  Orientation: A/O x 3, disoriented to time  Aggression Stop Light: Green  Mobility: Ax2, no OOB this shift. Turn/repo Q 2 hrs.  Pain Management: Denies pain   Diet: Soft and bite size diet low saturated fat/Na< 2400  VS: VSS  on RA.  Bowel/Bladder: Incontinent of bowel. No BM this shift.   Abnormal Lab/Assessments: None new  Drain/Device/Wound: PIV x2 saline locked  Consults: Speech path/ PT  D/C Day/Goals/Place: Pending

## 2023-05-18 NOTE — PLAN OF CARE
[id number]  VSS on RA. Pt SOB/ARZOLA with activity but no oxygen needed. IS encouraged. SR w/ BBB- tele discontinued this shift. Pt will be restarted on xarelto this jeevan, continue to monitor for signs of bleeding. Incontinent at times, skin intact. Up A1, gb, walker- ambulated in white with PT. TCU referrals being sent per  note.

## 2023-05-19 ENCOUNTER — APPOINTMENT (OUTPATIENT)
Dept: GENERAL RADIOLOGY | Facility: CLINIC | Age: 88
DRG: 871 | End: 2023-05-19
Attending: HOSPITALIST
Payer: MEDICARE

## 2023-05-19 ENCOUNTER — APPOINTMENT (OUTPATIENT)
Dept: SPEECH THERAPY | Facility: CLINIC | Age: 88
DRG: 871 | End: 2023-05-19
Payer: MEDICARE

## 2023-05-19 LAB
ERYTHROCYTE [DISTWIDTH] IN BLOOD BY AUTOMATED COUNT: 19.4 % (ref 10–15)
HCT VFR BLD AUTO: 25 % (ref 35–47)
HGB BLD-MCNC: 7.6 G/DL (ref 11.7–15.7)
MCH RBC QN AUTO: 22.3 PG (ref 26.5–33)
MCHC RBC AUTO-ENTMCNC: 30.4 G/DL (ref 31.5–36.5)
MCV RBC AUTO: 73 FL (ref 78–100)
PLATELET # BLD AUTO: 599 10E3/UL (ref 150–450)
POTASSIUM SERPL-SCNC: 3.8 MMOL/L (ref 3.4–5.3)
RBC # BLD AUTO: 3.41 10E6/UL (ref 3.8–5.2)
WBC # BLD AUTO: 15.3 10E3/UL (ref 4–11)

## 2023-05-19 PROCEDURE — 84132 ASSAY OF SERUM POTASSIUM: CPT | Performed by: HOSPITALIST

## 2023-05-19 PROCEDURE — 258N000003 HC RX IP 258 OP 636: Performed by: HOSPITALIST

## 2023-05-19 PROCEDURE — 999N000157 HC STATISTIC RCP TIME EA 10 MIN

## 2023-05-19 PROCEDURE — 36415 COLL VENOUS BLD VENIPUNCTURE: CPT | Performed by: HOSPITALIST

## 2023-05-19 PROCEDURE — 250N000011 HC RX IP 250 OP 636: Performed by: HOSPITALIST

## 2023-05-19 PROCEDURE — 94640 AIRWAY INHALATION TREATMENT: CPT

## 2023-05-19 PROCEDURE — 92526 ORAL FUNCTION THERAPY: CPT | Mod: GN | Performed by: SPEECH-LANGUAGE PATHOLOGIST

## 2023-05-19 PROCEDURE — 85027 COMPLETE CBC AUTOMATED: CPT | Performed by: HOSPITALIST

## 2023-05-19 PROCEDURE — 120N000001 HC R&B MED SURG/OB

## 2023-05-19 PROCEDURE — 250N000013 HC RX MED GY IP 250 OP 250 PS 637: Performed by: HOSPITALIST

## 2023-05-19 PROCEDURE — 94640 AIRWAY INHALATION TREATMENT: CPT | Mod: 76

## 2023-05-19 PROCEDURE — 99233 SBSQ HOSP IP/OBS HIGH 50: CPT | Performed by: HOSPITALIST

## 2023-05-19 PROCEDURE — 71045 X-RAY EXAM CHEST 1 VIEW: CPT

## 2023-05-19 PROCEDURE — 250N000009 HC RX 250: Performed by: NURSE PRACTITIONER

## 2023-05-19 RX ADMIN — DOXYCYCLINE 100 MG: 100 INJECTION, POWDER, LYOPHILIZED, FOR SOLUTION INTRAVENOUS at 01:21

## 2023-05-19 RX ADMIN — MEMANTINE 5 MG: 5 TABLET ORAL at 09:36

## 2023-05-19 RX ADMIN — AMLODIPINE BESYLATE 2.5 MG: 2.5 TABLET ORAL at 09:36

## 2023-05-19 RX ADMIN — IRON SUCROSE 200 MG: 20 INJECTION, SOLUTION INTRAVENOUS at 21:18

## 2023-05-19 RX ADMIN — IPRATROPIUM BROMIDE AND ALBUTEROL SULFATE 3 ML: .5; 3 SOLUTION RESPIRATORY (INHALATION) at 07:24

## 2023-05-19 RX ADMIN — IPRATROPIUM BROMIDE AND ALBUTEROL SULFATE 3 ML: .5; 3 SOLUTION RESPIRATORY (INHALATION) at 18:50

## 2023-05-19 RX ADMIN — DOXYCYCLINE 100 MG: 100 INJECTION, POWDER, LYOPHILIZED, FOR SOLUTION INTRAVENOUS at 14:35

## 2023-05-19 RX ADMIN — CEFTRIAXONE SODIUM 2 G: 2 INJECTION, POWDER, FOR SOLUTION INTRAMUSCULAR; INTRAVENOUS at 16:56

## 2023-05-19 RX ADMIN — PAROXETINE HYDROCHLORIDE 20 MG: 20 TABLET, FILM COATED ORAL at 21:16

## 2023-05-19 RX ADMIN — QUETIAPINE FUMARATE 25 MG: 25 TABLET ORAL at 21:16

## 2023-05-19 RX ADMIN — CARBOXYMETHYLCELLULOSE SODIUM 1 DROP: 5 SOLUTION/ DROPS OPHTHALMIC at 09:46

## 2023-05-19 RX ADMIN — RIVAROXABAN 15 MG: 15 TABLET, FILM COATED ORAL at 16:56

## 2023-05-19 RX ADMIN — METOPROLOL SUCCINATE 100 MG: 25 TABLET, EXTENDED RELEASE ORAL at 09:36

## 2023-05-19 RX ADMIN — ATORVASTATIN CALCIUM 20 MG: 20 TABLET, FILM COATED ORAL at 09:36

## 2023-05-19 RX ADMIN — IPRATROPIUM BROMIDE AND ALBUTEROL SULFATE 3 ML: .5; 3 SOLUTION RESPIRATORY (INHALATION) at 10:59

## 2023-05-19 RX ADMIN — Medication 1 MG: at 01:31

## 2023-05-19 RX ADMIN — MEMANTINE 5 MG: 5 TABLET ORAL at 21:16

## 2023-05-19 ASSESSMENT — ACTIVITIES OF DAILY LIVING (ADL)
ADLS_ACUITY_SCORE: 48

## 2023-05-19 NOTE — PROGRESS NOTES
Care Management Follow Up    Length of Stay (days): 4    Expected Discharge Date: 05/19/2023     Concerns to be Addressed: all concerns addressed in this encounter     Patient plan of care discussed at interdisciplinary rounds: Yes    Anticipated Discharge Disposition: Skilled Nursing Facility, Transitional Care     Anticipated Discharge Services: None  Anticipated Discharge DME: None    Patient/family educated on Medicare website which has current facility and service quality ratings: no  Education Provided on the Discharge Plan: Yes  Patient/Family in Agreement with the Plan: yes    Referrals Placed by CM/SW:    Private pay costs discussed: Not applicable    Additional Information:  Writer completed PAS for patient to discharge when medically stable     PAS-RR    D: Per DHS regulation, SW completed and submitted PAS-RR to [place] [employer] Direct Connect via the Senior LinkAge Line.  PAS-RR confirmation # is : [id number]    A: family verbalized understanding.    P: Further questions may be directed to Senior LinkAge Line at #[phone], option #4 for PAS-RR staff.        ROBIN Bains

## 2023-05-19 NOTE — PROGRESS NOTES
[facility]    Medicine Progress Note - Hospitalist Service    Date of Admission:  5/15/2023    Assessment & Plan   Margy Babin is a 89 year old female admitted on 5/15/2023.  Past history of HFrEF, A-fib, chronic LBBB, HTN, CVA with dysphagia, essential tremor, possible memory impairment who presents with report of progressive weakness x1 week.  Found to have sepsis and acute hypoxic respiratory failure due to pneumonia.  Note seen in ED 5/3 following fall and diagnosed with UTI (discharged on keflex); seen again in ED 5/5 for dyspnea with negative work-up and discharged with nebs and steroid course for suspected viral syndrome.         Severe sepsis and acute hypoxic respiratory failure due to CAP, resolving  Lactic acidosis, resolved  Acute physical deconditioning   * Per report, patient with increasing weakness over the past week; hypoxic to mid-70's, RR >30, VBG with low pO2, leukocytosis of 27 and lactate 3.6 on arrival  * admission CXR 5/15 with questionable left basilar opacity; though repeat CXR 5/16 without clear infiltrate  * RRT overnight 5/16 for hypoxia, seems largely due to poor oximetry readings, given lasix 20 mg IV with concern for CHF (received 3L volume resuscitation at arrival) though BNP only 707; received additional 40 mg IV lasix with blood transfusion 5/16  * TTE 5/17 with EF 55% without WMA, grade 1 diastolic dysfunction   Plan  - continue ceftriaxone and doxycycline  - WBC trending down, remains afebrile; continue to monitor.   - blood cultures ngtd   - weaned to room air but today, 5/19; noted to be requiring 3L NC. Will try to wean off and obtain CXR if unable to .  - encourage IS and acapella  - therapies recommending TCU, placement found when patient is medically stable.       Hypokalemia, resolved  - replace per protocol     Acute on chronic anemia: Microcytic  Patient with a hemoglobin of 7.7 with MCV 71 on admission.  Chart review shows hgb  "trending down from 11g/dL since 9/2022.  No report of external blood loss.    * iron studies consistent with iron deficiency  * s/p 1U PRBC 5/16 for hgb <7  - hgb stable 7-8 g/dl 05/18/23   -  will give IV iron tomorrow today  - no signs of bleeding, Xarelto resumed on 05/18/23  - continue to monitor Hgb  - patient reports being open EGD/colonoscopy in outpatient follow up; discussed with daughter to follow up with PCP regarding this     Thrombocytosis   Admission plt 685.  Platelet mildly elevated since 9/2022, possibly related to iron deficiency.     - plt stable 500-600     Suspected type II NSTEMI  Hypertension  Hyperlipidemia  Atrial fibrillation on chronic anticoagulation  HFrEF  TTE 4/2022 with EF 50-55% with moderately dilated left atrium.  * serial trop flat ~30; EKG showing known LBBB  * echo as above  - continue PTA amlodipine, atorvastatin, metoprolol  - resume Xarelto 05/18/23       Chronic kidney disease, stage II  Patient with a creatinine of 1.03, approximate baseline.  -Avoid nephrotoxins.  -Cr stable about 0.8 05/18/23      History of dysphagia secondary to stroke  Discharge summary in September 2022 notes oral pharyngeal dysphagia  - SLP recommending dysphagia level 7 with thin liquids     Possible dementia  * daughter reports mother is typically \"sharp as a tack\", stating that her disorientation to date on 5/16 is unusual.  Chart review shows history of confusion in setting of acute illness.   - continue PTA Seroquel and Memantine        Diet: Room Service  Combination Diet Easy to Chew (level 7); Thin Liquids (level 0); 2 gm NA Diet    DVT Prophylaxis: Pneumatic Compression Devices  Pichardo Catheter: Not present  Lines: None     Cardiac Monitoring: None  Code Status: No CPR- Do NOT Intubate      Clinically Significant Risk Factors              # Hypoalbuminemia: Lowest albumin = 2.9 g/dL at 5/16/2023  7:03 AM, will monitor as appropriate     # Hypertension: Noted on problem list     # Dementia: " "noted on problem list    # Overweight: Estimated body mass index is 27.93 kg/m  as calculated from the following:    Height as of this encounter: 1.626 m (5' 4\").    Weight as of this encounter: 73.8 kg (162 lb 11.2 oz)., PRESENT ON ADMISSION          Disposition Plan      Expected Discharge Date: 05/19/2023    Discharge Delays: Placement - TCU  Destination: nursing home            Noris Treadwell MD  Hospitalist Service  [facility]  Securely message with [employer] (more info)  Text page via [employer] Paging/Directory   ______________________________________________________________________    Interval History   No complaints    Physical Exam   Vital Signs: Temp: 98.3  F (36.8  C) Temp src: Oral BP: 121/56 Pulse: 78   Resp: 16 SpO2: 94 % O2 Device: Nasal cannula Oxygen Delivery: 3 LPM  Weight: 162 lbs 11.19 oz    General Appearance: Well appearing for stated age.  Respiratory: CTAB, no rales or ronchi  Cardiovascular: S1, S2 normal, no murmurs  GI: non-tender on palpation, BS present      Medical Decision Making       55 MINUTES SPENT BY ME on the date of service doing chart review, history, exam, documentation & further activities per the note.      Data     I have personally reviewed the following data over the past 24 hrs:    15.3 (H)  \   7.6 (L)   / 599 (H)     N/A N/A N/A /  N/A   3.8 N/A N/A \       Imaging results reviewed over the past 24 hrs:   No results found for this or any previous visit (from the past 24 hour(s)).  "

## 2023-05-19 NOTE — PROGRESS NOTES
A&Ox2, disoriented to time and situation. VSS. Tele SR w/ BBB. Easy to chew, thin liquid, 2g sodium diet. 2 PIV in right forearm. IV antibiotics given.Pt was placed on 2L O2 d/t desats during the shift.

## 2023-05-19 NOTE — PLAN OF CARE
A&Ox2, disoriented to time and situation. VSS on 1L O2 via NC, attempted to wean but with sleep dipped down to mid 80s. R PIV SL with intermittent abx, re-dressed today. Denies pain. Potassium WNL, recheck ordered for 5/20 AM. CXR performed today, unremarkable, see results. A1/GB/W. Plan for discharge to [facility] when medically ready.

## 2023-05-19 NOTE — PROGRESS NOTES
Care Management Follow Up    Length of Stay (days): 4    Expected Discharge Date: 05/19/2023     Concerns to be Addressed: all concerns addressed in this encounter     Patient plan of care discussed at interdisciplinary rounds: Yes    Anticipated Discharge Disposition: Skilled Nursing Facility, Transitional Care     Anticipated Discharge Services: None  Anticipated Discharge DME: None    Patient/family educated on Medicare website which has current facility and service quality ratings: no  Education Provided on the Discharge Plan: Yes  Patient/Family in Agreement with the Plan: yes    Referrals Placed by CM/SW:    Private pay costs discussed: Not applicable    Additional Information:  Writer was informed that [facility] Accepted the patient and informed the writer of the private pay up front fees. Writer called the patient's Daughter Tina, [phone], to inform her. Lisa consented and wanted to move forward with [facility]. Daughter asked what type of payment does [facility] accept. Writer called [facility] [phone]. Admissions at [facility] stated that they can accept cash, check and debit. Writer called the daughter to inform of the payment options. Daughter stated that she would want to writer a check to [facility] and leave it with the patient's belongings when she came over later today. Writer called [facility] back to confirm that this was ok . Admissions at [facility] stated that it was ok and that they will make a note of it. Writer stated to [facility] admissions that they will inform the nursing team here that they will place the check in the patient's discharge packet. Admissions at [facility] consented.  Writer paged doctor if the patient was ready. MD stated that the patient was not ready. Writer called the daughter to update her. Writer will inform the nursing staff of the plan with the alex. SW will follow.      ROBIN Ribera  [facility]  Hospital  Social Work

## 2023-05-20 LAB
BACTERIA BLD CULT: NO GROWTH
BACTERIA BLD CULT: NO GROWTH
POTASSIUM SERPL-SCNC: 3.6 MMOL/L (ref 3.4–5.3)
SARS-COV-2 RNA RESP QL NAA+PROBE: NEGATIVE

## 2023-05-20 PROCEDURE — 250N000011 HC RX IP 250 OP 636: Performed by: HOSPITALIST

## 2023-05-20 PROCEDURE — 250N000013 HC RX MED GY IP 250 OP 250 PS 637: Performed by: HOSPITALIST

## 2023-05-20 PROCEDURE — 94640 AIRWAY INHALATION TREATMENT: CPT

## 2023-05-20 PROCEDURE — 94640 AIRWAY INHALATION TREATMENT: CPT | Mod: 76

## 2023-05-20 PROCEDURE — 999N000157 HC STATISTIC RCP TIME EA 10 MIN

## 2023-05-20 PROCEDURE — 84132 ASSAY OF SERUM POTASSIUM: CPT | Performed by: HOSPITALIST

## 2023-05-20 PROCEDURE — 87635 SARS-COV-2 COVID-19 AMP PRB: CPT | Performed by: STUDENT IN AN ORGANIZED HEALTH CARE EDUCATION/TRAINING PROGRAM

## 2023-05-20 PROCEDURE — 36415 COLL VENOUS BLD VENIPUNCTURE: CPT | Performed by: HOSPITALIST

## 2023-05-20 PROCEDURE — 250N000009 HC RX 250: Performed by: NURSE PRACTITIONER

## 2023-05-20 PROCEDURE — 99239 HOSP IP/OBS DSCHRG MGMT >30: CPT | Performed by: STUDENT IN AN ORGANIZED HEALTH CARE EDUCATION/TRAINING PROGRAM

## 2023-05-20 PROCEDURE — 120N000001 HC R&B MED SURG/OB

## 2023-05-20 RX ORDER — CEFIXIME 400 MG/1
400 CAPSULE ORAL DAILY
DISCHARGE
Start: 2023-05-20 | End: 2023-05-22

## 2023-05-20 RX ORDER — DOXYCYCLINE 100 MG/1
100 CAPSULE ORAL 2 TIMES DAILY
DISCHARGE
Start: 2023-05-20 | End: 2023-05-22

## 2023-05-20 RX ADMIN — AMLODIPINE BESYLATE 2.5 MG: 2.5 TABLET ORAL at 09:08

## 2023-05-20 RX ADMIN — IPRATROPIUM BROMIDE AND ALBUTEROL SULFATE 3 ML: .5; 3 SOLUTION RESPIRATORY (INHALATION) at 15:46

## 2023-05-20 RX ADMIN — DOXYCYCLINE 100 MG: 100 INJECTION, POWDER, LYOPHILIZED, FOR SOLUTION INTRAVENOUS at 00:14

## 2023-05-20 RX ADMIN — MEMANTINE 5 MG: 5 TABLET ORAL at 09:08

## 2023-05-20 RX ADMIN — MEMANTINE 5 MG: 5 TABLET ORAL at 21:19

## 2023-05-20 RX ADMIN — IPRATROPIUM BROMIDE AND ALBUTEROL SULFATE 3 ML: .5; 3 SOLUTION RESPIRATORY (INHALATION) at 19:55

## 2023-05-20 RX ADMIN — PAROXETINE HYDROCHLORIDE 20 MG: 20 TABLET, FILM COATED ORAL at 22:06

## 2023-05-20 RX ADMIN — IPRATROPIUM BROMIDE AND ALBUTEROL SULFATE 3 ML: .5; 3 SOLUTION RESPIRATORY (INHALATION) at 08:03

## 2023-05-20 RX ADMIN — RIVAROXABAN 15 MG: 15 TABLET, FILM COATED ORAL at 18:07

## 2023-05-20 RX ADMIN — METOPROLOL SUCCINATE 100 MG: 25 TABLET, EXTENDED RELEASE ORAL at 09:08

## 2023-05-20 RX ADMIN — CARBOXYMETHYLCELLULOSE SODIUM 1 DROP: 5 SOLUTION/ DROPS OPHTHALMIC at 09:08

## 2023-05-20 RX ADMIN — ACETAMINOPHEN 650 MG: 325 TABLET ORAL at 06:56

## 2023-05-20 RX ADMIN — QUETIAPINE FUMARATE 25 MG: 25 TABLET ORAL at 22:06

## 2023-05-20 RX ADMIN — ATORVASTATIN CALCIUM 20 MG: 20 TABLET, FILM COATED ORAL at 09:08

## 2023-05-20 ASSESSMENT — ACTIVITIES OF DAILY LIVING (ADL)
ADLS_ACUITY_SCORE: 48

## 2023-05-20 NOTE — PLAN OF CARE
A&Ox3, disoriented to time. VSS on RA, likely will need 1L O2 with sleep. No IV access. Denies pain. K+ WNL, recheck ordered for 5/21 AM. Pt was due to discharge at 1630 today to [facility] with  wheelchair transport, however, they had to reschedule ride and pt was unable to be admitted to TCU this evening. Ride will be rescheduled for tomorrow. Daughter and [facility] were contacted. On-call MD notified.

## 2023-05-20 NOTE — PROGRESS NOTES
Care Management Discharge Note    Discharge Date: 05/20/2023       Discharge Disposition: Skilled Nursing Facility, Transitional Care    [facility]  [address] [place] [address]  P: [phone]    Discharge Services: TCU    Discharge DME: None    Discharge Transportation:  Wheelchair Transport with a  window of 4:24 - 5:09 pm.     Private pay costs discussed: private room/amenity fees and transportation costs    Does the patient's insurance plan have a 3 day qualifying hospital stay waiver?  Yes   Will the waiver be used for post-acute placement? No    PAS Confirmation Code: [id number]  Patient/family educated on Medicare website which has current facility and service quality ratings: no    Education Provided on the Discharge Plan: Yes  Persons Notified of Discharge Plans: Patient's Daughter Tina, Care Team, SNF  Patient/Family in Agreement with the Plan: yes    Handoff Referral Completed: No    Additional Information:  Received discharge orders for patient to discharge to [facility] today.  spoke with daughter Tina who requested transportation be arranged for patient. She is in understanding of associated costs.  placed call to  Transport to arrange for a wheelchair ride. Ride arranged for a  window of 4:24 - 5:09.   updated facility and they are in agreement with arrival time. Faxed discharge orders to facility.     KRISTA Pacheco

## 2023-05-20 NOTE — PLAN OF CARE
Shift Note: 05/19/2023 [id number] night shift   Summary: Acute hypoxic respiratory failure Past history of HFrEF, A-fib, chronic LBBB, HTN, CVA with dysphagia, essential tremor, possible memory impairment who presents with report of progressive weakness x1 week.  Cognitive Concerns/ Orientation : A&Ox3, disoriented to time   BEHAVIOR & AGGRESSION TOOL COLOR: green  CIWA SCORE: na  ABNL VS/O2: VSS on NC 1lpm  MOBILITY: A1/GB/W  PAIN MANAGMENT: denies  DIET: diet easy to chew, thin liquid   BOWEL/BLADDER: on purewick, Incontinent of bowel. No BM this shift.   ABNL LAB/BG: on K protocol,   DRAIN/DEVICES: R PIV SL   TELEMETRY RHYTHM: na  SKIN: redness blanchable on sacrum/coccyx, scabs on the R/L upper arm  TESTS/PROCEDURES: NA  D/C DATE: plan for discharge to [facility] when medically ready.   OTHER IMPORTANT INFO:

## 2023-05-20 NOTE — DISCHARGE SUMMARY
"[facility]  Hospitalist Discharge Summary      Date of Admission:  5/15/2023  Date of Discharge:  5/20/2023  Discharging Provider: Dionte Mueller MD  Discharge Service: Hospitalist Service    Discharge Diagnoses   As below    Clinically Significant Risk Factors     # Overweight: Estimated body mass index is 27.93 kg/m  as calculated from the following:    Height as of this encounter: 1.626 m (5' 4\").    Weight as of this encounter: 73.8 kg (162 lb 11.2 oz).       Follow-ups Needed After Discharge   Follow-up Appointments     Follow Up and recommended labs and tests      Follow up with skilled nursing physician.  The following labs/tests are   recommended: CBC in 1-2 weeks to evaluate for persistent leukocytosis   (unclear if leukocytosis is chronic or related to infection as has history   of WBC ranging 10-15 for the last few years but only have data associated   with hospital stays), and hemoglobin level.           Unresulted Labs Ordered in the Past 30 Days of this Admission     Date and Time Order Name Status Description    5/15/2023  1:19 PM Blood Culture Peripheral Blood Preliminary     5/15/2023  1:19 PM Blood Culture Peripheral Blood Preliminary       These results will be followed up by hospitalist discharge pool. Would suspect they are non-significant if positive as much improved at discharge.    Discharge Disposition   Discharged to rehabilitation facility  Condition at discharge: Stable    Hospital Course   Margy Babin is a 89 year old female admitted on 5/15/2023.  Past history of HFrEF, A-fib, chronic LBBB, HTN, CVA with dysphagia, essential tremor, possible memory impairment who presents with report of progressive weakness x1 week.  Found to have severe sepsis and acute hypoxic respiratory failure due to pneumonia.  Note seen in ED 5/3 following fall and diagnosed with UTI (discharged on keflex); seen again in ED 5/5 for dyspnea with negative work-up and " discharged with nebs and steroid course for suspected viral syndrome. She was treated with 5 days of antibiotics for pneumonia. She was slowly titrated down on oxygen. She will discharge to TCU [facility] with 2 days of antibiotics to complete 7 day course.         Severe sepsis and acute hypoxic respiratory failure due to CAP, resolved  Lactic acidosis, resolved  Acute physical deconditioning   * Per report, patient with increasing weakness over the past week; hypoxic to mid-70's, RR >30, VBG with low pO2, leukocytosis of 27 and lactate 3.6 on arrival  * admission CXR 5/15 with questionable left basilar opacity; though repeat CXR 5/16 without clear infiltrate  * RRT overnight 5/16 for hypoxia, seems largely due to poor oximetry readings, given lasix 20 mg IV with concern for CHF (received 3L volume resuscitation at arrival) though BNP only 707; received additional 40 mg IV lasix with blood transfusion 5/16  * TTE 5/17 with EF 55% without WMA, grade 1 diastolic dysfunction     - continue antibiotics for 2 days on discharge, suprax and doxycycline  - discharge to TCU.      Leukocytosis, improved, chronic  * presented with WBC 27. On review of records, appears as though WBC typically elevated 10-15 for past several years.   - recommend repeat CBC on follow up to assess if this is chronic.   - if persistent recommend follow up with PCP or referral to oncology with associated anemia and thrombocytosis.     Hypokalemia, resolved  - replace per protocol     Acute on chronic anemia: Microcytic  Patient with a hemoglobin of 7.7 with MCV 71 on admission.  Chart review shows hgb trending down from 11g/dL since 9/2022.  No report of external blood loss.    * iron studies consistent with iron deficiency  * s/p 1U PRBC 5/16 for hgb <7  * received 1 dose of Venofer on 5/19/23  - no signs of bleeding, Xarelto resumed on 05/18/23  - continue to monitor Hgb on follow up   - patient reports being open EGD/colonoscopy in outpatient  "follow up; discussed with daughter to follow up with PCP regarding this     Thrombocytosis   Admission plt 685.  Platelet mildly elevated since 9/2022, possibly related to iron deficiency.     - plt stable 500-600     Suspected type II NSTEMI  Hypertension  Hyperlipidemia  Atrial fibrillation on chronic anticoagulation  HFrEF  TTE 4/2022 with EF 50-55% with moderately dilated left atrium.  * serial trop flat ~30; EKG showing known LBBB  * echo as above  - continue PTA amlodipine, atorvastatin, metoprolol  - resume Xarelto 05/18/23       Chronic kidney disease, stage II  Patient with a creatinine of 1.03, approximate baseline.  -Avoid nephrotoxins.  -Cr stable about 0.8 05/18/23      History of dysphagia secondary to stroke  Discharge summary in September 2022 notes oral pharyngeal dysphagia  - SLP recommending dysphagia level 7 with thin liquids     Possible dementia  * daughter reports mother is typically \"sharp as a tack\", stating that her disorientation to date on 5/16 is unusual.  Chart review shows history of confusion in setting of acute illness.   - continue PTA Seroquel and Memantine       Consultations This Hospital Stay   PHARMACY TO DOSE VANCO  SPEECH LANGUAGE PATH ADULT IP CONSULT  PHYSICAL THERAPY ADULT IP CONSULT  PHARMACY TO DOSE VANCO  CARE MANAGEMENT / SOCIAL WORK IP CONSULT  PHYSICAL THERAPY ADULT IP CONSULT  OCCUPATIONAL THERAPY ADULT IP CONSULT    Code Status   No CPR- Do NOT Intubate    Time Spent on this Encounter   I, Dointe Mueller MD, personally saw the patient today and spent greater than 30 minutes discharging this patient.       Dionte Mueller MD  [place] [address] [facility]  [address] [place] [address] [address]VE[address] [address]  Phone: [phone]  ______________________________________________________________________    Physical Exam   Vital Signs: Temp: 97.4  F (36.3  C) Temp src: Axillary BP: 137/54 Pulse: 75   Resp: 18 SpO2: 96 % O2 Device: Nasal cannula Oxygen " Delivery: 1 LPM  Weight: 162 lbs 11.19 oz  Constitutional: Awake, alert, cooperative, no apparent distress  Respiratory: Clear to auscultation bilaterally, no crackles or wheezing  Cardiovascular: Regular rate and rhythm, normal S1 and S2, and no murmur noted  GI: Normal bowel sounds, soft, non-distended, non-tender  Skin/Integumen: No rashes, no cyanosis, no edema  Other:          Primary Care Physician   Colin Lake    Discharge Orders      General info for SNF    Length of Stay Estimate: Short Term Care: Estimated # of Days <30  Condition at Discharge: Stable  Level of care:skilled   Rehabilitation Potential: Good  Admission H&P remains valid and up-to-date: Yes  Recent Chemotherapy: N/A  Use Nursing Home Standing Orders: Yes     Mantoux instructions    Give two-step Mantoux (PPD) Per Facility Policy Yes     Reason for your hospital stay    Pneumonia     Activity - Up with nursing assistance     Activity - Up with assistive device     Follow Up and recommended labs and tests    Follow up with [facility] physician.  The following labs/tests are recommended: CBC in 1-2 weeks to evaluate for persistent leukocytosis (unclear if leukocytosis is chronic or related to infection as has history of WBC ranging 10-15 for the last few years but only have data associated with hospital stays), and hemoglobin level.     Physical Therapy Adult Consult    Evaluate and treat as clinically indicated.    Reason:  deconditioning     Occupational Therapy Adult Consult    Evaluate and treat as clinically indicated.    Reason:  deconditioning     Diet    Follow this diet upon discharge: Orders Placed This Encounter      Room Service      Combination Diet Easy to Chew (level 7); Thin Liquids (level 0); 2 gm NA Diet       Significant Results and Procedures   Most Recent 3 CBC's:  Recent Labs   Lab Test 05/19/23  0703 05/18/23  0738 05/17/23  0653   WBC 15.3* 14.8* 17.0*   HGB 7.6* 7.7* 7.8*   MCV 73* 75* 74*   * 580* 542*      Most Recent 3 BMP's:  Recent Labs   Lab Test 05/20/23  0728 05/19/23  0703 05/18/23  0738 05/17/23  0653 05/16/23 1953 05/16/23  0703   NA  --   --  140 139  --  137   POTASSIUM 3.6 3.8 3.5 3.8   < > 3.3*   CHLORIDE  --   --  107 107  --  103   CO2  --   --  25 24  --  24   BUN  --   --  13.9 13.0  --  20.9   CR  --   --  0.75 0.85  --  0.97*   ANIONGAP  --   --  8 8  --  10   SOBIA  --   --  8.1* 8.2  --  7.9*   GLC  --   --  85 96  --  104*    < > = values in this interval not displayed.   ,   Results for orders placed or performed during the hospital encounter of 05/15/23   XR Chest 2 Views    Narrative    XR CHEST 2 VIEWS   5/15/2023 1:19 PM     HISTORY: Shortness of breath, weak    COMPARISON: Chest radiograph 5/5/2023      Impression    IMPRESSION: Stable enlargement of the cardiac silhouette. Questionable  left basilar opacity, could represent consolidation versus overlying  structure. No definite pleural effusion or pneumothorax. Bones are  unchanged. Stable posttraumatic changes of the right acromioclavicular  joint.    REAGAN ECHOLS MD         SYSTEM ID:  [id number]   Head CT w/o contrast    Narrative    CT OF THE HEAD WITHOUT CONTRAST 5/15/2023 1:30 PM     COMPARISON: Head CT 5/4/2023    HISTORY: Fall. Trauma. Pain.    TECHNIQUE: 5 mm thick axial CT images of the head were acquired  without IV contrast material.    FINDINGS: There is moderate diffuse cerebral volume loss. There are  subtle patchy areas of decreased density in the cerebral white matter  bilaterally that are consistent with sequela of chronic small vessel  ischemic disease.     The ventricles and basal cisterns are within normal limits in  configuration given the degree of cerebral volume loss. There is no  midline shift. There are no extra-axial fluid collections.     No intracranial hemorrhage, mass or recent infarct.    The visualized paranasal sinuses are well-aerated. There is no  mastoiditis. There are no fractures of the  visualized bones.       Impression    IMPRESSION: Diffuse cerebral volume loss and cerebral white matter  changes consistent with chronic small vessel ischemic disease. No  evidence for acute intracranial pathology.       Radiation dose for this scan was reduced using automated exposure  control, adjustment of the mA and/or kV according to patient size, or  iterative reconstruction technique    GISELLE CHAVIRA MD         SYSTEM ID:  [id number]   XR Chest 2 Views    Narrative    EXAM: XR CHEST 2 VIEWS  LOCATION: [facility]  DATE/TIME: 2023 1:07 AM CDT    INDICATION: respiratory failure  COMPARISON: 2023      Impression    IMPRESSION: Trace bilateral pleural effusions. No consolidation, pleural effusion or pneumothorax. Cardiomediastinal contour is normal.    Pulmonary vascularity is within normal limits. Widening of the right acromioclavicular joint is redemonstrated.   XR Chest Port 1 View    Narrative    EXAM: XR CHEST PORT 1 VIEW  LOCATION: [facility]  DATE/TIME: 2023 5:54 PM CDT    INDICATION: hypoxia  COMPARISON: 2023      Impression    IMPRESSION: Shallow aspiration. Chest otherwise negative with no change.   Echocardiogram Complete     Value    LVEF  55%    Narrative    [id number]  [id number]  [id number]  [id number]^GAVINO^TREASURE^LOGAN     [facility]  Echocardiography Laboratory  [address] [place] [address]     Name: KHANH BRAGG  MRN: [id number]  : 10/02/1929  Study Date: 2023 01:27 PM  Age: 89 yrs  Gender: Female  Patient Location: [facility]  Reason For Study: SOB  Ordering Physician: TREASURE MANCIA  Performed By: Dario Haley RDCS     BSA: 1.8 m2  Height: 64 in  Weight: 164 lb  HR: 78  BP: 128/49 mmHg  ______________________________________________________________________________  Procedure  Complete Portable Echo  Adult.  ______________________________________________________________________________  Interpretation Summary     1. The left ventricle is normal in structure, function and size. The visual  ejection fraction is estimated at 55%.  2. The right ventricle is normal in structure, function and size.  3. There is mild to moderate (1-2+) tricuspid regurgitation. The right  ventricular systolic pressure is approximated at 38mmHg plus the right atrial  pressure.     Echo 4-27-22 showed EF 50-55%.  ______________________________________________________________________________  Left Ventricle  The left ventricle is normal in structure, function and size. There is normal  left ventricular wall thickness. A sigmoid septum is present. The visual  ejection fraction is estimated at 55%. Grade I or early diastolic dysfunction.  Normal left ventricular wall motion.     Right Ventricle  The right ventricle is normal in structure, function and size.     Atria  The left atrium is mildly dilated. Right atrial size is normal. There is no  atrial shunt seen.     Mitral Valve  There is mild (1+) mitral regurgitation.     Tricuspid Valve  There is mild to moderate (1-2+) tricuspid regurgitation. The right  ventricular systolic pressure is approximated at 38mmHg plus the right atrial  pressure.     Aortic Valve  There is mild (1+) aortic regurgitation.     Pulmonic Valve  The pulmonic valve is normal in structure and function.     Vessels  Normal ascending, transverse (arch), and descending aorta. The inferior vena  cava was normal in size with preserved respiratory variability.     Pericardium  There is no pericardial effusion.     Rhythm  Sinus rhythm was noted.  ______________________________________________________________________________  MMode/2D Measurements & Calculations     IVSd: 1.2 cm  LVIDd: 4.1 cm  LVIDs: 2.5 cm  LVPWd: 0.97 cm  FS: 37.9 %  LV mass(C)d: 145.1 grams  LV mass(C)dI: 80.7 grams/m2  Ao root diam: 2.9 cm  LA  dimension: 4.7 cm  asc Aorta Diam: 3.2 cm  LA/Ao: 1.6  LVOT diam: 1.9 cm  LVOT area: 2.7 cm2  LA Volume (BP): 63.9 ml  LA Volume Index (BP): 35.5 ml/m2  RWT: 0.48     Doppler Measurements & Calculations  MV E max derek: 84.4 cm/sec  MV A max derek: 109.0 cm/sec  MV E/A: 0.77  MV dec time: 0.21 sec  AI P1/2t: 371.8 msec  LV V1 max P.6 mmHg  LV V1 max: 118.0 cm/sec  LV V1 VTI: 22.5 cm  SV(LVOT): 60.5 ml  SI(LVOT): 33.7 ml/m2  TR max derek: 309.5 cm/sec  TR max P.3 mmHg  E/E' av.1  Lateral E/e': 13.1  Medial E/e': 13.1  RV S Derek: 12.8 cm/sec     ______________________________________________________________________________  Report approved by: Lorie Lainez 2023 02:34 PM               Discharge Medications   Current Discharge Medication List      START taking these medications    Details   cefixime (SUPRAX) 400 MG capsule Take 1 capsule (400 mg) by mouth daily for 2 days    Associated Diagnoses: Pneumonia of left lower lobe due to infectious organism      doxycycline hyclate (VIBRAMYCIN) 100 MG capsule Take 1 capsule (100 mg) by mouth 2 times daily for 2 days    Associated Diagnoses: Pneumonia of left lower lobe due to infectious organism         CONTINUE these medications which have NOT CHANGED    Details   !! acetaminophen (ACETAMINOPHEN EXTRA STRENGTH) 500 MG tablet 2 TABS (1000MG) BY MOUTH DAILY AND 2 TABS (1000MG) BY MOUTH EVERY 12 HOURS AS NEEDED FOR PAIN - NOT TO EXCEED 4000MG APAP/24HRS STRENGTH: 500 MG  Qty: 60 tablet, Refills: 1    Associated Diagnoses: Neck pain      !! acetaminophen (TYLENOL) 500 MG tablet Take 1,000 mg by mouth every 12 hours as needed for mild pain      amLODIPine (NORVASC) 2.5 MG tablet Take 1 tablet (2.5 mg) by mouth daily  Qty: 30 tablet, Refills: 10    Associated Diagnoses: Essential hypertension      atorvastatin (LIPITOR) 20 MG tablet Take 1 tablet (20 mg) by mouth daily  Qty: 90 tablet, Refills: 3    Associated Diagnoses: Hyperlipidemia LDL goal <100       bisacodyl (DULCOLAX) 5 MG EC tablet 1 TAB BY MOUTH DAILY AS NEEDED FOR CONSTIPATION  Qty: 30 tablet, Refills: 10    Associated Diagnoses: Constipation, unspecified constipation type      Cholecalciferol (VITAMIN D HIGH POTENCY) 25 MCG (1000 UT) CAPS 1 CAP BY MOUTH DAILY  Qty: 30 capsule, Refills: 10    Associated Diagnoses: Takes dietary supplements      docusate sodium (COLACE) 100 MG capsule TAKE 1 CAP BY MOUTH TWICE DAILY AS NEEDED FOR CONSTIPATION  Qty: 60 capsule, Refills: 10    Associated Diagnoses: Constipation, unspecified constipation type      guaiFENesin (ROBITUSSIN) 20 mg/mL SOLN solution Take 10 mLs by mouth every 4 hours as needed for cough    Associated Diagnoses: Pneumonia due to infectious organism, unspecified laterality, unspecified part of lung      ipratropium - albuterol 0.5 mg/2.5 mg/3 mL (DUONEB) 0.5-2.5 (3) MG/3ML neb solution Take 1 vial (3 mLs) by nebulization every 4 hours as needed for shortness of breath  Qty: 30 mL, Refills: 1      memantine (NAMENDA) 5 MG tablet 1 TAB BY MOUTH TWICE DAILY (DX: DEMENTIA)  Qty: 60 tablet, Refills: 10    Associated Diagnoses: Dementia (H)      metoprolol succinate ER (TOPROL XL) 100 MG 24 hr tablet 1 TAB BY MOUTH DAILY (DX: HYPERTENSION)  Qty: 30 tablet, Refills: 10    Associated Diagnoses: Paroxysmal atrial fibrillation (H)      ondansetron (ZOFRAN) 4 MG tablet 1 TAB BY MOUTH EVERY 8 HOURS AS NEEDED FOR NAUSEA  Qty: 10 tablet, Refills: 10    Associated Diagnoses: Nausea without vomiting      oxybutynin ER (DITROPAN XL) 10 MG 24 hr tablet 2 TABS (20MG) BY MOUTH DAILY (DX: NOCTURIA)  Qty: 180 tablet, Refills: 2    Associated Diagnoses: Nocturia      PARoxetine (PAXIL) 20 MG tablet 1 TAB BY MOUTH AT BEDTIME Strength: 20 mg  Qty: 90 tablet, Refills: 0    Associated Diagnoses: 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)      QUEtiapine (SEROQUEL) 25 MG tablet 1 TAB BY MOUTH AT BEDTIME (DX: INSOMNIA)  Qty: 30 tablet, Refills: 1    Associated  Diagnoses: Insomnia, unspecified type      ramelteon (ROZEREM) 8 MG tablet 1 TAB BY MOUTH EVERY NIGHT AS NEEDED FOR SLEEP  Qty: 30 tablet, Refills: 10    Associated Diagnoses: Insomnia, unspecified type      RESTASIS 0.05 % ophthalmic emulsion INSTILL 1 DROP INTO BOTH EYES DAILY (DX: DRY EYES) ++CLAUDETTE++  Qty: 30 each, Refills: 10    Associated Diagnoses: Dry eyes      rivaroxaban ANTICOAGULANT (XARELTO) 15 MG TABS tablet Take 1 tablet (15 mg) by mouth daily (with dinner)  Qty: 90 tablet, Refills: 1    Associated Diagnoses: Paroxysmal atrial fibrillation (H)      order for DME Equipment being ordered: Walker Wheels () and Walker ()  Treatment Diagnosis: Difficulty ambulating  Qty: 1 each, Refills: 0    Associated Diagnoses: Fall, initial encounter       !! - Potential duplicate medications found. Please discuss with provider.        Allergies   No Known Allergies

## 2023-05-21 VITALS
BODY MASS INDEX: 27.06 KG/M2 | TEMPERATURE: 97.9 F | WEIGHT: 158.51 LBS | HEIGHT: 64 IN | OXYGEN SATURATION: 92 % | SYSTOLIC BLOOD PRESSURE: 147 MMHG | HEART RATE: 88 BPM | RESPIRATION RATE: 18 BRPM | DIASTOLIC BLOOD PRESSURE: 64 MMHG

## 2023-05-21 LAB — POTASSIUM SERPL-SCNC: 3.6 MMOL/L (ref 3.4–5.3)

## 2023-05-21 PROCEDURE — 250N000013 HC RX MED GY IP 250 OP 250 PS 637: Performed by: HOSPITALIST

## 2023-05-21 PROCEDURE — 36415 COLL VENOUS BLD VENIPUNCTURE: CPT | Performed by: STUDENT IN AN ORGANIZED HEALTH CARE EDUCATION/TRAINING PROGRAM

## 2023-05-21 PROCEDURE — 250N000009 HC RX 250: Performed by: NURSE PRACTITIONER

## 2023-05-21 PROCEDURE — 84132 ASSAY OF SERUM POTASSIUM: CPT | Performed by: STUDENT IN AN ORGANIZED HEALTH CARE EDUCATION/TRAINING PROGRAM

## 2023-05-21 PROCEDURE — 94640 AIRWAY INHALATION TREATMENT: CPT

## 2023-05-21 PROCEDURE — 94640 AIRWAY INHALATION TREATMENT: CPT | Mod: 76

## 2023-05-21 RX ORDER — CEFPODOXIME PROXETIL 200 MG/1
200 TABLET, FILM COATED ORAL EVERY 12 HOURS SCHEDULED
Status: DISCONTINUED | OUTPATIENT
Start: 2023-05-21 | End: 2023-05-21 | Stop reason: HOSPADM

## 2023-05-21 RX ORDER — DOXYCYCLINE 100 MG/1
100 CAPSULE ORAL EVERY 12 HOURS SCHEDULED
Status: DISCONTINUED | OUTPATIENT
Start: 2023-05-21 | End: 2023-05-21 | Stop reason: HOSPADM

## 2023-05-21 RX ADMIN — METOPROLOL SUCCINATE 100 MG: 25 TABLET, EXTENDED RELEASE ORAL at 08:45

## 2023-05-21 RX ADMIN — MEMANTINE 5 MG: 5 TABLET ORAL at 08:45

## 2023-05-21 RX ADMIN — ATORVASTATIN CALCIUM 20 MG: 20 TABLET, FILM COATED ORAL at 08:45

## 2023-05-21 RX ADMIN — DOXYCYCLINE HYCLATE 100 MG: 100 CAPSULE ORAL at 08:45

## 2023-05-21 RX ADMIN — CEFPODOXIME PROXETIL 200 MG: 200 TABLET, FILM COATED ORAL at 08:45

## 2023-05-21 RX ADMIN — AMLODIPINE BESYLATE 2.5 MG: 2.5 TABLET ORAL at 08:45

## 2023-05-21 RX ADMIN — CARBOXYMETHYLCELLULOSE SODIUM 1 DROP: 5 SOLUTION/ DROPS OPHTHALMIC at 08:46

## 2023-05-21 RX ADMIN — DOXYCYCLINE HYCLATE 100 MG: 100 CAPSULE ORAL at 00:29

## 2023-05-21 RX ADMIN — IPRATROPIUM BROMIDE AND ALBUTEROL SULFATE 3 ML: .5; 3 SOLUTION RESPIRATORY (INHALATION) at 07:30

## 2023-05-21 ASSESSMENT — ACTIVITIES OF DAILY LIVING (ADL)
ADLS_ACUITY_SCORE: 48
ADLS_ACUITY_SCORE: 52

## 2023-05-21 NOTE — PROGRESS NOTES
Patient was cleared for discharge 5/20/23. The planned ride did not present to transfer patient to facility. Discharge delayed until 5/21/23. Patient remains medically stable for discharge. Please see discharge summary from 5/20/23 for full details.    Reviewed vitals. Patient appears well for discharge to TCU.     Dionte Mueller MD

## 2023-05-21 NOTE — PROGRESS NOTES
Care Management Discharge Note     Discharge Date: 05/20/2023        Discharge Disposition: Skilled Nursing Facility, Transitional Care     [facility]CU  [address] [place] [address]  P: [phone]     Discharge Services: TCU     Discharge DME: None     Discharge Transportation:  Wheelchair Transport with a  window of 10:30-11:10     Private pay costs discussed: private room/amenity fees and transportation costs     Does the patient's insurance plan have a 3 day qualifying hospital stay waiver?  Yes   Will the waiver be used for post-acute placement? No     PAS Confirmation Code: [id number]  Patient/family educated on Medicare website which has current facility and service quality ratings: no     Education Provided on the Discharge Plan: Yes  Persons Notified of Discharge Plans: Patient's Daughter Tina, Care Team, SNF  Patient/Family in Agreement with the Plan: yes     Handoff Referral Completed: No     Additional Information:  Received discharge orders for patient to discharge to [facility] today. Ride arranged for 10:30 today.  faxed discharge orders and spoke with supervisor at [facility]. They are in agreement with patient admitting today.     REJI Pacheco, [facility]  Inpatient Care Coordination  Social Work  [phone]  [facility]

## 2023-05-21 NOTE — PLAN OF CARE
1900 - 0730    Alert to self only. [place]. VSS on RA. On 1L NC overnight. Denies pain, N/V. Easy chew, 2g Na diet. Takes meds one at a time with water. Switched to PO abx. Up A1 w/GB+W. No IV access - MD aware. Incontinent at times of B/B, no BM overnight. SW following. Discharge to [facility] TCU today between 1030 - 1110.

## 2023-05-21 NOTE — PROVIDER NOTIFICATION
MD Notification    Notified Person: MD    Notified Person Name: On Call MD    Notification Date/Time: 5/20 1941    Notification Interaction: Web Page    Purpose of Notification: Pt was due to d/c today to [facility],  Health transport rescheduled her ride and it is now too late for her to be admitted to TCU [facility]. Family was notified. Pt will remain on unit. No IV access.    Orders Received:    Comments:

## 2023-05-21 NOTE — PROVIDER NOTIFICATION
MD Notification    Notified Person: MD    Notified Person Name: Shankar    Notification Date/Time: 05/21/23 12:05 AM    Notification Interaction: AmCom    Purpose of Notification: Pt was supposed to d/c yesterday, ride cancelled, IV was already removed. Pt has IV abx due at 0100. Do you want another IV put in? Thanks.    Orders Received: IV abx switched to PO    Comments:

## 2023-05-21 NOTE — PLAN OF CARE
Discharge Note    Patient discharged to TCU via EMS/BLS accompanied by no family/friend .  IV: Discontinued  Prescriptions N/A.   Belongings reviewed and sent with patient.   Home medications returned to patient: NA  Equipment sent with: N/A.   patient verbalizes understanding of discharge instructions. AVS given to  EMS transport .

## 2023-05-22 NOTE — PROGRESS NOTES
Speech Language Therapy Discharge Summary    Reason for therapy discharge:    Discharged to transitional care facility.    Progress towards therapy goal(s). See goals on Care Plan in [facility] electronic health record for goal details.  Goals partially met.  Barriers to achieving goals:   discharge from facility.    Therapy recommendation(s):    Continued therapy is recommended.  Rationale/Recommendations:  Continue easy to chew solids, thin liquids when upright, slow rate/breathing breaks if needed, alternate between solids/liquids for oral clearance PRN. Could benefit from follow up SLP services to ensure safety of intake and use of compensatory swallow strategies.

## 2023-05-24 ENCOUNTER — LAB REQUISITION (OUTPATIENT)
Dept: LAB | Facility: CLINIC | Age: 88
End: 2023-05-24
Payer: MEDICARE

## 2023-05-24 DIAGNOSIS — A41.9 SEPSIS, UNSPECIFIED ORGANISM (H): ICD-10-CM

## 2023-05-25 LAB
ERYTHROCYTE [DISTWIDTH] IN BLOOD BY AUTOMATED COUNT: 22.4 % (ref 10–15)
HCT VFR BLD AUTO: 25.7 % (ref 35–47)
HGB BLD-MCNC: 7.3 G/DL (ref 11.7–15.7)
MCH RBC QN AUTO: 22.6 PG (ref 26.5–33)
MCHC RBC AUTO-ENTMCNC: 28.4 G/DL (ref 31.5–36.5)
MCV RBC AUTO: 80 FL (ref 78–100)
PLATELET # BLD AUTO: 501 10E3/UL (ref 150–450)
RBC # BLD AUTO: 3.23 10E6/UL (ref 3.8–5.2)
WBC # BLD AUTO: 11.6 10E3/UL (ref 4–11)

## 2023-05-25 PROCEDURE — 85027 COMPLETE CBC AUTOMATED: CPT | Performed by: FAMILY MEDICINE

## 2023-05-25 PROCEDURE — 36415 COLL VENOUS BLD VENIPUNCTURE: CPT | Performed by: FAMILY MEDICINE

## 2023-05-25 PROCEDURE — P9603 ONE-WAY ALLOW PRORATED MILES: HCPCS | Performed by: FAMILY MEDICINE

## 2023-05-26 ENCOUNTER — LAB REQUISITION (OUTPATIENT)
Dept: LAB | Facility: CLINIC | Age: 88
End: 2023-05-26
Payer: MEDICARE

## 2023-05-26 DIAGNOSIS — J18.9 PNEUMONIA, UNSPECIFIED ORGANISM: ICD-10-CM

## 2023-05-31 LAB
ANION GAP SERPL CALCULATED.3IONS-SCNC: 13 MMOL/L (ref 7–15)
BUN SERPL-MCNC: 19.1 MG/DL (ref 8–23)
CALCIUM SERPL-MCNC: 8.9 MG/DL (ref 8.2–9.6)
CHLORIDE SERPL-SCNC: 105 MMOL/L (ref 98–107)
CREAT SERPL-MCNC: 0.9 MG/DL (ref 0.51–0.95)
DEPRECATED HCO3 PLAS-SCNC: 23 MMOL/L (ref 22–29)
ERYTHROCYTE [DISTWIDTH] IN BLOOD BY AUTOMATED COUNT: 22.6 % (ref 10–15)
GFR SERPL CREATININE-BSD FRML MDRD: 59 ML/MIN/1.73M2
GLUCOSE SERPL-MCNC: 71 MG/DL (ref 70–99)
HCT VFR BLD AUTO: 30 % (ref 35–47)
HGB BLD-MCNC: 8.4 G/DL (ref 11.7–15.7)
MCH RBC QN AUTO: 22.2 PG (ref 26.5–33)
MCHC RBC AUTO-ENTMCNC: 28 G/DL (ref 31.5–36.5)
MCV RBC AUTO: 79 FL (ref 78–100)
NT-PROBNP SERPL-MCNC: 485 PG/ML (ref 0–1800)
PLATELET # BLD AUTO: 527 10E3/UL (ref 150–450)
POTASSIUM SERPL-SCNC: 4.2 MMOL/L (ref 3.4–5.3)
RBC # BLD AUTO: 3.79 10E6/UL (ref 3.8–5.2)
SODIUM SERPL-SCNC: 141 MMOL/L (ref 136–145)
WBC # BLD AUTO: 10.9 10E3/UL (ref 4–11)

## 2023-05-31 PROCEDURE — P9603 ONE-WAY ALLOW PRORATED MILES: HCPCS | Performed by: FAMILY MEDICINE

## 2023-05-31 PROCEDURE — 36415 COLL VENOUS BLD VENIPUNCTURE: CPT | Performed by: FAMILY MEDICINE

## 2023-05-31 PROCEDURE — 80048 BASIC METABOLIC PNL TOTAL CA: CPT | Performed by: FAMILY MEDICINE

## 2023-05-31 PROCEDURE — 85027 COMPLETE CBC AUTOMATED: CPT | Performed by: FAMILY MEDICINE

## 2023-05-31 PROCEDURE — 83880 ASSAY OF NATRIURETIC PEPTIDE: CPT | Performed by: FAMILY MEDICINE

## 2023-06-06 ENCOUNTER — TELEPHONE (OUTPATIENT)
Dept: FAMILY MEDICINE | Facility: CLINIC | Age: 88
End: 2023-06-06
Payer: MEDICARE

## 2023-06-06 NOTE — TELEPHONE ENCOUNTER
TO PCP      Home Care is calling regarding an established patient with [facility].       Requesting orders from: Colin Lake  Provider is following patient: Yes  Is this a 60-day recertification request?  No    Orders Requested  Delayed start of care for 06/10/23.      Information was gathered and will be sent to provider for review.  RN will contact Home Care with information after provider review.  Confirmed ok to leave a detailed message with call back.  Contact information confirmed and updated as needed.    Demi Hirsch RN

## 2023-06-06 NOTE — TELEPHONE ENCOUNTER
Returned call to [facility]     Gave verbal on requested home care orders    Zuleyka SIDHU, Triage RN  [facility]

## 2023-06-10 ENCOUNTER — MEDICAL CORRESPONDENCE (OUTPATIENT)
Dept: HEALTH INFORMATION MANAGEMENT | Facility: CLINIC | Age: 88
End: 2023-06-10

## 2023-06-11 ENCOUNTER — HEALTH MAINTENANCE LETTER (OUTPATIENT)
Age: 88
End: 2023-06-11

## 2023-06-12 ENCOUNTER — TELEPHONE (OUTPATIENT)
Dept: FAMILY MEDICINE | Facility: CLINIC | Age: 88
End: 2023-06-12
Payer: MEDICARE

## 2023-06-12 NOTE — TELEPHONE ENCOUNTER
To PCP    Home Care is calling regarding an established patient with [facility].       Requesting orders from: Colin Lake  Provider is following patient: Yes  Is this a 60-day recertification request?  No    Orders Requested    Physical Therapy  Request for initial certification (first set of orders)   Frequency: 1x/wk for 7 wks    Occupational Therapy  Request for initial evaluation and treatment (one time)      Speech Therapy  Request for initial evaluation and treatment (one time)    Please approve above PT, OT, ST requests.    Confirmed ok to leave a detailed message with call back.  Contact information confirmed and updated as needed.    Demi Hirsch, RN

## 2023-06-12 NOTE — TELEPHONE ENCOUNTER
Order/Referral Request    Who is requesting: Bailey from OT    Orders being requested:   See her 1x a week for 4 weeks  Hold 2 week and then 1x week for 1 week work on cogentive testing for [employer] safety theraputic activities   Caregiver education    Reason service is needed/diagnosis: pnuemino    When are orders needed by: as soon possible    Has this been discussed with Provider: No    Does patient have a preference on a Group/Provider/Facility? [facility]    Does patient have an appointment scheduled?: No    Where to send orders: verbal orders    Could we send this information to you in [place] or would you prefer to receive a phone call?:   Patient would prefer a phone call   Okay to leave a detailed message?: Yes at Other phone number:  [phone]

## 2023-06-13 NOTE — TELEPHONE ENCOUNTER
Called TAYLOR [place] regarding PCP message below. Left message on confidential VM. Left clinic number to call back with questions/concerns.    Demi Hirsch RN on 6/13/2023 at 11:49 AM

## 2023-06-13 NOTE — TELEPHONE ENCOUNTER
Called and left detailed message regarding PCP message below on Bailey, OT confidential VM per ok to leave message.     Demi Hirsch RN on 6/13/2023 at 10:51 AM       No

## 2023-06-14 ENCOUNTER — TELEPHONE (OUTPATIENT)
Dept: FAMILY MEDICINE | Facility: CLINIC | Age: 88
End: 2023-06-14
Payer: MEDICARE

## 2023-06-14 NOTE — TELEPHONE ENCOUNTER
Home Care is calling regarding an established patient with [facility].     Requesting orders from: Colin [facility]  Provider is following patient: Yes  Is this a 60-day recertification request?  No    Orders Requested    Speech Therapy  Request for initial certification (first set of orders)     Frequency: 1x/wk for 2 wks     Verbal orders given.  Home Care will send orders for provider to sign.  Confirmed ok to leave a detailed message with call back.  Contact information confirmed and updated as needed.    Russel Menjivar RN

## 2023-07-06 ENCOUNTER — TELEPHONE (OUTPATIENT)
Dept: FAMILY MEDICINE | Facility: CLINIC | Age: 88
End: 2023-07-06
Payer: MEDICARE

## 2023-07-06 NOTE — TELEPHONE ENCOUNTER
Home Care is calling regarding an established patient with [facility].     Requesting orders from: Colin Lake  Provider is following patient: Yes  Is this a 60-day recertification request?  No    Orders Requested    Occupational Therapy  Request for continuation of care with increase in frequency  Frequency: two more visits one time a week for 2 weeks      For: ALS's and cognitive testing.     Verbal orders given.  Home Care will send orders for provider to sign.  Confirmed ok to leave a detailed message with call back.  Contact information confirmed and updated as needed.    Russel Menjivar RN

## 2023-07-07 ENCOUNTER — DOCUMENTATION ONLY (OUTPATIENT)
Dept: HOME HEALTH SERVICES | Facility: CLINIC | Age: 88
End: 2023-07-07
Payer: MEDICARE

## 2023-07-07 DIAGNOSIS — J18.9 PNEUMONIA OF LEFT LOWER LOBE DUE TO INFECTIOUS ORGANISM: Primary | ICD-10-CM

## 2023-07-10 DIAGNOSIS — Z53.9 DIAGNOSIS NOT YET DEFINED: Primary | ICD-10-CM

## 2023-07-10 PROCEDURE — G0180 MD CERTIFICATION HHA PATIENT: HCPCS | Performed by: INTERNAL MEDICINE

## 2023-08-06 DIAGNOSIS — F03.90 DEMENTIA (H): ICD-10-CM

## 2023-08-07 RX ORDER — MEMANTINE HYDROCHLORIDE 5 MG/1
5 TABLET ORAL 2 TIMES DAILY
Qty: 60 TABLET | Refills: 10 | Status: SHIPPED | OUTPATIENT
Start: 2023-08-07

## 2023-08-09 DIAGNOSIS — R35.1 NOCTURIA: ICD-10-CM

## 2023-08-09 DIAGNOSIS — I10 ESSENTIAL HYPERTENSION: ICD-10-CM

## 2023-08-09 RX ORDER — OXYBUTYNIN CHLORIDE 10 MG/1
TABLET, EXTENDED RELEASE ORAL
Qty: 180 TABLET | Refills: 2 | Status: ON HOLD | OUTPATIENT
Start: 2023-08-09 | End: 2023-10-13

## 2023-08-09 RX ORDER — AMLODIPINE BESYLATE 2.5 MG/1
2.5 TABLET ORAL DAILY
Qty: 30 TABLET | Refills: 10 | Status: ON HOLD | OUTPATIENT
Start: 2023-08-09 | End: 2023-11-29

## 2023-09-05 ENCOUNTER — APPOINTMENT (OUTPATIENT)
Dept: CT IMAGING | Facility: CLINIC | Age: 88
End: 2023-09-05
Attending: EMERGENCY MEDICINE
Payer: MEDICARE

## 2023-09-05 ENCOUNTER — APPOINTMENT (OUTPATIENT)
Dept: GENERAL RADIOLOGY | Facility: CLINIC | Age: 88
End: 2023-09-05
Attending: EMERGENCY MEDICINE
Payer: MEDICARE

## 2023-09-05 ENCOUNTER — HOSPITAL ENCOUNTER (EMERGENCY)
Facility: CLINIC | Age: 88
Discharge: HOME | End: 2023-09-05
Attending: EMERGENCY MEDICINE | Admitting: EMERGENCY MEDICINE
Payer: MEDICARE

## 2023-09-05 VITALS
OXYGEN SATURATION: 96 % | TEMPERATURE: 97.1 F | DIASTOLIC BLOOD PRESSURE: 46 MMHG | HEART RATE: 72 BPM | RESPIRATION RATE: 25 BRPM | SYSTOLIC BLOOD PRESSURE: 123 MMHG
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
## 2023-09-05 DIAGNOSIS — Z79.01 ANTICOAGULATED: ICD-10-CM
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
## 2023-09-05 DIAGNOSIS — E04.1 THYROID NODULE: ICD-10-CM

## 2023-09-05 DIAGNOSIS — S01.01XA SCALP LACERATION, INITIAL ENCOUNTER: ICD-10-CM

## 2023-09-05 LAB
ATRIAL RATE - MUSE: 70 BPM
DIASTOLIC BLOOD PRESSURE - MUSE: NORMAL MMHG
INTERPRETATION ECG - MUSE: NORMAL
P AXIS - MUSE: 82 DEGREES
PR INTERVAL - MUSE: 182 MS
QRS DURATION - MUSE: 148 MS
QT - MUSE: 444 MS
QTC - MUSE: 479 MS
R AXIS - MUSE: -37 DEGREES
SYSTOLIC BLOOD PRESSURE - MUSE: NORMAL MMHG
T AXIS - MUSE: 120 DEGREES
VENTRICULAR RATE- MUSE: 70 BPM

## 2023-09-05 PROCEDURE — 73610 X-RAY EXAM OF ANKLE: CPT | Mod: RT

## 2023-09-05 PROCEDURE — 73502 X-RAY EXAM HIP UNI 2-3 VIEWS: CPT

## 2023-09-05 PROCEDURE — 72125 CT NECK SPINE W/O DYE: CPT | Mod: MA

## 2023-09-05 PROCEDURE — 70450 CT HEAD/BRAIN W/O DYE: CPT | Mod: MA

## 2023-09-05 PROCEDURE — 12001 RPR S/N/AX/GEN/TRNK 2.5CM/<: CPT

## 2023-09-05 PROCEDURE — 93005 ELECTROCARDIOGRAM TRACING: CPT

## 2023-09-05 PROCEDURE — 99285 EMERGENCY DEPT VISIT HI MDM: CPT | Mod: 25

## 2023-09-05 ASSESSMENT — ACTIVITIES OF DAILY LIVING (ADL)
ADLS_ACUITY_SCORE: 35
ADLS_ACUITY_SCORE: 35

## 2023-09-05 NOTE — DISCHARGE INSTRUCTIONS
Keep wound clean and dry. May shower in 24 hours and pat dry. Do not keep submerged.  Follow up with primary care for staples to be removed in 7 days.  You also need to ask your primary care provider about your thyroid nodule as you may need an ultrasound.  Return with signs of infection such as redness, discharge, or fever >101F, or if you have severe headache, chest pain, lightheadedness, shortness of breath, or any other new or concerning symptoms.

## 2023-09-05 NOTE — ED NOTES
Patient was able to get up out of bed, walked with a walker and noting her knee was sore but able to walk down the white, all without assistance.

## 2023-09-05 NOTE — ED NOTES
Writer RN spoke with SAY Page at [facility], and went over discharge instructions. Staff report they are ready for patient to come back whenever.

## 2023-09-05 NOTE — ED PROVIDER NOTES
"    History     Chief Complaint:  Fall       The history is provided by the patient.      Margy Babin is a 89 year old female history of vascular dementia, hypertension, and atrial fibrillation on Xarelto who presents via EMS from her assisted living facility after a fall.  She tells me she was walking from the bathroom back to her bed without the use of her walker when she fell.  She does not believe she tripped but she adamantly denies syncope.  She denies chest pain, shortness of breath, lightheadedness, or palpitations.  She did hit her head but did not have posttraumatic loss of consciousness.  She has headache, particularly at the site of injury where there is a small laceration, but no vision changes, nausea, or vomiting.  She thinks she might have \"a little\" neck pain.  She denies any other symptoms or concerns but remarks she thinks she hurt her right hip and ankle.  She wears a call light that she pressed for help and did not have to remain on the ground for any long period of time.  She believes she was able to weight bear with paramedics. She requests breakfast.    Independent Historian:    As above    Review of External Notes:  Paperwork from her facility [facility] including POLST with DNR status.  Discharge summary 5/20/2023 after admission for pneumonia.    Medications:    acetaminophen   amLODIPine (NORVASC) 2.5 MG tablet  atorvastatin (LIPITOR) 20 MG tablet  bisacodyl (DULCOLAX) 5 MG EC tablet  Cholecalciferol (VITAMIN D HIGH POTENCY) 25 MCG (1000 UT) CAPS  docusate sodium (COLACE) 100 MG capsule  guaiFENesin (ROBITUSSIN) 20 mg/mL SOLN solution  ipratropium - albuterol 0.5 mg/2.5 mg/3 mL (DUONEB) 0.5-2.5 (3) MG/3ML neb solution  memantine (NAMENDA) 5 MG tablet  metoprolol succinate ER (TOPROL XL) 100 MG 24 hr tablet  ondansetron (ZOFRAN) 4 MG tablet  oxyBUTYnin ER (DITROPAN XL) 10 MG 24 hr tablet  PARoxetine (PAXIL) 20 MG tablet  QUEtiapine (SEROQUEL) 25 MG tablet  ramelteon " (ROZEREM) 8 MG tablet  RESTASIS 0.05 % ophthalmic emulsion  rivaroxaban ANTICOAGULANT (XARELTO) 15 MG TABS tablet    Past Medical History:    Past Medical History:   Diagnosis Date    Anxiety     Blepharitis of both eyes     Cerebrovascular accident (CVA) due to embolism (H) 04/2022    Chronic atrial fibrillation (H)     Depression, major, recurrent, in complete remission (H)     Dry eye syndrome     Dyspnea on exertion 10/07/2021    Essential hypertension 03/05/2020    Familial tremor 03/06/2013    Insomnia, unspecified type 11/03/2014    Mixed hyperlipidemia 11/04/2020    Nausea without vomiting 06/16/2015    Tremor, hereditary, benign      Past Surgical History:    Past Surgical History:   Procedure Laterality Date    HYSTERECTOMY TOTAL ABDOMINAL, BILATERAL SALPINGO-OOPHORECTOMY, COMBINED      NECK SURGERY  2009    cervical fusion    ROTATOR CUFF REPAIR RT/LT Right     ZZC TOTAL HIP ARTHROPLASTY Right 1997      Physical Exam   Patient Vitals for the past 24 hrs:   BP Temp Temp src Pulse Resp SpO2   09/05/23 0800 130/48 -- -- 64 -- 95 %   09/05/23 0747 -- 97.1  F (36.2  C) Temporal -- -- --   09/05/23 0745 -- -- -- -- -- 96 %   09/05/23 0734 -- -- -- -- -- 97 %   09/05/23 0731 -- -- -- -- -- 95 %   09/05/23 0730 125/64 -- -- 67 -- --   09/05/23 0721 -- -- -- -- 25 --   09/05/23 0718 137/51 -- -- 69 -- 93 %      Vitals:    09/05/23 1015 09/05/23 1030 09/05/23 1100   BP:  126/46 123/46   Pulse:  80 72   Resp:      Temp:      SpO2: 98%  96%       Physical Exam  General: Well-developed and well-nourished. Well appearing elderly  woman. Cooperative.  Head:  Right posterior parietal small hematoma with overlying laceration.  There is about 1 cm of abrasion and then a deeper 1 cm laceration.  Eyes:  Conjunctivae, lids, and sclerae are normal.  ENT:    Normal nose. Moist mucous membranes.  Neck:  Supple. Normal range of motion.  No definite reproducible midline tenderness.  CV:  Regular rate and rhythm. Normal  heart sounds with no murmurs, rubs, or gallops detected.  Resp:  No respiratory distress. Clear to auscultation bilaterally without decreased breath sounds, wheezing, rales, or rhonchi.  GI:  Soft. Non-distended. Non-tender.    MS:  Normal ROM. No bilateral lower extremity edema.  Able to lift both lower extremities off of the cart independently.  Able to flex knees independently.  Tenderness over the right lateral malleolus without significant edema but a small amount of erythema.  Ill-defined tenderness over the right lateral hip.  Skin:  Warm. Non-diaphoretic. No pallor.  Laceration as above.  Neuro:  Awake and alert. Normal strength.  Psych: Normal mood and affect. Normal speech.  Vitals reviewed.    Emergency Department Course   EKG  Indication: fall  Time: 0849  Rate 70 bpm. NJ interval 182. QRS duration 148. QT/QTc 444/479.   Normal sinus rhythm  Left axis deviation  Left bundle branch blocks  Abnormal ECG  No acute ST changes.  No change as compared to prior, dated 5/15/23.    Imaging:  Cervical spine CT w/o contrast   Final Result   IMPRESSION:    1. No evidence of acute fracture or posttraumatic subluxation.   2. Postoperative/degenerative changes as detailed.   3. Incidental right thyroid nodule measuring over 1.5 cm for which   thyroid ultrasound workup would be typically recommended if not   previously performed.      SUSANA SAMANO MD            SYSTEM ID:  [id number]      CT Head w/o Contrast   Final Result   IMPRESSION:    1. No acute intracranial abnormality.   2. Chronic changes as detailed.      SUSANA SAMANO MD            SYSTEM ID:  [id number]      XR Pelvis w Hip Right 1 View   Final Result   IMPRESSION: Postoperative change right total hip arthroplasty.   Components appear well seated. Left hip negative for fracture with   mild degenerative change. Pelvis negative for fracture.      DOMENIC WOOD MD            SYSTEM ID:  [id number]      Ankle XR, G/E 3 views, right   Final Result   IMPRESSION:  Achilles calcaneal spurring. Degenerative change at the   posterior facet of the subtalar joint. No evidence for acute fracture   or disruption of the ankle mortise.      DOMENIC WOOD MD            SYSTEM ID:  [id number]        Procedures     Laceration Repair      Procedure: Laceration Repair    Indication: Laceration    Consent: Verbal    Location: Right Scalp     Length: 1 cm    Preparation: Irrigation with Sterile Saline.    Treatment/Exploration: Wound explored, no foreign bodies found     Closure: The wound was closed with  2 staples.    Patient Status: The patient tolerated the procedure well: Yes. There were no complications.    Emergency Department Course & Assessments:  Assessments:  0930 I updated the patient.  She is comfortable with plan for discharge.  She passed trial of ambulation with a walker.    Independent Interpretation (X-rays, CTs, rhythm strip):  I independently interpreted the head CT and see no intracranial hemorrhage.  I independently interpreted the ankle and pelvis XR and see no fracture or malalignment.    Consultations/Discussion of Management or Tests:  Not applicable    Social Determinants of Health affecting care:  Supportive daughter  Lives in a care facility     Disposition:  Discharged    Impression & Plan    Medical Decision Making:  Flor is a 89 year old woman with vascular dementia and atrial fibrillation on Xarelto who lost her balance and fell when she was not using her walker.  She did hit her head (but did not have loss of consciousness) and sustained a small laceration.  She denies other complaints but when prompted responds positively to neck pain, right hip pain, and ankle pain on the right.  She is well-appearing.  She has a right posterior parietal hematoma with an overlying laceration which was cleaned and repaired, as above.  She has a mild tenderness over the right lateral malleolus.    EKG is reassuring without acute ST changes or arrhythmias.  Laboratory  studies were not undertaken as she clearly describes this as a mechanical fall when not using her walker.     Fortunately, CT of the cervical spine is without fracture or subluxation.  Incidental right thyroid nodule was identified and the patient and her daughter were informed of this with recommendation to follow-up with her primary care provider.  CT of the head reveals no intracranial hemorrhage or skull fracture.  X-rays of the right hip/pelvis and ankle are without fracture or malalignment.    The patient was able to ambulate in the emergency department with her walker without difficulty.  She and her daughter are comfortable with plan for discharge with close primary care follow-up for the aforementioned thyroid nodule and staple removal.  She will return as needed.  All questions answered.    Diagnosis:    ICD-10-CM    1. Fall at home, initial encounter  W19.XXXA     Y92.009       2. Scalp laceration, initial encounter  S01.01XA       3. Thyroid nodule  E04.1       4. Anticoagulated  Z79.01            Discharge Medications:  Discharge Medication List as of 9/5/2023  9:47 AM         9/5/2023   Chika Yanez MD Dixson, Kylie S, MD  09/08/23 9310

## 2023-09-05 NOTE — ED NOTES
Emergency Department Technician Wound Irrigation Note:    9/5/2023    7:48 AM      Wound location:  right scalp laceration    Irrigation Fluid: Normal Saline and sharon briseno     Estimated Irrigation Volume (60 mL fluid per cm): 350 ccs     Margy Avina

## 2023-09-05 NOTE — ED NOTES
Writer RN attempted to call and give report to patients RN at [facility] for the third time. RN unable to get a hold of RN. Writer DEAL spoke with Karrie a staff member who answered the phone number. Karrie informed that the patient will be coming back, and writer RN left a call back number if the RN has questions about discharge instructions.

## 2023-09-05 NOTE — ED NOTES
Pt daughter iTna called with an update, and writer RN went over discharge instructions.   Daughter verbalized understanding that the staples need to come out in 7 days, and to follow up with primary care regarding thyroid nodule.

## 2023-09-05 NOTE — ED TRIAGE NOTES
Pt presents to ed via ems to be evaluated for a fall.   Ems report as follows: pt lives in an assisted living facility, and sustained a mechanical fall today. Pt reports that she was walking to the bathroom, and lost her balance causing her to fall. Pt did hit her head, but denies any LOC.Pt is on a blood thinner. Pt has laceration on the back of her head, and pt c/o right ankle pain, and right hip pain. Pt denies any dizziness, chest pain, or sob.      Triage Assessment       Row Name 09/05/23 0719       Triage Assessment (Adult)    Airway WDL WDL       Respiratory WDL    Respiratory WDL WDL       Peripheral/Neurovascular WDL    Peripheral Neurovascular WDL WDL

## 2023-09-15 ENCOUNTER — OFFICE VISIT (OUTPATIENT)
Dept: URGENT CARE | Facility: URGENT CARE | Age: 88
End: 2023-09-15
Payer: MEDICARE

## 2023-09-15 VITALS
OXYGEN SATURATION: 95 % | HEART RATE: 71 BPM | DIASTOLIC BLOOD PRESSURE: 64 MMHG | TEMPERATURE: 98.1 F | SYSTOLIC BLOOD PRESSURE: 140 MMHG

## 2023-09-15 DIAGNOSIS — Z48.02 ENCOUNTER FOR STAPLE REMOVAL: Primary | ICD-10-CM

## 2023-09-15 PROCEDURE — 99207 PR NO CHARGE LOS: CPT | Performed by: PHYSICIAN ASSISTANT

## 2023-09-15 NOTE — PATIENT INSTRUCTIONS
2 staples removed from scalp. Keep wound dry and clean. Watch for signs of infection such as redness or purulent drainage. Conservative measures discussed including over-the-counter Tylenol as needed for pain. See your primary care provider in 7 days if there is no improvement or sooner as needed. Seek emergency care if you develop fever, streaking, severe pain or rapidly spreading redness.

## 2023-09-15 NOTE — PROGRESS NOTES
URGENT CARE VISIT:    SUBJECTIVE:   Margy Babin is a 89 year old female who presents to the clinic for staple removal on the top of head placed 10 day(s) ago. Mechanism of injury: fell.    Associated symptoms: denies fevers, chills, and purulent drainage    OBJECTIVE:  VITALS: BP (!) 140/64 (BP Location: Left arm, Patient Position: Sitting, Cuff Size: Adult Regular)   Pulse 71   Temp 98.1  F (36.7  C) (Tympanic)   SpO2 95%   General: WDWN in NAD  Size of laceration: 2 centimeters  Location of laceration: top of scalp  Characteristics of the laceration: 2 staples, clean and intact.     ASSESSMENT:    ICD-10-CM    1. Encounter for staple removal  Z48.02           PLAN:  PROCEDURE NOTE:  2 staples were removed with removal kit. Patient tolerated the procedure well.    Patient Instructions   2 staples removed from scalp. Keep wound dry and clean. Watch for signs of infection such as redness or purulent drainage. Conservative measures discussed including over-the-counter Tylenol as needed for pain. See your primary care provider in 7 days if there is no improvement or sooner as needed. Seek emergency care if you develop fever, streaking, severe pain or rapidly spreading redness.       Patient verbalized understanding and is agreeable to plan. The patient was discharged ambulatory and in stable condition.    Daisha Acevedo PA-C ....................  9/15/2023   11:40 AM

## 2023-10-10 ENCOUNTER — APPOINTMENT (OUTPATIENT)
Dept: CT IMAGING | Facility: CLINIC | Age: 88
DRG: 811 | End: 2023-10-10
Attending: EMERGENCY MEDICINE
Payer: MEDICARE

## 2023-10-10 ENCOUNTER — HOSPITAL ENCOUNTER (EMERGENCY)
Facility: CLINIC | Age: 88
Discharge: NURSING, REHAB OR LONG-TERM CARE | DRG: 811 | End: 2023-10-10
Attending: EMERGENCY MEDICINE | Admitting: EMERGENCY MEDICINE
Payer: MEDICARE

## 2023-10-10 ENCOUNTER — APPOINTMENT (OUTPATIENT)
Dept: GENERAL RADIOLOGY | Facility: CLINIC | Age: 88
DRG: 811 | End: 2023-10-10
Attending: EMERGENCY MEDICINE
Payer: MEDICARE

## 2023-10-10 VITALS
SYSTOLIC BLOOD PRESSURE: 144 MMHG | RESPIRATION RATE: 20 BRPM | TEMPERATURE: 97.7 F | OXYGEN SATURATION: 95 % | HEART RATE: 70 BPM | DIASTOLIC BLOOD PRESSURE: 51 MMHG
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
## 2023-10-10 DIAGNOSIS — S51.011A LACERATION OF RIGHT ELBOW, INITIAL ENCOUNTER: ICD-10-CM

## 2023-10-10 DIAGNOSIS — S42.401A CLOSED FRACTURE DISLOCATION OF RIGHT ELBOW, INITIAL ENCOUNTER: ICD-10-CM

## 2023-10-10 LAB
ALBUMIN SERPL BCG-MCNC: 3.3 G/DL (ref 3.5–5.2)
ALP SERPL-CCNC: 104 U/L (ref 35–104)
ALT SERPL W P-5'-P-CCNC: 7 U/L (ref 0–50)
ANION GAP SERPL CALCULATED.3IONS-SCNC: 12 MMOL/L (ref 7–15)
AST SERPL W P-5'-P-CCNC: 10 U/L (ref 0–45)
ATRIAL RATE - MUSE: 60 BPM
BILIRUB SERPL-MCNC: 0.3 MG/DL
BUN SERPL-MCNC: 22 MG/DL (ref 8–23)
CALCIUM SERPL-MCNC: 8.8 MG/DL (ref 8.2–9.6)
CHLORIDE SERPL-SCNC: 102 MMOL/L (ref 98–107)
CREAT SERPL-MCNC: 1.02 MG/DL (ref 0.51–0.95)
DEPRECATED HCO3 PLAS-SCNC: 24 MMOL/L (ref 22–29)
DIASTOLIC BLOOD PRESSURE - MUSE: NORMAL MMHG
EGFRCR SERPLBLD CKD-EPI 2021: 51 ML/MIN/1.73M2
GLUCOSE SERPL-MCNC: 98 MG/DL (ref 70–99)
HOLD SPECIMEN: NORMAL
HOLD SPECIMEN: NORMAL
INTERPRETATION ECG - MUSE: NORMAL
P AXIS - MUSE: 56 DEGREES
POTASSIUM SERPL-SCNC: 3.9 MMOL/L (ref 3.4–5.3)
PR INTERVAL - MUSE: 172 MS
PROT SERPL-MCNC: 6.3 G/DL (ref 6.4–8.3)
QRS DURATION - MUSE: 150 MS
QT - MUSE: 466 MS
QTC - MUSE: 466 MS
R AXIS - MUSE: -14 DEGREES
SODIUM SERPL-SCNC: 138 MMOL/L (ref 135–145)
SYSTOLIC BLOOD PRESSURE - MUSE: NORMAL MMHG
T AXIS - MUSE: 129 DEGREES
VENTRICULAR RATE- MUSE: 60 BPM

## 2023-10-10 PROCEDURE — 73080 X-RAY EXAM OF ELBOW: CPT | Mod: RT

## 2023-10-10 PROCEDURE — 80053 COMPREHEN METABOLIC PANEL: CPT | Performed by: EMERGENCY MEDICINE

## 2023-10-10 PROCEDURE — 12002 RPR S/N/AX/GEN/TRNK2.6-7.5CM: CPT

## 2023-10-10 PROCEDURE — 250N000013 HC RX MED GY IP 250 OP 250 PS 637: Performed by: EMERGENCY MEDICINE

## 2023-10-10 PROCEDURE — 0HQDXZZ REPAIR RIGHT LOWER ARM SKIN, EXTERNAL APPROACH: ICD-10-PCS | Performed by: EMERGENCY MEDICINE

## 2023-10-10 PROCEDURE — 36415 COLL VENOUS BLD VENIPUNCTURE: CPT | Performed by: EMERGENCY MEDICINE

## 2023-10-10 PROCEDURE — 99285 EMERGENCY DEPT VISIT HI MDM: CPT | Mod: 25

## 2023-10-10 PROCEDURE — 73562 X-RAY EXAM OF KNEE 3: CPT | Mod: RT

## 2023-10-10 PROCEDURE — 70450 CT HEAD/BRAIN W/O DYE: CPT | Mod: MA

## 2023-10-10 PROCEDURE — 93005 ELECTROCARDIOGRAM TRACING: CPT

## 2023-10-10 RX ORDER — ACETAMINOPHEN 500 MG
500 TABLET ORAL ONCE
Status: COMPLETED | OUTPATIENT
Start: 2023-10-10 | End: 2023-10-10

## 2023-10-10 RX ADMIN — ACETAMINOPHEN 500 MG: 500 TABLET, FILM COATED ORAL at 06:34

## 2023-10-10 ASSESSMENT — ACTIVITIES OF DAILY LIVING (ADL)
ADLS_ACUITY_SCORE: 35
ADLS_ACUITY_SCORE: 35

## 2023-10-10 NOTE — ED PROVIDER NOTES
History     Chief Complaint:  Fall       HPI   Margy Babin is a 89 year old female presents emergency department after mechanical fall at her assisted living facility.  Complains of knee pain right elbow pain and a laceration on her right elbow.      Independent Historian:   None - Patient Only    Review of External Notes:          Medications:    acetaminophen (ACETAMINOPHEN EXTRA STRENGTH) 500 MG tablet  acetaminophen (TYLENOL) 500 MG tablet  amLODIPine (NORVASC) 2.5 MG tablet  atorvastatin (LIPITOR) 20 MG tablet  bisacodyl (DULCOLAX) 5 MG EC tablet  Cholecalciferol (VITAMIN D HIGH POTENCY) 25 MCG (1000 UT) CAPS  docusate sodium (COLACE) 100 MG capsule  guaiFENesin (ROBITUSSIN) 20 mg/mL SOLN solution  ipratropium - albuterol 0.5 mg/2.5 mg/3 mL (DUONEB) 0.5-2.5 (3) MG/3ML neb solution  memantine (NAMENDA) 5 MG tablet  metoprolol succinate ER (TOPROL XL) 100 MG 24 hr tablet  ondansetron (ZOFRAN) 4 MG tablet  order for DME  oxyBUTYnin ER (DITROPAN XL) 10 MG 24 hr tablet  PARoxetine (PAXIL) 20 MG tablet  QUEtiapine (SEROQUEL) 25 MG tablet  ramelteon (ROZEREM) 8 MG tablet  RESTASIS 0.05 % ophthalmic emulsion  rivaroxaban ANTICOAGULANT (XARELTO) 15 MG TABS tablet        Past Medical History:    Past Medical History:   Diagnosis Date    Anxiety     Blepharitis of both eyes     Cerebrovascular accident (CVA) due to embolism (H) 04/2022    Chronic atrial fibrillation (H)     Depression, major, recurrent, in complete remission (H)     Dry eye syndrome     Dyspnea on exertion 10/07/2021    Essential hypertension 03/05/2020    Familial tremor 03/06/2013    Insomnia, unspecified type 11/03/2014    Mixed hyperlipidemia 11/04/2020    Nausea without vomiting 06/16/2015    Tremor, hereditary, benign        Past Surgical History:    Past Surgical History:   Procedure Laterality Date    HYSTERECTOMY TOTAL ABDOMINAL, BILATERAL SALPINGO-OOPHORECTOMY, COMBINED      NECK SURGERY  2009    cervical fusion    ROTATOR CUFF  REPAIR RT/LT Right     ZZC TOTAL HIP ARTHROPLASTY Right 1997        Physical Exam   Patient Vitals for the past 24 hrs:   BP Temp Temp src Pulse Resp SpO2   10/10/23 0445 (!) 150/57 -- -- 62 -- 94 %   10/10/23 0300 (!) 146/55 -- -- 61 20 100 %   10/10/23 0229 (!) 144/55 97.7  F (36.5  C) Oral 77 18 98 %        Physical Exam  Gen: well appearing, in no acute distress  Oral : Mucous membranes moist,   Nose: No rhinorhea  Ears: External near normal, without drainage  Eyes: periorbital tissues and sclera normal   Neck: supple, no abnormal swelling  Lungs: Clear bilaterally, no tachypnea or distress, speaks full sentences  CV: Regular rate, regular rhythm  Abd: soft, nontender, nondistended, no rebound/guarding  Ext: no lower extremity edema, tender palpation about the right knee  Skin: 4 cm laceration over the right elbow into the subcutaneous tissue  Neuro: alert, no gross motor or sensory deficits,   Psych: pleasant mood, normal affect      Emergency Department Course   ECG  ECG taken at 0257, ECG read at 0302  Normal sinus rhythm  Left bundle branch block  Abnormal ECG   Rate 60 bpm. TX interval 172 ms. QRS duration 150 ms. QT/QTc 466/466 ms. P-R-T axes 56 -14 129.     Imaging:  Head CT w/o contrast   Final Result   IMPRESSION:   1.  No acute intracranial process.         Elbow XR, G/E 3 views, right   Final Result   IMPRESSION: Small osseous fragment adjacent to the medial humeral condyle, favored to represent a small avulsion fracture, though age indeterminate. This can be correlated with point tenderness. No other evidence of fracture. Small elbow effusion.      XR Knee Right 3 Views   Final Result   IMPRESSION: No acute fracture detected. No dislocation. 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 No significant joint effusion.         Report per radiology    Laboratory:  Labs Ordered and  Resulted from Time of ED Arrival to Time of ED Departure   COMPREHENSIVE METABOLIC PANEL - Abnormal       Result Value    Sodium 138      Potassium 3.9      Carbon Dioxide (CO2) 24      Anion Gap 12      Urea Nitrogen 22.0      Creatinine 1.02 (*)     GFR Estimate 51 (*)     Calcium 8.8      Chloride 102      Glucose 98      Alkaline Phosphatase 104      AST 10      ALT 7      Protein Total 6.3 (*)     Albumin 3.3 (*)     Bilirubin Total 0.3          Procedures     Laceration Repair      Procedure: Laceration Repair    Indication: Laceration    Consent: Verbal    Location: Right elbow    Length: 4 cm    Preparation: Irrigation with Sterile Saline.    Anesthesia/Sedation: Lidocaine - 1%      Treatment/Exploration: Wound explored, no foreign bodies found     Closure: The wound was closed with one layer. Skin/superficial layer was closed with 8 x 5-0 Polypropylene (prolene)  using Interrupted sutures.     Patient Status: The patient tolerated the procedure well: Yes. There were no complications.      Emergency Department Course & Assessments:             Interventions:  Medications - No data to display     Assessments:      Independent Interpretation (X-rays, CTs, rhythm strip):  None    Consultations/Discussion of Management or Tests:  None        Social Determinants of Health affecting care:   None    Disposition:  The patient was discharged to home.     Impression & Plan        Medical Decision Making:  Patient presents to the emergency department after a mechanical fall from standing.  She may have a small avulsion fracture of her right elbow, could be chronic she does have some tenderness in that area.  We will treat with a sling for now.  Laceration near the right elbow sutured.  Dressed.  Will recommend 10-day suture removal.  We will work on getting her transferred back to her assisted living facility this morning.      Diagnosis:    ICD-10-CM    1. Closed fracture dislocation of right elbow, initial encounter   S42.401A       2. Laceration of right elbow, initial encounter  S51.011A       3. Fall from standing, initial encounter  W19.XXXA            Discharge Medications:  New Prescriptions    No medications on file          Blas Fishman MD  10/10/2023   Blas Fishman,*        Blas Fishman MD  10/10/23 0509

## 2023-10-10 NOTE — ED NOTES
Zena escalante staff at [facility] was informed regarding patient plan to discharge now and going back to facility.

## 2023-10-10 NOTE — ED NOTES
Bed: ED28  Expected date: 10/10/23  Expected time: 2:20 AM  Means of arrival: Ambulance  Comments:  Sheeba 2 89F fall eval

## 2023-10-10 NOTE — DISCHARGE INSTRUCTIONS
There is a small elbow fracture evident on the x-ray of your right elbow.  You do not need to be in a splint or cast for this fracture you can treat this in a elbow sling for comfort.  As the pain improves you can move your elbow and use it as needed.

## 2023-10-10 NOTE — ED TRIAGE NOTES
Arrived via ems c/o Fall. Patient came from [facility]. Per ems patient is getting up to the bathroom and fell. Patient hit her head, Right elbow and Right knee. Patient had Right upper arm laceration. Denies LOC and not on blood thinners per ems. Patient is in pain right now 9/10.      Triage Assessment       Row Name 10/10/23 0235       Triage Assessment (Adult)    Airway WDL WDL       Respiratory WDL    Respiratory WDL X;rhythm/pattern       Skin Circulation/Temperature WDL    Skin Circulation/Temperature WDL X  Right arm tear       Cardiac WDL    Cardiac WDL WDL       Peripheral/Neurovascular WDL    Peripheral Neurovascular WDL WDL       Cognitive/Neuro/Behavioral WDL    Cognitive/Neuro/Behavioral WDL X

## 2023-10-11 ENCOUNTER — APPOINTMENT (OUTPATIENT)
Dept: GENERAL RADIOLOGY | Facility: CLINIC | Age: 88
DRG: 811 | End: 2023-10-11
Attending: PHYSICIAN ASSISTANT
Payer: MEDICARE

## 2023-10-11 ENCOUNTER — APPOINTMENT (OUTPATIENT)
Dept: CT IMAGING | Facility: CLINIC | Age: 88
DRG: 811 | End: 2023-10-11
Attending: EMERGENCY MEDICINE
Payer: MEDICARE

## 2023-10-11 ENCOUNTER — HOSPITAL ENCOUNTER (INPATIENT)
Facility: CLINIC | Age: 88
LOS: 2 days | Discharge: HOME HEALTH | DRG: 811 | End: 2023-10-13
Attending: EMERGENCY MEDICINE | Admitting: STUDENT IN AN ORGANIZED HEALTH CARE EDUCATION/TRAINING PROGRAM
Payer: MEDICARE

## 2023-10-11 DIAGNOSIS — D64.9 ACUTE ON CHRONIC ANEMIA: ICD-10-CM

## 2023-10-11 DIAGNOSIS — R35.1 NOCTURIA: ICD-10-CM

## 2023-10-11 DIAGNOSIS — G93.40 ACUTE ENCEPHALOPATHY: ICD-10-CM

## 2023-10-11 LAB
ABO/RH(D): NORMAL
ACANTHOCYTES BLD QL SMEAR: ABNORMAL
ALBUMIN SERPL BCG-MCNC: 3.5 G/DL (ref 3.5–5.2)
ALP SERPL-CCNC: 111 U/L (ref 35–104)
ALT SERPL W P-5'-P-CCNC: 8 U/L (ref 0–50)
ANION GAP SERPL CALCULATED.3IONS-SCNC: 15 MMOL/L (ref 7–15)
ANTIBODY SCREEN: NEGATIVE
AST SERPL W P-5'-P-CCNC: 15 U/L (ref 0–45)
ATRIAL RATE - MUSE: 62 BPM
AUER BODIES BLD QL SMEAR: ABNORMAL
BASE EXCESS BLDV CALC-SCNC: -0.9 MMOL/L (ref -7.7–1.9)
BASO STIPL BLD QL SMEAR: ABNORMAL
BASO+EOS+MONOS # BLD AUTO: ABNORMAL 10*3/UL
BASO+EOS+MONOS NFR BLD AUTO: ABNORMAL %
BASOPHILS # BLD AUTO: 0.2 10E3/UL (ref 0–0.2)
BASOPHILS NFR BLD AUTO: 1 %
BILIRUB SERPL-MCNC: 0.3 MG/DL
BITE CELLS BLD QL SMEAR: ABNORMAL
BLD PROD TYP BPU: NORMAL
BLD PROD TYP BPU: NORMAL
BLISTER CELLS BLD QL SMEAR: ABNORMAL
BLOOD COMPONENT TYPE: NORMAL
BLOOD COMPONENT TYPE: NORMAL
BUN SERPL-MCNC: 22.2 MG/DL (ref 8–23)
BURR CELLS BLD QL SMEAR: ABNORMAL
CALCIUM SERPL-MCNC: 8.7 MG/DL (ref 8.2–9.6)
CHLORIDE SERPL-SCNC: 103 MMOL/L (ref 98–107)
CODING SYSTEM: NORMAL
CODING SYSTEM: NORMAL
CREAT SERPL-MCNC: 1.18 MG/DL (ref 0.51–0.95)
CROSSMATCH: NORMAL
CROSSMATCH: NORMAL
DACRYOCYTES BLD QL SMEAR: ABNORMAL
DEPRECATED HCO3 PLAS-SCNC: 21 MMOL/L (ref 22–29)
DIASTOLIC BLOOD PRESSURE - MUSE: NORMAL MMHG
EGFRCR SERPLBLD CKD-EPI 2021: 43 ML/MIN/1.73M2
ELLIPTOCYTES BLD QL SMEAR: ABNORMAL
EOSINOPHIL # BLD AUTO: 0.9 10E3/UL (ref 0–0.7)
EOSINOPHIL NFR BLD AUTO: 8 %
ERYTHROCYTE [DISTWIDTH] IN BLOOD BY AUTOMATED COUNT: 20.2 % (ref 10–15)
FERRITIN SERPL-MCNC: 14 NG/ML (ref 11–328)
FLUAV RNA SPEC QL NAA+PROBE: NEGATIVE
FLUBV RNA RESP QL NAA+PROBE: NEGATIVE
FRAGMENTS BLD QL SMEAR: ABNORMAL
GLUCOSE SERPL-MCNC: 213 MG/DL (ref 70–99)
HCO3 BLDV-SCNC: 25 MMOL/L (ref 21–28)
HCT VFR BLD AUTO: 25.3 % (ref 35–47)
HGB BLD-MCNC: 6.4 G/DL (ref 11.7–15.7)
HGB BLD-MCNC: 7 G/DL (ref 11.7–15.7)
HGB C CRYSTALS: ABNORMAL
HOLD SPECIMEN: NORMAL
HOWELL-JOLLY BOD BLD QL SMEAR: ABNORMAL
IMM GRANULOCYTES # BLD: 0.1 10E3/UL
IMM GRANULOCYTES NFR BLD: 0 %
INTERPRETATION ECG - MUSE: NORMAL
IRON BINDING CAPACITY (ROCHE): 455 UG/DL (ref 240–430)
IRON SATN MFR SERPL: 2 % (ref 15–46)
IRON SERPL-MCNC: 10 UG/DL (ref 37–145)
ISSUE DATE AND TIME: NORMAL
ISSUE DATE AND TIME: NORMAL
LYMPHOCYTES # BLD AUTO: 1.1 10E3/UL (ref 0.8–5.3)
LYMPHOCYTES NFR BLD AUTO: 10 %
MCH RBC QN AUTO: 15.4 PG (ref 26.5–33)
MCHC RBC AUTO-ENTMCNC: 25.3 G/DL (ref 31.5–36.5)
MCV RBC AUTO: 61 FL (ref 78–100)
MONOCYTES # BLD AUTO: 0.8 10E3/UL (ref 0–1.3)
MONOCYTES NFR BLD AUTO: 7 %
NEUTROPHILS # BLD AUTO: 8.3 10E3/UL (ref 1.6–8.3)
NEUTROPHILS NFR BLD AUTO: 74 %
NEUTS HYPERSEG BLD QL SMEAR: ABNORMAL
NRBC # BLD AUTO: 0 10E3/UL
NRBC BLD AUTO-RTO: 0 /100
O2/TOTAL GAS SETTING VFR VENT: 0 %
P AXIS - MUSE: 91 DEGREES
PCO2 BLDV: 46 MM HG (ref 40–50)
PH BLDV: 7.34 [PH] (ref 7.32–7.43)
PLAT MORPH BLD: ABNORMAL
PLATELET # BLD AUTO: 509 10E3/UL (ref 150–450)
PO2 BLDV: 27 MM HG (ref 25–47)
POLYCHROMASIA BLD QL SMEAR: ABNORMAL
POTASSIUM SERPL-SCNC: 4 MMOL/L (ref 3.4–5.3)
PR INTERVAL - MUSE: 174 MS
PROT SERPL-MCNC: 6.8 G/DL (ref 6.4–8.3)
QRS DURATION - MUSE: 156 MS
QT - MUSE: 476 MS
QTC - MUSE: 483 MS
R AXIS - MUSE: -33 DEGREES
RBC # BLD AUTO: 4.15 10E6/UL (ref 3.8–5.2)
RBC AGGLUT BLD QL: ABNORMAL
RBC MORPH BLD: ABNORMAL
ROULEAUX BLD QL SMEAR: ABNORMAL
RSV RNA SPEC NAA+PROBE: NEGATIVE
SARS-COV-2 RNA RESP QL NAA+PROBE: NEGATIVE
SICKLE CELLS BLD QL SMEAR: ABNORMAL
SMUDGE CELLS BLD QL SMEAR: ABNORMAL
SODIUM SERPL-SCNC: 139 MMOL/L (ref 135–145)
SPECIMEN EXPIRATION DATE: NORMAL
SPHEROCYTES BLD QL SMEAR: SLIGHT
STOMATOCYTES BLD QL SMEAR: ABNORMAL
SYSTOLIC BLOOD PRESSURE - MUSE: NORMAL MMHG
T AXIS - MUSE: 121 DEGREES
TARGETS BLD QL SMEAR: SLIGHT
TOXIC GRANULES BLD QL SMEAR: ABNORMAL
TROPONIN T SERPL HS-MCNC: 14 NG/L
TROPONIN T SERPL HS-MCNC: 28 NG/L
UNIT ABO/RH: NORMAL
UNIT ABO/RH: NORMAL
UNIT NUMBER: NORMAL
UNIT NUMBER: NORMAL
UNIT STATUS: NORMAL
UNIT STATUS: NORMAL
UNIT TYPE ISBT: 5100
UNIT TYPE ISBT: 5100
VARIANT LYMPHS BLD QL SMEAR: ABNORMAL
VENTRICULAR RATE- MUSE: 62 BPM
WBC # BLD AUTO: 11.3 10E3/UL (ref 4–11)

## 2023-10-11 PROCEDURE — 99285 EMERGENCY DEPT VISIT HI MDM: CPT | Mod: 25

## 2023-10-11 PROCEDURE — 86901 BLOOD TYPING SEROLOGIC RH(D): CPT | Performed by: EMERGENCY MEDICINE

## 2023-10-11 PROCEDURE — 36415 COLL VENOUS BLD VENIPUNCTURE: CPT | Performed by: EMERGENCY MEDICINE

## 2023-10-11 PROCEDURE — 84484 ASSAY OF TROPONIN QUANT: CPT | Performed by: EMERGENCY MEDICINE

## 2023-10-11 PROCEDURE — 82803 BLOOD GASES ANY COMBINATION: CPT | Performed by: EMERGENCY MEDICINE

## 2023-10-11 PROCEDURE — 87637 SARSCOV2&INF A&B&RSV AMP PRB: CPT | Performed by: PHYSICIAN ASSISTANT

## 2023-10-11 PROCEDURE — 71046 X-RAY EXAM CHEST 2 VIEWS: CPT

## 2023-10-11 PROCEDURE — 86923 COMPATIBILITY TEST ELECTRIC: CPT | Performed by: EMERGENCY MEDICINE

## 2023-10-11 PROCEDURE — 70450 CT HEAD/BRAIN W/O DYE: CPT | Mod: MG

## 2023-10-11 PROCEDURE — 250N000013 HC RX MED GY IP 250 OP 250 PS 637: Performed by: PHYSICIAN ASSISTANT

## 2023-10-11 PROCEDURE — 36430 TRANSFUSION BLD/BLD COMPNT: CPT

## 2023-10-11 PROCEDURE — 80053 COMPREHEN METABOLIC PANEL: CPT | Performed by: EMERGENCY MEDICINE

## 2023-10-11 PROCEDURE — 36415 COLL VENOUS BLD VENIPUNCTURE: CPT | Performed by: PHYSICIAN ASSISTANT

## 2023-10-11 PROCEDURE — 83550 IRON BINDING TEST: CPT | Performed by: STUDENT IN AN ORGANIZED HEALTH CARE EDUCATION/TRAINING PROGRAM

## 2023-10-11 PROCEDURE — 99222 1ST HOSP IP/OBS MODERATE 55: CPT | Mod: GC | Performed by: SURGERY

## 2023-10-11 PROCEDURE — 84484 ASSAY OF TROPONIN QUANT: CPT | Performed by: PHYSICIAN ASSISTANT

## 2023-10-11 PROCEDURE — 258N000003 HC RX IP 258 OP 636: Performed by: EMERGENCY MEDICINE

## 2023-10-11 PROCEDURE — 99223 1ST HOSP IP/OBS HIGH 75: CPT | Mod: AI | Performed by: PHYSICIAN ASSISTANT

## 2023-10-11 PROCEDURE — 120N000001 HC R&B MED SURG/OB

## 2023-10-11 PROCEDURE — 85018 HEMOGLOBIN: CPT | Performed by: PHYSICIAN ASSISTANT

## 2023-10-11 PROCEDURE — 86923 COMPATIBILITY TEST ELECTRIC: CPT | Performed by: PHYSICIAN ASSISTANT

## 2023-10-11 PROCEDURE — 94150 VITAL CAPACITY TEST: CPT

## 2023-10-11 PROCEDURE — 93005 ELECTROCARDIOGRAM TRACING: CPT

## 2023-10-11 PROCEDURE — P9016 RBC LEUKOCYTES REDUCED: HCPCS | Performed by: EMERGENCY MEDICINE

## 2023-10-11 PROCEDURE — 86850 RBC ANTIBODY SCREEN: CPT | Performed by: EMERGENCY MEDICINE

## 2023-10-11 PROCEDURE — 71110 X-RAY EXAM RIBS BIL 3 VIEWS: CPT | Mod: 52

## 2023-10-11 PROCEDURE — 999N000157 HC STATISTIC RCP TIME EA 10 MIN

## 2023-10-11 PROCEDURE — 85025 COMPLETE CBC W/AUTO DIFF WBC: CPT | Performed by: EMERGENCY MEDICINE

## 2023-10-11 PROCEDURE — 82728 ASSAY OF FERRITIN: CPT | Performed by: STUDENT IN AN ORGANIZED HEALTH CARE EDUCATION/TRAINING PROGRAM

## 2023-10-11 RX ORDER — LABETALOL HYDROCHLORIDE 5 MG/ML
10 INJECTION, SOLUTION INTRAVENOUS
Status: DISCONTINUED | OUTPATIENT
Start: 2023-10-11 | End: 2023-10-13 | Stop reason: HOSPADM

## 2023-10-11 RX ORDER — MEMANTINE HYDROCHLORIDE 5 MG/1
5 TABLET ORAL 2 TIMES DAILY
Status: DISCONTINUED | OUTPATIENT
Start: 2023-10-11 | End: 2023-10-13 | Stop reason: HOSPADM

## 2023-10-11 RX ORDER — AMOXICILLIN 250 MG
1 CAPSULE ORAL 2 TIMES DAILY
Status: DISCONTINUED | OUTPATIENT
Start: 2023-10-11 | End: 2023-10-13 | Stop reason: HOSPADM

## 2023-10-11 RX ORDER — METOPROLOL TARTRATE 1 MG/ML
2.5 INJECTION, SOLUTION INTRAVENOUS EVERY 5 MIN PRN
Status: DISCONTINUED | OUTPATIENT
Start: 2023-10-11 | End: 2023-10-13 | Stop reason: HOSPADM

## 2023-10-11 RX ORDER — QUETIAPINE FUMARATE 25 MG/1
25 TABLET, FILM COATED ORAL AT BEDTIME
Status: DISCONTINUED | OUTPATIENT
Start: 2023-10-11 | End: 2023-10-13 | Stop reason: HOSPADM

## 2023-10-11 RX ORDER — LIDOCAINE 4 G/G
1 PATCH TOPICAL
Status: DISCONTINUED | OUTPATIENT
Start: 2023-10-12 | End: 2023-10-13 | Stop reason: HOSPADM

## 2023-10-11 RX ORDER — OXYBUTYNIN CHLORIDE 10 MG/1
20 TABLET, EXTENDED RELEASE ORAL AT BEDTIME
Status: DISCONTINUED | OUTPATIENT
Start: 2023-10-11 | End: 2023-10-13

## 2023-10-11 RX ORDER — NALOXONE HYDROCHLORIDE 0.4 MG/ML
0.2 INJECTION, SOLUTION INTRAMUSCULAR; INTRAVENOUS; SUBCUTANEOUS
Status: DISCONTINUED | OUTPATIENT
Start: 2023-10-11 | End: 2023-10-13 | Stop reason: HOSPADM

## 2023-10-11 RX ORDER — ACETAMINOPHEN 500 MG
1000 TABLET ORAL EVERY 12 HOURS PRN
Status: DISCONTINUED | OUTPATIENT
Start: 2023-10-11 | End: 2023-10-11

## 2023-10-11 RX ORDER — IPRATROPIUM BROMIDE AND ALBUTEROL SULFATE 2.5; .5 MG/3ML; MG/3ML
1 SOLUTION RESPIRATORY (INHALATION) EVERY 4 HOURS PRN
Status: DISCONTINUED | OUTPATIENT
Start: 2023-10-11 | End: 2023-10-13 | Stop reason: HOSPADM

## 2023-10-11 RX ORDER — AMOXICILLIN 250 MG
2 CAPSULE ORAL 2 TIMES DAILY
Status: DISCONTINUED | OUTPATIENT
Start: 2023-10-11 | End: 2023-10-13 | Stop reason: HOSPADM

## 2023-10-11 RX ORDER — ACETAMINOPHEN 325 MG/1
975 TABLET ORAL EVERY 8 HOURS
Status: DISCONTINUED | OUTPATIENT
Start: 2023-10-11 | End: 2023-10-13 | Stop reason: HOSPADM

## 2023-10-11 RX ORDER — LIDOCAINE 4 G/G
1 PATCH TOPICAL
Status: DISCONTINUED | OUTPATIENT
Start: 2023-10-12 | End: 2023-10-11

## 2023-10-11 RX ORDER — ACETAMINOPHEN 650 MG/1
650 SUPPOSITORY RECTAL EVERY 6 HOURS PRN
Status: DISCONTINUED | OUTPATIENT
Start: 2023-10-11 | End: 2023-10-13 | Stop reason: HOSPADM

## 2023-10-11 RX ORDER — NALOXONE HYDROCHLORIDE 0.4 MG/ML
0.4 INJECTION, SOLUTION INTRAMUSCULAR; INTRAVENOUS; SUBCUTANEOUS
Status: DISCONTINUED | OUTPATIENT
Start: 2023-10-11 | End: 2023-10-13 | Stop reason: HOSPADM

## 2023-10-11 RX ORDER — ACETAMINOPHEN 500 MG
1000 TABLET ORAL DAILY
Status: DISCONTINUED | OUTPATIENT
Start: 2023-10-11 | End: 2023-10-11

## 2023-10-11 RX ORDER — BISACODYL 10 MG
10 SUPPOSITORY, RECTAL RECTAL DAILY PRN
Status: DISCONTINUED | OUTPATIENT
Start: 2023-10-11 | End: 2023-10-13 | Stop reason: HOSPADM

## 2023-10-11 RX ORDER — HYDRALAZINE HYDROCHLORIDE 20 MG/ML
10 INJECTION INTRAMUSCULAR; INTRAVENOUS EVERY 4 HOURS PRN
Status: DISCONTINUED | OUTPATIENT
Start: 2023-10-11 | End: 2023-10-13 | Stop reason: HOSPADM

## 2023-10-11 RX ORDER — METHOCARBAMOL 500 MG/1
500 TABLET, FILM COATED ORAL EVERY 6 HOURS PRN
Status: DISCONTINUED | OUTPATIENT
Start: 2023-10-11 | End: 2023-10-13 | Stop reason: HOSPADM

## 2023-10-11 RX ORDER — ACETAMINOPHEN 500 MG
1000 TABLET ORAL DAILY
COMMUNITY

## 2023-10-11 RX ORDER — ACETAMINOPHEN 325 MG/1
650 TABLET ORAL EVERY 6 HOURS PRN
Status: DISCONTINUED | OUTPATIENT
Start: 2023-10-11 | End: 2023-10-13 | Stop reason: HOSPADM

## 2023-10-11 RX ORDER — METOPROLOL SUCCINATE 50 MG/1
100 TABLET, EXTENDED RELEASE ORAL DAILY
Status: DISCONTINUED | OUTPATIENT
Start: 2023-10-11 | End: 2023-10-13 | Stop reason: HOSPADM

## 2023-10-11 RX ORDER — PROCHLORPERAZINE MALEATE 5 MG
5 TABLET ORAL EVERY 6 HOURS PRN
Status: DISCONTINUED | OUTPATIENT
Start: 2023-10-11 | End: 2023-10-13 | Stop reason: HOSPADM

## 2023-10-11 RX ORDER — AMLODIPINE BESYLATE 2.5 MG/1
2.5 TABLET ORAL DAILY
Status: DISCONTINUED | OUTPATIENT
Start: 2023-10-12 | End: 2023-10-13 | Stop reason: HOSPADM

## 2023-10-11 RX ORDER — ATORVASTATIN CALCIUM 20 MG/1
20 TABLET, FILM COATED ORAL EVERY EVENING
Status: DISCONTINUED | OUTPATIENT
Start: 2023-10-11 | End: 2023-10-13 | Stop reason: HOSPADM

## 2023-10-11 RX ORDER — PAROXETINE 20 MG/1
20 TABLET, FILM COATED ORAL AT BEDTIME
Status: DISCONTINUED | OUTPATIENT
Start: 2023-10-11 | End: 2023-10-13 | Stop reason: HOSPADM

## 2023-10-11 RX ORDER — PROCHLORPERAZINE 25 MG
12.5 SUPPOSITORY, RECTAL RECTAL EVERY 12 HOURS PRN
Status: DISCONTINUED | OUTPATIENT
Start: 2023-10-11 | End: 2023-10-13 | Stop reason: HOSPADM

## 2023-10-11 RX ADMIN — ACETAMINOPHEN 975 MG: 325 TABLET, FILM COATED ORAL at 16:25

## 2023-10-11 RX ADMIN — ATORVASTATIN CALCIUM 20 MG: 20 TABLET, FILM COATED ORAL at 21:39

## 2023-10-11 RX ADMIN — SENNOSIDES AND DOCUSATE SODIUM 1 TABLET: 50; 8.6 TABLET ORAL at 21:39

## 2023-10-11 RX ADMIN — MEMANTINE 5 MG: 5 TABLET ORAL at 21:39

## 2023-10-11 RX ADMIN — PAROXETINE HYDROCHLORIDE 20 MG: 20 TABLET, FILM COATED ORAL at 21:39

## 2023-10-11 RX ADMIN — QUETIAPINE FUMARATE 25 MG: 25 TABLET ORAL at 21:39

## 2023-10-11 RX ADMIN — METOPROLOL SUCCINATE 100 MG: 50 TABLET, EXTENDED RELEASE ORAL at 17:16

## 2023-10-11 RX ADMIN — SODIUM CHLORIDE 1000 ML: 9 INJECTION, SOLUTION INTRAVENOUS at 11:10

## 2023-10-11 RX ADMIN — OXYBUTYNIN CHLORIDE 20 MG: 10 TABLET, EXTENDED RELEASE ORAL at 21:39

## 2023-10-11 ASSESSMENT — ACTIVITIES OF DAILY LIVING (ADL)
ADLS_ACUITY_SCORE: 35
ADLS_ACUITY_SCORE: 35
ADLS_ACUITY_SCORE: 43
ADLS_ACUITY_SCORE: 35
ADLS_ACUITY_SCORE: 43
ADLS_ACUITY_SCORE: 41
ADLS_ACUITY_SCORE: 35
ADLS_ACUITY_SCORE: 43

## 2023-10-11 NOTE — ED TRIAGE NOTES
Pt had routine blood work, hgb 6.4     Triage Assessment (Adult)       Row Name 10/11/23 0907          Triage Assessment    Airway WDL WDL        Respiratory WDL    Respiratory WDL WDL        Cardiac WDL    Cardiac WDL WDL        Cognitive/Neuro/Behavioral WDL    Cognitive/Neuro/Behavioral WDL X

## 2023-10-11 NOTE — H&P
[facility]    History and Physical - Hospitalist Service       Date of Admission:  10/11/2023    Assessment & Plan   Margy Babin is a 89 year old female with PMH significant for HFrEF, Afib on Xarelto, chronic LBBB, HTN, CVA with dysphagia, essential tremor, possible memory impairment who was sent to the ED by her facility after finding of Hgb 6.4, after recent fall at her facility (evaluated in ED AM of 10/10), and increased generalized weakness, fatigue, and some worsened confusion.     Symptomatic acute on chronic microcytic anemia  Iron deficiency anemia  During May 2023 admission, Hgb 7.7 with MCV 71 and dropped as low as 6.6 requiring 1 unit PRBC as well as 1 dose Venofer. Chart review shows hgb trending down from 11g/dL since 9/2022.    *Seen in ED 10/10 AM after fall at facility, discharged back to facility where labs were done by provider and found to have Hgb 6.4 range, sent to the ED for evaluation. Reports more fatigue, weakness generally, daughter felt legs were weaker. Some more confusion from baseline. Denies SOB, chest pain, N/V, or palpitations. Endorses lightheadedness, dizziness at times but no syncopal episodes. Denies blood in bowel or bladder. No fevers or chills. No abd pain. Head CT neg for bleed.   *Iron studies consistent with iron deficiency - recheck 10/11 with Iron 10, Iron sat 2%, TIBC 455  * s/p 1U PRBC 10/11 in the ED  - Inpatient status  - No signs of bleeding  - HOLD PTA Xarelto for now, restart as soon as able  - [facility] consultation - with advanced age and risk/benefit, need to discuss further revaluation  - Orthostatics  - Repeat Hgb after transfusion and again in AM  - Venofer x2 starting 10/12  - Wean O2 as able  - Monitor closely    Recent Labs   Lab 10/11/23  0918   HGB 6.4*       Mild CRISSY  Cr 1.18, above baseline of 1.75-1 range. Given 1 unit PRBC and 1L IVF in the ED.  - Recheck in AM  - No acute need for further hydration  - Allow PO,  encourage intake  - Avoid nephrotoxins    Acute encephalopathy, mild   Fatigue, generalized weakness   Likely related to anemia, rule out other causes as below. In the ED, able to answer all questions appropriately, though sometimes slow to respond she knows she is in the hospital for blood and that she recently fell, able to complete meaningful ROS, but unable to explain much about the fall she sustained recently.  - Orthostatics  - Check resp panel - COVID, RSV, Influenza  - UA ordered  - PT/OT/SW/CC consultations    Right elbow small avulsion fracture  Laceration near the right elbow sutured 10/10  Seen on XR in ED after sustaining a fall at [facility] 10/10.  - Ortho consult to assist in recommendations and outpatient follow up  - Sutures to be removed in 10 days  - ACE wrapped, sling    Recurrent falls  At least 3 in the past year. 1 day prior to admission and sent back to facility. And 1 month prior in which required staples.   - PT/OT/SW/CC    Acute hypoxic respiratory failure  In the setting of profound anemia, recent falls, checked XR which as below shows rib fractures. Atelectasis, possible small left effusion.   - Wean O2 as able  - Frequent IS use Q 2 hours  - On DOAC PTA, doubt PE, no CP, or other signs to indicate this, no PTX or Pneumonia on CXR  - Monitor    Posterior rib pain s/p fall --> multiple left sided rib fractures with small effusion  Pt with TTP and mild ecchymosis to right posterior thoracic rib region.  - Lido patch  - PRN APAP, very low dose Tramadol only if needed  - Check CXR/Rib XR to rule out rib fracture     ADDENDUM 1600  XR +for multiple rib fractures to left posterior thorax - Mildly displaced lateral left sixth, seventh, eighth and probably ninth rib fracture deformities. Probable left-sided pleural effusion. No displaced right-sided rib fracture is identified.  -Rib fx protocol  -Trauma evaluation  -CXR in AM, ensure effusion not increasing or concern for hemothorax    Possible hx  "vascular dementia  Possible dementia  Noted in chart to have vascular dementia, but dtr reports unclear of this diagnosis.   -Delirium protocol  -Reorient frequently  -Continue PTA Seroquel and Memantine     Mild troponin elevation, likely demand ischemia in setting of profound anemia  Hypertension  Hyperlipidemia  Atrial fibrillation on chronic anticoagulation  HFrEF  * Last TTE 5/17 with EF 55% without WMA, grade 1 diastolic dysfunction  * serial trop 28-->14. Similar range to prior admission 5/2023  - continue PTA amlodipine, atorvastatin, metoprolol, hold parameters in place  - Holding PTA Xarelto as above for profound anemia, and rib fractures  - Telemetry  - PRN labetalol/hydralazine for SBP >180 and IV metoprolol for sustained HR >120     History of dysphagia secondary to stroke  Discharge summary in September 2022 notes oral pharyngeal dysphagia  - SLP previously recommended dysphagia easy to chew diet level 7 with thin liquids          Diet: Combination Diet Regular Diet Adult    DVT Prophylaxis: DOAC  Pichardo Catheter: Not present  Lines: None     Cardiac Monitoring: ACTIVE order. Indication: afib, anemia  Code Status: No CPR- Do NOT Intubate    Clinically Significant Risk Factors Present on Admission                 # Drug Induced Coagulation Defect: home medication list includes an anticoagulant medication      # Hypertension: Noted on problem list   # Dementia: noted on problem list    # Overweight: Estimated body mass index is 25.02 kg/m  as calculated from the following:    Height as of this encounter: 1.651 m (5' 5\").    Weight as of this encounter: 68.2 kg (150 lb 5.7 oz).              Disposition Plan      Expected Discharge Date: 10/13/2023                The patient's care was discussed with the Attending Physician, Dr. Mueller, Bedside Nurse, Patient, and Patient's Family.    Jud Gandara PA-C  Hospitalist Service  [facility]  Securely message with Miguel (more " info)  Text page via [facility] Paging/Directory     ______________________________________________________________________    Chief Complaint   Abnormal labs    History is obtained from the patient, electronic health record, emergency department physician, and patient's daughter    History of Present Illness   Margy Babin is a 89 year old female with PMH significant for HFrEF, Afib on Xarelto, chronic LBBB, HTN, CVA with dysphagia, essential tremor, possible memory impairment who was sent to the ED by her facility after finding of Hgb 6.4, after recent fall at her facility (evaluated in ED AM of 10/10), and increased generalized weakness, fatigue, and some worsened confusion. Seen in ED 10/10 AM after fall at facility, discharged back to facility where labs were done by provider and found to have Hgb 6.4 range, sent to the ED for evaluation again 10/11. For the past several days she reports more fatigue, weakness generally, daughter felt legs were weaker. Some more confusion from baseline. Denies SOB, chest pain, N/V, or palpitations. Endorses lightheadedness, dizziness at times but no syncopal episodes. Denies blood in bowel or bladder. No fevers or chills. No abd pain. Head CT neg for bleed.     In the ED, hemodynamically stable but required 2L NC. Labs confirmed Hgb 6.4. CMP significant for Cr 1.18 which is elevated from baseline. Added on iron levels indicated FLORI. Trop T 28 mildly elevated, repeat normal at 14. VBG normal. CBC WBC 11.3 which may be stress demargination however UA pending. Hgb 6.4, Platelets 509, c/w prior levels. MCV 61 c/w FLORI. T&S, consented for blood and transfused 1 unit PRBC. EKG with chronic LBBB, no acute changes noted. Head CT, knee XR done day prior when evaluated in the ED for a fall and all negative for acute pathology. right elbow XR Small osseous fragment adjacent to the medial humeral condyle, favored to represent a small avulsion fracture, though age indeterminate. This  can be correlated with point tenderness. No other evidence of fracture. Small elbow effusion. ACE wrapped and sling applied. Repeat Head CT 10/11 negative for acute pathology. When seeing the patient she had ecchymosis and tenderness to posterior thoracic ribs on the right and she was hypoxic requiring 2L NC, therefore I ordered CXR and Rib XR which I will follow up on.       Past Medical History    Past Medical History:   Diagnosis Date    Anxiety     Blepharitis of both eyes     Cerebrovascular accident (CVA) due to embolism (H) 2022    corpus striatum    Chronic atrial fibrillation (H)     Depression, major, recurrent, in complete remission (H)     Dry eye syndrome     Dyspnea on exertion 10/07/2021    Essential hypertension 2020    Familial tremor 2013    Insomnia, unspecified type 2014    No CSA on file Last  check - 2020 with no concerns.    Mixed hyperlipidemia 2020    Nausea without vomiting 2015     Problem list name updated by automated process. Provider to review    Tremor, hereditary, benign        Past Surgical History   Past Surgical History:   Procedure Laterality Date    HYSTERECTOMY TOTAL ABDOMINAL, BILATERAL SALPINGO-OOPHORECTOMY, COMBINED      NECK SURGERY      cervical fusion    ROTATOR CUFF REPAIR RT/LT Right     ZZC TOTAL HIP ARTHROPLASTY Right        Prior to Admission Medications   Prior to Admission Medications   Prescriptions Last Dose Informant Patient Reported? Taking?   Cholecalciferol (VITAMIN D HIGH POTENCY) 25 MCG (1000 UT) CAPS 10/10/2023 Nursing Home No Yes   Si CAP BY MOUTH DAILY   PARoxetine (PAXIL) 20 MG tablet 10/10/2023 shelter No Yes   Si TAB BY MOUTH AT BEDTIME Strength: 20 mg   QUEtiapine (SEROQUEL) 25 MG tablet 10/10/2023 shelter No Yes   Si TAB BY MOUTH AT BEDTIME (DX: INSOMNIA)   RESTASIS 0.05 % ophthalmic emulsion  Nursing Home No Yes   Sig: INSTILL 1 DROP INTO BOTH EYES DAILY (DX: DRY EYES) ++CLAUDETTE++    acetaminophen (TYLENOL) 500 MG tablet  Nursing Home Yes Yes   Sig: Take 1,000 mg by mouth every 12 hours as needed for mild pain   acetaminophen (TYLENOL) 500 MG tablet 10/10/2023 Nursing Home Yes Yes   Sig: Take 1,000 mg by mouth daily   amLODIPine (NORVASC) 2.5 MG tablet 10/10/2023 care home No Yes   Sig: Take 1 tablet (2.5 mg) by mouth daily   atorvastatin (LIPITOR) 20 MG tablet 10/10/2023 care home No Yes   Sig: Take 1 tablet (20 mg) by mouth daily   Patient taking differently: Take 20 mg by mouth every evening   bisacodyl (DULCOLAX) 5 MG EC tablet  Nursing Home No Yes   Si TAB BY MOUTH DAILY AS NEEDED FOR CONSTIPATION   docusate sodium (COLACE) 100 MG capsule  Nursing Home No Yes   Sig: TAKE 1 CAP BY MOUTH TWICE DAILY AS NEEDED FOR CONSTIPATION   guaiFENesin (ROBITUSSIN) 20 mg/mL SOLN solution  Nursing Home No Yes   Sig: Take 10 mLs by mouth every 4 hours as needed for cough   ipratropium - albuterol 0.5 mg/2.5 mg/3 mL (DUONEB) 0.5-2.5 (3) MG/3ML neb solution  Nursing Home No Yes   Sig: Take 1 vial (3 mLs) by nebulization every 4 hours as needed for shortness of breath   memantine (NAMENDA) 5 MG tablet 10/10/2023 care home No Yes   Si TAB BY MOUTH TWICE DAILY   metoprolol succinate ER (TOPROL XL) 100 MG 24 hr tablet 10/10/2023 care home No Yes   Si TAB BY MOUTH DAILY (DX: HYPERTENSION)   ondansetron (ZOFRAN) 4 MG tablet  Nursing Home No Yes   Si TAB BY MOUTH EVERY 8 HOURS AS NEEDED FOR NAUSEA   order for DME  Nursing Home No No   Sig: Equipment being ordered: Walker Wheels () and Walker ()  Treatment Diagnosis: Difficulty ambulating   oxyBUTYnin ER (DITROPAN XL) 10 MG 24 hr tablet 10/10/2023 care home No Yes   Si TABS (20MG) BY MOUTH DAILY (DX: NOCTURIA)   ramelteon (ROZEREM) 8 MG tablet  Nursing Home No Yes   Si TAB BY MOUTH EVERY NIGHT AS NEEDED FOR SLEEP   rivaroxaban ANTICOAGULANT (XARELTO) 15 MG TABS tablet 10/10/2023 care home No Yes   Sig: Take 1  tablet (15 mg) by mouth daily (with dinner)      Facility-Administered Medications: None        Review of Systems    The 10 point Review of Systems is negative other than noted in the HPI or here.     Allergies   No Known Allergies     Physical Exam   Vital Signs: Temp: 98.4  F (36.9  C) Temp src: Oral BP: (!) 140/56 Pulse: 69   Resp: 16 SpO2: 97 % O2 Device: Oxymask Oxygen Delivery: 4 LPM  Weight: 150 lbs 5.66 oz    Physical Exam    General: Awake, alert, very pleasant elderly woman who appears stated age. Looks comfortable sitting up in bed. No acute distress.  HEENT: Normocephalic, atraumatic. Extraocular movements intact. P  Respiratory: Clear to auscultation bilaterally, no rales, wheezing, or rhonchi. Breathing comfortably on 2L NC.   Cardiovascular: Regular rate and rhythm, +S1 and S2, no murmur auscultated. No peripheral edema.   Gastrointestinal: Soft, non-tender, non-distended. Bowel sounds present.  Skin: Warm, dry. Pale appearing. Ecchymosis mild to posterior thorax on the left.   Musculoskeletal: RUE ACE wrapped, edema, tenderness. TTP to posterior thorax ecchymosis.   Neurologic: AAO x2-3. Answers all questions appropriately.   Psychiatric: Appropriate mood and affect. No obvious anxiety or depression.      Medical Decision Making       75 MINUTES SPENT BY ME on the date of service doing chart review, history, exam, documentation & further activities per the note.      Data     I have personally reviewed the following data over the past 24 hrs:    11.3 (H)  \   6.4 (LL)   / 509 (H)     139 103 22.2 /  213 (H)   4.0 21 (L) 1.18 (H) \     ALT: 8 AST: 15 AP: 111 (H) TBILI: 0.3   ALB: 3.5 TOT PROTEIN: 6.8 LIPASE: N/A     Trop: 14 BNP: N/A       Imaging results reviewed over the past 24 hrs:   Recent Results (from the past 24 hour(s))   CT Head w/o Contrast    Narrative    EXAM: CT HEAD W/O CONTRAST  10/11/2023 9:52 AM     HISTORY: fall yesterday, negative CT head yesterday, now confused,  anticoagulated        COMPARISON: 10/10/2023, 9/5/2023    TECHNIQUE: Using multidetector thin collimation helical acquisition  technique, axial, coronal and sagittal CT images from the skull base  to the vertex were obtained without intravenous contrast.   (topogram) image(s) also obtained and reviewed. Dose reduction  techniques were used.    FINDINGS:  No acute intracranial hemorrhage, mass effect, or midline shift.  Stable presumed moderate to severe sequela of chronic small vessel  ischemic disease and old right basal ganglia lacunar infarct. No CT  findings of acute infarct or hydrocephalus. Preserved subarachnoid  spaces.    Atraumatic calvarium. No substantial paranasal sinus mucosal disease.  Clear mastoid air cells. Nonfocal orbits.       Impression    IMPRESSION: No acute intracranial hemorrhage, appreciable acute  infarct or hydrocephalus.    SAMSON YING DO         SYSTEM ID:  [id number]   XR Ribs Bilateral 2 Views    Narrative    RIBS BILATERAL 2 VIEWS  DATE/TIME: 10/11/2023 2:44 PM    INDICATION: Posterior right rib pain, recent fall.  COMPARISON: Chest radiographic exam 5/19/2023.      Impression    IMPRESSION: Mildly displaced lateral left sixth, seventh, eighth and  probably ninth rib fracture deformities. Probable left-sided pleural  effusion. No displaced right-sided rib fracture is identified.  Apparent cardiomegaly. No visible pneumothorax. Degenerative change  visualized spine.    CRISTOBAL GARCIA MD         SYSTEM ID:  [id number]   XR Chest 2 Views    Narrative    XR CHEST 2 VIEWS 10/11/2023 2:45 PM    HISTORY: fall, hypoxia, posterior rib pain    COMPARISON: 5/19/2023      Impression    IMPRESSION:Mild linear opacities in the lung bases, likely  atelectasis. No significant pleural effusion. No pneumothorax. Mild  cardiomegaly. Normal mediastinal silhouette. No displaced rib  fractures.    HEATHER SAMPSON MD         SYSTEM ID:  [id number]

## 2023-10-11 NOTE — PHARMACY-ADMISSION MEDICATION HISTORY
Pharmacist Admission Medication History    Admission medication history is complete. The information provided in this note is only as accurate as the sources available at the time of the update.    Information Source(s): Facility ([facility] medication list/MAR via in-person    Pertinent Information:     Changes made to PTA medication list:  Added: None  Deleted: None  Changed: None    Medication Affordability:       Allergies reviewed with patient and updates made in EHR: no    Medication History Completed By: Leonora Finley [facility] 10/11/2023 12:05 PM    PTA Med List   Medication Sig Last Dose    acetaminophen (TYLENOL) 500 MG tablet Take 1,000 mg by mouth daily 10/10/2023    acetaminophen (TYLENOL) 500 MG tablet Take 1,000 mg by mouth every 12 hours as needed for mild pain     amLODIPine (NORVASC) 2.5 MG tablet Take 1 tablet (2.5 mg) by mouth daily 10/10/2023    atorvastatin (LIPITOR) 20 MG tablet Take 1 tablet (20 mg) by mouth daily (Patient taking differently: Take 20 mg by mouth every evening) 10/10/2023    bisacodyl (DULCOLAX) 5 MG EC tablet 1 TAB BY MOUTH DAILY AS NEEDED FOR CONSTIPATION     Cholecalciferol (VITAMIN D HIGH POTENCY) 25 MCG (1000 UT) CAPS 1 CAP BY MOUTH DAILY 10/10/2023    docusate sodium (COLACE) 100 MG capsule TAKE 1 CAP BY MOUTH TWICE DAILY AS NEEDED FOR CONSTIPATION     guaiFENesin (ROBITUSSIN) 20 mg/mL SOLN solution Take 10 mLs by mouth every 4 hours as needed for cough     ipratropium - albuterol 0.5 mg/2.5 mg/3 mL (DUONEB) 0.5-2.5 (3) MG/3ML neb solution Take 1 vial (3 mLs) by nebulization every 4 hours as needed for shortness of breath     memantine (NAMENDA) 5 MG tablet 1 TAB BY MOUTH TWICE DAILY 10/10/2023    metoprolol succinate ER (TOPROL XL) 100 MG 24 hr tablet 1 TAB BY MOUTH DAILY (DX: HYPERTENSION) 10/10/2023    ondansetron (ZOFRAN) 4 MG tablet 1 TAB BY MOUTH EVERY 8 HOURS AS NEEDED FOR NAUSEA     oxyBUTYnin ER (DITROPAN XL) 10 MG 24 hr tablet 2 TABS (20MG) BY MOUTH DAILY (DX:  NOCTURIA) 10/10/2023    PARoxetine (PAXIL) 20 MG tablet 1 TAB BY MOUTH AT BEDTIME Strength: 20 mg 10/10/2023    QUEtiapine (SEROQUEL) 25 MG tablet 1 TAB BY MOUTH AT BEDTIME (DX: INSOMNIA) 10/10/2023    ramelteon (ROZEREM) 8 MG tablet 1 TAB BY MOUTH EVERY NIGHT AS NEEDED FOR SLEEP     RESTASIS 0.05 % ophthalmic emulsion INSTILL 1 DROP INTO BOTH EYES DAILY (DX: DRY EYES) ++CLAUDETTE++     rivaroxaban ANTICOAGULANT (XARELTO) 15 MG TABS tablet Take 1 tablet (15 mg) by mouth daily (with dinner) 10/10/2023     Leonora BlancoD

## 2023-10-11 NOTE — ED NOTES
[facility]  ED Nurse Handoff Report    ED Chief complaint: Abnormal Labs      ED Diagnosis:   Final diagnoses:   None       Code Status: To be addressed by admitting provider    Allergies: No Known Allergies    Patient Story:   Pt comes in by ambulance from senior living due to a low hemoglobin of 6.4 which was drawn at the site. Hemoglobin in ED was 6.4. Patient disoriented to time and needs reinforcement for the need for hospitalization and blood transfusion. Pt was just in ED for a fall and a fracture of the right elbow.    Focused Assessment:    Neuro: Alert, oriented x 2, disoriented to time and situation  Respiratory: denies any shortness of breath, respirations unlabored and even. Pt on 3L nasal cannula for O2 sats in the 80s on RA. Sats maintaining low-mid 90s with a nasal cannula.  Cardiology:  Denies CP, Hr regular sinus   Gastrointestinal: soft, non tender, non distended   Genitourinary/Renal:    Musculoskeletal: moves all extremities. Fracture right elbow. Arm wrapped in ACE  Skin: Intact skin   Lines: 18 G RAC    Labs Ordered and Resulted from Time of ED Arrival to Time of ED Departure   COMPREHENSIVE METABOLIC PANEL - Abnormal       Result Value    Sodium 139      Potassium 4.0      Carbon Dioxide (CO2) 21 (*)     Anion Gap 15      Urea Nitrogen 22.2      Creatinine 1.18 (*)     GFR Estimate 43 (*)     Calcium 8.7      Chloride 103      Glucose 213 (*)     Alkaline Phosphatase 111 (*)     AST 15      ALT 8      Protein Total 6.8      Albumin 3.5      Bilirubin Total 0.3     TROPONIN T, HIGH SENSITIVITY - Abnormal    Troponin T, High Sensitivity 28 (*)    CBC WITH PLATELETS AND DIFFERENTIAL - Abnormal    WBC Count 11.3 (*)     RBC Count 4.15      Hemoglobin 6.4 (*)     Hematocrit 25.3 (*)     MCV 61 (*)     MCH 15.4 (*)     MCHC 25.3 (*)     RDW 20.2 (*)     Platelet Count 509 (*)     % Neutrophils 74      % Lymphocytes 10      % Monocytes 7      Mids % (Monos, Eos, Basos)        %  Eosinophils 8      % Basophils 1      % Immature Granulocytes 0      NRBCs per 100 WBC 0      Absolute Neutrophils 8.3      Absolute Lymphocytes 1.1      Absolute Monocytes 0.8      Mids Abs (Monos, Eos, Basos)        Absolute Eosinophils 0.9 (*)     Absolute Basophils 0.2      Absolute Immature Granulocytes 0.1      Absolute NRBCs 0.0     RBC AND PLATELET MORPHOLOGY - Abnormal    Platelet Assessment        Value: Automated Count Confirmed. Platelet morphology is normal.    Acanthocytes        Chrystal Rods        Basophilic Stippling        Bite Cells        Blister Cells        Shamika Cells        Elliptocytes        Hgb C Crystals        Leary-Jolly Bodies        Hypersegmented Neutrophils        Polychromasia        RBC agglutination        RBC Fragments        Reactive Lymphocytes        Rouleaux        Sickle Cells        Smudge Cells        Spherocytes Slight (*)     Stomatocytes        Target Cells Slight (*)     Teardrop Cells        Toxic Neutrophils        RBC Morphology Confirmed RBC Indices     BLOOD GAS VENOUS    pH Venous 7.34      pCO2 Venous 46      pO2 Venous 27      Bicarbonate Venous 25      Base Excess/Deficit -0.9      FIO2 0     ROUTINE UA WITH MICROSCOPIC REFLEX TO CULTURE   TYPE AND SCREEN, ADULT    ABO/RH(D) O POS      Antibody Screen Negative      SPECIMEN EXPIRATION DATE [id number]     PREPARE RED BLOOD CELLS (UNIT)    Blood Component Type Red Blood Cells      Product Code [id number]      Unit Status Transfused      Unit Number [id number]      CROSSMATCH Compatible      CODING SYSTEM [id number]      ISSUE DATE AND TIME [id number]      UNIT ABO/RH O+      UNIT TYPE ISBT 5100     PREPARE RED BLOOD CELLS (UNIT)   TRANSFUSE RED BLOOD CELLS (UNIT)   ABO/RH TYPE AND SCREEN        CT Head w/o Contrast   Final Result   IMPRESSION: No acute intracranial hemorrhage, appreciable acute   infarct or hydrocephalus.      SAMSON YING DO            SYSTEM ID:  [id number]            Treatments and/or  interventions provided:      Medications   sodium chloride 0.9% BOLUS 1,000 mL (0 mLs Intravenous Stopped 10/11/23 1134)        Patient's response to treatments and/or interventions:   Resting comfortably    To be done/followed up on inpatient unit:    See any in-patient orders    Does this patient have any cognitive concerns?: Disoriented to time and Disoriented to situation    Activity level - Baseline/Home:    Walker and a wheelchair    Activity Level - Current:     Unknown    Patient's Preferred language: English     Needed?: No    Isolation: None  Infection: Not Applicable  Patient tested for COVID 19 prior to admission: NO    Bariatric?: No    Vital Signs:   Vitals:    10/11/23 1133 10/11/23 1136 10/11/23 1200 10/11/23 1202   BP:   (!) 144/71 136/57   Pulse: 72 69 72 69   Resp: 26 28 17 26   Temp:    97.4  F (36.3  C)   TempSrc:       SpO2: (!) 88% 93% 93%    Weight:           Cardiac Rhythm: normal sinus rhythm    Was the PSS-3 completed:   Yes  What interventions are required if any?     None          Family Comments: None present  OBS brochure/video discussed/provided to patient/family: No              Name of person given brochure if not patient:              Relationship to patient:    For the majority of the shift this patient's behavior was Green.   Behavioral interventions performed were none    ED NURSE PHONE NUMBER: *[phone]      \

## 2023-10-11 NOTE — CONSULTS
General Surgery Consultation    Margy Babin MRN#: [id number]   Age: 89 year old YOB: 1929     The surgical service was consulted by Dionte Mueller MD to evaluate and/or treat this patient for trauma.    Date of Admission:          10/11/2023       Assessment:   89 year old female with PMH of HFrEF, Afib on rivaroxaban, chronic LBBB, CVA, who presented on 10/11/2023 after a fall from bed. She was found to be anemic and is being worked up for multiple complaints. On imaging she was found to have a right sided elbow avulsion fracture and small laceration which was repaired in the ED on 10/10. Orthopedic consult pending. She was also found to have mildly displaced left 6th, 7th, 8th, and 9th rib fractures with probable left pleural effusion. At bedside, patient denies chest wall pain including with deep inspiration. On chart review the patient's O2 requirement has been increasing this afternoon, however with replacement of the sat probe she maintained 97% sats on RA. She was instructed on incentive spirometer use. No further symptoms or signs of traumatic injury were identified on this exam.          Plan:   -- Rib fracture protocol, including aggressive pulmonary hygiene and multimodal pain control as needed  -- Incentive spirometry   -- Orthopedic consultation   ___________________________________________________________________         Chief Complaint:     Chief Complaint   Patient presents with    Abnormal Labs          History of Present Illness:   89 year old female who presented on 10/10 after a fall out of bed. She did not hit her head or lose consciousness. She reported right elbow pain, but denies other symptoms. She denies left chest wall pain or shortness of breath. She denies headache, blurry vision, neck pain, chest pain, abdominal pain, nausea, vomiting, dysuria, extremity pain other than right elbow, numbness, tingling, weakness.          Past Medical History:     Past Medical  History:   Diagnosis Date    Anxiety     Blepharitis of both eyes     Cerebrovascular accident (CVA) due to embolism (H) 04/2022    corpus striatum    Chronic atrial fibrillation (H)     Depression, major, recurrent, in complete remission (H)     Dry eye syndrome     Dyspnea on exertion 10/07/2021    Essential hypertension 03/05/2020    Familial tremor 03/06/2013    Insomnia, unspecified type 11/03/2014    No CSA on file Last  check - 02/28/2020 with no concerns.    Mixed hyperlipidemia 11/04/2020    Nausea without vomiting 06/16/2015     Problem list name updated by automated process. Provider to review    Tremor, hereditary, benign           Past Surgical History:     Past Surgical History:   Procedure Laterality Date    HYSTERECTOMY TOTAL ABDOMINAL, BILATERAL SALPINGO-OOPHORECTOMY, COMBINED      NECK SURGERY  2009    cervical fusion    ROTATOR CUFF REPAIR RT/LT Right     ZZC TOTAL HIP ARTHROPLASTY Right 1997            Medications:     Prior to Admission medications    Medication Sig Start Date End Date Taking? Authorizing Provider   acetaminophen (TYLENOL) 500 MG tablet Take 1,000 mg by mouth daily   Yes Unknown, Entered By History   acetaminophen (TYLENOL) 500 MG tablet Take 1,000 mg by mouth every 12 hours as needed for mild pain   Yes Unknown, Entered By History   amLODIPine (NORVASC) 2.5 MG tablet Take 1 tablet (2.5 mg) by mouth daily 8/9/23  Yes Colin Lake MD   atorvastatin (LIPITOR) 20 MG tablet Take 1 tablet (20 mg) by mouth daily  Patient taking differently: Take 20 mg by mouth every evening 10/31/22  Yes Connor Barlow MD   bisacodyl (DULCOLAX) 5 MG EC tablet 1 TAB BY MOUTH DAILY AS NEEDED FOR CONSTIPATION 8/12/22  Yes Braeden Sood MD   Cholecalciferol (VITAMIN D HIGH POTENCY) 25 MCG (1000 UT) CAPS 1 CAP BY MOUTH DAILY 8/12/22  Yes Braeden Sood MD   docusate sodium (COLACE) 100 MG capsule TAKE 1 CAP BY MOUTH TWICE DAILY AS NEEDED FOR CONSTIPATION 8/12/22  Yes Braeden Sood  MD   guaiFENesin (ROBITUSSIN) 20 mg/mL SOLN solution Take 10 mLs by mouth every 4 hours as needed for cough 9/7/22  Yes Dionte Mueller MD   ipratropium - albuterol 0.5 mg/2.5 mg/3 mL (DUONEB) 0.5-2.5 (3) MG/3ML neb solution Take 1 vial (3 mLs) by nebulization every 4 hours as needed for shortness of breath 5/5/23  Yes Milly Garcia MD   memantine (NAMENDA) 5 MG tablet 1 TAB BY MOUTH TWICE DAILY 8/7/23  Yes Braeden Sood MD   metoprolol succinate ER (TOPROL XL) 100 MG 24 hr tablet 1 TAB BY MOUTH DAILY (DX: HYPERTENSION) 8/12/22  Yes Christy Haro MD   ondansetron (ZOFRAN) 4 MG tablet 1 TAB BY MOUTH EVERY 8 HOURS AS NEEDED FOR NAUSEA 8/12/22  Yes Braeden Sood MD   oxyBUTYnin ER (DITROPAN XL) 10 MG 24 hr tablet 2 TABS (20MG) BY MOUTH DAILY (DX: NOCTURIA) 8/9/23  Yes Colin Lake MD   PARoxetine (PAXIL) 20 MG tablet 1 TAB BY MOUTH AT BEDTIME Strength: 20 mg 5/8/23  Yes Colin Lake MD   QUEtiapine (SEROQUEL) 25 MG tablet 1 TAB BY MOUTH AT BEDTIME (DX: INSOMNIA) 4/5/23  Yes Christy Haro MD   ramelteon (ROZEREM) 8 MG tablet 1 TAB BY MOUTH EVERY NIGHT AS NEEDED FOR SLEEP 8/12/22  Yes Braeden Sood MD   RESTASIS 0.05 % ophthalmic emulsion INSTILL 1 DROP INTO BOTH EYES DAILY (DX: DRY EYES) ++CLAUDETTE++ 10/24/22  Yes Braeden Sood MD   rivaroxaban ANTICOAGULANT (XARELTO) 15 MG TABS tablet Take 1 tablet (15 mg) by mouth daily (with dinner) 4/5/23  Yes Colin Lake MD   order for DME Equipment being ordered: Walker Wheels () and Walker ()  Treatment Diagnosis: Difficulty ambulating 6/28/17   Lynn Davies PA-C          Current Medications:          acetaminophen  975 mg Oral Q8H    [START ON 10/12/2023] amLODIPine  2.5 mg Oral Daily    atorvastatin  20 mg Oral QPM    [START ON 10/12/2023] iron sucrose  300 mg Intravenous Daily    [START ON 10/12/2023] lidocaine  1 patch Transdermal Q24H    memantine  5 mg Oral BID    metoprolol succinate ER  100 mg Oral Daily     "oxyBUTYnin ER  20 mg Oral At Bedtime    PARoxetine  20 mg Oral At Bedtime    QUEtiapine  25 mg Oral At Bedtime    [Held by provider] rivaroxaban ANTICOAGULANT  15 mg Oral Daily with supper    senna-docusate  1 tablet Oral BID    Or    senna-docusate  2 tablet Oral BID     acetaminophen **OR** acetaminophen, bisacodyl, hydrALAZINE, ipratropium - albuterol 0.5 mg/2.5 mg/3 mL, labetalol, melatonin, methocarbamol, metoprolol, naloxone **OR** naloxone **OR** naloxone **OR** naloxone, prochlorperazine **OR** prochlorperazine **OR** prochlorperazine, sodium phosphate, traMADol         Allergies:   No Known Allergies       Social History:     Social History     Tobacco Use    Smoking status: Never    Smokeless tobacco: Never   Substance Use Topics    Alcohol use: No     Alcohol/week: 0.0 standard drinks of alcohol          Family History:     Family History   Problem Relation Age of Onset    Heart Disease Father 80        MI    C.A.D. Father     Breast Cancer Daughter 43         age 53    Emphysema Sister     Heart Disease Sister              Review of Systems:   The 12 point Review of Systems is negative other than noted in the HPI.         Physical Exam:   Blood pressure (!) 140/56, pulse 69, temperature 98.4  F (36.9  C), temperature source Oral, resp. rate 16, height 1.651 m (5' 5\"), weight 68.2 kg (150 lb 5.7 oz), SpO2 94%, not currently breastfeeding.  No intake/output data recorded.  General: female of stated age, no acute distress  HEENT: Normocephalic. Atraumatic. Anecteric. Moist mucous membranes. Pupils equal  Neck: Supple without masses or lymphadenopathy  Lungs: Non-labored breathing, no chest wall tenderness, no left sided ecchymosis  Heart: Regular rate & rhythm, bilateral radial pulses intact   Abdomen: Soft, non-tender, non-distended  Extremities: Moves all extremities. No edema. Mild pain over right elbow.   Neurologic: No focal deficits, sensation and motor intact in all extremities, alert and " oriented   Skin: Warm and dry         Data:   Labs:  Recent Labs   Lab Test 10/11/23  0918 05/31/23  0530 05/25/23  0550   WBC 11.3* 10.9 11.6*   HGB 6.4* 8.4* 7.3*   HCT 25.3* 30.0* 25.7*   * 527* 501*     Recent Labs   Lab Test 10/11/23  0918 10/10/23  0245 05/31/23  0530   POTASSIUM 4.0 3.9 4.2   CHLORIDE 103 102 105   CO2 21* 24 23   BUN 22.2 22.0 19.1   CR 1.18* 1.02* 0.90   * 98 71     Recent Labs   Lab Test 10/11/23  0918 10/10/23  0245 05/16/23  0703 05/15/23  1239 04/14/23  1548 01/08/22  0608 01/07/22  0552 01/06/22  1406 01/06/22  0506 06/27/17  1040 01/27/17  0928   BILITOTAL 0.3 0.3 0.4   < > 0.2   < > 0.6 0.9  --    < > 0.3   DBIL  --   --   --   --   --   --  0.1  --   --   --   --    ALT 8 7 8*   < > 16   < > 55* 85*  --    < > 20   AST 15 10 18   < > 24   < > 62* 133*  --    < > 17   ALKPHOS 111* 104 78   < > 120*   < > 74 108  --    < > 100   AMYLASE  --   --   --   --   --   --   --   --   --   --  23*   LIPASE  --   --   --   --  28  --   --  82 63*   < > 102    < > = values in this interval not displayed.     Recent Labs   Lab Test 09/01/22  2244 01/06/22  1406   INR 2.16* 1.27*   PTT 30  --        CT Head  IMPRESSION: No acute intracranial hemorrhage, appreciable acute  infarct or hydrocephalus.    XR elbow  IMPRESSION: Small osseous fragment adjacent to the medial humeral condyle, favored to represent a small avulsion fracture, though age indeterminate. This can be correlated with point tenderness. No other evidence of fracture. Small elbow effusion.     XR ribs   IMPRESSION: Mildly displaced lateral left sixth, seventh, eighth and  probably ninth rib fracture deformities. Probable left-sided pleural  effusion. No displaced right-sided rib fracture is identified.  Apparent cardiomegaly. No visible pneumothorax. Degenerative change  visualized spine.    Thank you very much for this consult.     Time spent: 90 minutes total time spent on the date of this encounter doing: chart  review, review of test results, patient visit/obtaining a history, physical exam, education, counseling, developing plan of care, and documenting.     I have discussed the history, physical, and plan with Dr. Camara, who has independently interviewed and examined the patient and agrees with the plan as stated.     Romeo Garcia MD on 10/11/2023 at 5:41 PM  General Surgery Resident PGY4

## 2023-10-11 NOTE — ED NOTES
Bed: ED25  Expected date:   Expected time:   Means of arrival:   Comments:  Sheeba 1 89 F low hgb ETA 0905

## 2023-10-11 NOTE — ED PROVIDER NOTES
History     Chief Complaint:  Abnormal Labs    The history is provided by the patient and medical records.     Margy Babin is a 89 year old female on Xarelto with history of hypertension, hyperlipidemia, cardiomyopathy, and CVA presenting via EMS from her care facility for evaluation of abnormal labs. Per EMS reports, Flor has been increasingly fatigued and confused lately. A routine blood draw yesterday resulted in a hemoglobin of 6.4, prompting today's ED visit. Flor states that she was here yesterday for a fall and believes that she is here again today for a fall. She reports right arm pain. She denies abdominal pain. Denies hematemesis, hematuria, and bloody stool. No new reported falls.     Independent Historian:   EMS reports supplement the above history.    Review of External Notes:   I reviewed the ED visit note from yesterday, 10/10/23, where she was seen with a fall. A laceration was repaired to her right elbow and a negative CT scan of her head was obtained.    Medications:    Norvasc  Lipitor  Dulcolax  Colace  Robitussin  Namenda  Toprol XL  Zofran  Ditropan  Paxil  Seroquel  Rozerem  Xarelto    Past Medical History:    Anxiety  Blepharitis of both eyes  CVA due to embolism  Chronic atrial fibrillation  Depression, major, recurrent, in complete remission  Dry eye syndrome  Dyspnea on exertion  Essential hypertension  Familial tremor  Insomnia  Mixed hyperlipidemia  Nausea without vomiting  Sepsis  Atrial flutter  Cardiomyopathy  Coagulation disorder  Metabolic encephalopathy  Aspiration pneumonitis  Bacteremia  Dysphagia  Long QT syndrome  Nocturia  Supraventricular tachycardia  Prediabetes    Past Surgical History:    Hysterectomy total abdominal, bilateral salpingo-oophorectomy, combined  Cervical fusion  Rotator cuff repair  Total hip arthroplasty    Physical Exam   Patient Vitals for the past 24 hrs:   BP Temp Temp src Pulse Resp SpO2 Weight   10/11/23 1215 (!) 148/59 97.8  F (36.6   C) Oral 69 14 93 % --   10/11/23 1202 136/57 97.4  F (36.3  C) -- 69 26 -- --   10/11/23 1200 (!) 144/71 -- -- 72 17 93 % --   10/11/23 1136 -- -- -- 69 28 93 % --   10/11/23 1133 -- -- -- 72 26 (!) 88 % --   10/11/23 1130 (!) 142/58 -- -- 73 20 92 % --   10/11/23 1100 134/61 -- -- 68 26 98 % --   10/11/23 1057 -- -- -- 65 17 98 % --   10/11/23 1042 132/59 -- -- 68 14 -- --   10/11/23 1027 (!) 143/54 -- -- 68 17 95 % --   10/11/23 1012 128/53 -- -- 68 26 96 % --   10/11/23 0957 (!) 144/55 -- -- 64 27 92 % --   10/11/23 0942 139/71 -- -- 62 19 96 % --   10/11/23 0912 130/55 -- -- 59 25 98 % --   10/11/23 0906 130/55 97.6  F (36.4  C) Oral 63 20 94 % 71.7 kg (158 lb)     Physical Exam  Constitutional: Nontoxic appearing.  HEENT: Atraumatic.  PERRL.  EOMI.  Moist mucous membranes.  Neck: Soft.  Supple.  No JVD.  Cardiac: Regular rate and rhythm.  No murmur or rub.  Respiratory: Clear to auscultation bilaterally.  No respiratory distress.  No wheezing, rhonchi, or rales.  Abdomen: Soft and nontender.  No rebound or guarding.  Nondistended.  Musculoskeletal: Sutured wound right elbow without dehiscence.  No edema.  Normal range of motion.  Neurologic: Alert and oriented  to self but otherwise confused.  Normal tone and bulk.  No facial drooping.  Normal speech.  5/5 strength in bilateral upper and lower extremities.  Sensation to light touch intact throughout.    Skin: No rashes.  No edema.  Psych: Encephalopathic.      Emergency Department Course   ECG  ECG taken at 0938, ECG read at 0951  Normal sinus rhythm  Left axis deviation  Left bundle branch block  Abnormal ECG   Rate 62 bpm. SD interval 174 ms. QRS duration 156 ms. QT/QTc 476/483 ms. P-R-T axes 91 -33 121.     Imaging:  CT Head w/o Contrast   Final Result   IMPRESSION: No acute intracranial hemorrhage, appreciable acute   infarct or hydrocephalus.      SAMSON YING DO            SYSTEM ID:  [id number]         Laboratory:  Labs Ordered and Resulted from Time of  ED Arrival to Time of ED Departure   COMPREHENSIVE METABOLIC PANEL - Abnormal       Result Value    Sodium 139      Potassium 4.0      Carbon Dioxide (CO2) 21 (*)     Anion Gap 15      Urea Nitrogen 22.2      Creatinine 1.18 (*)     GFR Estimate 43 (*)     Calcium 8.7      Chloride 103      Glucose 213 (*)     Alkaline Phosphatase 111 (*)     AST 15      ALT 8      Protein Total 6.8      Albumin 3.5      Bilirubin Total 0.3     TROPONIN T, HIGH SENSITIVITY - Abnormal    Troponin T, High Sensitivity 28 (*)    CBC WITH PLATELETS AND DIFFERENTIAL - Abnormal    WBC Count 11.3 (*)     RBC Count 4.15      Hemoglobin 6.4 (*)     Hematocrit 25.3 (*)     MCV 61 (*)     MCH 15.4 (*)     MCHC 25.3 (*)     RDW 20.2 (*)     Platelet Count 509 (*)     % Neutrophils 74      % Lymphocytes 10      % Monocytes 7      Mids % (Monos, Eos, Basos)        % Eosinophils 8      % Basophils 1      % Immature Granulocytes 0      NRBCs per 100 WBC 0      Absolute Neutrophils 8.3      Absolute Lymphocytes 1.1      Absolute Monocytes 0.8      Mids Abs (Monos, Eos, Basos)        Absolute Eosinophils 0.9 (*)     Absolute Basophils 0.2      Absolute Immature Granulocytes 0.1      Absolute NRBCs 0.0     RBC AND PLATELET MORPHOLOGY - Abnormal    Platelet Assessment        Value: Automated Count Confirmed. Platelet morphology is normal.    Acanthocytes        Chrystal Rods        Basophilic Stippling        Bite Cells        Blister Cells        Shamika Cells        Elliptocytes        Hgb C Crystals        Leary-Jolly Bodies        Hypersegmented Neutrophils        Polychromasia        RBC agglutination        RBC Fragments        Reactive Lymphocytes        Rouleaux        Sickle Cells        Smudge Cells        Spherocytes Slight (*)     Stomatocytes        Target Cells Slight (*)     Teardrop Cells        Toxic Neutrophils        RBC Morphology Confirmed RBC Indices     BLOOD GAS VENOUS    pH Venous 7.34      pCO2 Venous 46      pO2 Venous 27       Bicarbonate Venous 25      Base Excess/Deficit -0.9      FIO2 0     ROUTINE UA WITH MICROSCOPIC REFLEX TO CULTURE   TYPE AND SCREEN, ADULT    ABO/RH(D) O POS      Antibody Screen Negative      SPECIMEN EXPIRATION DATE [id number]     PREPARE RED BLOOD CELLS (UNIT)    Blood Component Type Red Blood Cells      Product Code [id number]      Unit Status Transfused      Unit Number [id number]      CROSSMATCH Compatible      CODING SYSTEM [id number]      ISSUE DATE AND TIME [id number]      UNIT ABO/RH O+      UNIT TYPE ISBT 5100     PREPARE RED BLOOD CELLS (UNIT)   TRANSFUSE RED BLOOD CELLS (UNIT)   ABO/RH TYPE AND SCREEN     Emergency Department Course & Assessments:       Interventions:  Medications   sodium chloride 0.9% BOLUS 1,000 mL (0 mLs Intravenous Stopped 10/11/23 1134)     Assessments:  0908 I obtained history and examined the patient as noted above.     Independent Interpretation (X-rays, CTs, rhythm strip):  I independently interpreted the patient's head CT and note no large intracranial hemorrhage.    Consultations/Discussion of Management or Tests:  ED Course as of 10/11/23 1228   Wed Oct 11, 2023   1101 I spoke with the patient's daughter Tina regarding the patient's history and presentation in the emergency department today.   1221 I spoke with Jud Gandara PA-C, for Dr. Mueller of the hospitalist team regarding the patient, who accepted the patient for admission.       Disposition:  The patient was admitted to the hospital under the care of Dr. Mueller.    Impression & Plan    Medical Decision Making:  Margy Babin is a 89-year-old woman who is afebrile and hemodynamically stable.  She is mildly confused at times.  She has no focal neurologic deficits.  Given her fall yesterday and the fact that she is anticoagulated, we did obtain a CT scan of the head which was unrevealing for acute abnormalities.  Lab work-up as noted as above and confirms a hemoglobin of 6.4.  No signs of  active bleeding but she is anticoagulated.  She is low MCV and has a history of iron deficiency anemia requiring transfusion in the past.  I discussed the risk and benefits of a blood transfusion with daughter over the telephone who is in agreement and gives informed consent as the patient is confused and cannot consent at this time.  She has been weak, tired, and had a fall leading to ER visit and injuries and lives in assisted living facility and that could be reasonable for observation admission at this time and daughter is in agreement.  We will obtain a UA.  1 unit PRBC was ordered and given.  Discussed with the hospitalist service who accepts for admission and she is in stable condition at time of admission.    Diagnosis:    ICD-10-CM    1. Acute encephalopathy  G93.40       2. Acute on chronic anemia  D64.9         Scribe Disclosure:  I, Erick Pappas, am serving as a scribe at 9:05 AM on 10/11/2023 to document services personally performed by Paolo Tomlin MD based on my observations and the provider's statements to me.   10/11/2023   Paolo Tomlin MD Salay, Nicholas J, MD  10/16/23 0923

## 2023-10-12 ENCOUNTER — APPOINTMENT (OUTPATIENT)
Dept: GENERAL RADIOLOGY | Facility: CLINIC | Age: 88
DRG: 811 | End: 2023-10-12
Attending: PHYSICIAN ASSISTANT
Payer: MEDICARE

## 2023-10-12 ENCOUNTER — APPOINTMENT (OUTPATIENT)
Dept: OCCUPATIONAL THERAPY | Facility: CLINIC | Age: 88
DRG: 811 | End: 2023-10-12
Attending: PHYSICIAN ASSISTANT
Payer: MEDICARE

## 2023-10-12 LAB
ALBUMIN UR-MCNC: 100 MG/DL
ANION GAP SERPL CALCULATED.3IONS-SCNC: 11 MMOL/L (ref 7–15)
APPEARANCE UR: ABNORMAL
BACTERIA #/AREA URNS HPF: ABNORMAL /HPF
BILIRUB UR QL STRIP: NEGATIVE
BUN SERPL-MCNC: 15.8 MG/DL (ref 8–23)
CALCIUM SERPL-MCNC: 8.5 MG/DL (ref 8.2–9.6)
CHLORIDE SERPL-SCNC: 109 MMOL/L (ref 98–107)
COLOR UR AUTO: ABNORMAL
CREAT SERPL-MCNC: 0.82 MG/DL (ref 0.51–0.95)
DEPRECATED HCO3 PLAS-SCNC: 20 MMOL/L (ref 22–29)
EGFRCR SERPLBLD CKD-EPI 2021: 66 ML/MIN/1.73M2
ERYTHROCYTE [DISTWIDTH] IN BLOOD BY AUTOMATED COUNT: 24.9 % (ref 10–15)
GLUCOSE SERPL-MCNC: 99 MG/DL (ref 70–99)
GLUCOSE UR STRIP-MCNC: NEGATIVE MG/DL
HCT VFR BLD AUTO: 30 % (ref 35–47)
HGB BLD-MCNC: 8.6 G/DL (ref 11.7–15.7)
HGB UR QL STRIP: ABNORMAL
KETONES UR STRIP-MCNC: NEGATIVE MG/DL
LEUKOCYTE ESTERASE UR QL STRIP: ABNORMAL
MAGNESIUM SERPL-MCNC: 2 MG/DL (ref 1.7–2.3)
MCH RBC QN AUTO: 18.7 PG (ref 26.5–33)
MCHC RBC AUTO-ENTMCNC: 28.7 G/DL (ref 31.5–36.5)
MCV RBC AUTO: 65 FL (ref 78–100)
NITRATE UR QL: POSITIVE
PH UR STRIP: 7 [PH] (ref 5–7)
PHOSPHATE SERPL-MCNC: 3.1 MG/DL (ref 2.5–4.5)
PLATELET # BLD AUTO: 394 10E3/UL (ref 150–450)
POTASSIUM SERPL-SCNC: 4 MMOL/L (ref 3.4–5.3)
RBC # BLD AUTO: 4.61 10E6/UL (ref 3.8–5.2)
RBC URINE: 26 /HPF
SODIUM SERPL-SCNC: 140 MMOL/L (ref 135–145)
SP GR UR STRIP: 1.03 (ref 1–1.03)
SQUAMOUS EPITHELIAL: 5 /HPF
UROBILINOGEN UR STRIP-MCNC: NORMAL MG/DL
WBC # BLD AUTO: 12 10E3/UL (ref 4–11)
WBC CLUMPS #/AREA URNS HPF: PRESENT /HPF
WBC URINE: >182 /HPF

## 2023-10-12 PROCEDURE — 93005 ELECTROCARDIOGRAM TRACING: CPT

## 2023-10-12 PROCEDURE — 85027 COMPLETE CBC AUTOMATED: CPT | Performed by: PHYSICIAN ASSISTANT

## 2023-10-12 PROCEDURE — 87088 URINE BACTERIA CULTURE: CPT | Performed by: PHYSICIAN ASSISTANT

## 2023-10-12 PROCEDURE — 97535 SELF CARE MNGMENT TRAINING: CPT | Mod: GO

## 2023-10-12 PROCEDURE — 83735 ASSAY OF MAGNESIUM: CPT | Performed by: PHYSICIAN ASSISTANT

## 2023-10-12 PROCEDURE — 999N000197 HC STATISTIC WOC PT EDUCATION, 0-15 MIN

## 2023-10-12 PROCEDURE — 36415 COLL VENOUS BLD VENIPUNCTURE: CPT | Performed by: PHYSICIAN ASSISTANT

## 2023-10-12 PROCEDURE — 999N000157 HC STATISTIC RCP TIME EA 10 MIN

## 2023-10-12 PROCEDURE — 250N000013 HC RX MED GY IP 250 OP 250 PS 637: Performed by: PHYSICIAN ASSISTANT

## 2023-10-12 PROCEDURE — 94150 VITAL CAPACITY TEST: CPT

## 2023-10-12 PROCEDURE — 99233 SBSQ HOSP IP/OBS HIGH 50: CPT | Performed by: INTERNAL MEDICINE

## 2023-10-12 PROCEDURE — P9016 RBC LEUKOCYTES REDUCED: HCPCS | Performed by: PHYSICIAN ASSISTANT

## 2023-10-12 PROCEDURE — 258N000003 HC RX IP 258 OP 636: Performed by: INTERNAL MEDICINE

## 2023-10-12 PROCEDURE — 250N000011 HC RX IP 250 OP 636: Mod: JZ | Performed by: INTERNAL MEDICINE

## 2023-10-12 PROCEDURE — 81001 URINALYSIS AUTO W/SCOPE: CPT | Performed by: PHYSICIAN ASSISTANT

## 2023-10-12 PROCEDURE — 84100 ASSAY OF PHOSPHORUS: CPT | Performed by: PHYSICIAN ASSISTANT

## 2023-10-12 PROCEDURE — 93010 ELECTROCARDIOGRAM REPORT: CPT | Performed by: INTERNAL MEDICINE

## 2023-10-12 PROCEDURE — 250N000013 HC RX MED GY IP 250 OP 250 PS 637: Performed by: INTERNAL MEDICINE

## 2023-10-12 PROCEDURE — 97165 OT EVAL LOW COMPLEX 30 MIN: CPT | Mod: GO

## 2023-10-12 PROCEDURE — 120N000001 HC R&B MED SURG/OB

## 2023-10-12 PROCEDURE — 71045 X-RAY EXAM CHEST 1 VIEW: CPT

## 2023-10-12 PROCEDURE — 80048 BASIC METABOLIC PNL TOTAL CA: CPT | Performed by: PHYSICIAN ASSISTANT

## 2023-10-12 RX ORDER — LIDOCAINE 40 MG/G
CREAM TOPICAL
Status: DISCONTINUED | OUTPATIENT
Start: 2023-10-12 | End: 2023-10-13 | Stop reason: HOSPADM

## 2023-10-12 RX ORDER — PANTOPRAZOLE SODIUM 40 MG/1
40 TABLET, DELAYED RELEASE ORAL
Status: DISCONTINUED | OUTPATIENT
Start: 2023-10-12 | End: 2023-10-13 | Stop reason: HOSPADM

## 2023-10-12 RX ORDER — CEFTRIAXONE 1 G/1
1 INJECTION, POWDER, FOR SOLUTION INTRAMUSCULAR; INTRAVENOUS EVERY 24 HOURS
Status: DISCONTINUED | OUTPATIENT
Start: 2023-10-12 | End: 2023-10-13 | Stop reason: HOSPADM

## 2023-10-12 RX ADMIN — ACETAMINOPHEN 975 MG: 325 TABLET, FILM COATED ORAL at 18:04

## 2023-10-12 RX ADMIN — IRON SUCROSE 300 MG: 20 INJECTION, SOLUTION INTRAVENOUS at 09:34

## 2023-10-12 RX ADMIN — ATORVASTATIN CALCIUM 20 MG: 20 TABLET, FILM COATED ORAL at 20:27

## 2023-10-12 RX ADMIN — MEMANTINE 5 MG: 5 TABLET ORAL at 08:14

## 2023-10-12 RX ADMIN — OXYBUTYNIN CHLORIDE 20 MG: 10 TABLET, EXTENDED RELEASE ORAL at 21:41

## 2023-10-12 RX ADMIN — LIDOCAINE 1 PATCH: 4 PATCH TOPICAL at 08:21

## 2023-10-12 RX ADMIN — AMLODIPINE BESYLATE 2.5 MG: 2.5 TABLET ORAL at 08:14

## 2023-10-12 RX ADMIN — QUETIAPINE FUMARATE 25 MG: 25 TABLET ORAL at 21:41

## 2023-10-12 RX ADMIN — MEMANTINE 5 MG: 5 TABLET ORAL at 20:27

## 2023-10-12 RX ADMIN — SENNOSIDES AND DOCUSATE SODIUM 1 TABLET: 50; 8.6 TABLET ORAL at 08:14

## 2023-10-12 RX ADMIN — CEFTRIAXONE SODIUM 1 G: 1 INJECTION, POWDER, FOR SOLUTION INTRAMUSCULAR; INTRAVENOUS at 18:04

## 2023-10-12 RX ADMIN — SENNOSIDES AND DOCUSATE SODIUM 1 TABLET: 50; 8.6 TABLET ORAL at 20:27

## 2023-10-12 RX ADMIN — ACETAMINOPHEN 975 MG: 325 TABLET, FILM COATED ORAL at 00:14

## 2023-10-12 RX ADMIN — PANTOPRAZOLE SODIUM 40 MG: 40 TABLET, DELAYED RELEASE ORAL at 09:03

## 2023-10-12 RX ADMIN — PAROXETINE HYDROCHLORIDE 20 MG: 20 TABLET, FILM COATED ORAL at 21:41

## 2023-10-12 RX ADMIN — TRAMADOL HYDROCHLORIDE 25 MG: 50 TABLET, COATED ORAL at 05:51

## 2023-10-12 RX ADMIN — METOPROLOL SUCCINATE 100 MG: 50 TABLET, EXTENDED RELEASE ORAL at 08:14

## 2023-10-12 RX ADMIN — ACETAMINOPHEN 975 MG: 325 TABLET, FILM COATED ORAL at 08:14

## 2023-10-12 ASSESSMENT — ACTIVITIES OF DAILY LIVING (ADL)
ADLS_ACUITY_SCORE: 43
ADLS_ACUITY_SCORE: 51
ADLS_ACUITY_SCORE: 47
ADLS_ACUITY_SCORE: 51
ADLS_ACUITY_SCORE: 47
ADLS_ACUITY_SCORE: 49
ADLS_ACUITY_SCORE: 51
PREVIOUS_RESPONSIBILITIES: MEDICATION MANAGEMENT;FINANCES
ADLS_ACUITY_SCORE: 51
ADLS_ACUITY_SCORE: 43
ADLS_ACUITY_SCORE: 51
DEPENDENT_IADLS:: CLEANING;COOKING;LAUNDRY;SHOPPING;MEAL PREPARATION;MEDICATION MANAGEMENT;MONEY MANAGEMENT;TRANSPORTATION
ADLS_ACUITY_SCORE: 47
ADLS_ACUITY_SCORE: 51

## 2023-10-12 NOTE — CONSULTS
[facility]    Orthopedic Consultation    Margy Babin MRN# [id number]   Age: 89 year old YOB: 1929     Date of Admission: 10/11/2023    Reason for consult: Abnormal right elbow x-ray, recent fall       Requesting physician: Jud Gandara PA-C       Level of consult: One-time consult to assist in determining a diagnosis and to recommend an appropriate treatment plan           Assessment and Plan:   Assessment:   Age-indeterminate right distal medial humeral condyle avulsion fracture  Right lateral arm/forearm hematoma  Right lateral elbow skin tear  Multiple left-sided rib fractures      Plan:   The patient's history and clinical/diagnostic findings were reviewed with the on-call orthopedic trauma surgeon, Dr. Haris Watson. Patient with history of recurrent falls with recent fall on 10/10/23 at her [facility]. Patient sustained multiple left-sided rib fractures. Radiographs consistent with an age-indeterminate right distal medial humeral condyle avulsion fracture. On today's assessment, patient does not have point tenderness to the medial epicondyle/medial elbow. She does have swelling/hematoma formation to the right lateral distal arm/proximal forearm as well as a skin tear. Given above findings, recommendations are as follows and there are no indications for orthopedic surgical intervention.    -Sling PRN for comfort for the right upper extremity.  -Compression wrap PRN for comfort/swelling for the right upper extremity.  -Activity as tolerated/WBAT RUE using pain as her guide. Okay to use a walker if she tolerates this.   -NV checks to the RUE Q4H.  -Mepilex to right elbow skin tear. RN to change daily.  -Defer restarting PTA Xarelto to primary team.  -Dispo per primary team.  -Follow up with upper extremity specialist at [facility] (please have patient/facility call [phone] to schedule) in 2-3 weeks for repeat x-rays and reevaluation of the  right elbow. Ortho trauma to sign off this hospitalization. Please contact our team if any questions or concerns arise in the interim.           Chief Complaint:   Right elbow pain/swelling s/p fall         History of Present Illness:   Medical history obtained via chart review and discussion with the patient. Margy Babin is a 89 year old RHD female with past medical history of HFrEF, atrial fibrillation on Xarelto, chronic LBBB, HTN, CVA with dysphagia, essential tremor, and possible memory impairment who was admitted on 10/11/23 for anemia and increased generalized weakness, fatigue, and confusion. Patient has a history of recurrent falls and reportedly sustained another fall on 10/10/23. Patient was ultimately taken to the [facility] ED and was found to have a possible right elbow fracture and multiple left-sided rib fractures. Patient also has a right elbow skin tear that was previously repaired. Currently, the patient is sitting upright in a chair and eating breakfast using her right hand. Denies right elbow pain. Denies numbness and tingling. Patient does not recall any prior injuries to her right elbow, denies right elbow surgeries. Patient uses a walker at all times while ambulating. Does not endorse any current systemic symptoms. Tolerating PO intake.          Past Medical History:     Past Medical History:   Diagnosis Date    Anxiety     Blepharitis of both eyes     Cerebrovascular accident (CVA) due to embolism (H) 04/2022    corpus striatum    Chronic atrial fibrillation (H)     Depression, major, recurrent, in complete remission (H)     Dry eye syndrome     Dyspnea on exertion 10/07/2021    Essential hypertension 03/05/2020    Familial tremor 03/06/2013    Insomnia, unspecified type 11/03/2014    No CSA on file Last  check - 02/28/2020 with no concerns.    Mixed hyperlipidemia 11/04/2020    Nausea without vomiting 06/16/2015     Problem list name updated by automated process. Provider to review     Tremor, hereditary, benign              Past Surgical History:     Past Surgical History:   Procedure Laterality Date    HYSTERECTOMY TOTAL ABDOMINAL, BILATERAL SALPINGO-OOPHORECTOMY, COMBINED      NECK SURGERY  2009    cervical fusion    ROTATOR CUFF REPAIR RT/LT Right     ZZC TOTAL HIP ARTHROPLASTY Right              Social History:     Social History     Tobacco Use    Smoking status: Never    Smokeless tobacco: Never   Substance Use Topics    Alcohol use: No     Alcohol/week: 0.0 standard drinks of alcohol             Family History:     Family History   Problem Relation Age of Onset    Heart Disease Father 80        MI    C.A.D. Father     Breast Cancer Daughter 43         age 53    Emphysema Sister     Heart Disease Sister              Immunizations:     VACCINE/DOSE   Diptheria   DPT   DTAP   HBIG   Hepatitis A   Hepatitis B   HIB   Influenza   Measles   Meningococcal   MMR   Mumps   Pneumococcal   Polio   Rubella   Small Pox   TDAP   Varicella   Zoster             Allergies:   No Known Allergies          Medications:     Current Facility-Administered Medications   Medication    acetaminophen (TYLENOL) tablet 650 mg    Or    acetaminophen (TYLENOL) Suppository 650 mg    acetaminophen (TYLENOL) tablet 975 mg    amLODIPine (NORVASC) tablet 2.5 mg    atorvastatin (LIPITOR) tablet 20 mg    bisacodyl (DULCOLAX) suppository 10 mg    hydrALAZINE (APRESOLINE) injection 10 mg    ipratropium - albuterol 0.5 mg/2.5 mg/3 mL (DUONEB) neb solution 3 mL    iron sucrose (VENOFER) 300 mg in sodium chloride 0.9 % 290 mL intermittent infusion    labetalol (NORMODYNE/TRANDATE) injection 10 mg    Lidocaine (LIDOCARE) 4 % Patch 1 patch    lidocaine (LMX4) cream    lidocaine 1 % 0.1-1 mL    melatonin tablet 1 mg    memantine (NAMENDA) tablet 5 mg    methocarbamol (ROBAXIN) tablet 500 mg    metoprolol (LOPRESSOR) injection 2.5 mg    metoprolol succinate ER (TOPROL XL) 24 hr tablet 100 mg    naloxone (NARCAN) injection 0.2  "mg    Or    naloxone (NARCAN) injection 0.4 mg    Or    naloxone (NARCAN) injection 0.2 mg    Or    naloxone (NARCAN) injection 0.4 mg    oxyBUTYnin ER (DITROPAN XL) 24 hr tablet 20 mg    pantoprazole (PROTONIX) EC tablet 40 mg    PARoxetine (PAXIL) tablet 20 mg    prochlorperazine (COMPAZINE) injection 5 mg    Or    prochlorperazine (COMPAZINE) tablet 5 mg    Or    prochlorperazine (COMPAZINE) suppository 12.5 mg    QUEtiapine (SEROquel) tablet 25 mg    [Held by provider] rivaroxaban ANTICOAGULANT (XARELTO) tablet 15 mg    senna-docusate (SENOKOT-S/PERICOLACE) 8.6-50 MG per tablet 1 tablet    Or    senna-docusate (SENOKOT-S/PERICOLACE) 8.6-50 MG per tablet 2 tablet    sodium chloride (PF) 0.9% PF flush 3 mL    sodium chloride (PF) 0.9% PF flush 3 mL    sodium phosphate (FLEET ENEMA) 1 enema    traMADol (ULTRAM) half-tab 25 mg             Review of Systems:   CV: NEGATIVE for chest pain, palpitations or peripheral edema  C: NEGATIVE for fever, chills, change in weight  E/M: NEGATIVE for ear, mouth and throat problems  R: NEGATIVE for significant cough or SOB          Physical Exam:   All vitals have been reviewed  Patient Vitals for the past 24 hrs:   BP Temp Temp src Pulse Resp SpO2 Height Weight   10/12/23 0808 (!) 147/59 98.7  F (37.1  C) Axillary 71 19 94 % -- --   10/12/23 0404 (!) 152/67 99  F (37.2  C) Axillary 81 18 92 % -- --   10/12/23 0328 (!) 143/63 98.8  F (37.1  C) Axillary 83 18 93 % -- --   10/12/23 0215 133/52 99  F (37.2  C) Axillary 83 18 91 % -- --   10/12/23 0115 113/44 99  F (37.2  C) -- 82 18 -- -- --   10/12/23 0041 131/55 98  F (36.7  C) Oral 81 18 93 % -- --   10/12/23 0000 137/52 98.3  F (36.8  C) Oral 81 20 92 % -- --   10/11/23 2155 (!) 141/54 98.1  F (36.7  C) Oral 71 18 93 % -- --   10/11/23 1738 -- -- -- -- -- 94 % -- --   10/11/23 1625 -- -- -- -- -- -- 1.651 m (5' 5\") 68.2 kg (150 lb 5.7 oz)   10/11/23 1600 (!) 140/56 98.4  F (36.9  C) Oral 69 16 97 % -- --   10/11/23 1543 130/55 " 97.7  F (36.5  C) Oral 69 17 94 % -- --   10/11/23 1530 130/55 -- -- 65 17 94 % -- --   10/11/23 1345 -- -- -- 72 17 96 % -- --   10/11/23 1330 130/55 -- -- 64 18 94 % -- --   10/11/23 1315 138/68 -- -- 69 15 95 % -- --   10/11/23 1300 (!) 159/55 -- -- 65 17 93 % -- --   10/11/23 1245 139/62 -- -- 67 21 92 % -- --   10/11/23 1230 120/55 -- -- 62 24 93 % -- --   10/11/23 1215 (!) 148/59 97.8  F (36.6  C) Oral 69 14 93 % -- --   10/11/23 1202 136/57 97.4  F (36.3  C) -- 69 26 -- -- --   10/11/23 1200 (!) 144/71 -- -- 72 17 93 % -- --       Intake/Output Summary (Last 24 hours) at 10/12/2023 1139  Last data filed at 10/12/2023 0833  Gross per 24 hour   Intake 860 ml   Output --   Net 860 ml       Constitutional: Pleasant, alert, appropriate, following commands. NAD. Non-toxic appearing.  HEENT: Head atraumatic normocephalic. Pupils equal round and reactive.  Respiratory: Unlabored breathing no audible wheeze  Cardiovascular: Regular rate and rhythm per pulses.  GI: Abdomen is non-distended.  Lymph/Hematologic: No lymphadenopathy in areas examined.  Genitourinary: No head.  Skin: No rashes, no cyanosis, no edema.  Musculoskeletal: Right upper extremity: Dried blood saturated Telfa to the lateral elbow skin tear (RN notified and will convert to Mepilex). Otherwise, skin intact to examined areas. Early ecchymosis with associated mild to moderate swelling with induration to the right lateral distal humeral region, elbow, and proximal third of the forearm. Diffuse tenderness throughout lateral arm hematoma. Nontender over the medial epicondyle, olecranon, proximal half of the humerus, shoulder, wrist, and hand/digits diffusely. Patient able to actively flex and extend the elbow without increased pain. Full active wrist flexion and extension. Able to flex, extend, adduct, and abduct all digits actively. 5-/5  strength. Radial pulse 2+. Capillary refill <2 seconds. SILT M/R/U nerve distributions.  Neurologic: GCS 15,  normal mood and affect          Data:   All laboratory data reviewed  Results for orders placed or performed during the hospital encounter of 10/11/23   CT Head w/o Contrast     Status: None    Narrative    EXAM: CT HEAD W/O CONTRAST  10/11/2023 9:52 AM     HISTORY: fall yesterday, negative CT head yesterday, now confused,  anticoagulated       COMPARISON: 10/10/2023, 9/5/2023    TECHNIQUE: Using multidetector thin collimation helical acquisition  technique, axial, coronal and sagittal CT images from the skull base  to the vertex were obtained without intravenous contrast.   (topogram) image(s) also obtained and reviewed. Dose reduction  techniques were used.    FINDINGS:  No acute intracranial hemorrhage, mass effect, or midline shift.  Stable presumed moderate to severe sequela of chronic small vessel  ischemic disease and old right basal ganglia lacunar infarct. No CT  findings of acute infarct or hydrocephalus. Preserved subarachnoid  spaces.    Atraumatic calvarium. No substantial paranasal sinus mucosal disease.  Clear mastoid air cells. Nonfocal orbits.       Impression    IMPRESSION: No acute intracranial hemorrhage, appreciable acute  infarct or hydrocephalus.    SAMSON YING DO         SYSTEM ID:  [id number]   XR Chest 2 Views     Status: None    Narrative    XR CHEST 2 VIEWS 10/11/2023 2:45 PM    HISTORY: fall, hypoxia, posterior rib pain    COMPARISON: 5/19/2023      Impression    IMPRESSION:Mild linear opacities in the lung bases, likely  atelectasis. No significant pleural effusion. No pneumothorax. Mild  cardiomegaly. Normal mediastinal silhouette. No displaced rib  fractures.    HEATHER SAMPSON MD         SYSTEM ID:  [id number]   XR Ribs Bilateral 2 Views     Status: None    Narrative    RIBS BILATERAL 2 VIEWS  DATE/TIME: 10/11/2023 2:44 PM    INDICATION: Posterior right rib pain, recent fall.  COMPARISON: Chest radiographic exam 5/19/2023.      Impression    IMPRESSION: Mildly displaced lateral  left sixth, seventh, eighth and  probably ninth rib fracture deformities. Probable left-sided pleural  effusion. No displaced right-sided rib fracture is identified.  Apparent cardiomegaly. No visible pneumothorax. Degenerative change  visualized spine.    CRISTOBAL GARCIA MD         SYSTEM ID:  [id number]   XR Chest Port 1 View     Status: None    Narrative    CHEST ONE VIEW  10/12/2023 9:02 AM     HISTORY: Trauma Patient with Rib Fracture(s).    COMPARISON: Chest radiograph 10/11/2023.      Impression    IMPRESSION: Known left rib fractures are better visualized on the  prior dedicated rib radiograph. No pneumothorax or significant pleural  effusion. No new focal consolidation. Probable bibasilar atelectasis.  Similar cardiomediastinal silhouette.    JENISE VOGEL MD         SYSTEM ID:  [id number]   Comprehensive metabolic panel     Status: Abnormal   Result Value Ref Range    Sodium 139 135 - 145 mmol/L    Potassium 4.0 3.4 - 5.3 mmol/L    Carbon Dioxide (CO2) 21 (L) 22 - 29 mmol/L    Anion Gap 15 7 - 15 mmol/L    Urea Nitrogen 22.2 8.0 - 23.0 mg/dL    Creatinine 1.18 (H) 0.51 - 0.95 mg/dL    GFR Estimate 43 (L) >60 mL/min/1.73m2    Calcium 8.7 8.2 - 9.6 mg/dL    Chloride 103 98 - 107 mmol/L    Glucose 213 (H) 70 - 99 mg/dL    Alkaline Phosphatase 111 (H) 35 - 104 U/L    AST 15 0 - 45 U/L    ALT 8 0 - 50 U/L    Protein Total 6.8 6.4 - 8.3 g/dL    Albumin 3.5 3.5 - 5.2 g/dL    Bilirubin Total 0.3 <=1.2 mg/dL   Troponin T, High Sensitivity     Status: Abnormal   Result Value Ref Range    Troponin T, High Sensitivity 28 (H) <=14 ng/L   [place] Draw     Status: None    Narrative    The following orders were created for panel order [place] Draw.  Procedure                               Abnormality         Status                     ---------                               -----------         ------                     Extra Blue Top Tube[[id number]]                              Final result                 Please  view results for these tests on the individual orders.   CBC with platelets and differential     Status: Abnormal   Result Value Ref Range    WBC Count 11.3 (H) 4.0 - 11.0 10e3/uL    RBC Count 4.15 3.80 - 5.20 10e6/uL    Hemoglobin 6.4 (LL) 11.7 - 15.7 g/dL    Hematocrit 25.3 (L) 35.0 - 47.0 %    MCV 61 (L) 78 - 100 fL    MCH 15.4 (L) 26.5 - 33.0 pg    MCHC 25.3 (L) 31.5 - 36.5 g/dL    RDW 20.2 (H) 10.0 - 15.0 %    Platelet Count 509 (H) 150 - 450 10e3/uL    % Neutrophils 74 %    % Lymphocytes 10 %    % Monocytes 7 %    Mids % (Monos, Eos, Basos)      % Eosinophils 8 %    % Basophils 1 %    % Immature Granulocytes 0 %    NRBCs per 100 WBC 0 <1 /100    Absolute Neutrophils 8.3 1.6 - 8.3 10e3/uL    Absolute Lymphocytes 1.1 0.8 - 5.3 10e3/uL    Absolute Monocytes 0.8 0.0 - 1.3 10e3/uL    Mids Abs (Monos, Eos, Basos)      Absolute Eosinophils 0.9 (H) 0.0 - 0.7 10e3/uL    Absolute Basophils 0.2 0.0 - 0.2 10e3/uL    Absolute Immature Granulocytes 0.1 <=0.4 10e3/uL    Absolute NRBCs 0.0 10e3/uL   Extra Blue Top Tube     Status: None   Result Value Ref Range    Hold Specimen JI    RBC and Platelet Morphology     Status: Abnormal   Result Value Ref Range    Platelet Assessment  Automated Count Confirmed. Platelet morphology is normal.     Automated Count Confirmed. Platelet morphology is normal.    Acanthocytes      Chrystal Rods      Basophilic Stippling      Bite Cells      Blister Cells      Shamika Cells      Elliptocytes      Hgb C Crystals      Leary-Jolly Bodies      Hypersegmented Neutrophils      Polychromasia      RBC agglutination      RBC Fragments      Reactive Lymphocytes      Rouleaux      Sickle Cells      Smudge Cells      Spherocytes Slight (A) None Seen    Stomatocytes      Target Cells Slight (A) None Seen    Teardrop Cells      Toxic Neutrophils      RBC Morphology Confirmed RBC Indices    Blood gas venous     Status: None   Result Value Ref Range    pH Venous 7.34 7.32 - 7.43    pCO2 Venous 46 40 - 50 mm Hg     pO2 Venous 27 25 - 47 mm Hg    Bicarbonate Venous 25 21 - 28 mmol/L    Base Excess/Deficit -0.9 -7.7 - 1.9 mmol/L    FIO2 0    Ferritin     Status: Normal   Result Value Ref Range    Ferritin 14 11 - 328 ng/mL   Iron and iron binding capacity     Status: Abnormal   Result Value Ref Range    Iron 10 (L) 37 - 145 ug/dL    Iron Binding Capacity 455 (H) 240 - 430 ug/dL    Iron Sat Index 2 (L) 15 - 46 %   Troponin T, High Sensitivity     Status: Normal   Result Value Ref Range    Troponin T, High Sensitivity 14 <=14 ng/L   Asymptomatic Influenza A/B, RSV, & SARS-CoV2 PCR (COVID-19) Nasopharyngeal     Status: Normal    Specimen: Nasopharyngeal; Swab   Result Value Ref Range    Influenza A PCR Negative Negative    Influenza B PCR Negative Negative    RSV PCR Negative Negative    SARS CoV2 PCR Negative Negative    Narrative    Testing was performed using the Xpert Xpress CoV2/Flu/RSV Assay on the Cepheid GeneXpert Instrument. This test should be ordered for the detection of SARS-CoV-2, influenza, and RSV viruses in individuals who meet clinical and/or epidemiological criteria. Test performance is unknown in asymptomatic patients. This test is for in vitro diagnostic use under the FDA EUA for laboratories certified under CLIA to perform high or moderate complexity testing. This test has not been FDA cleared or approved. A negative result does not rule out the presence of PCR inhibitors in the specimen or target RNA in concentration below the limit of detection for the assay. If only one viral target is positive but coinfection with multiple targets is suspected, the sample should be re-tested with another FDA cleared, approved, or authorized test, if coinfection would change clinical management. This test was validated by the [facility]. These laboratories are certified under the Clinical Laboratory Improvement Amendments of 1988 (CLIA-88) as qualified to perform high complexity laboratory testing.    Hemoglobin     Status: Abnormal   Result Value Ref Range    Hemoglobin 7.0 (L) 11.7 - 15.7 g/dL   CBC with platelets     Status: Abnormal   Result Value Ref Range    WBC Count 12.0 (H) 4.0 - 11.0 10e3/uL    RBC Count 4.61 3.80 - 5.20 10e6/uL    Hemoglobin 8.6 (L) 11.7 - 15.7 g/dL    Hematocrit 30.0 (L) 35.0 - 47.0 %    MCV 65 (L) 78 - 100 fL    MCH 18.7 (L) 26.5 - 33.0 pg    MCHC 28.7 (L) 31.5 - 36.5 g/dL    RDW 24.9 (H) 10.0 - 15.0 %    Platelet Count 394 150 - 450 10e3/uL   Basic metabolic panel     Status: Abnormal   Result Value Ref Range    Sodium 140 135 - 145 mmol/L    Potassium 4.0 3.4 - 5.3 mmol/L    Chloride 109 (H) 98 - 107 mmol/L    Carbon Dioxide (CO2) 20 (L) 22 - 29 mmol/L    Anion Gap 11 7 - 15 mmol/L    Urea Nitrogen 15.8 8.0 - 23.0 mg/dL    Creatinine 0.82 0.51 - 0.95 mg/dL    GFR Estimate 66 >60 mL/min/1.73m2    Calcium 8.5 8.2 - 9.6 mg/dL    Glucose 99 70 - 99 mg/dL   Magnesium     Status: Normal   Result Value Ref Range    Magnesium 2.0 1.7 - 2.3 mg/dL   Phosphorus     Status: Normal   Result Value Ref Range    Phosphorus 3.1 2.5 - 4.5 mg/dL   EKG 12-lead, tracing only     Status: None   Result Value Ref Range    Systolic Blood Pressure  mmHg    Diastolic Blood Pressure  mmHg    Ventricular Rate 62 BPM    Atrial Rate 62 BPM    TN Interval 174 ms    QRS Duration 156 ms     ms    QTc 483 ms    P Axis 91 degrees    R AXIS -33 degrees    T Axis 121 degrees    Interpretation ECG       Sinus rhythm  Left axis deviation  Left bundle branch block  Abnormal ECG  When compared with ECG of 10-OCT-2023 02:57,  No significant change was found  Confirmed by GENERATED REPORT, COMPUTER ([id number]),  Deyanira Dumont ([id number]) on 10/11/2023 9:49:23 AM     EKG 12-lead, tracing only     Status: None (Preliminary result)   Result Value Ref Range    Systolic Blood Pressure  mmHg    Diastolic Blood Pressure  mmHg    Ventricular Rate 63 BPM    Atrial Rate 63 BPM    TN Interval 172 ms    QRS Duration 148 ms    QT  440 ms    QTc 450 ms    P Axis 39 degrees    R AXIS -28 degrees    T Axis 127 degrees    Interpretation ECG       Sinus rhythm  Left bundle branch block  Abnormal ECG  When compared with ECG of 11-OCT-2023 09:38,  No significant change was found     Adult Type and Screen     Status: None   Result Value Ref Range    ABO/RH(D) O POS     Antibody Screen Negative Negative    SPECIMEN EXPIRATION DATE [id number]    Prepare red blood cells (unit)     Status: None   Result Value Ref Range    Blood Component Type Red Blood Cells     Product Code E6200X39     Unit Status Transfused     Unit Number [id number]     CROSSMATCH Compatible     CODING SYSTEM [id number]     ISSUE DATE AND TIME [id number]     UNIT ABO/RH O+     UNIT TYPE ISBT 5100    CONDITIONAL Prepare red blood cells (unit)     Status: None   Result Value Ref Range    Blood Component Type Red Blood Cells     Product Code J5723D62     Unit Status Transfused     Unit Number [id number]     CROSSMATCH Compatible     CODING SYSTEM [id number]     ISSUE DATE AND TIME [id number]     UNIT ABO/RH O+     UNIT TYPE ISBT 5100    CBC with platelets differential     Status: Abnormal    Narrative    The following orders were created for panel order CBC with platelets differential.  Procedure                               Abnormality         Status                     ---------                               -----------         ------                     CBC with platelets and d...[[id number]]  Abnormal            Final result               RBC and Platelet Morphology[[id number]]  Abnormal            Final result                 Please view results for these tests on the individual orders.   ABO/Rh type and screen     Status: None    Narrative    The following orders were created for panel order ABO/Rh type and screen.  Procedure                               Abnormality         Status                     ---------                               -----------         ------                      Adult Type and Screen[[id number]]                            Final result                 Please view results for these tests on the individual orders.          Attestation:  I have reviewed today's vital signs, notes, medications, labs and imaging with Dr. Haris Watson.  Amount of time performed on this consult: 50 minutes.    Lindsay Macdonald PA-C  [facility]

## 2023-10-12 NOTE — PLAN OF CARE
Goal Outcome Evaluation:  Summary: 10/11/23  [id number]  Primary Diagnosis: Fall and Hgn 6.4  Orientation: A&OX4  Aggression Stop Light: Green  Mobility: Not OOB, Ind with walker at baseline per pt.   Pain Management: Back and right elbow pain, tylenol given once and effective.   Diet: Combination diet.   Bowel/Bladder: External cath. Continent.   Abnormal Lab/Assessments: Hgn 6.4 this morning in ED. One unit of RBC given. Recheck at 1900  Drain/Device/Wound: Right  Consults:   D/C Day/Goals/Place: pending

## 2023-10-12 NOTE — PLAN OF CARE
Goal Outcome Evaluation:    Orientation: a&ox3, not to time  Activity: ax1, GBW  Diet/BS Checks: comb reg, tolerates well  Tele:  NSR w/ BBB  IV Access/Drains: Pulled out L PIV SL  Pain Management: back/elbow pain 8/10, Tylenol x1, traMADol x1  Abnormal VS/Results: Hgb 7.0, rec'd 1 unit RBC, tolerated well  Bowel/Bladder: incont b/b, Purewick in place  Skin/Wounds: scattered bruising, scab on R knee, fractured R elbow and ribs  Consults: Ortho, Gastro  D/C Disposition: Pending

## 2023-10-12 NOTE — PROGRESS NOTES
"   10/12/23 0930   Appointment Info   Signing Clinician's Name / Credentials (OT) Ti Rayo OTR/L   Rehab Comments (OT) RUE sling   Living Environment   People in Home alone   Current Living Arrangements other (see comments)  (Senior Living facility)   Transportation Anticipated agency   Living Environment Comments Pt reports living in Senior Living Facility.   Self-Care   Usual Activity Tolerance moderate   Current Activity Tolerance fair   Equipment Currently Used at Home walker, rolling   Fall history within last six months yes   Number of times patient has fallen within last six months 2   Activity/Exercise/Self-Care Comment Pt reports IND w/ toileting and dressing. Assist w/ bathing. Pt states, \"I get help w/ dressing if I need it.\"   Instrumental Activities of Daily Living (IADL)   Previous Responsibilities medication management;finances   IADL Comments Pt reports staff completes laundry and housekeeping. Pt goes down to dining white for 3x/meals a day.   General Information   Onset of Illness/Injury or Date of Surgery 10/11/23   Referring Physician Jud Gandara PA-C   Patient/Family Therapy Goal Statement (OT) Return to home.   Additional Occupational Profile Info/Pertinent History of Current Problem Margy Babin is a 89 year old female with PMH significant for HFrEF, Afib on Xarelto, chronic LBBB, HTN, CVA with dysphagia, essential tremor, possible memory impairment who was sent to the ED by her facility after finding of Hgb 6.4, after recent fall at her facility (evaluated in ED AM of 10/10), and increased generalized weakness, fatigue, and some worsened confusion.   Existing Precautions/Restrictions fall;other (see comments)  (Sling to RUE)   Cognitive Status Examination   Orientation Status person;place  (Not oriented to year. stated \"2001\")   Visual Perception   Visual Impairment/Limitations corrective lenses full-time   Sensory   Sensory Quick Adds sensation intact   Pain " Assessment   Patient Currently in Pain No   Range of Motion Comprehensive   Comment, General Range of Motion LUE WFL, RUE not assessed   Strength Comprehensive (MMT)   Comment, General Manual Muscle Testing (MMT) Assessment LUE WFL, RUE not formally assessed ( strength WFL)   Bed Mobility   Bed Mobility supine-sit   Supine-Sit [place] (Bed Mobility) contact guard   Transfers   Transfers sit-stand transfer;toilet transfer   Sit-Stand Transfer   Assistive Device (Sit-Stand Transfers) walker, front-wheeled   Sit/Stand Transfer Comments SBA   Toilet Transfer   Type (Toilet Transfer) stand-sit;sit-stand   Assistive Device (Toilet Transfer) walker, front-wheeled;grab bars/safety frame   Toilet Transfer Comments SBA   Activities of Daily Living   BADL Assessment/Intervention upper body dressing;lower body dressing;grooming;toileting   Upper Body Dressing Assessment/Training   Comment, (Upper Body Dressing) Don sling   [place] Level (Upper Body Dressing) minimum assist (75% patient effort)   Lower Body Dressing Assessment/Training   Position (Lower Body Dressing) edge of bed sitting   Comment, (Lower Body Dressing) SBA   Grooming Assessment/Training   Position (Grooming) sink side   Comment, (Grooming) SBA   Toileting   Position (Toileting) unsupported sitting   Comment, (Toileting) SBA   Clinical Impression   Criteria for Skilled Therapeutic Interventions Met (OT) Yes, treatment indicated   OT Diagnosis Decreased ADL independence and tolerance   Influenced by the following impairments Decreased ADL independence   OT Problem List-Impairments impacting ADL problems related to;range of motion (ROM);strength;other (see comments)  (activity tolerance)   Assessment of Occupational Performance 1-3 Performance Deficits   Identified Performance Deficits TB dressing, G/h   Planned Therapy Interventions (OT) ADL retraining;ROM;strengthening;home program guidelines;progressive activity/exercise;risk factor education    Clinical Decision Making Complexity (OT) problem focused assessment/low complexity   Risk & Benefits of therapy have been explained evaluation/treatment results reviewed;care plan/treatment goals reviewed;risks/benefits reviewed;current/potential barriers reviewed;patient   OT Total Evaluation Time   OT Eval, Low Complexity Minutes (23646) 10   OT Goals   Therapy Frequency (OT) Daily   OT Predicted Duration/Target Date for Goal Attainment 10/18/23   OT Goals Hygiene/Grooming;Upper Body Dressing;Lower Body Dressing;Toilet Transfer/Toileting   OT: Hygiene/Grooming modified independent;while standing   OT: Upper Body Dressing Modified independent;within precautions;including set-up/clothing retrieval   OT: Lower Body Dressing Independent;including set-up/clothing retrieval   OT: Toilet Transfer/Toileting Modified independent;toilet transfer;cleaning and garment management   Self-Care/Home Management   Self-Care/Home Mgmt/ADL, Compensatory, Meal Prep Minutes (18936) 24   Symptoms Noted During/After Treatment (Meal Preparation/Planning Training) fatigue   Treatment Detail/Skilled Intervention Pt greeted supine in bed sleeping; agreeable for OT evaluation once aroused. Pt w/ RUE sling not donned. CGA sup > sit edge of bed w/ head of bed elevated and use of bed rail. Min A to don RUE into sling; pt reported comfort and did not report pain in RUE at all throughout session. Pt able to demonstrate doffing/donning B socks using cross leg method while seated EOB w/ SBA. Prior to completing stand, educated pt regarding use of RUE w/ transfers. Assumed WBAT to RUE within pain range. SBA sit > stand edge of bed w/ use of FWW w/ VC's for pt to push up off of the bed w/ LUE. Pt ambulated into bathroom and completed toilet transfer onto commode over toilet w/ SBA. Following completion, pt stood sinkside to complete 1 g/h task. Pt returned to room and seated in bedside chair. Pt remained w/ RUE sling donned and reported comfort. Pt  remained seated in chair w/ all needs met and chair alarm activated.   OT Discharge Planning   OT Plan G/h sinkside, UB dressing   OT Discharge Recommendation (DC Rec) home with assist;home with home care occupational therapy   OT Rationale for DC Rec Pt currently functioning below baseline d/t decreased activity tolerance and pain. Pt from senior living facility where they receive assistance w/ most I/ADL's. Anticipate once medically ready, pt will be safe to d/c back to senior living w/ home therapy and continued level of assist.   OT Brief overview of current status CGA bed mobility, Min A UB dressing, SBA transfers w/ FWW   Total Session Time   Timed Code Treatment Minutes 24   Total Session Time (sum of timed and untimed services) 34

## 2023-10-12 NOTE — PROGRESS NOTES
[facility]    HOSPITALIST PROGRESS NOTE :   --------------------------------------------------    Date of Admission:  10/11/2023    Cumulative Summary: Margy Babin is a 89 year old female with PMH significant for HFrEF, Afib on Xarelto, chronic LBBB, HTN, CVA with dysphagia, essential tremor, possible memory impairment who was sent to the ED by her facility after finding of Hgb 6.4, after recent fall at her facility (evaluated in ED AM of 10/10), and increased generalized weakness, fatigue, and some worsened confusion     Assessment & Plan     Symptomatic acute on chronic microcytic anemia  Iron deficiency anemia  During May 2023 admission, Hgb 7.7 with MCV 71 and dropped as low as 6.6 requiring 1 unit PRBC as well as 1 dose Venofer. Chart review shows hgb trending down from 11g/dL since 9/2022.    *Seen in ED 10/10 AM after fall at facility, discharged back to facility where labs were done by provider and found to have Hgb 6.4 range, sent to the ED for evaluation. Reports more fatigue, weakness generally, daughter felt legs were weaker. Some more confusion from baseline. Denies SOB, chest pain, N/V, or palpitations. Endorses lightheadedness, dizziness at times but no syncopal episodes. Denies blood in bowel or bladder. No fevers or chills. No abd pain. Head CT neg for bleed.   *Iron studies consistent with iron deficiency - recheck 10/11 with Iron 10, Iron sat 2%, TIBC 455  * s/p 1U PRBC 10/11 in the ED and received second unit last night     -- Continue to monitor patient closely.  --Currently patient does not have any signs of bleeding.  --Continue to hold PTA Xarelto, will discuss further with daughter regarding anticoagulation considering patient multiple falls and risk of bleeding with advanced age.  --[facility] consultation was requested.  --Patient was evaluated by Dr. Payne on this morning, highly appreciate her help.  --Per GI, patient and family does  not want to pursue any invasive procedures and would like to continue with conservative management.  --Patient has been a started on Protonix.  --We will give patient IV iron infusion for 3 doses.  --CT scan can be considered although might not be very useful if patient and daughter goals of care are to words comfort.  --Labs were reviewed from this morning, showing hemoglobin improvement to 8.6 after total of 2 units of packed red blood cell transfusion since admission.  --CBC tomorrow morning.  --Will recommend stopping anticoagulation at this time due to the concern for patient presenting with symptomatic anemia, multiple fractures from multiple falls, possible occult malignancy as a source of bleeding as patient has not undergone invasive procedure, will have further discussion with patient daughter tomorrow morning.        Mild CRISSY  Cr 1.18, above baseline of 1.75-1 range. Given 1 unit PRBC and 1L IVF in the ED.    -- Reviewed labs from this morning  -- Creatinine is improved to 0.82 this morning, electrolytes are in normal range.  --No acute need for further hydration.  -- Recheck BMP tomorrow morning    Acute encephalopathy, mild   Fatigue, generalized weakness   UTI    Likely related to anemia, rule out other causes as below. In the ED, able to answer all questions appropriately, though sometimes slow to respond she knows she is in the hospital for blood and that she recently fell, able to complete meaningful ROS, but unable to explain much about the fall she sustained recently.    -- Orthostatics checked, patient does not have any significant lightheadedness today.  -- Check resp panel - COVID, RSV, Influenza, all the respiratory panel came back negative.  -- UA ordered, showing large leukocyte esterase, more than 182 WBC count, positive nitrite and large leukocyte esterase  --We will start patient on IV ceftriaxone and will switch to oral antibiotics on discharge     Right elbow small avulsion  fracture  Laceration near the right elbow sutured 10/10  Multiple rib fractures : Patient was found to have mildly displaced lateral left sixth, seventh, eighth and probably ninth rib fracture deformities on x-ray with probable left-sided pleural effusion.  Seen on XR in ED after sustaining a fall at [facility] 10/10.    -- Patient was evaluated by general surgery for trauma evaluation in the emergency department.  --Patient is currently on rib fracture protocol, including aggressive pulmonary hygiene and multimodal pain control as needed.  --Continue incentive spirometry  --Orthopedic consultation was requested.  --Patient was evaluated by orthopedic surgery this morning, concern for age-indeterminate right distal medial humeral condyle avulsion fracture, right lateral arm/forearm hematoma, right lateral elbow skin tear and multiple left-sided rib fractures.  --Sling as needed for comfort for the right upper extremity is ordered  --Compression wrap as needed for comfort/swelling for the right upper extremity.  --Sutures to be removed in 10 days.  Discussed with bedside nursing, wound care consultation was requested but they are recommending to follow-up with orthopedic surgery consultation.     Recurrent falls  At least 3 in the past year. 1 day prior to admission and sent back to facility. And 1 month prior in which required staples.   --See above, physical and Occupational Therapy consultation has been requested, at this time patient seems safe to be discharged to home with assist or with home care occupational therapy.     Acute hypoxic respiratory failure: Resolved  In the setting of profound anemia, recent falls, checked XR which as below shows rib fractures. Atelectasis, possible small left effusion.     -- Patient has been weaned off from oxygen.  --Continue incentive spirometry  --Continue to monitor patient closely     Possible hx vascular dementia  Possible dementia    Noted in chart to have vascular dementia,  "but dtr reports unclear of this diagnosis.   Patient seems to have memory deficits during this morning when evaluated    --Delirium protocol  --Reorient frequently  --Continue PTA Seroquel and Memantine  --As above, considering patient dementia, recurrent falls and symptomatic anemia I would recommend patient to be taken off anticoagulation at this time due to more risk associated with bleeding      Mild troponin elevation, likely demand ischemia in setting of profound anemia  Hypertension  Hyperlipidemia  Atrial fibrillation on chronic anticoagulation  HFrEF  * Last TTE 5/17 with EF 55% without WMA, grade 1 diastolic dysfunction  * serial trop 28-->14. Similar range to prior admission 5/2023    -- Continue PTA amlodipine, atorvastatin, metoprolol, hold parameters in place  -- Holding PTA Xarelto as above for profound anemia, and rib fractures  -- Telemetry  -- PRN labetalol/hydralazine for SBP >180 and IV metoprolol for sustained HR >120  --Blood pressure slightly elevated today, if continues to be elevated then will consider increasing antihypertensive medication tomorrow but considering advanced age will be cautious about increasing her blood pressure medications further.     History of dysphagia secondary to stroke  Discharge summary in September 2022 notes oral pharyngeal dysphagia  -- SLP previously recommended dysphagia easy to chew diet level 7 with thin liquids    Clinically Significant Risk Factors Present on Admission               # Drug Induced Coagulation Defect: home medication list includes an anticoagulant medication    # Hypertension: Noted on problem list   # Dementia: noted on problem list    # Overweight: Estimated body mass index is 25.02 kg/m  as calculated from the following:    Height as of this encounter: 1.651 m (5' 5\").    Weight as of this encounter: 68.2 kg (150 lb 5.7 oz).              Diet: Combination Diet Regular Diet Adult    Pichardo Catheter: Not present  DVT Prophylaxis: Pneumatic " Compression Devices, DOAC agent has been on hold   Code Status: No CPR- Do NOT Intubate    The patient's care was discussed with the Bedside Nurse and Patient.  Plan to call patient's daughter tomorrow morning.    Medical Decision Making     60 MINUTES SPENT BY ME on the date of service doing chart review, history, exam, documentation & further activities per the note.      Disposition Plan   Tentative discharge to home tomorrow with home health care and home occupational therapy.  Will have further discussion with patient's daughter tomorrow regarding discontinuing anticoagulation.      Expected Discharge Date: 10/13/2023             Entered: Jessica Elizabeth MD 10/12/2023, 8:41 AM       Jessica Elizabeth MD, MD, FACP  Text Page (7am - 6pm)    ----------------------------------------------------------------------------------------------------------------------      Interval History   Patient care was assumed this morning, patient was seen and examined.  Patient is sitting in chair, denying any complaints, able to provide some answers but seems to have some memory deficit.  Discussed with bedside nursing, patient has received 2 units of packed red blood cell on 10/11/2023, hemoglobin is up to 8.5 this morning.  Patient does not have any signs and symptoms of active GI bleed.  Patient and family does not want to proceed with invasive procedure which is understandable.  I discussed with patient regarding probably is stopping anticoagulation due to multiple falls and risk of bleeding, patient was in agreement with the plan, I told her that I will have further discussion with her daughter.  Patient is otherwise denying any chest pain, feels that her pain is well controlled at the current medications.      -Data reviewed today: I reviewed all new labs and imaging results over the last 24 hours.    I personally reviewed no images or EKG's today.    Physical Exam   Temp: 98.7  F (37.1  C) Temp src: Axillary BP: (!) 147/59 Pulse: 71    Resp: 19 SpO2: 94 % O2 Device: None (Room air) Oxygen Delivery: 4 LPM  Vitals:    10/11/23 0906 10/11/23 1625   Weight: 71.7 kg (158 lb) 68.2 kg (150 lb 5.7 oz)     Vital Signs with Ranges  Temp:  [97.4  F (36.3  C)-99  F (37.2  C)] 98.7  F (37.1  C)  Pulse:  [59-83] 71  Resp:  [14-28] 19  BP: (113-159)/(44-71) 147/59  SpO2:  [88 %-98 %] 94 %  I/O last 3 completed shifts:  In: 800   Out: -     GENERAL: Alert , awake and oriented. NAD. Conversational, appropriate.   HEENT: Normocephalic. EOMI. No icterus or injection. Nares normal.   LUNGS: Clear to auscultation. No dyspnea at rest.   HEART: Regular rate. Extremities perfused.   ABDOMEN: Soft, nontender, and nondistended. Positive bowel sounds.   EXTREMITIES: No LE edema noted.   NEUROLOGIC: Moves extremities x4 on command. No acute focal neurologic abnormalities noted.     Medications      acetaminophen  975 mg Oral Q8H    amLODIPine  2.5 mg Oral Daily    atorvastatin  20 mg Oral QPM    iron sucrose  300 mg Intravenous Daily    lidocaine  1 patch Transdermal Q24H    memantine  5 mg Oral BID    metoprolol succinate ER  100 mg Oral Daily    oxyBUTYnin ER  20 mg Oral At Bedtime    PARoxetine  20 mg Oral At Bedtime    QUEtiapine  25 mg Oral At Bedtime    [Held by provider] rivaroxaban ANTICOAGULANT  15 mg Oral Daily with supper    senna-docusate  1 tablet Oral BID    Or    senna-docusate  2 tablet Oral BID    sodium chloride (PF)  3 mL Intracatheter Q8H       Data   Recent Labs   Lab 10/11/23  2010 10/11/23  0918 10/10/23  0245   WBC  --  11.3*  --    HGB 7.0* 6.4*  --    MCV  --  61*  --    PLT  --  509*  --    NA  --  139 138   POTASSIUM  --  4.0 3.9   CHLORIDE  --  103 102   CO2  --  21* 24   BUN  --  22.2 22.0   CR  --  1.18* 1.02*   ANIONGAP  --  15 12   SOBIA  --  8.7 8.8   GLC  --  213* 98   ALBUMIN  --  3.5 3.3*   PROTTOTAL  --  6.8 6.3*   BILITOTAL  --  0.3 0.3   ALKPHOS  --  111* 104   ALT  --  8 7   AST  --  15 10       Imaging:   Recent Results (from the  past 24 hour(s))   CT Head w/o Contrast    Narrative    EXAM: CT HEAD W/O CONTRAST  10/11/2023 9:52 AM     HISTORY: fall yesterday, negative CT head yesterday, now confused,  anticoagulated       COMPARISON: 10/10/2023, 9/5/2023    TECHNIQUE: Using multidetector thin collimation helical acquisition  technique, axial, coronal and sagittal CT images from the skull base  to the vertex were obtained without intravenous contrast.   (topogram) image(s) also obtained and reviewed. Dose reduction  techniques were used.    FINDINGS:  No acute intracranial hemorrhage, mass effect, or midline shift.  Stable presumed moderate to severe sequela of chronic small vessel  ischemic disease and old right basal ganglia lacunar infarct. No CT  findings of acute infarct or hydrocephalus. Preserved subarachnoid  spaces.    Atraumatic calvarium. No substantial paranasal sinus mucosal disease.  Clear mastoid air cells. Nonfocal orbits.       Impression    IMPRESSION: No acute intracranial hemorrhage, appreciable acute  infarct or hydrocephalus.    SAMSON YING DO         SYSTEM ID:  [id number]   XR Ribs Bilateral 2 Views    Narrative    RIBS BILATERAL 2 VIEWS  DATE/TIME: 10/11/2023 2:44 PM    INDICATION: Posterior right rib pain, recent fall.  COMPARISON: Chest radiographic exam 5/19/2023.      Impression    IMPRESSION: Mildly displaced lateral left sixth, seventh, eighth and  probably ninth rib fracture deformities. Probable left-sided pleural  effusion. No displaced right-sided rib fracture is identified.  Apparent cardiomegaly. No visible pneumothorax. Degenerative change  visualized spine.    CRISTOBAL GARCIA MD         SYSTEM ID:  [id number]   XR Chest 2 Views    Narrative    XR CHEST 2 VIEWS 10/11/2023 2:45 PM    HISTORY: fall, hypoxia, posterior rib pain    COMPARISON: 5/19/2023      Impression    IMPRESSION:Mild linear opacities in the lung bases, likely  atelectasis. No significant pleural effusion. No pneumothorax.  Mild  cardiomegaly. Normal mediastinal silhouette. No displaced rib  fractures.    HEATHER SAMPSON MD         SYSTEM ID:  [id number]

## 2023-10-12 NOTE — PROGRESS NOTES
Alert and oriented x 3. VSS, ortho BPs(-), denied dizziness/lightheadedness. Denied pain at rest. Neuros intact.  Tele: NSR. Dressing to RUE CDI, radial pulses strong. Purewick in place. Hgb recheck this evening was 7, standing order released and endorsed to [facility] for blood transfusion. Need urine specimen, endorsed. Ortho and gastroenterology consults in AM.

## 2023-10-12 NOTE — CONSULTS
Re: WOC consult for right elbow    Chart reviewed, noted WOC consult placed by Hospitalist, however Ortho PA has already seen pt and placed wound care orders for right elbow.  Therefore no need for WOC assessment at this time, will sign off.  Please reconsult prn.

## 2023-10-12 NOTE — CONSULTS
[facility] GASTROENTEROLOGY CONSULTATION     Margy Babin  [address]  [place] [address]  89 year old female    Admission Date/Time: 10/11/2023  Primary Care Provider: Colin Lake    We were asked to see the patient in consultation by Dr. Elizabeth for evaluation of microcytic anemia.        HPI:  Margy Babin is a 89 year old female who was admitted to [facility] 10/11/23 after a fall.  In the ER she was found to be anemic, also she sustained a right elbow avulsion fracture (repaired in ED) as well as mildly displaced rib fractures and left pleural effusion.      We were consulted regarding her anemia.  Patient's hemoglobin has been drifting downward since September 2022.  She denies any problems swallowing or any changes in appetite. Patient is not aware of any stool changes.    Last colonoscopy over 10 years ago, she has never had an upper endoscopy.  She lives in an assisted living facility.    Past medical history significant for afib on rivaroxaban, CVA.    ROS: A comprehensive ten point review of systems was negative aside from those in mentioned in the HPI.      MEDICATIONS: No current facility-administered medications on file prior to encounter.  acetaminophen (TYLENOL) 500 MG tablet, Take 1,000 mg by mouth daily  acetaminophen (TYLENOL) 500 MG tablet, Take 1,000 mg by mouth every 12 hours as needed for mild pain  amLODIPine (NORVASC) 2.5 MG tablet, Take 1 tablet (2.5 mg) by mouth daily  atorvastatin (LIPITOR) 20 MG tablet, Take 1 tablet (20 mg) by mouth daily (Patient taking differently: Take 20 mg by mouth every evening)  bisacodyl (DULCOLAX) 5 MG EC tablet, 1 TAB BY MOUTH DAILY AS NEEDED FOR CONSTIPATION  Cholecalciferol (VITAMIN D HIGH POTENCY) 25 MCG (1000 UT) CAPS, 1 CAP BY MOUTH DAILY  docusate sodium (COLACE) 100 MG capsule, TAKE 1 CAP BY MOUTH TWICE DAILY AS NEEDED FOR CONSTIPATION  guaiFENesin (ROBITUSSIN) 20 mg/mL SOLN solution, Take 10 mLs by mouth every 4  hours as needed for cough  ipratropium - albuterol 0.5 mg/2.5 mg/3 mL (DUONEB) 0.5-2.5 (3) MG/3ML neb solution, Take 1 vial (3 mLs) by nebulization every 4 hours as needed for shortness of breath  memantine (NAMENDA) 5 MG tablet, 1 TAB BY MOUTH TWICE DAILY  metoprolol succinate ER (TOPROL XL) 100 MG 24 hr tablet, 1 TAB BY MOUTH DAILY (DX: HYPERTENSION)  ondansetron (ZOFRAN) 4 MG tablet, 1 TAB BY MOUTH EVERY 8 HOURS AS NEEDED FOR NAUSEA  oxyBUTYnin ER (DITROPAN XL) 10 MG 24 hr tablet, 2 TABS (20MG) BY MOUTH DAILY (DX: NOCTURIA)  PARoxetine (PAXIL) 20 MG tablet, 1 TAB BY MOUTH AT BEDTIME Strength: 20 mg  QUEtiapine (SEROQUEL) 25 MG tablet, 1 TAB BY MOUTH AT BEDTIME (DX: INSOMNIA)  ramelteon (ROZEREM) 8 MG tablet, 1 TAB BY MOUTH EVERY NIGHT AS NEEDED FOR SLEEP  RESTASIS 0.05 % ophthalmic emulsion, INSTILL 1 DROP INTO BOTH EYES DAILY (DX: DRY EYES) ++CLAUDETTE++  rivaroxaban ANTICOAGULANT (XARELTO) 15 MG TABS tablet, Take 1 tablet (15 mg) by mouth daily (with dinner)  order for DME, Equipment being ordered: Walker Wheels () and Walker ()  Treatment Diagnosis: Difficulty ambulating        ALLERGIES: No Known Allergies    Past Medical History:   Diagnosis Date    Anxiety     Blepharitis of both eyes     Cerebrovascular accident (CVA) due to embolism (H) 04/2022    corpus striatum    Chronic atrial fibrillation (H)     Depression, major, recurrent, in complete remission (H)     Dry eye syndrome     Dyspnea on exertion 10/07/2021    Essential hypertension 03/05/2020    Familial tremor 03/06/2013    Insomnia, unspecified type 11/03/2014    No CSA on file Last  check - 02/28/2020 with no concerns.    Mixed hyperlipidemia 11/04/2020    Nausea without vomiting 06/16/2015     Problem list name updated by automated process. Provider to review    Tremor, hereditary, benign        Past Surgical History:   Procedure Laterality Date    HYSTERECTOMY TOTAL ABDOMINAL, BILATERAL SALPINGO-OOPHORECTOMY, COMBINED      NECK SURGERY   "2009    cervical fusion    ROTATOR CUFF REPAIR RT/LT Right     ZZC TOTAL HIP ARTHROPLASTY Right 1997         SOCIAL HISTORY:  Social History     Tobacco Use    Smoking status: Never    Smokeless tobacco: Never   Substance Use Topics    Alcohol use: No     Alcohol/week: 0.0 standard drinks of alcohol    Drug use: No       FAMILY HISTORY:  No known family history of GI disease.    PHYSICAL EXAM:   BP (!) 147/59 (BP Location: Left arm)   Pulse 71   Temp 98.7  F (37.1  C) (Axillary)   Resp 19   Ht 1.651 m (5' 5\")   Wt 68.2 kg (150 lb 5.7 oz)   SpO2 94%   BMI 25.02 kg/m      Constitutional: NAD, comfortable  Cardiovascular: irreg  Respiratory: CTAB  Psychiatric: affect normal/bright  Head: Atraumatic.    Neck: normal size, trachea midline  Eyes:  no icterus  ENT: hearing adequate, dry mouth  Abdomen:   Auscultation: normal  Appearance: non distended  Palpation: non tender  NEURO: grossly negative  SKIN: bandage on arm with some bruising            ADDITIONAL COMMENTS:   I reviewed the patient's new clinical lab test results.   Recent Labs   Lab Test 10/11/23  2010 10/11/23  0918 05/31/23  0530 05/25/23  0550 09/03/22  0542 09/01/22  2244 01/07/22  0242 01/06/22  1406   WBC  --  11.3* 10.9 11.6*   < > 10.4   < > 33.1*   HGB 7.0* 6.4* 8.4* 7.3*   < > 12.2   < > 15.4   MCV  --  61* 79 80   < > 88   < > 94   PLT  --  509* 527* 501*   < > 409   < > 373   INR  --   --   --   --   --  2.16*  --  1.27*    < > = values in this interval not displayed.     Recent Labs   Lab Test 10/11/23  0918 10/10/23  0245 05/31/23  0530    138 141   POTASSIUM 4.0 3.9 4.2   CHLORIDE 103 102 105   CO2 21* 24 23   BUN 22.2 22.0 19.1   CR 1.18* 1.02* 0.90   ANIONGAP 15 12 13   SOBIA 8.7 8.8 8.9   * 98 71     Recent Labs   Lab Test 10/11/23  0918 10/10/23  0245 05/16/23  0703 05/15/23  1750 05/15/23  1239 05/04/23  0140 04/14/23  1548 09/01/22  2244 07/26/22  1641 01/06/22  1407 01/06/22  1406 01/06/22  0506 06/19/17  1412 " 01/27/17  0928   ALBUMIN 3.5 3.3* 2.9*  --    < >  --  3.7   < >  --    < > 3.5  --    < > 3.5   BILITOTAL 0.3 0.3 0.4  --    < >  --  0.2   < >  --    < > 0.9  --    < > 0.3   ALT 8 7 8*  --    < >  --  16   < >  --    < > 85*  --    < > 20   AST 15 10 18  --    < >  --  24   < >  --    < > 133*  --    < > 17   ALKPHOS 111* 104 78  --    < >  --  120*   < >  --    < > 108  --    < > 100   PROTEIN  --   --   --  Negative  --  30*  --   --  Negative   < >  --   --    < >  --    LIPASE  --   --   --   --   --   --  28  --   --   --  82 63*   < > 102   AMYLASE  --   --   --   --   --   --   --   --   --   --   --   --   --  23*    < > = values in this interval not displayed.           CONSULTATION ASSESSMENT AND PLAN:    Principal Problem:    Acute on chronic anemia    Assessment: Microcytic anemia worsening over the past year. Low hgb could certainly be contributing to her fatigue and fall. Patient is 89, on anticoagulation for afib. No overt signs of active bleeding, but worsening anemia is concerning for malignancy, certainly could be colon cancer.  Upper GI malignancy is possible as well but seems less likely with lack of any symptoms (no pain, no dysphagia, no early satiety).      Patient is quite frail, she has had a stroke and currently has rib fractures.  Would be very difficult for her to complete a colonoscopy prep, she might not be able to.  If she was diagnosed with malignancy, unlikely she would be a candidate for surgery or chemotherapy.    Situation discussed with patient and her daughter, neither want procedures done.  Patient's daughter Tina would like to avoid the severe anemia if possible.        Plan:   - recheck hemoglobin  - IV iron infusion this admission  - start ppi which would help heal gastritis/duodenitis/ulcer  - recommend hemoglobin check every 2 weeks or so as outpatient, iron infusion or blood transfusions could then be arranged as outpatient in attempt to avoid hospital admission  - CT  scan could be considered, but patient and her daughter both want comfort as a goal, so CT likely not useful    Please call if we can be of assistance, we would be happy to see the patient again.    Margy Payne MD  [facility]  Office:  [phone]    Approximately 35 minutes of total time was spent providing patient care, including patient evaluation, reviewing documentation/test results, and .

## 2023-10-12 NOTE — PLAN OF CARE
Physical therapy:    PT orders received and acknowledged. Chart reviewed and discussed with care team. Per discussion with evaluating OT, pt mobilizing with SBA and appears to be very near to reported baseline for mobility. OT able to address pt mobility during hospital stay, no current IP PT needs identified. Will complete PT orders and defer to OT/care team for discharge recommendations.

## 2023-10-12 NOTE — PLAN OF CARE
Goal Outcome Evaluation:      [id number]:       Orientation: A/O x2. Disoriented to time and situation.   Activity: A1 GB/W, walked with OT /PT this shift.   Diet/BS Checks: regular diet, fair appetite.   Tele:  NSR  IV Access/Drains: c/o of L PIV painful with scheduled abx. Stopped infusing , will continue abx after establishing a new IV  Pain Management: C/o same pain at right elbow, managed with scheduled Tylenol. Refused PRN pain med.  Abnormal VS/Results: VSS on RA,  ex HTN- no PRN HTN med needed. Hgb=8.6  Bowel/Bladder: Incont. 1 BM, less UOP with purerwick since less PO liquid intake.   Skin/Wounds: scattered bruising overall. Right knee scab. Right elbow wound care done per ortho wound order- wound cleanser and mepilex applied-CDI  Consults: PT/OT/ GI/ ortho  D/C Disposition: pending improvement.   Other Info: IS use encouraged for patient.

## 2023-10-12 NOTE — UTILIZATION REVIEW
Admission Status; Secondary Review Determination       Under the authority of the Utilization Management Committee, the utilization review process indicated a secondary review on the above patient. The review outcome is based on review of the medical records, discussions with staff, and applying clinical experience noted on the date of the review.     (x) Inpatient Status Appropriate - This patient's medical care is consistent with medical management for inpatient care and reasonable inpatient medical practice.     RATIONALE FOR DETERMINATION   89-year-old female with a significant medical history of HFrEF, Afib on Xarelto, chronic LBBB, HTN, CVA with dysphagia, essential tremor, and possible memory impairment was admitted due to symptomatic acute on chronic microcytic anemia, indicative of iron deficiency anemia. Her hemoglobin levels had been declining since September 2022, with a current level of 6.4g/dL. She was sent to the ED following a fall at her facility, reporting increased generalized weakness, fatigue, and worsened confusion. The management plan included inpatient status, discontinuation of Xarelto, a GI consultation to explore potential malignancy as the cause of anemia, orthostatic monitoring, repeat hemoglobin checks, blood and Venofer infusions, oxygen management, and a focus on providing comfort care given her frailty and patient preferences to avoid invasive procedures like colonoscopy. The aim was to manage her anemia and underlying causes while maintaining her comfort as the primary goal.  Inpatient admission is essential for this 89-year-old female with symptomatic acute on chronic microcytic anemia, given her substantial hemoglobin decline, frailty, and advanced age, to promptly address the risk of further deterioration, falls, and complications related to her anemia, as well as to investigate potential underlying malignancies that may not be safely evaluated in an outpatient setting.    The  expected length of stay at the time of admission was more than 2 nights because of the severity of illness, intensity of service provided, and risk for adverse outcome. Inpatient admission is appropriate.         This document was produced using voice recognition software       The information on this document is developed by the utilization review team in order for the business office to ensure compliance. This only denotes the appropriateness of proper admission status and does not reflect the quality of care rendered.   The definitions of Inpatient Status and Observation Status used in making the determination above are those provided in the CMS Coverage Manual, Chapter 1 and Chapter 6, section 70.4.   Sincerely,   JALIL HANSEN MD   System Medical Director   Utilization Management   [facility].

## 2023-10-13 ENCOUNTER — APPOINTMENT (OUTPATIENT)
Dept: GENERAL RADIOLOGY | Facility: CLINIC | Age: 88
DRG: 811 | End: 2023-10-13
Attending: PHYSICIAN ASSISTANT
Payer: MEDICARE

## 2023-10-13 VITALS
SYSTOLIC BLOOD PRESSURE: 145 MMHG | TEMPERATURE: 98.2 F | HEART RATE: 71 BPM | RESPIRATION RATE: 18 BRPM | HEIGHT: 65 IN | BODY MASS INDEX: 25.05 KG/M2 | WEIGHT: 150.35 LBS | OXYGEN SATURATION: 93 % | DIASTOLIC BLOOD PRESSURE: 60 MMHG

## 2023-10-13 LAB
ANION GAP SERPL CALCULATED.3IONS-SCNC: 13 MMOL/L (ref 7–15)
ATRIAL RATE - MUSE: 63 BPM
BACTERIA UR CULT: ABNORMAL
BUN SERPL-MCNC: 16.4 MG/DL (ref 8–23)
CALCIUM SERPL-MCNC: 8.6 MG/DL (ref 8.2–9.6)
CHLORIDE SERPL-SCNC: 106 MMOL/L (ref 98–107)
CREAT SERPL-MCNC: 0.88 MG/DL (ref 0.51–0.95)
DEPRECATED HCO3 PLAS-SCNC: 18 MMOL/L (ref 22–29)
DIASTOLIC BLOOD PRESSURE - MUSE: NORMAL MMHG
EGFRCR SERPLBLD CKD-EPI 2021: 61 ML/MIN/1.73M2
ERYTHROCYTE [DISTWIDTH] IN BLOOD BY AUTOMATED COUNT: 26.1 % (ref 10–15)
GLUCOSE SERPL-MCNC: 116 MG/DL (ref 70–99)
HCT VFR BLD AUTO: 32.2 % (ref 35–47)
HGB BLD-MCNC: 8.9 G/DL (ref 11.7–15.7)
INTERPRETATION ECG - MUSE: NORMAL
MAGNESIUM SERPL-MCNC: 1.9 MG/DL (ref 1.7–2.3)
MCH RBC QN AUTO: 18.6 PG (ref 26.5–33)
MCHC RBC AUTO-ENTMCNC: 27.6 G/DL (ref 31.5–36.5)
MCV RBC AUTO: 67 FL (ref 78–100)
P AXIS - MUSE: 39 DEGREES
PHOSPHATE SERPL-MCNC: 3.2 MG/DL (ref 2.5–4.5)
PLATELET # BLD AUTO: 400 10E3/UL (ref 150–450)
POTASSIUM SERPL-SCNC: 4 MMOL/L (ref 3.4–5.3)
PR INTERVAL - MUSE: 172 MS
QRS DURATION - MUSE: 148 MS
QT - MUSE: 440 MS
QTC - MUSE: 450 MS
R AXIS - MUSE: -28 DEGREES
RBC # BLD AUTO: 4.78 10E6/UL (ref 3.8–5.2)
SODIUM SERPL-SCNC: 137 MMOL/L (ref 135–145)
SYSTOLIC BLOOD PRESSURE - MUSE: NORMAL MMHG
T AXIS - MUSE: 127 DEGREES
VENTRICULAR RATE- MUSE: 63 BPM
WBC # BLD AUTO: 14.2 10E3/UL (ref 4–11)

## 2023-10-13 PROCEDURE — 36415 COLL VENOUS BLD VENIPUNCTURE: CPT | Performed by: PHYSICIAN ASSISTANT

## 2023-10-13 PROCEDURE — 258N000003 HC RX IP 258 OP 636: Performed by: INTERNAL MEDICINE

## 2023-10-13 PROCEDURE — 250N000011 HC RX IP 250 OP 636: Mod: JZ | Performed by: INTERNAL MEDICINE

## 2023-10-13 PROCEDURE — 71045 X-RAY EXAM CHEST 1 VIEW: CPT

## 2023-10-13 PROCEDURE — 250N000013 HC RX MED GY IP 250 OP 250 PS 637: Performed by: INTERNAL MEDICINE

## 2023-10-13 PROCEDURE — 83735 ASSAY OF MAGNESIUM: CPT | Performed by: PHYSICIAN ASSISTANT

## 2023-10-13 PROCEDURE — 85018 HEMOGLOBIN: CPT | Performed by: INTERNAL MEDICINE

## 2023-10-13 PROCEDURE — 94150 VITAL CAPACITY TEST: CPT

## 2023-10-13 PROCEDURE — 250N000013 HC RX MED GY IP 250 OP 250 PS 637: Performed by: PHYSICIAN ASSISTANT

## 2023-10-13 PROCEDURE — 84100 ASSAY OF PHOSPHORUS: CPT | Performed by: PHYSICIAN ASSISTANT

## 2023-10-13 PROCEDURE — 99239 HOSP IP/OBS DSCHRG MGMT >30: CPT | Performed by: INTERNAL MEDICINE

## 2023-10-13 PROCEDURE — 80048 BASIC METABOLIC PNL TOTAL CA: CPT | Performed by: PHYSICIAN ASSISTANT

## 2023-10-13 RX ORDER — ACETAMINOPHEN 325 MG/1
650 TABLET ORAL EVERY 6 HOURS PRN
Status: ON HOLD | COMMUNITY
Start: 2023-10-13 | End: 2023-11-24

## 2023-10-13 RX ORDER — PANTOPRAZOLE SODIUM 40 MG/1
40 TABLET, DELAYED RELEASE ORAL
Qty: 30 TABLET | Refills: 0 | Status: SHIPPED | OUTPATIENT
Start: 2023-10-14 | End: 2023-11-13

## 2023-10-13 RX ORDER — CEFUROXIME AXETIL 500 MG/1
500 TABLET ORAL 2 TIMES DAILY
Qty: 14 TABLET | Refills: 0 | Status: SHIPPED | OUTPATIENT
Start: 2023-10-13 | End: 2023-10-20

## 2023-10-13 RX ORDER — OXYBUTYNIN CHLORIDE 10 MG/1
10 TABLET, EXTENDED RELEASE ORAL DAILY
Status: ON HOLD
Start: 2023-10-13 | End: 2023-11-29

## 2023-10-13 RX ORDER — OXYBUTYNIN CHLORIDE 10 MG/1
10 TABLET, EXTENDED RELEASE ORAL AT BEDTIME
Status: DISCONTINUED | OUTPATIENT
Start: 2023-10-13 | End: 2023-10-13 | Stop reason: HOSPADM

## 2023-10-13 RX ORDER — TRAMADOL HYDROCHLORIDE 50 MG/1
25 TABLET ORAL EVERY 6 HOURS PRN
Qty: 15 TABLET | Refills: 0 | Status: SHIPPED | OUTPATIENT
Start: 2023-10-13

## 2023-10-13 RX ORDER — FERROUS SULFATE 325(65) MG
325 TABLET ORAL
Qty: 30 TABLET | Refills: 0 | Status: ON HOLD | OUTPATIENT
Start: 2023-10-13 | End: 2024-04-28

## 2023-10-13 RX ADMIN — SENNOSIDES AND DOCUSATE SODIUM 1 TABLET: 50; 8.6 TABLET ORAL at 08:12

## 2023-10-13 RX ADMIN — AMLODIPINE BESYLATE 2.5 MG: 2.5 TABLET ORAL at 08:12

## 2023-10-13 RX ADMIN — ACETAMINOPHEN 975 MG: 325 TABLET, FILM COATED ORAL at 01:29

## 2023-10-13 RX ADMIN — MEMANTINE 5 MG: 5 TABLET ORAL at 08:12

## 2023-10-13 RX ADMIN — LIDOCAINE 1 PATCH: 4 PATCH TOPICAL at 08:11

## 2023-10-13 RX ADMIN — IRON SUCROSE 300 MG: 20 INJECTION, SOLUTION INTRAVENOUS at 09:21

## 2023-10-13 RX ADMIN — PANTOPRAZOLE SODIUM 40 MG: 40 TABLET, DELAYED RELEASE ORAL at 06:35

## 2023-10-13 RX ADMIN — TRAMADOL HYDROCHLORIDE 25 MG: 50 TABLET, COATED ORAL at 08:23

## 2023-10-13 RX ADMIN — ACETAMINOPHEN 975 MG: 325 TABLET, FILM COATED ORAL at 08:12

## 2023-10-13 RX ADMIN — METOPROLOL SUCCINATE 100 MG: 50 TABLET, EXTENDED RELEASE ORAL at 08:12

## 2023-10-13 ASSESSMENT — ACTIVITIES OF DAILY LIVING (ADL)
ADLS_ACUITY_SCORE: 50
ADLS_ACUITY_SCORE: 50
ADLS_ACUITY_SCORE: 51

## 2023-10-13 NOTE — PROGRESS NOTES
MD Notification    Notified Person: MD hospitalist    Notified Person Name:  Marconstance     Notification Date/Time: 10/13/2023 at 0407    Notification Interaction: [employer] web messaging.     Purpose of Notification: 830 E.G. Rib fracture alert BPA fired again from triggered Incentive spirometer level of 750mL.    Orders Received: continue to monitor.     Comments: notifying per BPA orders.

## 2023-10-13 NOTE — PLAN OF CARE
Pt found on thee ground at her facility with a laceration to the R elbow.    Pt cleared for discharge by hospitalist. Hbg 8.9 tending up, stiches intact in R elbow laceration, wound cares done- CDI, home with a keflex regiment. Pt reports feeling fine and is eager to return to her facility. Pt off the floor via transport in a WC back to her facility. All belongings, discharge packet and medications sent with transport.

## 2023-10-13 NOTE — PROGRESS NOTES
Care Management Discharge Note    Discharge Date: 10/13/2023       Discharge Disposition: Home, Home Care    Discharge Services:      Discharge DME:      Discharge Transportation: other (see comments) (need to clarify with Tina)    Private pay costs discussed: transportation costs    Does the patient's insurance plan have a 3 day qualifying hospital stay waiver?  No    PAS Confirmation Code:    Patient/family educated on Medicare website which has current facility and service quality ratings:      Education Provided on the Discharge Plan:    Persons Notified of Discharge Plans: justus Tina, bedside nurse Brandin  Patient/Family in Agreement with the Plan: yes (daughter Tina in agreement and so is patient)    Handoff Referral Completed: Yes    Additional Information:  Patient ready for discharge today.  Spoke with Kaylee at [facility] and she would like patient to return before 3:30pm.  Daughter requested that we arrange a wheelchair transport and is aware of cost; [facility] has been set up for pickup between 2:06-2:51 pm today.  Meds were filled here and will be given to the patient at discharge.  Discharge orders have been faxed to [facility] [phone]    Carlie Florence RN  Inpatient Float Care Coordinator    Post discharge addendum:  bedside nurse forgot to give meds to patient.  Writer will deliver them to [facility]; spoke with SAY Page; to deliver to [facility].

## 2023-10-13 NOTE — PROGRESS NOTES
MD Notification    Notified Person: MD hospitalist    Notified Person Name: Dr. Martinez    Notification Date/Time: 10/12/2023 at 0917    Notification Interaction: [employer]oc[employer] web messaging    Purpose of Notification: 830 E.G.  BPA Rib fracture alert triggered for incentive spirometer value 750 mL.     Orders Received:  through IS use for larger TVs.     Comments:

## 2023-10-13 NOTE — DISCHARGE SUMMARY
"[facility]  Hospitalist Discharge Summary      Date of Admission:  10/11/2023  Date of Discharge:  10/13/2023  Discharging Provider: Jessica Elizabeth MD, FACP  Discharge Service: Hospitalist Service    Discharge Diagnoses   Symptomatic acute on chronic microcytic anemia  Iron deficiency anemia  Mild CRISSY  Acute encephalopathy, mild   Fatigue, generalized weakness   UTI  Right elbow small avulsion fracture  Laceration near the right elbow sutured 10/10  Multiple rib fractures   Recurrent Falls  Acute hypoxic respiratory failure: Resolved   Possible hx vascular dementia  Possible dementia  Mild troponin elevation, likely demand ischemia in setting of profound anemia  Hypertension  Hyperlipidemia  Atrial fibrillation on chronic anticoagulation  HF with reduced EF  History of dysphagia secondary to stroke     Clinically Significant Risk Factors     # Overweight: Estimated body mass index is 25.02 kg/m  as calculated from the following:    Height as of this encounter: 1.651 m (5' 5\").    Weight as of this encounter: 68.2 kg (150 lb 5.7 oz).       Follow-ups Needed After Discharge   Follow-up Appointments     Follow-up and recommended labs and tests       Follow up with primary care provider, Colin Lake, within 7 days for   hospital follow- up.  The following labs/tests are recommended: BMP and   CBC.  Follow-up with orthopedic surgery in 2 to 4 weeks for multiple rib   fractures and right elbow laceration            Unresulted Labs Ordered in the Past 30 Days of this Admission       Date and Time Order Name Status Description    10/12/2023 11:35 AM Urine Culture Preliminary         These results will be followed up by PCP    Discharge Disposition   Discharged to home  Condition at discharge: Stable    Hospital Course     Cumulative Summary: Margy Babin is a 89 year old female with PMH significant for HFrEF, Afib on Xarelto, chronic LBBB, HTN, CVA with dysphagia, essential tremor, " possible memory impairment who was sent to the ED by her facility after finding of Hgb 6.4, after recent fall at her facility (evaluated in ED AM of 10/10), and increased generalized weakness, fatigue, and some worsened confusion   Here are further details regarding her current hospitalization     Symptomatic acute on chronic microcytic anemia  Iron deficiency anemia  During May 2023 admission, Hgb 7.7 with MCV 71 and dropped as low as 6.6 requiring 1 unit PRBC as well as 1 dose Venofer. Chart review shows hgb trending down from 11g/dL since 9/2022.    *Seen in ED 10/10 AM after fall at facility, discharged back to facility where labs were done by provider and found to have Hgb 6.4 range, sent to the ED for evaluation. Reports more fatigue, weakness generally, daughter felt legs were weaker. Some more confusion from baseline. Denies SOB, chest pain, N/V, or palpitations. Endorses lightheadedness, dizziness at times but no syncopal episodes. Denies blood in bowel or bladder. No fevers or chills. No abd pain. Head CT neg for bleed.   *Iron studies consistent with iron deficiency - recheck 10/11 with Iron 10, Iron sat 2%, TIBC 455  * s/p 1U PRBC 10/11 in the ED and received second unit last night     -- Patient was admitted for further evaluation and management   -- Patient did not have any signs of bleeding.  -- Held PTA Xarelto, discussed with patient and daughter regarding anticoagulation considering patient multiple falls and risk of bleeding with advanced age.  -- [facility] consultation was requested.  -- Patient was evaluated by Dr. Payne from [facility].  -- Per GI, patient and family does not want to pursue any invasive procedures and would like to continue with conservative management.  -- Patient has been started on Protonix.  -- Patient was also given IV iron infusion for 3 doses.  -- Hemoglobin improvement to 8.6 after total of 2 units of packed red blood cell transfusion since admission.  --  Reccommended stopping anticoagulation at this time due to the concern for patient presenting with symptomatic anemia, multiple fractures from multiple falls, possible occult malignancy as a source of bleeding as patient has not undergone invasive procedure, patient and daughter agreed , patient would like to focus goals more towards comfort   -- Patient is advised to keep outpatient follow up with PCP and get repeat Hgb after discharge , might need out outpatient transfusion.     Mild CRISSY  Cr 1.18, above baseline of 1.75-1 range. Given 1 unit PRBC and 1L IVF in the ED.    -- BMP reviewed, renal function is improved     Acute encephalopathy, mild   Fatigue, generalized weakness   UTI     Likely related to anemia, rule out other causes as below. In the ED, able to answer all questions appropriately, though sometimes slow to respond she knows she is in the hospital for blood and that she recently fell, able to complete meaningful ROS, but unable to explain much about the fall she sustained recently.     -- Orthostatics checked, patient does not have any significant lightheadedness.  -- Check resp panel - COVID, RSV, Influenza, all the respiratory panel came back negative.  -- UA ordered, showing large leukocyte esterase, more than 182 WBC count, positive nitrite and large leukocyte esterase  -- Started patient on IV ceftriaxone , switched to oral antibiotics on discharge.     Right elbow small avulsion fracture  Laceration near the right elbow sutured 10/10  Multiple rib fractures : Patient was found to have mildly displaced lateral left sixth, seventh, eighth and probably ninth rib fracture deformities on x-ray with probable left-sided pleural effusion.  Seen on XR in ED after sustaining a fall at [facility] 10/10.     -- Patient was evaluated by general surgery for trauma evaluation in the emergency department.  --Patient was started on rib fracture protocol, included aggressive pulmonary hygiene and multimodal pain control  as needed.  -- Continue incentive spirometry  -- Orthopedic consultation was requested.  -- Patient was evaluated by orthopedic surgery ,there was concern for age-indeterminate right distal medial humeral condyle avulsion fracture, right lateral arm/forearm hematoma, right lateral elbow skin tear and multiple left-sided rib fractures.  --Sling as needed for comfort for the right upper extremity is ordered  --Compression wrap as needed for comfort/swelling for the right upper extremity.  --Sutures to be removed in 10 days.     Recurrent falls:  At least 3 in the past year. 1 day prior to admission and sent back to facility. And 1 month prior in which required staples.     --See above, physical and Occupational Therapy consultation has been requested, at this time patient seems safe to be discharged to home with assist or with home care occupational therapy.     Acute hypoxic respiratory failure: Resolved  In the setting of profound anemia, recent falls, checked XR which as below shows rib fractures. Atelectasis, possible small left effusion.      -- Patient has been weaned off from oxygen.    Possible hx vascular dementia  Possible dementia     Noted in chart to have vascular dementia, but dtr reports unclear of this diagnosis.   Patient seems to have memory deficits during this morning when evaluated     --Delirium protocol  --Reorient frequently  --Continue PTA Seroquel and Memantine  -- Considering patient dementia, recurrent falls and symptomatic anemia ,AC is stopped      Mild troponin elevation, likely demand ischemia in setting of profound anemia  Hypertension  Hyperlipidemia  Atrial fibrillation on chronic anticoagulation  HFrEF  * Last TTE 5/17 with EF 55% without WMA, grade 1 diastolic dysfunction  * serial trop 28-->14. Similar range to prior admission 5/2023     -- Continue PTA amlodipine, atorvastatin and metoprolol on discharge  -- Held PTA Xarelto as above for profound anemia, and rib  "fractures    History of dysphagia secondary to stroke  Discharge summary in September 2022 notes oral pharyngeal dysphagia  -- SLP previously recommended dysphagia easy to chew diet level 7 with thin liquids    Patient was seen and examined on the day of discharge ,she is feeling well, does not have any complaints , I did review the discharge medications and instructions with the patient and plan for her to follow up with the PCP after the hospitalization .patient was in agreement , she is discharged in stable condition to her AL     Clinically Significant Risk Factors Present on Admission            # Drug Induced Coagulation Defect: home medication list includes an anticoagulant medication    # Hypertension: Noted on problem list   # Dementia: noted on problem list    # Overweight: Estimated body mass index is 25.02 kg/m  as calculated from the following:    Height as of this encounter: 1.651 m (5' 5\").    Weight as of this encounter: 68.2 kg (150 lb 5.7 oz).              Consultations This Hospital Stay   PHYSICAL THERAPY ADULT IP CONSULT  TRAUMA SURGERY IP CONSULT  GASTROENTEROLOGY IP CONSULT  CARE MANAGEMENT / SOCIAL WORK IP CONSULT  PHYSICAL THERAPY ADULT IP CONSULT  OCCUPATIONAL THERAPY ADULT IP CONSULT  ORTHOPEDIC SURGERY IP CONSULT  WOUND OSTOMY CONTINENCE NURSE  IP CONSULT  VASCULAR ACCESS ADULT IP CONSULT  WOUND OSTOMY CONTINENCE NURSE  IP CONSULT  OCCUPATIONAL THERAPY ADULT IP CONSULT    Code Status   No CPR- Do NOT Intubate    Time Spent on this Encounter   I, Jessica Elizabeth MD, personally saw the patient today and spent greater than 30 minutes discharging this patient.       Jessica Elizabeth MD  [facility] [address] [facility]  [address] [place] [address], [address] [address]  Phone: [phone]  ______________________________________________________________________    Physical Exam   Vital Signs: Temp: 98.2  F (36.8  C) Temp src: Axillary BP: (!) 145/60 Pulse: 71   Resp: 18 SpO2: 93 % O2 Device: " None (Room air)    Weight: 150 lbs 5.66 oz    Physical Exam  Vitals and nursing note reviewed.   Constitutional:       Appearance: She is well-developed.   HENT:      Head: Normocephalic and atraumatic.   Eyes:      Conjunctiva/sclera: Conjunctivae normal.      Pupils: Pupils are equal, round, and reactive to light.   Neck:      Thyroid: No thyromegaly.   Cardiovascular:      Rate and Rhythm: Normal rate and regular rhythm.      Heart sounds: Normal heart sounds. No murmur heard.  Pulmonary:      Effort: Pulmonary effort is normal. No respiratory distress.      Breath sounds: Normal breath sounds. No wheezing.   Abdominal:      General: Bowel sounds are normal.      Palpations: Abdomen is soft.      Tenderness: There is no abdominal tenderness. There is no guarding or rebound.   Musculoskeletal:         General: No deformity. Normal range of motion.      Cervical back: Normal range of motion and neck supple.   Skin:     General: Skin is warm and dry.   Neurological:      Mental Status: She is alert and oriented to person, place, and time.   Psychiatric:         Behavior: Behavior normal.        Primary Care Physician   Colin Lake    Discharge Orders      Home Care Referral      Weight bearing status    -Sling PRN for comfort for the right upper extremity.  -Compression wrap PRN for comfort/swelling for the right upper extremity.  -Activity as tolerated/WBAT RUE using pain as her guide. Okay to use a walker if she tolerates this.   -NV checks to the RUE Q4H.  -Mepilex to right elbow skin tear. RN to change daily.  -Defer restarting PTA Xarelto to primary team.  -Dispo per primary team.  -Follow up with upper extremity specialist at [facility] (please have patient/facility call [phone] to schedule) in 2-3 weeks for repeat x-rays and reevaluation of the right elbow.     Reason for your hospital stay    You were admitted to the hospital secondary to anemia , which might be secondary to chronic  blood loss , your counts are improved now after blood transfusion.     Activity    Your activity upon discharge: activity as tolerated and no driving for today     Discharge Instructions    You were admitted to the hospital secondary to anemia, you have undergone 2 units of packed red blood cell transfusion which has improved your hemoglobin.  You are discharged on iron supplement, due to the concern for anemia and falls, you have not been taking of rivaroxaban.  I would recommend you staying off the rivaroxaban and getting your hemoglobin monitored in an outpatient setting for the next few weeks to months.  If your hemoglobin is starts to improve with ferrous sulfate without any further intervention your primary care physician can probably start you back on rivaroxaban.  But if your hemoglobin stays low and you do not undergo outpatient blood transfusion then you should probably stay off the rivaroxaban due to the concern for ongoing bleeding.  If you continues to have issues with low blood counts I would also recommend you to have further discussion with your primary care physician in the future to change your goals of care towards more comfort care.  You were also found to have urinary tract infection.  You are discharged on 7 days of antibiotics.  Your oxybutynin dose is decreased to 10 mg p.o. daily from 20 mg due to anticholinergic side effects.  Your other medications have been continued.  You are also discharged on Protonix 40 mg p.o. daily to be taken every day in case if there is gastritis/gastric ulcer causing anemia.  As you are also found to have multiple rib fractures currently you can continue to take Tylenol for pain control, tramadol 50 mg has also been ordered for pain control on discharge, if you have been doing well on this pain medications, your primary care physician can give you further prescriptions.  Please continue to take all your other home medications.  You are also discharged with home  occupational therapy and home RN he will be going back to your assisted living.     Follow-up and recommended labs and tests     Follow up with primary care provider, Colin Lake, within 7 days for hospital follow- up.  The following labs/tests are recommended: BMP and CBC.  Follow-up with orthopedic surgery in 2 to 4 weeks for multiple rib fractures and right elbow laceration     No CPR- Do NOT Intubate     Fall precautions     Diet    Follow this diet upon discharge: Orders Placed This Encounter      Combination Diet Regular Diet Adult         Significant Results and Procedures   Results for orders placed or performed during the hospital encounter of 10/11/23   CT Head w/o Contrast    Narrative    EXAM: CT HEAD W/O CONTRAST  10/11/2023 9:52 AM     HISTORY: fall yesterday, negative CT head yesterday, now confused,  anticoagulated       COMPARISON: 10/10/2023, 9/5/2023    TECHNIQUE: Using multidetector thin collimation helical acquisition  technique, axial, coronal and sagittal CT images from the skull base  to the vertex were obtained without intravenous contrast.   (topogram) image(s) also obtained and reviewed. Dose reduction  techniques were used.    FINDINGS:  No acute intracranial hemorrhage, mass effect, or midline shift.  Stable presumed moderate to severe sequela of chronic small vessel  ischemic disease and old right basal ganglia lacunar infarct. No CT  findings of acute infarct or hydrocephalus. Preserved subarachnoid  spaces.    Atraumatic calvarium. No substantial paranasal sinus mucosal disease.  Clear mastoid air cells. Nonfocal orbits.       Impression    IMPRESSION: No acute intracranial hemorrhage, appreciable acute  infarct or hydrocephalus.    SAMSON YING DO         SYSTEM ID:  [id number]   XR Chest 2 Views    Narrative    XR CHEST 2 VIEWS 10/11/2023 2:45 PM    HISTORY: fall, hypoxia, posterior rib pain    COMPARISON: 5/19/2023      Impression    IMPRESSION:Mild linear opacities in the  lung bases, likely  atelectasis. No significant pleural effusion. No pneumothorax. Mild  cardiomegaly. Normal mediastinal silhouette. No displaced rib  fractures.    HEATHER SAMPSON MD         SYSTEM ID:  [id number]   XR Ribs Bilateral 2 Views    Narrative    RIBS BILATERAL 2 VIEWS  DATE/TIME: 10/11/2023 2:44 PM    INDICATION: Posterior right rib pain, recent fall.  COMPARISON: Chest radiographic exam 5/19/2023.      Impression    IMPRESSION: Mildly displaced lateral left sixth, seventh, eighth and  probably ninth rib fracture deformities. Probable left-sided pleural  effusion. No displaced right-sided rib fracture is identified.  Apparent cardiomegaly. No visible pneumothorax. Degenerative change  visualized spine.    CRISTOBAL GARCIA MD         SYSTEM ID:  [id number]   XR Chest Port 1 View    Narrative    CHEST ONE VIEW  10/12/2023 9:02 AM     HISTORY: Trauma Patient with Rib Fracture(s).    COMPARISON: Chest radiograph 10/11/2023.      Impression    IMPRESSION: Known left rib fractures are better visualized on the  prior dedicated rib radiograph. No pneumothorax or significant pleural  effusion. No new focal consolidation. Probable bibasilar atelectasis.  Similar cardiomediastinal silhouette.    JENISE VOGEL MD         SYSTEM ID:  [id number]   XR Chest Port 1 View    Narrative    EXAM: XR CHEST PORT 1 VIEW  LOCATION: [facility]  DATE: 10/13/2023    INDICATION: Trauma Patient with Rib Fracture(s)  COMPARISON: 10/12/2023.      Impression    IMPRESSION: Known rib fractures not well seen on this single frontal view of the chest. No pneumothorax or significant pleural fluid. Mild interstitial prominence. No new lung consolidation. Stable cardiac silhouette.       Discharge Medications   Current Discharge Medication List        START taking these medications    Details   !! acetaminophen (TYLENOL) 325 MG tablet Take 2 tablets (650 mg) by mouth every 6 hours as needed for mild pain or  other (and adjunct with moderate or severe pain or per patient request)    Associated Diagnoses: Acute on chronic anemia; Nocturia      cefuroxime (CEFTIN) 500 MG tablet Take 1 tablet (500 mg) by mouth 2 times daily for 7 days  Qty: 14 tablet, Refills: 0    Associated Diagnoses: Acute on chronic anemia; Nocturia      ferrous sulfate (FEROSUL) 325 (65 Fe) MG tablet Take 1 tablet (325 mg) by mouth daily (with breakfast)  Qty: 30 tablet, Refills: 0    Associated Diagnoses: Acute on chronic anemia; Nocturia      pantoprazole (PROTONIX) 40 MG EC tablet Take 1 tablet (40 mg) by mouth every morning (before breakfast) for 30 days  Qty: 30 tablet, Refills: 0    Associated Diagnoses: Acute on chronic anemia; Nocturia      traMADol (ULTRAM) 50 MG tablet Take 0.5 tablets (25 mg) by mouth every 6 hours as needed for severe pain  Qty: 15 tablet, Refills: 0    Associated Diagnoses: Acute on chronic anemia; Nocturia       !! - Potential duplicate medications found. Please discuss with provider.        CONTINUE these medications which have CHANGED    Details   oxyBUTYnin ER (DITROPAN XL) 10 MG 24 hr tablet Take 1 tablet (10 mg) by mouth daily    Associated Diagnoses: Nocturia           CONTINUE these medications which have NOT CHANGED    Details   !! acetaminophen (TYLENOL) 500 MG tablet Take 1,000 mg by mouth daily      !! acetaminophen (TYLENOL) 500 MG tablet Take 1,000 mg by mouth every 12 hours as needed for mild pain      amLODIPine (NORVASC) 2.5 MG tablet Take 1 tablet (2.5 mg) by mouth daily  Qty: 30 tablet, Refills: 10    Associated Diagnoses: Essential hypertension      atorvastatin (LIPITOR) 20 MG tablet Take 1 tablet (20 mg) by mouth daily  Qty: 90 tablet, Refills: 3    Associated Diagnoses: Hyperlipidemia LDL goal <100      bisacodyl (DULCOLAX) 5 MG EC tablet 1 TAB BY MOUTH DAILY AS NEEDED FOR CONSTIPATION  Qty: 30 tablet, Refills: 10    Associated Diagnoses: Constipation, unspecified constipation type       Cholecalciferol (VITAMIN D HIGH POTENCY) 25 MCG (1000 UT) CAPS 1 CAP BY MOUTH DAILY  Qty: 30 capsule, Refills: 10    Associated Diagnoses: Takes dietary supplements      docusate sodium (COLACE) 100 MG capsule TAKE 1 CAP BY MOUTH TWICE DAILY AS NEEDED FOR CONSTIPATION  Qty: 60 capsule, Refills: 10    Associated Diagnoses: Constipation, unspecified constipation type      guaiFENesin (ROBITUSSIN) 20 mg/mL SOLN solution Take 10 mLs by mouth every 4 hours as needed for cough    Associated Diagnoses: Pneumonia due to infectious organism, unspecified laterality, unspecified part of lung      ipratropium - albuterol 0.5 mg/2.5 mg/3 mL (DUONEB) 0.5-2.5 (3) MG/3ML neb solution Take 1 vial (3 mLs) by nebulization every 4 hours as needed for shortness of breath  Qty: 30 mL, Refills: 1      memantine (NAMENDA) 5 MG tablet 1 TAB BY MOUTH TWICE DAILY  Qty: 60 tablet, Refills: 10    Associated Diagnoses: Dementia (H)      metoprolol succinate ER (TOPROL XL) 100 MG 24 hr tablet 1 TAB BY MOUTH DAILY (DX: HYPERTENSION)  Qty: 30 tablet, Refills: 10    Associated Diagnoses: Paroxysmal atrial fibrillation (H)      ondansetron (ZOFRAN) 4 MG tablet 1 TAB BY MOUTH EVERY 8 HOURS AS NEEDED FOR NAUSEA  Qty: 10 tablet, Refills: 10    Associated Diagnoses: Nausea without vomiting      PARoxetine (PAXIL) 20 MG tablet 1 TAB BY MOUTH AT BEDTIME Strength: 20 mg  Qty: 90 tablet, Refills: 0    Associated Diagnoses: 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)      QUEtiapine (SEROQUEL) 25 MG tablet 1 TAB BY MOUTH AT BEDTIME (DX: INSOMNIA)  Qty: 30 tablet, Refills: 1    Associated Diagnoses: Insomnia, unspecified type      ramelteon (ROZEREM) 8 MG tablet 1 TAB BY MOUTH EVERY NIGHT AS NEEDED FOR SLEEP  Qty: 30 tablet, Refills: 10    Associated Diagnoses: Insomnia, unspecified type      RESTASIS 0.05 % ophthalmic emulsion INSTILL 1 DROP INTO BOTH EYES DAILY (DX: DRY EYES) ++CLAUDETTE++  Qty: 30 each, Refills: 10    Associated Diagnoses: Dry eyes       order for DME Equipment being ordered: Walker Wheels () and Walker ()  Treatment Diagnosis: Difficulty ambulating  Qty: 1 each, Refills: 0    Associated Diagnoses: Fall, initial encounter       !! - Potential duplicate medications found. Please discuss with provider.        STOP taking these medications       rivaroxaban ANTICOAGULANT (XARELTO) 15 MG TABS tablet Comments:   Reason for Stopping:             Allergies   No Known Allergies

## 2023-10-13 NOTE — PROGRESS NOTES
Surgery Progress Note    Subjective: feeling well, denies shortness of breath or chest wall pain.     Objective: breathing comfortably on room air, able to take a deep inspiration. No tenderness over left chest wall    Assessment and plan: Doing well. Ok to discharge from trauma perspective. Continue incentive spirometer.    Romeo Garcia MD on 10/13/2023 at 1:54 PM  General Surgery Resident PGY4

## 2023-10-13 NOTE — PLAN OF CARE
[id number]    Orientation: A&Ox2/3. Disoriented to time and place at times.   Activity: Ax1 GB and walker. T/R with assist.   Diet/BS Checks: regular   Tele:  SR with BBB  IV Access/Drains: New R PIV SL.   Pain Management: denied pain.   Abnormal VS/Results: VSS on Ra, except HTN  Bowel/Bladder: incontinent of bladder, purewick in place for most of night. No BM this shift.   Skin/Wounds: R elbow skin wound, wound cares completed.    Consults: PT, OT, GI, ortho  D/C Disposition: pending.     Other Info:   A&Ox2/3, disoriented to time and place at times. VSS on RA, except HTN. Denied pain. Ax1 gait belt and walker, T/R with assist. Incontinent of bladder, purewick in place for most of night. No BM this shift. New R PIV SL. Regular diet. Incentive inspirometer use encouraged, MD notified per BPA orders, see note. Neuros remained unchanged. Pt wore sling to R arm for comfort while awake. Discharge pending.

## 2023-10-13 NOTE — PROGRESS NOTES
NIF/VC performed with patient today:    NIF: -40 cm H2O with good effort   VC: 800 mL     Both were performed with good effort while the patient was sitting in a chair     Juan Haque, RRT on 10/13/2023 at 8:45 AM

## 2023-10-14 DIAGNOSIS — Z09 HOSPITAL DISCHARGE FOLLOW-UP: ICD-10-CM

## 2023-10-16 ENCOUNTER — TELEPHONE (OUTPATIENT)
Dept: FAMILY MEDICINE | Facility: CLINIC | Age: 88
End: 2023-10-16
Payer: MEDICARE

## 2023-10-16 NOTE — TELEPHONE ENCOUNTER
Home Care is calling regarding an established patient with [facility].       Requesting orders from: Colin Lake  Provider is following patient: Yes  Is this a 60-day recertification request?  No    Orders Requested    Skilled Nursing  Request for initial certification (first set of orders)   Frequency:  1x/wk for 3 wks  Then every other week for 6 weeks.     Physical Therapy  Request for initial evaluation and treatment (one time)     Occupational Therapy  Request for initial evaluation and treatment (one time)     Confirmed ok to leave a detailed message with call back.  Contact information confirmed and updated as needed.    Rani Stephenson, RN

## 2023-10-18 ENCOUNTER — TELEPHONE (OUTPATIENT)
Dept: FAMILY MEDICINE | Facility: CLINIC | Age: 88
End: 2023-10-18
Payer: MEDICARE

## 2023-10-18 NOTE — TELEPHONE ENCOUNTER
Triage outreach    Attempt number 1    Left message to call back at [phone]. No indicators on voicemail.     SAY Beebe  [facility] Triage

## 2023-10-18 NOTE — TELEPHONE ENCOUNTER
Home Care is calling regarding an established patient with [facility].       Requesting orders from: Colin Lake  Provider is following patient: Yes  Is this a 60-day recertification request?  No    Orders Requested    Physical Therapy  Request for initial certification (first set of orders)   Frequency: 5 visits over next 8 weeks        Information was gathered and will be sent to provider for review.  RN will contact Home Care with information after provider review.  Confirmed ok to leave a detailed message with call back.  Contact information confirmed and updated as needed.    Birgit Melchor RN

## 2023-10-19 NOTE — TELEPHONE ENCOUNTER
Patient Contact    Attempt # 2    Was call answered? No.    Left message for patient to call triage back.    Demi Hahn RN

## 2023-10-19 NOTE — TELEPHONE ENCOUNTER
Called TAYLOR Jefferosn regarding PCP message below. He verbalized understanding.     Demi Hirsch RN on 10/19/2023 at 11:00 AM

## 2023-10-20 ENCOUNTER — TELEPHONE (OUTPATIENT)
Dept: FAMILY MEDICINE | Facility: CLINIC | Age: 88
End: 2023-10-20
Payer: MEDICARE

## 2023-10-20 NOTE — TELEPHONE ENCOUNTER
Received call from Angella of [facility] regarding pt was originally complaining of pain in area where she had stitches/wound, now denies pain.  Angella reports zero s/s infection, and wound looks improved from last visit. Afebrile.    Will call back if any change in condition.  Lisa Tavares, RN  [facility] RN Triage Team

## 2023-11-02 ENCOUNTER — TELEPHONE (OUTPATIENT)
Dept: FAMILY MEDICINE | Facility: CLINIC | Age: 88
End: 2023-11-02
Payer: MEDICARE

## 2023-11-02 NOTE — TELEPHONE ENCOUNTER
Home Care is calling regarding an established patient with [facility].        10/18/2023    12:08 PM   Home Care Information   Date of Home Care episode start 10/18/2023   Current following provider Dr. Lake     Requesting orders from: Colin Lake  Provider is following patient: Yes  Is this a 60-day recertification request?  No    Orders Requested    Occupational Therapy  Request for initial certification (first set of orders)   Frequency: Every other week for 6 weeks       Information was gathered and will be sent to provider for review.  RN will contact Home Care with information after provider review.  Confirmed ok to leave a detailed message with call back.  Contact information confirmed and updated as needed.    Birgit Melchor RN

## 2023-11-02 NOTE — TELEPHONE ENCOUNTER
Called and spoke to Loren. Relayed message from the provider. Loren expressed understanding and had no additional questions at this time.     Fawn Poon RN

## 2023-11-23 ENCOUNTER — HOSPITAL ENCOUNTER (INPATIENT)
Facility: CLINIC | Age: 88
LOS: 2 days | Discharge: HOME HEALTH | DRG: 280 | End: 2023-11-29
Attending: EMERGENCY MEDICINE | Admitting: INTERNAL MEDICINE
Payer: MEDICARE

## 2023-11-23 DIAGNOSIS — N30.00 ACUTE CYSTITIS WITHOUT HEMATURIA: ICD-10-CM

## 2023-11-23 DIAGNOSIS — E86.0 DEHYDRATION: ICD-10-CM
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
## 2023-11-23 DIAGNOSIS — I48.91 ATRIAL FIBRILLATION WITH RAPID VENTRICULAR RESPONSE (H): ICD-10-CM

## 2023-11-23 LAB
ABO/RH(D): NORMAL
ANTIBODY SCREEN: NEGATIVE
SPECIMEN EXPIRATION DATE: NORMAL

## 2023-11-23 PROCEDURE — 84145 PROCALCITONIN (PCT): CPT | Performed by: EMERGENCY MEDICINE

## 2023-11-23 PROCEDURE — 99285 EMERGENCY DEPT VISIT HI MDM: CPT | Mod: 25

## 2023-11-23 PROCEDURE — 86901 BLOOD TYPING SEROLOGIC RH(D): CPT | Performed by: EMERGENCY MEDICINE

## 2023-11-23 PROCEDURE — 80053 COMPREHEN METABOLIC PANEL: CPT | Performed by: EMERGENCY MEDICINE

## 2023-11-23 PROCEDURE — 96365 THER/PROPH/DIAG IV INF INIT: CPT | Mod: 59

## 2023-11-23 PROCEDURE — 93005 ELECTROCARDIOGRAM TRACING: CPT

## 2023-11-23 PROCEDURE — 86850 RBC ANTIBODY SCREEN: CPT | Performed by: EMERGENCY MEDICINE

## 2023-11-23 PROCEDURE — 83880 ASSAY OF NATRIURETIC PEPTIDE: CPT | Performed by: EMERGENCY MEDICINE

## 2023-11-23 PROCEDURE — 36415 COLL VENOUS BLD VENIPUNCTURE: CPT | Performed by: EMERGENCY MEDICINE

## 2023-11-23 PROCEDURE — 85025 COMPLETE CBC W/AUTO DIFF WBC: CPT | Performed by: EMERGENCY MEDICINE

## 2023-11-23 RX ORDER — DILTIAZEM HYDROCHLORIDE 5 MG/ML
10 INJECTION INTRAVENOUS ONCE
Status: COMPLETED | OUTPATIENT
Start: 2023-11-23 | End: 2023-11-24

## 2023-11-24 ENCOUNTER — APPOINTMENT (OUTPATIENT)
Dept: GENERAL RADIOLOGY | Facility: CLINIC | Age: 88
DRG: 280 | End: 2023-11-24
Attending: EMERGENCY MEDICINE
Payer: MEDICARE

## 2023-11-24 ENCOUNTER — APPOINTMENT (OUTPATIENT)
Dept: CT IMAGING | Facility: CLINIC | Age: 88
DRG: 280 | End: 2023-11-24
Attending: EMERGENCY MEDICINE
Payer: MEDICARE

## 2023-11-24 ENCOUNTER — APPOINTMENT (OUTPATIENT)
Dept: PHYSICAL THERAPY | Facility: CLINIC | Age: 88
DRG: 280 | End: 2023-11-24
Attending: STUDENT IN AN ORGANIZED HEALTH CARE EDUCATION/TRAINING PROGRAM
Payer: MEDICARE

## 2023-11-24 PROBLEM — E86.0 DEHYDRATION: Status: ACTIVE | Noted: 2023-11-24

## 2023-11-24 PROBLEM — I48.91 ATRIAL FIBRILLATION WITH RAPID VENTRICULAR RESPONSE (H): Status: ACTIVE | Noted: 2023-11-24

## 2023-11-24 PROBLEM — N30.00 ACUTE CYSTITIS WITHOUT HEMATURIA: Status: ACTIVE | Noted: 2023-11-24

## 2023-11-24 LAB
ALBUMIN SERPL BCG-MCNC: 3.8 G/DL (ref 3.5–5.2)
ALBUMIN UR-MCNC: NEGATIVE MG/DL
ALP SERPL-CCNC: 110 U/L (ref 40–150)
ALT SERPL W P-5'-P-CCNC: 16 U/L (ref 0–50)
ANION GAP SERPL CALCULATED.3IONS-SCNC: 14 MMOL/L (ref 7–15)
APPEARANCE UR: CLEAR
AST SERPL W P-5'-P-CCNC: 29 U/L (ref 0–45)
ATRIAL RATE - MUSE: 79 BPM
BACTERIA #/AREA URNS HPF: ABNORMAL /HPF
BASOPHILS # BLD AUTO: 0.1 10E3/UL (ref 0–0.2)
BASOPHILS NFR BLD AUTO: 1 %
BILIRUB SERPL-MCNC: 0.2 MG/DL
BILIRUB UR QL STRIP: NEGATIVE
BUN SERPL-MCNC: 17.7 MG/DL (ref 8–23)
CALCIUM SERPL-MCNC: 9.1 MG/DL (ref 8.2–9.6)
CHLORIDE SERPL-SCNC: 103 MMOL/L (ref 98–107)
COLOR UR AUTO: ABNORMAL
CREAT SERPL-MCNC: 0.79 MG/DL (ref 0.51–0.95)
DEPRECATED HCO3 PLAS-SCNC: 23 MMOL/L (ref 22–29)
DIASTOLIC BLOOD PRESSURE - MUSE: NORMAL MMHG
EGFRCR SERPLBLD CKD-EPI 2021: 69 ML/MIN/1.73M2
EOSINOPHIL # BLD AUTO: 1.3 10E3/UL (ref 0–0.7)
EOSINOPHIL NFR BLD AUTO: 12 %
ERYTHROCYTE [DISTWIDTH] IN BLOOD BY AUTOMATED COUNT: ABNORMAL %
GLUCOSE SERPL-MCNC: 125 MG/DL (ref 70–99)
GLUCOSE UR STRIP-MCNC: NEGATIVE MG/DL
HCT VFR BLD AUTO: 43.1 % (ref 35–47)
HGB BLD-MCNC: 12.9 G/DL (ref 11.7–15.7)
HGB UR QL STRIP: NEGATIVE
HOLD SPECIMEN: NORMAL
HOLD SPECIMEN: NORMAL
IMM GRANULOCYTES # BLD: 0 10E3/UL
IMM GRANULOCYTES NFR BLD: 0 %
INTERPRETATION ECG - MUSE: NORMAL
KETONES UR STRIP-MCNC: NEGATIVE MG/DL
LACTATE SERPL-SCNC: 2 MMOL/L (ref 0.7–2)
LACTATE SERPL-SCNC: 2.8 MMOL/L (ref 0.7–2)
LEUKOCYTE ESTERASE UR QL STRIP: ABNORMAL
LYMPHOCYTES # BLD AUTO: 2.8 10E3/UL (ref 0.8–5.3)
LYMPHOCYTES NFR BLD AUTO: 27 %
MCH RBC QN AUTO: 25.1 PG (ref 26.5–33)
MCHC RBC AUTO-ENTMCNC: 29.9 G/DL (ref 31.5–36.5)
MCV RBC AUTO: 84 FL (ref 78–100)
MONOCYTES # BLD AUTO: 0.9 10E3/UL (ref 0–1.3)
MONOCYTES NFR BLD AUTO: 8 %
NEUTROPHILS # BLD AUTO: 5.4 10E3/UL (ref 1.6–8.3)
NEUTROPHILS NFR BLD AUTO: 52 %
NITRATE UR QL: POSITIVE
NRBC # BLD AUTO: 0 10E3/UL
NRBC BLD AUTO-RTO: 0 /100
NT-PROBNP SERPL-MCNC: 210 PG/ML (ref 0–1800)
P AXIS - MUSE: 109 DEGREES
PH UR STRIP: 6 [PH] (ref 5–7)
PLATELET # BLD AUTO: 370 10E3/UL (ref 150–450)
POTASSIUM SERPL-SCNC: 4.2 MMOL/L (ref 3.4–5.3)
PR INTERVAL - MUSE: 200 MS
PROCALCITONIN SERPL IA-MCNC: 0.07 NG/ML
PROT SERPL-MCNC: 6.8 G/DL (ref 6.4–8.3)
QRS DURATION - MUSE: 136 MS
QT - MUSE: 444 MS
QTC - MUSE: 509 MS
R AXIS - MUSE: -26 DEGREES
RBC # BLD AUTO: 5.13 10E6/UL (ref 3.8–5.2)
RBC URINE: 0 /HPF
SODIUM SERPL-SCNC: 140 MMOL/L (ref 135–145)
SP GR UR STRIP: 1.01 (ref 1–1.03)
SYSTOLIC BLOOD PRESSURE - MUSE: NORMAL MMHG
T AXIS - MUSE: 145 DEGREES
TROPONIN T SERPL HS-MCNC: 30 NG/L
TROPONIN T SERPL HS-MCNC: 32 NG/L
TROPONIN T SERPL HS-MCNC: 36 NG/L
TSH SERPL DL<=0.005 MIU/L-ACNC: 3.89 UIU/ML (ref 0.3–4.2)
UROBILINOGEN UR STRIP-MCNC: NORMAL MG/DL
VENTRICULAR RATE- MUSE: 79 BPM
WBC # BLD AUTO: 10.5 10E3/UL (ref 4–11)
WBC CLUMPS #/AREA URNS HPF: PRESENT /HPF
WBC URINE: 22 /HPF

## 2023-11-24 PROCEDURE — 36415 COLL VENOUS BLD VENIPUNCTURE: CPT | Performed by: STUDENT IN AN ORGANIZED HEALTH CARE EDUCATION/TRAINING PROGRAM

## 2023-11-24 PROCEDURE — 250N000011 HC RX IP 250 OP 636: Performed by: EMERGENCY MEDICINE

## 2023-11-24 PROCEDURE — 84484 ASSAY OF TROPONIN QUANT: CPT | Performed by: INTERNAL MEDICINE

## 2023-11-24 PROCEDURE — 250N000013 HC RX MED GY IP 250 OP 250 PS 637: Performed by: STUDENT IN AN ORGANIZED HEALTH CARE EDUCATION/TRAINING PROGRAM

## 2023-11-24 PROCEDURE — 99232 SBSQ HOSP IP/OBS MODERATE 35: CPT | Performed by: STUDENT IN AN ORGANIZED HEALTH CARE EDUCATION/TRAINING PROGRAM

## 2023-11-24 PROCEDURE — G1010 CDSM STANSON: HCPCS

## 2023-11-24 PROCEDURE — 258N000003 HC RX IP 258 OP 636: Performed by: EMERGENCY MEDICINE

## 2023-11-24 PROCEDURE — 72170 X-RAY EXAM OF PELVIS: CPT

## 2023-11-24 PROCEDURE — 36415 COLL VENOUS BLD VENIPUNCTURE: CPT | Performed by: EMERGENCY MEDICINE

## 2023-11-24 PROCEDURE — G0378 HOSPITAL OBSERVATION PER HR: HCPCS

## 2023-11-24 PROCEDURE — 84443 ASSAY THYROID STIM HORMONE: CPT | Performed by: EMERGENCY MEDICINE

## 2023-11-24 PROCEDURE — 250N000013 HC RX MED GY IP 250 OP 250 PS 637: Performed by: INTERNAL MEDICINE

## 2023-11-24 PROCEDURE — 84484 ASSAY OF TROPONIN QUANT: CPT | Performed by: STUDENT IN AN ORGANIZED HEALTH CARE EDUCATION/TRAINING PROGRAM

## 2023-11-24 PROCEDURE — 71275 CT ANGIOGRAPHY CHEST: CPT | Mod: MA

## 2023-11-24 PROCEDURE — 99223 1ST HOSP IP/OBS HIGH 75: CPT | Mod: AI | Performed by: INTERNAL MEDICINE

## 2023-11-24 PROCEDURE — 96361 HYDRATE IV INFUSION ADD-ON: CPT

## 2023-11-24 PROCEDURE — 96366 THER/PROPH/DIAG IV INF ADDON: CPT

## 2023-11-24 PROCEDURE — 73562 X-RAY EXAM OF KNEE 3: CPT | Mod: RT

## 2023-11-24 PROCEDURE — 84484 ASSAY OF TROPONIN QUANT: CPT | Performed by: EMERGENCY MEDICINE

## 2023-11-24 PROCEDURE — 87088 URINE BACTERIA CULTURE: CPT | Performed by: EMERGENCY MEDICINE

## 2023-11-24 PROCEDURE — 87040 BLOOD CULTURE FOR BACTERIA: CPT | Performed by: EMERGENCY MEDICINE

## 2023-11-24 PROCEDURE — 36415 COLL VENOUS BLD VENIPUNCTURE: CPT | Performed by: INTERNAL MEDICINE

## 2023-11-24 PROCEDURE — 96376 TX/PRO/DX INJ SAME DRUG ADON: CPT

## 2023-11-24 PROCEDURE — 73080 X-RAY EXAM OF ELBOW: CPT | Mod: RT

## 2023-11-24 PROCEDURE — 97116 GAIT TRAINING THERAPY: CPT | Mod: GP

## 2023-11-24 PROCEDURE — 250N000009 HC RX 250: Performed by: EMERGENCY MEDICINE

## 2023-11-24 PROCEDURE — 97161 PT EVAL LOW COMPLEX 20 MIN: CPT | Mod: GP

## 2023-11-24 PROCEDURE — 83605 ASSAY OF LACTIC ACID: CPT | Performed by: EMERGENCY MEDICINE

## 2023-11-24 PROCEDURE — 250N000011 HC RX IP 250 OP 636: Mod: JZ | Performed by: EMERGENCY MEDICINE

## 2023-11-24 PROCEDURE — 96367 TX/PROPH/DG ADDL SEQ IV INF: CPT

## 2023-11-24 PROCEDURE — 81001 URINALYSIS AUTO W/SCOPE: CPT | Performed by: EMERGENCY MEDICINE

## 2023-11-24 RX ORDER — METOPROLOL SUCCINATE 100 MG/1
200 TABLET, EXTENDED RELEASE ORAL DAILY
Status: DISCONTINUED | OUTPATIENT
Start: 2023-11-24 | End: 2023-11-26

## 2023-11-24 RX ORDER — AMOXICILLIN 250 MG
2 CAPSULE ORAL 2 TIMES DAILY PRN
Status: DISCONTINUED | OUTPATIENT
Start: 2023-11-24 | End: 2023-11-29 | Stop reason: HOSPADM

## 2023-11-24 RX ORDER — LIDOCAINE 40 MG/G
CREAM TOPICAL
Status: DISCONTINUED | OUTPATIENT
Start: 2023-11-24 | End: 2023-11-29 | Stop reason: HOSPADM

## 2023-11-24 RX ORDER — RAMELTEON 8 MG/1
8 TABLET ORAL DAILY PRN
Status: DISCONTINUED | OUTPATIENT
Start: 2023-11-24 | End: 2023-11-25

## 2023-11-24 RX ORDER — PROCHLORPERAZINE 25 MG
12.5 SUPPOSITORY, RECTAL RECTAL EVERY 12 HOURS PRN
Status: DISCONTINUED | OUTPATIENT
Start: 2023-11-24 | End: 2023-11-29 | Stop reason: HOSPADM

## 2023-11-24 RX ORDER — IOPAMIDOL 755 MG/ML
59 INJECTION, SOLUTION INTRAVASCULAR ONCE
Status: COMPLETED | OUTPATIENT
Start: 2023-11-24 | End: 2023-11-24

## 2023-11-24 RX ORDER — ONDANSETRON 2 MG/ML
4 INJECTION INTRAMUSCULAR; INTRAVENOUS EVERY 6 HOURS PRN
Status: DISCONTINUED | OUTPATIENT
Start: 2023-11-24 | End: 2023-11-25

## 2023-11-24 RX ORDER — IPRATROPIUM BROMIDE AND ALBUTEROL SULFATE 2.5; .5 MG/3ML; MG/3ML
1 SOLUTION RESPIRATORY (INHALATION) EVERY 4 HOURS PRN
Status: DISCONTINUED | OUTPATIENT
Start: 2023-11-24 | End: 2023-11-29 | Stop reason: HOSPADM

## 2023-11-24 RX ORDER — PROCHLORPERAZINE MALEATE 5 MG
5 TABLET ORAL EVERY 6 HOURS PRN
Status: DISCONTINUED | OUTPATIENT
Start: 2023-11-24 | End: 2023-11-29 | Stop reason: HOSPADM

## 2023-11-24 RX ORDER — QUETIAPINE FUMARATE 25 MG/1
25 TABLET, FILM COATED ORAL AT BEDTIME
Status: DISCONTINUED | OUTPATIENT
Start: 2023-11-24 | End: 2023-11-29 | Stop reason: HOSPADM

## 2023-11-24 RX ORDER — CEFTRIAXONE 1 G/1
1 INJECTION, POWDER, FOR SOLUTION INTRAMUSCULAR; INTRAVENOUS EVERY 24 HOURS
Status: DISCONTINUED | OUTPATIENT
Start: 2023-11-25 | End: 2023-11-24

## 2023-11-24 RX ORDER — CEFTRIAXONE 1 G/1
1 INJECTION, POWDER, FOR SOLUTION INTRAMUSCULAR; INTRAVENOUS ONCE
Qty: 10 ML | Refills: 0 | Status: COMPLETED | OUTPATIENT
Start: 2023-11-24 | End: 2023-11-24

## 2023-11-24 RX ORDER — METOPROLOL TARTRATE 1 MG/ML
2.5 INJECTION, SOLUTION INTRAVENOUS EVERY 4 HOURS PRN
Status: DISCONTINUED | OUTPATIENT
Start: 2023-11-24 | End: 2023-11-29 | Stop reason: HOSPADM

## 2023-11-24 RX ORDER — AMLODIPINE BESYLATE 2.5 MG/1
2.5 TABLET ORAL DAILY
Status: DISCONTINUED | OUTPATIENT
Start: 2023-11-24 | End: 2023-11-29 | Stop reason: HOSPADM

## 2023-11-24 RX ORDER — ONDANSETRON 4 MG/1
4 TABLET, ORALLY DISINTEGRATING ORAL EVERY 6 HOURS PRN
Status: DISCONTINUED | OUTPATIENT
Start: 2023-11-24 | End: 2023-11-25

## 2023-11-24 RX ORDER — AMOXICILLIN 250 MG
1 CAPSULE ORAL 2 TIMES DAILY PRN
Status: DISCONTINUED | OUTPATIENT
Start: 2023-11-24 | End: 2023-11-29 | Stop reason: HOSPADM

## 2023-11-24 RX ORDER — MEMANTINE HYDROCHLORIDE 5 MG/1
5 TABLET ORAL 2 TIMES DAILY
Status: DISCONTINUED | OUTPATIENT
Start: 2023-11-24 | End: 2023-11-29 | Stop reason: HOSPADM

## 2023-11-24 RX ORDER — ATORVASTATIN CALCIUM 20 MG/1
20 TABLET, FILM COATED ORAL EVERY EVENING
Status: DISCONTINUED | OUTPATIENT
Start: 2023-11-24 | End: 2023-11-29 | Stop reason: HOSPADM

## 2023-11-24 RX ORDER — FERROUS SULFATE 325(65) MG
325 TABLET ORAL
Status: DISCONTINUED | OUTPATIENT
Start: 2023-11-25 | End: 2023-11-29 | Stop reason: HOSPADM

## 2023-11-24 RX ORDER — NITROFURANTOIN 25; 75 MG/1; MG/1
100 CAPSULE ORAL EVERY 12 HOURS SCHEDULED
Status: DISCONTINUED | OUTPATIENT
Start: 2023-11-24 | End: 2023-11-26

## 2023-11-24 RX ORDER — POLYETHYLENE GLYCOL 3350 17 G/17G
17 POWDER, FOR SOLUTION ORAL 2 TIMES DAILY PRN
Status: DISCONTINUED | OUTPATIENT
Start: 2023-11-24 | End: 2023-11-29 | Stop reason: HOSPADM

## 2023-11-24 RX ADMIN — SODIUM CHLORIDE 500 ML: 9 INJECTION, SOLUTION INTRAVENOUS at 01:41

## 2023-11-24 RX ADMIN — AMLODIPINE BESYLATE 2.5 MG: 2.5 TABLET ORAL at 16:18

## 2023-11-24 RX ADMIN — CEFTRIAXONE SODIUM 1 G: 1 INJECTION, POWDER, FOR SOLUTION INTRAMUSCULAR; INTRAVENOUS at 02:09

## 2023-11-24 RX ADMIN — QUETIAPINE FUMARATE 25 MG: 25 TABLET ORAL at 21:04

## 2023-11-24 RX ADMIN — NITROFURANTOIN MONOHYDRATE/MACROCRYSTALLINE 100 MG: 25; 75 CAPSULE ORAL at 21:05

## 2023-11-24 RX ADMIN — DILTIAZEM HYDROCHLORIDE 10 MG: 5 INJECTION INTRAVENOUS at 00:01

## 2023-11-24 RX ADMIN — MEMANTINE 5 MG: 5 TABLET ORAL at 21:05

## 2023-11-24 RX ADMIN — DILTIAZEM HYDROCHLORIDE 5 MG/HR: 5 INJECTION INTRAVENOUS at 00:07

## 2023-11-24 RX ADMIN — SODIUM CHLORIDE 1000 ML: 9 INJECTION, SOLUTION INTRAVENOUS at 00:25

## 2023-11-24 RX ADMIN — ATORVASTATIN CALCIUM 20 MG: 20 TABLET, FILM COATED ORAL at 21:05

## 2023-11-24 RX ADMIN — IOPAMIDOL 59 ML: 755 INJECTION, SOLUTION INTRAVENOUS at 01:28

## 2023-11-24 RX ADMIN — METOPROLOL SUCCINATE 200 MG: 50 TABLET, EXTENDED RELEASE ORAL at 08:38

## 2023-11-24 RX ADMIN — SODIUM CHLORIDE 86 ML: 9 INJECTION, SOLUTION INTRAVENOUS at 01:29

## 2023-11-24 ASSESSMENT — ACTIVITIES OF DAILY LIVING (ADL)
ADLS_ACUITY_SCORE: 38
DEPENDENT_IADLS:: CLEANING;COOKING;LAUNDRY;SHOPPING;MEAL PREPARATION;MEDICATION MANAGEMENT;MONEY MANAGEMENT;TRANSPORTATION;INCONTINENCE
ADLS_ACUITY_SCORE: 45
ADLS_ACUITY_SCORE: 35
ADLS_ACUITY_SCORE: 38
ADLS_ACUITY_SCORE: 37
ADLS_ACUITY_SCORE: 38
ADLS_ACUITY_SCORE: 38
ADLS_ACUITY_SCORE: 41
ADLS_ACUITY_SCORE: 39
ADLS_ACUITY_SCORE: 38
ADLS_ACUITY_SCORE: 38
ADLS_ACUITY_SCORE: 37

## 2023-11-24 NOTE — PROGRESS NOTES
RECEIVING UNIT ED HANDOFF REVIEW    ED Nurse Handoff Report was reviewed by: Ilene Bender RN on November 24, 2023 at 3:49 AM

## 2023-11-24 NOTE — PROGRESS NOTES
"   11/24/23 1400   Appointment Info   Signing Clinician's Name / Credentials (PT) Ramonita Sunshine DPT   Living Environment   People in Home facility resident   Current Living Arrangements assisted living  ([facility])   Home Accessibility wheelchair accessible;no concerns   Transportation Anticipated family or friend will provide;agency   Living Environment Comments Pt lives in an [facility] where she reports having a call light and can recieve assist as needed with tasks.   Self-Care   Usual Activity Tolerance moderate   Current Activity Tolerance fair   Regular Exercise No   Equipment Currently Used at Home walker, rolling;wheelchair, manual   Fall history within last six months yes   Number of times patient has fallen within last six months 1  (Pt unable to state how many falls but reports there have been multiple recently.)   Activity/Exercise/Self-Care Comment Pt is typically Mod I with mobility with a FWW, reports having multiple falls recently. Pt reports having assist with all ADL's if needed at her half-way.   General Information   Onset of Illness/Injury or Date of Surgery 11/23/23   Referring Physician Uziel King, DO   Patient/Family Therapy Goals Statement (PT) \"To go back home\"   Pertinent History of Current Problem (include personal factors and/or comorbidities that impact the POC) Pt is a 89 year old female admitted on 11/23/2023. She presents to the emergency department after a fall out of bed while getting up to go to the bathroom at her care facility.  Found to have A-fib RVR, lactic acidosis, and hypotension as well as pyuria with possible urinary tract infection.   Existing Precautions/Restrictions fall;oxygen therapy device and L/min  (2 L supplemental O2)   Weight-Bearing Status - LUE full weight-bearing   Weight-Bearing Status - RUE full weight-bearing   Weight-Bearing Status - LLE full weight-bearing   Weight-Bearing Status - RLE full weight-bearing   Cognition   Affect/Mental Status (Cognition) " WFL;confused   Orientation Status (Cognition) oriented x 4;verbal cues/prompts needed for orientation   Follows Commands (Cognition) follows one-step commands;repetition of directions required;verbal cues/prompting required   Cognitive Status Comments Pt is AxOx4 but forgetful throughout session, requires repetition of cues but easily redirectable   Pain Assessment   Patient Currently in Pain No   Posture    Posture Forward head position;Protracted shoulders   Range of Motion (ROM)   Range of Motion ROM is WFL   Strength (Manual Muscle Testing)   Strength (Manual Muscle Testing) Able to perform R SLR;Able to perform L SLR;Deficits observed during functional mobility   Bed Mobility   Comment, (Bed Mobility) Supine>sitting EOB completed w/ SBA   Transfers   Comment, (Transfers) Sit>stand completed w/ SBA   Gait/Stairs (Locomotion)   Comment, (Gait/Stairs) Pt ambulated w/ SBA and FWW   Balance   Balance other (describe)   Balance Comments Pt demonstrated good sitting and static standing balance, required FWW for all dynamic balance   Sensory Examination   Sensory Perception patient reports no sensory changes   Coordination   Coordination no deficits were identified   Muscle Tone   Muscle Tone no deficits were identified   Clinical Impression   Criteria for Skilled Therapeutic Intervention Yes, treatment indicated   PT Diagnosis (PT) Impaired functional mobility   Influenced by the following impairments Impaired activity tolerance, impaired balance, weakness   Functional limitations due to impairments Impaired gait and inability to complete ADL's   Clinical Presentation (PT Evaluation Complexity) stable   Clinical Presentation Rationale Clinical judgment   Clinical Decision Making (Complexity) low complexity   Planned Therapy Interventions (PT) balance training;bed mobility training;gait training;home exercise program;motor coordination training;neuromuscular re-education;patient/family education;postural  re-education;ROM (range of motion);stair training;strengthening;stretching;transfer training;progressive activity/exercise;risk factor education;home program guidelines   Risk & Benefits of therapy have been explained evaluation/treatment results reviewed;care plan/treatment goals reviewed;current/potential barriers reviewed;risks/benefits reviewed;participants voiced agreement with care plan;participants included;patient   PT Total Evaluation Time   PT Eval, Low Complexity Minutes (16154) 10   Physical Therapy Goals   PT Frequency Daily   PT Predicted Duration/Target Date for Goal Attainment 12/01/23   PT Goals Bed Mobility;Transfers;Gait   PT: Bed Mobility Independent;Supine to/from sit;Rolling;Bridging   PT: Transfers Modified independent;Sit to/from stand;Bed to/from chair;Assistive device   PT: Gait Modified independent;Rolling walker;Greater than 200 feet   Interventions   Interventions Quick Adds Gait Training;Therapeutic Activity   Therapeutic Activity   Therapeutic Activities: dynamic activities to improve functional performance Minutes (14210) 5   Treatment Detail/Skilled Intervention Evaluation completed and treatment indicated. Once sitting EOB, pt completed sit>stand x 3 reps w/ SBA, cues given to push up from sitting surface when completing. Post-ambulation, pt completed sit>supine w/ SBA. Pt educated on discharge planning and usage of FWW for mobility. Pt left supine in bed with all needs in reach and bed alarm on.   Gait Training   Gait Training Minutes (41861) 15   Symptoms Noted During/After Treatment (Gait Training) shortness of breath   Treatment Detail/Skilled Intervention Pt ambulated ~125 ft w/ FWW and CGA. Cues given for pacing, upright posture, and obstacle navigation throughout. VS monitored throughout session, pt on 2 L supp O2 throughout. Pre-ambulation at 97% SpO2 and 86 bpm. Post-ambulation pt at 94% SpO2 and 100 bpm. No overt LOB occurred.   Distance in Feet 125 ft   [place] Level  (Gait Training) contact guard   Physical Assistance Level (Gait Training) verbal cues   Weight Bearing (Gait Training) full weight-bearing   Assistive Device (Gait Training) rolling walker   PT Discharge Planning   PT Plan Progress gait distance w/ FWW to increase activity tolerance, close monitoring of VS, repeated sit>stands, dynamic balance tasks   PT Discharge Recommendation (DC Rec) Long term care facility   PT Rationale for DC Rec Pt is moving close to baseline mobility, currently limited by SOB and impaired balance with ambulation. Pt lives in an [facility] and usually is able to ambulate w/ FWW on room air, is currently requiring 2 L supplemental O2 at this time. Anticipate with further skilled inpatient therapy and medical managment that pt will be safe to return home to her [facility] with assist and HCOT in order to address impaired activity tolerance and decreased balance.   PT Brief overview of current status SBA w/ FWW while on 2 L supplemental O2   PT Equipment Needed at Discharge   (Pt has FWW and manual w/c at [facility])   Total Session Time   Timed Code Treatment Minutes 20   Total Session Time (sum of timed and untimed services) 30                                                                                 [facility]      OUTPATIENT PHYSICAL THERAPY EVALUATION  PLAN OF TREATMENT FOR OUTPATIENT REHABILITATION  (COMPLETE FOR INITIAL CLAIMS ONLY)  Patient's Last Name, First Name, M.I.  YOB: 1929  Margy Babin                        Provider's Name  [facility] Medical Record No.  [id number]                               Onset Date:  11/23/23   Start of Care Date:         Type:     _X_PT   ___OT   ___SLP Medical Diagnosis:                           PT Diagnosis:  Impaired functional mobility   Visits from SOC:  1   _________________________________________________________________________________  Plan of Treatment/Functional  Goals    Planned Interventions: balance training, bed mobility training, gait training, home exercise program, motor coordination training, neuromuscular re-education, patient/family education, postural re-education, ROM (range of motion), stair training, strengthening, stretching, transfer training, progressive activity/exercise, risk factor education, home program guidelines     Goals: See Physical Therapy Goals on Care Plan in [facility] electronic health record.    Therapy Frequency: Daily  Predicted Duration of Therapy Intervention: 12/01/23  _________________________________________________________________________________    I CERTIFY THE NEED FOR THESE SERVICES FURNISHED UNDER        THIS PLAN OF TREATMENT AND WHILE UNDER MY CARE     (Physician attestation of this document indicates review and certification of the therapy plan).               ,      Referring Physician: Uziel King, DO            Initial Assessment        See Physical Therapy evaluation dated   in Epic electronic health record.

## 2023-11-24 NOTE — H&P
[facility]    History and Physical - Hospitalist Service       Date of Admission:  11/23/2023    Assessment & Plan      Margy Babin is a 89 year old female admitted on 11/23/2023. She presents to the emergency department after a fall out of bed while getting up to go to the bathroom at her care facility.  Found to have A-fib RVR, lactic acidosis, and hypotension as well as pyuria with possible urinary tract infection.    Paroxysmal atrial fibrillation with rapid ventricular rate: Now converted to sinus rhythm after initial dose of diltiazem.  Patient unaware of rapid rates.  -Increasing prior to admission metoprolol XL from 100 mg daily to 200 mg daily.  -Continue Zio patch monitoring at discharge; might need to consider ablation in the future if recurrent A-fib RVR episodes and inability to do uptitrate rate control agents.  -Remaining off of anticoagulation as per last admission and discussion with family    Non-ST elevation myocardial infarction: Troponin elevated at 30 in the setting of A-fib RVR into the 170 range.  Suspect type II NSTEMI with demand ischemia from rate.  -Repeat troponin    Pyuria: Unreliable historian, but no urinary symptoms including no reported dysuria or urinary frequency.  -IV ceftriaxone 1 g every 24 hours  -Urine culture pending    Frequent falls:  -Fall precautions  -Can pursue outpatient physical therapy/Occupational Therapy at facility on discharge    Suspected vascular dementia.  Noted in history.  Seems to be an unreliable historian at this time (reports she is on no prescription medications) and has a history of encephalopathy on prior admissions.  -Outpatient occupational therapy  -Await reconciliation of prior to admission medications including Namenda, Seroquel, paroxetine, ramelteon    Hypertension:  -Can resume prior to admission amlodipine at discharge.          Diet:  Regular diet  DVT Prophylaxis: Pneumatic Compression Devices  Pichardo  Catheter: Not present  Lines: None     Cardiac Monitoring: None  Code Status:  DNR/DNI.  Confirmed with patient on admission    Clinically Significant Risk Factors Present on Admission                  # Hypertension: Noted on problem list   # Acute Respiratory Failure: Documented O2 saturation < 91%.  Continue supplemental oxygen as needed  # Dementia: noted on problem list               Disposition Plan      Expected Discharge Date: 11/25/2023                  Satnam Jackson MD  Hospitalist Service  [facility]  Securely message with [employer] (more info)  Text page via [employer] Paging/Directory     ______________________________________________________________________    Chief Complaint   Fall out of bed    History is obtained from the patient, chart review, discussion with Dr. Cox in the emergency department    History of Present Illness   Margy Babin is a 89 year old female who presents to the emergency department from her care facility, [facility], after she got up to go to the bathroom and fell out of bed.  She is uncertain as to the mechanism of her fall, but does not believe that she was lightheaded at time of fall.  Note, however, that her history is likely unreliable.  She tells me that she is taking no prescription medications.  I confirmed with her care facility that she is on scheduled medications, and received her doses this a.m.  Also has a history of falls, hospitalization for encephalopathy and weakness with fall 10/11 - 10/13/2023.    Patient was brought to the emergency department after her fall, and noted to have atrial fibrillation with rapid ventricular rate. Heart rates were into the 170s range with lactic acid elevated at 2.8, troponin elevated at 30.  She reports no chest discomfort or chest pain.  She was initially normotensive, but with initiation of diltiazem, became hypotensive with a kevin in the 70s systolic range.  Responded to some IV fluid  administration.  Subsequently converted back into sinus rhythm in the 70s, and after an additional 500 mL of fluid and approximately 1/2-hour, became normotensive.  She was unaware of any of her palpitations of atrial fibrillation, reports that she has no history of recognizing atrial fibrillation events.  She is no longer on anticoagulation after recent admission with falls.  An extensive workup was performed in the emergency department including imaging.  No pulmonary embolism on CT PE study.    At this time, patient has no complaints.  She tells me she has no nausea or vomiting, no constipation or diarrhea, no fevers or chills.  She reports no dysuria or urinary frequency.  Again, patient's history may be unreliable.  She does have a history of vascular dementia.  Urinalysis with 22 WBCs representing a mild pyuria.  Started on ceftriaxone for this, and observation admission was requested given possibility that patient might be septic with a combination of hypotension and elevated lactic acid, though suspect this primarily was secondary to A-fib with RVR.  I did confirm with her care facility that she received 100 mg of oral metoprolol 11/23 AM.          Past Medical History    Past Medical History:   Diagnosis Date    Anxiety     Blepharitis of both eyes     Cerebrovascular accident (CVA) due to embolism (H) 04/2022    corpus striatum    Chronic atrial fibrillation (H)     Depression, major, recurrent, in complete remission (H24)     Dry eye syndrome     Dyspnea on exertion 10/07/2021    Essential hypertension 03/05/2020    Familial tremor 03/06/2013    Insomnia, unspecified type 11/03/2014    No CSA on file Last  check - 02/28/2020 with no concerns.    Mixed hyperlipidemia 11/04/2020    Nausea without vomiting 06/16/2015     Problem list name updated by automated process. Provider to review    Tremor, hereditary, benign        Past Surgical History   Past Surgical History:   Procedure Laterality Date     HYSTERECTOMY TOTAL ABDOMINAL, BILATERAL SALPINGO-OOPHORECTOMY, COMBINED      NECK SURGERY  2009    cervical fusion    ROTATOR CUFF REPAIR RT/LT Right     ZZC TOTAL HIP ARTHROPLASTY Right        Prior to Admission Medications   Prior to Admission Medications   Prescriptions Last Dose Informant Patient Reported? Taking?   Cholecalciferol (VITAMIN D HIGH POTENCY) 25 MCG (1000 UT) CAPS  Nursing Home No No   Si CAP BY MOUTH DAILY   PARoxetine (PAXIL) 20 MG tablet  Nursing Home No No   Si TAB BY MOUTH AT BEDTIME Strength: 20 mg   QUEtiapine (SEROQUEL) 25 MG tablet  Nursing Home No No   Si TAB BY MOUTH AT BEDTIME (DX: INSOMNIA)   RESTASIS 0.05 % ophthalmic emulsion  Nursing Home No No   Sig: INSTILL 1 DROP INTO BOTH EYES DAILY (DX: DRY EYES) ++CLAUDETTE++   acetaminophen (TYLENOL) 325 MG tablet   No No   Sig: Take 2 tablets (650 mg) by mouth every 6 hours as needed for mild pain or other (and adjunct with moderate or severe pain or per patient request)   acetaminophen (TYLENOL) 500 MG tablet  Nursing Home Yes No   Sig: Take 1,000 mg by mouth every 12 hours as needed for mild pain   acetaminophen (TYLENOL) 500 MG tablet  Nursing Home Yes No   Sig: Take 1,000 mg by mouth daily   amLODIPine (NORVASC) 2.5 MG tablet  Nursing Home No No   Sig: Take 1 tablet (2.5 mg) by mouth daily   atorvastatin (LIPITOR) 20 MG tablet  Nursing Home No No   Sig: Take 1 tablet (20 mg) by mouth daily   Patient taking differently: Take 20 mg by mouth every evening   bisacodyl (DULCOLAX) 5 MG EC tablet  Nursing Home No No   Si TAB BY MOUTH DAILY AS NEEDED FOR CONSTIPATION   docusate sodium (COLACE) 100 MG capsule  Nursing Home No No   Sig: TAKE 1 CAP BY MOUTH TWICE DAILY AS NEEDED FOR CONSTIPATION   ferrous sulfate (FEROSUL) 325 (65 Fe) MG tablet   No No   Sig: Take 1 tablet (325 mg) by mouth daily (with breakfast)   guaiFENesin (ROBITUSSIN) 20 mg/mL SOLN solution  Nursing Home No No   Sig: Take 10 mLs by mouth every 4 hours as needed  for cough   ipratropium - albuterol 0.5 mg/2.5 mg/3 mL (DUONEB) 0.5-2.5 (3) MG/3ML neb solution  Nursing Home No No   Sig: Take 1 vial (3 mLs) by nebulization every 4 hours as needed for shortness of breath   memantine (NAMENDA) 5 MG tablet  Nursing Home No No   Si TAB BY MOUTH TWICE DAILY   metoprolol succinate ER (TOPROL XL) 100 MG 24 hr tablet  Nursing Home No No   Si TAB BY MOUTH DAILY (DX: HYPERTENSION)   ondansetron (ZOFRAN) 4 MG tablet  Nursing Home No No   Si TAB BY MOUTH EVERY 8 HOURS AS NEEDED FOR NAUSEA   order for DME  Nursing Home No No   Sig: Equipment being ordered: Walker Wheels () and Walker ()  Treatment Diagnosis: Difficulty ambulating   oxyBUTYnin ER (DITROPAN XL) 10 MG 24 hr tablet   No No   Sig: Take 1 tablet (10 mg) by mouth daily   ramelteon (ROZEREM) 8 MG tablet  Nursing Home No No   Si TAB BY MOUTH EVERY NIGHT AS NEEDED FOR SLEEP   traMADol (ULTRAM) 50 MG tablet   No No   Sig: Take 0.5 tablets (25 mg) by mouth every 6 hours as needed for severe pain      Facility-Administered Medications: None           Physical Exam   Vital Signs: Temp: 98.1  F (36.7  C) Temp src: Oral BP: 134/62 Pulse: 80   Resp: (!) 31 SpO2: 97 % O2 Device: Nasal cannula Oxygen Delivery: 2 LPM  Weight: 150 lbs 0 oz    General Appearance: Robust for age 89-year-old female resting comfortably on [facility].  She is in no acute distress  Eyes: No scleral icterus or injection  HEENT: Normocephalic and atraumatic  Respiratory: Bibasilar crackles on inspiration, no wheezes, no rhonchi  Cardiovascular: Regular rate and rhythm with heart rate currently in the 70s.  Skin: Skin tear proximal to right elbow ventrally.  No tenderness to palpation of bony elbow at this time.  Note prior avulsion fracture.  Musculoskeletal: Muscular tone and bulk intact in all extremities and appropriate if not robust for age  Neurologic: Alert and conversant, but appears to be unreliable in history.  Tells me that she  takes no prescription medications when this is not the case.  Psychiatric: Very pleasant, normal affect    Medical Decision Making       70 MINUTES SPENT BY ME on the date of service doing chart review, history, exam, documentation & further activities per the note.      Data     I have personally reviewed the following data over the past 24 hrs:    10.5  \   12.9   / 370     140 103 17.7 /  125 (H)   4.2 23 0.79 \     ALT: 16 AST: 29 AP: 110 TBILI: 0.2   ALB: 3.8 TOT PROTEIN: 6.8 LIPASE: N/A     Trop: 30 (H) BNP: 210     TSH: 3.89 T4: N/A A1C: N/A     Procal: 0.07 CRP: N/A Lactic Acid: 2.0         Imaging results reviewed over the past 24 hrs:   Recent Results (from the past 24 hour(s))   XR Pelvis 1/2 Views    Narrative    EXAM: XR PELVIS 1/2 VIEWS  LOCATION: [facility]  DATE: 11/24/2023    INDICATION: fall  COMPARISON: None.      Impression    IMPRESSION: Right total hip arthroplasty. No dislocation. Degenerative changes of the left hip. Partially visualized degenerative changes of the lower lumbar spine. No definite evidence of acute fracture. Partially visualized bowel gas pattern is   nonobstructed. Pelvic phleboliths.   Elbow XR, G/E 3 views, right    Narrative    EXAM: XR ELBOW RIGHT G/E 3 VIEWS  LOCATION: [facility]  DATE: 11/24/2023    INDICATION: fall  COMPARISON: None.      Impression    IMPRESSION:   Multiple views right elbow. IV projecting over the lateral antecubital fossa.    No significant joint space narrowing is present.  There are no fractures or bone destructive lesions.  The articular margins are generally smooth, with no significant   degenerative disease. Small anterior effusion. No posterior fat pad sign is noted.  If continued clinical concern, consider conservative therapy, follow-up films, and/or clinical/orthopedic follow-up recommended.         XR Knee Right 3 Views    Narrative    EXAM: XR KNEE RIGHT 3 VIEWS  LOCATION: [facility]  DATE: 11/24/2023    INDICATION: Fall, pain  COMPARISON: 10/10/2023      Impression    IMPRESSION: Unchanged severe tricompartmental osteoarthritis. No knee joint effusion. No displaced fracture.   Cervical spine CT w/o contrast    Narrative    EXAM: CT HEAD W/O CONTRAST, CT CERVICAL SPINE W/O CONTRAST  LOCATION: [facility]  DATE: 11/24/2023    INDICATION: Fall. Traumatic head and neck injury.  COMPARISON: 09/05/2023.  TECHNIQUE:   1) Routine CT Head without IV contrast. Multiplanar reformats. Dose reduction techniques were used.  2) Routine CT Cervical Spine without IV contrast. Multiplanar reformats. Dose reduction techniques were used.    FINDINGS:   HEAD CT:   INTRACRANIAL CONTENTS: No intracranial hemorrhage, extraaxial collection, or mass effect. Chronic right basal ganglia infarct. Moderate presumed chronic small vessel ischemic changes. Mild generalized volume loss. No hydrocephalus.     VISUALIZED ORBITS/SINUSES/MASTOIDS: Prior bilateral cataract surgery. Visualized portions of the orbits are otherwise unremarkable. No paranasal sinus mucosal disease. No middle ear or mastoid effusion.    BONES/SOFT TISSUES: No acute abnormality.    CERVICAL SPINE CT:   VERTEBRA: Decreased osseous mineralization. Stable vertebral body heights and alignment. Osseous fusion is again noted at the level of C5-C6. Stable postsurgical changes at the levels of C2 through C4. No acute fracture or posttraumatic subluxation.    CANAL/FORAMINA: Multilevel degenerative changes. No high-grade central spinal canal stenosis. At C2-C3 on the right there is moderate to severe neural foraminal stenosis. At C3-C4 on the right there is moderate to severe neural foraminal stenosis. At   C4-C5 on the right there is mild right neural foraminal stenosis. At C5-C6 there is bilateral moderate neural foraminal stenosis. At C6-C7 there is moderate to severe bilateral neural foraminal  stenosis.    PARASPINAL: Stable 1.5 cm right thyroid lobe nodule. Visualized lung fields are clear.      Impression    IMPRESSION:  HEAD CT:  1.  No CT evidence for acute intracranial process.  2.  Brain atrophy and presumed chronic microvascular ischemic changes as above.    CERVICAL SPINE CT:  1.  No CT evidence for acute fracture or post traumatic subluxation.  2.  Chronic and degenerative changes, as above.  3.  Stable appearing right thyroid lobe nodule measuring up to 1.5 cm. Recommend non-emergent thyroid function testing and thyroid ultrasound for further assessment, if not already performed.     Head CT w/o contrast    Narrative    EXAM: CT HEAD W/O CONTRAST, CT CERVICAL SPINE W/O CONTRAST  LOCATION: [facility]  DATE: 11/24/2023    INDICATION: Fall. Traumatic head and neck injury.  COMPARISON: 09/05/2023.  TECHNIQUE:   1) Routine CT Head without IV contrast. Multiplanar reformats. Dose reduction techniques were used.  2) Routine CT Cervical Spine without IV contrast. Multiplanar reformats. Dose reduction techniques were used.    FINDINGS:   HEAD CT:   INTRACRANIAL CONTENTS: No intracranial hemorrhage, extraaxial collection, or mass effect. Chronic right basal ganglia infarct. Moderate presumed chronic small vessel ischemic changes. Mild generalized volume loss. No hydrocephalus.     VISUALIZED ORBITS/SINUSES/MASTOIDS: Prior bilateral cataract surgery. Visualized portions of the orbits are otherwise unremarkable. No paranasal sinus mucosal disease. No middle ear or mastoid effusion.    BONES/SOFT TISSUES: No acute abnormality.    CERVICAL SPINE CT:   VERTEBRA: Decreased osseous mineralization. Stable vertebral body heights and alignment. Osseous fusion is again noted at the level of C5-C6. Stable postsurgical changes at the levels of C2 through C4. No acute fracture or posttraumatic subluxation.    CANAL/FORAMINA: Multilevel degenerative changes. No high-grade central spinal canal  stenosis. At C2-C3 on the right there is moderate to severe neural foraminal stenosis. At C3-C4 on the right there is moderate to severe neural foraminal stenosis. At   C4-C5 on the right there is mild right neural foraminal stenosis. At C5-C6 there is bilateral moderate neural foraminal stenosis. At C6-C7 there is moderate to severe bilateral neural foraminal stenosis.    PARASPINAL: Stable 1.5 cm right thyroid lobe nodule. Visualized lung fields are clear.      Impression    IMPRESSION:  HEAD CT:  1.  No CT evidence for acute intracranial process.  2.  Brain atrophy and presumed chronic microvascular ischemic changes as above.    CERVICAL SPINE CT:  1.  No CT evidence for acute fracture or post traumatic subluxation.  2.  Chronic and degenerative changes, as above.  3.  Stable appearing right thyroid lobe nodule measuring up to 1.5 cm. Recommend non-emergent thyroid function testing and thyroid ultrasound for further assessment, if not already performed.     CT Chest Pulmonary Embolism w Contrast    Narrative    EXAM: CT CHEST PULMONARY EMBOLISM W CONTRAST  LOCATION: [facility]  DATE: 11/24/2023    INDICATION: rapid atrial fibrillation, hypotension, anticoagulation stopped  COMPARISON: 09/01/2022  TECHNIQUE: CT chest pulmonary angiogram during arterial phase injection of IV contrast. Multiplanar reformats and MIP reconstructions were performed. Dose reduction techniques were used.   CONTRAST: 59 mL Isovue 370    FINDINGS:  ANGIOGRAM CHEST: Pulmonary arteries are normal caliber and negative for pulmonary emboli. Thoracic aorta is negative for dissection. No CT evidence of right heart strain.    LUNGS AND PLEURA: Multifocal scattered atelectasis in the bilateral lung bases. No confluent consolidation, pleural effusion or pneumothorax.    MEDIASTINUM/AXILLAE: Multiple mildly prominent external lymph nodes are released in size, indeterminate. For example, a right paratracheal node measures 2  x 1 cm on series 5 image 77. No axillary or supraclavicular lymphadenopathy. A hypoattenuating   right thyroid nodule measures 2 cm, stable. A moderate size hiatal hernia has significantly increased in size.    CORONARY ARTERY CALCIFICATION: Minimal in the left anterior descending coronary artery.    UPPER ABDOMEN: No acute findings. A small cyst in the superior pole of the right kidney does not require follow-up. Mild atrophy of the left kidney is unchanged.    MUSCULOSKELETAL: Moderate anterior wedge compression deformity of T6 and multilevel moderate and severe degenerative disc space narrowing in the thoracic spine, unchanged. No suspicious osseous lesions or acute fractures.        Impression    IMPRESSION:    1.  No acute findings in the chest, including pulmonary embolism or pneumonia.    2.  Moderate size hiatal hernia, significantly increased compared to prior study.    3.  Mildly prominent mediastinal lymph nodes are indeterminate, possibly reactive.

## 2023-11-24 NOTE — PHARMACY-ADMISSION MEDICATION HISTORY
Pharmacist Admission Medication History    Admission medication history is complete. The information provided in this note is only as accurate as the sources available at the time of the update.    Information Source(s): Facility (U/NH/) medication list/MAR via N/A      Changes made to PTA medication list:  Added: Xarelto  Deleted: None  Changed: None    Medication Affordability: n/a       Allergies reviewed with patient and updates made in EHR: unable to assess    Medication History Completed By: Tina Lebron [facility] 11/24/2023 9:28 AM    PTA Med List   Medication Sig Last Dose    acetaminophen (TYLENOL) 500 MG tablet Take 1,000 mg by mouth daily 11/23/2023    acetaminophen (TYLENOL) 500 MG tablet Take 1,000 mg by mouth every 12 hours as needed for mild pain     amLODIPine (NORVASC) 2.5 MG tablet Take 1 tablet (2.5 mg) by mouth daily 11/23/2023    atorvastatin (LIPITOR) 20 MG tablet Take 1 tablet (20 mg) by mouth daily (Patient taking differently: Take 20 mg by mouth every evening) 11/23/2023    bisacodyl (DULCOLAX) 5 MG EC tablet 1 TAB BY MOUTH DAILY AS NEEDED FOR CONSTIPATION     Cholecalciferol (VITAMIN D HIGH POTENCY) 25 MCG (1000 UT) CAPS 1 CAP BY MOUTH DAILY 11/23/2023    docusate sodium (COLACE) 100 MG capsule TAKE 1 CAP BY MOUTH TWICE DAILY AS NEEDED FOR CONSTIPATION     ferrous sulfate (FEROSUL) 325 (65 Fe) MG tablet Take 1 tablet (325 mg) by mouth daily (with breakfast) 11/23/2023    guaiFENesin (ROBITUSSIN) 20 mg/mL SOLN solution Take 10 mLs by mouth every 4 hours as needed for cough     ipratropium - albuterol 0.5 mg/2.5 mg/3 mL (DUONEB) 0.5-2.5 (3) MG/3ML neb solution Take 1 vial (3 mLs) by nebulization every 4 hours as needed for shortness of breath     memantine (NAMENDA) 5 MG tablet 1 TAB BY MOUTH TWICE DAILY 11/23/2023    metoprolol succinate ER (TOPROL XL) 100 MG 24 hr tablet 1 TAB BY MOUTH DAILY (DX: HYPERTENSION) 11/23/2023    ondansetron (ZOFRAN) 4 MG tablet 1 TAB BY MOUTH EVERY 8 HOURS AS  NEEDED FOR NAUSEA     oxyBUTYnin ER (DITROPAN XL) 10 MG 24 hr tablet Take 1 tablet (10 mg) by mouth daily 11/23/2023    PARoxetine (PAXIL) 20 MG tablet 1 TAB BY MOUTH AT BEDTIME Strength: 20 mg 11/23/2023    QUEtiapine (SEROQUEL) 25 MG tablet 1 TAB BY MOUTH AT BEDTIME (DX: INSOMNIA)     ramelteon (ROZEREM) 8 MG tablet 1 TAB BY MOUTH EVERY NIGHT AS NEEDED FOR SLEEP     RESTASIS 0.05 % ophthalmic emulsion INSTILL 1 DROP INTO BOTH EYES DAILY (DX: DRY EYES) ++CLAUDETTE++ 11/23/2023    rivaroxaban ANTICOAGULANT (XARELTO) 15 MG TABS tablet Take 15 mg by mouth daily (with dinner)     traMADol (ULTRAM) 50 MG tablet Take 0.5 tablets (25 mg) by mouth every 6 hours as needed for severe pain    Tian Lebron, PharmD

## 2023-11-24 NOTE — PROGRESS NOTES
[facility]  Hospitalist Progress Note    Assessment & Plan   Margy Babin is a 89 year old female admitted on 11/23/2023. She presents to the emergency department after a fall out of bed while getting up to go to the bathroom at her care facility.  Found to have A-fib RVR, lactic acidosis, and hypotension as well as pyuria with possible urinary tract infection. Increased her PTA metoprolol. She is now in normal sinus rhythm. Treating possible UTI. Patient reports deconditioning and frequent falls at home. Has not tried to get up since admission. She can return home to assisted living if an assist of one or less per facility. Obtained stat PT consult. SW to update me on if facility can take her home tonight versus tomorrow AM.     Paroxysmal atrial fibrillation with rapid ventricular rate: Now converted to sinus rhythm after initial dose of diltiazem.  Patient unaware of rapid rates.  -Increased prior to admission metoprolol XL from 100 mg daily to 200 mg daily.  -Continue Zio patch monitoring at discharge; might need to consider ablation in the future if recurrent A-fib RVR episodes and inability to do uptitrate rate control agents.  -Remaining off of anticoagulation as per last admission and discussion with family     Non-ST elevation myocardial infarction: Troponin elevated at 30 - 36 in the setting of A-fib RVR into the 170 range.  Suspect type II NSTEMI with demand ischemia from rate.  -Repeat troponin     Pyuria  Small leukocyte esterase   Unreliable historian, but no urinary symptoms including no reported dysuria or urinary frequency. Daughter reports she has been confused lately which has coincided with urinary tract infections in the past.  - Switched ceftriaxone to macrobid for 5 day course of antibiotics, can adjust further base on urine culture   - Urine culture pending     Frequent falls:  Describes frequent falls at home. Daughter thinks she has been more confused  lately but was sounding at baseline on the phone today. Patient has not gotten up yet today to try and walk.  -Fall precautions  - likely to need ongoing PT/OT at her living facility   - Consider discontinuing pta tramadol     Suspected vascular dementia.  Noted in history.  Seems to be an unreliable historian at this time (reports she is on no prescription medications) and has a history of encephalopathy on prior admissions.  -Outpatient occupational therapy  - Resumed Namenda, Seroquel, ramelteon  - Holding paroxetine pending repeat EKG for QT interval monitoring, am EKG     Hypertension:  - resumed pta amlodipine     HLD  Pta lipitor    Iron deficiency anemia    Ferrous sulfate    Clinically Significant Risk Factors Present on Admission               # Drug Induced Coagulation Defect: home medication list includes an anticoagulant medication    # Hypertension: Noted on problem list   # Acute Respiratory Failure: Documented O2 saturation < 91%.  Continue supplemental oxygen as needed  # Dementia: noted on problem list        # Financial/Environmental Concerns: none         Diet: Regular Diet Adult     DVT Prophylaxis: PCD  Pichardo Catheter: Not present  Lines: None     Cardiac Monitoring: ACTIVE order. Indication: Tachyarrhythmias, acute (48 hours)  Code Status: No CPR- Do NOT Intubate      Disposition Plan       Expected Discharge Date: 11/25/2023      Destination: assisted living        Entered: Uziel King DO 11/24/2023, 3:34 PM        Uziel King DO  [facility]  Securely message with the Vocera Web Console (learn more here)  Text page via [employer] Paging/Directory    Interval History     No acute events overnight  Patient endorses fatigue  Denies urinary symptoms  Has been falling more at home recently  Denies syncope or vision changes  Discussed with daughter who states her mom has been intermittently confused     -Data reviewed today: I reviewed all new labs and imaging  results over the last 24 hours. I personally reviewed     Physical Exam   Temp: 98.3  F (36.8  C) Temp src: Oral BP: 139/57 Pulse: 70   Resp: 18 SpO2: 96 % O2 Device: Nasal cannula Oxygen Delivery: 2 LPM  Vitals:    11/23/23 2341   Weight: 68 kg (150 lb)     Vital Signs with Ranges  Temp:  [97.3  F (36.3  C)-98.3  F (36.8  C)] 98.3  F (36.8  C)  Pulse:  [] 70  Resp:  [10-47] 18  BP: ()/(55-79) 139/57  SpO2:  [87 %-99 %] 96 %  I/O last 3 completed shifts:  In: 300 [P.O.:300]  Out: 400 [Urine:400]    Constitutional: Awake, alert, cooperative, elderly female, no apparent distress.   ENT: Normocephalic, without obvious abnormality, atraumatic   Neck: Supple, symmetrical, trachea midline, no adenopathy.  Pulmonary: Prolonged expiratory phase, expiratory wheeze, no crackles   Cardiovascular: Regular rate and rhythm, normal S1 and S2, no S3 or S4, and no murmur noted.  GI: Normal bowel sounds, soft, non-distended, non-tender.  Skin/Integumen: Visualized skin appeared clear.  Neuro: CN II-XII grossly intact.  Psych:  Alert and oriented x 3. Normal affect.  Extremities: No lower extremity edema noted, and calves are non-TTP bilaterally.     Medical Decision Making       45 MINUTES SPENT BY ME on the date of service doing chart review, history, exam, documentation & further activities per the note.       Medications      amLODIPine  2.5 mg Oral Daily    atorvastatin  20 mg Oral QPM    [START ON 11/25/2023] ferrous sulfate  325 mg Oral Daily with breakfast    memantine  5 mg Oral BID    metoprolol succinate ER  200 mg Oral Daily    nitroFURantoin macrocrystal-monohydrate  100 mg Oral Q12H [facility] (08/20)    QUEtiapine  25 mg Oral At Bedtime    sodium chloride (PF)  3 mL Intracatheter Q8H       Data   Recent Labs   Lab 11/23/23  2351   WBC 10.5   HGB 12.9   MCV 84         POTASSIUM 4.2   CHLORIDE 103   CO2 23   BUN 17.7   CR 0.79   ANIONGAP 14   SOBIA 9.1   *   ALBUMIN 3.8   PROTTOTAL 6.8   BILITOTAL  0.2   ALKPHOS 110   ALT 16   AST 29     Recent Results (from the past 24 hour(s))   XR Pelvis 1/2 Views    Narrative    EXAM: XR PELVIS 1/2 VIEWS  LOCATION: [facility]  DATE: 11/24/2023    INDICATION: fall  COMPARISON: None.      Impression    IMPRESSION: Right total hip arthroplasty. No dislocation. Degenerative changes of the left hip. Partially visualized degenerative changes of the lower lumbar spine. No definite evidence of acute fracture. Partially visualized bowel gas pattern is   nonobstructed. Pelvic phleboliths.   Elbow XR, G/E 3 views, right    Narrative    EXAM: XR ELBOW RIGHT G/E 3 VIEWS  LOCATION: [facility]  DATE: 11/24/2023    INDICATION: fall  COMPARISON: None.      Impression    IMPRESSION:   Multiple views right elbow. IV projecting over the lateral antecubital fossa.    No significant joint space narrowing is present.  There are no fractures or bone destructive lesions.  The articular margins are generally smooth, with no significant   degenerative disease. Small anterior effusion. No posterior fat pad sign is noted.  If continued clinical concern, consider conservative therapy, follow-up films, and/or clinical/orthopedic follow-up recommended.         XR Knee Right 3 Views    Narrative    EXAM: XR KNEE RIGHT 3 VIEWS  LOCATION: [facility]  DATE: 11/24/2023    INDICATION: Fall, pain  COMPARISON: 10/10/2023      Impression    IMPRESSION: Unchanged severe tricompartmental osteoarthritis. No knee joint effusion. No displaced fracture.   Cervical spine CT w/o contrast    Narrative    EXAM: CT HEAD W/O CONTRAST, CT CERVICAL SPINE W/O CONTRAST  LOCATION: [facility]  DATE: 11/24/2023    INDICATION: Fall. Traumatic head and neck injury.  COMPARISON: 09/05/2023.  TECHNIQUE:   1) Routine CT Head without IV contrast. Multiplanar reformats. Dose reduction techniques were used.  2) Routine CT Cervical Spine  without IV contrast. Multiplanar reformats. Dose reduction techniques were used.    FINDINGS:   HEAD CT:   INTRACRANIAL CONTENTS: No intracranial hemorrhage, extraaxial collection, or mass effect. Chronic right basal ganglia infarct. Moderate presumed chronic small vessel ischemic changes. Mild generalized volume loss. No hydrocephalus.     VISUALIZED ORBITS/SINUSES/MASTOIDS: Prior bilateral cataract surgery. Visualized portions of the orbits are otherwise unremarkable. No paranasal sinus mucosal disease. No middle ear or mastoid effusion.    BONES/SOFT TISSUES: No acute abnormality.    CERVICAL SPINE CT:   VERTEBRA: Decreased osseous mineralization. Stable vertebral body heights and alignment. Osseous fusion is again noted at the level of C5-C6. Stable postsurgical changes at the levels of C2 through C4. No acute fracture or posttraumatic subluxation.    CANAL/FORAMINA: Multilevel degenerative changes. No high-grade central spinal canal stenosis. At C2-C3 on the right there is moderate to severe neural foraminal stenosis. At C3-C4 on the right there is moderate to severe neural foraminal stenosis. At   C4-C5 on the right there is mild right neural foraminal stenosis. At C5-C6 there is bilateral moderate neural foraminal stenosis. At C6-C7 there is moderate to severe bilateral neural foraminal stenosis.    PARASPINAL: Stable 1.5 cm right thyroid lobe nodule. Visualized lung fields are clear.      Impression    IMPRESSION:  HEAD CT:  1.  No CT evidence for acute intracranial process.  2.  Brain atrophy and presumed chronic microvascular ischemic changes as above.    CERVICAL SPINE CT:  1.  No CT evidence for acute fracture or post traumatic subluxation.  2.  Chronic and degenerative changes, as above.  3.  Stable appearing right thyroid lobe nodule measuring up to 1.5 cm. Recommend non-emergent thyroid function testing and thyroid ultrasound for further assessment, if not already performed.     Head CT w/o contrast     Narrative    EXAM: CT HEAD W/O CONTRAST, CT CERVICAL SPINE W/O CONTRAST  LOCATION: [facility]  DATE: 11/24/2023    INDICATION: Fall. Traumatic head and neck injury.  COMPARISON: 09/05/2023.  TECHNIQUE:   1) Routine CT Head without IV contrast. Multiplanar reformats. Dose reduction techniques were used.  2) Routine CT Cervical Spine without IV contrast. Multiplanar reformats. Dose reduction techniques were used.    FINDINGS:   HEAD CT:   INTRACRANIAL CONTENTS: No intracranial hemorrhage, extraaxial collection, or mass effect. Chronic right basal ganglia infarct. Moderate presumed chronic small vessel ischemic changes. Mild generalized volume loss. No hydrocephalus.     VISUALIZED ORBITS/SINUSES/MASTOIDS: Prior bilateral cataract surgery. Visualized portions of the orbits are otherwise unremarkable. No paranasal sinus mucosal disease. No middle ear or mastoid effusion.    BONES/SOFT TISSUES: No acute abnormality.    CERVICAL SPINE CT:   VERTEBRA: Decreased osseous mineralization. Stable vertebral body heights and alignment. Osseous fusion is again noted at the level of C5-C6. Stable postsurgical changes at the levels of C2 through C4. No acute fracture or posttraumatic subluxation.    CANAL/FORAMINA: Multilevel degenerative changes. No high-grade central spinal canal stenosis. At C2-C3 on the right there is moderate to severe neural foraminal stenosis. At C3-C4 on the right there is moderate to severe neural foraminal stenosis. At   C4-C5 on the right there is mild right neural foraminal stenosis. At C5-C6 there is bilateral moderate neural foraminal stenosis. At C6-C7 there is moderate to severe bilateral neural foraminal stenosis.    PARASPINAL: Stable 1.5 cm right thyroid lobe nodule. Visualized lung fields are clear.      Impression    IMPRESSION:  HEAD CT:  1.  No CT evidence for acute intracranial process.  2.  Brain atrophy and presumed chronic microvascular ischemic changes as  above.    CERVICAL SPINE CT:  1.  No CT evidence for acute fracture or post traumatic subluxation.  2.  Chronic and degenerative changes, as above.  3.  Stable appearing right thyroid lobe nodule measuring up to 1.5 cm. Recommend non-emergent thyroid function testing and thyroid ultrasound for further assessment, if not already performed.     CT Chest Pulmonary Embolism w Contrast    Narrative    EXAM: CT CHEST PULMONARY EMBOLISM W CONTRAST  LOCATION: [facility]  DATE: 11/24/2023    INDICATION: rapid atrial fibrillation, hypotension, anticoagulation stopped  COMPARISON: 09/01/2022  TECHNIQUE: CT chest pulmonary angiogram during arterial phase injection of IV contrast. Multiplanar reformats and MIP reconstructions were performed. Dose reduction techniques were used.   CONTRAST: 59 mL Isovue 370    FINDINGS:  ANGIOGRAM CHEST: Pulmonary arteries are normal caliber and negative for pulmonary emboli. Thoracic aorta is negative for dissection. No CT evidence of right heart strain.    LUNGS AND PLEURA: Multifocal scattered atelectasis in the bilateral lung bases. No confluent consolidation, pleural effusion or pneumothorax.    MEDIASTINUM/AXILLAE: Multiple mildly prominent external lymph nodes are released in size, indeterminate. For example, a right paratracheal node measures 2 x 1 cm on series 5 image 77. No axillary or supraclavicular lymphadenopathy. A hypoattenuating   right thyroid nodule measures 2 cm, stable. A moderate size hiatal hernia has significantly increased in size.    CORONARY ARTERY CALCIFICATION: Minimal in the left anterior descending coronary artery.    UPPER ABDOMEN: No acute findings. A small cyst in the superior pole of the right kidney does not require follow-up. Mild atrophy of the left kidney is unchanged.    MUSCULOSKELETAL: Moderate anterior wedge compression deformity of T6 and multilevel moderate and severe degenerative disc space narrowing in the thoracic spine,  unchanged. No suspicious osseous lesions or acute fractures.        Impression    IMPRESSION:    1.  No acute findings in the chest, including pulmonary embolism or pneumonia.    2.  Moderate size hiatal hernia, significantly increased compared to prior study.    3.  Mildly prominent mediastinal lymph nodes are indeterminate, possibly reactive.     Uziel King, DO

## 2023-11-24 NOTE — ED TRIAGE NOTES
Pt BIBA from [facility] via EMS.  Reportedly pt got up to use bathroom and had an unwitnessed fall. Pt is c/o Right shoulder and R knee pain.  EMS noted pt to be in A-fib w/ RVR anywhere between 150-190's bpm. IV was established and 500 ML NS bolus initiated d/t soft pressures. Pt does have a known hx of A-fib and Dementia.  Pre hospital BG~ 136; SBP's 95; 95%RA;      Triage Assessment (Adult)       Row Name 11/23/23 8284          Triage Assessment    Airway WDL WDL        Respiratory WDL    Respiratory WDL WDL;rhythm/pattern     Rhythm/Pattern, Respiratory depth regular;pattern regular        Cardiac WDL    Cardiac WDL X;rhythm      Pulse Rate & Regularity radial pulse irregular      Cardiac Rhythm Atrial fibrillation  W/ RVR.        Peripheral/Neurovascular WDL    Peripheral Neurovascular WDL WDL        Cognitive/Neuro/Behavioral WDL    Cognitive/Neuro/Behavioral WDL WDL

## 2023-11-24 NOTE — ED NOTES
[facility]  ED Nurse Handoff Report    ED Chief complaint: Fall and Irregular Heart Beat      ED Diagnosis:   Final diagnoses:   None       Code Status: Full Code    Allergies: No Known Allergies    Patient Story:   Pt BIBA from [facility] via EMS.Reportedly pt got up to use bathroom and had an unwitnessed fall. Pt states that she fell between the wall and toilet. Pt is c/o Right elbow and R knee pain.  EMS noted pt to be in A-fib w/ RVR anywhere between 150-190's bpm. IV was established and 500 ML NS bolus initiated d/t soft pressures. Pt does have a known hx of A-fib and Dementia.  Pre hospital BG~ 136; SBP's 95; 95%RA en route;      Pt was given 10 MG Diltiazem IVP in an attempt to lower HR. HR appeared to have lowered to high 90's bpm - 130's, initially. SBP's continued to be borderline soft. 1.5 L NS bolus was administered per order as well as Diltiazem gtt with improvement. Pt was able to convert to Sinus rhythm w/ frequent PVC's and with SBP's >100. Pt was also started on Ceftriaxone infusion for UTI.    Focused Assessment:    A+Ox3  Generalized weakness  SBP's 80- 120's; A-fib W/ HR~ 150-180's bpm  Spo2 at 90% on RA  Tachypneic  R elbow; R knee pain  Urinary incontinence; Urinary frequency  Dry oral mucosa    Treatments and/or interventions provided:   1.5 L NS bolus  10 Mg Diltiazem IVP  Diltiazem gtt ~ Stopped  1G Rocephin infusion  Supplemental O2  Pure wick for urination    Patient's response to treatments and/or interventions:   Improved SBP  Improved HR    To be done/followed up on inpatient unit:    Continue with plan of care    Does this patient have any cognitive concerns?: Baseline dementia    Activity level - Baseline/Home:  Independent  Activity Level - Current:   Unknown    Patient's Preferred language: English   Needed?: No    Isolation: None  Infection: Not Applicable  Patient tested for COVID 19 prior to admission: NO  Bariatric?: No    Vital Signs:    Vitals:    11/24/23 0030 11/24/23 0032 11/24/23 0045 11/24/23 0100   BP: (!) 77/67 105/73 (!) 89/55 117/63   BP Location: Left arm Right arm     Pulse: (!) 124 (!) 134 (!) 142 92   Resp: 24 18 15 10   Temp:       TempSrc:       SpO2: 93%  95% 94%   Weight:       Height:           Cardiac Rhythm:Cardiac Rhythm: (S) Atrial fibrillation (w/ RVR.)    Was the PSS-3 completed:   Yes  What interventions are required if any?               Family Comments: None at bedside  OBS brochure/video discussed/provided to patient/family: Yes    For the majority of the shift this patient's behavior was Green.   Behavioral interventions performed were none.    ED NURSE PHONE NUMBER: *[phone]

## 2023-11-24 NOTE — PLAN OF CARE
Goal Outcome Evaluation:      Care Plan Summary Note: Admitted here with unwitnessed fall and R elbow & R knee pain  Orientation: A&Ox4 but forgetful  Observation Goals (met & not met): not met  Activity Level: Not OOB this shift  Fall Risk: Yes  Behavior & Aggression Tool Color: none  Pain Management: Rest & repositioning.    ABNL VS/O2: VSS on 2 L O2 NC   ABNL Lab/BG: Troponin elevated  Diet: Regular  Bowel/Bladder: Incontinent with Purewick in place.  Drains/Devices: PIV SL. On tele   Tests/Procedures for next shift: Repeat troponin, urine culture pending  Anticipated DC date: 1-2 days  Other Important Info: Abrasion & bruises noted on R elbow and R knee. Pt is from [facility].

## 2023-11-24 NOTE — ED NOTES
Bed: ED06  Expected date:   Expected time:   Means of arrival:   Comments:  Sheeba 2 89F A-fib with RVR 160s

## 2023-11-24 NOTE — PROGRESS NOTES
Care Management Initial Consult    General Information  Assessment completed with: Care Team MemberKaylee  Type of CM/SW Visit: Initial Assessment    Primary Care Provider verified and updated as needed: Yes   Readmission within the last 30 days: no previous admission in last 30 days      Reason for Consult: discharge planning  Advance Care Planning: Advance Care Planning Reviewed: no concerns identified          Communication Assessment  Patient's communication style: spoken language (English or Bilingual)             Cognitive  Cognitive/Neuro/Behavioral: WDL                      Living Environment:   People in home: facility resident     Current living Arrangements: assisted living      Able to return to prior arrangements: yes       Family/Social Support:  Care provided by: self, other (see comments)  Provides care for: no one, unable/limited ability to care for self  Marital Status: Single             Description of Support System:           Current Resources:   Patient receiving home care services: No     Community Resources: None  Equipment currently used at home:    Supplies currently used at home: None    Employment/Financial:  Employment Status: retired        Financial Concerns: none           Does the patient's insurance plan have a 3 day qualifying hospital stay waiver?  No    Lifestyle & Psychosocial Needs:  Social Determinants of Health     Food Insecurity: Not on file   Depression: Not at risk (12/27/2022)    PHQ-2     PHQ-2 Score: 0   Housing Stability: Not on file   Tobacco Use: Low Risk  (11/23/2023)    Patient History     Smoking Tobacco Use: Never     Smokeless Tobacco Use: Never     Passive Exposure: Not on file   Financial Resource Strain: Not on file   Alcohol Use: Not on file   Transportation Needs: Not on file   Physical Activity: Not on file   Interpersonal Safety: Not on file   Stress: Not on file   Social Connections: Not on file       Functional Status:  Prior to admission patient  needed assistance:   Dependent ADLs:: Ambulation-walker  Dependent IADLs:: Cleaning, Cooking, Laundry, Shopping, Meal Preparation, Medication Management, Money Management, Transportation, Incontinence       Mental Health Status:  Mental Health Status: No Current Concerns       Chemical Dependency Status:                Values/Beliefs:  Spiritual, Cultural Beliefs, Mandaeism Practices, Values that affect care: no               Additional Information:      CM team called phone [phone] and spoke with/left message for Kaylee to request background information on pt's place of living.      In what kind of facility does pt reside (SNF, [facility], group home)? [place]  Name of building/unit: [facility]  What do you know about pt's baseline cognition and mobility? [place] to self and place        What level of cognition/mobility is required for safe return? [facility] and not       the memory care   Is resident enrolled in any services?  Was getting PT recently/discharge now     (ask about: meals, physical assist with ADLs, med administration, housekeeping, and if they have built in or wearable call buttons)  -Medication administration, meals escorts, wears a pendant, is an assist of 1   Are there grab bars in the bathroom, shower, and/or shower chair in unit? Describe: yes  Does pt have any personal DME? Walker, wheelchair   Best number to reach facility nursing? Phone   Evening/weekend number? [phone]  Fax: [phone]  What does the facility need faxed to them from us at discharge? New Orders   Does facility manage medications?  yes   If yes, any contracted pharmacy? Name: TBD         If new Rx meds at discharge, fill at hospital pharmacy or contracted pharmacy?  [facility]? Kaylee said [facility] but could not find this Pharmacy in [employer].  Does [facility] have a copy of any Health Care Directive of [facility]? DNR selective treatment, no tube feeding, IV/IM/Oral antibiotics OK         If so please fax to [phone] (unit fax)  or email to [email or web address]    Explained we can provide therapy notes and information when it is available and provided callback contact information.

## 2023-11-24 NOTE — ED PROVIDER NOTES
"  History     Chief Complaint:  Fall and Irregular Heart Beat       HPI   Margy Babin is a 89 year old female with history of dementia, CVA, hypertension, and atrial fibrillation who presents from her nursing home with an unwitnessed fall. She has right elbow abrasion and pain.  Denies chest pain and shortness of breath as well as recent illness. EMS noted her heart rate was between 150 and 190 with them and complained of right shoulder and knee pain.      Independent Historian:   EMS - They report patient had an unwitnessed fall.     Review of External Notes:   Discharge summary 10/13/2023, admission for symptomatic anemia requiring transfusion, Xarelto held  at discharge given advanced age, recurrent falls and anemia.  Noted to have right elbow avulsion fracture at that time and laceration required sutures.  Some concern for memory impairment.      Medications:    Norvasc   Lipitor  Albuterol   Namenda  Toprol  Seroquel   Tramadol     Past Medical History:    Anxiety  Blepharitis of both eyes  Cerebrovascular accident (CVA) due to embolism (H)  Chronic atrial fibrillation (H)  Depression  Essential hypertension  Insomnia, unspecified type  Mixed hyperlipidemia  Tremor, hereditary, benign  Dementia   Atrial fibrillation     Past Surgical History:    Hysterectomy   Salpingo oophorectomy   Rotator cuff repair   Right knee arthroplasty    Physical Exam   Patient Vitals for the past 24 hrs:   BP Temp Temp src Pulse Resp SpO2 Height Weight   11/23/23 2342 107/75 98.1  F (36.7  C) Oral (!) 178 20 94 % -- --   11/23/23 2341 -- -- -- -- -- -- 1.651 m (5' 5\") 68 kg (150 lb)        Physical Exam  Constitutional: Alert, attentive   HENT:    Head: atraumatic  Neck: no midline tenderness  Nose: Nose normal.    Mouth/Throat: Mucous membranes are dry  Eyes: EOM are normal, anicteric, conjugate gaze  CV: tachycardic rate and irregularly irregular rhythm; no murmurs  Chest: Effort normal and breath sounds clear without " wheezing or rales, symmetric bilaterally   GI:  non tender. No distension. No guarding or rebound.    MSK: abrasion to right elbow but no focal bony tenderness upper or lower extremities, no significant leg swelling  Neurological: Alert, attentive, moving all extremities equally.   Skin: Skin is warm and dry.     Emergency Department Course   ECG  ECG taken at 2336, ECG read at 0000  Atrial fibrillation with rapid ventricular response with premature ventricular or aberrantly conducted complexes  Left axis deviation   Left bundle branch block  Abnormal ECG   Atrial fibrillation replaced sinus rhythm as compared to prior, dated 10/12/23.  Rate 172 bpm. NY interval * ms. QRS duration 126 ms. QT/QTc 280/473 ms. P-R-T axes * -43 151.     ECG results from 11/23/23   EKG 12 lead     Value    Systolic Blood Pressure     Diastolic Blood Pressure     Ventricular Rate 79    Atrial Rate 79    NY Interval 200    QRS Duration 136        QTc 509    P Axis 109    R AXIS -26    T Axis 145    Interpretation ECG      Sinus rhythm with occasional Premature ventricular complexes  Left bundle branch block  Abnormal ECG  When compared with ECG of 23-NOV-2023 23:36, (unconfirmed)  Sinus rhythm has replaced Atrial fibrillation  Vent. rate has decreased BY  93 BPM  T wave amplitude has decreased in Anterior leads          Imaging:  XR Pelvis 1/2 Views    (Results Pending)   XR Knee Right 3 Views    (Results Pending)   Head CT w/o contrast    (Results Pending)   Cervical spine CT w/o contrast    (Results Pending)   Elbow XR, G/E 3 views, right    (Results Pending)   CT Chest Pulmonary Embolism w Contrast    (Results Pending)          Laboratory:  Labs Ordered and Resulted from Time of ED Arrival to Time of ED Departure   LACTIC ACID WHOLE BLOOD - Abnormal       Result Value    Lactic Acid 2.8 (*)    COMPREHENSIVE METABOLIC PANEL   NT PROBNP INPATIENT   CBC WITH PLATELETS AND DIFFERENTIAL   TROPONIN T, HIGH SENSITIVITY   ROUTINE UA WITH  MICROSCOPIC   PROCALCITONIN   TYPE AND SCREEN, ADULT   BLOOD CULTURE   BLOOD CULTURE   ABO/RH TYPE AND SCREEN            Emergency Department Course & Assessments:  Interventions:  Medications   diltiazem (CARDIZEM) 125 mg in sodium chloride 0.9 % 125 mL infusion (5 mg/hr Intravenous $New Bag 23 0007)   diltiazem (CARDIZEM) injection 10 mg (10 mg Intravenous $Given 23 0001)        Assessments:  235 I obtained history and examined the patient as noted above.   0000   I rechecked the patient after she was given Dilt, ventricular rates dropped from the 170s to upper 90s, blood pressure in the low 100s.  0207 I updated the patient.     Independent Interpretation (X-rays, CTs, rhythm strip):  I personally reviewed her CT PE study, see no evidence of large central PE, no pneumothorax or overt infiltrate.  I personally her head CT, see no evidence of intracranial hemorrhage.    Consultations/Discussion of Management or Tests:  215 I spoke with Dr. Jackson of the Hospitalist service from [facility] regarding patient's presentation, findings, and plan of care.         Social Determinants of Health affecting care:   None    Disposition:  The patient was admitted to the hospital under the care of Dr. Jackson.     Impression & Plan    Medical Decision Makin-year-old past medical history significant for chronic A-fib, previously on Xarelto presumed held since discharge in October due to symptomatic anemia and recurrent falls, heart failure with reduced EF (last echo was 55%) hypertension, hyperlipidemia, possible vascular dementia, prior stroke presenting from her nursing home after unwitnessed fall found to be in rapid ventricular atrial fibrillation and mildly hypotensive.  She is not overtly symptomatic from hypotension with blood pressure in the 90s over 50s, EKG did confirm A-fib with RVR, patient not endorsing overt anginal symptoms.  She was given a single dose of milligrams IV Dilts, had brief  improvement in her rapid heart rate and was started on a low-dose dill drip, after 1 L of IV fluids, her blood pressure was still soft despite improvement in ventricular rates down into the 130s to 140s, on bedside echo, she still appeared intravascularly depleted with an IVC of 0.93 cm and significant respiratory variation prompting an additional 1 L IV fluids and normalization of her vitals.  Her initial lactate was elevated at 2.8 however she is afebrile without leukocytosis and procalcitonin is normal with low suspicion for for sepsis however her UA does appear consistent with UTI.  Culture sent, she was given ceftriaxone.  Given she is off of her anticoagulation, did perform CT PE study given rapid A-fib, hypotension, this was negative for PE or overt pneumonia.  She imaging her head and neck were obtained due to the fall and questionable anticoagulation use and these were also negative for intracranial hemorrhage or fracture.  X-rays of her right knee demonstrate only arthritis without fracture, x-ray of the right elbow is without fracture and pelvis is without any acute fracture.  Given, and A-fib with RVR, presumed dehydration and UTI in the setting of elevated lactate, will plan for observation admission for overnight monitoring.  Her troponin is mildly elevated at 30, I have high suspicion for type II elevation secondary to hypotension which is likely multifactorial related to A-fib with RVR and hypovolemia.  Her hemoglobin is stable and in fact elevated I do not suspect bleeding.  No indication for heparinization, patient does not risk any chest symptoms.  Case discussed with Dr. Jackson, will plan for observation admission with telemetry    Diagnosis:    ICD-10-CM    1. Atrial fibrillation with rapid ventricular response (H)  I48.91       2. Dehydration  E86.0       3. Acute cystitis without hematuria  N30.00            Yaya Cox MD  [employer]  2:28 AM 11/24/23          Scribe Disclosure:  I, Elie Enrique, am serving as a scribe at 11:46 PM on 11/23/2023 to document services personally performed by Yaya Cox MD based on my observations and the provider's statements to me.   11/23/2023   Yaya Cox MD Dunbar, John Forrest, MD  11/24/23 0228

## 2023-11-25 ENCOUNTER — APPOINTMENT (OUTPATIENT)
Dept: PHYSICAL THERAPY | Facility: CLINIC | Age: 88
DRG: 280 | End: 2023-11-25
Payer: MEDICARE

## 2023-11-25 ENCOUNTER — APPOINTMENT (OUTPATIENT)
Dept: GENERAL RADIOLOGY | Facility: CLINIC | Age: 88
DRG: 280 | End: 2023-11-25
Attending: STUDENT IN AN ORGANIZED HEALTH CARE EDUCATION/TRAINING PROGRAM
Payer: MEDICARE

## 2023-11-25 LAB
ANION GAP SERPL CALCULATED.3IONS-SCNC: 10 MMOL/L (ref 7–15)
BACTERIA UR CULT: ABNORMAL
BASOPHILS # BLD AUTO: 0.1 10E3/UL (ref 0–0.2)
BASOPHILS NFR BLD AUTO: 1 %
BUN SERPL-MCNC: 15.8 MG/DL (ref 8–23)
CALCIUM SERPL-MCNC: 9 MG/DL (ref 8.2–9.6)
CHLORIDE SERPL-SCNC: 105 MMOL/L (ref 98–107)
CREAT SERPL-MCNC: 0.8 MG/DL (ref 0.51–0.95)
DEPRECATED HCO3 PLAS-SCNC: 25 MMOL/L (ref 22–29)
EGFRCR SERPLBLD CKD-EPI 2021: 68 ML/MIN/1.73M2
EOSINOPHIL # BLD AUTO: 0.9 10E3/UL (ref 0–0.7)
EOSINOPHIL NFR BLD AUTO: 8 %
ERYTHROCYTE [DISTWIDTH] IN BLOOD BY AUTOMATED COUNT: ABNORMAL %
FLUAV RNA SPEC QL NAA+PROBE: NEGATIVE
FLUBV RNA RESP QL NAA+PROBE: NEGATIVE
GLUCOSE SERPL-MCNC: 105 MG/DL (ref 70–99)
HCT VFR BLD AUTO: 40.6 % (ref 35–47)
HGB BLD-MCNC: 11.8 G/DL (ref 11.7–15.7)
IMM GRANULOCYTES # BLD: 0 10E3/UL
IMM GRANULOCYTES NFR BLD: 0 %
LYMPHOCYTES # BLD AUTO: 1.8 10E3/UL (ref 0.8–5.3)
LYMPHOCYTES NFR BLD AUTO: 17 %
MCH RBC QN AUTO: 24.7 PG (ref 26.5–33)
MCHC RBC AUTO-ENTMCNC: 29.1 G/DL (ref 31.5–36.5)
MCV RBC AUTO: 85 FL (ref 78–100)
MONOCYTES # BLD AUTO: 0.9 10E3/UL (ref 0–1.3)
MONOCYTES NFR BLD AUTO: 9 %
NEUTROPHILS # BLD AUTO: 6.8 10E3/UL (ref 1.6–8.3)
NEUTROPHILS NFR BLD AUTO: 65 %
NRBC # BLD AUTO: 0 10E3/UL
NRBC BLD AUTO-RTO: 0 /100
PHOSPHATE SERPL-MCNC: 4.5 MG/DL (ref 2.5–4.5)
PLATELET # BLD AUTO: 346 10E3/UL (ref 150–450)
POTASSIUM SERPL-SCNC: 4.3 MMOL/L (ref 3.4–5.3)
RBC # BLD AUTO: 4.77 10E6/UL (ref 3.8–5.2)
RSV RNA SPEC NAA+PROBE: NEGATIVE
SARS-COV-2 RNA RESP QL NAA+PROBE: POSITIVE
SODIUM SERPL-SCNC: 140 MMOL/L (ref 135–145)
WBC # BLD AUTO: 10.5 10E3/UL (ref 4–11)

## 2023-11-25 PROCEDURE — 250N000013 HC RX MED GY IP 250 OP 250 PS 637: Performed by: INTERNAL MEDICINE

## 2023-11-25 PROCEDURE — 250N000013 HC RX MED GY IP 250 OP 250 PS 637: Performed by: STUDENT IN AN ORGANIZED HEALTH CARE EDUCATION/TRAINING PROGRAM

## 2023-11-25 PROCEDURE — 84100 ASSAY OF PHOSPHORUS: CPT | Performed by: STUDENT IN AN ORGANIZED HEALTH CARE EDUCATION/TRAINING PROGRAM

## 2023-11-25 PROCEDURE — 999N000157 HC STATISTIC RCP TIME EA 10 MIN

## 2023-11-25 PROCEDURE — G0378 HOSPITAL OBSERVATION PER HR: HCPCS

## 2023-11-25 PROCEDURE — 99232 SBSQ HOSP IP/OBS MODERATE 35: CPT | Performed by: STUDENT IN AN ORGANIZED HEALTH CARE EDUCATION/TRAINING PROGRAM

## 2023-11-25 PROCEDURE — 94640 AIRWAY INHALATION TREATMENT: CPT

## 2023-11-25 PROCEDURE — 36415 COLL VENOUS BLD VENIPUNCTURE: CPT | Performed by: STUDENT IN AN ORGANIZED HEALTH CARE EDUCATION/TRAINING PROGRAM

## 2023-11-25 PROCEDURE — 71046 X-RAY EXAM CHEST 2 VIEWS: CPT

## 2023-11-25 PROCEDURE — 97530 THERAPEUTIC ACTIVITIES: CPT | Mod: GP

## 2023-11-25 PROCEDURE — 93005 ELECTROCARDIOGRAM TRACING: CPT

## 2023-11-25 PROCEDURE — 93010 ELECTROCARDIOGRAM REPORT: CPT | Performed by: INTERNAL MEDICINE

## 2023-11-25 PROCEDURE — 250N000009 HC RX 250: Performed by: STUDENT IN AN ORGANIZED HEALTH CARE EDUCATION/TRAINING PROGRAM

## 2023-11-25 PROCEDURE — 80048 BASIC METABOLIC PNL TOTAL CA: CPT | Performed by: STUDENT IN AN ORGANIZED HEALTH CARE EDUCATION/TRAINING PROGRAM

## 2023-11-25 PROCEDURE — 85025 COMPLETE CBC W/AUTO DIFF WBC: CPT | Performed by: STUDENT IN AN ORGANIZED HEALTH CARE EDUCATION/TRAINING PROGRAM

## 2023-11-25 PROCEDURE — 97110 THERAPEUTIC EXERCISES: CPT | Mod: GP

## 2023-11-25 PROCEDURE — 87637 SARSCOV2&INF A&B&RSV AMP PRB: CPT | Performed by: STUDENT IN AN ORGANIZED HEALTH CARE EDUCATION/TRAINING PROGRAM

## 2023-11-25 RX ORDER — CALCIUM CARBONATE 500 MG/1
1000 TABLET, CHEWABLE ORAL 4 TIMES DAILY PRN
Status: DISCONTINUED | OUTPATIENT
Start: 2023-11-25 | End: 2023-11-29 | Stop reason: HOSPADM

## 2023-11-25 RX ORDER — BENZONATATE 100 MG/1
100 CAPSULE ORAL 3 TIMES DAILY PRN
Status: DISCONTINUED | OUTPATIENT
Start: 2023-11-25 | End: 2023-11-29 | Stop reason: HOSPADM

## 2023-11-25 RX ORDER — RAMELTEON 8 MG/1
8 TABLET ORAL AT BEDTIME
Status: DISCONTINUED | OUTPATIENT
Start: 2023-11-25 | End: 2023-11-29 | Stop reason: HOSPADM

## 2023-11-25 RX ORDER — CALCIUM CARBONATE 500 MG/1
1000 TABLET, CHEWABLE ORAL 2 TIMES DAILY PRN
Status: DISCONTINUED | OUTPATIENT
Start: 2023-11-25 | End: 2023-11-25

## 2023-11-25 RX ADMIN — MEMANTINE 5 MG: 5 TABLET ORAL at 21:22

## 2023-11-25 RX ADMIN — ATORVASTATIN CALCIUM 20 MG: 20 TABLET, FILM COATED ORAL at 21:22

## 2023-11-25 RX ADMIN — RAMELTEON 8 MG: 8 TABLET ORAL at 01:28

## 2023-11-25 RX ADMIN — MEMANTINE 5 MG: 5 TABLET ORAL at 08:43

## 2023-11-25 RX ADMIN — FERROUS SULFATE TAB 325 MG (65 MG ELEMENTAL FE) 325 MG: 325 (65 FE) TAB at 08:43

## 2023-11-25 RX ADMIN — NITROFURANTOIN MONOHYDRATE/MACROCRYSTALLINE 100 MG: 25; 75 CAPSULE ORAL at 08:43

## 2023-11-25 RX ADMIN — IPRATROPIUM BROMIDE AND ALBUTEROL SULFATE 3 ML: .5; 3 SOLUTION RESPIRATORY (INHALATION) at 12:06

## 2023-11-25 RX ADMIN — AMLODIPINE BESYLATE 2.5 MG: 2.5 TABLET ORAL at 08:43

## 2023-11-25 RX ADMIN — QUETIAPINE FUMARATE 25 MG: 25 TABLET ORAL at 21:22

## 2023-11-25 RX ADMIN — RAMELTEON 8 MG: 8 TABLET ORAL at 21:22

## 2023-11-25 RX ADMIN — NITROFURANTOIN MONOHYDRATE/MACROCRYSTALLINE 100 MG: 25; 75 CAPSULE ORAL at 21:22

## 2023-11-25 RX ADMIN — METOPROLOL SUCCINATE 200 MG: 50 TABLET, EXTENDED RELEASE ORAL at 08:43

## 2023-11-25 ASSESSMENT — ACTIVITIES OF DAILY LIVING (ADL)
ADLS_ACUITY_SCORE: 48
ADLS_ACUITY_SCORE: 48
ADLS_ACUITY_SCORE: 45
ADLS_ACUITY_SCORE: 50
ADLS_ACUITY_SCORE: 45
ADLS_ACUITY_SCORE: 45
ADLS_ACUITY_SCORE: 46
ADLS_ACUITY_SCORE: 45
ADLS_ACUITY_SCORE: 48
ADLS_ACUITY_SCORE: 48

## 2023-11-25 NOTE — UTILIZATION REVIEW
Concurrent stay review; Secondary Review Determination     [facility]          Under the authority of the Utilization Management Committee, the utilization review process indicated a secondary review on the above patient.  The review outcome is based on review of the medical records, discussions with staff, and applying clinical experience noted on the date of the review.          (x) Observation Status Appropriate - Concurrent stay review    RATIONALE FOR DETERMINATION       89-year-old past medical history significant for chronic A-fib, previously on Xarelto presumed held since discharge in October due to symptomatic anemia and recurrent falls, heart failure with reduced EF (last echo was 55%) hypertension, hyperlipidemia, possible vascular dementia, prior stroke presenting from her nursing home after unwitnessed fall found to be in rapid ventricular atrial fibrillation and mildly hypotensive.  She is not overtly symptomatic from hypotension with blood pressure in the 90s over 50s, EKG did confirm A-fib with RVR, patient not endorsing overt anginal symptoms.  UA suggestive for UTI and UCx came back positive.  The ER images were negative for intracranial hemorrhage or fracture.  Chest CT was negative for PE and pneumonia.  The patient converted back to sinus rhythm.  The troponin was mildly elevated at 13, most likely from demand ischemia.    is discussing with the nursing home about discharge planning, possible tomorrow.    Patient is clinically improving and there is no clear indication to change patient's status to inpatient. The severity of illness, intensity of service provided, expected LOS and risk for adverse outcome make the care appropriate for observation.      This document was produced using voice recognition software       The information on this document is developed by the utilization review team in order for the business office to ensure compliance.  This only  denotes the appropriateness of proper admission status and does not reflect the quality of care rendered.         The definitions of Inpatient Status and Observation Status used in making the determination above are those provided in the CMS Coverage Manual, Chapter 1 and Chapter 6, section 70.4.      Sincerely,     GEORGINA MERRITT MD   Utilization Review  Physician Advisor  [facility]

## 2023-11-25 NOTE — PLAN OF CARE
Goal Outcome Evaluation:      Plan of Care Reviewed With: patient    Overall Patient Progress: no changeOverall Patient Progress: no change     Alert to self only, confused. VSS, RA. CMS intact. Denies pain. Up SBA with walker. Ambulated few times in hallway. Incontinent. Moved closer to nurses desk due to confusion.

## 2023-11-25 NOTE — PROGRESS NOTES
"Dr. Fernando lane messaged \"[id number]; just fyi, according to the patient's daughter, the patient contracted covid earlier this month. the pcr may come back positive, as a result. i will still test, to rule out flu and rsv. \"  "

## 2023-11-25 NOTE — PROGRESS NOTES
"Dr. Fernando lane messaged \"the patient has a new cough, is flushed and diaphoretic ( afebrile), has abdominal discomfort, loss of appetite, and reports that she feels \"awful\". last bm was yesterday. she has not been screened for covid this stay. wondering if you have orders? \"  "

## 2023-11-25 NOTE — PROGRESS NOTES
Observation goals  PRIOR TO DISCHARGE       Comments:   -diagnostic tests and consults completed and resulted- Not met   -vital signs normal or at patient baseline- Partially met   -infection is improving- No fever   -returns to baseline functional status- Not met   -safe disposition plan has been identified- Not met

## 2023-11-25 NOTE — PLAN OF CARE
Goal Outcome Evaluation:      PRIMARY Concern: Fall  SAFETY RISK Concerns (fall risk, behaviors, etc.): Fall risk   Isolation/Type: None  Tests/Procedures for NEXT shift: None   Consults? (Pending/following, signed-off?) PT /CC following   Where is patient from? (Home, TCU, etc.): Nursing home    Other Important info for NEXT shift: None   Anticipated DC date & active delays: 1-2 days     SUMMARY NOTE:    Orientation/Cognitive: A&O to self and time   Observation Goals (Met/ Not Met): Not met   Mobility Level/Assist Equipment: SBA/Ax1 GB/ Walker   Antibiotics & Plan (IV/po, length of tx left): None   Pain Management: Denies pain   Tele/VS/O2: VSS on RA , Tele =SR   ABNL Lab/BG: Troponin=30, 36, 32  Diet: Regular   Bowel/Bladder: Incontinent   Skin Concerns: Skin tear to right elbow. Scattered   Drains/Devices: PIV SL   Patient Stated Goal for Today: go home          Observation goals  PRIOR TO DISCHARGE       Comments:   -diagnostic tests and consults completed and resulted- Not met   -vital signs normal or at patient baseline- Partially met   -infection is improving- No fever   -returns to baseline functional status- Not met   -safe disposition plan has been identified- Not met

## 2023-11-25 NOTE — PROGRESS NOTES
Observation goals  PRIOR TO DISCHARGE        Comments: -diagnostic tests and consults completed and resulted  -vital signs normal or at patient baseline: yes  -infection is improving: in progress; patient has tested positive for covid  -returns to baseline functional status; no  -safe disposition plan has been identified; yes  Nurse to notify provider when observation goals have been met and patient is ready for discharge.          A&O X3; A1 gb/walker to BR; incontinent of BB, at times; denies pain; c/o stomach ache, and feeling unwell; afebrile and VSS on RA this shift; plan to monitor symptoms and discharge to LTC on Monday.

## 2023-11-25 NOTE — PROVIDER NOTIFICATION
MD Notification    Notified Person: MD    Notified Person Name: Dr. Mohsan Chaudhry     Notification Date/Time:     Notification Interaction: [employer] messaging     Purpose of Notification:  short stay Room 528.   Pt is removing tele multiple times.   Hx of dementia. Is it okay to hold tele overnight.   Please advise.    Orders Received:    Comments: yes, okay to hold tele

## 2023-11-25 NOTE — PROGRESS NOTES
[facility]  Hospitalist Progress Note    Assessment & Plan   Margy Babin is a 89 year old female admitted on 11/23/2023. She presents to the emergency department after a fall out of bed while getting up to go to the bathroom at her care facility.  Found to have A-fib RVR, lactic acidosis, and hypotension as well as pyuria with possible urinary tract infection. Increased her PTA metoprolol. She is now in normal sinus rhythm. Treating possible UTI. Patient reports deconditioning and frequent falls at home. Working with PT/OT. She had COVID earlier this month which could very likely be contributing to her frequent falls.     Paroxysmal atrial fibrillation with rapid ventricular rate: Now converted to sinus rhythm after initial dose of diltiazem.  Patient unaware of rapid rates.  - Increased prior to admission metoprolol XL from 100 mg daily to 200 mg daily.  - Continue Zio patch monitoring at discharge; might need to consider ablation in the future if recurrent A-fib RVR episodes and inability to do uptitrate rate control agents.  - Remaining off of anticoagulation as per last admission and discussion with family    Non-ST elevation myocardial infarction: Troponin elevated at 30 - 36- 33 in the setting of A-fib RVR into the 170 range.  Suspect type II NSTEMI with demand ischemia from rate.    Pyuria  Small leukocyte esterase  Urinary tract infection    Unreliable historian, but no urinary symptoms including no reported dysuria or urinary frequency. Daughter reports she has been confused lately which has coincided with urinary tract infections in the past.  - Switched ceftriaxone to macrobid 11/24  - Urine culture growing >100,000 citrobacter, macrobid is likely to cover this based on [facility] antibiogram. Was initially on ceftriaxone which citrobacter has only been 75% susceptible to. If concern for acute decompensation or worsening infection, will switch patient to  cefepime    Eosinophilia   I see no work up for this in the past, however she has had eosinophilia for over a year. Will repeat CBC today to confirm     Frequent falls  Deconditioning  Recent Covid infection  Cough  Describes frequent falls at home. Daughter thinks she has been more confused lately but was sounding at baseline on the phone today. Patient has not gotten up yet today to try and walk. Update: Daughter states patient had COVID infection in early November. This could very likely be contributing to her overall deconditioning and recent falls. Has wheezing and prolonged expiratory phase on exam.   - Fall precautions  - tessalon for cough  - likely to need ongoing PT/OT at her living facility   - Consider discontinuing pta tramadol  - check RSV, flu   - prn nebulizer     Abdominal Discomfort  Reports tums has helped her in the past. Will trial this. Could also consider GI cocktail if into improving.      Suspected vascular dementia.  Noted in history.  Seems to be an unreliable historian at this time (reports she is on no prescription medications) and has a history of encephalopathy on prior admissions.  -Outpatient occupational therapy  - Resumed Namenda, Seroquel, ramelteon  - Holding paroxetine pending repeat EKG for QT interval monitoring, am EKG     Hypertension:  - resumed pta amlodipine     HLD  Pta lipitor    Iron deficiency anemia    Ferrous sulfate    Clinically Significant Risk Factors Present on Admission               # Drug Induced Coagulation Defect: home medication list includes an anticoagulant medication    # Hypertension: Noted on problem list   # Dementia: noted on problem list        # Financial/Environmental Concerns: none         Diet: Regular Diet Adult     DVT Prophylaxis: PCD  Pichardo Catheter: Not present  Lines: None     Cardiac Monitoring: ACTIVE order. Indication: Tachyarrhythmias, acute (48 hours)  Code Status: No CPR- Do NOT Intubate      Disposition Plan       Expected  Discharge Date: 11/26/2023      Destination: assisted living  Discharge Comments: PT- long term care  SW/CM      Entered: Uziel King DO 11/25/2023, 2:58 PM        Uziel King DO  [facility]  Securely message with the Vocera Web Console (learn more here)  Text page via [facility] Paging/Directory    Interval History     No acute events overnight  Patient endorses fatigue  Denies urinary symptoms  Reports upset stomach and new cough  Daughter states patient had COVID earlier this month   No other symptoms reported by patient     -Data reviewed today: I reviewed all new labs and imaging results over the last 24 hours. I personally reviewed     Physical Exam   Temp: 97.7  F (36.5  C) Temp src: Oral BP: 116/49 Pulse: 65   Resp: 18 SpO2: 94 % O2 Device: None (Room air) Oxygen Delivery: 2 LPM  Vitals:    11/23/23 2341   Weight: 68 kg (150 lb)     Vital Signs with Ranges  Temp:  [97.7  F (36.5  C)-98.3  F (36.8  C)] 97.7  F (36.5  C)  Pulse:  [65-82] 65  Resp:  [18] 18  BP: (116-139)/(49-57) 116/49  SpO2:  [88 %-96 %] 94 %  I/O last 3 completed shifts:  In: 300 [P.O.:300]  Out: -     Constitutional: Awake, alert, cooperative, elderly female, no apparent distress.   ENT: Normocephalic, without obvious abnormality, atraumatic   Neck: Supple, symmetrical, trachea midline, no adenopathy.  Pulmonary: Prolonged expiratory phase, expiratory wheeze, no crackles   Cardiovascular: Regular rate and rhythm, normal S1 and S2, no S3 or S4, and no murmur noted.  GI: Normal bowel sounds, soft, non-distended, non-tender.  Skin/Integumen: Visualized skin appeared clear.  Neuro: CN II-XII grossly intact.  Psych:  Alert and oriented x 3. Normal affect.  Extremities: No lower extremity edema noted, and calves are non-TTP bilaterally.     Medical Decision Making       45 MINUTES SPENT BY ME on the date of service doing chart review, history, exam, documentation & further activities per the note.        Medications      amLODIPine  2.5 mg Oral Daily    atorvastatin  20 mg Oral QPM    ferrous sulfate  325 mg Oral Daily with breakfast    memantine  5 mg Oral BID    metoprolol succinate ER  200 mg Oral Daily    nitroFURantoin macrocrystal-monohydrate  100 mg Oral Q12H VISHAL (08/20)    QUEtiapine  25 mg Oral At Bedtime    sodium chloride (PF)  3 mL Intracatheter Q8H       Data   Recent Labs   Lab 11/23/23  2351   WBC 10.5   HGB 12.9   MCV 84         POTASSIUM 4.2   CHLORIDE 103   CO2 23   BUN 17.7   CR 0.79   ANIONGAP 14   SOBIA 9.1   *   ALBUMIN 3.8   PROTTOTAL 6.8   BILITOTAL 0.2   ALKPHOS 110   ALT 16   AST 29     Recent Results (from the past 24 hour(s))   XR Chest 2 Views    Narrative    EXAM: XR CHEST 2 VIEWS  LOCATION: [facility]  DATE: 11/25/2023    INDICATION: hypoxia  COMPARISON: 10/13/2023      Impression    IMPRESSION: Mildly prominent interstitium bilaterally. The be related to chronic underlying scarring, sequelae of infection, or less likely pulmonary edema. Mild bronchial wall thickening. Heart size is stable. Degenerative changes of the bilateral   shoulders and visualized spine.     Uziel King, DO

## 2023-11-25 NOTE — PROGRESS NOTES
Care Management Follow Up    Length of Stay (days): 0    Expected Discharge Date: 11/26/2023     Concerns to be Addressed: all concerns addressed in this encounter     Patient plan of care discussed at interdisciplinary rounds: Yes    Anticipated Discharge Disposition: Assisted Living, Home Care     Anticipated Discharge Services: None  Anticipated Discharge DME: None    Patient/family educated on Medicare website which has current facility and service quality ratings: no  Education Provided on the Discharge Plan:    Patient/Family in Agreement with the Plan: yes    Referrals Placed by CM/SW: Homecare  Private pay costs discussed: Not applicable    Additional Information:  Writer called [facility] and spoke to AVELINA Ren. Writer explained that patient could possibly could discharge tomorrow. She told writer they had a nurse walk off the job but would accommodate patient if she was discharged tomorrow, they do have an on call nurse. Updated Gela on Medication changes. Will follow for discharge planning.  Writer called and went over Medicare Outpatient Observation Notice with daughter Tina. Tina told writer that patient just had COVID in early November which was not mentioned to writer by [facility].  Let bedside RN know.    Terrie Walter RN

## 2023-11-25 NOTE — PROGRESS NOTES
"Dr. Fernando lane messaged \"so, as expected, covid swab is positive. according to daughter, most of LTC tested positive at the same time, around november 5th. would you like to make the call to make her \" recovered\", or should we wait until infection prevention weighs in, on monday?\"    Update: Dr. King replied that he would like us to hold off until IP weighs in, on Monday. We will leave the patient on precautions, until then.   "

## 2023-11-26 LAB — MAGNESIUM SERPL-MCNC: 1.9 MG/DL (ref 1.7–2.3)

## 2023-11-26 PROCEDURE — 250N000013 HC RX MED GY IP 250 OP 250 PS 637: Performed by: STUDENT IN AN ORGANIZED HEALTH CARE EDUCATION/TRAINING PROGRAM

## 2023-11-26 PROCEDURE — G0378 HOSPITAL OBSERVATION PER HR: HCPCS

## 2023-11-26 PROCEDURE — 250N000013 HC RX MED GY IP 250 OP 250 PS 637: Performed by: INTERNAL MEDICINE

## 2023-11-26 PROCEDURE — 36415 COLL VENOUS BLD VENIPUNCTURE: CPT | Performed by: STUDENT IN AN ORGANIZED HEALTH CARE EDUCATION/TRAINING PROGRAM

## 2023-11-26 PROCEDURE — 99233 SBSQ HOSP IP/OBS HIGH 50: CPT | Performed by: STUDENT IN AN ORGANIZED HEALTH CARE EDUCATION/TRAINING PROGRAM

## 2023-11-26 PROCEDURE — 83735 ASSAY OF MAGNESIUM: CPT | Performed by: STUDENT IN AN ORGANIZED HEALTH CARE EDUCATION/TRAINING PROGRAM

## 2023-11-26 RX ORDER — OXYBUTYNIN CHLORIDE 10 MG/1
10 TABLET, EXTENDED RELEASE ORAL DAILY
Status: DISCONTINUED | OUTPATIENT
Start: 2023-11-26 | End: 2023-11-29

## 2023-11-26 RX ORDER — NITROFURANTOIN 25; 75 MG/1; MG/1
100 CAPSULE ORAL EVERY 12 HOURS SCHEDULED
Status: DISCONTINUED | OUTPATIENT
Start: 2023-11-26 | End: 2023-11-29 | Stop reason: HOSPADM

## 2023-11-26 RX ORDER — CARBOXYMETHYLCELLULOSE SODIUM 5 MG/ML
1 SOLUTION/ DROPS OPHTHALMIC DAILY
Status: DISCONTINUED | OUTPATIENT
Start: 2023-11-26 | End: 2023-11-29 | Stop reason: HOSPADM

## 2023-11-26 RX ORDER — VITAMIN B COMPLEX
25 TABLET ORAL DAILY
Status: DISCONTINUED | OUTPATIENT
Start: 2023-11-26 | End: 2023-11-29 | Stop reason: HOSPADM

## 2023-11-26 RX ADMIN — Medication 25 MCG: at 13:09

## 2023-11-26 RX ADMIN — NITROFURANTOIN MONOHYDRATE/MACROCRYSTALLINE 100 MG: 25; 75 CAPSULE ORAL at 08:39

## 2023-11-26 RX ADMIN — AMLODIPINE BESYLATE 2.5 MG: 2.5 TABLET ORAL at 08:39

## 2023-11-26 RX ADMIN — ATORVASTATIN CALCIUM 20 MG: 20 TABLET, FILM COATED ORAL at 21:01

## 2023-11-26 RX ADMIN — METOPROLOL SUCCINATE 200 MG: 50 TABLET, EXTENDED RELEASE ORAL at 08:39

## 2023-11-26 RX ADMIN — NITROFURANTOIN MONOHYDRATE/MACROCRYSTALLINE 100 MG: 25; 75 CAPSULE ORAL at 21:01

## 2023-11-26 RX ADMIN — FERROUS SULFATE TAB 325 MG (65 MG ELEMENTAL FE) 325 MG: 325 (65 FE) TAB at 08:39

## 2023-11-26 RX ADMIN — QUETIAPINE FUMARATE 25 MG: 25 TABLET ORAL at 21:02

## 2023-11-26 RX ADMIN — OXYBUTYNIN CHLORIDE 10 MG: 10 TABLET, EXTENDED RELEASE ORAL at 13:09

## 2023-11-26 RX ADMIN — RAMELTEON 8 MG: 8 TABLET ORAL at 21:02

## 2023-11-26 RX ADMIN — CALCIUM CARBONATE (ANTACID) CHEW TAB 500 MG 1000 MG: 500 CHEW TAB at 19:11

## 2023-11-26 RX ADMIN — MEMANTINE 5 MG: 5 TABLET ORAL at 08:39

## 2023-11-26 RX ADMIN — MEMANTINE 5 MG: 5 TABLET ORAL at 21:01

## 2023-11-26 RX ADMIN — CARBOXYMETHYLCELLULOSE SODIUM 1 DROP: 5 SOLUTION/ DROPS OPHTHALMIC at 13:09

## 2023-11-26 ASSESSMENT — ACTIVITIES OF DAILY LIVING (ADL)
ADLS_ACUITY_SCORE: 48
ADLS_ACUITY_SCORE: 49
ADLS_ACUITY_SCORE: 50
ADLS_ACUITY_SCORE: 48
ADLS_ACUITY_SCORE: 50
ADLS_ACUITY_SCORE: 49
ADLS_ACUITY_SCORE: 50
ADLS_ACUITY_SCORE: 48
ADLS_ACUITY_SCORE: 48
ADLS_ACUITY_SCORE: 49
ADLS_ACUITY_SCORE: 48
ADLS_ACUITY_SCORE: 49

## 2023-11-26 NOTE — PROGRESS NOTES
Observation goals  PRIOR TO DISCHARGE        Comments: -diagnostic tests and consults completed and resulted-partially met  -vital signs normal or at patient baseline-partially met  -infection is improving-no fever  -returns to baseline functional status-not met.  -safe disposition plan has been identified-not met  Nurse to notify provider when observation goals have been met and patient is ready for discharge.

## 2023-11-26 NOTE — PROGRESS NOTES
[facility]  Hospitalist Progress Note    Assessment & Plan   Margy Babin is a 89 year old female admitted on 11/23/2023. She presents to the emergency department after a fall out of bed while getting up to go to the bathroom at her care facility.  Found to have A-fib RVR, lactic acidosis, and hypotension as well as pyuria with possible urinary tract infection. Increased her PTA metoprolol. She is now in normal sinus rhythm. Treating possible UTI. Patient reports deconditioning and frequent falls at home. Working with PT/OT. She had COVID earlier this month which could very likely be contributing to her frequent falls and deconditioning. Further hospital course below. Can return to her prior to admission facility if she is an assist of one per social work. Likely to discharge in 1-2 days pending TTE.     Paroxysmal atrial fibrillation with rapid ventricular rate: Now converted to sinus rhythm after initial dose of diltiazem.  Patient unaware of rapid rates.  - Toprol XL initially increased from 100 mg to 200 mg daily, decreasing to 150 mg daily starting 11/27 due to concern for fatigue and orthostasis   - Continue Zio patch monitoring at discharge; might need to consider ablation in the future if recurrent A-fib RVR episodes and inability to do uptitrate rate control agents.  - Remaining off of anticoagulation as per last admission and discussion with family    Positive orthostatic vitals  Lightheadedness   Fatigue  Her symptoms could be multifactorial in setting of recent covid infection as well as UTI. She presented with atrial fibrillation with RVR which self resolved and her PTA Toprol XL dose was increased from 100 mg to 200  mg daily. She has been more fatigued since that time so I am reducing the Toprol XL dose to 150 mg and repeat a TTE to evaluate for possible cardiomyopathy which could be contributing to symptoms.  Echo 6 months ago with EF 55% with mild to moderate  (1-2+) tricuspid regurgitation.    - TTE   - Telemetry     Non-ST elevation myocardial infarction: Troponin elevated at 30 - 36- 33 in the setting of A-fib RVR into the 170 range.  Suspect type II NSTEMI with demand ischemia from paroxysmal atrial fibrillation    Pyuria  Small leukocyte esterase  Urinary tract infection    Unreliable historian, but no urinary symptoms including no reported dysuria or urinary frequency. Daughter reports she has been confused lately which has coincided with urinary tract infections in the past.  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       Frequent falls  Deconditioning  Recent Covid infection  Cough  Describes frequent falls at home. Daughter thinks she has been more confused lately but was sounding at baseline on the phone today. Patient has not gotten up yet today to try and walk. Update: Daughter states patient had COVID infection in early November. This could very likely be contributing to her overall deconditioning and recent falls. Has wheezing and prolonged expiratory phase on exam.   - Fall precautions  - tessalon for cough  - likely to need ongoing PT/OT at her living facility   - Consider discontinuing pta tramadol  - Infection prevention to evaluate patient Monday to consider removing precautions   - prn nebulizer     Abdominal Discomfort  Prn tums      Suspected vascular dementia.  Noted in history.  Seems to be an unreliable historian at this time (reports she is on no prescription medications) and has a history of encephalopathy on prior admissions.  -Outpatient occupational therapy  - Resumed Namendsofie Seroquel, ramelteon  - Holding paroxetine pending repeat EKG for QT interval  monitoring (493 ms on 11/25), am EKG     Hypertension:  - resumed pta amlodipine     HLD  Pta lipitor    Iron deficiency anemia    Ferrous sulfate    Clinically Significant Risk Factors Present on Admission               # Drug Induced Coagulation Defect: home medication list includes an anticoagulant medication    # Hypertension: Noted on problem list   # Dementia: noted on problem list        # Financial/Environmental Concerns: none         Diet: Regular Diet Adult     DVT Prophylaxis: PCD  Pichardo Catheter: Not present  Lines: None     Cardiac Monitoring: ACTIVE order. Indication: QTc prolonging medication (48 hours)  Code Status: No CPR- Do NOT Intubate      Disposition Plan       Expected Discharge Date: 11/27/2023      Destination: assisted living  Discharge Comments: PT- long term care  SW/CM  COVID precautions till Monday, awaiting IP comments      Entered: Uziel King DO 11/26/2023, 3:09 PM        Uziel King DO  [facility]  Securely message with the Vocera Web Console (learn more here)  Text page via [employer] Paging/Directory    Interval History     No acute events overnight  Patient endorses fatigue  Denies urinary symptoms  Tums helped her upset stomach  History is somewhat limited due to   No other symptoms reported by patient     -Data reviewed today: I reviewed all new labs and imaging results over the last 24 hours. I personally reviewed     Physical Exam   Temp: 97.9  F (36.6  C) Temp src: Oral BP: (!) 159/68 Pulse: 62   Resp: 16 SpO2: 96 % O2 Device: None (Room air)    Vitals:    11/23/23 2341   Weight: 68 kg (150 lb)     Vital Signs with Ranges  Temp:  [97.5  F (36.4  C)-98  F (36.7  C)] 97.9  F (36.6  C)  Pulse:  [62-65] 62  Resp:  [16] 16  BP: (127-159)/(56-68) 159/68  SpO2:  [92 %-96 %] 96 %  No intake/output data recorded.    Constitutional: Awake, alert, cooperative, elderly female, no apparent distress.   ENT: Normocephalic, without obvious abnormality,  atraumatic   Neck: Supple, symmetrical, trachea midline, no adenopathy.  Pulmonary: Prolonged expiratory phase, expiratory wheeze, no crackles   Cardiovascular: Regular rate and rhythm, normal S1 and S2, no S3 or S4, and no murmur noted.  GI: Normal bowel sounds, soft, non-distended, non-tender.  Skin/Integumen: Visualized skin appeared clear.  Neuro: CN II-XII grossly intact.  Psych:  Alert and oriented to self and location but forgetful.   Extremities: No lower extremity edema noted, and calves are non-TTP bilaterally.     Medical Decision Making       45 MINUTES SPENT BY ME on the date of service doing chart review, history, exam, documentation & further activities per the note.       Medications      amLODIPine  2.5 mg Oral Daily    atorvastatin  20 mg Oral QPM    carboxymethylcellulose PF  1 drop Both Eyes Daily    ferrous sulfate  325 mg Oral Daily with breakfast    memantine  5 mg Oral BID    [START ON 11/27/2023] metoprolol succinate ER  150 mg Oral Daily    nitroFURantoin macrocrystal-monohydrate  100 mg Oral Q12H [facility] (08/20)    oxyBUTYnin ER  10 mg Oral Daily    QUEtiapine  25 mg Oral At Bedtime    ramelteon  8 mg Oral At Bedtime    sodium chloride (PF)  3 mL Intracatheter Q8H    Vitamin D3  25 mcg Oral Daily     Data   Recent Labs   Lab 11/25/23  1605 11/23/23  2351   WBC 10.5 10.5   HGB 11.8 12.9   MCV 85 84    370    140   POTASSIUM 4.3 4.2   CHLORIDE 105 103   CO2 25 23   BUN 15.8 17.7   CR 0.80 0.79   ANIONGAP 10 14   SOBIA 9.0 9.1   * 125*   ALBUMIN  --  3.8   PROTTOTAL  --  6.8   BILITOTAL  --  0.2   ALKPHOS  --  110   ALT  --  16   AST  --  29     No results found for this or any previous visit (from the past 24 hour(s)).    Uziel King DO

## 2023-11-26 NOTE — PROGRESS NOTES
PRIMARY Concern: UTI+, COVID +, Hypotension, Fall   SAFETY RISK Concerns (fall risk, behaviors, etc.): Fall risk       Isolation/Type: Isolation precautions removed today  Tests/Procedures for NEXT shift: Echo   Consults? (Pending/following, signed-off?) NA  Where is patient from? (Home, TCU, etc.): Care facility   Other Important info for NEXT shift: Positive ortho's, PA notified   Anticipated DC date & active delays: TBD  _____________________________________________________________________________  SUMMARY NOTE:  Orientation/Cognitive: AOx2, disoriented to situation and time  Observation Goals (Met/ Not Met): Not met   Mobility Level/Assist Equipment: A1/GB/W  Antibiotics & Plan (IV/po, length of tx left): PO antibiotics for UTI  Pain Management: Declines   Tele/VS/O2: VSS on RA except for HTN, positive ortho's, PA made aware   ABNL Lab/BG: See results   Diet: Regular   Bowel/Bladder: Intermittent incontinence   Skin Concerns: Scattered bruising  Drains/Devices: PIV SL, PCD's    Patient Stated Goal for Today: Rest

## 2023-11-26 NOTE — PROGRESS NOTES
PRIMARY Concern: Fall  SAFETY RISK Concerns (fall risk, behaviors, etc.): Fall risk   Isolation/Type: None  Tests/Procedures for NEXT shift: None   Consults? (Pending/following, signed-off?) PT /CC following   Where is patient from? (Home, TCU, etc.): Nursing home    Other Important info for NEXT shift: positive covid-19 test, could be lingering from earlier in the month.    Anticipated DC date & active delays: 1-2 days     SUMMARY NOTE:    Orientation/Cognitive: Disoriented to situation.   Observation Goals (Met/ Not Met): Not met   Mobility Level/Assist Equipment: SBA/Ax1 GB/ Walker   Antibiotics & Plan (IV/po, length of tx left): None   Pain Management: Denies pain   Tele/VS/O2: VSS on RA , Tele =SR   ABNL Lab/BG: Troponin=30, 36, 32  Diet: Regular   Bowel/Bladder: Incontinent of bladder  Skin Concerns: Skin tear to right elbow. Scattered   Drains/Devices: PIV SL   Patient Stated Goal for Today: go home

## 2023-11-26 NOTE — PROGRESS NOTES
MD Notification    Notified Person: PA    Notified Person Name: Fernando    Notification Date/Time: 2:41 pm    Notification Interaction: Vocera page    Purpose of Notification: Positive ortho's, thanks.     Orders Received: Repeat PHIL and tele, Metoprolol dose may be too high for her.     Comments:

## 2023-11-27 ENCOUNTER — APPOINTMENT (OUTPATIENT)
Dept: CARDIOLOGY | Facility: CLINIC | Age: 88
DRG: 280 | End: 2023-11-27
Attending: STUDENT IN AN ORGANIZED HEALTH CARE EDUCATION/TRAINING PROGRAM
Payer: MEDICARE

## 2023-11-27 ENCOUNTER — APPOINTMENT (OUTPATIENT)
Dept: PHYSICAL THERAPY | Facility: CLINIC | Age: 88
DRG: 280 | End: 2023-11-27
Payer: MEDICARE

## 2023-11-27 LAB — LVEF ECHO: NORMAL

## 2023-11-27 PROCEDURE — 99233 SBSQ HOSP IP/OBS HIGH 50: CPT | Performed by: INTERNAL MEDICINE

## 2023-11-27 PROCEDURE — 250N000013 HC RX MED GY IP 250 OP 250 PS 637: Performed by: STUDENT IN AN ORGANIZED HEALTH CARE EDUCATION/TRAINING PROGRAM

## 2023-11-27 PROCEDURE — 97116 GAIT TRAINING THERAPY: CPT | Mod: GP

## 2023-11-27 PROCEDURE — 93306 TTE W/DOPPLER COMPLETE: CPT | Mod: 26 | Performed by: INTERNAL MEDICINE

## 2023-11-27 PROCEDURE — 36415 COLL VENOUS BLD VENIPUNCTURE: CPT | Performed by: INTERNAL MEDICINE

## 2023-11-27 PROCEDURE — 258N000003 HC RX IP 258 OP 636: Performed by: INTERNAL MEDICINE

## 2023-11-27 PROCEDURE — 82533 TOTAL CORTISOL: CPT | Performed by: INTERNAL MEDICINE

## 2023-11-27 PROCEDURE — 255N000002 HC RX 255 OP 636: Performed by: STUDENT IN AN ORGANIZED HEALTH CARE EDUCATION/TRAINING PROGRAM

## 2023-11-27 PROCEDURE — G0378 HOSPITAL OBSERVATION PER HR: HCPCS

## 2023-11-27 PROCEDURE — 120N000001 HC R&B MED SURG/OB

## 2023-11-27 PROCEDURE — 999N000208 ECHOCARDIOGRAM COMPLETE

## 2023-11-27 PROCEDURE — 97110 THERAPEUTIC EXERCISES: CPT | Mod: GP

## 2023-11-27 RX ORDER — METOPROLOL SUCCINATE 100 MG/1
100 TABLET, EXTENDED RELEASE ORAL DAILY
Status: DISCONTINUED | OUTPATIENT
Start: 2023-11-28 | End: 2023-11-29 | Stop reason: HOSPADM

## 2023-11-27 RX ORDER — SODIUM CHLORIDE 9 MG/ML
INJECTION, SOLUTION INTRAVENOUS CONTINUOUS
Status: ACTIVE | OUTPATIENT
Start: 2023-11-27 | End: 2023-11-27

## 2023-11-27 RX ADMIN — MEMANTINE 5 MG: 5 TABLET ORAL at 21:51

## 2023-11-27 RX ADMIN — MEMANTINE 5 MG: 5 TABLET ORAL at 08:53

## 2023-11-27 RX ADMIN — NITROFURANTOIN MONOHYDRATE/MACROCRYSTALLINE 100 MG: 25; 75 CAPSULE ORAL at 21:50

## 2023-11-27 RX ADMIN — OXYBUTYNIN CHLORIDE 10 MG: 10 TABLET, EXTENDED RELEASE ORAL at 08:53

## 2023-11-27 RX ADMIN — Medication 25 MCG: at 08:53

## 2023-11-27 RX ADMIN — RAMELTEON 8 MG: 8 TABLET ORAL at 21:51

## 2023-11-27 RX ADMIN — CARBOXYMETHYLCELLULOSE SODIUM 1 DROP: 5 SOLUTION/ DROPS OPHTHALMIC at 08:53

## 2023-11-27 RX ADMIN — NITROFURANTOIN MONOHYDRATE/MACROCRYSTALLINE 100 MG: 25; 75 CAPSULE ORAL at 08:53

## 2023-11-27 RX ADMIN — HUMAN ALBUMIN MICROSPHERES AND PERFLUTREN 9 ML: 10; .22 INJECTION, SOLUTION INTRAVENOUS at 13:54

## 2023-11-27 RX ADMIN — FERROUS SULFATE TAB 325 MG (65 MG ELEMENTAL FE) 325 MG: 325 (65 FE) TAB at 08:54

## 2023-11-27 RX ADMIN — QUETIAPINE FUMARATE 25 MG: 25 TABLET ORAL at 21:52

## 2023-11-27 RX ADMIN — ATORVASTATIN CALCIUM 20 MG: 20 TABLET, FILM COATED ORAL at 21:50

## 2023-11-27 RX ADMIN — SODIUM CHLORIDE: 9 INJECTION, SOLUTION INTRAVENOUS at 09:37

## 2023-11-27 ASSESSMENT — ACTIVITIES OF DAILY LIVING (ADL)
ADLS_ACUITY_SCORE: 53
ADLS_ACUITY_SCORE: 53
ADLS_ACUITY_SCORE: 49
ADLS_ACUITY_SCORE: 53
ADLS_ACUITY_SCORE: 53
ADLS_ACUITY_SCORE: 49
ADLS_ACUITY_SCORE: 53
ADLS_ACUITY_SCORE: 49
ADLS_ACUITY_SCORE: 53
ADLS_ACUITY_SCORE: 49

## 2023-11-27 NOTE — UTILIZATION REVIEW
"  Admission Status; Secondary Review Determination         Under the authority of the Utilization Management Committee, the utilization review process indicated a secondary review on the above patient.  The review outcome is based on review of the medical records, discussions with staff, and applying clinical experience noted on the date of the review.        (xxx)      Inpatient Status Appropriate - This patient's medical care is consistent with medical management for inpatient care and reasonable inpatient medical practice.      () Observation Status Appropriate - This patient does not meet hospital inpatient criteria and is placed in observation status. If this patient's primary payer is Medicare and was admitted as an inpatient, Condition Code 44 should be used and patient status changed to \"observation\".   () Admission Status NOT Appropriate - This patient's medical care is not consistent with medical management for Inpatient or Observation Status.          RATIONALE FOR DETERMINATION     Flor Babin is a 89-year-old female with a past medical history significant for chronic A-fib (previously on Xarelto but held since discharge in October due to symptomatic anemia and recurrent falls), heart failure with reduced EF (last echo was 55%) hypertension, hyperlipidemia, possible vascular dementia, prior stroke admitted to Observation status on 11/23/2023 after unwitnessed fall and found to be in rapid ventricular atrial fibrillation and mildly hypotensive.  EKG did confirm A-fib with RVR.  UA suggestive for UTI and UCx came back positive.  Imaging studies were negative for intracranial hemorrhage or fracture.  Chest CT was negative for PE and pneumonia.  The patient converted back to sinus rhythm.  The troponin was mildly elevated at 13, most likely from demand ischemia.  She was clinically improving but today has had symptomatic orthostatic blood pressure changes.  At this time IVF initiated and BP meds held.  " I recommend advancing to IP status at this time.  I sent a text to Dr. Olivo to discuss.    The severity of illness, intensity of service provided, expected LOS and risk for adverse outcome make the care complex, high risk and appropriate for hospital admission.        The information on this document is developed by the utilization review team in order for the business office to ensure compliance.  This only denotes the appropriateness of proper admission status and does not reflect the quality of care rendered.         The definitions of Inpatient Status and Observation Status used in making the determination above are those provided in the CMS Coverage Manual, Chapter 1 and Chapter 6, section 70.4.      Sincerely,     Shaila Bowers MD  Physician Advisor   Utilization Review/ Case Management  [facility].

## 2023-11-27 NOTE — PLAN OF CARE
Alert and oriented x4 this morning. Intermittent confusion this evening, redirectable. VSS on RA except orthostatic hypotension. Asymptomatic during 2nd orthostatic hypotension. MAGI stockings on. BP medications adjusted. Tele SB. Denies chest pain and SOB. L/s fine crackles. Up Ax1 walker+GB. Tolerating reg diet. Incontinent of urine at times. Up to chair for meals. Possible discharge back to [facility] tomorrow.

## 2023-11-27 NOTE — PROVIDER NOTIFICATION
Dr Olivo notified for    Symptomatic positive orthostatic BP. Reported feeling dizzy.       Response:  BP medications held.   IV Fluid

## 2023-11-27 NOTE — PROGRESS NOTES
[facility]  Hospitalist Progress Note    Assessment & Plan   Margy Babin is a 89 year old female admitted on 11/23/2023. She presents to the emergency department after a fall out of bed while getting up to go to the bathroom at her care facility.  Found to have A-fib RVR, lactic acidosis, and hypotension as well as pyuria with possible urinary tract infection. Increased her PTA metoprolol. She is now in normal sinus rhythm. Treating possible UTI. Patient reports deconditioning and frequent falls at home. Working with PT/OT. She had COVID earlier this month which could very likely be contributing to her frequent falls and deconditioning. Further hospital course below. Can return to her prior to admission facility if she is an assist of one per social work. Likely to discharge in 1-2 days pending TTE.         Orthostatic hypotension: Symptomatic  Lightheadedness   Fatigue  Her symptoms could be multifactorial in setting of recent covid infection as well as UTI. She presented with atrial fibrillation with RVR which self resolved and her PTA Toprol XL dose was increased from 100 mg to 200  mg daily. She has been more fatigued since that reduced to Toprol XL dose to 150 mg and repeat a echocardiogram ordered which is pending at this time   echo 6 months ago with EF 55% with mild to moderate (1-2+) tricuspid regurgitation.      She did have significant drop in blood pressure and become become symptomatic.  At this time I will cut down her metoprolol dose to RPT dose of 100 mg daily, hold amlodipine at this time, other medication that can contribute include Seroquel, oxybutynin and Namenda    At this time I will give her IV fluids 100 mL per hour for 4 to 5 hours, hold amlodipine, apply compression stockings and discontinue oxybutynin.  Will check serum cortisol level.    Paroxysmal atrial fibrillation with rapid ventricular rate: Resolved  Now converted to sinus rhythm after initial  dose of diltiazem.  Patient unaware of rapid rates.  - Toprol XL initially increased from 100 mg to 200 mg daily, decreasing to 150 mg daily starting 11/27 due to concern for fatigue and orthostasis   Patient has significant orthostatic drop in her blood pressure as mentioned above, I will decrease the dose of metoprolol back to her PTA dose of 100 mg daily.  - Continue Zio patch monitoring at discharge; might need to consider ablation in the future if recurrent A-fib RVR episodes and inability to do uptitrate rate control agents.  - Remaining off of anticoagulation as per last admission and discussion with family    Non-ST elevation myocardial infarction: Troponin elevated at 30 - 36- 33 in the setting of A-fib RVR into the 170 range.  Suspect type II NSTEMI with demand ischemia from paroxysmal atrial fibrillation    Urinary tract infection   Unreliable historian, but no urinary symptoms including no reported dysuria or urinary frequency. Daughter reports she has been confused lately which has coincided with urinary tract infections in the past.  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       Frequent falls  Deconditioning  Recent Covid infection  Cough  Describes frequent falls at home. Daughter thinks she has been more confused lately but was sounding at baseline on the phone today.  This is multifactorial related to patient age, comorbidities, orthostatic hypotension, possible UTI update: Daughter states patient had COVID infection in early November. This could very likely be contributing to her overall deconditioning and recent falls. H  - Fall precautions  - tessalon for cough  - likely to need ongoing PT/OT at her living facility   - Consider discontinuing pta  tramadol      Abdominal Discomfort  Prn tums      Dementia: noted in history.  Seems to be an unreliable historian at this time (reports she is on no prescription medications) and has a history of encephalopathy on prior admissions.  -Outpatient occupational therapy  - Resumed Barbara Zafar ramelteon, patient has baseline dementia and she is on Namenda      Hypertension:  -Continue with the metoprolol at this time, giving significant orthostatic hypotension, I will hold the amlodipine for now.  And like to discontinue it    HLD  Continue with Lipitor    Iron deficiency anemia    Ferrous sulfate      Patient has significant hypertension, generalized weakness, requiring IV fluids, PT OT evaluation.  Most likely need to be switched to inpatient.  As she is not ready to discharge at this time.    Clinically Significant Risk Factors Present on Admission               # Drug Induced Coagulation Defect: home medication list includes an anticoagulant medication    # Hypertension: Noted on problem list   # Dementia: noted on problem list        # Financial/Environmental Concerns: none         Diet: Regular Diet Adult     DVT Prophylaxis: PCD  Pichardo Catheter: Not present  Lines: None     Cardiac Monitoring: ACTIVE order. Indication: QTc prolonging medication (48 hours)  Code Status: No CPR- Do NOT Intubate      Disposition Plan       Expected Discharge Date: 11/28/2023      Destination: assisted living  Discharge Comments: PT- long term care [place]  SW/CM helping with discharge planning  Echo      Entered: Bhanu Olivo MD 11/27/2023, 11:38 AM        Bhanu Olivo MD  [facility]  Securely message with the Vocera Web Console (learn more here)  Text page via [employer] Paging/Directory    Interval History   Patient seen and evaluated this morning, felt dizzy and lightheaded when she get up this morning, orthostatic blood pressure check and has significant drop in systolic blood pressure from systolic  blood pressure of 157 while lying to 120 on standing she was getting symptomatic as well.    Feeling tired no fatigue no fever chills chest pain headache abdominal pain dysuria hematuria constipation diarrhea at this    No other significant event overnight    -Data reviewed today: I reviewed all new labs and imaging results over the last 24 hours. I personally reviewed     Physical Exam   Temp: 97.3  F (36.3  C) Temp src: Axillary BP: (!) 150/60 Pulse: 62   Resp: 16 SpO2: 92 % O2 Device: None (Room air)    Vitals:    11/23/23 2341   Weight: 68 kg (150 lb)     Vital Signs with Ranges  Temp:  [97.3  F (36.3  C)-98.1  F (36.7  C)] 97.3  F (36.3  C)  Pulse:  [60-66] 62  Resp:  [16] 16  BP: (130-157)/(55-64) 150/60  SpO2:  [92 %-97 %] 92 %  I/O last 3 completed shifts:  In: 200 [P.O.:200]  Out: -     Constitutional: Awake, alert, cooperative, elderly female, no apparent distress.   ENT: Normocephalic, without obvious abnormality, atraumatic   Neck: Supple, symmetrical, trachea midline, no adenopathy.  Pulmonary: Prolonged expiratory phase, expiratory wheeze, no crackles   Cardiovascular: Regular rate and rhythm, normal S1 and S2, no S3 or S4, and no murmur noted.  GI: Normal bowel sounds, soft, non-distended, non-tender.  Skin/Integumen: Visualized skin appeared clear.  Neuro: CN II-XII grossly intact.  Psych:  Alert and oriented to self and location but forgetful.   Extremities: No lower extremity edema noted, and calves are non-TTP bilaterally.     Medical Decision Making       45 MINUTES SPENT BY ME on the date of service doing chart review, history, exam, documentation & further activities per the note.       Medications    sodium chloride 100 mL/hr at 11/27/23 0937      [Held by provider] amLODIPine  2.5 mg Oral Daily    atorvastatin  20 mg Oral QPM    carboxymethylcellulose PF  1 drop Both Eyes Daily    ferrous sulfate  325 mg Oral Daily with breakfast    memantine  5 mg Oral BID    [START ON 11/28/2023] metoprolol  succinate ER  100 mg Oral Daily    nitroFURantoin macrocrystal-monohydrate  100 mg Oral Q12H [facility] (08/20)    oxyBUTYnin ER  10 mg Oral Daily    QUEtiapine  25 mg Oral At Bedtime    ramelteon  8 mg Oral At Bedtime    sodium chloride (PF)  3 mL Intracatheter Q8H    Vitamin D3  25 mcg Oral Daily     Data   Recent Labs   Lab 11/25/23  1605 11/23/23  2351   WBC 10.5 10.5   HGB 11.8 12.9   MCV 85 84    370    140   POTASSIUM 4.3 4.2   CHLORIDE 105 103   CO2 25 23   BUN 15.8 17.7   CR 0.80 0.79   ANIONGAP 10 14   SOBIA 9.0 9.1   * 125*   ALBUMIN  --  3.8   PROTTOTAL  --  6.8   BILITOTAL  --  0.2   ALKPHOS  --  110   ALT  --  16   AST  --  29     No results found for this or any previous visit (from the past 24 hour(s)).      Bhanu Olivo MD  [facility]  Contact information available via [facility] Paging/Directory

## 2023-11-27 NOTE — PROGRESS NOTES
Observation goals  PRIOR TO DISCHARGE       Comments:   -diagnostic tests and consults completed and resulted- Not met  -vital signs normal or at patient baseline- Met  -infection is improving- Partially Met  -returns to baseline functional status- Not met  -safe disposition plan has been identified- Partially met

## 2023-11-27 NOTE — PLAN OF CARE
Goal Outcome Evaluation:  PRIMARY Concern: Fall  SAFETY RISK Concerns (fall risk, behaviors, etc.): Fall risk   Isolation/Type: None  Tests/Procedures for NEXT shift: ECHO. Orthostatic BP  Consults? (Pending/following, signed-off?) PT /CC following   Where is patient from? (Home, TCU, etc.): [facility]  Other Important info for NEXT shift: Intermittent dizzy. Needs Zio patch at discharge per Hospitalist notes.   Anticipated DC date & active delays: Possibly discharge back to [facility] today. CC/SW following.      SUMMARY NOTE:     Orientation/Cognitive: A&O to self and time   Observation Goals (Met/ Not Met): Not met   Mobility Level/Assist Equipment: SBA/Ax1 GB/ Walker   Antibiotics & Plan (IV/po, length of tx left): On po antibiotics for UTI   Pain Management: Denies pain   Tele/VS/O2: VSS on RA , Tele =SR with BBB   ABNL Lab/BG: UTI +   Diet: Regular  Bowel/Bladder: Incontinent   Skin Concerns: Skin tear to right elbow. Scattered   Drains/Devices: PIV SL   Patient Stated Goal for Today: go home          Observation goals  PRIOR TO DISCHARGE       Comments:   -diagnostic tests and consults completed and resulted- Not met  -vital signs normal or at patient baseline- Met  -infection is improving- Partially Met  -returns to baseline functional status- Not met  -safe disposition plan has been identified- Partially met

## 2023-11-27 NOTE — PROGRESS NOTES
Top portion must ALWAYS be completed  PRIMARY Concern: Fall, UTI  SAFETY RISK Concerns (fall risk, behaviors, etc.): yes, fall risk. Calm   Isolation/Type: none  Tests/Procedures for NEXT shift: Echo pending.  Consults? (Pending/following, signed-off?) none  Where is patient from? (Home, TCU, etc.): [facility]  Other Important info for NEXT shift: Positive ortho. Intermittent dizzy. Needs Zio patch at discharge per Hospitalist notes.  Anticipated DC date & active delays: Possibly discharge back to [facility] tomorrow. CC/SW following.  _____________________________________________________________________  SUMMARY NOTE:                Orientation/Cognitive: AOX4.  Observation Goals (Met/ Not Met): not met  Mobility Level/Assist Equipment: A1gb/walker. Sat up in chair for meal this shift.  Antibiotics & Plan (IV/po, length of tx left): On po antibiotics for UTI  Pain Management: Denies. Gave Tums.  Tele/VS/O2: VSS on RA. Tele- SR w/BBB.  ABNL Lab/BG: UTI +  Diet: Reg  Bowel/Bladder: Incontinent at times. Bladder scanned  33cc.   Skin Concerns: none  Drains/Devices: PIV SL. PCD's   Patient Stated Goal for Today: none      Observation goals  PRIOR TO DISCHARGE        Comments: -diagnostic tests and consults completed and resulted- not met  -vital signs normal or at patient baseline- met  -infection is improving- partially  -returns to baseline functional status- not met  -safe disposition plan has been identified- not met.   Nurse to notify provider when observation goals have been met and patient is ready for discharge.

## 2023-11-28 LAB
ATRIAL RATE - MUSE: 68 BPM
CORTIS SERPL-MCNC: 4.6 UG/DL
DIASTOLIC BLOOD PRESSURE - MUSE: NORMAL MMHG
INTERPRETATION ECG - MUSE: NORMAL
LACTATE SERPL-SCNC: 1.3 MMOL/L (ref 0.7–2)
LACTATE SERPL-SCNC: 2.1 MMOL/L (ref 0.7–2)
P AXIS - MUSE: 38 DEGREES
PR INTERVAL - MUSE: 190 MS
QRS DURATION - MUSE: 148 MS
QT - MUSE: 464 MS
QTC - MUSE: 493 MS
R AXIS - MUSE: -46 DEGREES
SYSTOLIC BLOOD PRESSURE - MUSE: NORMAL MMHG
T AXIS - MUSE: 105 DEGREES
VENTRICULAR RATE- MUSE: 68 BPM

## 2023-11-28 PROCEDURE — 120N000001 HC R&B MED SURG/OB

## 2023-11-28 PROCEDURE — 99232 SBSQ HOSP IP/OBS MODERATE 35: CPT | Performed by: INTERNAL MEDICINE

## 2023-11-28 PROCEDURE — 93010 ELECTROCARDIOGRAM REPORT: CPT | Performed by: INTERNAL MEDICINE

## 2023-11-28 PROCEDURE — 83605 ASSAY OF LACTIC ACID: CPT | Performed by: INTERNAL MEDICINE

## 2023-11-28 PROCEDURE — 93005 ELECTROCARDIOGRAM TRACING: CPT

## 2023-11-28 PROCEDURE — 36415 COLL VENOUS BLD VENIPUNCTURE: CPT | Performed by: INTERNAL MEDICINE

## 2023-11-28 PROCEDURE — 250N000013 HC RX MED GY IP 250 OP 250 PS 637: Performed by: STUDENT IN AN ORGANIZED HEALTH CARE EDUCATION/TRAINING PROGRAM

## 2023-11-28 PROCEDURE — 250N000013 HC RX MED GY IP 250 OP 250 PS 637: Performed by: INTERNAL MEDICINE

## 2023-11-28 RX ADMIN — NITROFURANTOIN MONOHYDRATE/MACROCRYSTALLINE 100 MG: 25; 75 CAPSULE ORAL at 09:13

## 2023-11-28 RX ADMIN — MEMANTINE 5 MG: 5 TABLET ORAL at 19:51

## 2023-11-28 RX ADMIN — METOPROLOL SUCCINATE 100 MG: 100 TABLET, EXTENDED RELEASE ORAL at 09:13

## 2023-11-28 RX ADMIN — CARBOXYMETHYLCELLULOSE SODIUM 1 DROP: 5 SOLUTION/ DROPS OPHTHALMIC at 09:15

## 2023-11-28 RX ADMIN — NITROFURANTOIN MONOHYDRATE/MACROCRYSTALLINE 100 MG: 25; 75 CAPSULE ORAL at 19:51

## 2023-11-28 RX ADMIN — FERROUS SULFATE TAB 325 MG (65 MG ELEMENTAL FE) 325 MG: 325 (65 FE) TAB at 09:13

## 2023-11-28 RX ADMIN — Medication 25 MCG: at 09:13

## 2023-11-28 RX ADMIN — ATORVASTATIN CALCIUM 20 MG: 20 TABLET, FILM COATED ORAL at 19:50

## 2023-11-28 RX ADMIN — RAMELTEON 8 MG: 8 TABLET ORAL at 21:40

## 2023-11-28 RX ADMIN — QUETIAPINE FUMARATE 25 MG: 25 TABLET ORAL at 21:40

## 2023-11-28 RX ADMIN — MEMANTINE 5 MG: 5 TABLET ORAL at 09:13

## 2023-11-28 RX ADMIN — OXYBUTYNIN CHLORIDE 10 MG: 10 TABLET, EXTENDED RELEASE ORAL at 09:13

## 2023-11-28 ASSESSMENT — ACTIVITIES OF DAILY LIVING (ADL)
ADLS_ACUITY_SCORE: 45
ADLS_ACUITY_SCORE: 49
ADLS_ACUITY_SCORE: 45
ADLS_ACUITY_SCORE: 49
ADLS_ACUITY_SCORE: 46
ADLS_ACUITY_SCORE: 53
ADLS_ACUITY_SCORE: 46
ADLS_ACUITY_SCORE: 53
ADLS_ACUITY_SCORE: 46
ADLS_ACUITY_SCORE: 49
ADLS_ACUITY_SCORE: 46
ADLS_ACUITY_SCORE: 45

## 2023-11-28 NOTE — PLAN OF CARE
Goal Outcome Evaluation:  PRIMARY Concern: Fall  SAFETY RISK Concerns (fall risk, behaviors, etc.): Fall risk   Isolation/Type: None  Tests/Procedures for NEXT shift Orthostatic positive   Consults? (Pending/following, signed-off?) PT following   Where is patient from? (Home, TCU, etc.): [facility]  Other Important info for NEXT shift Denies feeling dizzy this shift   Anticipated DC date & active delays: Possibly discharge  to [place] [facility]     SUMMARY NOTE:     Orientation/Cognitive: A&O3  Observation Goals (Met/ Not Met): Not met   Mobility Level/Assist Equipment: SBA/Ax1 GB/ Walker   Antibiotics & Plan (IV/po, length of tx left):    Pain Management: Denies pain.   Tele/VS/O2: BP elevated other VSS on RA    ABNL Lab/BG: positive for UTI  Diet: Regular  Bowel/Bladder: Incontinent   Skin Concerns: Skin tear to right elbow. Scattered   Drains/Devices: PIV SL   Patient Stated Goal for Today: Sleep          Observation goals  PRIOR TO DISCHARGE       Comments:   -diagnostic tests and consults completed and resulted- Not met  -vital signs normal or at patient baseline- Met  -infection is improving- Partially Met  -returns to baseline functional status- Not met  -safe disposition plan has been identified- Partially met                                    [facility]    Infectious Disease Progress Note    Date of Service : 09/29/2021    Assessment:  1. S.aureus (MRSA) septic arthritis r 1st MTP joint , likely related to hematogenous seeding -substance abuse history is the risk factor for infection. Blood cxs negative thus far. No evidence of endocarditis  2. Chronic anemia  3. Chronic medical conditions - ADHD, history of right leg cellulitis, chemical dependency, depression, hypertension, panic disorder     Recommendations:  1. Continue Vancomycin, level subtherapeutic. Dose adjustment per Pharm-D  2. Discussed treatment options with patient. He prefers to stay here for 2 weeks for IV vancomycin until 10/10  then transition to oral Linezolid for another 2 weeks  3. Check safety labs - CBC/diff weekly, creatinine and vancomycin levels twice weekly  4.add probiotic    Danay Campbell MD    Interval History   Some diarrhea over the past two days. No abdominal pain. Foot pain is controlled, no additional complaints today    Antimicrobial therapy  9/5 Vancomycin    Physical Exam   Temp: 97.9  F (36.6  C) Temp src: Oral BP: 118/69 Pulse: 55   Resp: 15 SpO2: 97 % O2 Device: None (Room air)    Vitals:    09/24/21 0642 09/27/21 0628 09/28/21 0627   Weight: 87.9 kg (193 lb 12.6 oz) 86 kg (189 lb 9.5 oz) 86 kg (189 lb 9.6 oz)     Vital Signs with Ranges  Temp:  [97.9  F (36.6  C)-98.2  F (36.8  C)] 97.9  F (36.6  C)  Pulse:  [55-63] 55  Resp:  [15] 15  BP: (115-118)/(67-69) 118/69  SpO2:  [97 %-100 %] 97 %      Constitutional: Awake, alert, cooperative, no apparent distress  Lungs: Clear to auscultation bilaterally, no crackles or wheezing  Cardiovascular: Regular rate and rhythm, normal S1 and S2, and no murmur noted  Abdomen: Normal bowel sounds, soft, non-distended, non-tender  Skin: No rashes, no cyanosis, no edema  MS: R foot in surgical dressing    Other:    Medications       acetaminophen  975 mg Oral Q8H     mirtazapine  15 mg Oral At Bedtime      PHENobarbital  32.4 mg Oral TID     [START ON 10/2/2021] PHENobarbital  32.4 mg Oral BID     [START ON 10/4/2021] PHENobarbital  32.4 mg Oral QAM AC     polyethylene glycol  17 g Oral Daily     senna-docusate  1 tablet Oral BID     sodium chloride (PF)  3 mL Intracatheter Q8H     vancomycin  1,750 mg Intravenous Q12H       Data   All microbiology laboratory data reviewed.  Recent Labs   Lab Test 09/25/21  0546 09/24/21  0928 09/23/21  1926   WBC 4.5 5.7 5.8   HGB 11.8* 12.2* 12.3*   HCT 36.2* 37.4* 37.4*   MCV 85 85 84    278 252     Recent Labs   Lab Test 09/28/21  0702 09/27/21  0652 09/26/21  1227   CR 0.91 0.86 0.71     Recent Labs   Lab Test 09/25/21  0546   SED 61*     Component      Latest Ref Rng & Units 9/24/2021   HIV Antigen Antibody Combo      Nonreactive Nonreactive   Hepatitis C Antibody      Nonreactive Nonreactive   Hep B Surface Agn      Nonreactive Nonreactive   Hepatitis B Surface Antibody      <8.00 m[IU]/mL 890.47 (H)      Component      Latest Ref Rng & Units 9/26/2021   Vancomycin Level      mg/L 7.0      Micro  9/22 R foot synovial fluid aspirate -MRSA  9/23 R foot wound MRSA  Blood cx 9/23, 9/24 negative                   Staphylococcus aureus MRSA       GERRY       Clindamycin <=0.25 ug/mL Susceptible 1       Erythromycin >=8.0 ug/mL Resistant       Gentamicin <=0.5 ug/mL Susceptible       Linezolid 2.0 ug/mL Susceptible       Oxacillin >=4.0 ug/mL Resistant       Tetracycline <=1.0 ug/mL Susceptible       Trimethoprim/Sulfamethoxazole <=0.5/9.5 u... Susceptible       Vancomycin 1.0 ug/mL Susceptible

## 2023-11-28 NOTE — PLAN OF CARE
Goal Outcome Evaluation:  PRIMARY Concern: Fall  SAFETY RISK Concerns (fall risk, behaviors, etc.): Fall risk   Isolation/Type: None  Tests/Procedures for NEXT shift Orthostatic positive   Consults? (Pending/following, signed-off?) PT following   Where is patient from? (Home, TCU, etc.): [facility]  Other Important info for NEXT shift: Feeling dizzy at times  Anticipated DC date & active delays: Possibly discharge  to [facility]     SUMMARY NOTE:     Orientation/Cognitive: A&O3  Observation Goals (Met/ Not Met): Not met   Mobility Level/Assist Equipment: SBA/Ax1 GB/ Walker   Antibiotics & Plan (IV/po, length of tx left):    Pain Management: Denies pain.   Tele/VS/O2: BP elevated other VSS on RA     ABNL Lab/BG: positive for UTI  Diet: Regular  Bowel/Bladder: Continent up to the BR  Skin Concerns: Skin tear to right elbow. Scattered   Drains/Devices: PIV SL   Patient Stated Goal for Today: Sleep

## 2023-11-28 NOTE — PLAN OF CARE
Goal Outcome Evaluation:  PRIMARY Concern: Fall  SAFETY RISK Concerns (fall risk, behaviors, etc.): Fall risk   Isolation/Type: None  Tests/Procedures for NEXT shift Orthostatic positive   Consults? (Pending/following, signed-off?) PT following   Where is patient from? (Home, TCU, etc.): [facility]  Other Important info for NEXT shift Denies feeling dizzy this shift   Anticipated DC date & active delays: Possibly discharge  to [place] [facility]     SUMMARY NOTE:     Orientation/Cognitive: A&O3  Observation Goals (Met/ Not Met): Not met   Mobility Level/Assist Equipment: SBA/Ax1 GB/ Walker   Antibiotics & Plan (IV/po, length of tx left):    Pain Management: Denies pain.   Tele/VS/O2: BP elevated other VSS on RA, SB    ABNL Lab/BG: positive for UTI  Diet: Regular  Bowel/Bladder: Incontinent   Skin Concerns: Skin tear to right elbow. Scattered   Drains/Devices: PIV SL   Patient Stated Goal for Today: Sleep          Observation goals  PRIOR TO DISCHARGE       Comments:   -diagnostic tests and consults completed and resulted- Not met  -vital signs normal or at patient baseline- Met  -infection is improving- Partially Met  -returns to baseline functional status- Not met  -safe disposition plan has been identified- Partially met

## 2023-11-28 NOTE — PROGRESS NOTES
[facility]  Hospitalist Progress Note    Assessment & Plan   Margy Babin is a 89 year old female admitted on 11/23/2023. She presents to the emergency department after a fall out of bed while getting up to go to the bathroom at her care facility.  Found to have A-fib RVR, lactic acidosis, and hypotension as well as pyuria with possible urinary tract infection. Increased her PTA metoprolol. She is now in normal sinus rhythm. Treating possible UTI. Patient reports deconditioning and frequent falls at home. Working with PT/OT. She had COVID earlier this month which could very likely be contributing to her frequent falls and deconditioning. Further hospital course below. Can return to her prior to admission facility if she is an assist of one per social work. Likely to discharge in 1-2 days pending TTE.         Orthostatic hypotension: Symptomatic  Lightheadedness   Fatigue  Her symptoms could be multifactorial in setting of recent covid infection as well as UTI. She presented with atrial fibrillation with RVR which self resolved and her PTA Toprol XL dose was increased from 100 mg to 200  mg daily. She has been more fatigued since that reduced to Toprol XL dose to 150 mg and repeat a echocardiogram ordered which shows EF of 50 to 55%, mild concentric left ventricle hypertrophy, right ventricle is normal in structure function and size no significant valve disease   echo 6 months ago with EF 55% with mild to moderate (1-2+) tricuspid regurgitation.      She did have significant drop in blood pressure and become become symptomatic.  I did cut down her metoprolol dose to 100 mg daily, hold amlodipine at this time, other medication that can contribute include Seroquel, oxybutynin and Namenda    On 11/27/2023 gave her IV fluids 100 mL per hour for 4 to 5 hours, hold amlodipine, apply compression stockings and discontinue oxybutynin.  Cortisol level low normal.  Improved orthostatic blood  pressure this morning, continue PT and OT to evaluate the patient    Paroxysmal atrial fibrillation with rapid ventricular rate: Resolved  Now converted to sinus rhythm after initial dose of diltiazem.  Patient unaware of rapid rates.  - Toprol XL initially increased from 100 mg to 200 mg daily, decreasing to 150 mg daily starting 11/27 due to concern for fatigue and orthostasis   Patient has significant orthostatic drop in her blood pressure as mentioned above, I did decrease the dose of metoprolol back to her PTA dose of 100 mg daily.  Weight remained in good control at this time  - Continue Zio patch monitoring at discharge; might need to consider ablation in the future if recurrent A-fib RVR episodes and inability to do uptitrate rate control agents.  - Remaining off of anticoagulation as per last admission and discussion with family    Non-ST elevation myocardial infarction: Troponin elevated at 30 - 36- 33 in the setting of A-fib RVR into the 170 range.  Suspect type II NSTEMI with demand ischemia from paroxysmal atrial fibrillation    Urinary tract infection   Unreliable historian, but no urinary symptoms including no reported dysuria or urinary frequency. Daughter reports she has been confused lately which has coincided with urinary tract infections in the past.  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       Frequent falls  Deconditioning  Recent Covid infection  Cough  Describes frequent falls at home. Daughter thinks she has been more confused lately but was sounding at baseline on the phone  This is multifactorial related to patient age, comorbidities, orthostatic hypotension, possible UTI   update: Daughter states patient had COVID infection in early November. This could  very likely be contributing to her overall deconditioning and recent falls.   - Fall precautions  - tessalon for cough  - likely to need ongoing PT/OT at her living facility   - Consider discontinuing pta tramadol  PT has evaluated patient recommending home with home health, will have OT evaluate the patient today as well.  Check orthostatic blood pressure make sure not getting symptomatic or had significant drop.      Abdominal Discomfort  Prn tums      Dementia: noted in history.  Seems to be an unreliable historian at this time (reports she is on no prescription medications) and has a history of encephalopathy on prior admissions.  -Outpatient occupational therapy  - Resumed Namenda, Seroquel, ramelteon, patient has baseline dementia and she is on Namenda      Hypertension:  -Continue with the metoprolol at this time, giving significant orthostatic hypotension, I will hold the amlodipine for now.  And like to discontinue it    HLD  Continue with Lipitor    Iron deficiency anemia    Ferrous sulfate      Patient has significant hypertension, generalized weakness, requiring IV fluids, PT OT evaluation.  Most likely need to be switched to inpatient.  As she is not ready to discharge at this time.    Clinically Significant Risk Factors Present on Admission               # Drug Induced Coagulation Defect: home medication list includes an anticoagulant medication    # Hypertension: Noted on problem list   # Dementia: noted on problem list        # Financial/Environmental Concerns: none         Diet: Regular Diet Adult     DVT Prophylaxis: PCD  Pichardo Catheter: Not present  Lines: None     Cardiac Monitoring: ACTIVE order. Indication: QTc prolonging medication (48 hours)  Code Status: No CPR- Do NOT Intubate      Disposition Plan       Expected Discharge Date: 11/29/2023      Destination: assisted living  Discharge Comments: PT- long term care [facility]  SW/LM helping with discharge planning  Echo complete      Entered:  Bhanu Olivo MD 11/28/2023, 10:11 AM        Bhanu Olivo MD  [facility]  Securely message with the [employer] Web Console (learn more here)  Text page via [facility] Paging/Directory    Interval History   Patient seen and evaluated this morning, feeling better, denies any headache dizziness lightheadedness this morning just woke up.  Not up this morning yet.    No other significant event overnight    -Data reviewed today: I reviewed all new labs and imaging results over the last 24 hours. I personally reviewed     Physical Exam   Temp: (!) 96  F (35.6  C) Temp src: Oral BP: 138/67 Pulse: 76   Resp: 18 SpO2: 95 % O2 Device: None (Room air)    Vitals:    11/23/23 2341   Weight: 68 kg (150 lb)     Vital Signs with Ranges  Temp:  [96  F (35.6  C)-98.5  F (36.9  C)] 96  F (35.6  C)  Pulse:  [62-83] 76  Resp:  [16-18] 18  BP: (121-150)/(57-71) 138/67  SpO2:  [92 %-96 %] 95 %  I/O last 3 completed shifts:  In: 750 [P.O.:350; I.V.:400]  Out: -     Constitutional: Awake, alert, cooperative, elderly female, no apparent distress.   ENT: Normocephalic, without obvious abnormality, atraumatic   Neck: Supple, symmetrical, trachea midline, no adenopathy.  Pulmonary: Good air entry bilaterally no wheezing or crackles   Cardiovascular: Regular rate and rhythm, normal S1 and S2, no S3 or S4, and no murmur noted.  GI: Normal bowel sounds, soft, non-distended, non-tender.  Skin/Integumen: Visualized skin appeared clear.  Neuro: CN II-XII grossly intact.  Psych:  Alert and oriented to self and location but forgetful.   Extremities: No lower extremity edema noted, and calves are non-TTP bilaterally.     Medical Decision Making       45 MINUTES SPENT BY ME on the date of service doing chart review, history, exam, documentation & further activities per the note.       Medications        [Held by provider] amLODIPine  2.5 mg Oral Daily    atorvastatin  20 mg Oral QPM    carboxymethylcellulose PF  1 drop Both Eyes Daily     ferrous sulfate  325 mg Oral Daily with breakfast    memantine  5 mg Oral BID    metoprolol succinate ER  100 mg Oral Daily    nitroFURantoin macrocrystal-monohydrate  100 mg Oral Q12H VISHAL ()    oxyBUTYnin ER  10 mg Oral Daily    QUEtiapine  25 mg Oral At Bedtime    ramelteon  8 mg Oral At Bedtime    sodium chloride (PF)  3 mL Intracatheter Q8H    Vitamin D3  25 mcg Oral Daily     Data   Recent Labs   Lab 23  1605 23  2351   WBC 10.5 10.5   HGB 11.8 12.9   MCV 85 84    370    140   POTASSIUM 4.3 4.2   CHLORIDE 105 103   CO2 25 23   BUN 15.8 17.7   CR 0.80 0.79   ANIONGAP 10 14   SOBIA 9.0 9.1   * 125*   ALBUMIN  --  3.8   PROTTOTAL  --  6.8   BILITOTAL  --  0.2   ALKPHOS  --  110   ALT  --  16   AST  --  29     Recent Results (from the past 24 hour(s))   Echocardiogram Complete   Result Value    LVEF  50-55%    Narrative    [id number]  [id number]  [id number]^[email or web address]^JULY^DUSTY     [facility]  Echocardiography Laboratory  [address] [place] [address]     Name: KHANH BRAGG  MRN: [id number]  : 10/02/1929  Study Date: 2023 01:22 PM  Age: 89 yrs  Gender: Female  Patient Location: [facility]  Reason For Study: Arrhythmia  Ordering Physician: JULY DOVER  Referring Physician: Colin Lake  Performed By: Chrissy Vasquez     BSA: 1.8 m2  Height: 65 in  Weight: 150 lb  HR: 59  BP: 159/68 mmHg  ______________________________________________________________________________  Procedure  Complete Portable Echo Adult. Optison (NDC #[id number]) given intravenously.  ______________________________________________________________________________  Interpretation Summary     Left ventricular systolic function is borderline reduced.  The visual ejection fraction is 50-55%.  There is mild concentric left ventricular hypertrophy.  The right ventricle is normal in structure, function and size.  No significant valve dysfunction.  The inferior vena cava was  normal in size with preserved respiratory  variability.  There is no pericardial effusion.     Compared to prior study dated 5/17/2023, pulmonary pressures are lower,  otherwise no significant change.  ______________________________________________________________________________  Left Ventricle  The left ventricle is normal in size. There is mild concentric left  ventricular hypertrophy. The visual ejection fraction is 50-55%. Left  ventricular systolic function is borderline reduced. Grade I or early  diastolic dysfunction. There is borderline global hypokinesia of the left  ventricle.     Right Ventricle  The right ventricle is normal in structure, function and size.     Atria  The left atrium is severely dilated. Right atrial size is normal.     Mitral Valve  There is mild mitral annular calcification. There is mild (1+) mitral  regurgitation.     Tricuspid Valve  The tricuspid valve is normal in structure and function. There is mild (1+)  tricuspid regurgitation. The right ventricular systolic pressure is  approximated at 25.2 mmHg plus the right atrial pressure.     Aortic Valve  The aortic valve is trileaflet with aortic valve sclerosis. There is mild (1+)  aortic regurgitation.     Pulmonic Valve  The pulmonic valve is normal in structure and function. There is mild (1+)  pulmonic valvular regurgitation.     Vessels  The aortic root is normal size. Normal size ascending aorta. The inferior vena  cava was normal in size with preserved respiratory variability.     Pericardium  There is no pericardial effusion.     ______________________________________________________________________________  MMode/2D Measurements & Calculations  IVSd: 1.4 cm  LVIDd: 4.8 cm  LVIDs: 3.6 cm  LVPWd: 1.2 cm  FS: 23.9 %  LV mass(C)d: 254.3 grams  LV mass(C)dI: 145.3 grams/m2     Ao root diam: 3.0 cm  LA dimension: 4.1 cm  asc Aorta Diam: 3.1 cm  LA/Ao: 1.4  Ao root diam index Ht(cm/m): 1.8  Ao root diam index BSA (cm/m2): 1.7  Asc  Ao diam index BSA (cm/m2): 1.8  Asc Ao diam index Ht(cm/m): 1.9  LA Volume (BP): 73.5 ml  LA Volume Index (BP): 42.0 ml/m2  RV Base: 3.5 cm     RWT: 0.52  TAPSE: 2.0 cm     Doppler Measurements & Calculations  MV E max miranda: 59.9 cm/sec  MV A max miranda: 98.9 cm/sec  MV E/A: 0.61  MV dec slope: 236.4 cm/sec2  MV dec time: 0.25 sec  AI P1/2t: 579.8 msec  TR max miranda: 250.9 cm/sec  TR max P.2 mmHg  E/E' av.7  Lateral E/e': 7.4  Medial E/e': 15.9     ______________________________________________________________________________  Report approved by: Lorie Junior 2023 04:12 PM               Bhanu Olivo MD  [facility]  Contact information available via [facility] Paging/Directory

## 2023-11-28 NOTE — PROGRESS NOTES
PRIMARY Concern: Fall  SAFETY RISK Concerns (fall risk, behaviors, etc.): Fall risk   Isolation/Type: None  Tests/Procedures for NEXT shift:None  Consults? (Pending/following, signed-off?) PT/OT following   Where is patient from? (Home, TCU, etc.): [facility]  Other Important info for NEXT shift: Check Ortho BP. Lactic acid 2.1, MD notified.   Anticipated DC date & active delays: Possibly discharge  to [facility], needs OT eval     SUMMARY NOTE:   Orientation/Cognitive: A&O3. Disoriented to situation.   Observation Goals (Met/ Not Met): Not met   Mobility Level/Assist Equipment: Ax1 GB/ Walker   Antibiotics & Plan (IV/po, length of tx left):  Macrobid for UTI  Pain Management: Denies pain.   Tele/VS/O2: VSS on RA. Tele: SR w/ PVCs and BBB  ABNL Lab/BG: positive for UTI, Lactic acid 2.1, MD aware  Diet: Regular  Bowel/Bladder: Incontinent of bladder at times.   Skin Concerns: Skin tear to right elbow. Scattered bruises.   Drains/Devices: PIV SL   Patient Stated Goal for Today:Rest.

## 2023-11-29 VITALS
OXYGEN SATURATION: 97 % | HEART RATE: 65 BPM | RESPIRATION RATE: 16 BRPM | HEIGHT: 65 IN | WEIGHT: 150 LBS | SYSTOLIC BLOOD PRESSURE: 134 MMHG | BODY MASS INDEX: 24.99 KG/M2 | TEMPERATURE: 98.4 F | DIASTOLIC BLOOD PRESSURE: 55 MMHG

## 2023-11-29 LAB
BACTERIA BLD CULT: NO GROWTH
BACTERIA BLD CULT: NO GROWTH

## 2023-11-29 PROCEDURE — 250N000013 HC RX MED GY IP 250 OP 250 PS 637: Performed by: INTERNAL MEDICINE

## 2023-11-29 PROCEDURE — 99239 HOSP IP/OBS DSCHRG MGMT >30: CPT | Performed by: INTERNAL MEDICINE

## 2023-11-29 PROCEDURE — 250N000013 HC RX MED GY IP 250 OP 250 PS 637: Performed by: STUDENT IN AN ORGANIZED HEALTH CARE EDUCATION/TRAINING PROGRAM

## 2023-11-29 RX ADMIN — NITROFURANTOIN MONOHYDRATE/MACROCRYSTALLINE 100 MG: 25; 75 CAPSULE ORAL at 08:46

## 2023-11-29 RX ADMIN — FERROUS SULFATE TAB 325 MG (65 MG ELEMENTAL FE) 325 MG: 325 (65 FE) TAB at 08:46

## 2023-11-29 RX ADMIN — METOPROLOL SUCCINATE 100 MG: 100 TABLET, EXTENDED RELEASE ORAL at 08:45

## 2023-11-29 RX ADMIN — CARBOXYMETHYLCELLULOSE SODIUM 1 DROP: 5 SOLUTION/ DROPS OPHTHALMIC at 08:45

## 2023-11-29 RX ADMIN — Medication 25 MCG: at 08:45

## 2023-11-29 RX ADMIN — MEMANTINE 5 MG: 5 TABLET ORAL at 08:46

## 2023-11-29 ASSESSMENT — ACTIVITIES OF DAILY LIVING (ADL)
ADLS_ACUITY_SCORE: 46
ADLS_ACUITY_SCORE: 49
ADLS_ACUITY_SCORE: 46
ADLS_ACUITY_SCORE: 48
ADLS_ACUITY_SCORE: 46
ADLS_ACUITY_SCORE: 46

## 2023-11-29 NOTE — PROGRESS NOTES
Pt alert and still confused. IV was removed. Discharge plans were reviewed and instructed. All questions answered. Pt picked up by the transport via wheelchair to [facility].

## 2023-11-29 NOTE — PLAN OF CARE
Physical Therapy Discharge Summary    Reason for therapy discharge:    Discharged to home.    Progress towards therapy goal(s). See goals on Care Plan in [facility] electronic health record for goal details.  Goals partially met.  Barriers to achieving goals:   discharge from facility.    Therapy recommendation(s):    No further therapy is recommended.

## 2023-11-29 NOTE — DISCHARGE SUMMARY
[facility]    Discharge Summary  Hospitalist    Date of Admission:  11/23/2023  Date of Discharge:  11/29/2023  Discharging Provider: Bhanu Olivo MD, MD  Date of Service (when I saw the patient): 11/29/23    Discharge Diagnoses   Please refer below    History of Present Illness   Margy Babin is an 89 year old female who presented with fall    Hospital Course   Margy Babin is a 89 year old female admitted on 11/23/2023. She presents to the emergency department after a fall out of bed while getting up to go to the bathroom at her care facility.  Found to have A-fib RVR, lactic acidosis, and hypotension as well as pyuria with possible urinary tract infection. Increased her PTA metoprolol. She is now in normal sinus rhythm. Treating possible UTI. Patient reports deconditioning and frequent falls at home. Working with PT/OT. She had COVID earlier this month which could very likely be contributing to her frequent falls and deconditioning. Further hospital course below. Can return to her prior to admission facility if she is an assist of one per social work. Likely to discharge in 1-2 days pending TTE.      Final discharge diagnoses and hospital course     Orthostatic hypotension: Symptomatic: Improved  Lightheadedness: Resolved  Fatigue: Resolved  Her symptoms could be multifactorial in setting of recent covid infection as well as UTI. She presented with atrial fibrillation with RVR which self resolved and her PTA Toprol XL dose was increased from 100 mg to 200  mg daily. She has been more fatigued since that reduced to Toprol XL dose to 150 mg and repeat a echocardiogram ordered which shows EF of 50 to 55%, mild concentric left ventricle hypertrophy, right ventricle is normal in structure function and size no significant valve disease   echo 6 months ago with EF 55% with mild to moderate (1-2+) tricuspid regurgitation.       She did have significant drop in blood  pressure and become become symptomatic.  I did cut down her metoprolol dose to 100 mg daily, discontinue amlodipine at this time, other medication that can contribute include Seroquel, oxybutynin and Namenda, I did discontinue oxybutynin as well     On 11/27/2023 gave her IV fluids 100 mL per hour for 4 to 5 hours, hold amlodipine, apply compression stockings and discontinue oxybutynin.  Cortisol level low normal.  Improved orthostatic blood pressure this morning, continue PT and OT to evaluate the patient    At this time the patient is feeling much better, no more dizziness or lightheadedness on standing up, she is working with PT and OT and they are recommending discharging back to assisted living with home PT and OT.  At the time of discharge feeling well denies any headache dizziness lightheadedness no fever chills or cough no abdominal pain back pain dysuria hematuria constipation diarrhea.  She will be discharged back to assisted living in stable and improved condition     Paroxysmal atrial fibrillation with rapid ventricular rate: Resolved  Now converted to sinus rhythm after initial dose of diltiazem.  Patient unaware of rapid rates.  - Toprol XL initially increased from 100 mg to 200 mg daily, decreasing to 150 mg daily starting 11/27 due to concern for fatigue and orthostasis   Patient has significant orthostatic drop in her blood pressure as mentioned above, I did decrease the dose of metoprolol back to her PTA dose of 100 mg daily.  Heart rate remained in good control at this time  Patient is not on anticoagulation  - Remaining off of anticoagulation as per last admission and discussion with family     Non-ST elevation myocardial infarction: Troponin elevated at 30 - 36- 33 in the setting of A-fib RVR into the 170 range.  Suspect type II NSTEMI with demand ischemia from paroxysmal atrial fibrillation     Urinary tract infection: Treated   Unreliable historian, but no urinary symptoms including no  reported dysuria or urinary frequency. Daughter reports she has been confused lately which has coincided with urinary tract infections in the past.  - Switched ceftriaxone to macrobid 11/24, she completed that treatment of 6 days while in the hospital  - Urine culture growing >100,000 citrobacter, sensitive to macrobid.  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       Frequent falls  Deconditioning  Recent Covid infection  Cough  Describes frequent falls at home. Daughter thinks she has been more confused lately but was sounding at baseline on the phone  This is multifactorial related to patient age, comorbidities, orthostatic hypotension, possible UTI   update: Daughter states patient had COVID infection in early November. This could very likely be contributing to her overall deconditioning and recent falls.   - Fall precautions  - tessalon for cough  - likely to need ongoing PT/OT at her living facility   - Consider discontinuing pta tramadol  PT has evaluated patient recommending home with home health, will have OT evaluate the patient  as well.  No significant drop in her blood pressure anymore, she is back to her baseline level.        Abdominal Discomfort  Resolved     Dementia: noted in history.  Seems to be an unreliable historian at this time (reports she is on no prescription medications) and has a history of encephalopathy on prior admissions.  -Outpatient occupational therapy  - Resumed Barbara Zafar ramelteon, patient has baseline dementia and she is on Namenda        Hypertension:  -Continue with the metoprolol at this time, giving significant orthostatic hypotension,  Discontinue amlodipine     HLD  Continue with Lipitor     Iron deficiency anemia    Ferrous sulfate           Clinically Significant Risk Factors Present on Admission               # Hypertension: Noted on problem list   # Dementia:  noted on problem list        # Financial/Environmental Concerns: none             Diet: Regular Diet Adult         Bhanu Olivo MD, MD    Significant Results and Procedures       Pending Results   These results will be followed up by PCP  Unresulted Labs Ordered in the Past 30 Days of this Admission       No orders found from 10/24/2023 to 11/24/2023.            Code Status   DNR / DNI       Primary Care Physician   Colin Lake    Physical Exam   Temp: 98  F (36.7  C) Temp src: Oral BP: (!) 151/69 Pulse: 84   Resp: 18 SpO2: 95 % O2 Device: None (Room air)    Vitals:    11/23/23 2341   Weight: 68 kg (150 lb)     Vital Signs with Ranges  Temp:  [97.4  F (36.3  C)-98  F (36.7  C)] 98  F (36.7  C)  Pulse:  [72-84] 84  Resp:  [16-18] 18  BP: (123-151)/(50-69) 151/69  SpO2:  [93 %-95 %] 95 %  I/O last 3 completed shifts:  In: 480 [P.O.:480]  Out: -     Constitutional: awake, alert, cooperative, no apparent distress, and appears stated age  Eyes: Lids and lashes normal, pupils equal, round and reactive to light, extra ocular muscles intact, sclera clear, conjunctiva normal  Respiratory: No increased work of breathing, good air exchange, clear to auscultation bilaterally, no crackles or wheezing  Cardiovascular: Normal apical impulse, regular rate and rhythm, normal S1 and S2, no S3 or S4, and no murmur noted  GI: No scars, normal bowel sounds, soft, non-distended, non-tender, no masses palpated, no hepatosplenomegally  Neurologic: No focal deficit    Discharge Disposition   Discharged to home  Condition at discharge: Stable    Consultations This Hospital Stay   PHYSICAL THERAPY ADULT IP CONSULT  CARE MANAGEMENT / SOCIAL WORK IP CONSULT  INFECTION PREVENTION IP CONSULT  CARE MANAGEMENT / SOCIAL WORK IP CONSULT  OCCUPATIONAL THERAPY ADULT IP CONSULT    Time Spent on this Encounter   Bhanu COLON MD, personally saw the patient today and spent greater than 30 minutes discharging this patient.    Discharge Orders       Home Care Referral      Reason for your hospital stay    Orthostatic hypotension  Afib with RVR     Follow-up and recommended labs and tests     Follow up with primary care provider, Colin Lake, within 7 days for hospital follow- up.  No follow up labs or test are needed.     Activity    Your activity upon discharge: activity as tolerated     Diet    Follow this diet upon discharge: Orders Placed This Encounter      Regular Diet Adult     Discharge Medications   Current Discharge Medication List        CONTINUE these medications which have NOT CHANGED    Details   !! acetaminophen (TYLENOL) 500 MG tablet Take 1,000 mg by mouth daily      !! acetaminophen (TYLENOL) 500 MG tablet Take 1,000 mg by mouth every 12 hours as needed for mild pain      atorvastatin (LIPITOR) 20 MG tablet Take 1 tablet (20 mg) by mouth daily  Qty: 90 tablet, Refills: 3    Associated Diagnoses: Hyperlipidemia LDL goal <100      bisacodyl (DULCOLAX) 5 MG EC tablet 1 TAB BY MOUTH DAILY AS NEEDED FOR CONSTIPATION  Qty: 30 tablet, Refills: 10    Associated Diagnoses: Constipation, unspecified constipation type      Cholecalciferol (VITAMIN D HIGH POTENCY) 25 MCG (1000 UT) CAPS 1 CAP BY MOUTH DAILY  Qty: 30 capsule, Refills: 10    Associated Diagnoses: Takes dietary supplements      docusate sodium (COLACE) 100 MG capsule TAKE 1 CAP BY MOUTH TWICE DAILY AS NEEDED FOR CONSTIPATION  Qty: 60 capsule, Refills: 10    Associated Diagnoses: Constipation, unspecified constipation type      ferrous sulfate (FEROSUL) 325 (65 Fe) MG tablet Take 1 tablet (325 mg) by mouth daily (with breakfast)  Qty: 30 tablet, Refills: 0    Associated Diagnoses: Acute on chronic anemia; Nocturia      guaiFENesin (ROBITUSSIN) 20 mg/mL SOLN solution Take 10 mLs by mouth every 4 hours as needed for cough    Associated Diagnoses: Pneumonia due to infectious organism, unspecified laterality, unspecified part of lung      ipratropium - albuterol 0.5 mg/2.5 mg/3 mL (DUONEB)  0.5-2.5 (3) MG/3ML neb solution Take 1 vial (3 mLs) by nebulization every 4 hours as needed for shortness of breath  Qty: 30 mL, Refills: 1      memantine (NAMENDA) 5 MG tablet 1 TAB BY MOUTH TWICE DAILY  Qty: 60 tablet, Refills: 10    Associated Diagnoses: Dementia (H)      metoprolol succinate ER (TOPROL XL) 100 MG 24 hr tablet 1 TAB BY MOUTH DAILY (DX: HYPERTENSION)  Qty: 30 tablet, Refills: 10    Associated Diagnoses: Paroxysmal atrial fibrillation (H)      ondansetron (ZOFRAN) 4 MG tablet 1 TAB BY MOUTH EVERY 8 HOURS AS NEEDED FOR NAUSEA  Qty: 10 tablet, Refills: 10    Associated Diagnoses: Nausea without vomiting      PARoxetine (PAXIL) 20 MG tablet 1 TAB BY MOUTH AT BEDTIME Strength: 20 mg  Qty: 90 tablet, Refills: 0    Associated Diagnoses: 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)      QUEtiapine (SEROQUEL) 25 MG tablet 1 TAB BY MOUTH AT BEDTIME (DX: INSOMNIA)  Qty: 30 tablet, Refills: 1    Associated Diagnoses: Insomnia, unspecified type      ramelteon (ROZEREM) 8 MG tablet 1 TAB BY MOUTH EVERY NIGHT AS NEEDED FOR SLEEP  Qty: 30 tablet, Refills: 10    Associated Diagnoses: Insomnia, unspecified type      RESTASIS 0.05 % ophthalmic emulsion INSTILL 1 DROP INTO BOTH EYES DAILY (DX: DRY EYES) ++CLAUDETTE++  Qty: 30 each, Refills: 10    Associated Diagnoses: Dry eyes      rivaroxaban ANTICOAGULANT (XARELTO) 15 MG TABS tablet Take 15 mg by mouth daily (with dinner)      traMADol (ULTRAM) 50 MG tablet Take 0.5 tablets (25 mg) by mouth every 6 hours as needed for severe pain  Qty: 15 tablet, Refills: 0    Associated Diagnoses: Acute on chronic anemia; Nocturia      order for DME Equipment being ordered: Walker Wheels () and Walker ()  Treatment Diagnosis: Difficulty ambulating  Qty: 1 each, Refills: 0    Associated Diagnoses: Fall, initial encounter       !! - Potential duplicate medications found. Please discuss with provider.        STOP taking these medications       amLODIPine (NORVASC) 2.5  MG tablet Comments:   Reason for Stopping:         oxyBUTYnin ER (DITROPAN XL) 10 MG 24 hr tablet Comments:   Reason for Stopping:             Allergies   No Known Allergies  Data   Most Recent 3 CBC's:  Recent Labs   Lab Test 11/25/23  1605 11/23/23  2351 10/13/23  0931   WBC 10.5 10.5 14.2*   HGB 11.8 12.9 8.9*   MCV 85 84 67*    370 400      Most Recent 3 BMP's:  Recent Labs   Lab Test 11/25/23  1605 11/23/23  2351 10/13/23  0931    140 137   POTASSIUM 4.3 4.2 4.0   CHLORIDE 105 103 106   CO2 25 23 18*   BUN 15.8 17.7 16.4   CR 0.80 0.79 0.88   ANIONGAP 10 14 13   SOBIA 9.0 9.1 8.6   * 125* 116*     Most Recent 2 LFT's:  Recent Labs   Lab Test 11/23/23  2351 10/11/23  0918   AST 29 15   ALT 16 8   ALKPHOS 110 111*   BILITOTAL 0.2 0.3     Most Recent INR's and Anticoagulation Dosing History:  Anticoagulation Dose History          Latest Ref Rng & Units 4/11/2007 1/6/2022 9/1/2022   Recent Dosing and Labs   INR 0.85 - 1.15 0.97  1.27  2.16      Most Recent 3 Troponin's:  Recent Labs   Lab Test 12/25/20  1435 06/27/17  2103 06/27/17  1520   TROPI <0.015 <0.015  The 99th percentile for upper reference range is 0.045 ug/L.  Troponin values in   the range of 0.045 - 0.120 ug/L may be associated with risks of adverse   clinical events.   <0.015  The 99th percentile for upper reference range is 0.045 ug/L.  Troponin values in   the range of 0.045 - 0.120 ug/L may be associated with risks of adverse   clinical events.       Most Recent Cholesterol Panel:  Recent Labs   Lab Test 04/26/22  0705   CHOL 140   LDL 77   HDL 46*   TRIG 86     Most Recent 6 Bacteria Isolates From Any Culture (See EPIC Reports for Culture Details):  Recent Labs   Lab Test 12/25/20  1544 12/25/20  1435 04/23/19  1255 06/19/17  1524 10/23/15  1028   CULT No growth No growth 10,000 to 50,000 colonies/mL  mixed urogenital peewee  Susceptibility testing not routinely done   10,000 to 50,000 colonies/mL mixed urogenital peewee  Susceptibility testing not   routinely done   10,000 to 50,000 colonies/mL mixed urogenital peewee     Most Recent TSH, T4 and A1c Labs:  Recent Labs   Lab Test 11/24/23  0142 04/25/22  1126   TSH 3.89  --    A1C  --  5.8*     Results for orders placed or performed during the hospital encounter of 11/23/23   XR Pelvis 1/2 Views    Narrative    EXAM: XR PELVIS 1/2 VIEWS  LOCATION: [facility]  DATE: 11/24/2023    INDICATION: fall  COMPARISON: None.      Impression    IMPRESSION: Right total hip arthroplasty. No dislocation. Degenerative changes of the left hip. Partially visualized degenerative changes of the lower lumbar spine. No definite evidence of acute fracture. Partially visualized bowel gas pattern is   nonobstructed. Pelvic phleboliths.   XR Knee Right 3 Views    Narrative    EXAM: XR KNEE RIGHT 3 VIEWS  LOCATION: [facility]  DATE: 11/24/2023    INDICATION: Fall, pain  COMPARISON: 10/10/2023      Impression    IMPRESSION: Unchanged severe tricompartmental osteoarthritis. No knee joint effusion. No displaced fracture.   Head CT w/o contrast    Narrative    EXAM: CT HEAD W/O CONTRAST, CT CERVICAL SPINE W/O CONTRAST  LOCATION: [facility]  DATE: 11/24/2023    INDICATION: Fall. Traumatic head and neck injury.  COMPARISON: 09/05/2023.  TECHNIQUE:   1) Routine CT Head without IV contrast. Multiplanar reformats. Dose reduction techniques were used.  2) Routine CT Cervical Spine without IV contrast. Multiplanar reformats. Dose reduction techniques were used.    FINDINGS:   HEAD CT:   INTRACRANIAL CONTENTS: No intracranial hemorrhage, extraaxial collection, or mass effect. Chronic right basal ganglia infarct. Moderate presumed chronic small vessel ischemic changes. Mild generalized volume loss. No hydrocephalus.     VISUALIZED ORBITS/SINUSES/MASTOIDS: Prior bilateral cataract surgery. Visualized portions of the orbits are otherwise  unremarkable. No paranasal sinus mucosal disease. No middle ear or mastoid effusion.    BONES/SOFT TISSUES: No acute abnormality.    CERVICAL SPINE CT:   VERTEBRA: Decreased osseous mineralization. Stable vertebral body heights and alignment. Osseous fusion is again noted at the level of C5-C6. Stable postsurgical changes at the levels of C2 through C4. No acute fracture or posttraumatic subluxation.    CANAL/FORAMINA: Multilevel degenerative changes. No high-grade central spinal canal stenosis. At C2-C3 on the right there is moderate to severe neural foraminal stenosis. At C3-C4 on the right there is moderate to severe neural foraminal stenosis. At   C4-C5 on the right there is mild right neural foraminal stenosis. At C5-C6 there is bilateral moderate neural foraminal stenosis. At C6-C7 there is moderate to severe bilateral neural foraminal stenosis.    PARASPINAL: Stable 1.5 cm right thyroid lobe nodule. Visualized lung fields are clear.      Impression    IMPRESSION:  HEAD CT:  1.  No CT evidence for acute intracranial process.  2.  Brain atrophy and presumed chronic microvascular ischemic changes as above.    CERVICAL SPINE CT:  1.  No CT evidence for acute fracture or post traumatic subluxation.  2.  Chronic and degenerative changes, as above.  3.  Stable appearing right thyroid lobe nodule measuring up to 1.5 cm. Recommend non-emergent thyroid function testing and thyroid ultrasound for further assessment, if not already performed.     Cervical spine CT w/o contrast    Narrative    EXAM: CT HEAD W/O CONTRAST, CT CERVICAL SPINE W/O CONTRAST  LOCATION: [facility]  DATE: 11/24/2023    INDICATION: Fall. Traumatic head and neck injury.  COMPARISON: 09/05/2023.  TECHNIQUE:   1) Routine CT Head without IV contrast. Multiplanar reformats. Dose reduction techniques were used.  2) Routine CT Cervical Spine without IV contrast. Multiplanar reformats. Dose reduction techniques were  used.    FINDINGS:   HEAD CT:   INTRACRANIAL CONTENTS: No intracranial hemorrhage, extraaxial collection, or mass effect. Chronic right basal ganglia infarct. Moderate presumed chronic small vessel ischemic changes. Mild generalized volume loss. No hydrocephalus.     VISUALIZED ORBITS/SINUSES/MASTOIDS: Prior bilateral cataract surgery. Visualized portions of the orbits are otherwise unremarkable. No paranasal sinus mucosal disease. No middle ear or mastoid effusion.    BONES/SOFT TISSUES: No acute abnormality.    CERVICAL SPINE CT:   VERTEBRA: Decreased osseous mineralization. Stable vertebral body heights and alignment. Osseous fusion is again noted at the level of C5-C6. Stable postsurgical changes at the levels of C2 through C4. No acute fracture or posttraumatic subluxation.    CANAL/FORAMINA: Multilevel degenerative changes. No high-grade central spinal canal stenosis. At C2-C3 on the right there is moderate to severe neural foraminal stenosis. At C3-C4 on the right there is moderate to severe neural foraminal stenosis. At   C4-C5 on the right there is mild right neural foraminal stenosis. At C5-C6 there is bilateral moderate neural foraminal stenosis. At C6-C7 there is moderate to severe bilateral neural foraminal stenosis.    PARASPINAL: Stable 1.5 cm right thyroid lobe nodule. Visualized lung fields are clear.      Impression    IMPRESSION:  HEAD CT:  1.  No CT evidence for acute intracranial process.  2.  Brain atrophy and presumed chronic microvascular ischemic changes as above.    CERVICAL SPINE CT:  1.  No CT evidence for acute fracture or post traumatic subluxation.  2.  Chronic and degenerative changes, as above.  3.  Stable appearing right thyroid lobe nodule measuring up to 1.5 cm. Recommend non-emergent thyroid function testing and thyroid ultrasound for further assessment, if not already performed.     Elbow XR, G/E 3 views, right    Narrative    EXAM: XR ELBOW RIGHT G/E 3 VIEWS  LOC[facility]: [facility]  DATE: 11/24/2023    INDICATION: fall  COMPARISON: None.      Impression    IMPRESSION:   Multiple views right elbow. IV projecting over the lateral antecubital fossa.    No significant joint space narrowing is present.  There are no fractures or bone destructive lesions.  The articular margins are generally smooth, with no significant   degenerative disease. Small anterior effusion. No posterior fat pad sign is noted.  If continued clinical concern, consider conservative therapy, follow-up films, and/or clinical/orthopedic follow-up recommended.         CT Chest Pulmonary Embolism w Contrast    Narrative    EXAM: CT CHEST PULMONARY EMBOLISM W CONTRAST  LOCATION: [facility]  DATE: 11/24/2023    INDICATION: rapid atrial fibrillation, hypotension, anticoagulation stopped  COMPARISON: 09/01/2022  TECHNIQUE: CT chest pulmonary angiogram during arterial phase injection of IV contrast. Multiplanar reformats and MIP reconstructions were performed. Dose reduction techniques were used.   CONTRAST: 59 mL Isovue 370    FINDINGS:  ANGIOGRAM CHEST: Pulmonary arteries are normal caliber and negative for pulmonary emboli. Thoracic aorta is negative for dissection. No CT evidence of right heart strain.    LUNGS AND PLEURA: Multifocal scattered atelectasis in the bilateral lung bases. No confluent consolidation, pleural effusion or pneumothorax.    MEDIASTINUM/AXILLAE: Multiple mildly prominent external lymph nodes are released in size, indeterminate. For example, a right paratracheal node measures 2 x 1 cm on series 5 image 77. No axillary or supraclavicular lymphadenopathy. A hypoattenuating   right thyroid nodule measures 2 cm, stable. A moderate size hiatal hernia has significantly increased in size.    CORONARY ARTERY CALCIFICATION: Minimal in the left anterior descending coronary artery.    UPPER ABDOMEN: No acute findings. A small cyst in the superior pole of the right  kidney does not require follow-up. Mild atrophy of the left kidney is unchanged.    MUSCULOSKELETAL: Moderate anterior wedge compression deformity of T6 and multilevel moderate and severe degenerative disc space narrowing in the thoracic spine, unchanged. No suspicious osseous lesions or acute fractures.        Impression    IMPRESSION:    1.  No acute findings in the chest, including pulmonary embolism or pneumonia.    2.  Moderate size hiatal hernia, significantly increased compared to prior study.    3.  Mildly prominent mediastinal lymph nodes are indeterminate, possibly reactive.   XR Chest 2 Views    Narrative    EXAM: XR CHEST 2 VIEWS  LOCATION: [facility]  DATE: 2023    INDICATION: hypoxia  COMPARISON: 10/13/2023      Impression    IMPRESSION: Mildly prominent interstitium bilaterally. The be related to chronic underlying scarring, sequelae of infection, or less likely pulmonary edema. Mild bronchial wall thickening. Heart size is stable. Degenerative changes of the bilateral   shoulders and visualized spine.   Echocardiogram Complete     Value    LVEF  50-55%    Narrative    [id number]  [id number]  [id number]  [id number]^[email or web address]^[email or web address]^DUSTY     [facility]  Echocardiography Laboratory  [address] [place] [address]     Name: KHANH BRAGG  MRN: [id number]  : 10/02/1929  Study Date: 2023 01:22 PM  Age: 89 yrs  Gender: Female  Patient Location: [facility]  Reason For Study: Arrhythmia  Ordering Physician: JULY DOVER  Referring Physician: Colin Lake  Performed By: Chrissy Vasquez     BSA: 1.8 m2  Height: 65 in  Weight: 150 lb  HR: 59  BP: 159/68 mmHg  ______________________________________________________________________________  Procedure  Complete Portable Echo Adult. Optison (NDC #[id number]) given intravenously.  ______________________________________________________________________________  Interpretation Summary     Left ventricular  systolic function is borderline reduced.  The visual ejection fraction is 50-55%.  There is mild concentric left ventricular hypertrophy.  The right ventricle is normal in structure, function and size.  No significant valve dysfunction.  The inferior vena cava was normal in size with preserved respiratory  variability.  There is no pericardial effusion.     Compared to prior study dated 5/17/2023, pulmonary pressures are lower,  otherwise no significant change.  ______________________________________________________________________________  Left Ventricle  The left ventricle is normal in size. There is mild concentric left  ventricular hypertrophy. The visual ejection fraction is 50-55%. Left  ventricular systolic function is borderline reduced. Grade I or early  diastolic dysfunction. There is borderline global hypokinesia of the left  ventricle.     Right Ventricle  The right ventricle is normal in structure, function and size.     Atria  The left atrium is severely dilated. Right atrial size is normal.     Mitral Valve  There is mild mitral annular calcification. There is mild (1+) mitral  regurgitation.     Tricuspid Valve  The tricuspid valve is normal in structure and function. There is mild (1+)  tricuspid regurgitation. The right ventricular systolic pressure is  approximated at 25.2 mmHg plus the right atrial pressure.     Aortic Valve  The aortic valve is trileaflet with aortic valve sclerosis. There is mild (1+)  aortic regurgitation.     Pulmonic Valve  The pulmonic valve is normal in structure and function. There is mild (1+)  pulmonic valvular regurgitation.     Vessels  The aortic root is normal size. Normal size ascending aorta. The inferior vena  cava was normal in size with preserved respiratory variability.     Pericardium  There is no pericardial effusion.     ______________________________________________________________________________  MMode/2D Measurements & Calculations  IVSd: 1.4 cm  LVIDd:  4.8 cm  LVIDs: 3.6 cm  LVPWd: 1.2 cm  FS: 23.9 %  LV mass(C)d: 254.3 grams  LV mass(C)dI: 145.3 grams/m2     Ao root diam: 3.0 cm  LA dimension: 4.1 cm  asc Aorta Diam: 3.1 cm  LA/Ao: 1.4  Ao root diam index Ht(cm/m): 1.8  Ao root diam index BSA (cm/m2): 1.7  Asc Ao diam index BSA (cm/m2): 1.8  Asc Ao diam index Ht(cm/m): 1.9  LA Volume (BP): 73.5 ml  LA Volume Index (BP): 42.0 ml/m2  RV Base: 3.5 cm     RWT: 0.52  TAPSE: 2.0 cm     Doppler Measurements & Calculations  MV E max miranda: 59.9 cm/sec  MV A max miranda: 98.9 cm/sec  MV E/A: 0.61  MV dec slope: 236.4 cm/sec2  MV dec time: 0.25 sec  AI P1/2t: 579.8 msec  TR max miranda: 250.9 cm/sec  TR max P.2 mmHg  E/E' av.7  Lateral E/e': 7.4  Medial E/e': 15.9     ______________________________________________________________________________  Report approved by: Lorie Junior 2023 04:12 PM           Most Recent 3 CBC's:  Recent Labs   Lab Test 23  1605 23  2351 10/13/23  0931   WBC 10.5 10.5 14.2*   HGB 11.8 12.9 8.9*   MCV 85 84 67*    370 400     Most Recent 3 BMP's:  Recent Labs   Lab Test 23  1605 23  2351 10/13/23  0931    140 137   POTASSIUM 4.3 4.2 4.0   CHLORIDE 105 103 106   CO2 25 23 18*   BUN 15.8 17.7 16.4   CR 0.80 0.79 0.88   ANIONGAP 10 14 13   SOBIA 9.0 9.1 8.6   * 125* 116*     Most Recent 2 LFT's:  Recent Labs   Lab Test 23  2351 10/11/23  0918   AST 29 15   ALT 16 8   ALKPHOS 110 111*   BILITOTAL 0.2 0.3

## 2023-11-29 NOTE — CARE PLAN
PRIMARY Concern: Fall  SAFETY RISK Concerns (fall risk, behaviors, etc.): Fall risk   Isolation/Type: None  Tests/Procedures for NEXT shift:None  Consults? (Pending/following, signed-off?) PT/OT consult  Where is patient from? (Home, TCU, etc.): [facility]  Other Important info for NEXT shift: Check Ortho BP.    Anticipated DC date & active delays: Possibly discharge  to [facility], needs OT eval     SUMMARY NOTE:   Orientation/Cognitive: A&O2. Disoriented to situation time.   Observation Goals (Met/ Not Met): Not met   Mobility Level/Assist Equipment: Ax1 GB/ Walker   Antibiotics & Plan (IV/po, length of tx left):  Macrobid for UTI  Pain Management: Denies pain.   Tele/VS/O2: VSS on RA.   ABNL Lab/BG: no  new labs  Diet: Regular  Bowel/Bladder: Incontinent of bladder at times.   Skin Concerns: Skin tear to right elbow. Scattered bruises.   Drains/Devices: PIV SL   Patient Stated Goal for Today: sleep

## 2023-11-29 NOTE — PROGRESS NOTES
MD Notification    Notified Person: MD    Notified Person Name: Bhanu Olivo MD     Notification Date/Time: 11/28/23 @ 1727h    Notification Interaction: Vocera    Purpose of Notification: Lactic acid 2.1    Orders Received: Monitor.     Comments:

## 2023-11-29 NOTE — PROGRESS NOTES
Care Management Discharge Note    Discharge Date: 11/29/2023       Discharge Disposition:  [facility]    Discharge Services:  Home Care-Referral accepted by [facility] for (PT/OT)     Discharge DME:  walker    Discharge Transportation:  [facility] Wheelchair [phone].    Private pay costs discussed: Not applicable    Does the patient's insurance plan have a 3 day qualifying hospital stay waiver?  No    PAS Confirmation Code:  n/a  Patient/family educated on Medicare website which has current facility and service quality ratings: yes    Education Provided on the Discharge Plan:  yes  Persons Notified of Discharge Plans: bedside RN Ray, Charge, patient, daughter Tina  Patient/Family in Agreement with the Plan: yes    Handoff Referral Completed: no    Additional Information:  Writer informed that patient has been medically cleared to discharge home today. Patient will be returning back [facility]. Home Care has been ordered. [facility] has accepted the referral for PT/OT. Writer spoke with Katty Limon and she has accepted the patient back today. Patient will be bringing the discharge orders with her. No further discharge needs.         Silvana Lora, SAY  Care Coordinator  [phone]

## 2023-11-29 NOTE — PLAN OF CARE
Goal Outcome Evaluation:  PRIMARY Concern: Fall  SAFETY RISK Concerns (fall risk, behaviors, etc.): Fall risk   Isolation/Type: None  Tests/Procedures for NEXT shift:None  Consults? (Pending/following, signed-off?) PT/OT following   Where is patient from? (Home, TCU, etc.): [facility]  Other Important info for NEXT shift: Check Ortho BP.    Anticipated DC date & active delays: Possibly discharge  to [facility], needs OT eval     SUMMARY NOTE:   Orientation/Cognitive: A&O3. Disoriented to situation.   Observation Goals (Met/ Not Met): Not met   Mobility Level/Assist Equipment: Ax1 GB/ Walker   Antibiotics & Plan (IV/po, length of tx left):  Macrobid for UTI  Pain Management: Denies pain.   Tele/VS/O2: VSS on RA. Tele: SR w/ PVCs and BBB  ABNL Lab/BG: positive for UTI, Lactic acid 1.3  Diet: Regular  Bowel/Bladder: Incontinent of bladder at times.   Skin Concerns: Skin tear to right elbow. Scattered bruises.   Drains/Devices: PIV SL   Patient Stated Goal for Today:Rest.

## 2023-11-30 ENCOUNTER — PATIENT OUTREACH (OUTPATIENT)
Dept: CARE COORDINATION | Facility: CLINIC | Age: 88
End: 2023-11-30
Payer: MEDICARE

## 2023-11-30 LAB
ATRIAL RATE - MUSE: 94 BPM
DIASTOLIC BLOOD PRESSURE - MUSE: NORMAL MMHG
INTERPRETATION ECG - MUSE: NORMAL
P AXIS - MUSE: 48 DEGREES
PR INTERVAL - MUSE: 158 MS
QRS DURATION - MUSE: 146 MS
QT - MUSE: 404 MS
QTC - MUSE: 505 MS
R AXIS - MUSE: -44 DEGREES
SYSTOLIC BLOOD PRESSURE - MUSE: NORMAL MMHG
T AXIS - MUSE: 120 DEGREES
VENTRICULAR RATE- MUSE: 94 BPM

## 2023-11-30 NOTE — PROGRESS NOTES
[facility] [place]    Background: Transitional Care Management program identified per system criteria and reviewed by [facility] [place] team for possible outreach.    Assessment: Upon chart review, [facility] Team member will not proceed with patient outreach related to this episode of Transitional Care Management program due to reason below:    Patient has discharged to a Memory Care, Long-term Care, Assisted Living or Group Home where patient is receiving on-site support with their daily cares, including support with hospital follow up plan.    Plan: Transitional Care Management episode addressed appropriately per reason noted above.      Madonna Shen RN  [facility] [place], [facility]    *Connected Care Resource Team does NOT follow patient ongoing. Referrals are identified based on internal discharge reports and the outreach is to ensure patient has an understanding of their discharge instructions.

## 2023-12-01 ENCOUNTER — DOCUMENTATION ONLY (OUTPATIENT)
Dept: OTHER | Facility: CLINIC | Age: 88
End: 2023-12-01
Payer: MEDICARE

## 2023-12-14 NOTE — ED NOTES
Per MD Ngozi, patient to be DNR/DNI. Discontinue BIPAP and start high flow O2. RT notified.    A Flutter with RVR  NSTEMI Afib with R[facility]

## 2023-12-15 ENCOUNTER — TELEPHONE (OUTPATIENT)
Dept: FAMILY MEDICINE | Facility: CLINIC | Age: 88
End: 2023-12-15
Payer: MEDICARE

## 2023-12-15 NOTE — TELEPHONE ENCOUNTER
Home Care is calling regarding an established patient with [facility].        10/18/2023    12:08 PM   Home Care Information   Date of Home Care episode start 10/18/2023   Current following provider Dr. Lake     Requesting orders from: Colin Lake  Provider is following patient: Yes  Is this a 60-day recertification request?  No    Orders Requested    Skilled Nursing  Request for initial evaluation and treatment (one time)       Need approval for PT order:   Physical Therapy  Request for initial certification (first set of orders)   Frequency:  1x/wk for 1 wks        Pastora Tim RN

## 2023-12-18 ENCOUNTER — TELEPHONE (OUTPATIENT)
Dept: FAMILY MEDICINE | Facility: CLINIC | Age: 88
End: 2023-12-18
Payer: MEDICARE

## 2023-12-18 NOTE — TELEPHONE ENCOUNTER
See other encounter   Addressed in prior encounter    Zuleyka SIDHU, Triage RN  [facility]

## 2023-12-18 NOTE — TELEPHONE ENCOUNTER
Kulwinder called back   Verbal given on requested orders     Zuleyka SIDHU, Triage RN  [facility]

## 2023-12-18 NOTE — TELEPHONE ENCOUNTER
Felisa PT with [facility] calling to request verbal orders. See order details below.       Home Care is calling regarding an established patient with [facility].        10/18/2023    12:08 PM   Home Care Information   Date of Home Care episode start 10/18/2023   Current following provider Dr. Lake     Requesting orders from: Colin Lake  Provider is following patient: Yes  Is this a 60-day recertification request?  No    Orders Requested    Physical Therapy  Request for continuation of care with no increase or decrease in frequency  Frequency: 1x per week for 3 weeks, every other week for 4 weeks.         Verbal orders given.  Home Care will send orders for provider to sign.  Confirmed ok to leave a detailed message with call back.  Contact information confirmed and updated as needed.    Justice L. Phoenix, RN

## 2024-03-15 ENCOUNTER — HOSPITAL ENCOUNTER (EMERGENCY)
Facility: CLINIC | Age: 89
Discharge: HOME | End: 2024-03-16
Attending: EMERGENCY MEDICINE | Admitting: EMERGENCY MEDICINE
Payer: MEDICARE

## 2024-03-15 DIAGNOSIS — R00.1 SINUS BRADYCARDIA: ICD-10-CM

## 2024-03-15 DIAGNOSIS — R42 DIZZINESS: ICD-10-CM

## 2024-03-15 LAB
BASOPHILS # BLD AUTO: 0.1 10E3/UL (ref 0–0.2)
BASOPHILS NFR BLD AUTO: 1 %
EOSINOPHIL # BLD AUTO: 0.6 10E3/UL (ref 0–0.7)
EOSINOPHIL NFR BLD AUTO: 4 %
ERYTHROCYTE [DISTWIDTH] IN BLOOD BY AUTOMATED COUNT: 14 % (ref 10–15)
GLUCOSE BLDC GLUCOMTR-MCNC: 125 MG/DL (ref 70–99)
HCT VFR BLD AUTO: 47 % (ref 35–47)
HGB BLD-MCNC: 14.8 G/DL (ref 11.7–15.7)
IMM GRANULOCYTES # BLD: 0.1 10E3/UL
IMM GRANULOCYTES NFR BLD: 1 %
LYMPHOCYTES # BLD AUTO: 2 10E3/UL (ref 0.8–5.3)
LYMPHOCYTES NFR BLD AUTO: 15 %
MCH RBC QN AUTO: 30 PG (ref 26.5–33)
MCHC RBC AUTO-ENTMCNC: 31.5 G/DL (ref 31.5–36.5)
MCV RBC AUTO: 95 FL (ref 78–100)
MONOCYTES # BLD AUTO: 1 10E3/UL (ref 0–1.3)
MONOCYTES NFR BLD AUTO: 7 %
NEUTROPHILS # BLD AUTO: 10.1 10E3/UL (ref 1.6–8.3)
NEUTROPHILS NFR BLD AUTO: 72 %
NRBC # BLD AUTO: 0 10E3/UL
NRBC BLD AUTO-RTO: 0 /100
PLATELET # BLD AUTO: 322 10E3/UL (ref 150–450)
RBC # BLD AUTO: 4.94 10E6/UL (ref 3.8–5.2)
WBC # BLD AUTO: 14 10E3/UL (ref 4–11)

## 2024-03-15 PROCEDURE — 82962 GLUCOSE BLOOD TEST: CPT

## 2024-03-15 PROCEDURE — 36415 COLL VENOUS BLD VENIPUNCTURE: CPT | Performed by: EMERGENCY MEDICINE

## 2024-03-15 PROCEDURE — 258N000003 HC RX IP 258 OP 636: Performed by: EMERGENCY MEDICINE

## 2024-03-15 PROCEDURE — 93005 ELECTROCARDIOGRAM TRACING: CPT

## 2024-03-15 PROCEDURE — 99284 EMERGENCY DEPT VISIT MOD MDM: CPT

## 2024-03-15 PROCEDURE — 85004 AUTOMATED DIFF WBC COUNT: CPT | Performed by: EMERGENCY MEDICINE

## 2024-03-15 PROCEDURE — 80053 COMPREHEN METABOLIC PANEL: CPT | Performed by: EMERGENCY MEDICINE

## 2024-03-15 PROCEDURE — 96360 HYDRATION IV INFUSION INIT: CPT | Mod: 59

## 2024-03-15 RX ADMIN — SODIUM CHLORIDE 500 ML: 9 INJECTION, SOLUTION INTRAVENOUS at 23:36

## 2024-03-15 ASSESSMENT — COLUMBIA-SUICIDE SEVERITY RATING SCALE - C-SSRS
1. IN THE PAST MONTH, HAVE YOU WISHED YOU WERE DEAD OR WISHED YOU COULD GO TO SLEEP AND NOT WAKE UP?: NO
6. HAVE YOU EVER DONE ANYTHING, STARTED TO DO ANYTHING, OR PREPARED TO DO ANYTHING TO END YOUR LIFE?: NO
2. HAVE YOU ACTUALLY HAD ANY THOUGHTS OF KILLING YOURSELF IN THE PAST MONTH?: NO

## 2024-03-15 ASSESSMENT — ACTIVITIES OF DAILY LIVING (ADL): ADLS_ACUITY_SCORE: 38

## 2024-03-16 VITALS
RESPIRATION RATE: 16 BRPM | DIASTOLIC BLOOD PRESSURE: 67 MMHG | OXYGEN SATURATION: 95 % | SYSTOLIC BLOOD PRESSURE: 162 MMHG | TEMPERATURE: 97.5 F | HEART RATE: 76 BPM

## 2024-03-16 LAB
ALBUMIN SERPL BCG-MCNC: 3.8 G/DL (ref 3.5–5.2)
ALBUMIN UR-MCNC: 10 MG/DL
ALP SERPL-CCNC: 98 U/L (ref 40–150)
ALT SERPL W P-5'-P-CCNC: 14 U/L (ref 0–50)
ANION GAP SERPL CALCULATED.3IONS-SCNC: 10 MMOL/L (ref 7–15)
APPEARANCE UR: CLEAR
AST SERPL W P-5'-P-CCNC: 19 U/L (ref 0–45)
ATRIAL RATE - MUSE: 51 BPM
BILIRUB SERPL-MCNC: 0.2 MG/DL
BILIRUB UR QL STRIP: NEGATIVE
BUN SERPL-MCNC: 29.8 MG/DL (ref 8–23)
CALCIUM SERPL-MCNC: 9.3 MG/DL (ref 8.2–9.6)
CHLORIDE SERPL-SCNC: 104 MMOL/L (ref 98–107)
COLOR UR AUTO: YELLOW
CREAT SERPL-MCNC: 1.11 MG/DL (ref 0.51–0.95)
DEPRECATED HCO3 PLAS-SCNC: 26 MMOL/L (ref 22–29)
DIASTOLIC BLOOD PRESSURE - MUSE: NORMAL MMHG
EGFRCR SERPLBLD CKD-EPI 2021: 46 ML/MIN/1.73M2
GLUCOSE SERPL-MCNC: 124 MG/DL (ref 70–99)
GLUCOSE UR STRIP-MCNC: NEGATIVE MG/DL
HGB UR QL STRIP: ABNORMAL
HYALINE CASTS: 21 /LPF
INTERPRETATION ECG - MUSE: NORMAL
KETONES UR STRIP-MCNC: NEGATIVE MG/DL
LEUKOCYTE ESTERASE UR QL STRIP: NEGATIVE
MUCOUS THREADS #/AREA URNS LPF: PRESENT /LPF
NITRATE UR QL: NEGATIVE
P AXIS - MUSE: 15 DEGREES
PH UR STRIP: 5 [PH] (ref 5–7)
POTASSIUM SERPL-SCNC: 4.7 MMOL/L (ref 3.4–5.3)
PR INTERVAL - MUSE: 170 MS
PROT SERPL-MCNC: 6.8 G/DL (ref 6.4–8.3)
QRS DURATION - MUSE: 154 MS
QT - MUSE: 526 MS
QTC - MUSE: 484 MS
R AXIS - MUSE: -31 DEGREES
RBC URINE: 11 /HPF
SODIUM SERPL-SCNC: 140 MMOL/L (ref 135–145)
SP GR UR STRIP: 1.02 (ref 1–1.03)
SQUAMOUS EPITHELIAL: 1 /HPF
SYSTOLIC BLOOD PRESSURE - MUSE: NORMAL MMHG
T AXIS - MUSE: 148 DEGREES
UROBILINOGEN UR STRIP-MCNC: NORMAL MG/DL
VENTRICULAR RATE- MUSE: 51 BPM
WBC URINE: 3 /HPF

## 2024-03-16 PROCEDURE — 81001 URINALYSIS AUTO W/SCOPE: CPT | Performed by: EMERGENCY MEDICINE

## 2024-03-16 ASSESSMENT — ACTIVITIES OF DAILY LIVING (ADL)
ADLS_ACUITY_SCORE: 38

## 2024-03-16 NOTE — ED PROVIDER NOTES
History     Chief Complaint:  Dizziness       HPI   Margy Babin is a 89 year old female with a history of hypertension, hyperlipidemia, a-fib and cardiomyopathy on Xarelto who presents for evaluation of dizziness beginning this evening. Flor was sleeping and woke up, endorsing room spinning dizziness. Her symptoms have since subsided and feels back to normal. She denies emesis, diarrhea, dysuria, chest pain, cough, shortness of breath, fever, recent sickness, or headache.    Independent Historian:   None - Patient Only    Review of External Notes:   I reviewed the hospitalization discharge note from November 2023, when the patient was admitted for a fall.    Medications:    Seroquel  Namenda  Toprol  Paxil  Xarelto    Past Medical History:    Acidosis  Anemia  Thrombocytosis  Kidney disease  Pneumonia  Respiratory failure  Anxiety  Aspiration pneumonitis  Atrial flutter  Cardiomyopathy  Ceruminosis  Colitis  Depression  Dry eye  Hypertension  Left bundle branch block  Long QT syndrome  Depression  Hyperlipidemia  Dementia  Atrial fibrillation  Urinary tract infection    Past Surgical History:    Hysterectomy  Neck surgery  Hip arthroplasty    Physical Exam   Patient Vitals for the past 24 hrs:   BP Temp Temp src Pulse Resp SpO2   03/16/24 0200 (!) 162/67 -- -- 76 -- 95 %   03/16/24 0100 (!) 153/66 -- -- 63 -- 96 %   03/15/24 2315 -- -- -- -- -- 93 %   03/15/24 2300 126/58 -- -- 54 -- 94 %   03/15/24 2245 118/58 97.5  F (36.4  C) Oral 52 16 95 %        Physical Exam  General: Sitting up in bed  Eyes:  The pupils are equal and round    Conjunctivae and sclerae are normal  ENT:    Atraumatic face  Neck:  Normal range of motion  CV:  Bradycardic rate, regular rhythm    Skin warm and well perfused   Resp:  Non labored breathing on room air    No tachypnea    No cough heard    Lungs clear bilaterally  GI:  Abdomen is soft, there is no rigidity    No distension    No rebound tenderness     No abdominal  tenderness  MS:  Normal muscular tone  Skin:  No rash or acute skin lesions noted  Neuro:   Awake, alert.      Speech is normal and fluent.    Face is symmetric.     Moves all extremities equally    SILT on bilateral UE/LE    No arm or leg drift  Psych: Normal affect.  Appropriate interactions.    Emergency Department Course   ECG  ECG taken at 2248, ECG read at 2330  Sinus bradycardia  Left axis deviation  Left bundle branch block  Abnormal ECG   Now bradycardic as compared to prior, dated 11/28/23.  Rate 51 bpm. VT interval 170 ms. QRS duration 154 ms. QT/QTc 526/484 ms. P-R-T axes 15 -31 148.     Laboratory:  Labs Ordered and Resulted from Time of ED Arrival to Time of ED Departure   GLUCOSE BY METER - Abnormal       Result Value    GLUCOSE BY METER POCT 125 (*)    COMPREHENSIVE METABOLIC PANEL - Abnormal    Sodium 140      Potassium 4.7      Carbon Dioxide (CO2) 26      Anion Gap 10      Urea Nitrogen 29.8 (*)     Creatinine 1.11 (*)     GFR Estimate 46 (*)     Calcium 9.3      Chloride 104      Glucose 124 (*)     Alkaline Phosphatase 98      AST 19      ALT 14      Protein Total 6.8      Albumin 3.8      Bilirubin Total 0.2     ROUTINE UA WITH MICROSCOPIC REFLEX TO CULTURE - Abnormal    Color Urine Yellow      Appearance Urine Clear      Glucose Urine Negative      Bilirubin Urine Negative      Ketones Urine Negative      Specific Gravity Urine 1.024      Blood Urine Small (*)     pH Urine 5.0      Protein Albumin Urine 10 (*)     Urobilinogen Urine Normal      Nitrite Urine Negative      Leukocyte Esterase Urine Negative      Mucus Urine Present (*)     RBC Urine 11 (*)     WBC Urine 3      Squamous Epithelials Urine 1      Hyaline Casts Urine 21 (*)    CBC WITH PLATELETS AND DIFFERENTIAL - Abnormal    WBC Count 14.0 (*)     RBC Count 4.94      Hemoglobin 14.8      Hematocrit 47.0      MCV 95      MCH 30.0      MCHC 31.5      RDW 14.0      Platelet Count 322      % Neutrophils 72      % Lymphocytes 15      %  Monocytes 7      % Eosinophils 4      % Basophils 1      % Immature Granulocytes 1      NRBCs per 100 WBC 0      Absolute Neutrophils 10.1 (*)     Absolute Lymphocytes 2.0      Absolute Monocytes 1.0      Absolute Eosinophils 0.6      Absolute Basophils 0.1      Absolute Immature Granulocytes 0.1      Absolute NRBCs 0.0          Emergency Department Course & Assessments:    Interventions:  Medications   sodium chloride 0.9% BOLUS 500 mL (0 mLs Intravenous Stopped 3/16/24 0014)      Independent Interpretation (X-rays, CTs, rhythm strip):  None    Assessments/Consultations/Discussion of Management or Tests:  ED Course as of 03/16/24 0908   Fri Mar 15, 2024   2313 I saw the patient for an initial evaluation and exam.     Sat Mar 16, 2024   0027 I rechecked and updated the patient. She walked okay, no dizziness.   0128 I rechecked and updated the patient.         Social Determinants of Health affecting care:   None    Disposition:  The patient was discharged to home.     Impression & Plan      Medical Decision Making:  On exam overall stable and nontoxic appearing with no focal neurologic deficit appreciated on exam. Gait was steady when ambulated and no dizziness noted. Patient asymptomatic right now with no headache or dizziness. Dizziness was very short lived. I did not think exam and presentation were consistent with infectious etiology such as meningitis or encephalitis as no fever, full ROM of neck, nontoxic. Given resolution of symptoms and very short lived, did not think imaging of head was indicated. No chest pain, palpitations or shortness of breath to suggest cardiac etiology. WBC elevated but no infection found on UA and no infectious symptoms. UA with few RBCs, likely from straight cath as difficult per nurse. Nurse called facility and discharged back to facility.     Diagnosis:    ICD-10-CM    1. Dizziness  R42       2. Sinus bradycardia  R00.1          Scribe Disclosure:  DARLENE, Kendell Richardson, am serving as a  scribe at 11:01 PM on 3/15/2024 to document services personally performed by Angella Álvarez MD based on my observations and the provider's statements to me.        Angella Álvarez MD  03/16/24 0912

## 2024-03-16 NOTE — DISCHARGE INSTRUCTIONS
No clear cause of your dizziness found today and it seems that you have improved  Follow up with primary care provider as needed

## 2024-03-16 NOTE — ED NOTES
Bed: ED20  Expected date: 3/15/24  Expected time: 10:35 PM  Means of arrival: Ambulance  Comments:  Sheeba 2 89F dizzy

## 2024-03-16 NOTE — ED TRIAGE NOTES
BIBA from [facility] boarding and care.  Concern for dizziness as patient was going to bed. Patient states worse with head movement from side to side.      Triage Assessment (Adult)       Row Name 03/15/24 7663          Triage Assessment    Airway WDL WDL        Respiratory WDL    Respiratory WDL WDL        Skin Circulation/Temperature WDL    Skin Circulation/Temperature WDL WDL        Cardiac WDL    Cardiac WDL X     Cardiac Rhythm SB        Peripheral/Neurovascular WDL    Peripheral Neurovascular WDL WDL        Cognitive/Neuro/Behavioral WDL    Cognitive/Neuro/Behavioral WDL WDL  baseline per ems

## 2024-03-18 ENCOUNTER — PATIENT OUTREACH (OUTPATIENT)
Dept: FAMILY MEDICINE | Facility: CLINIC | Age: 89
End: 2024-03-18
Payer: MEDICARE

## 2024-03-18 NOTE — TELEPHONE ENCOUNTER
What type of discharge? Emergency Department  Risk of Hospital admission or ED visit: 97.4%  Is a TCM episode required? No    Romina Anguiano RN  [facility]

## 2024-03-20 NOTE — TELEPHONE ENCOUNTER
ED / Discharge Outreach Protocol    Patient Contact    Attempt # 1    Was call answered?  No.  Unable to leave message. VM is not set up.

## 2024-03-22 NOTE — TELEPHONE ENCOUNTER
Triage called mobile number and spoke to pt's daughter Tina. CTC verified.   Daughter reports she is [place] of [place] but family has seen pt and she is doing fine. Drt denies medication changes  of needs for follow up. Drt agreed to have family reach out to clinic if any concerns.

## 2024-03-22 NOTE — TELEPHONE ENCOUNTER
ED / Discharge Outreach Protocol    Patient Contact    Attempt # 2    Was call answered?  No.  Unable to leave message. VM is not set up.

## 2024-04-28 ENCOUNTER — APPOINTMENT (OUTPATIENT)
Dept: GENERAL RADIOLOGY | Facility: CLINIC | Age: 89
DRG: 193 | End: 2024-04-28
Attending: EMERGENCY MEDICINE
Payer: MEDICARE

## 2024-04-28 ENCOUNTER — HOSPITAL ENCOUNTER (INPATIENT)
Facility: CLINIC | Age: 89
LOS: 11 days | Discharge: NURSING, REHAB OR LONG-TERM CARE | DRG: 193 | End: 2024-05-09
Attending: EMERGENCY MEDICINE | Admitting: STUDENT IN AN ORGANIZED HEALTH CARE EDUCATION/TRAINING PROGRAM
Payer: MEDICARE

## 2024-04-28 DIAGNOSIS — R06.02 SOB (SHORTNESS OF BREATH): ICD-10-CM

## 2024-04-28 DIAGNOSIS — J12.9 VIRAL PNEUMONIA: ICD-10-CM

## 2024-04-28 DIAGNOSIS — M25.562 ACUTE PAIN OF LEFT KNEE: Primary | ICD-10-CM

## 2024-04-28 DIAGNOSIS — J96.01 ACUTE RESPIRATORY FAILURE WITH HYPOXIA (H): ICD-10-CM

## 2024-04-28 DIAGNOSIS — J18.9 ATYPICAL PNEUMONIA: ICD-10-CM

## 2024-04-28 LAB
ANION GAP SERPL CALCULATED.3IONS-SCNC: 14 MMOL/L (ref 7–15)
ATRIAL RATE - MUSE: 84 BPM
BASE EXCESS BLDV CALC-SCNC: 4 MMOL/L (ref -3–3)
BASOPHILS # BLD AUTO: 0.1 10E3/UL (ref 0–0.2)
BASOPHILS NFR BLD AUTO: 1 %
BUN SERPL-MCNC: 20.8 MG/DL (ref 8–23)
C PNEUM DNA SPEC QL NAA+PROBE: NOT DETECTED
CALCIUM SERPL-MCNC: 8.9 MG/DL (ref 8.2–9.6)
CHLORIDE SERPL-SCNC: 102 MMOL/L (ref 98–107)
CREAT SERPL-MCNC: 0.9 MG/DL (ref 0.51–0.95)
DEPRECATED HCO3 PLAS-SCNC: 26 MMOL/L (ref 22–29)
DIASTOLIC BLOOD PRESSURE - MUSE: NORMAL MMHG
EGFRCR SERPLBLD CKD-EPI 2021: 59 ML/MIN/1.73M2
EOSINOPHIL # BLD AUTO: 0.6 10E3/UL (ref 0–0.7)
EOSINOPHIL NFR BLD AUTO: 6 %
ERYTHROCYTE [DISTWIDTH] IN BLOOD BY AUTOMATED COUNT: 14.4 % (ref 10–15)
FLUAV H1 2009 PAND RNA SPEC QL NAA+PROBE: NOT DETECTED
FLUAV H1 RNA SPEC QL NAA+PROBE: NOT DETECTED
FLUAV H3 RNA SPEC QL NAA+PROBE: NOT DETECTED
FLUAV RNA SPEC QL NAA+PROBE: NEGATIVE
FLUAV RNA SPEC QL NAA+PROBE: NOT DETECTED
FLUBV RNA RESP QL NAA+PROBE: NEGATIVE
FLUBV RNA SPEC QL NAA+PROBE: NOT DETECTED
GLUCOSE SERPL-MCNC: 119 MG/DL (ref 70–99)
HADV DNA SPEC QL NAA+PROBE: NOT DETECTED
HCO3 BLDV-SCNC: 31 MMOL/L (ref 21–28)
HCOV PNL SPEC NAA+PROBE: NOT DETECTED
HCT VFR BLD AUTO: 45.1 % (ref 35–47)
HGB BLD-MCNC: 14.2 G/DL (ref 11.7–15.7)
HMPV RNA SPEC QL NAA+PROBE: DETECTED
HOLD SPECIMEN: NORMAL
HPIV1 RNA SPEC QL NAA+PROBE: NOT DETECTED
HPIV2 RNA SPEC QL NAA+PROBE: NOT DETECTED
HPIV3 RNA SPEC QL NAA+PROBE: NOT DETECTED
HPIV4 RNA SPEC QL NAA+PROBE: NOT DETECTED
IMM GRANULOCYTES # BLD: 0 10E3/UL
IMM GRANULOCYTES NFR BLD: 0 %
INTERPRETATION ECG - MUSE: NORMAL
LACTATE BLD-SCNC: 1.5 MMOL/L
LYMPHOCYTES # BLD AUTO: 2.2 10E3/UL (ref 0.8–5.3)
LYMPHOCYTES NFR BLD AUTO: 22 %
M PNEUMO DNA SPEC QL NAA+PROBE: NOT DETECTED
MCH RBC QN AUTO: 30 PG (ref 26.5–33)
MCHC RBC AUTO-ENTMCNC: 31.5 G/DL (ref 31.5–36.5)
MCV RBC AUTO: 95 FL (ref 78–100)
MONOCYTES # BLD AUTO: 1.1 10E3/UL (ref 0–1.3)
MONOCYTES NFR BLD AUTO: 11 %
NEUTROPHILS # BLD AUTO: 6.2 10E3/UL (ref 1.6–8.3)
NEUTROPHILS NFR BLD AUTO: 60 %
NRBC # BLD AUTO: 0 10E3/UL
NRBC BLD AUTO-RTO: 0 /100
NT-PROBNP SERPL-MCNC: 516 PG/ML (ref 0–1800)
P AXIS - MUSE: 51 DEGREES
PCO2 BLDV: 55 MM HG (ref 40–50)
PH BLDV: 7.36 [PH] (ref 7.32–7.43)
PLATELET # BLD AUTO: 288 10E3/UL (ref 150–450)
PO2 BLDV: 29 MM HG (ref 25–47)
POTASSIUM SERPL-SCNC: 4.2 MMOL/L (ref 3.4–5.3)
PR INTERVAL - MUSE: 140 MS
PROCALCITONIN SERPL IA-MCNC: 0.07 NG/ML
QRS DURATION - MUSE: 142 MS
QT - MUSE: 410 MS
QTC - MUSE: 484 MS
R AXIS - MUSE: -23 DEGREES
RBC # BLD AUTO: 4.74 10E6/UL (ref 3.8–5.2)
RSV RNA SPEC NAA+PROBE: NEGATIVE
RSV RNA SPEC QL NAA+PROBE: NOT DETECTED
RSV RNA SPEC QL NAA+PROBE: NOT DETECTED
RV+EV RNA SPEC QL NAA+PROBE: NOT DETECTED
SAO2 % BLDV: 51 % (ref 70–75)
SARS-COV-2 RNA RESP QL NAA+PROBE: NEGATIVE
SODIUM SERPL-SCNC: 142 MMOL/L (ref 135–145)
SYSTOLIC BLOOD PRESSURE - MUSE: NORMAL MMHG
T AXIS - MUSE: 137 DEGREES
TROPONIN T SERPL HS-MCNC: 17 NG/L
TROPONIN T SERPL HS-MCNC: 20 NG/L
VENTRICULAR RATE- MUSE: 84 BPM
WBC # BLD AUTO: 10.2 10E3/UL (ref 4–11)

## 2024-04-28 PROCEDURE — 120N000001 HC R&B MED SURG/OB

## 2024-04-28 PROCEDURE — 36415 COLL VENOUS BLD VENIPUNCTURE: CPT | Performed by: EMERGENCY MEDICINE

## 2024-04-28 PROCEDURE — 250N000009 HC RX 250: Performed by: EMERGENCY MEDICINE

## 2024-04-28 PROCEDURE — 99223 1ST HOSP IP/OBS HIGH 75: CPT | Mod: AI | Performed by: PHYSICIAN ASSISTANT

## 2024-04-28 PROCEDURE — 96374 THER/PROPH/DIAG INJ IV PUSH: CPT | Mod: 59

## 2024-04-28 PROCEDURE — 94640 AIRWAY INHALATION TREATMENT: CPT | Mod: 76

## 2024-04-28 PROCEDURE — 99285 EMERGENCY DEPT VISIT HI MDM: CPT | Mod: 25

## 2024-04-28 PROCEDURE — 250N000009 HC RX 250: Performed by: PHYSICIAN ASSISTANT

## 2024-04-28 PROCEDURE — 85025 COMPLETE CBC W/AUTO DIFF WBC: CPT | Performed by: EMERGENCY MEDICINE

## 2024-04-28 PROCEDURE — 250N000011 HC RX IP 250 OP 636: Performed by: EMERGENCY MEDICINE

## 2024-04-28 PROCEDURE — 250N000013 HC RX MED GY IP 250 OP 250 PS 637: Performed by: PHYSICIAN ASSISTANT

## 2024-04-28 PROCEDURE — 83880 ASSAY OF NATRIURETIC PEPTIDE: CPT | Performed by: EMERGENCY MEDICINE

## 2024-04-28 PROCEDURE — 84484 ASSAY OF TROPONIN QUANT: CPT | Performed by: PHYSICIAN ASSISTANT

## 2024-04-28 PROCEDURE — 250N000011 HC RX IP 250 OP 636: Performed by: PHYSICIAN ASSISTANT

## 2024-04-28 PROCEDURE — 999N000157 HC STATISTIC RCP TIME EA 10 MIN

## 2024-04-28 PROCEDURE — 93005 ELECTROCARDIOGRAM TRACING: CPT

## 2024-04-28 PROCEDURE — 250N000013 HC RX MED GY IP 250 OP 250 PS 637: Performed by: EMERGENCY MEDICINE

## 2024-04-28 PROCEDURE — 36415 COLL VENOUS BLD VENIPUNCTURE: CPT | Performed by: PHYSICIAN ASSISTANT

## 2024-04-28 PROCEDURE — 84484 ASSAY OF TROPONIN QUANT: CPT | Performed by: EMERGENCY MEDICINE

## 2024-04-28 PROCEDURE — 71046 X-RAY EXAM CHEST 2 VIEWS: CPT

## 2024-04-28 PROCEDURE — 82803 BLOOD GASES ANY COMBINATION: CPT

## 2024-04-28 PROCEDURE — 84145 PROCALCITONIN (PCT): CPT | Performed by: EMERGENCY MEDICINE

## 2024-04-28 PROCEDURE — 80048 BASIC METABOLIC PNL TOTAL CA: CPT | Performed by: EMERGENCY MEDICINE

## 2024-04-28 PROCEDURE — 87633 RESP VIRUS 12-25 TARGETS: CPT | Performed by: PHYSICIAN ASSISTANT

## 2024-04-28 PROCEDURE — 94640 AIRWAY INHALATION TREATMENT: CPT

## 2024-04-28 PROCEDURE — 87581 M.PNEUMON DNA AMP PROBE: CPT | Performed by: PHYSICIAN ASSISTANT

## 2024-04-28 PROCEDURE — 87637 SARSCOV2&INF A&B&RSV AMP PRB: CPT | Performed by: EMERGENCY MEDICINE

## 2024-04-28 RX ORDER — BENZONATATE 100 MG/1
100 CAPSULE ORAL 3 TIMES DAILY PRN
Status: DISCONTINUED | OUTPATIENT
Start: 2024-04-28 | End: 2024-05-09 | Stop reason: HOSPADM

## 2024-04-28 RX ORDER — NALOXONE HYDROCHLORIDE 0.4 MG/ML
0.4 INJECTION, SOLUTION INTRAMUSCULAR; INTRAVENOUS; SUBCUTANEOUS
Status: DISCONTINUED | OUTPATIENT
Start: 2024-04-28 | End: 2024-05-09 | Stop reason: HOSPADM

## 2024-04-28 RX ORDER — ONDANSETRON 4 MG/1
4 TABLET, ORALLY DISINTEGRATING ORAL EVERY 6 HOURS PRN
Status: DISCONTINUED | OUTPATIENT
Start: 2024-04-28 | End: 2024-05-09 | Stop reason: HOSPADM

## 2024-04-28 RX ORDER — ATORVASTATIN CALCIUM 20 MG/1
20 TABLET, FILM COATED ORAL EVERY EVENING
Status: DISCONTINUED | OUTPATIENT
Start: 2024-04-28 | End: 2024-05-09 | Stop reason: HOSPADM

## 2024-04-28 RX ORDER — IPRATROPIUM BROMIDE AND ALBUTEROL SULFATE 2.5; .5 MG/3ML; MG/3ML
3 SOLUTION RESPIRATORY (INHALATION)
Status: DISCONTINUED | OUTPATIENT
Start: 2024-04-28 | End: 2024-04-29

## 2024-04-28 RX ORDER — CALCIUM CARBONATE 500 MG/1
1000 TABLET, CHEWABLE ORAL 4 TIMES DAILY PRN
Status: DISCONTINUED | OUTPATIENT
Start: 2024-04-28 | End: 2024-05-09 | Stop reason: HOSPADM

## 2024-04-28 RX ORDER — ACETAMINOPHEN 325 MG/1
650 TABLET ORAL EVERY 4 HOURS PRN
Status: DISCONTINUED | OUTPATIENT
Start: 2024-04-28 | End: 2024-05-09 | Stop reason: HOSPADM

## 2024-04-28 RX ORDER — DOXYCYCLINE 100 MG/1
100 CAPSULE ORAL ONCE
Status: COMPLETED | OUTPATIENT
Start: 2024-04-28 | End: 2024-04-28

## 2024-04-28 RX ORDER — NALOXONE HYDROCHLORIDE 0.4 MG/ML
0.2 INJECTION, SOLUTION INTRAMUSCULAR; INTRAVENOUS; SUBCUTANEOUS
Status: DISCONTINUED | OUTPATIENT
Start: 2024-04-28 | End: 2024-05-09 | Stop reason: HOSPADM

## 2024-04-28 RX ORDER — LIDOCAINE 40 MG/G
CREAM TOPICAL
Status: DISCONTINUED | OUTPATIENT
Start: 2024-04-28 | End: 2024-05-09 | Stop reason: HOSPADM

## 2024-04-28 RX ORDER — GUAIFENESIN 600 MG/1
600 TABLET, EXTENDED RELEASE ORAL 2 TIMES DAILY
Status: DISCONTINUED | OUTPATIENT
Start: 2024-04-28 | End: 2024-05-09 | Stop reason: HOSPADM

## 2024-04-28 RX ORDER — MEMANTINE HYDROCHLORIDE 5 MG/1
5 TABLET ORAL 2 TIMES DAILY
Status: DISCONTINUED | OUTPATIENT
Start: 2024-04-28 | End: 2024-05-09 | Stop reason: HOSPADM

## 2024-04-28 RX ORDER — QUETIAPINE FUMARATE 25 MG/1
25 TABLET, FILM COATED ORAL AT BEDTIME
Status: DISCONTINUED | OUTPATIENT
Start: 2024-04-28 | End: 2024-05-09 | Stop reason: HOSPADM

## 2024-04-28 RX ORDER — AZITHROMYCIN 500 MG/1
500 INJECTION, POWDER, LYOPHILIZED, FOR SOLUTION INTRAVENOUS EVERY 24 HOURS
Status: DISCONTINUED | OUTPATIENT
Start: 2024-04-28 | End: 2024-04-29

## 2024-04-28 RX ORDER — ONDANSETRON 2 MG/ML
4 INJECTION INTRAMUSCULAR; INTRAVENOUS EVERY 6 HOURS PRN
Status: DISCONTINUED | OUTPATIENT
Start: 2024-04-28 | End: 2024-05-09 | Stop reason: HOSPADM

## 2024-04-28 RX ORDER — ALBUTEROL SULFATE 0.83 MG/ML
2.5 SOLUTION RESPIRATORY (INHALATION)
Status: DISCONTINUED | OUTPATIENT
Start: 2024-04-28 | End: 2024-04-29

## 2024-04-28 RX ORDER — ALBUTEROL SULFATE 90 UG/1
1-2 AEROSOL, METERED RESPIRATORY (INHALATION) EVERY 4 HOURS PRN
COMMUNITY
Start: 2024-04-27

## 2024-04-28 RX ORDER — DOXYCYCLINE 100 MG/1
100 CAPSULE ORAL EVERY 12 HOURS SCHEDULED
Status: CANCELLED | OUTPATIENT
Start: 2024-04-28

## 2024-04-28 RX ORDER — METOPROLOL SUCCINATE 100 MG/1
100 TABLET, EXTENDED RELEASE ORAL DAILY
Status: DISCONTINUED | OUTPATIENT
Start: 2024-04-28 | End: 2024-05-09 | Stop reason: HOSPADM

## 2024-04-28 RX ORDER — CEFTRIAXONE 1 G/1
1 INJECTION, POWDER, FOR SOLUTION INTRAMUSCULAR; INTRAVENOUS EVERY 24 HOURS
Status: DISCONTINUED | OUTPATIENT
Start: 2024-04-28 | End: 2024-04-29

## 2024-04-28 RX ORDER — DEXAMETHASONE SODIUM PHOSPHATE 4 MG/ML
10 INJECTION, SOLUTION INTRA-ARTICULAR; INTRALESIONAL; INTRAMUSCULAR; INTRAVENOUS; SOFT TISSUE ONCE
Status: COMPLETED | OUTPATIENT
Start: 2024-04-28 | End: 2024-04-28

## 2024-04-28 RX ORDER — HYDRALAZINE HYDROCHLORIDE 20 MG/ML
10 INJECTION INTRAMUSCULAR; INTRAVENOUS EVERY 4 HOURS PRN
Status: DISCONTINUED | OUTPATIENT
Start: 2024-04-28 | End: 2024-05-09 | Stop reason: HOSPADM

## 2024-04-28 RX ORDER — RAMELTEON 8 MG/1
8 TABLET ORAL
Status: DISCONTINUED | OUTPATIENT
Start: 2024-04-28 | End: 2024-05-09 | Stop reason: HOSPADM

## 2024-04-28 RX ORDER — GUAIFENESIN 600 MG/1
600 TABLET, EXTENDED RELEASE ORAL 2 TIMES DAILY
COMMUNITY

## 2024-04-28 RX ORDER — FERROUS SULFATE 324(65)MG
324 TABLET, DELAYED RELEASE (ENTERIC COATED) ORAL
COMMUNITY

## 2024-04-28 RX ORDER — HYDRALAZINE HYDROCHLORIDE 10 MG/1
10 TABLET, FILM COATED ORAL EVERY 4 HOURS PRN
Status: DISCONTINUED | OUTPATIENT
Start: 2024-04-28 | End: 2024-05-09 | Stop reason: HOSPADM

## 2024-04-28 RX ORDER — PAROXETINE 20 MG/1
20 TABLET, FILM COATED ORAL AT BEDTIME
Status: DISCONTINUED | OUTPATIENT
Start: 2024-04-28 | End: 2024-05-09 | Stop reason: HOSPADM

## 2024-04-28 RX ORDER — IPRATROPIUM BROMIDE AND ALBUTEROL SULFATE 2.5; .5 MG/3ML; MG/3ML
3 SOLUTION RESPIRATORY (INHALATION) EVERY 30 MIN PRN
Status: COMPLETED | OUTPATIENT
Start: 2024-04-28 | End: 2024-04-28

## 2024-04-28 RX ORDER — AMOXICILLIN 250 MG
2 CAPSULE ORAL 2 TIMES DAILY PRN
Status: DISCONTINUED | OUTPATIENT
Start: 2024-04-28 | End: 2024-05-09 | Stop reason: HOSPADM

## 2024-04-28 RX ORDER — VITAMIN B COMPLEX
25 TABLET ORAL DAILY
COMMUNITY

## 2024-04-28 RX ORDER — ACETAMINOPHEN 650 MG/1
650 SUPPOSITORY RECTAL EVERY 4 HOURS PRN
Status: DISCONTINUED | OUTPATIENT
Start: 2024-04-28 | End: 2024-05-09 | Stop reason: HOSPADM

## 2024-04-28 RX ORDER — AMOXICILLIN 250 MG
1 CAPSULE ORAL 2 TIMES DAILY PRN
Status: DISCONTINUED | OUTPATIENT
Start: 2024-04-28 | End: 2024-05-09 | Stop reason: HOSPADM

## 2024-04-28 RX ADMIN — DEXAMETHASONE SODIUM PHOSPHATE 10 MG: 4 INJECTION, SOLUTION INTRAMUSCULAR; INTRAVENOUS at 09:58

## 2024-04-28 RX ADMIN — ATORVASTATIN CALCIUM 20 MG: 20 TABLET, FILM COATED ORAL at 22:01

## 2024-04-28 RX ADMIN — PAROXETINE HYDROCHLORIDE 20 MG: 20 TABLET, FILM COATED ORAL at 22:01

## 2024-04-28 RX ADMIN — IPRATROPIUM BROMIDE AND ALBUTEROL SULFATE 3 ML: .5; 3 SOLUTION RESPIRATORY (INHALATION) at 10:04

## 2024-04-28 RX ADMIN — IPRATROPIUM BROMIDE AND ALBUTEROL SULFATE 3 ML: .5; 3 SOLUTION RESPIRATORY (INHALATION) at 15:08

## 2024-04-28 RX ADMIN — QUETIAPINE FUMARATE 25 MG: 25 TABLET ORAL at 22:01

## 2024-04-28 RX ADMIN — CEFTRIAXONE SODIUM 1 G: 1 INJECTION, POWDER, FOR SOLUTION INTRAMUSCULAR; INTRAVENOUS at 15:45

## 2024-04-28 RX ADMIN — METOPROLOL SUCCINATE 100 MG: 100 TABLET, EXTENDED RELEASE ORAL at 15:14

## 2024-04-28 RX ADMIN — RIVAROXABAN 15 MG: 10 TABLET, FILM COATED ORAL at 18:02

## 2024-04-28 RX ADMIN — MEMANTINE 5 MG: 5 TABLET ORAL at 23:00

## 2024-04-28 RX ADMIN — GUAIFENESIN 600 MG: 600 TABLET, EXTENDED RELEASE ORAL at 22:01

## 2024-04-28 RX ADMIN — DOXYCYCLINE HYCLATE 100 MG: 100 CAPSULE ORAL at 12:38

## 2024-04-28 RX ADMIN — AZITHROMYCIN MONOHYDRATE 500 MG: 500 INJECTION, POWDER, LYOPHILIZED, FOR SOLUTION INTRAVENOUS at 15:55

## 2024-04-28 ASSESSMENT — COLUMBIA-SUICIDE SEVERITY RATING SCALE - C-SSRS
6. HAVE YOU EVER DONE ANYTHING, STARTED TO DO ANYTHING, OR PREPARED TO DO ANYTHING TO END YOUR LIFE?: NO
2. HAVE YOU ACTUALLY HAD ANY THOUGHTS OF KILLING YOURSELF IN THE PAST MONTH?: NO
1. IN THE PAST MONTH, HAVE YOU WISHED YOU WERE DEAD OR WISHED YOU COULD GO TO SLEEP AND NOT WAKE UP?: NO

## 2024-04-28 ASSESSMENT — ACTIVITIES OF DAILY LIVING (ADL)
CONCENTRATING,_REMEMBERING_OR_MAKING_DECISIONS_DIFFICULTY: NO
WERE_AUXILIARY_AIDS_OFFERED?: NO
DOING_ERRANDS_INDEPENDENTLY_DIFFICULTY: YES
ADLS_ACUITY_SCORE: 38
PATIENT'S_PREFERRED_MEANS_OF_COMMUNICATION: ENGLISH SPEAKER WITH HEARING LOSS, NO SPEECH PROBLEMS.
ADLS_ACUITY_SCORE: 29
WALKING_OR_CLIMBING_STAIRS: AMBULATION DIFFICULTY, REQUIRES EQUIPMENT
ADLS_ACUITY_SCORE: 38
ADLS_ACUITY_SCORE: 38
WEAR_GLASSES_OR_BLIND: YES
ADLS_ACUITY_SCORE: 29
CHANGE_IN_FUNCTIONAL_STATUS_SINCE_ONSET_OF_CURRENT_ILLNESS/INJURY: YES
DESCRIBE_HEARING_LOSS: BILATERAL HEARING LOSS
HEARING_DIFFICULTY_OR_DEAF: YES
DIFFICULTY_EATING/SWALLOWING: NO
ADLS_ACUITY_SCORE: 29
ADLS_ACUITY_SCORE: 29
ADLS_ACUITY_SCORE: 38
ADLS_ACUITY_SCORE: 38
FALL_HISTORY_WITHIN_LAST_SIX_MONTHS: NO
ADLS_ACUITY_SCORE: 38
DIFFICULTY_COMMUNICATING: NO
ADLS_ACUITY_SCORE: 38
TOILETING_ISSUES: NO
DRESSING/BATHING_DIFFICULTY: NO
WALKING_OR_CLIMBING_STAIRS_DIFFICULTY: YES
EQUIPMENT_CURRENTLY_USED_AT_HOME: WALKER, ROLLING
ADLS_ACUITY_SCORE: 29
ADLS_ACUITY_SCORE: 38
ADLS_ACUITY_SCORE: 38

## 2024-04-28 NOTE — ED PROVIDER NOTES
History     Chief Complaint:  Cough       HPI   Margy Babin is a 89 year old female with history of atrial fibrillation (anticoagulant Xarelto), CVA, HTN, HLD, dementia, who presents with shortness of breath.  Patient has been having increasing cough and shortness of breath for the last 1 week.  Cough is nonproductive.  No history of fever.  Patient had chest x-ray 5 days ago which was reportedly clear and has been tested twice for COVID (negative both times) and has also tested negative for influenza.  Today she presents from skilled care facility via EMS who noted wheezing and provided DuoNeb on route.  Patient was initially 89% on room air per EMS.  Oxygen saturations improved to normal with 2 L nasal cannula oxygen.  On evaluation patient denies chest pain, abdominal pain, or lower extremity swelling      Independent Historian:   EMS - They report history as above    Review of External Notes:   I reviewed the patient's discharge summary from 11/29/2023      Medications:    Seroquel  Paxil  Metoprolol  Tramadol  Xarelto  Atorvastatin      Past Medical History:    Past Medical History:   Diagnosis Date    Anxiety     Blepharitis of both eyes     Cerebrovascular accident (CVA) due to embolism (H) 04/2022    Chronic atrial fibrillation (H)     Depression, major, recurrent, in complete remission (H24)     Dry eye syndrome     Dyspnea on exertion 10/07/2021    Essential hypertension 03/05/2020    Familial tremor 03/06/2013    Insomnia, unspecified type 11/03/2014    Mixed hyperlipidemia 11/04/2020    Nausea without vomiting 06/16/2015    Tremor, hereditary, benign        Past Surgical History:    Past Surgical History:   Procedure Laterality Date    HYSTERECTOMY TOTAL ABDOMINAL, BILATERAL SALPINGO-OOPHORECTOMY, COMBINED      NECK SURGERY  2009    cervical fusion    ROTATOR CUFF REPAIR RT/LT Right     ZZC TOTAL HIP ARTHROPLASTY Right 1997        Physical Exam   Patient Vitals for the past 24 hrs:   BP  "Temp Temp src Pulse Resp SpO2 Height Weight   04/28/24 1547 139/64 97.7  F (36.5  C) Oral 106 18 97 % -- --   04/28/24 1412 -- -- -- -- -- -- 1.65 m (5' 4.96\") 69.5 kg (153 lb 3.5 oz)   04/28/24 1409 135/62 98  F (36.7  C) Oral 96 18 94 % -- --   04/28/24 1306 135/67 97.7  F (36.5  C) Oral 84 18 93 % -- --   04/28/24 0941 139/64 97.2  F (36.2  C) Temporal 79 16 98 % -- --   04/28/24 0937 139/64 -- -- 85 -- 99 % -- --        Physical Exam    General: Alert and cooperative with exam. Patient in mild distress. Normal mentation.  Nontoxic appearance.  Head:  Scalp is NC/AT  Eyes:  No scleral icterus, PERRL  ENT:  The external nose and ears are normal. The oropharynx is normal and without erythema; mucus membranes are moist. Uvula midline, no evidence of deep space infection.  Neck:  Normal range of motion without rigidity.  CV:  Regular rate and rhythm  Resp:  Diffuse coarse lung sounds with audible wheezing and mild increased work of breathing.  Moderate dry cough.  Nasal cannula in place  GI:  Abdomen is soft, no distension, no tenderness. No peritoneal signs  MS:  No lower extremity edema   Skin:  Warm and dry, No rash or lesions noted.  Neuro: Oriented to person and place only.  No gross motor deficits.      Emergency Department Course     ECG results from 04/28/24   EKG 12 lead     Value    Systolic Blood Pressure     Diastolic Blood Pressure     Ventricular Rate 84    Atrial Rate 84    VT Interval 140    QRS Duration 142        QTc 484    P Axis 51    R AXIS -23    T Axis 137    Interpretation ECG      Sinus rhythm with occasional Premature ventricular complexes  Left bundle branch block  Abnormal ECG  When compared with ECG of 15-MAR-2024 22:48,  Premature ventricular complexes are now Present  Vent. rate has increased BY  33 BPM  Confirmed by GENERATED REPORT, COMPUTER ([id number]),  Lianet Segura ([id number]) on 4/28/2024 4:51:44 PM         Imaging:  Chest XR,  PA & LAT   Final Result   IMPRESSION: No pleural " fluid or pneumothorax. No airspace disease. Reticular interstitial opacities could be fibrosis, edema, atypical infection, or other process. The cardiomediastinal silhouette is at the upper limits of normal in size. There is aortic    calcification.             Laboratory:  Labs Ordered and Resulted from Time of ED Arrival to Time of ED Departure   BASIC METABOLIC PANEL - Abnormal       Result Value    Sodium 142      Potassium 4.2      Chloride 102      Carbon Dioxide (CO2) 26      Anion Gap 14      Urea Nitrogen 20.8      Creatinine 0.90      GFR Estimate 59 (*)     Calcium 8.9      Glucose 119 (*)    TROPONIN T, HIGH SENSITIVITY - Abnormal    Troponin T, High Sensitivity 20 (*)    ISTAT GASES LACTATE VENOUS POCT - Abnormal    Lactic Acid POCT 1.5      Bicarbonate Venous POCT 31 (*)     O2 Sat, Venous POCT 51 (*)     pCO2 Venous POCT 55 (*)     pH Venous POCT 7.36      pO2 Venous POCT 29      Base Excess/Deficit (+/-) POCT 4.0 (*)    NT PROBNP INPATIENT - Normal    N terminal Pro BNP Inpatient 516     INFLUENZA A/B, RSV, & SARS-COV2 PCR - Normal    Influenza A PCR Negative      Influenza B PCR Negative      RSV PCR Negative      SARS CoV2 PCR Negative     PROCALCITONIN - Normal    Procalcitonin 0.07     CBC WITH PLATELETS AND DIFFERENTIAL    WBC Count 10.2      RBC Count 4.74      Hemoglobin 14.2      Hematocrit 45.1      MCV 95      MCH 30.0      MCHC 31.5      RDW 14.4      Platelet Count 288      % Neutrophils 60      % Lymphocytes 22      % Monocytes 11      % Eosinophils 6      % Basophils 1      % Immature Granulocytes 0      NRBCs per 100 WBC 0      Absolute Neutrophils 6.2      Absolute Lymphocytes 2.2      Absolute Monocytes 1.1      Absolute Eosinophils 0.6      Absolute Basophils 0.1      Absolute Immature Granulocytes 0.0      Absolute NRBCs 0.0            Emergency Department Course & Assessments:    Interventions:  Medications   ipratropium - albuterol 0.5 mg/2.5 mg/3 mL (DUONEB) neb solution 3 mL (3  mLs Nebulization $Given 4/28/24 1004)   dexAMETHasone (DECADRON) injection 10 mg (10 mg Intravenous $Given 4/28/24 0995)   doxycycline hyclate (VIBRAMYCIN) capsule 100 mg (100 mg Oral $Given 4/28/24 1231)          Independent Interpretation (X-rays, CTs, rhythm strip):  Chest x-ray demonstrates no effusion or pneumothorax.  Interstitial infiltrates concerning for potential pneumonia    Consultations/Discussion of Management or Tests:  Patient's care was discussed with the hospitalist service who accepted for admission       Social Determinants of Health affecting care:   None    Disposition:  The patient was admitted to the hospital under the care of Dr. Jose.     Impression & Plan      Medical Decision Making:  Patient is a 89-year-old female presents with cough and shortness of breath; noted to be hypoxic at care facility and requiring supplemental oxygen to maintain normal oxygen saturations.  On evaluation patient has audible wheezing and diffuse coarse lung sounds.  She received additional DuoNeb and was provided 10 mg Decadron for potential reactive airway disease.  On reassessment lung sounds improved.  Chest x-ray demonstrates potential atypical pneumonia; provided doxycycline.  EKG without significant change from previous.  Labs notable for minimally elevated troponin (20), normal procalcitonin (0.07), normal white count (10.2), normal hemoglobin (14.2), normal lactic acid (1.5), mildly elevated pCO2 on VBG (55), normal BNP (516), and negative influenza/COVID/RSV testing.  Given  acute respiratory failure with hypoxia secondary to atypical pneumonia, patient will be admitted to the hospital service for further evaluation and care.  Stable at time of admission.      Diagnosis:    ICD-10-CM    1. Acute respiratory failure with hypoxia (H)  J96.01       2. Atypical pneumonia  J18.9       3. SOB (shortness of breath)  R06.02               Andre Castro,   04/28/24 8113

## 2024-04-28 NOTE — H&P
[facility]  History and Physical - Hospitalist Service       Date of Admission:  4/28/2024  PRIMARY CARE PROVIDER:    Colin aLke    Assessment & Plan   Margy Babin is a 89 year old female with PMH of paroxysmal atrial fibrillation, dementia, HTN, HLD, iron deficiency anemia, orthostatic hypotension, hx CVA, depression who presented to the ED on 4/28/24 from her [facility] for evaluation of cough.    She has been having cough and congestion x 1 week. She was seen on 4/23 and tested negative for COVID-19 and influenza. She was tested again for COVID-19 on 4/26 and negative. Her cough has been getting worse, now with audible wheezing, and she was brought to the ED for evaluation. No fevers, chills, chest pain, abdominal pain, LE swelling, calf pain. No history of any underlying lung disease and patient is a never smoker. In the ED, VSS. She was saturating 86% on room air and placed on 2 L O2. CBC and BMP unremarkable. ProBNP 516. Procalcitonin 0.07. Troponin 20. Lactic acid 1.5. VBG with pH 7.36, pCO2 55, bicarb 31. COVID-19, RSV, Influenza negative. EKG SR with PVCs and LBBB. CXR showed reticular opacities which could represent fibrosis, edema, atypical infection, or other process. She was given Duoneb, IV Decadron and PO Doxycycline in the ED.    Community acquired pneumonia  Acute hypoxic respiratory failure  *Presenting with cough, wheezing, chest congestion x 1 week. Desaturated to 86% on RA and was placed on 2 L O2. No leukocytosis, afebrile, low procal. CXR with reticular opacities. Negative for COVID-19, RSV, influenza. Note, CT chest 11/24/23 showed some atelectasis but no opacities or suggestion of fibrosis.  -IV Rocephin +  Azithromycin  -S/p IV Decadron 10 mg x 1, reassess need for further steroids tomorrow  -DouNebs 4x/daily  -Mucinex 600 mg BID  -Sputum culture  -Respiratory panel PCR  -Supplemental oxygen as needed    Elevated troponin  *Troponin 20. EKG SR with PVCs  and known LBBB. No chest pain. Likely elevated due to demand ischemia in the setting of acute hypoxic respiratory failure.  *Echo 11/23/23 with EF 50-55%, mild concentric LVH, no significant valve disease  -Repeat one more troponin    Paroxysmal atrial fibrillation  HTN  HLD  -Continue PTA Metoprolol, Xarelto, Atorvastatin once verified    History of CVA (04/2022)  *Cardioembolic from atrial fibrillation  -Continue PTA Xarelto, Atorvastatin once verified    Dementia  *Oriented x 2, coming from [facility]  -Continue PTA Namenda, Seroquel once verified  - consult for discharge planning    Depression  -Continue PTA Paxil once verified    Clinically Significant Risk Factors Present on Admission               # Drug Induced Coagulation Defect: home medication list includes an anticoagulant medication    # Hypertension: Noted on problem list   # Dementia: noted on problem list        # Financial/Environmental Concerns:              Diet:  Regular  DVT Prophylaxis: DOAC  Pichardo Catheter: Not present  Lines: None     Cardiac Monitoring: None  Code Status:  DNR/DNI         Disposition Plan      Expected Discharge Date: 04/30/2024             Entered: Rosanne Reilly PA-C 04/28/2024, 12:31 PM     The patient's care was discussed with the Attending Physician, Dr. Jose, Patient.  Rosanne Reilly PA-C  [facility]  Securely message with the Vocera Web Console (learn more here)      ______________________________________________________________________    Chief Complaint   Cough, congestion    History is obtained from the patient and EMR.      History of Present Illness   Mragy Babin is a 89 year old female with PMH of paroxysmal atrial fibrillation, dementia, HTN, HLD, iron deficiency anemia, orthostatic hypotension who presented to the ED on 4/28/24 from her NH for evaluation of cough.    She has been having cough and congestion x 1 week. She was seen on 4/23  and tested negative for COVID-19 and influenza. She was tested again for COVID-19 on  and negative. Her cough has been getting worse, now with audible wheezing, and she was brought to the ED for evaluation. No fevers, chills, chest pain, abdominal pain, LE swelling, calf pain. No history of any underlying lung disease and patient is a never smoker. In the ED, VSS. She was saturating 86% on room air and placed on 2 L O2. CBC and BMP unremarkable. ProBNP 516. Procalcitonin 0.07. Troponin 20. Lactic acid 1.5. VBG with pH 7.36, pCO2 55, bicarb 31. COVID-19, RSV, Influenza negative. EKG SR with PVCs and LBBB. CXR showed reticular opacities which could represent fibrosis, edema, atypical infection, or other process. She was given Duoneb, IV Decadron and PO Doxycycline in the ED.    Past Medical History    I have reviewed this patient's medical history and updated it with pertinent information if needed.   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    Mixed hyperlipidemia 2020    Nausea without vomiting 2015     Problem list name updated by automated process. Provider to review    Tremor, hereditary, benign        Prior to Admission Medications   Prior to Admission Medications   Prescriptions Last Dose Informant Patient Reported? Taking?   Cholecalciferol (VITAMIN D HIGH POTENCY) 25 MCG (1000 UT) CAPS  Nursing Home No No   Si CAP BY MOUTH DAILY   PARoxetine (PAXIL) 20 MG tablet  Nursing Home No No   Si TAB BY MOUTH AT BEDTIME Strength: 20 mg   QUEtiapine (SEROQUEL)  25 MG tablet  Nursing Home No No   Si TAB BY MOUTH AT BEDTIME (DX: INSOMNIA)   RESTASIS 0.05 % ophthalmic emulsion  Nursing Home No No   Sig: INSTILL 1 DROP INTO BOTH EYES DAILY (DX: DRY EYES) ++CLAUDETTE++   acetaminophen (TYLENOL) 500 MG tablet  Nursing Home Yes No   Sig: Take 1,000 mg by mouth every 12 hours as needed for mild pain   acetaminophen (TYLENOL) 500 MG tablet  Nursing Home Yes No   Sig: Take 1,000 mg by mouth daily   atorvastatin (LIPITOR) 20 MG tablet  Nursing Home No No   Sig: Take 1 tablet (20 mg) by mouth daily   Patient taking differently: Take 20 mg by mouth every evening   bisacodyl (DULCOLAX) 5 MG EC tablet  Nursing Home No No   Si TAB BY MOUTH DAILY AS NEEDED FOR CONSTIPATION   docusate sodium (COLACE) 100 MG capsule  Nursing Home No No   Sig: TAKE 1 CAP BY MOUTH TWICE DAILY AS NEEDED FOR CONSTIPATION   ferrous sulfate (FEROSUL) 325 (65 Fe) MG tablet   No No   Sig: Take 1 tablet (325 mg) by mouth daily (with breakfast)   guaiFENesin (ROBITUSSIN) 20 mg/mL SOLN solution  Nursing Home No No   Sig: Take 10 mLs by mouth every 4 hours as needed for cough   ipratropium - albuterol 0.5 mg/2.5 mg/3 mL (DUONEB) 0.5-2.5 (3) MG/3ML neb solution  Nursing Home No No   Sig: Take 1 vial (3 mLs) by nebulization every 4 hours as needed for shortness of breath   memantine (NAMENDA) 5 MG tablet  Nursing Home No No   Si TAB BY MOUTH TWICE DAILY   metoprolol succinate ER (TOPROL XL) 100 MG 24 hr tablet  Nursing Home No No   Si TAB BY MOUTH DAILY (DX: HYPERTENSION)   ondansetron (ZOFRAN) 4 MG tablet  Nursing Home No No   Si TAB BY MOUTH EVERY 8 HOURS AS NEEDED FOR NAUSEA   order for DME  Nursing Home No No   Sig: Equipment being ordered: Walker Wheels () and Walker ()  Treatment Diagnosis: Difficulty ambulating   ramelteon (ROZEREM) 8 MG tablet  Nursing Home No No   Si TAB BY MOUTH EVERY NIGHT AS NEEDED FOR SLEEP   rivaroxaban ANTICOAGULANT (XARELTO) 15 MG TABS tablet   Yes No   Sig:  Take 15 mg by mouth daily (with dinner)   traMADol (ULTRAM) 50 MG tablet   No No   Sig: Take 0.5 tablets (25 mg) by mouth every 6 hours as needed for severe pain      Facility-Administered Medications: None     Allergies   No Known Allergies    Physical Exam   Vital Signs: Temp: 97.2  F (36.2  C) Temp src: Temporal BP: 139/64 Pulse: 79   Resp: 16 SpO2: 98 % O2 Device: Other (Comments) (Nebulizer, 9lpm)    Weight: 0 lbs 0 oz    Constitutional: Awake, alert, cooperative, no apparent distress.   ENT: Normocephalic, without obvious abnormality, atraumatic. Normal sclera.    Neck: Supple, symmetrical, trachea midline  Pulmonary: No increased work of breathing, coarse lung sounds b/l  Cardiovascular: Regular rate and rhythm  GI: Normal bowel sounds, soft, non-distended, non-tender.   Skin: Visualized skin appeared clear.  Neuro: Oriented x 2 (disoriented to date). Moves all extremities spontaneously. No focal neuro deficits.  Psych:  Normal affect and mood  Extremities: Normal muscle tone. No gross deformities noted. Calves non-tender b/l. No pitting edema b/l LE    Medical Decision Making       60 MINUTES SPENT BY ME on the date of service doing chart review, history, exam, documentation & further activities per the note.         Data   Data reviewed today: I reviewed all medications, new labs and imaging results over the last 24 hours. I personally reviewed no images or EKG's today.      I have personally reviewed the following data over the past 24 hrs:    10.2  \   14.2   / 288     142 102 20.8 /  119 (H)   4.2 26 0.90 \     Trop: 20 (H) BNP: 516     Procal: 0.07 CRP: N/A Lactic Acid: 1.5         Imaging results reviewed over the past 24 hrs:   Recent Results (from the past 24 hour(s))   Chest XR,  PA & LAT    Narrative    EXAM: XR CHEST 2 VIEWS  LOCATION: [facility]  DATE: 4/28/2024    INDICATION: Shortness of breath, cough  COMPARISON: 11/25/2023 and 11/24/2023      Impression     IMPRESSION: No pleural fluid or pneumothorax. No airspace disease. Reticular interstitial opacities could be fibrosis, edema, atypical infection, or other process. The cardiomediastinal silhouette is at the upper limits of normal in size. There is aortic   calcification.

## 2024-04-28 NOTE — ED NOTES
[facility]  ED Nurse Handoff Report    ED Chief complaint: Cough      ED Diagnosis:   Final diagnoses:   None       Code Status: DNR / DNI-per polst from NH    Allergies: No Known Allergies    Patient Story: pt BIBA from NH. Pt started to feel ill one week ago. Pt developed cough and congestion ans was seen by provider on Tuesday. Pt was swabbed for flu and covid and both were negative. Pt was swabbed again for covid on Friday and was negative. Pt was having worsening cough and audible wheezes today so pt was brought in. Pt has vascular dementia hx. Pt alert and oriented except for year.   Focused Assessment:    Abnormal Labs Resulted from Time of ED Arrival to Time of ED Departure   BASIC METABOLIC PANEL - Abnormal       Result Value    Sodium 142      Potassium 4.2      Chloride 102      Carbon Dioxide (CO2) 26      Anion Gap 14      Urea Nitrogen 20.8      Creatinine 0.90      GFR Estimate 59 (*)     Calcium 8.9      Glucose 119 (*)    TROPONIN T, HIGH SENSITIVITY - Abnormal    Troponin T, High Sensitivity 20 (*)    ISTAT GASES LACTATE VENOUS POCT - Abnormal    Lactic Acid POCT 1.5      Bicarbonate Venous POCT 31 (*)     O2 Sat, Venous POCT 51 (*)     pCO2 Venous POCT 55 (*)     pH Venous POCT 7.36      pO2 Venous POCT 29      Base Excess/Deficit (+/-) POCT 4.0 (*)       Chest XR,  PA & LAT   Final Result   IMPRESSION: No pleural fluid or pneumothorax. No airspace disease. Reticular interstitial opacities could be fibrosis, edema, atypical infection, or other process. The cardiomediastinal silhouette is at the upper limits of normal in size. There is aortic    calcification.           Treatments and/or interventions provided: duo neb x2 and IV decadron. 3L NC  Patient's response to treatments and/or interventions: some improvement in breathing and increased o2 saturations    To be done/followed up on inpatient unit:  admission orders    Does this patient have any cognitive concerns?: Disoriented  to time    Activity level - Baseline/Home:  Independent and Walker  Activity Level - Current:   Unknown    Patient's Preferred language: English   Needed?: No    Isolation: COVID r/o and special precautions  Infection: COVID r/o and special precautions  Patient tested for COVID 19 prior to admission: YES  Bariatric?: No    Vital Signs:   Vitals:    04/28/24 0937 04/28/24 0941   BP: 139/64 139/64   Pulse: 85 79   Resp:  16   Temp:  97.2  F (36.2  C)   TempSrc:  Temporal   SpO2: 99% 98%       Cardiac Rhythm:     Was the PSS-3 completed:   Yes  What interventions are required if any?               Family Comments: none present  OBS brochure/video discussed/provided to patient/family: N/A              Name of person given brochure if not patient:               Relationship to patient:     For the majority of the shift this patient's behavior was Green.   Behavioral interventions performed were .    ED NURSE PHONE NUMBER: [phone]

## 2024-04-28 NOTE — PHARMACY-ADMISSION MEDICATION HISTORY
Pharmacy Intern Admission Medication History    Admission medication history is complete. The information provided in this note is only as accurate as the sources available at the time of the update.    Information Source(s): Facility ([facility]/NH/) medication list/MAR and CareEverywhere/SureScripts via phone    Pertinent Information: Updated med list and last doses per med list sent over from [facility] and nurse Subha.   Patient usually gets her morning meds at 10 am, she only had guaifenesin and albuterol inhaler before she came in today.     Changes made to PTA medication list:  Added: Albuterol inhaler, guaifenesin tablet  Deleted: Guaifenesin solution, restasis  Changed: None    Allergies reviewed with patient and updates made in EHR: no    Medication History Completed By: Jesse Adams 4/28/2024 1:46 PM    PTA Med List   Medication Sig Last Dose    acetaminophen (TYLENOL) 500 MG tablet Take 1,000 mg by mouth daily 4/27/2024 at am    acetaminophen (TYLENOL) 500 MG tablet Take 1,000 mg by mouth every 12 hours as needed for mild pain  at prn    albuterol (PROAIR HFA/PROVENTIL HFA/VENTOLIN HFA) 108 (90 Base) MCG/ACT inhaler Inhale 1-2 puffs into the lungs every 4 hours as needed for shortness of breath, wheezing or cough 4/28/2024 at am    atorvastatin (LIPITOR) 20 MG tablet Take 1 tablet (20 mg) by mouth daily 4/27/2024 at pm    bisacodyl (DULCOLAX) 5 MG EC tablet 1 TAB BY MOUTH DAILY AS NEEDED FOR CONSTIPATION  at prn    docusate sodium (COLACE) 100 MG capsule TAKE 1 CAP BY MOUTH TWICE DAILY AS NEEDED FOR CONSTIPATION  at prn    Ferrous Sulfate 324 (65 Fe) MG TBEC Take 1 tablet by mouth Every Mon, Wed and Fri 4/26/2024 at am    guaiFENesin (MUCINEX) 600 MG 12 hr tablet Take 600 mg by mouth 2 times daily 4/28/2024 at am    ipratropium - albuterol 0.5 mg/2.5 mg/3 mL (DUONEB) 0.5-2.5 (3) MG/3ML neb solution Take 1 vial (3 mLs) by nebulization every 4 hours as needed for shortness of breath  at prn     memantine (NAMENDA) 5 MG tablet 1 TAB BY MOUTH TWICE DAILY 4/27/2024 at pm    metoprolol succinate ER (TOPROL XL) 100 MG 24 hr tablet 1 TAB BY MOUTH DAILY (DX: HYPERTENSION) 4/27/2024 at am    ondansetron (ZOFRAN) 4 MG tablet 1 TAB BY MOUTH EVERY 8 HOURS AS NEEDED FOR NAUSEA  at prn    PARoxetine (PAXIL) 20 MG tablet 1 TAB BY MOUTH AT BEDTIME Strength: 20 mg 4/27/2024 at pm    QUEtiapine (SEROQUEL) 25 MG tablet 1 TAB BY MOUTH AT BEDTIME (DX: INSOMNIA) 4/27/2024 at pm    ramelteon (ROZEREM) 8 MG tablet 1 TAB BY MOUTH EVERY NIGHT AS NEEDED FOR SLEEP  at prn    rivaroxaban ANTICOAGULANT (XARELTO) 15 MG TABS tablet Take 15 mg by mouth daily (with dinner) 4/27/2024 at pm    traMADol (ULTRAM) 50 MG tablet Take 0.5 tablets (25 mg) by mouth every 6 hours as needed for severe pain  at prn    Vitamin D3 (VITAMIN D, CHOLECALCIFEROL,) 25 mcg (1000 units) tablet Take 25 mcg by mouth daily 4/27/2024 at am

## 2024-04-28 NOTE — PROGRESS NOTES
RECEIVING UNIT ED HANDOFF REVIEW    ED Nurse Handoff Report was reviewed by: Socrates Prescott RN on April 28, 2024 at 12:40 PM

## 2024-04-29 ENCOUNTER — APPOINTMENT (OUTPATIENT)
Dept: PHYSICAL THERAPY | Facility: CLINIC | Age: 89
DRG: 193 | End: 2024-04-29
Attending: PHYSICIAN ASSISTANT
Payer: MEDICARE

## 2024-04-29 LAB
ANION GAP SERPL CALCULATED.3IONS-SCNC: 14 MMOL/L (ref 7–15)
BASOPHILS # BLD AUTO: 0 10E3/UL (ref 0–0.2)
BASOPHILS NFR BLD AUTO: 0 %
BUN SERPL-MCNC: 34.8 MG/DL (ref 8–23)
CALCIUM SERPL-MCNC: 9 MG/DL (ref 8.2–9.6)
CHLORIDE SERPL-SCNC: 103 MMOL/L (ref 98–107)
CREAT SERPL-MCNC: 0.89 MG/DL (ref 0.51–0.95)
DEPRECATED HCO3 PLAS-SCNC: 21 MMOL/L (ref 22–29)
EGFRCR SERPLBLD CKD-EPI 2021: 60 ML/MIN/1.73M2
EOSINOPHIL # BLD AUTO: 0 10E3/UL (ref 0–0.7)
EOSINOPHIL NFR BLD AUTO: 0 %
ERYTHROCYTE [DISTWIDTH] IN BLOOD BY AUTOMATED COUNT: 14.2 % (ref 10–15)
GLUCOSE SERPL-MCNC: 112 MG/DL (ref 70–99)
HCT VFR BLD AUTO: 41.9 % (ref 35–47)
HGB BLD-MCNC: 13.1 G/DL (ref 11.7–15.7)
IMM GRANULOCYTES # BLD: 0 10E3/UL
IMM GRANULOCYTES NFR BLD: 0 %
LYMPHOCYTES # BLD AUTO: 1.2 10E3/UL (ref 0.8–5.3)
LYMPHOCYTES NFR BLD AUTO: 11 %
MCH RBC QN AUTO: 30.1 PG (ref 26.5–33)
MCHC RBC AUTO-ENTMCNC: 31.3 G/DL (ref 31.5–36.5)
MCV RBC AUTO: 96 FL (ref 78–100)
MONOCYTES # BLD AUTO: 0.9 10E3/UL (ref 0–1.3)
MONOCYTES NFR BLD AUTO: 9 %
NEUTROPHILS # BLD AUTO: 8.5 10E3/UL (ref 1.6–8.3)
NEUTROPHILS NFR BLD AUTO: 80 %
NRBC # BLD AUTO: 0 10E3/UL
NRBC BLD AUTO-RTO: 0 /100
PLATELET # BLD AUTO: 317 10E3/UL (ref 150–450)
POTASSIUM SERPL-SCNC: 4.7 MMOL/L (ref 3.4–5.3)
PROCALCITONIN SERPL IA-MCNC: 0.08 NG/ML
RBC # BLD AUTO: 4.35 10E6/UL (ref 3.8–5.2)
SODIUM SERPL-SCNC: 138 MMOL/L (ref 135–145)
WBC # BLD AUTO: 10.6 10E3/UL (ref 4–11)

## 2024-04-29 PROCEDURE — 97161 PT EVAL LOW COMPLEX 20 MIN: CPT | Mod: GP | Performed by: PHYSICAL THERAPIST

## 2024-04-29 PROCEDURE — 250N000013 HC RX MED GY IP 250 OP 250 PS 637: Performed by: PHYSICIAN ASSISTANT

## 2024-04-29 PROCEDURE — 80048 BASIC METABOLIC PNL TOTAL CA: CPT | Performed by: PHYSICIAN ASSISTANT

## 2024-04-29 PROCEDURE — 36415 COLL VENOUS BLD VENIPUNCTURE: CPT | Performed by: PHYSICIAN ASSISTANT

## 2024-04-29 PROCEDURE — 84145 PROCALCITONIN (PCT): CPT | Performed by: PHYSICIAN ASSISTANT

## 2024-04-29 PROCEDURE — 85025 COMPLETE CBC W/AUTO DIFF WBC: CPT | Performed by: PHYSICIAN ASSISTANT

## 2024-04-29 PROCEDURE — 97116 GAIT TRAINING THERAPY: CPT | Mod: GP | Performed by: PHYSICAL THERAPIST

## 2024-04-29 PROCEDURE — 99232 SBSQ HOSP IP/OBS MODERATE 35: CPT | Performed by: PHYSICIAN ASSISTANT

## 2024-04-29 PROCEDURE — 120N000001 HC R&B MED SURG/OB

## 2024-04-29 PROCEDURE — 97530 THERAPEUTIC ACTIVITIES: CPT | Mod: GP | Performed by: PHYSICAL THERAPIST

## 2024-04-29 RX ORDER — IPRATROPIUM BROMIDE AND ALBUTEROL SULFATE 2.5; .5 MG/3ML; MG/3ML
3 SOLUTION RESPIRATORY (INHALATION)
Status: DISCONTINUED | OUTPATIENT
Start: 2024-04-29 | End: 2024-05-04

## 2024-04-29 RX ADMIN — RAMELTEON 8 MG: 8 TABLET ORAL at 02:20

## 2024-04-29 RX ADMIN — PAROXETINE HYDROCHLORIDE 20 MG: 20 TABLET, FILM COATED ORAL at 21:42

## 2024-04-29 RX ADMIN — METOPROLOL SUCCINATE 100 MG: 100 TABLET, EXTENDED RELEASE ORAL at 10:45

## 2024-04-29 RX ADMIN — RIVAROXABAN 15 MG: 10 TABLET, FILM COATED ORAL at 18:28

## 2024-04-29 RX ADMIN — MEMANTINE 5 MG: 5 TABLET ORAL at 20:36

## 2024-04-29 RX ADMIN — GUAIFENESIN 600 MG: 600 TABLET, EXTENDED RELEASE ORAL at 20:36

## 2024-04-29 RX ADMIN — MEMANTINE 5 MG: 5 TABLET ORAL at 10:45

## 2024-04-29 RX ADMIN — GUAIFENESIN 600 MG: 600 TABLET, EXTENDED RELEASE ORAL at 10:45

## 2024-04-29 RX ADMIN — QUETIAPINE FUMARATE 25 MG: 25 TABLET ORAL at 21:42

## 2024-04-29 RX ADMIN — TRAMADOL HYDROCHLORIDE 25 MG: 50 TABLET, COATED ORAL at 01:44

## 2024-04-29 RX ADMIN — ATORVASTATIN CALCIUM 20 MG: 20 TABLET, FILM COATED ORAL at 20:36

## 2024-04-29 ASSESSMENT — ACTIVITIES OF DAILY LIVING (ADL)
ADLS_ACUITY_SCORE: 38
ADLS_ACUITY_SCORE: 39
ADLS_ACUITY_SCORE: 38
ADLS_ACUITY_SCORE: 31
ADLS_ACUITY_SCORE: 29
ADLS_ACUITY_SCORE: 43
ADLS_ACUITY_SCORE: 29
ADLS_ACUITY_SCORE: 29
ADLS_ACUITY_SCORE: 38
ADLS_ACUITY_SCORE: 29
ADLS_ACUITY_SCORE: 38
ADLS_ACUITY_SCORE: 29
ADLS_ACUITY_SCORE: 29
ADLS_ACUITY_SCORE: 43
ADLS_ACUITY_SCORE: 31
ADLS_ACUITY_SCORE: 39
ADLS_ACUITY_SCORE: 38
ADLS_ACUITY_SCORE: 29
ADLS_ACUITY_SCORE: 43
ADLS_ACUITY_SCORE: 29
ADLS_ACUITY_SCORE: 39
ADLS_ACUITY_SCORE: 38
ADLS_ACUITY_SCORE: 29

## 2024-04-29 NOTE — PLAN OF CARE
Goal Outcome Evaluation:  A and O X3, disoriented to time. Up with assist of one with walker. Regular diet. Lung sound wheezing. Confused and restless during night. Pt removed IV and Provider notified. Pt is on droplets precautions for Human Metapneumovirus. Pain managed with prn tramadol, ramelteon given for sleep.

## 2024-04-29 NOTE — PROGRESS NOTES
[facility]  Hospitalist Progress Note    Assessment & Plan   Margy Babin is a 89 year old female with PMH of paroxysmal atrial fibrillation, dementia, HTN, HLD, iron deficiency anemia, orthostatic hypotension, hx CVA, depression who presented to the ED on 4/28/24 from her [facility] for evaluation of cough, found to have human metapneumovirus.     Pneumonia secondary to human metapneumovirus  Acute hypoxic respiratory failure  *Presenting with cough, wheezing, chest congestion x 1 week. Desaturated to 86% on RA and was placed on 2 L O2. No leukocytosis, afebrile, low procal. CXR with reticular opacities. Negative for COVID-19, RSV, influenza. Note, CT chest 11/24/23 showed some atelectasis but no opacities or suggestion of fibrosis. No hx smoking or any underlying lung disease.  *Respiratory PCR (+) for human metapneumovirus  -Discontinue antibiotics, pneumonia is likely viral  -Change Duonebs to PRN  -Add flutter valve  -Mucinex 600 mg BID  -Tessalon perles as needed  -Sputum culture  -Supplemental oxygen as needed, wean as able     Elevated troponin  *Troponin 20, repeat 17. EKG SR with PVCs and known LBBB. No chest pain. Likely elevated due to demand ischemia in the setting of acute hypoxic respiratory failure.  *Echo 11/23/23 with EF 50-55%, mild concentric LVH, no significant valve disease     Paroxysmal atrial fibrillation  HTN  HLD  -Continue PTA Metoprolol, Xarelto, Atorvastatin      History of CVA (04/2022)  *Cardioembolic from atrial fibrillation  -Continue PTA Xarelto, Atorvastatin     Dementia  *Oriented x 2, coming from [facility]  -Continue PTA Namenda, Seroquel   - consult for discharge planning     Depression  -Continue PTA Paxil once verified    Clinically Significant Risk Factors Present on Admission               # Drug Induced Coagulation Defect: home medication list includes an anticoagulant medication    # Hypertension: Noted on problem  "list   # Dementia: noted on problem list    # Overweight: Estimated body mass index is 25.53 kg/m  as calculated from the following:    Height as of this encounter: 1.65 m (5' 4.96\").    Weight as of this encounter: 69.5 kg (153 lb 3.5 oz).       # Financial/Environmental Concerns:           Diet: Regular Diet Adult     DVT Prophylaxis: DOAC   Pichardo Catheter: Not present  Lines: None     Cardiac Monitoring: None  Code Status: No CPR- Do NOT Intubate      Disposition Plan      Expected Discharge Date: 04/30/2024              Entered: Rosanne Reilly PA-C 04/29/2024, 8:24 AM        The patient's care was discussed with the Attending Physician, Dr. King, Bedside Nurse, and Patient.      The patient has been discussed with Dr. King, who agrees with the assessment and plan at this time.    Rosanne Reilly PA-C  [facility]  Securely message with the Vocera Web Console (learn more here)      Interval History   Patient denies dyspnea, cough, chest congestion. She states she did not sleep well and is tired, hoping to nap. Still requiring supplemental oxygen, currently 4 L O2.    -Data reviewed today: I reviewed all new labs and imaging results over the last 24 hours. I personally reviewed no images or EKG's today.    Physical Exam   Temp: 98  F (36.7  C) Temp src: Oral BP: 127/55 Pulse: 89   Resp: 15 SpO2: 95 % O2 Device: Nasal cannula Oxygen Delivery: 4 LPM  Vitals:    04/28/24 1412   Weight: 69.5 kg (153 lb 3.5 oz)     Vital Signs with Ranges  Temp:  [97.2  F (36.2  C)-98  F (36.7  C)] 98  F (36.7  C)  Pulse:  [] 89  Resp:  [15-18] 15  BP: (127-139)/(55-67) 127/55  SpO2:  [93 %-99 %] 95 %  No intake/output data recorded.      Constitutional: Awake, alert, cooperative, no apparent distress.   ENT: Normocephalic, without obvious abnormality, atraumatic. Eye pupils are equal. Normal sclera.    Neck: Supple, symmetrical, trachea midline  Pulmonary: No increased work of breathing, " diminished  Cardiovascular: Regular rate and rhythm  GI: Normal bowel sounds, soft, non-distended, non-tender.   Skin: Visualized skin appeared clear.  Neuro: Oriented x 2. Moves all extremities spontaneously. No focal neuro deficits.  Psych:  Normal affect and mood  Extremities: Normal muscle tone. No gross deformities noted. Calves non-tender b/l. No pitting edema b/l LE      Medical Decision Making       35 MINUTES SPENT BY ME on the date of service doing chart review, history, exam, documentation & further activities per the note.         Medications   Current Facility-Administered Medications   Medication Dose Route Frequency Provider Last Rate Last Admin    Patient is already receiving anticoagulation with heparin, enoxaparin (LOVENOX), warfarin (COUMADIN)  or other anticoagulant medication   Does not apply Continuous PRN Rosanne Reilly PA-C         Current Facility-Administered Medications   Medication Dose Route Frequency Provider Last Rate Last Admin    atorvastatin (LIPITOR) tablet 20 mg  20 mg Oral QPM Rosanne Reilly PA-C   20 mg at 04/28/24 2201    azithromycin (ZITHROMAX) 500 mg vial to attach to  mL bag  500 mg Intravenous Q24H Rosanne Reilly PA-C   500 mg at 04/28/24 1555    cefTRIAXone (ROCEPHIN) 1 g vial to attach to  mL bag for ADULTS or NS 50 mL bag for PEDS  1 g Intravenous Q24H Rosanne Reilly PA-C   1 g at 04/28/24 1545    guaiFENesin (MUCINEX) 12 hr tablet 600 mg  600 mg Oral BID Rosanne Reilly PA-C   600 mg at 04/28/24 2201    ipratropium - albuterol 0.5 mg/2.5 mg/3 mL (DUONEB) neb solution 3 mL  3 mL Nebulization 4x daily Rosanne Reilly PA-C   3 mL at 04/28/24 1508    memantine (NAMENDA) tablet 5 mg  5 mg Oral BID Rosanne Reilly PA-C   5 mg at 04/28/24 2300    metoprolol succinate ER (TOPROL XL) 24 hr tablet 100 mg  100 mg Oral Daily Rosanne Reilly PA-C   100 mg at 04/28/24 1514    PARoxetine (PAXIL) tablet 20 mg  20 mg Oral At  Bedtime Rosanne Reilly PA-C   20 mg at 04/28/24 2201    QUEtiapine (SEROquel) tablet 25 mg  25 mg Oral At Bedtime Rosanne Reilly PA-C   25 mg at 04/28/24 2201    rivaroxaban ANTICOAGULANT (XARELTO) tablet 15 mg  15 mg Oral Daily with supper Rosanne Reilly PA-C   15 mg at 04/28/24 1802    sodium chloride (PF) 0.9% PF flush 3 mL  3 mL Intracatheter Q8H Rosanne Reilly PA-C   3 mL at 04/28/24 2202       Data   Recent Labs   Lab 04/28/24  0953   WBC 10.2   HGB 14.2   MCV 95         POTASSIUM 4.2   CHLORIDE 102   CO2 26   BUN 20.8   CR 0.90   ANIONGAP 14   SOBIA 8.9   *       Recent Results (from the past 24 hour(s))   Chest XR,  PA & LAT    Narrative    EXAM: XR CHEST 2 VIEWS  LOCATION: [facility]  DATE: 4/28/2024    INDICATION: Shortness of breath, cough  COMPARISON: 11/25/2023 and 11/24/2023      Impression    IMPRESSION: No pleural fluid or pneumothorax. No airspace disease. Reticular interstitial opacities could be fibrosis, edema, atypical infection, or other process. The cardiomediastinal silhouette is at the upper limits of normal in size. There is aortic   calcification.

## 2024-04-29 NOTE — PROGRESS NOTES
04/29/24 1029   Appointment Info   Signing Clinician's Name / Credentials (PT) Miranda Gibson DPT   Living Environment   People in Home alone   Current Living Arrangements assisted living   Home Accessibility no concerns  (Elevator Access)   Transportation Anticipated family or friend will provide  (Daughter provides transportation)   Living Environment Comments Pt receives 3 meals/day, assist with cleaning, laundry, and assist with bathing.   Self-Care   Usual Activity Tolerance moderate   Current Activity Tolerance fair   Regular Exercise No   Equipment Currently Used at Home walker, rolling   Fall history within last six months no   General Information   Onset of Illness/Injury or Date of Surgery 04/28/24   Referring Physician Rosanne Reilly PA-C   Pertinent History of Current Problem (include personal factors and/or comorbidities that impact the POC) 89 y/o female admitted with cough, found to have human metapneumovirus. PMH including paroxysmal atrial fibrillation, dementia, HTN, HLD, iron deficiency anemia, orthostatic hypotension, hx CVA, depression.   Existing Precautions/Restrictions fall   General Observations Pt in supine upon arrival of therapist.   Cognition   Affect/Mental Status (Cognition) confused   Orientation Status (Cognition) person;place;time   Follows Commands (Cognition) 50-74% accuracy;repetition of directions required;physical/tactile prompts required   Pain Assessment   Patient Currently in Pain No   Posture    Posture Comments Noted forward head and shoulder posture sitting EOB and standing at FWW.   Range of Motion (ROM)   ROM Comment B LEs WFL.   Strength (Manual Muscle Testing)   Strength Comments Not formally assessed, pt demonstrates at least 3/5 grossly in B LEs with mobility.   Bed Mobility   Comment, (Bed Mobility) Sit-supine with SBA.   Transfers   Comment, (Transfers) Sit <> stand with FWW and CGA.   Gait/Stairs (Locomotion)   Comment, (Gait/Stairs) Pt amb 15'  with FWW and CGA.   Balance   Balance Comments Noted good seated and standing balance at FWW.   Sensory Examination   Sensory Perception Comments Pt denied numbness/tingling in B LEs.   Clinical Impression   Criteria for Skilled Therapeutic Intervention Yes, treatment indicated   PT Diagnosis (PT) Difficulty with functional mobility.   Influenced by the following impairments Generalized weakness, Decreased activity tolerance, SOB   Functional limitations due to impairments Limited functional mobility requiring AD and assist.   Clinical Presentation (PT Evaluation Complexity) stable   Clinical Presentation Rationale Based on PMH, current presentation, and social support.   Clinical Decision Making (Complexity) low complexity   Planned Therapy Interventions (PT) balance training;bed mobility training;gait training;ROM (range of motion);strengthening;transfer training   Risk & Benefits of therapy have been explained patient   PT Total Evaluation Time   PT Eval, Low Complexity Minutes (62403) 10   Physical Therapy Goals   PT Frequency Daily   PT Predicted Duration/Target Date for Goal Attainment 05/02/24   PT Goals Bed Mobility;Transfers;Gait   PT: Bed Mobility Modified independent;Supine to/from sit   PT: Transfers Modified independent;Sit to/from stand;Assistive device   PT: Gait Supervision/stand-by assist;Rolling walker;100 feet   Interventions   Interventions Quick Adds Gait Training;Therapeutic Activity   Therapeutic Procedure/Exercise   Treatment Detail/Skilled Intervention Encouraged pt to complete ankle pumps when in supine and sitting up in chair.   Therapeutic Activity   Therapeutic Activities: dynamic activities to improve functional performance Minutes (82994) 8   Symptoms Noted During/After Treatment Fatigue   Treatment Detail/Skilled Intervention PT: Pt sitting up in recliner upon arrival of therapist, agreeable to session. Noted pt on 4 LPM throughout session. Sit <> stand with FWW and CGA, cues provided  for hand placement and upright posture. Noted with increased fatigue pt attempts to sit prior to turning completely to chair. Pt requested to return to supine at end of session.   Gait Training   Gait Training Minutes (54449) 10   Symptoms Noted During/After Treatment (Gait Training) fatigue   Treatment Detail/Skilled Intervention PT: Gait training of 30' x 2 with FWW and CGA, frequent cues provided for keeping FWW close to body and upright posture. Noted short shuffling gait pattern. Pt required seated rest break between each bout of ambulation.   Distance in Feet 30' x 2   PT Discharge Planning   PT Plan Repeated sit <> stand, LE strengthening, progress gait tolerance   PT Discharge Recommendation (DC Rec) home with assist;home with home care physical therapy;Leaving home requires significant taxing effort   PT Rationale for DC Rec Pt is below baseline, currently limited by weakness and SOB. If [facility] is able to provide assist of 1 with use of FWW, recommend pt return to [facility] with home PT. If [facility] is unable to provide assist, pt will require TCU prior to return to [facility].   PT Brief overview of current status Assist of 1 with use of FWW   Total Session Time   Timed Code Treatment Minutes 18   Total Session Time (sum of timed and untimed services) 28

## 2024-04-29 NOTE — PLAN OF CARE
Goal Outcome Evaluation:       Shift Summary [id number]    Admitting Diagnosis: SOB (shortness of breath) [R06.02]  Atypical pneumonia [J18.9]  Acute respiratory failure with hypoxia (H) [J96.01]   Vitals WNL  Pain Mild pain.   A&Ox4 with intermittent confusion  Voiding bladder incontinence   Mobility AX1 walker  Tele N/A  CMS WNL  Lung Sounds croarse on 3 via NC  GI WNL  Dressing Skin clean and intact    Orders Placed This Encounter      Regular Diet Adult       Plan:     IV antibiotics and NEB treatment

## 2024-04-29 NOTE — PROGRESS NOTES
4/29/24; [id number]    Human Metapneumovirus positive    A&O X1; A1 gb/walker to BR; 4L O2 NC; denies pain this shift; needs help with ordering meals; incontinent at times of BB ( no BM this shift).

## 2024-04-30 ENCOUNTER — APPOINTMENT (OUTPATIENT)
Dept: GENERAL RADIOLOGY | Facility: CLINIC | Age: 89
DRG: 193 | End: 2024-04-30
Attending: PHYSICIAN ASSISTANT
Payer: MEDICARE

## 2024-04-30 ENCOUNTER — APPOINTMENT (OUTPATIENT)
Dept: PHYSICAL THERAPY | Facility: CLINIC | Age: 89
DRG: 193 | End: 2024-04-30
Payer: MEDICARE

## 2024-04-30 LAB
ANION GAP SERPL CALCULATED.3IONS-SCNC: 11 MMOL/L (ref 7–15)
BASOPHILS # BLD AUTO: 0.1 10E3/UL (ref 0–0.2)
BASOPHILS NFR BLD AUTO: 0 %
BUN SERPL-MCNC: 36.9 MG/DL (ref 8–23)
CALCIUM SERPL-MCNC: 8.8 MG/DL (ref 8.2–9.6)
CHLORIDE SERPL-SCNC: 107 MMOL/L (ref 98–107)
CREAT SERPL-MCNC: 0.85 MG/DL (ref 0.51–0.95)
DEPRECATED HCO3 PLAS-SCNC: 26 MMOL/L (ref 22–29)
EGFRCR SERPLBLD CKD-EPI 2021: 63 ML/MIN/1.73M2
EOSINOPHIL # BLD AUTO: 0.2 10E3/UL (ref 0–0.7)
EOSINOPHIL NFR BLD AUTO: 2 %
ERYTHROCYTE [DISTWIDTH] IN BLOOD BY AUTOMATED COUNT: 14.4 % (ref 10–15)
GLUCOSE SERPL-MCNC: 95 MG/DL (ref 70–99)
HCT VFR BLD AUTO: 42.2 % (ref 35–47)
HGB BLD-MCNC: 13 G/DL (ref 11.7–15.7)
IMM GRANULOCYTES # BLD: 0 10E3/UL
IMM GRANULOCYTES NFR BLD: 0 %
LYMPHOCYTES # BLD AUTO: 2.4 10E3/UL (ref 0.8–5.3)
LYMPHOCYTES NFR BLD AUTO: 20 %
MCH RBC QN AUTO: 30 PG (ref 26.5–33)
MCHC RBC AUTO-ENTMCNC: 30.8 G/DL (ref 31.5–36.5)
MCV RBC AUTO: 98 FL (ref 78–100)
MONOCYTES # BLD AUTO: 0.9 10E3/UL (ref 0–1.3)
MONOCYTES NFR BLD AUTO: 7 %
NEUTROPHILS # BLD AUTO: 8.4 10E3/UL (ref 1.6–8.3)
NEUTROPHILS NFR BLD AUTO: 71 %
NRBC # BLD AUTO: 0 10E3/UL
NRBC BLD AUTO-RTO: 0 /100
PLATELET # BLD AUTO: 322 10E3/UL (ref 150–450)
POTASSIUM SERPL-SCNC: 4.3 MMOL/L (ref 3.4–5.3)
PROCALCITONIN SERPL IA-MCNC: 0.08 NG/ML
RBC # BLD AUTO: 4.33 10E6/UL (ref 3.8–5.2)
SODIUM SERPL-SCNC: 144 MMOL/L (ref 135–145)
WBC # BLD AUTO: 12 10E3/UL (ref 4–11)

## 2024-04-30 PROCEDURE — 36415 COLL VENOUS BLD VENIPUNCTURE: CPT | Performed by: PHYSICIAN ASSISTANT

## 2024-04-30 PROCEDURE — 99232 SBSQ HOSP IP/OBS MODERATE 35: CPT | Performed by: PHYSICIAN ASSISTANT

## 2024-04-30 PROCEDURE — 97110 THERAPEUTIC EXERCISES: CPT | Mod: GP | Performed by: PHYSICAL THERAPY ASSISTANT

## 2024-04-30 PROCEDURE — 85025 COMPLETE CBC W/AUTO DIFF WBC: CPT | Performed by: PHYSICIAN ASSISTANT

## 2024-04-30 PROCEDURE — 80048 BASIC METABOLIC PNL TOTAL CA: CPT | Performed by: PHYSICIAN ASSISTANT

## 2024-04-30 PROCEDURE — 120N000001 HC R&B MED SURG/OB

## 2024-04-30 PROCEDURE — 71046 X-RAY EXAM CHEST 2 VIEWS: CPT

## 2024-04-30 PROCEDURE — 97116 GAIT TRAINING THERAPY: CPT | Mod: GP | Performed by: PHYSICAL THERAPY ASSISTANT

## 2024-04-30 PROCEDURE — 84145 PROCALCITONIN (PCT): CPT | Performed by: PHYSICIAN ASSISTANT

## 2024-04-30 PROCEDURE — 250N000013 HC RX MED GY IP 250 OP 250 PS 637: Performed by: PHYSICIAN ASSISTANT

## 2024-04-30 RX ADMIN — METOPROLOL SUCCINATE 100 MG: 100 TABLET, EXTENDED RELEASE ORAL at 09:40

## 2024-04-30 RX ADMIN — QUETIAPINE FUMARATE 25 MG: 25 TABLET ORAL at 22:43

## 2024-04-30 RX ADMIN — RAMELTEON 8 MG: 8 TABLET ORAL at 22:44

## 2024-04-30 RX ADMIN — MEMANTINE 5 MG: 5 TABLET ORAL at 20:37

## 2024-04-30 RX ADMIN — GUAIFENESIN 600 MG: 600 TABLET, EXTENDED RELEASE ORAL at 20:37

## 2024-04-30 RX ADMIN — ATORVASTATIN CALCIUM 20 MG: 20 TABLET, FILM COATED ORAL at 20:37

## 2024-04-30 RX ADMIN — GUAIFENESIN 600 MG: 600 TABLET, EXTENDED RELEASE ORAL at 09:40

## 2024-04-30 RX ADMIN — PAROXETINE HYDROCHLORIDE 20 MG: 20 TABLET, FILM COATED ORAL at 22:44

## 2024-04-30 RX ADMIN — RIVAROXABAN 15 MG: 10 TABLET, FILM COATED ORAL at 18:53

## 2024-04-30 RX ADMIN — MEMANTINE 5 MG: 5 TABLET ORAL at 09:40

## 2024-04-30 ASSESSMENT — ACTIVITIES OF DAILY LIVING (ADL)
ADLS_ACUITY_SCORE: 43
ADLS_ACUITY_SCORE: 39
ADLS_ACUITY_SCORE: 43
ADLS_ACUITY_SCORE: 43
ADLS_ACUITY_SCORE: 39
ADLS_ACUITY_SCORE: 43
ADLS_ACUITY_SCORE: 43
DEPENDENT_IADLS:: CLEANING;COOKING;LAUNDRY;SHOPPING;MEAL PREPARATION;MEDICATION MANAGEMENT;MONEY MANAGEMENT;TRANSPORTATION;INCONTINENCE
ADLS_ACUITY_SCORE: 39
ADLS_ACUITY_SCORE: 43
ADLS_ACUITY_SCORE: 39
ADLS_ACUITY_SCORE: 43

## 2024-04-30 NOTE — PROGRESS NOTES
[facility]  Hospitalist Progress Note    Assessment & Plan   Margy Babin is a 89 year old female with PMH of paroxysmal atrial fibrillation, dementia, HTN, HLD, iron deficiency anemia, orthostatic hypotension, hx CVA, depression who presented to the ED on 4/28/24 from her assisted for evaluation of cough, found to have human metapneumovirus.     Pneumonia secondary to human metapneumovirus  Acute hypoxic respiratory failure  *4/28 presented with cough, wheezing, chest congestion x 1 week. Desaturated to 86% on RA and was placed on 3 L O2. No leukocytosis, afebrile, low procal. ProBNP 516. CXR with reticular opacities. Negative for COVID-19, RSV, influenza. Note, CT chest 11/24/23 showed some atelectasis but no opacities or suggestion of fibrosis. No hx smoking or any underlying lung disease.  *4/29 Respiratory PCR (+) for human metapneumovirus. Rocephin and Azithromycin were discontinued as her pneumonia is felt to be viral. Have not appreciated any significant wheezing since admission.  *4/30 still requiring 4 L O2, ongoing dry cough and fatigue. No significant improvement with supportive therapies yet. WBC 12 but procal unchanged at 0.08, afebrile. Repeat CXR with improved interstitial infiltrates.   -Mucinex 600 mg BID  -Flutter valve  -Duonebs as needed  -Incentive spirometry  -Tessalon perles as needed  -Sputum culture  -Supplemental oxygen as needed, wean as able  -PT recommending return to assisted vs TCU, depending on if [facility] can provide assist of 1 with FWW  -SW following for discharge planning    Elevated troponin  *Troponin 20, repeat 17. EKG SR with PVCs and known LBBB. No chest pain. Likely elevated due to demand ischemia in the setting of acute hypoxic respiratory failure.  *Echo 11/23/23 with EF 50-55%, mild concentric LVH, no significant valve disease     Paroxysmal atrial fibrillation  HTN  HLD  -Continue PTA Metoprolol, Xarelto, Atorvastatin      History of CVA  "(04/2022)  *Cardioembolic from atrial fibrillation  -Continue PTA Xarelto, Atorvastatin     Dementia  *Oriented x 2, coming from [facility]  -Continue PTA Barbara Zafar   - consult for discharge planning     Depression  -Continue PTA Paxil once verified    Clinically Significant Risk Factors                  # Hypertension: Noted on problem list     # Dementia: noted on problem list    # Overweight: Estimated body mass index is 25.53 kg/m  as calculated from the following:    Height as of this encounter: 1.65 m (5' 4.96\").    Weight as of this encounter: 69.5 kg (153 lb 3.5 oz)., PRESENT ON ADMISSION     # Financial/Environmental Concerns:             Diet: Regular Diet Adult     DVT Prophylaxis: DOAC   Pichardo Catheter: Not present  Lines: None     Cardiac Monitoring: None  Code Status: No CPR- Do NOT Intubate      Disposition Plan       Expected Discharge Date: 05/02/2024        Discharge Comments: From [facility]      Entered: Rosanne Reilly PA-C 04/30/2024, 9:52 AM        The patient's care was discussed with the Attending Physician, Dr. King and Patient.      The patient has been discussed with Dr. King, who agrees with the assessment and plan at this time.    Rosanne Reilly PA-C  [facility]  Securely message with the Vocera Web Console (learn more here)      Interval History   Patient denies shortness of breath or cough. Cough is documented throughout chart by RN. Note hx dementia. She feels tired and is hoping to nap. No chest pain.    -Data reviewed today: I reviewed all new labs and imaging results over the last 24 hours. I personally reviewed no images or EKG's today.    Physical Exam   Temp: 97.7  F (36.5  C) Temp src: Axillary BP: (!) 152/77 Pulse: 75   Resp: 18 SpO2: 92 % O2 Device: Nasal cannula Oxygen Delivery: 4 LPM  Vitals:    04/28/24 1412   Weight: 69.5 kg (153 lb 3.5 oz)     Vital Signs with Ranges  Temp:  [97.7  F (36.5 "  C)-98.8  F (37.1  C)] 97.7  F (36.5  C)  Pulse:  [75-87] 75  Resp:  [16-18] 18  BP: (151-179)/() 152/77  SpO2:  [92 %-98 %] 92 %  I/O last 3 completed shifts:  In: 360 [P.O.:360]  Out: 300 [Urine:300]      Constitutional: Awake, alert, cooperative, no apparent distress.   ENT: Normocephalic, without obvious abnormality, atraumatic. Eye pupils are equal. Normal sclera.    Neck: Supple, symmetrical, trachea midline  Pulmonary: No increased work of breathing, slightly coarse this morning  Cardiovascular: Regular rate and rhythm  GI: Normal bowel sounds, soft, non-distended, non-tender.   Skin: Visualized skin appeared clear.  Neuro: Moves all extremities spontaneously. No focal neuro deficits.  Psych:  Normal affect and mood  Extremities: Normal muscle tone. No gross deformities noted. Calves non-tender b/l. No pitting edema b/l LE    Medical Decision Making       40 MINUTES SPENT BY ME on the date of service doing chart review, history, exam, documentation & further activities per the note.         Medications   Current Facility-Administered Medications   Medication Dose Route Frequency Provider Last Rate Last Admin    Patient is already receiving anticoagulation with heparin, enoxaparin (LOVENOX), warfarin (COUMADIN)  or other anticoagulant medication   Does not apply Continuous PRN Rosanne Reilly PA-C         Current Facility-Administered Medications   Medication Dose Route Frequency Provider Last Rate Last Admin    atorvastatin (LIPITOR) tablet 20 mg  20 mg Oral QPM Rosanne Reilly PA-C   20 mg at 04/29/24 2036    guaiFENesin (MUCINEX) 12 hr tablet 600 mg  600 mg Oral BID Rosanne Reilly PA-C   600 mg at 04/30/24 0940    memantine (NAMENDA) tablet 5 mg  5 mg Oral BID Rosanne Reilly PA-C   5 mg at 04/30/24 0940    metoprolol succinate ER (TOPROL XL) 24 hr tablet 100 mg  100 mg Oral Daily Rosanne Reilly PA-C   100 mg at 04/30/24 0940    PARoxetine (PAXIL) tablet 20 mg  20 mg Oral At  Bedtime Rosanne Reilly PA-C   20 mg at 04/29/24 2142    QUEtiapine (SEROquel) tablet 25 mg  25 mg Oral At Bedtime Rosanne Reilly PA-C   25 mg at 04/29/24 2142    rivaroxaban ANTICOAGULANT (XARELTO) tablet 15 mg  15 mg Oral Daily with supper Rosanne Reilly PA-C   15 mg at 04/29/24 1828    sodium chloride (PF) 0.9% PF flush 3 mL  3 mL Intracatheter Q8H Rosanne Reilly PA-C   3 mL at 04/29/24 2038       Data   Recent Labs   Lab 04/30/24  0823 04/29/24  0806 04/28/24  0953   WBC 12.0* 10.6 10.2   HGB 13.0 13.1 14.2   MCV 98 96 95    317 288    138 142   POTASSIUM 4.3 4.7 4.2   CHLORIDE 107 103 102   CO2 26 21* 26   BUN 36.9* 34.8* 20.8   CR 0.85 0.89 0.90   ANIONGAP 11 14 14   SOBIA 8.8 9.0 8.9   GLC 95 112* 119*       No results found for this or any previous visit (from the past 24 hour(s)).

## 2024-04-30 NOTE — PROGRESS NOTES
4/30/24; [id number]    A&O X2 today; A1 gb/walker; 94% on 3L O2; denies pain; CXR complete; [place] facility updated on patient progress; pt incontinent of BB; plan to discharge when medically ready.

## 2024-04-30 NOTE — PROGRESS NOTES
Patient is A&Ox1, confused. Incontinent both B&B. VSS on 4L of O2, Denies pain during the shift. Slept well during the night. Moves extremities WNL. PIV on SL. On droplet precaution.

## 2024-05-01 ENCOUNTER — APPOINTMENT (OUTPATIENT)
Dept: PHYSICAL THERAPY | Facility: CLINIC | Age: 89
DRG: 193 | End: 2024-05-01
Payer: MEDICARE

## 2024-05-01 LAB
ANION GAP SERPL CALCULATED.3IONS-SCNC: 10 MMOL/L (ref 7–15)
BASOPHILS # BLD AUTO: 0.1 10E3/UL (ref 0–0.2)
BASOPHILS NFR BLD AUTO: 0 %
BUN SERPL-MCNC: 30.9 MG/DL (ref 8–23)
CALCIUM SERPL-MCNC: 8.9 MG/DL (ref 8.2–9.6)
CHLORIDE SERPL-SCNC: 105 MMOL/L (ref 98–107)
CREAT SERPL-MCNC: 0.8 MG/DL (ref 0.51–0.95)
DEPRECATED HCO3 PLAS-SCNC: 28 MMOL/L (ref 22–29)
EGFRCR SERPLBLD CKD-EPI 2021: 68 ML/MIN/1.73M2
EOSINOPHIL # BLD AUTO: 0.5 10E3/UL (ref 0–0.7)
EOSINOPHIL NFR BLD AUTO: 4 %
ERYTHROCYTE [DISTWIDTH] IN BLOOD BY AUTOMATED COUNT: 14.5 % (ref 10–15)
GLUCOSE SERPL-MCNC: 91 MG/DL (ref 70–99)
HCT VFR BLD AUTO: 45.5 % (ref 35–47)
HGB BLD-MCNC: 13.9 G/DL (ref 11.7–15.7)
IMM GRANULOCYTES # BLD: 0.1 10E3/UL
IMM GRANULOCYTES NFR BLD: 0 %
LYMPHOCYTES # BLD AUTO: 2.3 10E3/UL (ref 0.8–5.3)
LYMPHOCYTES NFR BLD AUTO: 16 %
MCH RBC QN AUTO: 30.2 PG (ref 26.5–33)
MCHC RBC AUTO-ENTMCNC: 30.5 G/DL (ref 31.5–36.5)
MCV RBC AUTO: 99 FL (ref 78–100)
MONOCYTES # BLD AUTO: 1 10E3/UL (ref 0–1.3)
MONOCYTES NFR BLD AUTO: 7 %
NEUTROPHILS # BLD AUTO: 10.1 10E3/UL (ref 1.6–8.3)
NEUTROPHILS NFR BLD AUTO: 73 %
NRBC # BLD AUTO: 0 10E3/UL
NRBC BLD AUTO-RTO: 0 /100
PLATELET # BLD AUTO: 351 10E3/UL (ref 150–450)
POTASSIUM SERPL-SCNC: 4.7 MMOL/L (ref 3.4–5.3)
PROCALCITONIN SERPL IA-MCNC: 0.1 NG/ML
RBC # BLD AUTO: 4.61 10E6/UL (ref 3.8–5.2)
SODIUM SERPL-SCNC: 143 MMOL/L (ref 135–145)
WBC # BLD AUTO: 14 10E3/UL (ref 4–11)

## 2024-05-01 PROCEDURE — 250N000011 HC RX IP 250 OP 636: Performed by: STUDENT IN AN ORGANIZED HEALTH CARE EDUCATION/TRAINING PROGRAM

## 2024-05-01 PROCEDURE — 250N000013 HC RX MED GY IP 250 OP 250 PS 637: Performed by: PHYSICIAN ASSISTANT

## 2024-05-01 PROCEDURE — 97116 GAIT TRAINING THERAPY: CPT | Mod: GP | Performed by: PHYSICAL THERAPIST

## 2024-05-01 PROCEDURE — 99233 SBSQ HOSP IP/OBS HIGH 50: CPT | Performed by: STUDENT IN AN ORGANIZED HEALTH CARE EDUCATION/TRAINING PROGRAM

## 2024-05-01 PROCEDURE — 85025 COMPLETE CBC W/AUTO DIFF WBC: CPT | Performed by: PHYSICIAN ASSISTANT

## 2024-05-01 PROCEDURE — 120N000001 HC R&B MED SURG/OB

## 2024-05-01 PROCEDURE — 82374 ASSAY BLOOD CARBON DIOXIDE: CPT | Performed by: PHYSICIAN ASSISTANT

## 2024-05-01 PROCEDURE — 84145 PROCALCITONIN (PCT): CPT | Performed by: STUDENT IN AN ORGANIZED HEALTH CARE EDUCATION/TRAINING PROGRAM

## 2024-05-01 PROCEDURE — 97530 THERAPEUTIC ACTIVITIES: CPT | Mod: GP | Performed by: PHYSICAL THERAPIST

## 2024-05-01 PROCEDURE — 36415 COLL VENOUS BLD VENIPUNCTURE: CPT | Performed by: PHYSICIAN ASSISTANT

## 2024-05-01 PROCEDURE — 84295 ASSAY OF SERUM SODIUM: CPT | Performed by: PHYSICIAN ASSISTANT

## 2024-05-01 RX ORDER — FUROSEMIDE 10 MG/ML
20 INJECTION INTRAMUSCULAR; INTRAVENOUS ONCE
Status: COMPLETED | OUTPATIENT
Start: 2024-05-01 | End: 2024-05-01

## 2024-05-01 RX ADMIN — METOPROLOL SUCCINATE 100 MG: 100 TABLET, EXTENDED RELEASE ORAL at 09:18

## 2024-05-01 RX ADMIN — QUETIAPINE FUMARATE 25 MG: 25 TABLET ORAL at 22:34

## 2024-05-01 RX ADMIN — RIVAROXABAN 15 MG: 10 TABLET, FILM COATED ORAL at 16:51

## 2024-05-01 RX ADMIN — BENZONATATE 100 MG: 100 CAPSULE ORAL at 16:51

## 2024-05-01 RX ADMIN — MEMANTINE 5 MG: 5 TABLET ORAL at 20:09

## 2024-05-01 RX ADMIN — ATORVASTATIN CALCIUM 20 MG: 20 TABLET, FILM COATED ORAL at 20:09

## 2024-05-01 RX ADMIN — GUAIFENESIN 600 MG: 600 TABLET, EXTENDED RELEASE ORAL at 20:09

## 2024-05-01 RX ADMIN — MEMANTINE 5 MG: 5 TABLET ORAL at 09:18

## 2024-05-01 RX ADMIN — FUROSEMIDE 20 MG: 10 INJECTION, SOLUTION INTRAMUSCULAR; INTRAVENOUS at 11:45

## 2024-05-01 RX ADMIN — GUAIFENESIN 600 MG: 600 TABLET, EXTENDED RELEASE ORAL at 09:18

## 2024-05-01 RX ADMIN — PAROXETINE HYDROCHLORIDE 20 MG: 20 TABLET, FILM COATED ORAL at 22:34

## 2024-05-01 ASSESSMENT — ACTIVITIES OF DAILY LIVING (ADL)
ADLS_ACUITY_SCORE: 39
ADLS_ACUITY_SCORE: 44
ADLS_ACUITY_SCORE: 39
ADLS_ACUITY_SCORE: 39
ADLS_ACUITY_SCORE: 44
ADLS_ACUITY_SCORE: 39
ADLS_ACUITY_SCORE: 43
ADLS_ACUITY_SCORE: 39
ADLS_ACUITY_SCORE: 44
ADLS_ACUITY_SCORE: 39
ADLS_ACUITY_SCORE: 44
ADLS_ACUITY_SCORE: 43
ADLS_ACUITY_SCORE: 43
ADLS_ACUITY_SCORE: 44
ADLS_ACUITY_SCORE: 39
ADLS_ACUITY_SCORE: 39
ADLS_ACUITY_SCORE: 44

## 2024-05-01 NOTE — PLAN OF CARE
Date/Time 4/30-5/1/24    Diagnosis: SOB     Mental Status: A/O to self only   Activity/dangle Ax1 gb/w  Diet: Reg  Pain: denied this shift   Pichardo/Voiding: incont at times up to BR this shift no BM this shift   Tele/Restraints/Iso: Contact/Droplet for  Human Metapneumovirus  02/LDA:  VSS on 3L NC ex hypertensive. RPIV SL  D/C Date:TBD

## 2024-05-01 NOTE — PROGRESS NOTES
[facility]  Hospitalist Progress Note    Assessment & Plan   Margy Babin is a 89 year old female with PMH of paroxysmal atrial fibrillation, dementia, HTN, HLD, iron deficiency anemia, orthostatic hypotension, hx CVA, depression who presented to the ED on 4/28/24 from her custodial for evaluation of cough, found to have human metapneumovirus.     Pneumonia secondary to human metapneumovirus  Acute hypoxic respiratory failure  *4/28 presented with cough, wheezing, chest congestion x 1 week. Desaturated to 86% on RA and was placed on 3 L O2. No leukocytosis, afebrile, low procal. ProBNP 516. CXR with reticular opacities. Negative for COVID-19, RSV, influenza. Note, CT chest 11/24/23 showed some atelectasis but no opacities or suggestion of fibrosis. No hx smoking or any underlying lung disease.  *4/29 Respiratory PCR (+) for human metapneumovirus. Rocephin and Azithromycin were discontinued as her pneumonia is felt to be viral. Have not appreciated any significant wheezing since admission.  *4/30 still requiring 4 L O2, ongoing dry cough and fatigue. No significant improvement with supportive therapies yet. WBC 12 but procal unchanged at 0.08, afebrile. Repeat CXR with improved interstitial infiltrates.   *5/1 Still requiring 3 liters O2, has dry cough, feeling a bit better. WBC now 14, add on procal pending. Has some crackles at the lung bases bilaterally  -trial IV lasix 20 mg on 5/1, weans supplemental O2 as able  -Add on procalcitonin  -Mucinex 600 mg BID  -Flutter valve  -Duonebs as needed  -Incentive spirometry  -Tessalon perles as needed  -Sputum culture  -PT recommending return to [facility] vs [facility], depending on if [facility] can provide assist of 1 with FWW  -SW following for discharge planning    Elevated troponin  *Troponin 20, repeat 17. EKG SR with PVCs and known LBBB. No chest pain. Likely elevated due to demand ischemia in the setting of acute hypoxic respiratory failure.  *Echo  "11/23/23 with EF 50-55%, mild concentric LVH, no significant valve disease     Paroxysmal atrial fibrillation  HTN  HLD  -Continue PTA Metoprolol, Xarelto, Atorvastatin      History of CVA (04/2022)  *Cardioembolic from atrial fibrillation  -Continue PTA Xarelto, Atorvastatin     Dementia  *Oriented x 2, coming from [facility]  -Continue PTA Namendsofie Miguel Aviola   - consult for discharge planning     Depression  -Continue PTA Paxil once verified    Clinically Significant Risk Factors                  # Hypertension: Noted on problem list     # Dementia: noted on problem list    # Overweight: Estimated body mass index is 25.53 kg/m  as calculated from the following:    Height as of this encounter: 1.65 m (5' 4.96\").    Weight as of this encounter: 69.5 kg (153 lb 3.5 oz)., PRESENT ON ADMISSION       # Financial/Environmental Concerns: none           Diet: Regular Diet Adult     DVT Prophylaxis: DOAC   Pichardo Catheter: Not present  Lines: None     Cardiac Monitoring: None  Code Status: No CPR- Do NOT Intubate      Disposition Plan       Expected Discharge Date: 05/02/2024        Discharge Comments: From [facility]  Needs home O2 assessment      Entered: Uziel King DO 05/01/2024, 7:22 AM             Uziel King DO  [facility]  Securely message with the Vocera Web Console (learn more here)      Interval History     - No acute events overnight   - Patient continues to feel short of breath but overall a bit better  - No chest pain, nausea, or diaphoresis  - No other acute concerns at this time     -Data reviewed today: I reviewed all new labs and imaging results over the last 24 hours. I personally reviewed no images or EKG's today.    Physical Exam   Temp: 98.5  F (36.9  C) Temp src: Axillary BP: (!) 167/67 Pulse: 78   Resp: 18 SpO2: 97 % O2 Device: Nasal cannula Oxygen Delivery: 3 LPM  Vitals:    04/28/24 1412   Weight: 69.5 kg (153 lb 3.5 oz)     Vital " Signs with Ranges  Temp:  [97.4  F (36.3  C)-98.6  F (37  C)] 98.5  F (36.9  C)  Pulse:  [74-78] 78  Resp:  [18] 18  BP: (152-167)/(64-80) 167/67  SpO2:  [92 %-98 %] 97 %  No intake/output data recorded.      Constitutional: Awake, alert, cooperative, no apparent distress.   ENT: Normocephalic, without obvious abnormality, atraumatic. Eye pupils are equal. Normal sclera.    Neck: Supple, symmetrical, trachea midline  Pulmonary: No increased work of breathing, Coarse lung sounds at based bilaterally   Cardiovascular: Regular rate and rhythm  GI: Normal bowel sounds, soft, non-distended, non-tender.   Skin: Visualized skin appeared clear.  Neuro: Moves all extremities spontaneously. No focal neuro deficits.  Psych:  Normal affect and mood  Extremities: Normal muscle tone. No gross deformities noted. Calves non-tender b/l. No pitting edema b/l LE    Medical Decision Making       30 MINUTES SPENT BY ME on the date of service doing chart review, history, exam, documentation & further activities per the note.         Medications   Current Facility-Administered Medications   Medication Dose Route Frequency Provider Last Rate Last Admin    Patient is already receiving anticoagulation with heparin, enoxaparin (LOVENOX), warfarin (COUMADIN)  or other anticoagulant medication   Does not apply Continuous PRN Rosanne Reilly PA-C         Current Facility-Administered Medications   Medication Dose Route Frequency Provider Last Rate Last Admin    atorvastatin (LIPITOR) tablet 20 mg  20 mg Oral QPM Rosanne Reilly PA-C   20 mg at 04/30/24 2037    guaiFENesin (MUCINEX) 12 hr tablet 600 mg  600 mg Oral BID Rosanne Reilly PA-C   600 mg at 04/30/24 2037    memantine (NAMENDA) tablet 5 mg  5 mg Oral BID Rosanne Reilly PA-C   5 mg at 04/30/24 2037    metoprolol succinate ER (TOPROL XL) 24 hr tablet 100 mg  100 mg Oral Daily Rosanne Reilly PA-C   100 mg at 04/30/24 0940    PARoxetine (PAXIL) tablet 20 mg  20 mg  Oral At Bedtime Rosanne Reilly PA-C   20 mg at 04/30/24 2244    QUEtiapine (SEROquel) tablet 25 mg  25 mg Oral At Bedtime Rosanne Reilly PA-C   25 mg at 04/30/24 2243    rivaroxaban ANTICOAGULANT (XARELTO) tablet 15 mg  15 mg Oral Daily with supper Rosanne Reilly PA-C   15 mg at 04/30/24 1853    sodium chloride (PF) 0.9% PF flush 3 mL  3 mL Intracatheter Q8H Rosanne Reilly PA-C   3 mL at 05/01/24 0532       Data   Recent Labs   Lab 04/30/24  0823 04/29/24  0806 04/28/24  0953   WBC 12.0* 10.6 10.2   HGB 13.0 13.1 14.2   MCV 98 96 95    317 288    138 142   POTASSIUM 4.3 4.7 4.2   CHLORIDE 107 103 102   CO2 26 21* 26   BUN 36.9* 34.8* 20.8   CR 0.85 0.89 0.90   ANIONGAP 11 14 14   SOBIA 8.8 9.0 8.9   GLC 95 112* 119*       Recent Results (from the past 24 hour(s))   XR Chest 2 Views    Narrative    CHEST TWO VIEWS  4/30/2024 2:57 PM       INDICATION: Follow up pneumonia.  COMPARISON: 4/28/2024       Impression    IMPRESSION: Improved interstitial infiltrates compared to previous. No  new findings. Probable trace right pleural effusion.       GERRY PENN MD         SYSTEM ID:  [id number]

## 2024-05-02 LAB
ANION GAP SERPL CALCULATED.3IONS-SCNC: 12 MMOL/L (ref 7–15)
BASOPHILS # BLD AUTO: 0.1 10E3/UL (ref 0–0.2)
BASOPHILS NFR BLD AUTO: 0 %
BUN SERPL-MCNC: 29.1 MG/DL (ref 8–23)
CALCIUM SERPL-MCNC: 8.9 MG/DL (ref 8.2–9.6)
CHLORIDE SERPL-SCNC: 99 MMOL/L (ref 98–107)
CREAT SERPL-MCNC: 0.87 MG/DL (ref 0.51–0.95)
DEPRECATED HCO3 PLAS-SCNC: 27 MMOL/L (ref 22–29)
EGFRCR SERPLBLD CKD-EPI 2021: 61 ML/MIN/1.73M2
EOSINOPHIL # BLD AUTO: 0.6 10E3/UL (ref 0–0.7)
EOSINOPHIL NFR BLD AUTO: 4 %
ERYTHROCYTE [DISTWIDTH] IN BLOOD BY AUTOMATED COUNT: 14.2 % (ref 10–15)
GLUCOSE SERPL-MCNC: 145 MG/DL (ref 70–99)
HCT VFR BLD AUTO: 43.3 % (ref 35–47)
HGB BLD-MCNC: 13.7 G/DL (ref 11.7–15.7)
IMM GRANULOCYTES # BLD: 0.1 10E3/UL
IMM GRANULOCYTES NFR BLD: 1 %
LYMPHOCYTES # BLD AUTO: 2.2 10E3/UL (ref 0.8–5.3)
LYMPHOCYTES NFR BLD AUTO: 15 %
MCH RBC QN AUTO: 30 PG (ref 26.5–33)
MCHC RBC AUTO-ENTMCNC: 31.6 G/DL (ref 31.5–36.5)
MCV RBC AUTO: 95 FL (ref 78–100)
MONOCYTES # BLD AUTO: 1.1 10E3/UL (ref 0–1.3)
MONOCYTES NFR BLD AUTO: 8 %
NEUTROPHILS # BLD AUTO: 10.6 10E3/UL (ref 1.6–8.3)
NEUTROPHILS NFR BLD AUTO: 72 %
NRBC # BLD AUTO: 0 10E3/UL
NRBC BLD AUTO-RTO: 0 /100
PLATELET # BLD AUTO: 361 10E3/UL (ref 150–450)
POTASSIUM SERPL-SCNC: 4 MMOL/L (ref 3.4–5.3)
RBC # BLD AUTO: 4.57 10E6/UL (ref 3.8–5.2)
SODIUM SERPL-SCNC: 138 MMOL/L (ref 135–145)
WBC # BLD AUTO: 14.7 10E3/UL (ref 4–11)

## 2024-05-02 PROCEDURE — 250N000013 HC RX MED GY IP 250 OP 250 PS 637: Performed by: STUDENT IN AN ORGANIZED HEALTH CARE EDUCATION/TRAINING PROGRAM

## 2024-05-02 PROCEDURE — 85025 COMPLETE CBC W/AUTO DIFF WBC: CPT | Performed by: STUDENT IN AN ORGANIZED HEALTH CARE EDUCATION/TRAINING PROGRAM

## 2024-05-02 PROCEDURE — 80048 BASIC METABOLIC PNL TOTAL CA: CPT | Performed by: STUDENT IN AN ORGANIZED HEALTH CARE EDUCATION/TRAINING PROGRAM

## 2024-05-02 PROCEDURE — 120N000001 HC R&B MED SURG/OB

## 2024-05-02 PROCEDURE — 99232 SBSQ HOSP IP/OBS MODERATE 35: CPT | Performed by: STUDENT IN AN ORGANIZED HEALTH CARE EDUCATION/TRAINING PROGRAM

## 2024-05-02 PROCEDURE — 36415 COLL VENOUS BLD VENIPUNCTURE: CPT | Performed by: STUDENT IN AN ORGANIZED HEALTH CARE EDUCATION/TRAINING PROGRAM

## 2024-05-02 PROCEDURE — 250N000013 HC RX MED GY IP 250 OP 250 PS 637: Performed by: PHYSICIAN ASSISTANT

## 2024-05-02 RX ORDER — FUROSEMIDE 10 MG/ML
20 INJECTION INTRAMUSCULAR; INTRAVENOUS ONCE
Status: DISCONTINUED | OUTPATIENT
Start: 2024-05-02 | End: 2024-05-02

## 2024-05-02 RX ORDER — FUROSEMIDE 20 MG
20 TABLET ORAL ONCE
Status: COMPLETED | OUTPATIENT
Start: 2024-05-02 | End: 2024-05-02

## 2024-05-02 RX ADMIN — METOPROLOL SUCCINATE 100 MG: 100 TABLET, EXTENDED RELEASE ORAL at 08:46

## 2024-05-02 RX ADMIN — BENZONATATE 100 MG: 100 CAPSULE ORAL at 17:16

## 2024-05-02 RX ADMIN — FUROSEMIDE 20 MG: 20 TABLET ORAL at 11:58

## 2024-05-02 RX ADMIN — QUETIAPINE FUMARATE 25 MG: 25 TABLET ORAL at 22:36

## 2024-05-02 RX ADMIN — GUAIFENESIN 600 MG: 600 TABLET, EXTENDED RELEASE ORAL at 20:57

## 2024-05-02 RX ADMIN — GUAIFENESIN 600 MG: 600 TABLET, EXTENDED RELEASE ORAL at 08:46

## 2024-05-02 RX ADMIN — ATORVASTATIN CALCIUM 20 MG: 20 TABLET, FILM COATED ORAL at 20:57

## 2024-05-02 RX ADMIN — RIVAROXABAN 15 MG: 10 TABLET, FILM COATED ORAL at 17:16

## 2024-05-02 RX ADMIN — MEMANTINE 5 MG: 5 TABLET ORAL at 20:57

## 2024-05-02 RX ADMIN — PAROXETINE HYDROCHLORIDE 20 MG: 20 TABLET, FILM COATED ORAL at 22:36

## 2024-05-02 RX ADMIN — MEMANTINE 5 MG: 5 TABLET ORAL at 08:46

## 2024-05-02 ASSESSMENT — ACTIVITIES OF DAILY LIVING (ADL)
ADLS_ACUITY_SCORE: 44

## 2024-05-02 NOTE — PROGRESS NOTES
Patient has been assessed for Home Oxygen needs. Oxygen readings:    *Pulse oximetry (SpO2) = 91 % - 96 % on room air at rest while awake.    *SpO2 = 87 % on room air during activity/with exercise.    *SpO2 improved to 96 % on 0 liters/minute with rest.

## 2024-05-02 NOTE — PROGRESS NOTES
[facility]  Hospitalist Progress Note    Assessment & Plan   Margy Babin is a 89 year old female with PMH of paroxysmal atrial fibrillation, dementia, HTN, HLD, iron deficiency anemia, orthostatic hypotension, hx CVA, depression who presented to the ED on 4/28/24 from her [facility] for evaluation of cough, found to have human metapneumovirus.     Pneumonia secondary to human metapneumovirus  Acute hypoxic respiratory failure  *4/28 presented with cough, wheezing, chest congestion x 1 week. Desaturated to 86% on RA and was placed on 3 L O2. No leukocytosis, afebrile, low procal. ProBNP 516. CXR with reticular opacities. Negative for COVID-19, RSV, influenza. Note, CT chest 11/24/23 showed some atelectasis but no opacities or suggestion of fibrosis. No hx smoking or any underlying lung disease.  *4/29 Respiratory PCR (+) for human metapneumovirus. Rocephin and Azithromycin were discontinued as her pneumonia is felt to be viral. Have not appreciated any significant wheezing since admission.  *4/30 still requiring 4 L O2, ongoing dry cough and fatigue. No significant improvement with supportive therapies yet. WBC 12 but procal unchanged at 0.08, afebrile. Repeat CXR with improved interstitial infiltrates.   *5/1 Still requiring 3 liters O2, has dry cough, feeling a bit better. WBC now 14, add on procal pending. Has some crackles at the lung bases bilaterally  *5/2 Now down to 1 lpm O2, cough is improving, denies shortness of breath today, WBC still about 14. Plan as follows. Continues to have coarse breath sounds at bases posteriorly   - Care order placed for nursing to complete walk test with O2 to determine if patient may require home oxygen  - 20 mg oral lasix x1, wean off oxygen as able, repeat BMP 5/2 pending  - Spoke with patient daughter over the phone, patient comes from [place] assisted [facility] and gets help with meals, getting dressed, and bathing. Is alone for most of  "the day. Will plan to order home PT and nursing on discharge, with tentative plan to return to assisted living facility on 5/3.   -Mucinex 600 mg BID  -Flutter valve  -Duonebs as needed  -Incentive spirometry  -Tessalon as needed  -Sputum culture  -PT recommending home with assist  -SW following for discharge planning    Elevated troponin  *Troponin 20, repeat 17. EKG SR with PVCs and known LBBB. No chest pain. Likely elevated due to demand ischemia in the setting of acute hypoxic respiratory failure.  *Echo 11/23/23 with EF 50-55%, mild concentric LVH, no significant valve disease     Paroxysmal atrial fibrillation  HTN  HLD  -Continue PTA Metoprolol, Xarelto, Atorvastatin      History of CVA (04/2022)  *Cardioembolic from atrial fibrillation  -Continue PTA Xarelto, Atorvastatin     Dementia  *Oriented x 2, coming from [facility]  -Continue PTA Namenda, Seroquel   -SW consult for discharge planning     Depression  -Continue PTA Paxil once verified    Clinically Significant Risk Factors                  # Hypertension: Noted on problem list     # Dementia: noted on problem list    # Overweight: Estimated body mass index is 25.53 kg/m  as calculated from the following:    Height as of this encounter: 1.65 m (5' 4.96\").    Weight as of this encounter: 69.5 kg (153 lb 3.5 oz)., PRESENT ON ADMISSION       # Financial/Environmental Concerns: none           Diet: Regular Diet Adult     DVT Prophylaxis: DOAC   Pichardo Catheter: Not present  Lines: None     Cardiac Monitoring: None  Code Status: No CPR- Do NOT Intubate      Disposition Plan      Expected Discharge Date: 05/02/2024        Discharge Comments: From [facility]  Needs home O2 assessment      Entered: Uziel King DO 05/02/2024, 7:13 AM             Uziel King DO  [facility]  Securely message with the Vocera Web Console (learn more here)      Interval History     - No acute events overnight   -No " longer short of breath today  - Still has a cough  - On 1 lpm nasal cannula  - Plan for diuresis, walk test, and to wean off supplement O2 today     -Data reviewed today: I reviewed all new labs and imaging results over the last 24 hours. I personally reviewed no images or EKG's today.    Physical Exam   Temp: 98.7  F (37.1  C) Temp src: Oral BP: (!) 140/62 Pulse: 70   Resp: 18 SpO2: 94 % O2 Device: Nasal cannula Oxygen Delivery: 1 LPM  Vitals:    04/28/24 1412   Weight: 69.5 kg (153 lb 3.5 oz)     Vital Signs with Ranges  Temp:  [97.8  F (36.6  C)-98.7  F (37.1  C)] 98.7  F (37.1  C)  Pulse:  [64-77] 70  Resp:  [18] 18  BP: (140-176)/(62-69) 140/62  SpO2:  [93 %-97 %] 94 %  I/O last 3 completed shifts:  In: 200 [P.O.:200]  Out: -       Constitutional: Awake, alert, cooperative, no apparent distress.   ENT: Normocephalic, without obvious abnormality, atraumatic. Eye pupils are equal. Normal sclera.    Neck: Supple, symmetrical, trachea midline  Pulmonary: No increased work of breathing, Coarse lung sounds at based bilaterally   Cardiovascular: Regular rate and rhythm  GI: Normal bowel sounds, soft, non-distended, non-tender.   Skin: Visualized skin appeared clear.  Neuro: Moves all extremities spontaneously. No focal neuro deficits.  Psych:  Normal affect and mood  Extremities: Normal muscle tone. No gross deformities noted. Calves non-tender b/l. No pitting edema b/l LE    Medical Decision Making       43 MINUTES SPENT BY ME on the date of service doing chart review, history, exam, documentation & further activities per the note.         Medications   Current Facility-Administered Medications   Medication Dose Route Frequency Provider Last Rate Last Admin    Patient is already receiving anticoagulation with heparin, enoxaparin (LOVENOX), warfarin (COUMADIN)  or other anticoagulant medication   Does not apply Continuous PRN Rosanne Reilly PA-C         Current Facility-Administered Medications   Medication Dose  Route Frequency Provider Last Rate Last Admin    atorvastatin (LIPITOR) tablet 20 mg  20 mg Oral QPM Rosanne Reilly PA-C   20 mg at 05/01/24 2009    guaiFENesin (MUCINEX) 12 hr tablet 600 mg  600 mg Oral BID Rosanne Reilly PA-C   600 mg at 05/01/24 2009    memantine (NAMENDA) tablet 5 mg  5 mg Oral BID Rosanne Reilly PA-C   5 mg at 05/01/24 2009    metoprolol succinate ER (TOPROL XL) 24 hr tablet 100 mg  100 mg Oral Daily Rosanne Reilly PA-C   100 mg at 05/01/24 0918    PARoxetine (PAXIL) tablet 20 mg  20 mg Oral At Bedtime Rosanne Reilly PA-C   20 mg at 05/01/24 2234    QUEtiapine (SEROquel) tablet 25 mg  25 mg Oral At Bedtime Rosanne Reilly PA-C   25 mg at 05/01/24 2234    rivaroxaban ANTICOAGULANT (XARELTO) tablet 15 mg  15 mg Oral Daily with supper Rosanne Reilly PA-C   15 mg at 05/01/24 1651    sodium chloride (PF) 0.9% PF flush 3 mL  3 mL Intracatheter Q8H Rosanne Reilly PA-C   3 mL at 05/02/24 0558       Data   Recent Labs   Lab 05/01/24  0841 04/30/24  0823 04/29/24  0806   WBC 14.0* 12.0* 10.6   HGB 13.9 13.0 13.1   MCV 99 98 96    322 317    144 138   POTASSIUM 4.7 4.3 4.7   CHLORIDE 105 107 103   CO2 28 26 21*   BUN 30.9* 36.9* 34.8*   CR 0.80 0.85 0.89   ANIONGAP 10 11 14   SOBIA 8.9 8.8 9.0   GLC 91 95 112*       No results found for this or any previous visit (from the past 24 hour(s)).

## 2024-05-02 NOTE — PLAN OF CARE
Goal Outcome Evaluation:      Plan of Care Reviewed With: patient    Overall Patient Progress: improvingOverall Patient Progress: improving    Outcome Evaluation: Denies SOB in this shift.    A/O x3, forgetful. On 1L NC sating btw 90-95%. PIV SL. Had xlarge loose BM in this shift. Incont of B/B. Cough managed with PRN Tessalon and schedule Mucinex. Denies pain. Call light within reach, Plan of care ongoing.

## 2024-05-02 NOTE — PLAN OF CARE
Goal Outcome Evaluation:    Summary: 5/1/24-5/2/24; [id number]  Diagnosis: SOB, Human Metapneumovirus  POD: 0  Orientation: A/O x3-4, intermittent confusion, disoriented to time or situation  Vitals/Tele: slightly increased BP on 2L O2, O2 weaned to 1L  IV Access/drains: R PIV SL  Diet: Regular  Mobility: A1 GBW  Pain: denies  GI/: Incont BB, small-smear BM this shift  Wound/Skin: scattered bruising  Consults: orthopedics, PT, OT following  Discharge Plan: JESSICA discussed pt back to [facility] at discharge

## 2024-05-03 PROCEDURE — 120N000001 HC R&B MED SURG/OB

## 2024-05-03 PROCEDURE — 250N000013 HC RX MED GY IP 250 OP 250 PS 637: Performed by: PHYSICIAN ASSISTANT

## 2024-05-03 PROCEDURE — 99232 SBSQ HOSP IP/OBS MODERATE 35: CPT | Performed by: STUDENT IN AN ORGANIZED HEALTH CARE EDUCATION/TRAINING PROGRAM

## 2024-05-03 RX ORDER — BENZONATATE 100 MG/1
100 CAPSULE ORAL 3 TIMES DAILY PRN
Qty: 15 CAPSULE | Refills: 0 | Status: SHIPPED | OUTPATIENT
Start: 2024-05-03

## 2024-05-03 RX ADMIN — BENZONATATE 100 MG: 100 CAPSULE ORAL at 16:57

## 2024-05-03 RX ADMIN — METOPROLOL SUCCINATE 100 MG: 100 TABLET, EXTENDED RELEASE ORAL at 09:19

## 2024-05-03 RX ADMIN — MEMANTINE 5 MG: 5 TABLET ORAL at 20:23

## 2024-05-03 RX ADMIN — MEMANTINE 5 MG: 5 TABLET ORAL at 09:19

## 2024-05-03 RX ADMIN — ATORVASTATIN CALCIUM 20 MG: 20 TABLET, FILM COATED ORAL at 20:22

## 2024-05-03 RX ADMIN — QUETIAPINE FUMARATE 25 MG: 25 TABLET ORAL at 22:05

## 2024-05-03 RX ADMIN — PAROXETINE HYDROCHLORIDE 20 MG: 20 TABLET, FILM COATED ORAL at 22:05

## 2024-05-03 RX ADMIN — GUAIFENESIN 600 MG: 600 TABLET, EXTENDED RELEASE ORAL at 20:23

## 2024-05-03 RX ADMIN — RIVAROXABAN 15 MG: 10 TABLET, FILM COATED ORAL at 16:21

## 2024-05-03 RX ADMIN — GUAIFENESIN 600 MG: 600 TABLET, EXTENDED RELEASE ORAL at 09:18

## 2024-05-03 ASSESSMENT — ACTIVITIES OF DAILY LIVING (ADL)
ADLS_ACUITY_SCORE: 44

## 2024-05-03 NOTE — PLAN OF CARE
Goal Outcome Evaluation:      Plan of Care Reviewed With: patient    Overall Patient Progress: improvingOverall Patient Progress: improving    Outcome Evaluation: Denies SOB in this shift.    A/O x3, disoriented to time. PIV SL. Droplet/contact precaution maintained. On RA. ARZOLA with activities sometimes per pt and RN noted, PRN 1L NC if required. Up with 1/wlk/gb to BR. Incont B/B. Had BM x1 in this shift. Appetite fair. Flutter valve in use. Prn Tessalon given for cough. No other concern reported/noted. Call light within reach. Plan of care ongoing.

## 2024-05-03 NOTE — PROGRESS NOTES
Patient has been assessed for Home Oxygen needs. Oxygen readings:    *Pulse oximetry (SpO2) = 94% on room air at rest while awake.    *SpO2 improved to 97% on 1 liters/minute at rest.    *SpO2 = 83-88% on room air during activity/with exercise.    *SpO2 improved to 92-98% on 1 liters/minute during activity/with exercise.

## 2024-05-03 NOTE — PROGRESS NOTES
Goal Outcome evaluation    Orientation:A/O x2-3  Activity: 1 Gb/walker  Diet/BS Checks: Regular  Tele: NA  IV Access/Drains: PIV sl  Wounds/skin: scattered bruising  Pain Management: Denied  Bowel/Bladder: Incontinent B/B  Consults: PT/ OT followinhg  D/C Disposition: TBD

## 2024-05-03 NOTE — PROGRESS NOTES
Care Management Follow Up    Length of Stay (days): 5    Expected Discharge Date: 05/03/2024     Concerns to be Addressed:       Patient plan of care discussed at interdisciplinary rounds: No    Anticipated Discharge Disposition: Assisted Living, Home Care     Anticipated Discharge Services:    Anticipated Discharge DME:      Patient/family educated on Medicare website which has current facility and service quality ratings:    Education Provided on the Discharge Plan:    Patient/Family in Agreement with the Plan: yes    Referrals Placed by CM/SW:    Private pay costs discussed: Not applicable    Additional Information:  Per SW who spoke directly with Flor, she would like to go back to her [facility].  Writer called [facility] [phone] and spoke with Oz who stated patient cannot return on the weekend because they do not have a nurse on duty.  Oz stated patient can return Monday morning as early as 0900.  Writer explained we will set a ride up for Monday morning if patient is medically ready.    Lynne Avalos RN

## 2024-05-03 NOTE — PROGRESS NOTES
[facility]  Hospitalist Progress Note    Assessment & Plan   Margy Babin is a 89 year old female with PMH of paroxysmal atrial fibrillation, dementia, HTN, HLD, iron deficiency anemia, orthostatic hypotension, hx CVA, depression who presented to the ED on 4/28/24 from her senior living for evaluation of cough, found to have human metapneumovirus. Medically stable for discharge on 5/3.      Pneumonia secondary to human metapneumovirus  Acute hypoxic respiratory failure  *4/28 presented with cough, wheezing, chest congestion x 1 week. Desaturated to 86% on RA and was placed on 3 L O2. No leukocytosis, afebrile, low procal. ProBNP 516. CXR with reticular opacities. Negative for COVID-19, RSV, influenza. Note, CT chest 11/24/23 showed some atelectasis but no opacities or suggestion of fibrosis. No hx smoking or any underlying lung disease.  *4/29 Respiratory PCR (+) for human metapneumovirus. Rocephin and Azithromycin were discontinued as her pneumonia is felt to be viral. Have not appreciated any significant wheezing since admission.  *4/30 still requiring 4 L O2, ongoing dry cough and fatigue. No significant improvement with supportive therapies yet. WBC 12 but procal unchanged at 0.08, afebrile. Repeat CXR with improved interstitial infiltrates.   *5/1 Still requiring 3 liters O2, has dry cough, feeling a bit better. WBC now 14, add on procal pending. Has some crackles at the lung bases bilaterally  *5/2 Now down to 1 lpm O2, cough is improving, denies shortness of breath today, WBC still about 14. Plan as follows. Continues to have coarse breath sounds at bases posteriorly   *5/3 Off O2 while at rest, walk test pending.   - Care order placed for nursing to complete walk test with O2 to determine if patient may require home oxygen during activity   - Patient off oxygen on 5/3 while at rest, she is worried about going back to her assisted living with her current services. She is medically  "stable for discharge. She is willing to go to a TCU to continue recovery before returning to [facility]. Discussed with . She may be able to get additional services at [facility] as well.   -Mucinex 600 mg BID  -Flutter valve  -Duonebs as needed  -Incentive spirometry  -Tessalon as needed  -Sputum culture  -PT recommending home with assist of 1 vs TCU  -SW following for discharge planning    Leukocytosis  Suspect leukocytosis was in the setting of viral pneumonia. Her O2 requirements have improved without antibiotics. I recommend follow up with her PCP in 1 week for a recheck on discharge. Recommend repeating CBC within 1 week of discharge to ensure leukocytosis is improving. Could also be done at TCU.     Elevated troponin  *Troponin 20, repeat 17. EKG SR with PVCs and known LBBB. No chest pain. Likely elevated due to demand ischemia in the setting of acute hypoxic respiratory failure.  *Echo 11/23/23 with EF 50-55%, mild concentric LVH, no significant valve disease     Paroxysmal atrial fibrillation  HTN  HLD  -Continue PTA Metoprolol, Xarelto, Atorvastatin      History of CVA (04/2022)  *Cardioembolic from atrial fibrillation  -Continue PTA Xarelto, Atorvastatin     Dementia  *Oriented x 2, coming from [facility]  -Continue PTA Namenda, Seroquel   - consult for discharge planning     Depression  -Continue PTA Paxil once verified    Clinically Significant Risk Factors                  # Hypertension: Noted on problem list     # Dementia: noted on problem list    # Overweight: Estimated body mass index is 25.53 kg/m  as calculated from the following:    Height as of this encounter: 1.65 m (5' 4.96\").    Weight as of this encounter: 69.5 kg (153 lb 3.5 oz).        # Financial/Environmental Concerns: none           Diet: Regular Diet Adult  Diet     DVT Prophylaxis: DOAC   Pichardo Catheter: Not present  Lines: None     Cardiac Monitoring: None  Code Status: No CPR- Do NOT Intubate  "     Disposition Plan       Expected Discharge Date: 05/03/2024        Discharge Comments: From [facility]  Needs home O2 assessment      Entered: Uziel King DO 05/03/2024, 9:18 AM             Uziel King DO  [facility]  Securely message with the Vocera Web Console (learn more here)      Interval History     - No acute events overnight   - Feeling ok today  - Cough still bothersome  - Denying shortness of breath  - Off oxygen at rest, will attempt walk test today  - Patient willing to go to TCU  - No other acute concerns at this time     -Data reviewed today: I reviewed all new labs and imaging results over the last 24 hours. I personally reviewed no images or EKG's today.    Physical Exam   Temp: 97.9  F (36.6  C) Temp src: Oral BP: (!) 153/59 Pulse: 69   Resp: 16 SpO2: 95 % O2 Device: None (Room air)    Vitals:    04/28/24 1412   Weight: 69.5 kg (153 lb 3.5 oz)     Vital Signs with Ranges  Temp:  [97.4  F (36.3  C)-97.9  F (36.6  C)] 97.9  F (36.6  C)  Pulse:  [69-95] 69  Resp:  [16] 16  BP: (131-153)/(57-78) 153/59  SpO2:  [91 %-95 %] 95 %  No intake/output data recorded.      Constitutional: Awake, alert, cooperative, no apparent distress.   ENT: Normocephalic, without obvious abnormality, atraumatic. Eye pupils are equal. Normal sclera.    Neck: Supple, symmetrical, trachea midline  Pulmonary: No increased work of breathing, Coarse lung sounds at based bilaterally   Cardiovascular: Regular rate and rhythm  GI: Normal bowel sounds, soft, non-distended, non-tender.   Skin: Visualized skin appeared clear.  Neuro: Moves all extremities spontaneously. No focal neuro deficits.  Psych:  Normal affect and mood  Extremities: Normal muscle tone. No gross deformities noted. Calves non-tender b/l. No pitting edema b/l LE    Medical Decision Making       42 MINUTES SPENT BY ME on the date of service doing chart review, history, exam, documentation & further activities per the  note.         Medications   Current Facility-Administered Medications   Medication Dose Route Frequency Provider Last Rate Last Admin    Patient is already receiving anticoagulation with heparin, enoxaparin (LOVENOX), warfarin (COUMADIN)  or other anticoagulant medication   Does not apply Continuous PRN Rosanne Reilly PA-C         Current Facility-Administered Medications   Medication Dose Route Frequency Provider Last Rate Last Admin    atorvastatin (LIPITOR) tablet 20 mg  20 mg Oral QPM Rosanne Reilly PA-C   20 mg at 05/02/24 2057    guaiFENesin (MUCINEX) 12 hr tablet 600 mg  600 mg Oral BID Rosanne Reilly PA-C   600 mg at 05/02/24 2057    memantine (NAMENDA) tablet 5 mg  5 mg Oral BID Rosanne Reilly PA-C   5 mg at 05/02/24 2057    metoprolol succinate ER (TOPROL XL) 24 hr tablet 100 mg  100 mg Oral Daily Rosanne Reilly PA-C   100 mg at 05/02/24 0846    PARoxetine (PAXIL) tablet 20 mg  20 mg Oral At Bedtime Rosanne Reilly PA-C   20 mg at 05/02/24 2236    QUEtiapine (SEROquel) tablet 25 mg  25 mg Oral At Bedtime Rosanne Reilly PA-C   25 mg at 05/02/24 2236    rivaroxaban ANTICOAGULANT (XARELTO) tablet 15 mg  15 mg Oral Daily with supper Rosanne Reilly PA-C   15 mg at 05/02/24 1716    sodium chloride (PF) 0.9% PF flush 3 mL  3 mL Intracatheter Q8H Rosanne Reilly PA-C   3 mL at 05/03/24 0633       Data   Recent Labs   Lab 05/02/24  1130 05/02/24  0739 05/01/24  0841 04/30/24  0823   WBC  --  14.7* 14.0* 12.0*   HGB  --  13.7 13.9 13.0   MCV  --  95 99 98   PLT  --  361 351 322     --  143 144   POTASSIUM 4.0  --  4.7 4.3   CHLORIDE 99  --  105 107   CO2 27  --  28 26   BUN 29.1*  --  30.9* 36.9*   CR 0.87  --  0.80 0.85   ANIONGAP 12  --  10 11   SOBIA 8.9  --  8.9 8.8   *  --  91 95       No results found for this or any previous visit (from the past 24 hour(s)).

## 2024-05-04 ENCOUNTER — APPOINTMENT (OUTPATIENT)
Dept: PHYSICAL THERAPY | Facility: CLINIC | Age: 89
DRG: 193 | End: 2024-05-04
Payer: MEDICARE

## 2024-05-04 ENCOUNTER — APPOINTMENT (OUTPATIENT)
Dept: GENERAL RADIOLOGY | Facility: CLINIC | Age: 89
DRG: 193 | End: 2024-05-04
Attending: STUDENT IN AN ORGANIZED HEALTH CARE EDUCATION/TRAINING PROGRAM
Payer: MEDICARE

## 2024-05-04 LAB
ALBUMIN UR-MCNC: NEGATIVE MG/DL
ANION GAP SERPL CALCULATED.3IONS-SCNC: 9 MMOL/L (ref 7–15)
APPEARANCE UR: CLEAR
BASOPHILS # BLD AUTO: ABNORMAL 10*3/UL
BASOPHILS # BLD MANUAL: 0 10E3/UL (ref 0–0.2)
BASOPHILS NFR BLD AUTO: ABNORMAL %
BASOPHILS NFR BLD MANUAL: 0 %
BILIRUB UR QL STRIP: NEGATIVE
BUN SERPL-MCNC: 32.8 MG/DL (ref 8–23)
CALCIUM SERPL-MCNC: 8.9 MG/DL (ref 8.2–9.6)
CHLORIDE SERPL-SCNC: 104 MMOL/L (ref 98–107)
COLOR UR AUTO: ABNORMAL
CREAT SERPL-MCNC: 0.94 MG/DL (ref 0.51–0.95)
CRP SERPL-MCNC: 29.99 MG/L
DEPRECATED HCO3 PLAS-SCNC: 26 MMOL/L (ref 22–29)
EGFRCR SERPLBLD CKD-EPI 2021: 56 ML/MIN/1.73M2
EOSINOPHIL # BLD AUTO: ABNORMAL 10*3/UL
EOSINOPHIL # BLD MANUAL: 0.2 10E3/UL (ref 0–0.7)
EOSINOPHIL NFR BLD AUTO: ABNORMAL %
EOSINOPHIL NFR BLD MANUAL: 1 %
ERYTHROCYTE [DISTWIDTH] IN BLOOD BY AUTOMATED COUNT: 14.1 % (ref 10–15)
GLUCOSE SERPL-MCNC: 109 MG/DL (ref 70–99)
GLUCOSE UR STRIP-MCNC: NEGATIVE MG/DL
HCT VFR BLD AUTO: 44.9 % (ref 35–47)
HGB BLD-MCNC: 14.5 G/DL (ref 11.7–15.7)
HGB UR QL STRIP: NEGATIVE
IMM GRANULOCYTES # BLD: ABNORMAL 10*3/UL
IMM GRANULOCYTES NFR BLD: ABNORMAL %
KETONES UR STRIP-MCNC: NEGATIVE MG/DL
LEUKOCYTE ESTERASE UR QL STRIP: ABNORMAL
LYMPHOCYTES # BLD AUTO: ABNORMAL 10*3/UL
LYMPHOCYTES # BLD MANUAL: 1.7 10E3/UL (ref 0.8–5.3)
LYMPHOCYTES NFR BLD AUTO: ABNORMAL %
LYMPHOCYTES NFR BLD MANUAL: 10 %
MCH RBC QN AUTO: 31 PG (ref 26.5–33)
MCHC RBC AUTO-ENTMCNC: 32.3 G/DL (ref 31.5–36.5)
MCV RBC AUTO: 96 FL (ref 78–100)
MONOCYTES # BLD AUTO: ABNORMAL 10*3/UL
MONOCYTES # BLD MANUAL: 1 10E3/UL (ref 0–1.3)
MONOCYTES NFR BLD AUTO: ABNORMAL %
MONOCYTES NFR BLD MANUAL: 6 %
MUCOUS THREADS #/AREA URNS LPF: PRESENT /LPF
NEUTROPHILS # BLD AUTO: ABNORMAL 10*3/UL
NEUTROPHILS # BLD MANUAL: 13.7 10E3/UL (ref 1.6–8.3)
NEUTROPHILS NFR BLD AUTO: ABNORMAL %
NEUTROPHILS NFR BLD MANUAL: 83 %
NITRATE UR QL: NEGATIVE
NRBC # BLD AUTO: 0 10E3/UL
NRBC BLD AUTO-RTO: 0 /100
PH UR STRIP: 5 [PH] (ref 5–7)
PLAT MORPH BLD: ABNORMAL
PLATELET # BLD AUTO: 413 10E3/UL (ref 150–450)
POTASSIUM SERPL-SCNC: 5 MMOL/L (ref 3.4–5.3)
PROCALCITONIN SERPL IA-MCNC: 0.08 NG/ML
RBC # BLD AUTO: 4.68 10E6/UL (ref 3.8–5.2)
RBC MORPH BLD: ABNORMAL
RBC URINE: 2 /HPF
SODIUM SERPL-SCNC: 139 MMOL/L (ref 135–145)
SP GR UR STRIP: 1.02 (ref 1–1.03)
SQUAMOUS EPITHELIAL: 8 /HPF
UROBILINOGEN UR STRIP-MCNC: NORMAL MG/DL
WBC # BLD AUTO: 16.5 10E3/UL (ref 4–11)
WBC URINE: 11 /HPF

## 2024-05-04 PROCEDURE — 97530 THERAPEUTIC ACTIVITIES: CPT | Mod: GP | Performed by: PHYSICAL THERAPIST

## 2024-05-04 PROCEDURE — 99232 SBSQ HOSP IP/OBS MODERATE 35: CPT | Performed by: STUDENT IN AN ORGANIZED HEALTH CARE EDUCATION/TRAINING PROGRAM

## 2024-05-04 PROCEDURE — 85027 COMPLETE CBC AUTOMATED: CPT | Performed by: STUDENT IN AN ORGANIZED HEALTH CARE EDUCATION/TRAINING PROGRAM

## 2024-05-04 PROCEDURE — 81001 URINALYSIS AUTO W/SCOPE: CPT | Performed by: STUDENT IN AN ORGANIZED HEALTH CARE EDUCATION/TRAINING PROGRAM

## 2024-05-04 PROCEDURE — 999N000157 HC STATISTIC RCP TIME EA 10 MIN

## 2024-05-04 PROCEDURE — 250N000013 HC RX MED GY IP 250 OP 250 PS 637: Performed by: PHYSICIAN ASSISTANT

## 2024-05-04 PROCEDURE — 94640 AIRWAY INHALATION TREATMENT: CPT | Mod: 76

## 2024-05-04 PROCEDURE — 84145 PROCALCITONIN (PCT): CPT | Performed by: STUDENT IN AN ORGANIZED HEALTH CARE EDUCATION/TRAINING PROGRAM

## 2024-05-04 PROCEDURE — 87086 URINE CULTURE/COLONY COUNT: CPT | Performed by: STUDENT IN AN ORGANIZED HEALTH CARE EDUCATION/TRAINING PROGRAM

## 2024-05-04 PROCEDURE — 85007 BL SMEAR W/DIFF WBC COUNT: CPT | Performed by: STUDENT IN AN ORGANIZED HEALTH CARE EDUCATION/TRAINING PROGRAM

## 2024-05-04 PROCEDURE — 36415 COLL VENOUS BLD VENIPUNCTURE: CPT | Performed by: STUDENT IN AN ORGANIZED HEALTH CARE EDUCATION/TRAINING PROGRAM

## 2024-05-04 PROCEDURE — 250N000009 HC RX 250: Performed by: STUDENT IN AN ORGANIZED HEALTH CARE EDUCATION/TRAINING PROGRAM

## 2024-05-04 PROCEDURE — 86140 C-REACTIVE PROTEIN: CPT | Performed by: STUDENT IN AN ORGANIZED HEALTH CARE EDUCATION/TRAINING PROGRAM

## 2024-05-04 PROCEDURE — 71045 X-RAY EXAM CHEST 1 VIEW: CPT

## 2024-05-04 PROCEDURE — 80048 BASIC METABOLIC PNL TOTAL CA: CPT | Performed by: STUDENT IN AN ORGANIZED HEALTH CARE EDUCATION/TRAINING PROGRAM

## 2024-05-04 PROCEDURE — 97116 GAIT TRAINING THERAPY: CPT | Mod: GP | Performed by: PHYSICAL THERAPIST

## 2024-05-04 PROCEDURE — 94640 AIRWAY INHALATION TREATMENT: CPT

## 2024-05-04 PROCEDURE — 87186 SC STD MICRODIL/AGAR DIL: CPT | Performed by: STUDENT IN AN ORGANIZED HEALTH CARE EDUCATION/TRAINING PROGRAM

## 2024-05-04 PROCEDURE — 250N000009 HC RX 250: Performed by: PHYSICIAN ASSISTANT

## 2024-05-04 PROCEDURE — 120N000001 HC R&B MED SURG/OB

## 2024-05-04 RX ORDER — IPRATROPIUM BROMIDE AND ALBUTEROL SULFATE 2.5; .5 MG/3ML; MG/3ML
3 SOLUTION RESPIRATORY (INHALATION)
Status: DISCONTINUED | OUTPATIENT
Start: 2024-05-04 | End: 2024-05-08

## 2024-05-04 RX ADMIN — PAROXETINE HYDROCHLORIDE 20 MG: 20 TABLET, FILM COATED ORAL at 21:06

## 2024-05-04 RX ADMIN — MEMANTINE 5 MG: 5 TABLET ORAL at 21:06

## 2024-05-04 RX ADMIN — GUAIFENESIN 600 MG: 600 TABLET, EXTENDED RELEASE ORAL at 09:43

## 2024-05-04 RX ADMIN — RIVAROXABAN 15 MG: 10 TABLET, FILM COATED ORAL at 16:52

## 2024-05-04 RX ADMIN — MEMANTINE 5 MG: 5 TABLET ORAL at 09:43

## 2024-05-04 RX ADMIN — ATORVASTATIN CALCIUM 20 MG: 20 TABLET, FILM COATED ORAL at 21:06

## 2024-05-04 RX ADMIN — IPRATROPIUM BROMIDE AND ALBUTEROL SULFATE 3 ML: .5; 3 SOLUTION RESPIRATORY (INHALATION) at 13:44

## 2024-05-04 RX ADMIN — GUAIFENESIN 600 MG: 600 TABLET, EXTENDED RELEASE ORAL at 21:06

## 2024-05-04 RX ADMIN — IPRATROPIUM BROMIDE AND ALBUTEROL SULFATE 3 ML: .5; 3 SOLUTION RESPIRATORY (INHALATION) at 20:53

## 2024-05-04 RX ADMIN — QUETIAPINE FUMARATE 25 MG: 25 TABLET ORAL at 21:05

## 2024-05-04 RX ADMIN — METOPROLOL SUCCINATE 100 MG: 100 TABLET, EXTENDED RELEASE ORAL at 09:43

## 2024-05-04 ASSESSMENT — ACTIVITIES OF DAILY LIVING (ADL)
ADLS_ACUITY_SCORE: 44

## 2024-05-04 NOTE — PROVIDER NOTIFICATION
MD Notification    Notified Person: MD    Notified Person Name: Dr. Jose    Notification Date/Time: 5/4/24 11:08 AM    Notification Interaction: vocera message    Purpose of Notification: lab results to review, WBC 16.5    Orders Received: awaiting response    Comments:   MD will come and see pt   
MD Notification    Notified Person: MD    Notified Person Name: Dr. Jose    Notification Date/Time: 5/4/24 2:44 PM    Notification Interaction: [employer] message    Purpose of Notification: CRP 29.99    Orders Received:   Comments: no new orders    
MD Notification    Notified Person: MD    Notified Person Name: Tommie    Notification Date/Time: 4/29/24; 1630    Notification Interaction: voclinda    Purpose of Notification: BP has been elevated this shift; most recent 179/82; would you like me to give the PRN Hydralazine ( parameters 180 systolic)    Orders Received: awaiting    Comments:   
Ambulatory

## 2024-05-04 NOTE — PROGRESS NOTES
[facility]    Medicine Progress Note - Hospitalist Service    Date of Admission:  4/28/2024    Assessment & Plan      Margy Babin is a 89 year old female with PMH of paroxysmal atrial fibrillation, dementia, HTN, HLD, iron deficiency anemia, orthostatic hypotension, hx CVA, depression who presented to the ED on 4/28/24 from her halfway for evaluation of cough, found to have human metapneumovirus. Medically stable for discharge on 5/3.      Pneumonia secondary to human metapneumovirus  Acute hypoxic respiratory failure  *4/28 presented with cough, wheezing, chest congestion x 1 week. Desaturated to 86% on RA and was placed on 3 L O2. No leukocytosis, afebrile, low procal. ProBNP 516. CXR with reticular opacities. Negative for COVID-19, RSV, influenza. Note, CT chest 11/24/23 showed some atelectasis but no opacities or suggestion of fibrosis. No hx smoking or any underlying lung disease.  *4/29 Respiratory PCR (+) for human metapneumovirus. Rocephin and Azithromycin were discontinued as her pneumonia is felt to be viral. Have not appreciated any significant wheezing since admission.  *4/30 still requiring 4 L O2, ongoing dry cough and fatigue. No significant improvement with supportive therapies yet. WBC 12 but procal unchanged at 0.08, afebrile. Repeat CXR with improved interstitial infiltrates.   *5/1 Still requiring 3 liters O2, has dry cough, feeling a bit better. WBC now 14, add on procal pending. Has some crackles at the lung bases bilaterally  *5/2 Now down to 1 lpm O2, cough is improving, denies shortness of breath today, WBC still about 14. Plan as follows. Continues to have coarse breath sounds at bases posteriorly   *5/3 Off O2 while at rest, walk test pending.   - Care order placed for nursing to complete walk test with O2 to determine if patient may require home oxygen during activity   - Patient off oxygen on 5/3 while at rest, she is worried about going back to her  "assisted living with her current services. She is medically stable for discharge. She is willing to go to a TCU to continue recovery before returning to [facility]. Discussed with . She may be able to get additional services at [facility] as well.   -Mucinex 600 mg BID  -Flutter valve  -Duonebs as needed  -Incentive spirometry  -Tessalon as needed  -Sputum culture  -PT recommending home with assist of 1 vs TCU  -SW following for discharge planning     Leukocytosis  Suspect leukocytosis was in the setting of viral pneumonia. Her O2 requirements have improved without antibiotics.   Due to worsening wbc today will order chest xray , UA .procalcitonin, UA   Procal wnl and chest xray no pnuemonia     Elevated troponin  *Troponin 20, repeat 17. EKG SR with PVCs and known LBBB. No chest pain. Likely elevated due to demand ischemia in the setting of acute hypoxic respiratory failure.  *Echo 11/23/23 with EF 50-55%, mild concentric LVH, no significant valve disease     Paroxysmal atrial fibrillation  HTN  HLD  -Continue PTA Metoprolol, Xarelto, Atorvastatin      History of CVA (04/2022)  *Cardioembolic from atrial fibrillation  -Continue PTA Xarelto, Atorvastatin     Dementia  *Oriented x 2, coming from [facility]  -Continue PTA Namenda, Seroquel   - consult for discharge planning     Depression  -Continue PTA Paxil once verified           Diet: Regular Diet Adult  Diet    DVT Prophylaxis: DOAC  Pichardo Catheter: Not present  Lines: None     Cardiac Monitoring: None  Code Status: No CPR- Do NOT Intubate      Clinically Significant Risk Factors                  # Hypertension: Noted on problem list     # Dementia: noted on problem list    # Overweight: Estimated body mass index is 25.53 kg/m  as calculated from the following:    Height as of this encounter: 1.65 m (5' 4.96\").    Weight as of this encounter: 69.5 kg (153 lb 3.5 oz).      # Financial/Environmental Concerns: none         Disposition Plan "     Medically Ready for Discharge: Anticipated Tomorrow             Lyubov Jose MD  Hospitalist Service  [facility]  Securely message with [employer] (more info)  Text page via [employer] Paging/Directory   ______________________________________________________________________    Interval History   Patient seen and examined at bedside  Patient denies any chest pain she denies any shortness of breath.  She denies any urgency or burning when she urinates  She says she feels tired and does not feel well      Physical Exam   Vital Signs: Temp: 98.5  F (36.9  C) Temp src: Oral BP: (!) 157/63 Pulse: 69   Resp: 16 SpO2: 93 % O2 Device: None (Room air) Oxygen Delivery: 1 LPM  Weight: 153 lbs 3.52 oz    Physical Exam  Cardiovascular:      Rate and Rhythm: Normal rate and regular rhythm.      Heart sounds: Normal heart sounds.   Pulmonary:      Effort: Pulmonary effort is normal. No respiratory distress.      Breath sounds: Wheezing present.   Abdominal:      General: There is no distension.      Palpations: Abdomen is soft.      Tenderness: There is no abdominal tenderness.          Medical Decision Making       40 MINUTES SPENT BY ME on the date of service doing chart review, history, exam, documentation & further activities per the note.      Data     I have personally reviewed the following data over the past 24 hrs:    16.5 (H)  \   14.5   / 413     139 104 32.8 (H) /  109 (H)   5.0 26 0.94 \       Imaging results reviewed over the past 24 hrs:   No results found for this or any previous visit (from the past 24 hour(s)).

## 2024-05-04 NOTE — PLAN OF CARE
A/Ox3, forgetful. VSS on RA most of the time. She desats occasional when sleeping, intermittently placed O2 at 1L NC. Denies pain when asked. Voiding well per BR, continent of bladder this shift. Up A-1 w/ GB and walker. Still has SOB with exertion, desats with activity but able to back up if she will rest. Fair appetite, refused to eat lunch as she's not hungry will eat her dinner early. Discharge pending back to her AL.

## 2024-05-04 NOTE — PROGRESS NOTES
Patient is A&Ox3, sometimes  confused. Incontinent both B&B. VSS on 1L of O2 at night, Denies pain during the shift. Slept well during the night. Moves extremities.PIV on SL

## 2024-05-04 NOTE — PLAN OF CARE
A/Ox3, forgetful. VSS, intermittently on O2 at 1L NC when sleeping as she desats to 87-89. Denies pain when asked. Voided X2 this AM, PVR 7mls. Up A-1 w/ GB and walker. Still has mild SOB with exertion. Fair appetite. UA needs to be collected, pt wasn't able to pee yet. Discharge pending back to her AL.

## 2024-05-05 LAB
ERYTHROCYTE [DISTWIDTH] IN BLOOD BY AUTOMATED COUNT: 13.9 % (ref 10–15)
HCT VFR BLD AUTO: 44.3 % (ref 35–47)
HGB BLD-MCNC: 14.1 G/DL (ref 11.7–15.7)
MCH RBC QN AUTO: 30.8 PG (ref 26.5–33)
MCHC RBC AUTO-ENTMCNC: 31.8 G/DL (ref 31.5–36.5)
MCV RBC AUTO: 97 FL (ref 78–100)
PLATELET # BLD AUTO: 436 10E3/UL (ref 150–450)
RBC # BLD AUTO: 4.58 10E6/UL (ref 3.8–5.2)
WBC # BLD AUTO: 19.6 10E3/UL (ref 4–11)

## 2024-05-05 PROCEDURE — 999N000157 HC STATISTIC RCP TIME EA 10 MIN

## 2024-05-05 PROCEDURE — 94640 AIRWAY INHALATION TREATMENT: CPT

## 2024-05-05 PROCEDURE — 85027 COMPLETE CBC AUTOMATED: CPT | Performed by: STUDENT IN AN ORGANIZED HEALTH CARE EDUCATION/TRAINING PROGRAM

## 2024-05-05 PROCEDURE — 120N000001 HC R&B MED SURG/OB

## 2024-05-05 PROCEDURE — 36415 COLL VENOUS BLD VENIPUNCTURE: CPT | Performed by: STUDENT IN AN ORGANIZED HEALTH CARE EDUCATION/TRAINING PROGRAM

## 2024-05-05 PROCEDURE — 250N000013 HC RX MED GY IP 250 OP 250 PS 637: Performed by: PHYSICIAN ASSISTANT

## 2024-05-05 PROCEDURE — 250N000009 HC RX 250: Performed by: STUDENT IN AN ORGANIZED HEALTH CARE EDUCATION/TRAINING PROGRAM

## 2024-05-05 PROCEDURE — 94640 AIRWAY INHALATION TREATMENT: CPT | Mod: 76

## 2024-05-05 PROCEDURE — 99232 SBSQ HOSP IP/OBS MODERATE 35: CPT | Performed by: STUDENT IN AN ORGANIZED HEALTH CARE EDUCATION/TRAINING PROGRAM

## 2024-05-05 PROCEDURE — 250N000013 HC RX MED GY IP 250 OP 250 PS 637: Performed by: STUDENT IN AN ORGANIZED HEALTH CARE EDUCATION/TRAINING PROGRAM

## 2024-05-05 RX ADMIN — ATORVASTATIN CALCIUM 20 MG: 20 TABLET, FILM COATED ORAL at 20:12

## 2024-05-05 RX ADMIN — MEMANTINE 5 MG: 5 TABLET ORAL at 20:12

## 2024-05-05 RX ADMIN — IPRATROPIUM BROMIDE AND ALBUTEROL SULFATE 3 ML: .5; 3 SOLUTION RESPIRATORY (INHALATION) at 20:19

## 2024-05-05 RX ADMIN — IPRATROPIUM BROMIDE AND ALBUTEROL SULFATE 3 ML: .5; 3 SOLUTION RESPIRATORY (INHALATION) at 14:50

## 2024-05-05 RX ADMIN — GUAIFENESIN 600 MG: 600 TABLET, EXTENDED RELEASE ORAL at 09:18

## 2024-05-05 RX ADMIN — GUAIFENESIN 600 MG: 600 TABLET, EXTENDED RELEASE ORAL at 20:12

## 2024-05-05 RX ADMIN — METOPROLOL SUCCINATE 100 MG: 100 TABLET, EXTENDED RELEASE ORAL at 09:18

## 2024-05-05 RX ADMIN — IPRATROPIUM BROMIDE AND ALBUTEROL SULFATE 3 ML: .5; 3 SOLUTION RESPIRATORY (INHALATION) at 07:46

## 2024-05-05 RX ADMIN — QUETIAPINE FUMARATE 25 MG: 25 TABLET ORAL at 21:05

## 2024-05-05 RX ADMIN — ACETAMINOPHEN 650 MG: 325 TABLET, FILM COATED ORAL at 12:51

## 2024-05-05 RX ADMIN — MEMANTINE 5 MG: 5 TABLET ORAL at 09:18

## 2024-05-05 RX ADMIN — RIVAROXABAN 15 MG: 10 TABLET, FILM COATED ORAL at 17:06

## 2024-05-05 RX ADMIN — PAROXETINE HYDROCHLORIDE 20 MG: 20 TABLET, FILM COATED ORAL at 21:05

## 2024-05-05 RX ADMIN — DICLOFENAC 2 G: 10 GEL TOPICAL at 21:06

## 2024-05-05 ASSESSMENT — ACTIVITIES OF DAILY LIVING (ADL)
ADLS_ACUITY_SCORE: 44

## 2024-05-05 NOTE — PROGRESS NOTES
A/O. VSS. Up-1/walker. 1L O2 @ HS. Denies pain or SOB. Intermittent cough. Coarse lungs. Tolerating diet. Pending d'c

## 2024-05-05 NOTE — PROGRESS NOTES
[facility]    Medicine Progress Note - Hospitalist Service    Date of Admission:  4/28/2024    Assessment & Plan   Margy Babin is a 89 year old female with PMH of paroxysmal atrial fibrillation, dementia, HTN, HLD, iron deficiency anemia, orthostatic hypotension, hx CVA, depression who presented to the ED on 4/28/24 from her [facility] for evaluation of cough, found to have human metapneumovirus. Medically stable for discharge on 5/3.      Pneumonia secondary to human metapneumovirus  Acute hypoxic respiratory failure  *4/28 presented with cough, wheezing, chest congestion x 1 week. Desaturated to 86% on RA and was placed on 3 L O2. No leukocytosis, afebrile, low procal. ProBNP 516. CXR with reticular opacities. Negative for COVID-19, RSV, influenza. Note, CT chest 11/24/23 showed some atelectasis but no opacities or suggestion of fibrosis. No hx smoking or any underlying lung disease.  *4/29 Respiratory PCR (+) for human metapneumovirus. Rocephin and Azithromycin were discontinued as her pneumonia is felt to be viral. Have not appreciated any significant wheezing since admission.  *4/30 still requiring 4 L O2, ongoing dry cough and fatigue. No significant improvement with supportive therapies yet. WBC 12 but procal unchanged at 0.08, afebrile. Repeat CXR with improved interstitial infiltrates.   *5/1 Still requiring 3 liters O2, has dry cough, feeling a bit better. WBC now 14, add on procal pending. Has some crackles at the lung bases bilaterally  *5/2 Now down to 1 lpm O2, cough is improving, denies shortness of breath today, WBC still about 14. Plan as follows. Continues to have coarse breath sounds at bases posteriorly   *5/3 Off O2 while at rest, walk test pending.   - Care order placed for nursing to complete walk test with O2 to determine if patient may require home oxygen during activity   --Will likely need oxygen at time of discharge and oxygen assement order placed for  "tomorrow        -Mucinex 600 mg BID  -Flutter valve  -Duonebs as needed  -Incentive spirometry  -Tessalon as needed  -Sputum culture  -PT recommending home with assist of 1 vs TCU  -SW following for discharge planning     Leukocytosis  Suspect leukocytosis was in the setting of viral pneumonia. Her O2 requirements have improved without antibiotics.   Due to worsening wbc today will order chest xray , UA .procalcitonin, UA   Procal wnl and chest xray no pnuemonia   No UTI symptoms and urine cultre growing Protues and E fecalis  -Consult ID for further recommendations  -Low threshold to start antibiotics if hemodynamically unstable     Elevated troponin  *Troponin 20, repeat 17. EKG SR with PVCs and known LBBB. No chest pain. Likely elevated due to demand ischemia in the setting of acute hypoxic respiratory failure.  *Echo 11/23/23 with EF 50-55%, mild concentric LVH, no significant valve disease     Paroxysmal atrial fibrillation  HTN  HLD  -Continue PTA Metoprolol, Xarelto, Atorvastatin      History of CVA (04/2022)  *Cardioembolic from atrial fibrillation  -Continue PTA Xarelto, Atorvastatin     Dementia  *Oriented x 2, coming from [facility]  -Continue PTA Namenda, Seroquel   -SW consult for discharge planning     Depression  -Continue PTA Paxil      # Right knee pain  -Voltren gel  -If not improving then will order xray           Diet: Regular Diet Adult  Diet    DVT Prophylaxis: DOAC  Pichardo Catheter: Not present  Lines: None     Cardiac Monitoring: None  Code Status: No CPR- Do NOT Intubate      Clinically Significant Risk Factors                  # Hypertension: Noted on problem list     # Dementia: noted on problem list    # Overweight: Estimated body mass index is 25.53 kg/m  as calculated from the following:    Height as of this encounter: 1.65 m (5' 4.96\").    Weight as of this encounter: 69.5 kg (153 lb 3.5 oz).      # Financial/Environmental Concerns: none         Disposition Plan "     Medically Ready for Discharge: 1-2 days              Lyubov Jose MD  Hospitalist Service  [facility]  Securely message with [employer] (more info)  Text page via [employer] Paging/Directory   ______________________________________________________________________    Interval History   Patient seen and examined at bedside.  States she feels better    Denies any shortness of breath.  Complaining of right knee pain.  Unsure if she has bumped her knee      Physical Exam   Vital Signs: Temp: 98.1  F (36.7  C) Temp src: Oral BP: (!) 156/69 Pulse: 82   Resp: 18 SpO2: 92 % O2 Device: Nasal cannula Oxygen Delivery: 3 LPM  Weight: 153 lbs 3.52 oz    Physical Exam  Cardiovascular:      Rate and Rhythm: Normal rate and regular rhythm.      Heart sounds: Normal heart sounds.   Pulmonary:      Effort: Pulmonary effort is normal. No respiratory distress.      Breath sounds: No wheezing.   Abdominal:      General: There is no distension.      Palpations: Abdomen is soft.      Tenderness: There is no abdominal tenderness.          Medical Decision Making       40 MINUTES SPENT BY ME on the date of service doing chart review, history, exam, documentation & further activities per the note.      Data         Imaging results reviewed over the past 24 hrs:   No results found for this or any previous visit (from the past 24 hour(s)).

## 2024-05-06 LAB
ANION GAP SERPL CALCULATED.3IONS-SCNC: 11 MMOL/L (ref 7–15)
BUN SERPL-MCNC: 28.8 MG/DL (ref 8–23)
CALCIUM SERPL-MCNC: 8.9 MG/DL (ref 8.2–9.6)
CHLORIDE SERPL-SCNC: 104 MMOL/L (ref 98–107)
CREAT SERPL-MCNC: 1.06 MG/DL (ref 0.51–0.95)
DEPRECATED HCO3 PLAS-SCNC: 26 MMOL/L (ref 22–29)
EGFRCR SERPLBLD CKD-EPI 2021: 48 ML/MIN/1.73M2
ERYTHROCYTE [DISTWIDTH] IN BLOOD BY AUTOMATED COUNT: 13.8 % (ref 10–15)
GLUCOSE SERPL-MCNC: 102 MG/DL (ref 70–99)
HCT VFR BLD AUTO: 41 % (ref 35–47)
HGB BLD-MCNC: 12.9 G/DL (ref 11.7–15.7)
MCH RBC QN AUTO: 30.1 PG (ref 26.5–33)
MCHC RBC AUTO-ENTMCNC: 31.5 G/DL (ref 31.5–36.5)
MCV RBC AUTO: 96 FL (ref 78–100)
PLATELET # BLD AUTO: 381 10E3/UL (ref 150–450)
POTASSIUM SERPL-SCNC: 4.2 MMOL/L (ref 3.4–5.3)
RBC # BLD AUTO: 4.28 10E6/UL (ref 3.8–5.2)
SODIUM SERPL-SCNC: 141 MMOL/L (ref 135–145)
WBC # BLD AUTO: 14.6 10E3/UL (ref 4–11)

## 2024-05-06 PROCEDURE — 120N000001 HC R&B MED SURG/OB

## 2024-05-06 PROCEDURE — 94640 AIRWAY INHALATION TREATMENT: CPT

## 2024-05-06 PROCEDURE — 99222 1ST HOSP IP/OBS MODERATE 55: CPT | Performed by: SPECIALIST

## 2024-05-06 PROCEDURE — 999N000157 HC STATISTIC RCP TIME EA 10 MIN

## 2024-05-06 PROCEDURE — 250N000009 HC RX 250: Performed by: STUDENT IN AN ORGANIZED HEALTH CARE EDUCATION/TRAINING PROGRAM

## 2024-05-06 PROCEDURE — 36415 COLL VENOUS BLD VENIPUNCTURE: CPT | Performed by: STUDENT IN AN ORGANIZED HEALTH CARE EDUCATION/TRAINING PROGRAM

## 2024-05-06 PROCEDURE — 94640 AIRWAY INHALATION TREATMENT: CPT | Mod: 76

## 2024-05-06 PROCEDURE — 85027 COMPLETE CBC AUTOMATED: CPT | Performed by: STUDENT IN AN ORGANIZED HEALTH CARE EDUCATION/TRAINING PROGRAM

## 2024-05-06 PROCEDURE — 250N000013 HC RX MED GY IP 250 OP 250 PS 637: Performed by: PHYSICIAN ASSISTANT

## 2024-05-06 PROCEDURE — 99232 SBSQ HOSP IP/OBS MODERATE 35: CPT | Performed by: HOSPITALIST

## 2024-05-06 PROCEDURE — 80048 BASIC METABOLIC PNL TOTAL CA: CPT | Performed by: STUDENT IN AN ORGANIZED HEALTH CARE EDUCATION/TRAINING PROGRAM

## 2024-05-06 RX ADMIN — METOPROLOL SUCCINATE 100 MG: 100 TABLET, EXTENDED RELEASE ORAL at 09:40

## 2024-05-06 RX ADMIN — ATORVASTATIN CALCIUM 20 MG: 20 TABLET, FILM COATED ORAL at 20:32

## 2024-05-06 RX ADMIN — DICLOFENAC 2 G: 10 GEL TOPICAL at 18:50

## 2024-05-06 RX ADMIN — MEMANTINE 5 MG: 5 TABLET ORAL at 09:40

## 2024-05-06 RX ADMIN — IPRATROPIUM BROMIDE AND ALBUTEROL SULFATE 3 ML: .5; 3 SOLUTION RESPIRATORY (INHALATION) at 02:25

## 2024-05-06 RX ADMIN — PAROXETINE HYDROCHLORIDE 20 MG: 20 TABLET, FILM COATED ORAL at 21:41

## 2024-05-06 RX ADMIN — MEMANTINE 5 MG: 5 TABLET ORAL at 21:41

## 2024-05-06 RX ADMIN — IPRATROPIUM BROMIDE AND ALBUTEROL SULFATE 3 ML: .5; 3 SOLUTION RESPIRATORY (INHALATION) at 07:20

## 2024-05-06 RX ADMIN — GUAIFENESIN 600 MG: 600 TABLET, EXTENDED RELEASE ORAL at 09:40

## 2024-05-06 RX ADMIN — GUAIFENESIN 600 MG: 600 TABLET, EXTENDED RELEASE ORAL at 21:41

## 2024-05-06 RX ADMIN — ACETAMINOPHEN 650 MG: 325 TABLET, FILM COATED ORAL at 02:45

## 2024-05-06 RX ADMIN — DICLOFENAC 2 G: 10 GEL TOPICAL at 11:26

## 2024-05-06 RX ADMIN — DICLOFENAC 2 G: 10 GEL TOPICAL at 21:46

## 2024-05-06 RX ADMIN — IPRATROPIUM BROMIDE AND ALBUTEROL SULFATE 3 ML: .5; 3 SOLUTION RESPIRATORY (INHALATION) at 21:06

## 2024-05-06 RX ADMIN — QUETIAPINE FUMARATE 25 MG: 25 TABLET ORAL at 21:42

## 2024-05-06 RX ADMIN — RIVAROXABAN 15 MG: 10 TABLET, FILM COATED ORAL at 18:50

## 2024-05-06 ASSESSMENT — ACTIVITIES OF DAILY LIVING (ADL)
ADLS_ACUITY_SCORE: 44

## 2024-05-06 NOTE — PROGRESS NOTES
[facility]    Medicine Progress Note - Hospitalist Service    Date of Admission:  4/28/2024    Assessment & Plan       Margy Babin is a 89 year old female with PMH of paroxysmal atrial fibrillation, dementia, HTN, HLD, iron deficiency anemia, orthostatic hypotension, hx CVA, depression who presented to the ED on 4/28/24 from her YANET for evaluation of cough, found to have human metapneumovirus.        Acute hypoxic respiratory failure dueto human metapneumovirus  -On 4/28 presented with cough, wheezing, chest congestion x 1 week. Desaturated to 86% on RA and was placed on 3 L O2. No leukocytosis, afebrile, low procal. ProBNP 516.   -CXR with reticular opacities.  - Negative for COVID-19, RSV, influenza.  . No hx smoking or any underlying lung disease.  -Respiratory panel on 4/29 showed human metapneumovirus  -During the course of hospitalization patient's oxygen needs have been fluctuating  -Chest x-ray on 5/4/2024 did not show any evidence of pneumonia  -On exam she is still needing 4 L of oxygen and I was able to wean her down to 2 L and also taught her how to use Aerobika and incentive spirometer  -Continue with pulmonary toilet  -Continue with Mucinex 600 twice daily and as needed Tessalon  -Continue with as needed DuoNebs  -Continue to wean from oxygen     Leukocytosis-improving  ?  UTI  -WBC count on admission was 10.6 and did peak to 19.6 and is trending down  -Respiratory panel on 4/28 did show human metapneumovirus  -UA from 5/4 is growing Proteus mirabilis and E faecalis  -Repeated multiple x-rays of the chest done on 4/28, 4/30 and recently on 5/4 did not show any evidence of any pneumonia  -ID consulted given that patient had no UTI symptoms    Elevated troponin  -Troponin 20, repeat 17.   -EKG SR with PVCs and known LBBB. No chest pain.   -Echo 11/23/23 with EF 50-55%, mild concentric LVH, no significant valve disease  -Likely elevated due to demand ischemia in the  "setting of acute hypoxic respiratory failure.       Paroxysmal atrial fibrillation  HTN  HLD  -Continue PTA Metoprolol, Xarelto, Atorvastatin      History of CVA (04/2022)  -Cardioembolic from atrial fibrillation  -Continue PTA Xarelto, Atorvastatin     Dementia  -Oriented x 2, coming from [facility]  -Continue PTA Namenda, Seroquel      Depression  -Continue PTA Paxil       Right knee pain  -Voltren gel  -If not improving then will order xray     Family communication   - I did speak with daughter [phone] and did update her           Diet: Regular Diet Adult  Diet    DVT Prophylaxis: DOAC  Pichardo Catheter: Not present  Lines: None     Cardiac Monitoring: None  Code Status: No CPR- Do NOT Intubate      Clinically Significant Risk Factors                  # Hypertension: Noted on problem list     # Dementia: noted on problem list    # Overweight: Estimated body mass index is 25.53 kg/m  as calculated from the following:    Height as of this encounter: 1.65 m (5' 4.96\").    Weight as of this encounter: 69.5 kg (153 lb 3.5 oz).      # Financial/Environmental Concerns: none         Disposition Plan     Medically Ready for Discharge: Anticipated Tomorrow             Manharrison Dill MD  Hospitalist Service  [facility]  Securely message with [employer] (more info)  Text page via [employer] Paging/Directory   ______________________________________________________________________    Interval History     -Sitting in the chair and denied any shortness of breath, nausea, vomiting or cough  -Did teach patient how to use incentive spirometer and Aerobika and encouraged her to continue using the same  -Discussed with RN and they will also check if patient's oxygen meter is showing correct reading and also try to wean her off oxygen    Physical Exam   Vital Signs: Temp: 98.2  F (36.8  C) Temp src: Axillary BP: (!) 141/73 Pulse: 88   Resp: 16 SpO2: 91 % O2 Device: Nasal cannula Oxygen " Delivery: 4 LPM  Weight: 153 lbs 3.52 oz        General: Patient appears comfortable and in no acute distress.  Respiratory: ctla   Cardiovascular: Regular rate , S1 and S2 normal with no murmer or rubs or gallops  Abdomen:   soft , non tender  Neurologic: No facial droop  Musculoskeletal: Normal Range of motion over upper and lower extremities bilaterally   Psychiatric: cooperative      Medical Decision Making       Time spent in care of patient is 45 minutes and I reviewed labs including CBC and basic metabolic panel and discussed plan of care in detail with the patient and the nursing staff also discussed with the case management team and the patient's nurse and also call the family      Data     I have personally reviewed the following data over the past 24 hrs:    14.6 (H)  \   12.9   / 381     141 104 28.8 (H) /  102 (H)   4.2 26 1.06 (H) \       Imaging results reviewed over the past 24 hrs:   No results found for this or any previous visit (from the past 24 hour(s)).

## 2024-05-06 NOTE — CONSULTS
[facility]    Infectious Disease Consultation     Date of Admission:  4/28/2024  Date of Consult : 05/06/24    Assessment:  89YF who has been admitted with cough and shortness of breath and was diagnosed with human metapneumoviral respiratory infection. UA shows only 11 WBC, no bacteriuria to suggest infection and positive cx for E.fecalis and proteus mirabilis likely suggests colonization. She has no urinary symptoms and no antimicrobial therapy is advised at this time.    -Human metapneumovirus infection with viral pneumonia  -Acute hypoxic respiratroy failure related to viral infection, improving  -Urinary colonization. UA has only 11 WBC not consistent with infection and cx is polymicrobial with proteus mirabilis and Enterococcus fecalis. Has had prior urine cxs positive   -Chronic medical conditions - atrial fibrillation, HTN, hyperlipidemia, h of CVA, dementia and depression    Recommendations:  Supportive care  No antimicrobial therapy is advised. Monitor off antibiotics  Recommendations were discussed with the hospitalist service  ID will sign off      Ledy Lobo MD    Reason for Consult   Reason for consult: I was asked to evaluate this patient for antimicrobial recommendations for positive urine cx.    Primary Care Physician   Colin Lake    Chief Complaint   Cough and shortness of breath    History is obtained from the patient and medical records    History of Present Illness   Margy Babin is a 89 year old female,  who has been admitted with cough and shortness of breath and was diagnosed with human metapneumoviral respiratory infection She has been maintained conservatively    She has remained afebrile, she had leukocytosis of 19.6k which is improving. Procalcitonin is negative. Patient notes pain in her bottom while sitting, but denies urinary discomfort, pain or dysuria. UA showed only 11 WBC and urine cx grew 10-50,000 cfu/ml of proteus mirabilis and  Enterococcus fecalis.    ID has been asked to evaluate patient for antimicrobial therapy  She  has some cough and shortness of breath but overall improved since admission    Past Medical History   I have reviewed this patient's medical history and updated it with pertinent information if needed.   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    Mixed hyperlipidemia 2020    Nausea without vomiting 2015     Problem list name updated by automated process. Provider to review    Tremor, hereditary, benign        Past Surgical History   I have reviewed this patient's surgical history and updated it with pertinent information if needed.  Past Surgical History:   Procedure Laterality Date    HYSTERECTOMY TOTAL ABDOMINAL, BILATERAL SALPINGO-OOPHORECTOMY, COMBINED      NECK SURGERY      cervical fusion    ROTATOR CUFF REPAIR RT/LT Right     ZZC TOTAL HIP ARTHROPLASTY Right        Prior to Admission Medications   Prior to Admission Medications   Prescriptions Last Dose Informant Patient Reported? Taking?   Ferrous Sulfate 324 (65 Fe) MG TBEC 2024 at am Nursing Home Yes Yes   Sig: Take 324 mg by mouth three times a week Every Mon, Wed and Fri   PARoxetine (PAXIL) 20 MG tablet 2024 at pm Nursing Home No Yes   Si TAB BY MOUTH AT BEDTIME Strength: 20 mg   QUEtiapine (SEROQUEL) 25 MG tablet 2024 at pm Nursing Home No Yes   Si TAB BY MOUTH AT BEDTIME (DX: INSOMNIA)   Vitamin D3 (VITAMIN D, CHOLECALCIFEROL,) 25 mcg (1000 units) tablet 2024 at am Nursing  Home Yes Yes   Sig: Take 25 mcg by mouth daily   acetaminophen (TYLENOL) 500 MG tablet  at prn Nursing Home Yes Yes   Sig: Take 1,000 mg by mouth every 12 hours as needed for mild pain   acetaminophen (TYLENOL) 500 MG tablet 2024 at am Nursing Home Yes Yes   Sig: Take 1,000 mg by mouth daily   albuterol (PROAIR HFA/PROVENTIL HFA/VENTOLIN HFA) 108 (90 Base) MCG/ACT inhaler 2024 at am Nursing Home Yes Yes   Sig: Inhale 1-2 puffs into the lungs every 4 hours as needed for shortness of breath, wheezing or cough   atorvastatin (LIPITOR) 20 MG tablet 2024 at pm Nursing Home No Yes   Sig: Take 1 tablet (20 mg) by mouth daily   bisacodyl (DULCOLAX) 5 MG EC tablet  at prn Nursing Home No Yes   Si TAB BY MOUTH DAILY AS NEEDED FOR CONSTIPATION   docusate sodium (COLACE) 100 MG capsule  at prn Nursing Home No Yes   Sig: TAKE 1 CAP BY MOUTH TWICE DAILY AS NEEDED FOR CONSTIPATION   guaiFENesin (MUCINEX) 600 MG 12 hr tablet 2024 at am Nursing Home Yes Yes   Sig: Take 600 mg by mouth 2 times daily   ipratropium - albuterol 0.5 mg/2.5 mg/3 mL (DUONEB) 0.5-2.5 (3) MG/3ML neb solution  at prn Nursing Home No Yes   Sig: Take 1 vial (3 mLs) by nebulization every 4 hours as needed for shortness of breath   memantine (NAMENDA) 5 MG tablet 2024 at pm Nursing Home No Yes   Si TAB BY MOUTH TWICE DAILY   metoprolol succinate ER (TOPROL XL) 100 MG 24 hr tablet 2024 at am Nursing Home No Yes   Si TAB BY MOUTH DAILY (DX: HYPERTENSION)   ondansetron (ZOFRAN) 4 MG tablet  at prn Nursing Home No Yes   Si TAB BY MOUTH EVERY 8 HOURS AS NEEDED FOR NAUSEA   ramelteon (ROZEREM) 8 MG tablet  at prn Nursing Home No Yes   Si TAB BY MOUTH EVERY NIGHT AS NEEDED FOR SLEEP   rivaroxaban ANTICOAGULANT (XARELTO) 15 MG TABS tablet 2024 at pm Nursing Home Yes Yes   Sig: Take 15 mg by mouth daily (with dinner)   traMADol (ULTRAM) 50 MG tablet  at prn Nursing Home No Yes   Sig: Take 0.5 tablets (25 mg) by mouth  every 6 hours as needed for severe pain      Facility-Administered Medications: None     Allergies   No Known Allergies    Immunization History   Immunization History   Administered Date(s) Administered    COVID-19 12+ (-) (MODERNA) 10/30/2023    COVID-19 Bivalent 12+ (Pfizer) 2023    COVID-19 MONOVALENT 12+ (Pfizer) 2021, 2021, 10/08/2021, 2022    Influenza (High Dose) 3 valent vaccine 2015, 2017, 2019, 10/01/2020    Influenza (IIV3) PF 10/01/2013    Influenza Vaccine 65+ (Fluzone HD) 2001, 10/06/2020, 2021, 10/12/2022    Influenza, seasonal, injectable, PF 2009, 2021    Pneumo Conj 13-V (2010&after) 2019    Pneumococcal 23 valent 2006    TDAP (Adacel,Boostrix) 2011, 2023       Social History   I have reviewed this patient's social history and updated it with pertinent information if needed. Margy Babin  reports that she has never smoked. She has never used smokeless tobacco. She reports that she does not drink alcohol and does not use drugs.    Family History   I have reviewed this patient's family history and updated it with pertinent information if needed.   Family History   Problem Relation Age of Onset    Heart Disease Father 80        MI    C.A.D. Father     Breast Cancer Daughter 43         age 53    Emphysema Sister     Heart Disease Sister        Review of Systems   The 10 point Review of Systems is as per HPI    Physical Exam   Temp: 98.2  F (36.8  C) Temp src: Axillary BP: (!) 141/73 Pulse: 88   Resp: 16 SpO2: 91 % O2 Device: Nasal cannula Oxygen Delivery: 4 LPM  Vital Signs with Ranges  Temp:  [97.9  F (36.6  C)-99.7  F (37.6  C)] 98.2  F (36.8  C)  Pulse:  [61-91] 88  Resp:  [16-18] 16  BP: (141-146)/(66-73) 141/73  SpO2:  [87 %-93 %] 91 %  153 lbs 3.52 oz  Body mass index is 25.53 kg/m .    GENERAL APPEARANCE:  awake  EYES: Eyes grossly normal to inspection  NECK: no adenopathy  RESP: lungs  clear   CV: regular rates and rhythm  ABDOMEN: soft, non tender  SKIN: no suspicious lesions or rashes      Data   All laboratory data reviewed    Component      Latest Ref Rng 5/6/2024  8:36 AM   Sodium      135 - 145 mmol/L 141    Potassium      3.4 - 5.3 mmol/L 4.2    Chloride      98 - 107 mmol/L 104    Carbon Dioxide (CO2)      22 - 29 mmol/L 26    Anion Gap      7 - 15 mmol/L 11    Urea Nitrogen      8.0 - 23.0 mg/dL 28.8 (H)    Creatinine      0.51 - 0.95 mg/dL 1.06 (H)    GFR Estimate      >60 mL/min/1.73m2 48 (L)    Calcium      8.2 - 9.6 mg/dL 8.9    Glucose      70 - 99 mg/dL 102 (H)    WBC      4.0 - 11.0 10e3/uL 14.6 (H)    RBC Count      3.80 - 5.20 10e6/uL 4.28    Hemoglobin      11.7 - 15.7 g/dL 12.9    Hematocrit      35.0 - 47.0 % 41.0    MCV      78 - 100 fL 96    MCH      26.5 - 33.0 pg 30.1    MCHC      31.5 - 36.5 g/dL 31.5    RDW      10.0 - 15.0 % 13.8    Platelet Count      150 - 450 10e3/uL 381      Component      Latest Ref Rng 4/28/2024  1:44 PM   Adenovirus      Not Detected  Not Detected    Coronavirus      Not Detected  Not Detected    Human Metapneumovirus      Not Detected  Detected !    Human Rhin/Enterovirus      Not Detected  Not Detected    Influenza A      Not Detected  Not Detected    Influenza A, H1      Not Detected  Not Detected    Influenza A 2009 H1N1      Not Detected  Not Detected    Influenza A, H3      Not Detected  Not Detected    Influenza B      Not Detected  Not Detected    Parainfluenza Virus 1      Not Detected  Not Detected    Parainfluenza Virus 2      Not Detected  Not Detected    Parainfluenza Virus 3      Not Detected  Not Detected    Parainfluenza Virus 4      Not Detected  Not Detected    Respiratory Syncytial Virus A      Not Detected  Not Detected    Respiratory Syncytial Virus B      Not Detected  Not Detected    Chlamydia pneumoniae      Not Detected  Not Detected    Mycoplasma pneumoniae      Not Detected  Not Detected       Component      Latest Ref  Rng 5/4/2024  8:51 PM   Color Urine      Colorless, Straw, Light Yellow, Yellow  Light Yellow    Appearance Urine      Clear  Clear    Glucose Urine      Negative mg/dL Negative    Bilirubin Urine      Negative  Negative    Ketones Urine      Negative mg/dL Negative    Specific Gravity Urine      1.003 - 1.035  1.021    Blood Urine      Negative  Negative    pH Urine      5.0 - 7.0  5.0    Protein Albumin Urine      Negative mg/dL Negative    Urobilinogen mg/dL      Normal, 2.0 mg/dL Normal    Nitrite Urine      Negative  Negative    Leukocyte Esterase Urine      Negative  Moderate !    Mucus Urine      None Seen /LPF Present !    RBC Urine      <=2 /HPF 2    WBC Urine      <=5 /HPF 11 (H)    Squamous Epithelial /HPF Urine      <=1 /HPF 8 (H)       Microbiology         05/04/2024 2051 05/06/2024 0628 Urine Culture [[id number]]    (Abnormal)   Urine, Midstream    Preliminary result Component Value   Culture 10,000-50,000 CFU/mL Proteus mirabilis Abnormal  P    10,000-50,000 CFU/mL Enterococcus faecalis Abnormal  P       Susceptibility     Proteus mirabilis     TRENT (Preliminary)     Amikacin 16 ug/mL Susceptible *     Ampicillin >=32 ug/mL Resistant     Cefoxitin <=4 ug/mL Susceptible     Ciprofloxacin >=4 ug/mL Resistant     Gentamicin 4 ug/mL Susceptible     Levofloxacin >=8 ug/mL Resistant     Meropenem <=0.25 ug/mL Susceptible *     Nitrofurantoin 128 ug/mL Resistant 1     Piperacillin/Tazobactam <=4 ug/mL Susceptible     Tobramycin >=16 ug/mL Resistant     Trimethoprim/Sulfamethoxazole >16/304 ug/mL Resistant                               Imaging  EXAM: XR CHEST PORT 1 VIEW  LOCATION: [facility]  DATE: 5/4/2024     INDICATION: elevated wbc  COMPARISON: 4/30/2024, 11/24/2023                                                                      IMPRESSION: Minimal chronic atelectasis in the lingula. Lungs are otherwise clear. No evidence of pneumonia.

## 2024-05-06 NOTE — PLAN OF CARE
Patient has been assessed for Home Oxygen needs. Oxygen readings:    *Pulse oximetry (SpO2) = 86% on room air at rest while awake.    *SpO2 improved to 96% on 4liters/minute at rest.    *SpO2 = 84% on room air during activity/with exercise.    *SpO2 improved to 95% on 4liters/minute during activity/with exercise.    Goal Outcome Evaluation:    A&Ox3, disoriented to time. Pt has pain in right knee, managed with scheduled voltaren gel. Home oxygen test completed. Possible discharge tomorrow.

## 2024-05-06 NOTE — PLAN OF CARE
Goal Outcome Evaluation:  Patient is progressing. Supplemental O2 is now on 3 L via N/C/

## 2024-05-07 ENCOUNTER — APPOINTMENT (OUTPATIENT)
Dept: PHYSICAL THERAPY | Facility: CLINIC | Age: 89
DRG: 193 | End: 2024-05-07
Payer: MEDICARE

## 2024-05-07 LAB
ANION GAP SERPL CALCULATED.3IONS-SCNC: 10 MMOL/L (ref 7–15)
BACTERIA UR CULT: ABNORMAL
BACTERIA UR CULT: ABNORMAL
BUN SERPL-MCNC: 26.7 MG/DL (ref 8–23)
CALCIUM SERPL-MCNC: 8.8 MG/DL (ref 8.2–9.6)
CHLORIDE SERPL-SCNC: 102 MMOL/L (ref 98–107)
CREAT SERPL-MCNC: 0.96 MG/DL (ref 0.51–0.95)
DEPRECATED HCO3 PLAS-SCNC: 25 MMOL/L (ref 22–29)
EGFRCR SERPLBLD CKD-EPI 2021: 55 ML/MIN/1.73M2
ERYTHROCYTE [DISTWIDTH] IN BLOOD BY AUTOMATED COUNT: 13.7 % (ref 10–15)
GLUCOSE SERPL-MCNC: 98 MG/DL (ref 70–99)
HCT VFR BLD AUTO: 39.6 % (ref 35–47)
HGB BLD-MCNC: 12.4 G/DL (ref 11.7–15.7)
MCH RBC QN AUTO: 29.9 PG (ref 26.5–33)
MCHC RBC AUTO-ENTMCNC: 31.3 G/DL (ref 31.5–36.5)
MCV RBC AUTO: 95 FL (ref 78–100)
PLATELET # BLD AUTO: 391 10E3/UL (ref 150–450)
POTASSIUM SERPL-SCNC: 4.4 MMOL/L (ref 3.4–5.3)
RBC # BLD AUTO: 4.15 10E6/UL (ref 3.8–5.2)
SODIUM SERPL-SCNC: 137 MMOL/L (ref 135–145)
WBC # BLD AUTO: 12.9 10E3/UL (ref 4–11)

## 2024-05-07 PROCEDURE — 97530 THERAPEUTIC ACTIVITIES: CPT | Mod: GP

## 2024-05-07 PROCEDURE — 36415 COLL VENOUS BLD VENIPUNCTURE: CPT | Performed by: STUDENT IN AN ORGANIZED HEALTH CARE EDUCATION/TRAINING PROGRAM

## 2024-05-07 PROCEDURE — 999N000157 HC STATISTIC RCP TIME EA 10 MIN

## 2024-05-07 PROCEDURE — 80048 BASIC METABOLIC PNL TOTAL CA: CPT | Performed by: STUDENT IN AN ORGANIZED HEALTH CARE EDUCATION/TRAINING PROGRAM

## 2024-05-07 PROCEDURE — 250N000009 HC RX 250: Performed by: STUDENT IN AN ORGANIZED HEALTH CARE EDUCATION/TRAINING PROGRAM

## 2024-05-07 PROCEDURE — 94640 AIRWAY INHALATION TREATMENT: CPT

## 2024-05-07 PROCEDURE — 99232 SBSQ HOSP IP/OBS MODERATE 35: CPT | Performed by: HOSPITALIST

## 2024-05-07 PROCEDURE — 250N000013 HC RX MED GY IP 250 OP 250 PS 637: Performed by: PHYSICIAN ASSISTANT

## 2024-05-07 PROCEDURE — 85027 COMPLETE CBC AUTOMATED: CPT | Performed by: STUDENT IN AN ORGANIZED HEALTH CARE EDUCATION/TRAINING PROGRAM

## 2024-05-07 PROCEDURE — 94640 AIRWAY INHALATION TREATMENT: CPT | Mod: 76

## 2024-05-07 PROCEDURE — 97116 GAIT TRAINING THERAPY: CPT | Mod: GP

## 2024-05-07 PROCEDURE — 120N000001 HC R&B MED SURG/OB

## 2024-05-07 RX ADMIN — IPRATROPIUM BROMIDE AND ALBUTEROL SULFATE 3 ML: .5; 3 SOLUTION RESPIRATORY (INHALATION) at 13:59

## 2024-05-07 RX ADMIN — DICLOFENAC 2 G: 10 GEL TOPICAL at 21:39

## 2024-05-07 RX ADMIN — DICLOFENAC 2 G: 10 GEL TOPICAL at 18:06

## 2024-05-07 RX ADMIN — GUAIFENESIN 600 MG: 600 TABLET, EXTENDED RELEASE ORAL at 09:32

## 2024-05-07 RX ADMIN — METOPROLOL SUCCINATE 100 MG: 100 TABLET, EXTENDED RELEASE ORAL at 09:32

## 2024-05-07 RX ADMIN — ATORVASTATIN CALCIUM 20 MG: 20 TABLET, FILM COATED ORAL at 20:37

## 2024-05-07 RX ADMIN — RIVAROXABAN 15 MG: 10 TABLET, FILM COATED ORAL at 17:24

## 2024-05-07 RX ADMIN — MEMANTINE 5 MG: 5 TABLET ORAL at 21:38

## 2024-05-07 RX ADMIN — DICLOFENAC 2 G: 10 GEL TOPICAL at 14:55

## 2024-05-07 RX ADMIN — GUAIFENESIN 600 MG: 600 TABLET, EXTENDED RELEASE ORAL at 21:38

## 2024-05-07 RX ADMIN — QUETIAPINE FUMARATE 25 MG: 25 TABLET ORAL at 21:38

## 2024-05-07 RX ADMIN — IPRATROPIUM BROMIDE AND ALBUTEROL SULFATE 3 ML: .5; 3 SOLUTION RESPIRATORY (INHALATION) at 07:26

## 2024-05-07 RX ADMIN — MEMANTINE 5 MG: 5 TABLET ORAL at 09:32

## 2024-05-07 RX ADMIN — IPRATROPIUM BROMIDE AND ALBUTEROL SULFATE 3 ML: .5; 3 SOLUTION RESPIRATORY (INHALATION) at 19:11

## 2024-05-07 RX ADMIN — PAROXETINE HYDROCHLORIDE 20 MG: 20 TABLET, FILM COATED ORAL at 21:38

## 2024-05-07 RX ADMIN — DICLOFENAC 2 G: 10 GEL TOPICAL at 09:33

## 2024-05-07 ASSESSMENT — ACTIVITIES OF DAILY LIVING (ADL)
ADLS_ACUITY_SCORE: 45
ADLS_ACUITY_SCORE: 44
ADLS_ACUITY_SCORE: 45
ADLS_ACUITY_SCORE: 44
ADLS_ACUITY_SCORE: 45
ADLS_ACUITY_SCORE: 44
ADLS_ACUITY_SCORE: 45
ADLS_ACUITY_SCORE: 44
ADLS_ACUITY_SCORE: 45
ADLS_ACUITY_SCORE: 45
ADLS_ACUITY_SCORE: 44
ADLS_ACUITY_SCORE: 44
ADLS_ACUITY_SCORE: 45
ADLS_ACUITY_SCORE: 44
ADLS_ACUITY_SCORE: 45
ADLS_ACUITY_SCORE: 44
ADLS_ACUITY_SCORE: 44
ADLS_ACUITY_SCORE: 45
ADLS_ACUITY_SCORE: 44
ADLS_ACUITY_SCORE: 45
ADLS_ACUITY_SCORE: 44

## 2024-05-07 NOTE — PLAN OF CARE
Goal Outcome Evaluation:    Shift Summary [id number]    Admitting Diagnosis: SOB (shortness of breath) [R06.02]  Atypical pneumonia [J18.9]  Acute respiratory failure with hypoxia (H) [J96.01]   Vitals : WNL  Pain : Mild right knee pain.  Votaren topical gel for pain management.   A&Ox4, with intermittent confusion.   Voiding ; bladder incontinence.   Mobility : Ax1. Walker GB.   Tele : NA.   CMS : WNL.   Lung Sounds coarse  and congested with congested cough. on 3L via N/C.   GI : bowel incontinence.   Dressing : NA.     Orders Placed This Encounter      Regular Diet Adult      Diet       Plan:   Neb treatment for congestion.  Management of hypoxia.   Discharge pending back to facility.

## 2024-05-07 NOTE — PROGRESS NOTES
[facility]    Medicine Progress Note - Hospitalist Service    Date of Admission:  4/28/2024    Assessment & Plan       Margy Babin is a 89 year old female with PMH of paroxysmal atrial fibrillation, dementia, HTN, HLD, iron deficiency anemia, orthostatic hypotension, hx CVA, depression who presented to the ED on 4/28/24 from her YANET for evaluation of cough, found to have human metapneumovirus.       Acute hypoxic respiratory failure dueto human metapneumovirus   Viral pneumonia  -On 4/28 presented with cough, wheezing, chest congestion x 1 week. Desaturated to 86% on RA and was placed on 3 L O2. No leukocytosis, afebrile, low procal. ProBNP 516.   -CXR with reticular opacities.  - Negative for COVID-19, RSV, influenza.  . No hx smoking or any underlying lung disease.  -Respiratory panel on 4/29 showed human metapneumovirus  -During the course of hospitalization patient's oxygen needs have been fluctuating  -Chest x-ray on 5/4/2024 did not show any evidence of pneumonia  -On exam does not have any wheezing or any crackles  -Discussed with nursing staff and the patient does not seem to have good reading given that her hands are cold and they will try to check the readings by warming her hands  -Continue with pulmonary toilet  -Continue with Mucinex 600 twice daily and as needed Tessalon  -Continue with  DuoNebs every 6 hours  -Continue to wean from oxygen     Leukocytosis-improving  Pyuria  -WBC count on admission was 10.6 and did peak to 19.6 and is trending down  -Respiratory panel on 4/28 did show human metapneumovirus  --Repeated multiple x-rays of the chest done on 4/28, 4/30 and recently on 5/4 did not show any evidence of any pneumonia  -UA from 5/4 showed moderate leukocyte esterase, 11 WBCs but there were squamous cells cultures grew Proteus ESBL and E faecalis  -Seen by infectious disease team and no antibiotics were recommended  -She has been afebrile and WBC count is  "trending down  -Continue to monitor for fever    Elevated troponin  -Troponin 20, repeat 17.   -EKG SR with PVCs and known LBBB. No chest pain.   -Echo 11/23/23 with EF 50-55%, mild concentric LVH, no significant valve disease  -Likely elevated due to demand ischemia in the setting of acute hypoxic respiratory failure.       Paroxysmal atrial fibrillation  HTN  HLD  -Continue PTA Metoprolol, Xarelto, Atorvastatin      History of CVA (04/2022)  -Cardioembolic from atrial fibrillation  -Continue PTA Xarelto, Atorvastatin     Dementia  -Oriented x 2, coming from [facility]  -Continue PTA Namenda, Seroquel      Depression  -Continue PTA Paxil       Right knee pain  -Voltren gel  -If not improving then will order xray     Family communication   - I did speak with daughter [phone] Tina at 3:20 PM and she was given update    Barriers to discharge/discharge planning  -Patient is still needing oxygen and will try to wean her off and anticipate that in the next 1 to 2 days may happen          Diet: Regular Diet Adult  Diet    DVT Prophylaxis: DOAC  Pichardo Catheter: Not present  Lines: None     Cardiac Monitoring: None  Code Status: No CPR- Do NOT Intubate      Clinically Significant Risk Factors                  # Hypertension: Noted on problem list     # Dementia: noted on problem list    # Overweight: Estimated body mass index is 25.53 kg/m  as calculated from the following:    Height as of this encounter: 1.65 m (5' 4.96\").    Weight as of this encounter: 69.5 kg (153 lb 3.5 oz).        # Financial/Environmental Concerns: none         Disposition Plan     Medically Ready for Discharge: Anticipated Tomorrow             Fabian Dill MD  Hospitalist Service  [facility]  Securely message with [employer] (more info)  Text page via [employer] Paging/Directory   ______________________________________________________________________    Interval History     -Sitting in the chair and " denied any shortness of breath, nausea, vomiting or cough  -Discussed with the primary RN and they did mention that her readings may not be accurate and they are trying to warm her fingers before reading is taken  -Tolerating diet well    Physical Exam   Vital Signs: Temp: 97.1  F (36.2  C) Temp src: Rectal BP: 131/51 Pulse: 73   Resp: 16 SpO2: 96 % O2 Device: Nasal cannula Oxygen Delivery: 2 LPM  Weight: 153 lbs 3.52 oz        General: Patient appears comfortable and in no acute distress.  Respiratory: ctla   Cardiovascular: Regular rate , S1 and S2 normal with no murmer or rubs or gallops  Abdomen:   soft , non tender  Neurologic: No facial droop  Musculoskeletal: Normal Range of motion over upper and lower extremities bilaterally   Psychiatric: cooperative      Medical Decision Making       Time spent in care of patient is 42 minutes and I reviewed labs including CBC and basic metabolic panel and had discussion with the infectious disease team last evening and also reviewed their note and discussed plan of care with the patient, nursing staff, case management and daughter      Data     I have personally reviewed the following data over the past 24 hrs:    12.9 (H)  \   12.4   / 391     137 102 26.7 (H) /  98   4.4 25 0.96 (H) \       Imaging results reviewed over the past 24 hrs:   No results found for this or any previous visit (from the past 24 hour(s)).

## 2024-05-07 NOTE — PLAN OF CARE
Goal Outcome Evaluation:  Shift Summar0 [id number]    Admitting Diagnosis: SOB (shortness of breath) [R06.02]  Atypical pneumonia [J18.9]  Acute respiratory failure with hypoxia (H) [J96.01]   Vitals WNL  Pain : mild right knee pain.  Voltaren topical gel in use   A&Ox4 with intermittent confusion.   Voiding : bladder incontinence.   Mobility : AX1. Walker.   Tele : NA.   CMS : WNL, except for trace edema in BLE.   Lung Sounds coarse on 2L via nasal cannula.   GI : bowel incontinence.   Dressing : NA.     Orders Placed This Encounter      Regular Diet Adult      Diet       Plan:   Neb trement with use of flatter valve  Wean off Oxygen  Discharge to care facility

## 2024-05-07 NOTE — PLAN OF CARE
Goal Outcome Evaluation:      Shift Summary [id number]    Admitting Diagnosis: SOB (shortness of breath) [R06.02]  Atypical pneumonia [J18.9]  Acute respiratory failure with hypoxia (H) [J96.01]   Vitals stable on 3L O2  Pain denies  A&Ox4  Voiding incontinent of urine  Mobility 1 assist  Tele NA  CMS intact

## 2024-05-07 NOTE — PLAN OF CARE
Goal Outcome Evaluation:  Patient is now on 2L N/C. Denies shortness of breath. Infrequent congested cough.

## 2024-05-08 ENCOUNTER — APPOINTMENT (OUTPATIENT)
Dept: PHYSICAL THERAPY | Facility: CLINIC | Age: 89
DRG: 193 | End: 2024-05-08
Payer: MEDICARE

## 2024-05-08 LAB
ACANTHOCYTES BLD QL SMEAR: ABNORMAL
ANION GAP SERPL CALCULATED.3IONS-SCNC: 10 MMOL/L (ref 7–15)
AUER BODIES BLD QL SMEAR: ABNORMAL
BASO STIPL BLD QL SMEAR: ABNORMAL
BASOPHILS # BLD AUTO: 0.1 10E3/UL (ref 0–0.2)
BASOPHILS NFR BLD AUTO: 1 %
BITE CELLS BLD QL SMEAR: ABNORMAL
BLISTER CELLS BLD QL SMEAR: ABNORMAL
BUN SERPL-MCNC: 24.2 MG/DL (ref 8–23)
BURR CELLS BLD QL SMEAR: ABNORMAL
CALCIUM SERPL-MCNC: 8.7 MG/DL (ref 8.2–9.6)
CHLORIDE SERPL-SCNC: 104 MMOL/L (ref 98–107)
CREAT SERPL-MCNC: 0.89 MG/DL (ref 0.51–0.95)
DACRYOCYTES BLD QL SMEAR: ABNORMAL
DEPRECATED HCO3 PLAS-SCNC: 26 MMOL/L (ref 22–29)
EGFRCR SERPLBLD CKD-EPI 2021: 60 ML/MIN/1.73M2
ELLIPTOCYTES BLD QL SMEAR: ABNORMAL
EOSINOPHIL # BLD AUTO: 0.3 10E3/UL (ref 0–0.7)
EOSINOPHIL NFR BLD AUTO: 3 %
ERYTHROCYTE [DISTWIDTH] IN BLOOD BY AUTOMATED COUNT: 13.5 % (ref 10–15)
ERYTHROCYTE [DISTWIDTH] IN BLOOD BY AUTOMATED COUNT: 13.6 % (ref 10–15)
FRAGMENTS BLD QL SMEAR: ABNORMAL
GLUCOSE SERPL-MCNC: 102 MG/DL (ref 70–99)
HCT VFR BLD AUTO: 39.8 % (ref 35–47)
HCT VFR BLD AUTO: 43.1 % (ref 35–47)
HGB BLD-MCNC: 12.6 G/DL (ref 11.7–15.7)
HGB BLD-MCNC: 13.5 G/DL (ref 11.7–15.7)
HGB C CRYSTALS: ABNORMAL
HOWELL-JOLLY BOD BLD QL SMEAR: ABNORMAL
IMM GRANULOCYTES # BLD: 0.1 10E3/UL
IMM GRANULOCYTES NFR BLD: 0 %
LYMPHOCYTES # BLD AUTO: 1.4 10E3/UL (ref 0.8–5.3)
LYMPHOCYTES NFR BLD AUTO: 11 %
MCH RBC QN AUTO: 29.9 PG (ref 26.5–33)
MCH RBC QN AUTO: 30.1 PG (ref 26.5–33)
MCHC RBC AUTO-ENTMCNC: 31.3 G/DL (ref 31.5–36.5)
MCHC RBC AUTO-ENTMCNC: 31.7 G/DL (ref 31.5–36.5)
MCV RBC AUTO: 94 FL (ref 78–100)
MCV RBC AUTO: 96 FL (ref 78–100)
MONOCYTES # BLD AUTO: 1 10E3/UL (ref 0–1.3)
MONOCYTES NFR BLD AUTO: 8 %
NEUTROPHILS # BLD AUTO: 9.6 10E3/UL (ref 1.6–8.3)
NEUTROPHILS NFR BLD AUTO: 77 %
NEUTS HYPERSEG BLD QL SMEAR: ABNORMAL
NRBC # BLD AUTO: 0 10E3/UL
NRBC BLD AUTO-RTO: 0 /100
PLAT MORPH BLD: ABNORMAL
PLATELET # BLD AUTO: 412 10E3/UL (ref 150–450)
PLATELET # BLD AUTO: 450 10E3/UL (ref 150–450)
POLYCHROMASIA BLD QL SMEAR: ABNORMAL
POTASSIUM SERPL-SCNC: 4.2 MMOL/L (ref 3.4–5.3)
RBC # BLD AUTO: 4.22 10E6/UL (ref 3.8–5.2)
RBC # BLD AUTO: 4.49 10E6/UL (ref 3.8–5.2)
RBC AGGLUT BLD QL: ABNORMAL
RBC MORPH BLD: ABNORMAL
RETICS # AUTO: 0.09 10E6/UL (ref 0.03–0.1)
RETICS/RBC NFR AUTO: 1.9 % (ref 0.5–2)
ROULEAUX BLD QL SMEAR: ABNORMAL
SICKLE CELLS BLD QL SMEAR: ABNORMAL
SMUDGE CELLS BLD QL SMEAR: ABNORMAL
SODIUM SERPL-SCNC: 140 MMOL/L (ref 135–145)
SPHEROCYTES BLD QL SMEAR: ABNORMAL
STOMATOCYTES BLD QL SMEAR: ABNORMAL
TARGETS BLD QL SMEAR: ABNORMAL
TOXIC GRANULES BLD QL SMEAR: ABNORMAL
VARIANT LYMPHS BLD QL SMEAR: ABNORMAL
WBC # BLD AUTO: 12.5 10E3/UL (ref 4–11)
WBC # BLD AUTO: 14.2 10E3/UL (ref 4–11)

## 2024-05-08 PROCEDURE — 250N000009 HC RX 250: Performed by: STUDENT IN AN ORGANIZED HEALTH CARE EDUCATION/TRAINING PROGRAM

## 2024-05-08 PROCEDURE — 97116 GAIT TRAINING THERAPY: CPT | Mod: GP

## 2024-05-08 PROCEDURE — 120N000001 HC R&B MED SURG/OB

## 2024-05-08 PROCEDURE — 85045 AUTOMATED RETICULOCYTE COUNT: CPT | Performed by: HOSPITALIST

## 2024-05-08 PROCEDURE — 99232 SBSQ HOSP IP/OBS MODERATE 35: CPT | Performed by: HOSPITALIST

## 2024-05-08 PROCEDURE — 97530 THERAPEUTIC ACTIVITIES: CPT | Mod: GP

## 2024-05-08 PROCEDURE — 999N000157 HC STATISTIC RCP TIME EA 10 MIN

## 2024-05-08 PROCEDURE — 85027 COMPLETE CBC AUTOMATED: CPT | Performed by: HOSPITALIST

## 2024-05-08 PROCEDURE — 85025 COMPLETE CBC W/AUTO DIFF WBC: CPT | Performed by: STUDENT IN AN ORGANIZED HEALTH CARE EDUCATION/TRAINING PROGRAM

## 2024-05-08 PROCEDURE — 36415 COLL VENOUS BLD VENIPUNCTURE: CPT | Performed by: STUDENT IN AN ORGANIZED HEALTH CARE EDUCATION/TRAINING PROGRAM

## 2024-05-08 PROCEDURE — 80048 BASIC METABOLIC PNL TOTAL CA: CPT | Performed by: STUDENT IN AN ORGANIZED HEALTH CARE EDUCATION/TRAINING PROGRAM

## 2024-05-08 PROCEDURE — 94640 AIRWAY INHALATION TREATMENT: CPT | Mod: 76

## 2024-05-08 PROCEDURE — 94640 AIRWAY INHALATION TREATMENT: CPT

## 2024-05-08 PROCEDURE — 36415 COLL VENOUS BLD VENIPUNCTURE: CPT | Performed by: HOSPITALIST

## 2024-05-08 PROCEDURE — 250N000013 HC RX MED GY IP 250 OP 250 PS 637: Performed by: PHYSICIAN ASSISTANT

## 2024-05-08 RX ORDER — IPRATROPIUM BROMIDE AND ALBUTEROL SULFATE 2.5; .5 MG/3ML; MG/3ML
3 SOLUTION RESPIRATORY (INHALATION) EVERY 6 HOURS PRN
Status: DISCONTINUED | OUTPATIENT
Start: 2024-05-08 | End: 2024-05-09 | Stop reason: HOSPADM

## 2024-05-08 RX ADMIN — METOPROLOL SUCCINATE 100 MG: 100 TABLET, EXTENDED RELEASE ORAL at 09:15

## 2024-05-08 RX ADMIN — MEMANTINE 5 MG: 5 TABLET ORAL at 21:23

## 2024-05-08 RX ADMIN — PAROXETINE HYDROCHLORIDE 20 MG: 20 TABLET, FILM COATED ORAL at 21:24

## 2024-05-08 RX ADMIN — GUAIFENESIN 600 MG: 600 TABLET, EXTENDED RELEASE ORAL at 09:15

## 2024-05-08 RX ADMIN — DICLOFENAC 2 G: 10 GEL TOPICAL at 14:45

## 2024-05-08 RX ADMIN — GUAIFENESIN 600 MG: 600 TABLET, EXTENDED RELEASE ORAL at 21:24

## 2024-05-08 RX ADMIN — QUETIAPINE FUMARATE 25 MG: 25 TABLET ORAL at 21:23

## 2024-05-08 RX ADMIN — DICLOFENAC 2 G: 10 GEL TOPICAL at 09:21

## 2024-05-08 RX ADMIN — IPRATROPIUM BROMIDE AND ALBUTEROL SULFATE 3 ML: .5; 3 SOLUTION RESPIRATORY (INHALATION) at 07:36

## 2024-05-08 RX ADMIN — DICLOFENAC 2 G: 10 GEL TOPICAL at 17:33

## 2024-05-08 RX ADMIN — RIVAROXABAN 15 MG: 10 TABLET, FILM COATED ORAL at 17:33

## 2024-05-08 RX ADMIN — CALCIUM CARBONATE (ANTACID) CHEW TAB 500 MG 1000 MG: 500 CHEW TAB at 22:12

## 2024-05-08 RX ADMIN — MEMANTINE 5 MG: 5 TABLET ORAL at 09:15

## 2024-05-08 RX ADMIN — IPRATROPIUM BROMIDE AND ALBUTEROL SULFATE 3 ML: .5; 3 SOLUTION RESPIRATORY (INHALATION) at 12:39

## 2024-05-08 RX ADMIN — DICLOFENAC 2 G: 10 GEL TOPICAL at 21:24

## 2024-05-08 RX ADMIN — ATORVASTATIN CALCIUM 20 MG: 20 TABLET, FILM COATED ORAL at 21:23

## 2024-05-08 ASSESSMENT — ACTIVITIES OF DAILY LIVING (ADL)
ADLS_ACUITY_SCORE: 45

## 2024-05-08 NOTE — PLAN OF CARE
Goal Outcome Evaluation:  1.5 L  supplemental Oxygen.O2 sats greater than 90%.  Congested cough.

## 2024-05-08 NOTE — PLAN OF CARE
Goal Outcome Evaluation:  Shift Summary [id number]    Admitting Diagnosis: SOB (shortness of breath) [R06.02]  Atypical pneumonia [J18.9]  Acute respiratory failure with hypoxia (H) [J96.01]   Vitals : wnl   Pain : occasional mild pain in the right knee. Votaren topical gel in use.   A&Ox3. To 4. Forgetful. Intermittent confusion. Very pleasant.   Voiding : bladder incontinence.   Mobility : Ax1. Walker GB.   Tele : NA.   CMS : WNL.   Lung Sounds clear in upper lobes. Congested cough. Currently on 1.5 L  Oxygen via N/C.   GI : BLADDER INCONTINENCE.   Dressing : NA.     Orders Placed This Encounter      Regular Diet Adult      Diet       Plan:   Wean off O2.  Discharge pending to Care facility.

## 2024-05-08 NOTE — PLAN OF CARE
Goal Outcome Evaluation:      Shift Summary 1900-0730stable on room air    Admitting Diagnosis: SOB (shortness of breath) [R06.02]  Atypical pneumonia [J18.9]  Acute respiratory failure with hypoxia (H) [J96.01]   Vitals stable on 2 L O2 via nasal canula  Pain 1/10. Pt declined pain medication offered  A&Ox4  Voiding incontinent   Mobility assist of 1 with gait belt and walker  Tele N/A  CMS intact  Lung Sounds diminished  on 2L O2 via nasal canula  GI 1 BM this shift      Orders Placed This Encounter      Regular Diet Adult      Diet

## 2024-05-08 NOTE — PROGRESS NOTES
Care Management Follow Up    Length of Stay (days): 10    Expected Discharge Date: 05/09/2024     Concerns to be Addressed:       Patient plan of care discussed at interdisciplinary rounds: Yes    Anticipated Discharge Disposition: Assisted Living, Home Care     Anticipated Discharge Services:  [facility]  Anticipated Discharge DME:  oxygen, nebulizer machine    Patient/family educated on Medicare website which has current facility and service quality ratings:    Education Provided on the Discharge Plan:    Patient/Family in Agreement with the Plan: yes    Referrals Placed by CM/SW:    Private pay costs discussed: Not applicable    Additional Information:  Following for discharge planning. Per MD Pt is not ready today as they continue to work on weaning o2.  Possible ready 5/9.      CC called and talked with daughter Tina, voiced understanding of plan and would like w/c ride arranged.  She notes Pt has not been on o2 at [facility] but is in agreement if necessary.  Appreciated update.     CC called [facility] (Rachel) [phone] and updated that patient not discharge today.   She notes Pt's PCP is onsite provider [facility].  They will see the Pt on Monday 5/13.  Updated facesheet.   Pt can return on o2, needs to be arranged.  Pt does not have a nebulizer (even though medication listed on prior to arrival list).  If nebulizer needed would need to order and send with.  Note left for MD    Pharmacy is [facility] and updated in Epic     Fax is [phone]    [facility] is following for [facility] RN/PT/OT at discharge. MD to write orders.    CC to follow for discharge back to [facility].        Marisel Hernandez RN

## 2024-05-08 NOTE — PROGRESS NOTES
[facility]    Medicine Progress Note - Hospitalist Service    Date of Admission:  4/28/2024    Assessment & Plan       Margy Babin is a 89 year old female with PMH of paroxysmal atrial fibrillation, dementia, HTN, HLD, iron deficiency anemia, orthostatic hypotension, hx CVA, depression who presented to the ED on 4/28/24 from her [facility] for evaluation of cough, found to have human metapneumovirus.       Acute hypoxic respiratory failure dueto human metapneumovirus   Viral pneumonia/ likley Bronchitis   -On 4/28 presented with cough, wheezing, chest congestion x 1 week. Desaturated to 86% on RA and was placed on 3 L O2. No leukocytosis, afebrile, low procal. ProBNP 516.   -CXR with reticular opacities.  - Negative for COVID-19, RSV, influenza.  . No hx smoking or any underlying lung disease.  -Respiratory panel on 4/29 showed human metapneumovirus  -During the course of hospitalization patient's oxygen needs have been fluctuating  -Chest x-ray on 5/4/2024 did not show any evidence of pneumonia  -On exam does not have any wheezing or any crackles and is still needing 2 L of oxygen but looks comfortable  -Continue with pulmonary toilet  -Continue with Mucinex 600 twice daily and as needed Tessalon  -Continue with  DuoNebs every 6 hours and will switch to as  -Continue to wean from oxygen     Chronic leukocytosis-improving  Pyuria  -WBC count on admission was 10.6 and did peak to 19.6 and is trending down  -Respiratory panel on 4/28 did show human metapneumovirus  --Repeated multiple x-rays of the chest done on 4/28, 4/30 and recently on 5/4 did not show any evidence of any pneumonia  -UA from 5/4 showed moderate leukocyte esterase, 11 WBCs but there were squamous cells cultures grew Proteus ESBL and E faecalis  -Seen by infectious disease team and no antibiotics were recommended  -She has been afebrile and WBC count is fluctuating and will add peripheral smear   -Continue to monitor  "for fever    Elevated troponin  -Troponin 20, repeat 17.   -EKG SR with PVCs and known LBBB. No chest pain.   -Echo 11/23/23 with EF 50-55%, mild concentric LVH, no significant valve disease  -Likely elevated due to demand ischemia in the setting of acute hypoxic respiratory failure.       Paroxysmal atrial fibrillation  HTN  HLD  -Continue PTA Metoprolol, Xarelto, Atorvastatin      History of CVA (04/2022)  -Cardioembolic from atrial fibrillation  -Continue PTA Xarelto, Atorvastatin     Dementia  -Oriented x 2, coming from [facility]  -Continue PTA Namenda, Seroquel      Depression  -Continue PTA Paxil       Right knee pain- controlled  -Voltren gel    Family communication   - I did speak with daughter [phone] Tina on 5/7 and will call intermittent      Barriers to discharge/discharge planning  -Patient is still needing oxygen and will try to wean her off and likely discharge in a.m. with home health if continues to remain stay          Diet: Regular Diet Adult  Diet    DVT Prophylaxis: DOAC  Pichardo Catheter: Not present  Lines: None     Cardiac Monitoring: None  Code Status: No CPR- Do NOT Intubate      Clinically Significant Risk Factors                  # Hypertension: Noted on problem list     # Dementia: noted on problem list    # Overweight: Estimated body mass index is 25.53 kg/m  as calculated from the following:    Height as of this encounter: 1.65 m (5' 4.96\").    Weight as of this encounter: 69.5 kg (153 lb 3.5 oz).        # Financial/Environmental Concerns: none         Disposition Plan     Medically Ready for Discharge: Anticipated Tomorrow             Manit MD Aniyah  Hospitalist Service  [facility]  Securely message with Miguel (more info)  Text page via [facility] Paging/Directory   ______________________________________________________________________    Interval History     -Reviewed events and still needing 2 L of oxygen without any subjective " sensation of shortness of breath, no cough with any sputum production  -Tolerating diet and wants to see if she can go home  -No nausea or vomiting    Physical Exam   Vital Signs: Temp: 98.1  F (36.7  C) Temp src: Oral BP: 125/87 Pulse: 80   Resp: 18 SpO2: 94 % O2 Device: Nasal cannula Oxygen Delivery: 2 LPM  Weight: 153 lbs 3.52 oz        General: Patient appears comfortable and in no acute distress.  Respiratory: ctla   Cardiovascular: Regular rate , S1 and S2 normal with no murmer or rubs or gallops  Abdomen:   soft , non tender  Neurologic: No facial droop  Musculoskeletal: Normal Range of motion over upper and lower extremities bilaterally   Psychiatric: cooperative      Medical Decision Making       Time spent in care of patient is 41 minutes and I reviewed labs including CBC and basic metabolic panel and discussed with the patient, nursing staff and care coordinator team      Data     I have personally reviewed the following data over the past 24 hrs:    14.2 (H)  \   12.6   / 412     140 104 24.2 (H) /  102 (H)   4.2 26 0.89 \       Imaging results reviewed over the past 24 hrs:   No results found for this or any previous visit (from the past 24 hour(s)).

## 2024-05-09 VITALS
BODY MASS INDEX: 25.53 KG/M2 | WEIGHT: 153.22 LBS | OXYGEN SATURATION: 95 % | HEIGHT: 65 IN | SYSTOLIC BLOOD PRESSURE: 128 MMHG | TEMPERATURE: 97.2 F | RESPIRATION RATE: 18 BRPM | HEART RATE: 74 BPM | DIASTOLIC BLOOD PRESSURE: 60 MMHG

## 2024-05-09 LAB
ACANTHOCYTES BLD QL SMEAR: NORMAL
ANION GAP SERPL CALCULATED.3IONS-SCNC: 6 MMOL/L (ref 7–15)
AUER BODIES BLD QL SMEAR: NORMAL
BASO STIPL BLD QL SMEAR: NORMAL
BITE CELLS BLD QL SMEAR: NORMAL
BLISTER CELLS BLD QL SMEAR: NORMAL
BUN SERPL-MCNC: 28.5 MG/DL (ref 8–23)
BURR CELLS BLD QL SMEAR: NORMAL
CALCIUM SERPL-MCNC: 9 MG/DL (ref 8.2–9.6)
CHLORIDE SERPL-SCNC: 104 MMOL/L (ref 98–107)
CREAT SERPL-MCNC: 0.95 MG/DL (ref 0.51–0.95)
DACRYOCYTES BLD QL SMEAR: NORMAL
DEPRECATED HCO3 PLAS-SCNC: 28 MMOL/L (ref 22–29)
EGFRCR SERPLBLD CKD-EPI 2021: 55 ML/MIN/1.73M2
ELLIPTOCYTES BLD QL SMEAR: NORMAL
ERYTHROCYTE [DISTWIDTH] IN BLOOD BY AUTOMATED COUNT: 13.2 % (ref 10–15)
FRAGMENTS BLD QL SMEAR: NORMAL
GLUCOSE SERPL-MCNC: 103 MG/DL (ref 70–99)
HCT VFR BLD AUTO: 38.9 % (ref 35–47)
HGB BLD-MCNC: 12.2 G/DL (ref 11.7–15.7)
HGB C CRYSTALS: NORMAL
HOWELL-JOLLY BOD BLD QL SMEAR: NORMAL
MCH RBC QN AUTO: 30.1 PG (ref 26.5–33)
MCHC RBC AUTO-ENTMCNC: 31.4 G/DL (ref 31.5–36.5)
MCV RBC AUTO: 96 FL (ref 78–100)
NEUTS HYPERSEG BLD QL SMEAR: NORMAL
PLAT MORPH BLD: NORMAL
PLATELET # BLD AUTO: 462 10E3/UL (ref 150–450)
POLYCHROMASIA BLD QL SMEAR: NORMAL
POTASSIUM SERPL-SCNC: 4.5 MMOL/L (ref 3.4–5.3)
RBC # BLD AUTO: 4.05 10E6/UL (ref 3.8–5.2)
RBC AGGLUT BLD QL: NORMAL
RBC MORPH BLD: NORMAL
ROULEAUX BLD QL SMEAR: NORMAL
SICKLE CELLS BLD QL SMEAR: NORMAL
SMUDGE CELLS BLD QL SMEAR: NORMAL
SODIUM SERPL-SCNC: 138 MMOL/L (ref 135–145)
SPHEROCYTES BLD QL SMEAR: NORMAL
STOMATOCYTES BLD QL SMEAR: NORMAL
TARGETS BLD QL SMEAR: NORMAL
TOXIC GRANULES BLD QL SMEAR: NORMAL
VARIANT LYMPHS BLD QL SMEAR: NORMAL
WBC # BLD AUTO: 11.5 10E3/UL (ref 4–11)

## 2024-05-09 PROCEDURE — 99207 PR APP CREDIT; MD BILLING SHARED VISIT: CPT | Performed by: HOSPITALIST

## 2024-05-09 PROCEDURE — 250N000013 HC RX MED GY IP 250 OP 250 PS 637: Performed by: PHYSICIAN ASSISTANT

## 2024-05-09 PROCEDURE — 36415 COLL VENOUS BLD VENIPUNCTURE: CPT | Performed by: STUDENT IN AN ORGANIZED HEALTH CARE EDUCATION/TRAINING PROGRAM

## 2024-05-09 PROCEDURE — 99239 HOSP IP/OBS DSCHRG MGMT >30: CPT | Performed by: HOSPITALIST

## 2024-05-09 PROCEDURE — 85014 HEMATOCRIT: CPT | Performed by: STUDENT IN AN ORGANIZED HEALTH CARE EDUCATION/TRAINING PROGRAM

## 2024-05-09 PROCEDURE — 80048 BASIC METABOLIC PNL TOTAL CA: CPT | Performed by: STUDENT IN AN ORGANIZED HEALTH CARE EDUCATION/TRAINING PROGRAM

## 2024-05-09 RX ORDER — IPRATROPIUM BROMIDE AND ALBUTEROL SULFATE 2.5; .5 MG/3ML; MG/3ML
3 SOLUTION RESPIRATORY (INHALATION) EVERY 6 HOURS PRN
Qty: 90 ML | Refills: 0 | Status: SHIPPED | OUTPATIENT
Start: 2024-05-09 | End: 2024-06-08

## 2024-05-09 RX ADMIN — GUAIFENESIN 600 MG: 600 TABLET, EXTENDED RELEASE ORAL at 09:46

## 2024-05-09 RX ADMIN — MEMANTINE 5 MG: 5 TABLET ORAL at 09:46

## 2024-05-09 RX ADMIN — METOPROLOL SUCCINATE 100 MG: 100 TABLET, EXTENDED RELEASE ORAL at 09:47

## 2024-05-09 RX ADMIN — DICLOFENAC 2 G: 10 GEL TOPICAL at 14:17

## 2024-05-09 RX ADMIN — ACETAMINOPHEN 650 MG: 325 TABLET, FILM COATED ORAL at 00:28

## 2024-05-09 RX ADMIN — DICLOFENAC 2 G: 10 GEL TOPICAL at 09:47

## 2024-05-09 ASSESSMENT — ACTIVITIES OF DAILY LIVING (ADL)
ADLS_ACUITY_SCORE: 49
ADLS_ACUITY_SCORE: 48
ADLS_ACUITY_SCORE: 49

## 2024-05-09 NOTE — PROGRESS NOTES
Patient has been assessed for Home Oxygen needs. Oxygen readings:    *Pulse oximetry (SpO2) = 94% on room air at rest while awake.    *SpO2 improved to 97% on 1 liters/minute at rest.    *SpO2 = 82 % on room air during activity/with exercise.    *SpO2 improved to 94 % on 2 liters/minute during activity/with exercise.

## 2024-05-09 NOTE — PROGRESS NOTES
Care Management Follow Up    Length of Stay (days): 11    Expected Discharge Date: 05/09/2024     Concerns to be Addressed:       Patient plan of care discussed at interdisciplinary rounds: Yes    Anticipated Discharge Disposition: Assisted Living, Home Care     Anticipated Discharge Services:    Anticipated Discharge DME:      Patient/family educated on Medicare website which has current facility and service quality ratings:    Education Provided on the Discharge Plan:    Patient/Family in Agreement with the Plan: yes    Referrals Placed by CM/SW:    Private pay costs discussed: Not applicable    Additional Information:  Following for discharge planning back to [facility].  Per MD Pt is ready to go today.  Awaiting bedside RN to enter oxygen documentation for Home RN.   Pt will also need a nebulizer. Order is in.     CC called [facility] and spoke with AVELINA Clinton who stated due to nursing in the building they would want Pt back by 2pm.  It is not known if we can get o2, nebulizer delivered before then. If not Pt would need to return tomorrow.     Nurse updated and is working to get documentation in.   CC called [facility] to see if we could expedite o2 and what would be needed for [facility]  Talked with Elise ( [phone]) and she will work to expedite o2 once in.  She notes Pt got a nebulizer May 6, 2023 and Medicare will not pay for another one.     CC called facility and spoke with AVELINA Clinton, updated on Nebulizer and she will have nurse check Pt's room.   She is okay with Pt returning slightly later.      [facility] W/C ride (Max) set up for 1410-14:50.  Facility updated on ride time.  Bedside RN updated.      -        Marisel Hernandez, RN

## 2024-05-09 NOTE — DISCHARGE SUMMARY
"[facility]  Hospitalist Discharge Summary      Date of Admission:  4/28/2024  Date of Discharge:  5/9/2024  Discharging Provider: Fabian Dill MD  Discharge Service: Hospitalist Service    Discharge Diagnoses     Acute hypoxic respiratory failure dueto human metapneumovirus   Viral pneumonia  Chronic leukocytosis-improving  Pyuria  Elevated troponin likely due to demand     Clinically Significant Risk Factors     # Overweight: Estimated body mass index is 25.53 kg/m  as calculated from the following:    Height as of this encounter: 1.65 m (5' 4.96\").    Weight as of this encounter: 69.5 kg (153 lb 3.5 oz).       Follow-ups Needed After Discharge   Follow-up Appointments     Follow-up and recommended labs and tests       Please follow up with your primary care provider in 1 week for a recheck.   Recommend a repeat CBC at your PCP follow up appointment.        {Additional follow-up instructions/to-do's for PCP        Unresulted Labs Ordered in the Past 30 Days of this Admission       Date and Time Order Name Status Description    5/8/2024  4:39 PM Bld morphology pathology review In process         These results will be followed up by     Discharge Disposition   Discharged to assisted living  Condition at discharge: Stable    Hospital Course       Margy Babin is a 89 year old female with PMH of paroxysmal atrial fibrillation, dementia, HTN, HLD, iron deficiency anemia, orthostatic hypotension, hx CVA, depression who presented to the ED on 4/28/24 from her [facility] for evaluation of cough, found to have human metapneumovirus.         Acute hypoxic respiratory failure dueto human metapneumovirus   Viral pneumonia/ likley Bronchitis   -On 4/28 presented with cough, wheezing, chest congestion x 1 week. Desaturated to 86% on RA and was placed on 3 L O2. No leukocytosis, afebrile, low procal. ProBNP 516.   -CXR with reticular opacities.  - Negative for COVID-19, RSV, influenza.  . No hx " smoking or any underlying lung disease.  -Respiratory panel on 4/29 showed human metapneumovirus  -During the course of hospitalization patient's oxygen needs have been fluctuating  -Chest x-ray on 5/4/2024 did not show any evidence of pneumonia  -On exam does not have any wheezing or any crackles and is on room air and two liter at activity  -Continue with  DuoNebs every 6 hours  as needed   -Continue to wean from oxygen     Oxygen Documentation  I certify that this patient, Margy Babin has been under my care (or a nurse practitioner or physican's assistant working with me). This is the face-to-face encounter for oxygen medical necessity.       At the time of this encounter, I have reviewed the qualifying testing and have determined that supplemental oxygen is reasonable and necessary and is expected to improve the patient's condition in a home setting.          Patient has continued oxygen desaturation due to Pneumonia J18.9.     If portability is ordered, is the patient mobile within the home? yes     Was this visit performed as a telehealth visit: No        Chronic leukocytosis-improving  Pyuria  -WBC count on admission was 10.6 and did peak to 19.6 and is trending down  -Respiratory panel on 4/28 did show human metapneumovirus  --Repeated multiple x-rays of the chest done on 4/28, 4/30 and recently on 5/4 did not show any evidence of any pneumonia  -UA from 5/4 showed moderate leukocyte esterase, 11 WBCs but there were squamous cells cultures grew Proteus ESBL and E faecalis  -Seen by infectious disease team and no antibiotics were recommended  -She has been afebrile and WBC count is trending down  peripheral smear pending  - repeat cbc as outpatient      Elevated troponin likely due to demand   -Troponin 20, repeat 17.   -EKG SR with PVCs and known LBBB. No chest pain.   -Echo 11/23/23 with EF 50-55%, mild concentric LVH, no significant valve disease  -Likely elevated due to demand ischemia in the  setting of acute hypoxic respiratory failure.        Paroxysmal atrial fibrillation  HTN  HLD  -Continue PTA Metoprolol, Xarelto, Atorvastatin      History of CVA (04/2022)  -Cardioembolic from atrial fibrillation  -Continue PTA Xarelto, Atorvastatin     Dementia  -Oriented x 2, coming from [facility]  -Continue PTA Namenda, Seroquel      Depression  -Continue PTA Paxil        Right knee pain- controlled  -Voltren gel for next few days       Consultations This Hospital Stay   CARE MANAGEMENT / SOCIAL WORK IP CONSULT  PHYSICAL THERAPY ADULT IP CONSULT  INFECTIOUS DISEASES IP CONSULT    Code Status   No CPR- Do NOT Intubate    Time Spent on this Encounter   I, Fabian Dill MD, personally saw the patient today and spent greater than 30 minutes discharging this patient.       Fabian Dill MD  [facility]  [address] [address]  Phone: [phone]  Fax: [phone]  ______________________________________________________________________    Physical Exam   Vital Signs: Temp: 97.2  F (36.2  C) Temp src: Oral BP: 128/60 Pulse: 74   Resp: 18 SpO2: 95 % O2 Device: None (Room air) Oxygen Delivery: 1 LPM  Weight: 153 lbs 3.52 oz    General: Patient appears comfortable and in no acute distress. And sitting in chair   Respiratory: ctla and was on room air   Cardiovascular: Regular rate , S1 and S2 normal   Neurologic: No facial droop  Musculoskeletal: Normal Range of motion over upper and lower extremities bilaterally   Psychiatric: cooperative      Primary Care Physician   [facility]    Discharge Orders      Home Care Referral      Home Care Referral      Reason for your hospital stay    You were admitted to the hospital for a viral pneumonia.     Follow-up and recommended labs and tests     Please follow up with your primary care provider in 1 week for a recheck. Recommend a repeat CBC at your PCP follow up appointment.     Activity     Your activity upon discharge: activity as tolerated     Reason for your hospital stay    HUMAN METAPNEUMOVIRUS     Activity    Your activity upon discharge: activity as tolerated     Nebulizer and Supplies Order    Nebulizer Documentation  I attest that I have seen and evaluated Margy Babin. She has a diagnosis of J18.9 - Pneumonia, unspecified organism and a nebulizer machine is needed to administer medication to improve breathing passages.     I, the undersigned, certify that the above prescribed supplies are medically necessary for this patient and is both reasonable and necessary in reference to accepted standards of medical and necessary in reference to accepted standards of medical practice in the treatment of this patient's condition and is not prescribed as a convenience.     Oxygen Adult/Peds    Oxygen Documentation  I certify that this patient, Margy Babin has been under my care (or a nurse practitioner or physican's assistant working with me). This is the face-to-face encounter for oxygen medical necessity.      At the time of this encounter, I have reviewed the qualifying testing and have determined that supplemental oxygen is reasonable and necessary and is expected to improve the patient's condition in a home setting.         Patient has continued oxygen desaturation due to Pneumonia J18.9.    If portability is ordered, is the patient mobile within the home? yes    Was this visit performed as a telehealth visit: No     Diet    Follow this diet upon discharge: Orders Placed This Encounter      Regular Diet Adult     Diet    Follow this diet upon discharge: Orders Placed This Encounter      Regular Diet Adult      Diet       Significant Results and Procedures   Most Recent 3 CBC's:  Recent Labs   Lab Test 05/09/24  0740 05/08/24  1640 05/08/24  0813   WBC 11.5* 12.5* 14.2*   HGB 12.2 13.5 12.6   MCV 96 96 94   * 450 412     Most Recent 3 BMP's:  Recent Labs   Lab Test  05/09/24  0740 05/08/24  0813 05/07/24  0754    140 137   POTASSIUM 4.5 4.2 4.4   CHLORIDE 104 104 102   CO2 28 26 25   BUN 28.5* 24.2* 26.7*   CR 0.95 0.89 0.96*   ANIONGAP 6* 10 10   SOBIA 9.0 8.7 8.8   * 102* 98     Most Recent 2 LFT's:  Recent Labs   Lab Test 03/15/24  2335 11/23/23  2351   AST 19 29   ALT 14 16   ALKPHOS 98 110   BILITOTAL 0.2 0.2     Most Recent 3 INR's:  Recent Labs   Lab Test 09/01/22  2244 01/06/22  1406   INR 2.16* 1.27*     Most Recent INR's and Anticoagulation Dosing History:  Anticoagulation Dose History          Latest Ref Rng & Units 4/11/2007 1/6/2022 9/1/2022   Recent Dosing and Labs   INR 0.85 - 1.15 0.97  1.27  2.16      Most Recent 3 Creatinines:  Recent Labs   Lab Test 05/09/24  0740 05/08/24  0813 05/07/24  0754   CR 0.95 0.89 0.96*     Most Recent 3 Hemoglobins:  Recent Labs   Lab Test 05/09/24  0740 05/08/24  1640 05/08/24  0813   HGB 12.2 13.5 12.6     Most Recent 3 Troponin's:  Recent Labs   Lab Test 12/25/20  1435 06/27/17  2103 06/27/17  1520   TROPI <0.015 <0.015  The 99th percentile for upper reference range is 0.045 ug/L.  Troponin values in   the range of 0.045 - 0.120 ug/L may be associated with risks of adverse   clinical events.   <0.015  The 99th percentile for upper reference range is 0.045 ug/L.  Troponin values in   the range of 0.045 - 0.120 ug/L may be associated with risks of adverse   clinical events.       Most Recent 3 BNP's:  Recent Labs   Lab Test 04/28/24  0953 11/23/23  2351 05/31/23  0530 05/16/23  0153 09/01/22  2244 02/16/22  1720 11/16/21  1643   NTBNPI 516 210  --  707   < >  --   --    NTBNP  --   --  485  --   --  395 840*    < > = values in this interval not displayed.     Most Recent D-dimer:  Recent Labs   Lab Test 09/01/22  2244   DD 0.37   ,   Results for orders placed or performed during the hospital encounter of 04/28/24   Chest XR,  PA & LAT    Narrative    EXAM: XR CHEST 2 VIEWS  LOCATION: [facility]  HOSPITAL  DATE: 4/28/2024    INDICATION: Shortness of breath, cough  COMPARISON: 11/25/2023 and 11/24/2023      Impression    IMPRESSION: No pleural fluid or pneumothorax. No airspace disease. Reticular interstitial opacities could be fibrosis, edema, atypical infection, or other process. The cardiomediastinal silhouette is at the upper limits of normal in size. There is aortic   calcification.   XR Chest 2 Views    Narrative    CHEST TWO VIEWS  4/30/2024 2:57 PM       INDICATION: Follow up pneumonia.  COMPARISON: 4/28/2024       Impression    IMPRESSION: Improved interstitial infiltrates compared to previous. No  new findings. Probable trace right pleural effusion.       GERRY PENN MD         SYSTEM ID:  [id number]   XR Chest Port 1 View    Narrative    EXAM: XR CHEST PORT 1 VIEW  LOCATION: [facility]  DATE: 5/4/2024    INDICATION: elevated wbc  COMPARISON: 4/30/2024, 11/24/2023      Impression    IMPRESSION: Minimal chronic atelectasis in the lingula. Lungs are otherwise clear. No evidence of pneumonia.       Discharge Medications   Current Discharge Medication List        START taking these medications    Details   benzonatate (TESSALON) 100 MG capsule Take 1 capsule (100 mg) by mouth 3 times daily as needed for cough  Qty: 15 capsule, Refills: 0    Associated Diagnoses: Atypical pneumonia      diclofenac (VOLTAREN) 1 % topical gel Apply 2 g topically 4 times daily for 5 days  Qty: 40 g, Refills: 0    Associated Diagnoses: Acute pain of left knee           CONTINUE these medications which have CHANGED    Details   ipratropium - albuterol 0.5 mg/2.5 mg/3 mL (DUONEB) 0.5-2.5 (3) MG/3ML neb solution Take 1 vial (3 mLs) by nebulization every 6 hours as needed for wheezing or shortness of breath  Qty: 90 mL, Refills: 0    Associated Diagnoses: Acute respiratory failure with hypoxia (H); SOB (shortness of breath)           CONTINUE these medications which have NOT CHANGED    Details   !!  acetaminophen (TYLENOL) 500 MG tablet Take 1,000 mg by mouth daily      !! acetaminophen (TYLENOL) 500 MG tablet Take 1,000 mg by mouth every 12 hours as needed for mild pain      albuterol (PROAIR HFA/PROVENTIL HFA/VENTOLIN HFA) 108 (90 Base) MCG/ACT inhaler Inhale 1-2 puffs into the lungs every 4 hours as needed for shortness of breath, wheezing or cough      atorvastatin (LIPITOR) 20 MG tablet Take 1 tablet (20 mg) by mouth daily  Qty: 90 tablet, Refills: 3    Associated Diagnoses: Hyperlipidemia LDL goal <100      bisacodyl (DULCOLAX) 5 MG EC tablet 1 TAB BY MOUTH DAILY AS NEEDED FOR CONSTIPATION  Qty: 30 tablet, Refills: 10    Associated Diagnoses: Constipation, unspecified constipation type      docusate sodium (COLACE) 100 MG capsule TAKE 1 CAP BY MOUTH TWICE DAILY AS NEEDED FOR CONSTIPATION  Qty: 60 capsule, Refills: 10    Associated Diagnoses: Constipation, unspecified constipation type      Ferrous Sulfate 324 (65 Fe) MG TBEC Take 324 mg by mouth three times a week Every Mon, Wed and Fri      guaiFENesin (MUCINEX) 600 MG 12 hr tablet Take 600 mg by mouth 2 times daily      memantine (NAMENDA) 5 MG tablet 1 TAB BY MOUTH TWICE DAILY  Qty: 60 tablet, Refills: 10    Associated Diagnoses: Dementia (H)      metoprolol succinate ER (TOPROL XL) 100 MG 24 hr tablet 1 TAB BY MOUTH DAILY (DX: HYPERTENSION)  Qty: 30 tablet, Refills: 10    Associated Diagnoses: Paroxysmal atrial fibrillation (H)      ondansetron (ZOFRAN) 4 MG tablet 1 TAB BY MOUTH EVERY 8 HOURS AS NEEDED FOR NAUSEA  Qty: 10 tablet, Refills: 10    Associated Diagnoses: Nausea without vomiting      PARoxetine (PAXIL) 20 MG tablet 1 TAB BY MOUTH AT BEDTIME Strength: 20 mg  Qty: 90 tablet, Refills: 0    Associated Diagnoses: 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)      QUEtiapine (SEROQUEL) 25 MG tablet 1 TAB BY MOUTH AT BEDTIME (DX: INSOMNIA)  Qty: 30 tablet, Refills: 1    Associated Diagnoses: Insomnia, unspecified type      ramelteon  (ROZEREM) 8 MG tablet 1 TAB BY MOUTH EVERY NIGHT AS NEEDED FOR SLEEP  Qty: 30 tablet, Refills: 10    Associated Diagnoses: Insomnia, unspecified type      rivaroxaban ANTICOAGULANT (XARELTO) 15 MG TABS tablet Take 15 mg by mouth daily (with dinner)      traMADol (ULTRAM) 50 MG tablet Take 0.5 tablets (25 mg) by mouth every 6 hours as needed for severe pain  Qty: 15 tablet, Refills: 0    Associated Diagnoses: Acute on chronic anemia; Nocturia      Vitamin D3 (VITAMIN D, CHOLECALCIFEROL,) 25 mcg (1000 units) tablet Take 25 mcg by mouth daily       !! - Potential duplicate medications found. Please discuss with provider.        Allergies   No Known Allergies

## 2024-05-09 NOTE — PLAN OF CARE
Problem: Adult Inpatient Plan of Care  Goal: Absence of Hospital-Acquired Illness or Injury  Intervention: Identify and Manage Fall Risk  Recent Flowsheet Documentation  Taken 5/9/2024 8914 by Jayda Chu RN  Safety Promotion/Fall Prevention:   safety round/check completed   nonskid shoes/slippers when out of bed   activity supervised   assistive device/personal items within reach     Goal Outcome Evaluation:       Pt is A/Ox3, forgetful with some confusion. VSS on 1L O2 via NC. Up A-1 w/ GB and walker. No SOB noted but desats with exertion (see home O2 assessment). Good appetite. Incontinent of bladder, voiding well per BR. Denies pain when asked. R knee pain is managed by diclofenac cream. Pt is discharging today back to her [facility] with oxygen via [facility] transport.

## 2024-05-09 NOTE — PLAN OF CARE
Goal Outcome Evaluation:    PRIMARY Concern: SOB and cough, human metapneumovirus  SAFETY RISK Concerns (fall risk, behaviors, etc.): Fall riks       Isolation/Type: Contact (ESBL) and droplet  Tests/Procedures for NEXT shift: needs the home oxygen test  Consults? (Pending/following, signed-off?) SW following   Where is patient from? (Home, TCU, etc.): [facility] [place]  Other Important info for NEXT shift: Pt has poor hand circulation, when obtaining pulse o2 warm the hand first    Anticipated DC date & active delays: Possibly 5/9, oxygen needs to be arranged to be able to return to facility   _____________________________________________________________________________  SUMMARY NOTE:  Orientation/Cognitive: A&Oxx3 disoriented to time, pt is intermittently confused  Mobility Level/Assist Equipment: Ax1, GB, Walker  Antibiotics & Plan (IV/po, length of tx left): NA  Pain Management: 6/10 back pain PRN tylenol given, R knee pain has scheduled volteran gel   Tele/VS/O2: VSS on 1L o2 sating 93-94%  ABNL Lab/BG: WBC:12.5 UTI +  Diet: Regular  Bowel/Bladder: Incontinent of urine and bladder  Skin Concerns: Scattered bruising, scab to R upper arm   Drains/Devices: PIV:S/L

## 2024-05-09 NOTE — PROGRESS NOTES
Care Management Discharge Note    Discharge Date: 05/09/2024       Discharge Disposition: Assisted Living, Home Care    Discharge Services:      Discharge DME:  oxygen    Discharge Transportation: health plan transportation (Tina prefers wheelchair ride be arranged)    Private pay costs discussed: Not applicable    Does the patient's insurance plan have a 3 day qualifying hospital stay waiver?  No    PAS Confirmation Code:    Patient/family educated on Medicare website which has current facility and service quality ratings:      Education Provided on the Discharge Plan:    Persons Notified of Discharge Plans: Bedside RN, Daughter  Patient/Family in Agreement with the Plan: yes    Handoff Referral Completed: No    Additional Information:  Pt discharge to [facility]. Updated [facility] of return time.   Orders faxed to [phone].  [facility] to make packet to send with.     Ride arranged with  [employer] w/c. Pt will need to wear mask for ride. Nurse aware.   [facility] brought home oxygen tank and demonstrated to bedside RN how to use.  Nurse stated she will call [facility] and update on how to use oxygen.     Updated daughter Tina of return to [facility].  She notes she has talked with the [facility] and they note they can't find the nebulizer machine.  They do remember it being there in the past but can no longer find it. Tina wants to make sure she has a nebulizer at discharge. Discussed with [facility] and they can dispense one at private pay.  ($230).  Tina will call [facility] to pay by phone and have sent to [facility].     Bedside RN to review AVS.     Marisel Hernandez RN

## 2024-05-09 NOTE — PLAN OF CARE
Physical Therapy Discharge Summary    Reason for therapy discharge:    Discharged to long term with home care PT    Progress towards therapy goal(s). See goals on Care Plan in [facility] electronic health record for goal details.  Goals partially met.  Barriers to achieving goals:   discharge from facility.    Therapy recommendation(s):    Continued therapy is recommended.  Rationale/Recommendations:   . PT Discharge Planning:    PT Plan: Wean O2 as able, repeat sit>stands; gait w/ FWW; LE strengthening  PT Discharge Recommendation (DC Rec): home with assist, home with home care physical therapy, Leaving home requires significant taxing effort  PT Rationale for DC Rec: Pt down to 1.5LPM oxygen today, able to amb in room for 10' + 40' but still low activity tolerance pt reports being fatigued quickly.  Patient resides in [facility], typically IND/mod I but states she may be able to increase assist if needed.  If discharging home, recommend consistent use of FWW and HHPT to promote return to PLOF.  PT Brief overview of current status: SBA gait with FWW, SBA STS from elevated surfaces    Recommendation above provided by last treating therapist.

## 2024-05-09 NOTE — PROGRESS NOTES
[facility]    Medicine Progress Note - Hospitalist Service    Date of Admission:  4/28/2024    Assessment & Plan       Margy Babin is a 89 year old female with PMH of paroxysmal atrial fibrillation, dementia, HTN, HLD, iron deficiency anemia, orthostatic hypotension, hx CVA, depression who presented to the ED on 4/28/24 from her [facility] for evaluation of cough, found to have human metapneumovirus.       Acute hypoxic respiratory failure dueto human metapneumovirus   Viral pneumonia/ likley Bronchitis   -On 4/28 presented with cough, wheezing, chest congestion x 1 week. Desaturated to 86% on RA and was placed on 3 L O2. No leukocytosis, afebrile, low procal. ProBNP 516.   -CXR with reticular opacities.  - Negative for COVID-19, RSV, influenza.  . No hx smoking or any underlying lung disease.  -Respiratory panel on 4/29 showed human metapneumovirus  -During the course of hospitalization patient's oxygen needs have been fluctuating  -Chest x-ray on 5/4/2024 did not show any evidence of pneumonia  -On exam does not have any wheezing or any crackles and is on room air and two liter at activity  -Continue with pulmonary toilet  -Continue with Mucinex 600 twice daily and as needed Tessalon  -Continue with  DuoNebs every 6 hours and will switch to as  -Continue to wean from oxygen     Oxygen Documentation  I certify that this patient, Margy Babin has been under my care (or a nurse practitioner or physican's assistant working with me). This is the face-to-face encounter for oxygen medical necessity.      At the time of this encounter, I have reviewed the qualifying testing and have determined that supplemental oxygen is reasonable and necessary and is expected to improve the patient's condition in a home setting.         Patient has continued oxygen desaturation due to Pneumonia J18.9.    If portability is ordered, is the patient mobile within the home? yes    Was this visit  performed as a telehealth visit: No      Chronic leukocytosis-improving  Pyuria  -WBC count on admission was 10.6 and did peak to 19.6 and is trending down  -Respiratory panel on 4/28 did show human metapneumovirus  --Repeated multiple x-rays of the chest done on 4/28, 4/30 and recently on 5/4 did not show any evidence of any pneumonia  -UA from 5/4 showed moderate leukocyte esterase, 11 WBCs but there were squamous cells cultures grew Proteus ESBL and E faecalis  -Seen by infectious disease team and no antibiotics were recommended  -She has been afebrile and WBC count is trending down  peripheral smear pending    Elevated troponin  -Troponin 20, repeat 17.   -EKG SR with PVCs and known LBBB. No chest pain.   -Echo 11/23/23 with EF 50-55%, mild concentric LVH, no significant valve disease  -Likely elevated due to demand ischemia in the setting of acute hypoxic respiratory failure.       Paroxysmal atrial fibrillation  HTN  HLD  -Continue PTA Metoprolol, Xarelto, Atorvastatin      History of CVA (04/2022)  -Cardioembolic from atrial fibrillation  -Continue PTA Xarelto, Atorvastatin     Dementia  -Oriented x 2, coming from [facility]  -Continue PTA Namenda, Seroquel      Depression  -Continue PTA Paxil       Right knee pain- controlled  -Voltren gel    Family communication   - I did speak with daughter [phone] Tina on 5/9 and she is okay with her going back to facility      Barriers to discharge/discharge planning  - Discharge back to facility          Diet: Regular Diet Adult  Diet  Diet    DVT Prophylaxis: DOAC  Pichardo Catheter: Not present  Lines: None     Cardiac Monitoring: None  Code Status: No CPR- Do NOT Intubate      Clinically Significant Risk Factors                  # Hypertension: Noted on problem list     # Dementia: noted on problem list    # Overweight: Estimated body mass index is 25.53 kg/m  as calculated from the following:    Height as of this encounter: 1.65 m (5'  "4.96\").    Weight as of this encounter: 69.5 kg (153 lb 3.5 oz).        # Financial/Environmental Concerns: none         Disposition Plan     Medically Ready for Discharge: Anticipated Tomorrow             Manharrison MD Aniyah  Hospitalist Service  [facility]  Securely message with [employer] (more info)  Text page via "[employer]" Paging/Directory   ______________________________________________________________________    Interval History     -Reviewed events and still needing 1 L of oxygen without any subjective sensation of shortness of breath, no cough with any sputum production  -Tolerating diet and wants to go   -No nausea or vomiting    Physical Exam   Vital Signs: Temp: 97.2  F (36.2  C) Temp src: Oral BP: 128/60 Pulse: 74   Resp: 18 SpO2: 95 % O2 Device: None (Room air) Oxygen Delivery: 1 LPM  Weight: 153 lbs 3.52 oz        General: Patient appears comfortable and in no acute distress. And sitting in chair   Respiratory: ctla   Cardiovascular: Regular rate , S1 and S2 normal   Neurologic: No facial droop  Musculoskeletal: Normal Range of motion over upper and lower extremities bilaterally   Psychiatric: cooperative      Medical Decision Making       Time spent in care of patient is 42 minutes and I reviewed labs including CBC and basic metabolic panel and discussed with the patient, nursing staff and care coordinator team and daughter      Data     I have personally reviewed the following data over the past 24 hrs:    11.5 (H)  \   12.2   / 462 (H)     138 104 28.5 (H) /  103 (H)   4.5 28 0.95 \     Ferritin:  N/A % Retic:  1.9 LDH:  N/A       Imaging results reviewed over the past 24 hrs:   No results found for this or any previous visit (from the past 24 hour(s)).  "

## 2024-05-10 LAB
PATH REPORT.COMMENTS IMP SPEC: NORMAL
PATH REPORT.COMMENTS IMP SPEC: NORMAL
PATH REPORT.FINAL DX SPEC: NORMAL
PATH REPORT.MICROSCOPIC SPEC OTHER STN: NORMAL
PATH REPORT.MICROSCOPIC SPEC OTHER STN: NORMAL
PATH REPORT.RELEVANT HX SPEC: NORMAL

## 2024-05-10 PROCEDURE — 85060 BLOOD SMEAR INTERPRETATION: CPT | Performed by: PATHOLOGY

## 2024-05-14 ENCOUNTER — DOCUMENTATION ONLY (OUTPATIENT)
Dept: OTHER | Facility: CLINIC | Age: 89
End: 2024-05-14
Payer: MEDICARE

## 2024-05-16 ENCOUNTER — TELEPHONE (OUTPATIENT)
Dept: FAMILY MEDICINE | Facility: CLINIC | Age: 89
End: 2024-05-16
Payer: MEDICARE

## 2024-05-16 NOTE — TELEPHONE ENCOUNTER
Home Care is calling regarding an established patient with  [facility].        10/18/2023    12:08 PM   Home Care Information   Date of Home Care episode start 10/18/2023   Current following provider Dr. Lake     Requesting orders from: [facility], [facility]  Provider is following patient: Yes  Is this a 60-day recertification request?  No    Orders Requested    Occupational Therapy  Request for continuation of care with increase in frequency  Frequency:  eowx/wk for 6 wks        Confirmed ok to leave a detailed message with call back.  Contact information confirmed and updated as needed.    Luiz Liu, RN

## 2024-05-16 NOTE — TELEPHONE ENCOUNTER
Patient Contact    Attempt # 1    Was call answered?  No.  Left message on voicemail with orders from provider and information to call me back.

## 2024-05-16 NOTE — TELEPHONE ENCOUNTER
Home Care is calling regarding an established patient with  [facility].        10/18/2023    12:08 PM   Home Care Information   Date of Home Care episode start 10/18/2023   Current following provider Dr. Lake     Requesting orders from: [facility], [facility]  Provider is following patient: No       Orders Requested    Skilled Nursing  Wound care for skin tear right upper arm. Orders requested are: wet to dry gauze dressing to change every other day.     Confirmed ok to leave a detailed message with call back.  Contact information confirmed and updated as needed.    Yuly Lerner RN

## 2024-05-17 ENCOUNTER — TELEPHONE (OUTPATIENT)
Dept: ENDOCRINOLOGY | Facility: CLINIC | Age: 89
End: 2024-05-17
Payer: MEDICARE

## 2024-05-17 NOTE — TELEPHONE ENCOUNTER
Home care called     Requesting PT orders     However, per chart review PCP is no longer Dr Lake - updated on 5/8/24    Upon review of her chart, home care noted pt does have a new PCP listed with them also     She will contact new provider for orders     Zuleyka SIDHU Triage RN  [facility]

## 2024-05-28 DIAGNOSIS — Z53.9 DIAGNOSIS NOT YET DEFINED: Primary | ICD-10-CM

## 2024-05-28 PROCEDURE — 99207 PR MD CERTIFICATION HHA PATIENT: CPT | Performed by: INTERNAL MEDICINE

## 2024-05-28 PROCEDURE — G0180 MD CERTIFICATION HHA PATIENT: HCPCS | Performed by: INTERNAL MEDICINE

## 2024-06-21 ENCOUNTER — TELEPHONE (OUTPATIENT)
Dept: FAMILY MEDICINE | Facility: CLINIC | Age: 89
End: 2024-06-21
Payer: MEDICARE

## 2024-06-21 NOTE — TELEPHONE ENCOUNTER
FYI - Status Update    Who is Calling: Ranjana [facility]  [phone]     Update: Cx visit for this week and move to next week.     Does caller want a call/response back: Nadia Ryan on 6/21/2024 at 11:52 AM

## 2024-06-21 NOTE — TELEPHONE ENCOUNTER
Routing to PCP as FYI.     Routing to PCP as FYI--no action needed.      Cindy LUCAS, RN      June 21, 2024  12:44 PM

## 2024-07-05 ENCOUNTER — TELEPHONE (OUTPATIENT)
Dept: FAMILY MEDICINE | Facility: CLINIC | Age: 89
End: 2024-07-05
Payer: MEDICARE

## 2024-07-05 NOTE — TELEPHONE ENCOUNTER
Home Care is calling regarding an established patient with [facility].        10/18/2023    12:08 PM   Home Care Information   Date of Home Care episode start 10/18/2023   Current following provider Dr. Lake     Requesting orders from: [facility], [facility]  Provider is following patient: Yes  Is this a 60-day recertification request?  Yes    Orders Requested    Skilled Nursing  Request for recertification   Frequency:  Every other week for 8 weeks       Information was gathered and will be sent to provider for review.  RN will contact Home Care with information after provider review.  Confirmed ok to leave a detailed message with call back.  Contact information confirmed and updated as needed.    Birgit Melchor RN

## 2024-07-17 ENCOUNTER — TELEPHONE (OUTPATIENT)
Dept: FAMILY MEDICINE | Facility: CLINIC | Age: 89
End: 2024-07-17
Payer: MEDICARE

## 2024-07-17 NOTE — TELEPHONE ENCOUNTER
Home Care is calling regarding an established patient with  [facility].        10/18/2023    12:08 PM   Home Care Information   Date of Home Care episode start 10/18/2023   Current following provider Dr. Lake     Requesting orders from: [facility], [facility]  Provider is following patient: Yes  Is this a 60-day recertification request?  No    Orders Requested    Skilled Nursing  Request for discontinuation of care   Goals have been met/progressing.  Discharging 7/31/24        Confirmed ok to leave a detailed message with call back.  Contact information confirmed and updated as needed.    Luiz Liu RN

## 2024-08-07 ENCOUNTER — TELEPHONE (OUTPATIENT)
Dept: DERMATOLOGY | Facility: CLINIC | Age: 89
End: 2024-08-07
Payer: MEDICARE

## 2024-08-07 ENCOUNTER — TRANSCRIBE ORDERS (OUTPATIENT)
Dept: OTHER | Age: 89
End: 2024-08-07

## 2024-08-07 DIAGNOSIS — S41.109A UNSPECIFIED OPEN WOUND OF UNSPECIFIED UPPER ARM, INITIAL ENCOUNTER: Primary | ICD-10-CM

## 2024-08-07 NOTE — TELEPHONE ENCOUNTER
This encounter is being sent to inform the clinic that this patient has a referral from Marshall Santos NP   for the diagnoses of Unspecified open wound of unspecified upper arm, initial encounter [S49.738X] and has requested that this patient be seen within 3-5 days and/or with Any.  Based on the availability of our provider(s), we are unable to accommodate this request.    Were all sites offered this patient?  Yes    Does scheduling algorithm request to schedule next available?  Patient appointment has not been scheduled.  Please review the referral request for accommodation and contact the patient.  If unable to accommodate, please resubmit a referral and indicate a preferred partner or affiliate location using Provider Finder or Scheduling Instructions field.      Please call daughter Tina Quiñones [phone] Thank you   Advancement Flap (Single) Text: The defect edges were debeveled with a #15 scalpel blade.  Given the location of the defect and the proximity to free margins a single advancement flap was deemed most appropriate.  Using a sterile surgical marker, an appropriate advancement flap was drawn incorporating the defect and placing the expected incisions within the relaxed skin tension lines where possible.    The area thus outlined was incised deep to adipose tissue with a #15 scalpel blade.  The skin margins were undermined to an appropriate distance in all directions utilizing iris scissors.

## 2024-08-09 NOTE — TELEPHONE ENCOUNTER
Patient Contact     Attempt # 1     Was call answered?   Yes      Called Margy Babin's daughter, Tina and offered an appointment with Dr. Trejo next Tuesday. Tina will be driving Flor to the appointment and will be out of town for this appointment. Scheduled with Shana Ford PA-C on Friday August 23rd at 1:45, clinic address and phone number provided.     RIAN Mosqueda

## 2024-08-23 ENCOUNTER — OFFICE VISIT (OUTPATIENT)
Dept: DERMATOLOGY | Facility: CLINIC | Age: 89
End: 2024-08-23
Payer: MEDICARE

## 2024-08-23 DIAGNOSIS — T14.90XD HEALING WOUND: Primary | ICD-10-CM

## 2024-08-23 PROCEDURE — 99202 OFFICE O/P NEW SF 15 MIN: CPT | Performed by: PHYSICIAN ASSISTANT

## 2024-08-23 NOTE — LETTER
8/23/2024       RE: Margy Babin  [address]  [place] [address]     Dear Colleague,    Thank you for referring your patient, Margy Babin, to the [facility] [place] at [facility]. Please see a copy of my visit note below.    [facility] Dermatology Note  Encounter Date: Aug 23, 2024  Office Visit     Reviewed patients past medical history and pertinent chart review prior to patients visit today.     Dermatology Problem List:  # Crusted plaque  Ddx: healing/ excoriated abrasion versus NMSC versus other    ____________________________________________    Assessment & Plan:     # Crusted plaque  Ddx: healing/ excoriated abrasion versus NMSC versus other  - We reviewed possible etiologies. Discussed risks and benefits of options for management including biopsy versus continued wound care. Given the lesion have improved, the patient and her daughter preferred to continue to monitor for now.   Wound Care:   - Cleanse the area with a gentle soap once daily. Do not scrub.   - Cover the site with Vaseline. Then, cover with a bandage. Avoid picking or scratching the site. Cover with more Vaseline before bed.   - Notify dermatology of any increasing redness, swelling, drainage, or pain at the site.     Follow up in 1 month for re-evaluation.       All risks, benefits and alternatives were discussed with patient.  Patient is in agreement and understands the assessment and plan.  All questions were answered.  Shana Paniagua PA-C  [facility]  _______________________________________    CC: Derm Problem (Flor is here today for a sore on upper R arm that hasn't been healing since March.)    HPI:  Ms. Margy Babin is a(n) 89 year old female who presents today with her daughter (who assists with the history) as a new patient for a lesion on the right upper arm. The patient's  daughter was made aware of the lesion in March. The patient does not recall any preceding trauma or rash at the site. The patient's daughter reports it has largely stayed the same over time with some improvement in recent days. The patient reports picking at the site, although it does not itch or cause pain. No history of skin cancer, psoriasis, or similar sores. Patient is otherwise feeling well, without additional skin concerns.      Physical Exam:  SKIN: Focused examination of right upper arm was performed.  - The right upper arm demonstrates a 2 cm crusted plaque.     - No other lesions of concern on areas examined.     Medications:  Current Outpatient Medications   Medication Sig Dispense Refill     acetaminophen (TYLENOL) 500 MG tablet Take 1,000 mg by mouth daily       acetaminophen (TYLENOL) 500 MG tablet Take 1,000 mg by mouth every 12 hours as needed for mild pain       albuterol (PROAIR HFA/PROVENTIL HFA/VENTOLIN HFA) 108 (90 Base) MCG/ACT inhaler Inhale 1-2 puffs into the lungs every 4 hours as needed for shortness of breath, wheezing or cough       atorvastatin (LIPITOR) 20 MG tablet Take 1 tablet (20 mg) by mouth daily 90 tablet 3     benzonatate (TESSALON) 100 MG capsule Take 1 capsule (100 mg) by mouth 3 times daily as needed for cough 15 capsule 0     bisacodyl (DULCOLAX) 5 MG EC tablet 1 TAB BY MOUTH DAILY AS NEEDED FOR CONSTIPATION 30 tablet 10     docusate sodium (COLACE) 100 MG capsule TAKE 1 CAP BY MOUTH TWICE DAILY AS NEEDED FOR CONSTIPATION 60 capsule 10     Ferrous Sulfate 324 (65 Fe) MG TBEC Take 324 mg by mouth three times a week Every Mon, Wed and Fri       guaiFENesin (MUCINEX) 600 MG 12 hr tablet Take 600 mg by mouth 2 times daily       memantine (NAMENDA) 5 MG tablet 1 TAB BY MOUTH TWICE DAILY 60 tablet 10     metoprolol succinate ER (TOPROL XL) 100 MG 24 hr tablet 1 TAB BY MOUTH DAILY (DX: HYPERTENSION) 30 tablet 10     ondansetron (ZOFRAN) 4 MG tablet 1 TAB BY MOUTH EVERY 8 HOURS AS  NEEDED FOR NAUSEA 10 tablet 10     PARoxetine (PAXIL) 20 MG tablet 1 TAB BY MOUTH AT BEDTIME Strength: 20 mg 90 tablet 0     QUEtiapine (SEROQUEL) 25 MG tablet 1 TAB BY MOUTH AT BEDTIME (DX: INSOMNIA) 30 tablet 1     ramelteon (ROZEREM) 8 MG tablet 1 TAB BY MOUTH EVERY NIGHT AS NEEDED FOR SLEEP 30 tablet 10     rivaroxaban ANTICOAGULANT (XARELTO) 15 MG TABS tablet Take 15 mg by mouth daily (with dinner)       traMADol (ULTRAM) 50 MG tablet Take 0.5 tablets (25 mg) by mouth every 6 hours as needed for severe pain 15 tablet 0     Vitamin D3 (VITAMIN D, CHOLECALCIFEROL,) 25 mcg (1000 units) tablet Take 25 mcg by mouth daily       ipratropium - albuterol 0.5 mg/2.5 mg/3 mL (DUONEB) 0.5-2.5 (3) MG/3ML neb solution Take 1 vial (3 mLs) by nebulization every 6 hours as needed for wheezing or shortness of breath 90 mL 0     No current facility-administered medications for this visit.      Past Medical History:   Patient Active Problem List   Diagnosis     Anxiety     Familial tremor     Dry eye syndrome     Blepharitis of both eyes     Depression, major, recurrent, in complete remission (H24)     Insomnia, unspecified type     ACP (advance care planning)     Essential hypertension     Ceruminosis, bilateral     Mixed hyperlipidemia     Tension headache     Renal insufficiency     Traumatic rhabdomyolysis, initial encounter (H24)     Urinary tract infection without hematuria, site unspecified     Sepsis, due to unspecified organism, unspecified whether acute organ dysfunction present (H)     Abdominal pain, generalized     Colitis     Atrial flutter (H)     Cardiomyopathy (H)     Coagulation disorder (H24)     Fusion of spine, cervical region     Left bundle branch block     Major depressive disorder, single episode, unspecified     Metabolic encephalopathy     Other specified abnormal findings of blood chemistry     Chronic atrial fibrillation, unspecified (H)     Weakness     Abnormal brain CT     Multiple falls      Pneumonia due to infectious organism, unspecified laterality, unspecified part of lung     Aspiration pneumonitis (H)     Pancolitis (H)     Acute respiratory failure with hypoxia (H)     Non-intractable vomiting with nausea, unspecified vomiting type     Acquired absence of both cervix and uterus     Bacteremia     Dysphagia, oropharyngeal phase     Enterococcus as the cause of diseases classified elsewhere     History of falling     Long QT syndrome     Nocturia     Noninfective gastroenteritis and colitis, unspecified     Other disorders of plasma-protein metabolism, not elsewhere classified     Other malaise     Other sequelae of cerebral infarction     Repeated falls     Supraventricular tachycardia (H24)     Unspecified abnormalities of gait and mobility     Unspecified dementia, unspecified severity, without behavioral disturbance, psychotic disturbance, mood disturbance, and anxiety (H)     Prediabetes     Other long term (current) drug therapy     Personal history of urinary tract infection     Pneumonia of left lower lobe due to infectious organism     Acute encephalopathy     Acute on chronic anemia     Dehydration     Atrial fibrillation with rapid ventricular response (H)     Acute cystitis without hematuria     SOB (shortness of breath)     Atypical pneumonia     Past Medical History:   Diagnosis Date     Anxiety      Blepharitis of both eyes      Cerebrovascular accident (CVA) due to embolism (H) 04/2022    corpus striatum     Chronic atrial fibrillation (H)      Depression, major, recurrent, in complete remission (H24)      Dry eye syndrome      Dyspnea on exertion 10/07/2021     Essential hypertension 03/05/2020     Familial tremor 03/06/2013     Insomnia, unspecified type 11/03/2014    No CSA on file Last  check - 02/28/2020 with no concerns.     Mixed hyperlipidemia 11/04/2020     Nausea without vomiting 06/16/2015     Problem list name updated by automated process. Provider to review      Tremor, hereditary, benign        CC Shana Paniagua PA-C  [address]  [address], [facility] [address]  [place] [address] on close of this encounter.       Again, thank you for allowing me to participate in the care of your patient.      Sincerely,    Shana Paniagua PA-C

## 2024-08-23 NOTE — PROGRESS NOTES
[facility] Dermatology Note  Encounter Date: Aug 23, 2024  Office Visit     Reviewed patients past medical history and pertinent chart review prior to patients visit today.     Dermatology Problem List:  # Crusted plaque  Ddx: healing/ excoriated abrasion versus NMSC versus other    ____________________________________________    Assessment & Plan:     # Crusted plaque  Ddx: healing/ excoriated abrasion versus NMSC versus other  - We reviewed possible etiologies. Discussed risks and benefits of options for management including biopsy versus continued wound care. Given the lesion have improved, the patient and her daughter preferred to continue to monitor for now.   Wound Care:   - Cleanse the area with a gentle soap once daily. Do not scrub.   - Cover the site with Vaseline. Then, cover with a bandage. Avoid picking or scratching the site. Cover with more Vaseline before bed.   - Notify dermatology of any increasing redness, swelling, drainage, or pain at the site.     Follow up in 1 month for re-evaluation.       All risks, benefits and alternatives were discussed with patient.  Patient is in agreement and understands the assessment and plan.  All questions were answered.  Shana Paniagua PA-C  [facility] Dermatology  _______________________________________    CC: Derm Problem (Flor is here today for a sore on upper R arm that hasn't been healing since March.)    HPI:  Ms. Margy Babin is a(n) 89 year old female who presents today with her daughter (who assists with the history) as a new patient for a lesion on the right upper arm. The patient's daughter was made aware of the lesion in March. The patient does not recall any preceding trauma or rash at the site. The patient's daughter reports it has largely stayed the same over time with some improvement in recent days. The patient reports picking at the site, although it does not itch or cause pain. No history of skin cancer,  psoriasis, or similar sores. Patient is otherwise feeling well, without additional skin concerns.      Physical Exam:  SKIN: Focused examination of right upper arm was performed.  - The right upper arm demonstrates a 2 cm crusted plaque.     - No other lesions of concern on areas examined.     Medications:  Current Outpatient Medications   Medication Sig Dispense Refill    acetaminophen (TYLENOL) 500 MG tablet Take 1,000 mg by mouth daily      acetaminophen (TYLENOL) 500 MG tablet Take 1,000 mg by mouth every 12 hours as needed for mild pain      albuterol (PROAIR HFA/PROVENTIL HFA/VENTOLIN HFA) 108 (90 Base) MCG/ACT inhaler Inhale 1-2 puffs into the lungs every 4 hours as needed for shortness of breath, wheezing or cough      atorvastatin (LIPITOR) 20 MG tablet Take 1 tablet (20 mg) by mouth daily 90 tablet 3    benzonatate (TESSALON) 100 MG capsule Take 1 capsule (100 mg) by mouth 3 times daily as needed for cough 15 capsule 0    bisacodyl (DULCOLAX) 5 MG EC tablet 1 TAB BY MOUTH DAILY AS NEEDED FOR CONSTIPATION 30 tablet 10    docusate sodium (COLACE) 100 MG capsule TAKE 1 CAP BY MOUTH TWICE DAILY AS NEEDED FOR CONSTIPATION 60 capsule 10    Ferrous Sulfate 324 (65 Fe) MG TBEC Take 324 mg by mouth three times a week Every Mon, Wed and Fri      guaiFENesin (MUCINEX) 600 MG 12 hr tablet Take 600 mg by mouth 2 times daily      memantine (NAMENDA) 5 MG tablet 1 TAB BY MOUTH TWICE DAILY 60 tablet 10    metoprolol succinate ER (TOPROL XL) 100 MG 24 hr tablet 1 TAB BY MOUTH DAILY (DX: HYPERTENSION) 30 tablet 10    ondansetron (ZOFRAN) 4 MG tablet 1 TAB BY MOUTH EVERY 8 HOURS AS NEEDED FOR NAUSEA 10 tablet 10    PARoxetine (PAXIL) 20 MG tablet 1 TAB BY MOUTH AT BEDTIME Strength: 20 mg 90 tablet 0    QUEtiapine (SEROQUEL) 25 MG tablet 1 TAB BY MOUTH AT BEDTIME (DX: INSOMNIA) 30 tablet 1    ramelteon (ROZEREM) 8 MG tablet 1 TAB BY MOUTH EVERY NIGHT AS NEEDED FOR SLEEP 30 tablet 10    rivaroxaban ANTICOAGULANT (XARELTO) 15  MG TABS tablet Take 15 mg by mouth daily (with dinner)      traMADol (ULTRAM) 50 MG tablet Take 0.5 tablets (25 mg) by mouth every 6 hours as needed for severe pain 15 tablet 0    Vitamin D3 (VITAMIN D, CHOLECALCIFEROL,) 25 mcg (1000 units) tablet Take 25 mcg by mouth daily      ipratropium - albuterol 0.5 mg/2.5 mg/3 mL (DUONEB) 0.5-2.5 (3) MG/3ML neb solution Take 1 vial (3 mLs) by nebulization every 6 hours as needed for wheezing or shortness of breath 90 mL 0     No current facility-administered medications for this visit.      Past Medical History:   Patient Active Problem List   Diagnosis    Anxiety    Familial tremor    Dry eye syndrome    Blepharitis of both eyes    Depression, major, recurrent, in complete remission (H24)    Insomnia, unspecified type    ACP (advance care planning)    Essential hypertension    Ceruminosis, bilateral    Mixed hyperlipidemia    Tension headache    Renal insufficiency    Traumatic rhabdomyolysis, initial encounter (H24)    Urinary tract infection without hematuria, site unspecified    Sepsis, due to unspecified organism, unspecified whether acute organ dysfunction present (H)    Abdominal pain, generalized    Colitis    Atrial flutter (H)    Cardiomyopathy (H)    Coagulation disorder (H24)    Fusion of spine, cervical region    Left bundle branch block    Major depressive disorder, single episode, unspecified    Metabolic encephalopathy    Other specified abnormal findings of blood chemistry    Chronic atrial fibrillation, unspecified (H)    Weakness    Abnormal brain CT    Multiple falls    Pneumonia due to infectious organism, unspecified laterality, unspecified part of lung    Aspiration pneumonitis (H)    Pancolitis (H)    Acute respiratory failure with hypoxia (H)    Non-intractable vomiting with nausea, unspecified vomiting type    Acquired absence of both cervix and uterus    Bacteremia    Dysphagia, oropharyngeal phase    Enterococcus as the cause of diseases  classified elsewhere    History of falling    Long QT syndrome    Nocturia    Noninfective gastroenteritis and colitis, unspecified    Other disorders of plasma-protein metabolism, not elsewhere classified    Other malaise    Other sequelae of cerebral infarction    Repeated falls    Supraventricular tachycardia (H24)    Unspecified abnormalities of gait and mobility    Unspecified dementia, unspecified severity, without behavioral disturbance, psychotic disturbance, mood disturbance, and anxiety (H)    Prediabetes    Other long term (current) drug therapy    Personal history of urinary tract infection    Pneumonia of left lower lobe due to infectious organism    Acute encephalopathy    Acute on chronic anemia    Dehydration    Atrial fibrillation with rapid ventricular response (H)    Acute cystitis without hematuria    SOB (shortness of breath)    Atypical pneumonia     Past Medical History:   Diagnosis Date    Anxiety     Blepharitis of both eyes     Cerebrovascular accident (CVA) due to embolism (H) 04/2022    corpus striatum    Chronic atrial fibrillation (H)     Depression, major, recurrent, in complete remission (H24)     Dry eye syndrome     Dyspnea on exertion 10/07/2021    Essential hypertension 03/05/2020    Familial tremor 03/06/2013    Insomnia, unspecified type 11/03/2014    No CSA on file Last  check - 02/28/2020 with no concerns.    Mixed hyperlipidemia 11/04/2020    Nausea without vomiting 06/16/2015     Problem list name updated by automated process. Provider to review    Tremor, hereditary, benign        CC Shana Paniagua PA-C  [address] [facility] [address], [facility] [address]  [place] [address] on close of this encounter.

## 2024-08-23 NOTE — PATIENT INSTRUCTIONS
Wound Care:     - Cleanse the area with a gentle soap once daily. Do not scrub.   - Cover the site with Vaseline. Then, cover with a bandage. Avoid picking or scratching the site. Cover with more Vaseline before bed.   - Notify dermatology of any increasing redness, swelling, drainage, or pain at the site.

## 2024-10-19 NOTE — ED NOTES
Bed: ED09  Expected date: 5/15/23  Expected time: 12:08 PM  Means of arrival: Ambulance  Comments:  Raiza 89f adelaida  [id number]   1

## 2024-12-31 NOTE — TELEPHONE ENCOUNTER
Controlled Substance Refill Request for Xanax  Problem List Complete:  No     PROVIDER TO CONSIDER COMPLETION OF PROBLEM LIST AND OVERVIEW/CONTROLLED SUBSTANCE AGREEMENT    Last Written Prescription Date:  6-8-17  Last Fill Quantity: 60,   # refills: 1    Last Office Visit with G primary care provider: 6-19-17    Future Office visit:     Controlled substance agreement on file: No.     Processing:  Fax Rx to [facility] pharmacy     checked in past 6 months?  Yes 6-8-17 no concerns   no

## 2025-01-25 ENCOUNTER — HEALTH MAINTENANCE LETTER (OUTPATIENT)
Age: OVER 89
End: 2025-01-25

## 2025-03-04 ENCOUNTER — DOCUMENTATION ONLY (OUTPATIENT)
Dept: GERIATRICS | Facility: CLINIC | Age: OVER 89
End: 2025-03-04
Payer: MEDICARE

## 2025-03-06 ENCOUNTER — ASSISTED LIVING VISIT (OUTPATIENT)
Dept: GERIATRICS | Facility: CLINIC | Age: OVER 89
End: 2025-03-06
Payer: MEDICARE

## 2025-03-06 VITALS
DIASTOLIC BLOOD PRESSURE: 51 MMHG | RESPIRATION RATE: 18 BRPM | TEMPERATURE: 97.7 F | HEART RATE: 75 BPM | SYSTOLIC BLOOD PRESSURE: 124 MMHG | HEIGHT: 65 IN | BODY MASS INDEX: 26.69 KG/M2 | WEIGHT: 160.2 LBS | OXYGEN SATURATION: 93 %

## 2025-03-06 DIAGNOSIS — F03.918 DEMENTIA WITH OTHER BEHAVIORAL DISTURBANCE, UNSPECIFIED DEMENTIA SEVERITY, UNSPECIFIED DEMENTIA TYPE (H): ICD-10-CM

## 2025-03-06 DIAGNOSIS — Z78.9 IMPAIRED MOBILITY AND ADLS: ICD-10-CM

## 2025-03-06 DIAGNOSIS — J43.9 PULMONARY EMPHYSEMA, UNSPECIFIED EMPHYSEMA TYPE (H): ICD-10-CM

## 2025-03-06 DIAGNOSIS — N18.31 CHRONIC KIDNEY DISEASE, STAGE 3A (H): ICD-10-CM

## 2025-03-06 DIAGNOSIS — Z74.2 NEED FOR ASSISTANCE AT HOME WITHOUT OTHER HOUSEHOLD MEMBER ABLE TO RENDER CARE: ICD-10-CM

## 2025-03-06 DIAGNOSIS — I48.92 ATRIAL FLUTTER, UNSPECIFIED TYPE (H): ICD-10-CM

## 2025-03-06 DIAGNOSIS — I50.20 HEART FAILURE WITH REDUCED EJECTION FRACTION (H): Primary | ICD-10-CM

## 2025-03-06 DIAGNOSIS — F03.C0 SEVERE DEMENTIA WITHOUT BEHAVIORAL DISTURBANCE, PSYCHOTIC DISTURBANCE, MOOD DISTURBANCE, OR ANXIETY, UNSPECIFIED DEMENTIA TYPE (H): ICD-10-CM

## 2025-03-06 DIAGNOSIS — Z74.09 IMPAIRED MOBILITY AND ADLS: ICD-10-CM

## 2025-03-06 DIAGNOSIS — Z74.9: ICD-10-CM

## 2025-03-06 DIAGNOSIS — R54 FRAILTY: ICD-10-CM

## 2025-03-06 DIAGNOSIS — Z74.1 PERSONAL CARE IMPAIRMENT: ICD-10-CM

## 2025-03-06 DIAGNOSIS — I10 BENIGN ESSENTIAL HYPERTENSION: ICD-10-CM

## 2025-03-06 DIAGNOSIS — K59.00 CONSTIPATION, UNSPECIFIED CONSTIPATION TYPE: ICD-10-CM

## 2025-03-06 DIAGNOSIS — Z86.73 HISTORY OF CVA (CEREBROVASCULAR ACCIDENT): ICD-10-CM

## 2025-03-06 DIAGNOSIS — K51.00 PANCOLITIS (H): ICD-10-CM

## 2025-03-06 PROBLEM — E66.3 OVERWEIGHT: Status: ACTIVE | Noted: 2023-05-21

## 2025-03-06 PROBLEM — D75.839 THROMBOCYTOSIS, UNSPECIFIED: Status: ACTIVE | Noted: 2023-05-21

## 2025-03-06 PROBLEM — D50.9 IRON DEFICIENCY ANEMIA, UNSPECIFIED: Status: ACTIVE | Noted: 2023-05-21

## 2025-03-06 PROBLEM — J96.01 ACUTE RESPIRATORY FAILURE WITH HYPOXIA (H): Status: RESOLVED | Noted: 2022-09-02 | Resolved: 2025-03-06

## 2025-03-06 PROBLEM — Z87.440 PERSONAL HISTORY OF URINARY (TRACT) INFECTIONS: Status: ACTIVE | Noted: 2023-05-21

## 2025-03-06 PROBLEM — M62.81 MUSCLE WEAKNESS (GENERALIZED): Status: ACTIVE | Noted: 2023-05-21

## 2025-03-06 PROBLEM — E87.20 ACIDOSIS: Status: ACTIVE | Noted: 2022-01-11

## 2025-03-06 PROBLEM — D72.828 OTHER ELEVATED WHITE BLOOD CELL COUNT: Status: ACTIVE | Noted: 2023-05-21

## 2025-03-06 PROBLEM — J18.9 PNEUMONIA, UNSPECIFIED ORGANISM: Status: ACTIVE | Noted: 2023-05-21

## 2025-03-06 PROBLEM — N18.2 CHRONIC KIDNEY DISEASE, STAGE 2 (MILD): Status: ACTIVE | Noted: 2023-05-21

## 2025-03-06 RX ORDER — DOCUSATE SODIUM 100 MG/1
100 CAPSULE, LIQUID FILLED ORAL 2 TIMES DAILY PRN
Status: SHIPPED
Start: 2025-03-06

## 2025-03-06 RX ORDER — CALCIUM CARBONATE 500 MG/1
1 TABLET, CHEWABLE ORAL EVERY 8 HOURS PRN
COMMUNITY

## 2025-03-06 NOTE — PROGRESS NOTES
[facility] Geriatrics   2025     Name: Margy Babin   : 10/2/1929         Orders:  BMP, CBC, Lipid panel, vitamin D, TSH, hgb A1c on 3/12/2025.   Dx atrial fibrillation  Weight 3x per week.  Dx HFpEF  Start Ipratropium-Albuterol Inhalation Solution 0.5-2.5 (3) MG/3M (Ipratropium-Albuterol) nebulizer twice daily.   Dx. COPD  Discontinue iron     Electronically signed by     KAM Gordillo CNP on 3/6/2025 at 2:28 PM

## 2025-03-06 NOTE — PROGRESS NOTES
[facility] GERIATRICS    PRIMARY CARE PROVIDER AND CLINIC:  KAM Gordillo CNP, [address] [facility] [address]  Chief Complaint   Patient presents with    [facility] Medical Record Number:  [id number]  Place of Service where encounter took place:  [facility] ([facility]) [[id number]]    Margy Babin  is a 89 year old  (10/2/1929), admitted to the above facility from [facility].       Her past medical history includes  Atrial fibrillation  HTN  Dementia  Hx of CVA  Depressin  Right knee pain    Recently moved from [facility]  Patient was seen in her room.  She was resting.  She woke up and had a conversation with writer.  She stated she was living with her mother.  However she Jerica does have 2 children 1 lives in [place] and 1 she was not aware of where she lives because she moved frequently.  But he denied shortness of breath chest pain.  Staff states she needs assistance with dressing and walking to the dining room.  Jerica uses her walker.  BIMS is noted to be 7/13        CODE STATUS/ADVANCE DIRECTIVES DISCUSSION:   DNR / DNI  ALLERGIES: No Known Allergies   PAST MEDICAL HISTORY:   Past Medical History:   Diagnosis Date    Anxiety     Blepharitis of both eyes     Cerebrovascular accident (CVA) due to embolism (H) 04/2022    corpus striatum    Chronic atrial fibrillation (H)     Depression, major, recurrent, in complete remission     Dry eye syndrome     Dyspnea on exertion 10/07/2021    Essential hypertension 03/05/2020    Familial tremor 03/06/2013    Insomnia, unspecified type 11/03/2014    No CSA on file Last  check - 02/28/2020 with no concerns.    Mixed hyperlipidemia 11/04/2020    Nausea without vomiting 06/16/2015     Problem list name updated by automated process. Provider to review    Tremor, hereditary, benign       PAST SURGICAL HISTORY:   has a past surgical history that includes Neck surgery (2009); TOTAL HIP  ARTHROPLASTY (Right, 1997); rotator cuff repair rt/lt (Right); and Hysterectomy total abdominal, bilateral salpingo-oophorectomy, combined.  FAMILY HISTORY: family history includes Breast Cancer (age of onset: 43) in her daughter; C.A.D. in her father; Emphysema in her sister; Heart Disease in her sister; Heart Disease (age of onset: 80) in her father.  SOCIAL HISTORY:   reports that she has never smoked. She has never used smokeless tobacco. She reports that she does not drink alcohol and does not use drugs.  Patient's living condition: lives in an assisted living facility    Post Discharge Medication Reconciliation Status:   MED REC REQUIRED  Post Medication Reconciliation Status:  Discharge medications reconciled and changed, see notes/orders       Current Outpatient Medications   Medication Sig Dispense Refill    acetaminophen (TYLENOL) 500 MG tablet Take 1,000 mg by mouth daily      acetaminophen (TYLENOL) 500 MG tablet Take 1,000 mg by mouth every 12 hours as needed for mild pain      Albuterol-Budesonide 90-80 MCG/ACT AERO Inhale 2 puffs into the lungs every 4 hours as needed.      atorvastatin (LIPITOR) 20 MG tablet Take 1 tablet (20 mg) by mouth daily 90 tablet 3    calcium carbonate (TUMS) 500 MG chewable tablet Take 1 chew tab by mouth every 8 hours as needed for heartburn.      docusate sodium (COLACE) 100 MG capsule TAKE 1 CAP BY MOUTH TWICE DAILY AS NEEDED FOR CONSTIPATION (Patient taking differently: Take 100 mg by mouth 2 times daily as needed.) 60 capsule 10    Ferrous Sulfate 324 (65 Fe) MG TBEC Take 324 mg by mouth three times a week Every Mon, Wed and Fri      ipratropium - albuterol 0.5 mg/2.5 mg/3 mL (DUONEB) 0.5-2.5 (3) MG/3ML neb solution Take 1 vial (3 mLs) by nebulization every 6 hours as needed for wheezing or shortness of breath 90 mL 0    memantine (NAMENDA) 5 MG tablet 1 TAB BY MOUTH TWICE DAILY 60 tablet 10    metoprolol succinate ER (TOPROL XL) 100 MG 24 hr tablet 1 TAB BY MOUTH DAILY  "(DX: HYPERTENSION) 30 tablet 10    PARoxetine (PAXIL) 20 MG tablet 1 TAB BY MOUTH AT BEDTIME Strength: 20 mg 90 tablet 0    QUEtiapine (SEROQUEL) 25 MG tablet 1 TAB BY MOUTH AT BEDTIME (DX: INSOMNIA) 30 tablet 1    rivaroxaban ANTICOAGULANT (XARELTO) 15 MG TABS tablet Take 15 mg by mouth daily (with dinner)      Vitamin D3 (VITAMIN D, CHOLECALCIFEROL,) 25 mcg (1000 units) tablet Take 25 mcg by mouth daily       No current facility-administered medications for this visit.       ROS:  10 point ROS of systems including Constitutional, Eyes, Respiratory, Cardiovascular, Gastroenterology, Genitourinary, Integumentary, Musculoskeletal, Psychiatric were all negative except for pertinent positives noted in my HPI.    Vitals:  /51   Pulse 75   Temp 97.7  F (36.5  C)   Resp 18   Ht 1.651 m (5' 5\")   Wt 72.7 kg (160 lb 3.2 oz)   SpO2 93%   BMI 26.66 kg/m    Exam:  GENERAL APPEARANCE:  Alert, in no distress  RESP:  no respiratory distress, expiratory wheezes  CV:  rate-normal  PSYCH:  insight and judgement impaired    Lab/Diagnostic data:  Recent labs in [facility] reviewed by me today.     ASSESSMENT/PLAN:    (I50.20) Heart failure with reduced ejection fraction (H)  (primary encounter diagnosis)  Comment: Chronic  Goal from 2023 shows EF 50-55% with mild concentric left ventricular hypertrophy  Euvolemic on exam   currently taking metoprolol succinate  Plan: Monitor BMP and CBC and daily weights x 2 weeks    (F03.90) Dementia, unspecified dementia severity, unspecified dementia type, with behavioral, psychotic, or mood disturbance or anxiety (H)  (F03.C0) Severe dementia without behavioral disturbance, psychotic disturbance, mood disturbance, or anxiety, unspecified dementia type (H)  Comment: Chronic  Patient very confused today thought she lives was living with her mother  Knew she had 2 children  Currently taking Namenda, Paxil, Seroquel  Needing assistance with dressing and walking to the dining room  Plan: Nursing " to provide cares    (I48.92) Atrial flutter, unspecified type (H)  Comment: Chronic  For rate control she is currently taking metoprolol succinate  For stroke prophylaxis she is currently taking rivaroxaban  Plan: Monitor BMP, CBC and heart rate    (K51.00) Pancolitis (H)  Comment: Noted on CT in 2022  Plan: Monitor bowel    (N18.31) Chronic kidney disease, stage 3a (H)  Comment: Chronic  Last GFR was 55 (5/2024)  Plan: Avoid nephrotoxic medications  Monitor BMP, CBC    (I10) Benign essential hypertension  Comment: Chronic  Currently taking metoprolol succinate  Plan: Monitor BMP, CBC and blood pressure    (Z86.73) History of CVA (cerebrovascular accident)  Comment: Currently taking atorvastatin  Plan: Continue with plan of care no changes at this time, adjustment as needed      (Z74.09,  Z78.9) Impaired mobility and ADLs  (R54) Frailty  (Z74.1) Personal care impairment  (Z74.2) Need for assistance at home without other household member able to render care  (Z74.9) Problem related to care provider dependency  Comment: Patient recently moved from assisted living to a new [facility].  She is currently needing assistance from staff to get dressed and walk with her to the dining room  Patient is using a 2 wheeled walker  Plan: Nursing to provide supportive care    (J43.9) Pulmonary emphysema, unspecified emphysema type (H)  Comment: Chronic  Currently having wheezing  Currently has orders for inhalers as needed and DuoNeb as needed  Plan: Will schedule duoneb BID           Electronically signed by:      KAM Gordillo CNP on 3/6/2025 at 2:28 PM            60 minutes was spent reviewing medical record from [facility], assessing patient, reviewing medical notes from previous primary care provider, talking with ptand answering their questions/concerns, coordinating above plan of care with nursing , SW, therapy and dietitian and patient and reviewed medications with patient and counseled pt. on above plan of care.   Counseled on medications and side effects.

## 2025-03-10 NOTE — PROGRESS NOTES
[facility] GERIATRICS        Visit Type: RECHECK     Facility:   [facility] ([facility]) [[id number]]         History of Present Illness: Margy Babin is a 89 year old female     Today Margy Babin has no complaints.  Her vital signs are stable.  She is using nebulizer 4 times daily.  She denies shortness of breath.  She is adjusting to her new apartment.  Her BIMS was 7/15.    Nursing walks with her to the dining room to prevent getting lost.  She uses a 2 wheeled walker  Daughter stated her lesion on her arm but it has been there a year - elected not biopsy  At previous [facility] - they allowed her to walk.    Spoke with Daughter Tina on the phone     Current Outpatient Medications   Medication Sig Dispense Refill    acetaminophen (TYLENOL) 500 MG tablet Take 1,000 mg by mouth daily      acetaminophen (TYLENOL) 500 MG tablet Take 1,000 mg by mouth every 12 hours as needed for mild pain      Albuterol-Budesonide 90-80 MCG/ACT AERO Inhale 2 puffs into the lungs every 4 hours as needed.      atorvastatin (LIPITOR) 20 MG tablet Take 1 tablet (20 mg) by mouth daily 90 tablet 3    calcium carbonate (TUMS) 500 MG chewable tablet Take 1 chew tab by mouth every 8 hours as needed for heartburn.      docusate sodium (COLACE) 100 MG capsule Take 1 capsule (100 mg) by mouth 2 times daily as needed for constipation.      guaiFENesin (ROBITUSSIN) 20 mg/mL liquid Take 200 mg by mouth every 4 hours as needed for cough.      ipratropium - albuterol 0.5 mg/2.5 mg/3 mL (DUONEB) 0.5-2.5 (3) MG/3ML neb solution Take 1 vial (3 mLs) by nebulization every 6 hours as needed for wheezing or shortness of breath (Patient taking differently: Take 3 mLs by nebulization 4 times daily.) 90 mL 0    memantine (NAMENDA) 5 MG tablet 1 TAB BY MOUTH TWICE DAILY 60 tablet 10    metoprolol succinate ER (TOPROL XL) 100 MG 24 hr tablet 1 TAB BY MOUTH DAILY (DX: HYPERTENSION) 30 tablet 10    PARoxetine (PAXIL) 20 MG tablet 1 TAB BY MOUTH AT  "BEDTIME Strength: 20 mg 90 tablet 0    QUEtiapine (SEROQUEL) 25 MG tablet 1 TAB BY MOUTH AT BEDTIME (DX: INSOMNIA) 30 tablet 1    rivaroxaban ANTICOAGULANT (XARELTO) 15 MG TABS tablet Take 15 mg by mouth daily (with dinner)      Vitamin D3 (VITAMIN D, CHOLECALCIFEROL,) 25 mcg (1000 units) tablet Take 25 mcg by mouth daily         ALLERGIES:Patient has no known allergies.    Vitals:  BP (!) 144/74   Pulse 74   Temp 97.5  F (36.4  C)   Resp 18   Ht 1.651 m (5' 5\")   Wt 72.5 kg (159 lb 12.8 oz)   SpO2 92%   BMI 26.59 kg/m    Body mass index is 26.59 kg/m .    Review of Systems   All other systems reviewed and are negative.         Physical Exam  Vitals and nursing note reviewed.   Cardiovascular:      Rate and Rhythm: Normal rate.   Pulmonary:      Effort: Pulmonary effort is normal.      Breath sounds: Normal breath sounds.   Skin:     General: Skin is warm.   Neurological:      Mental Status: She is alert.           Labs:       Assessment/Plan:    (I50.20) Heart failure with reduced ejection fraction (H)  (primary encounter diagnosis)  Comment: Chronic  Goal from 2023 shows EF 50-55% with mild concentric left ventricular hypertrophy  Weight stable  currently taking metoprolol succinate  Plan: Continue with plan of care no changes at this time, adjustment as needed       (F03.90) Dementia, unspecified dementia severity, unspecified dementia type, with behavioral, psychotic, or mood disturbance or anxiety (H)  (F03.C0) Severe dementia without behavioral disturbance, psychotic disturbance, mood disturbance, or anxiety, unspecified dementia type (H)  Comment: Chronic  Patient very confused today thought she lives was living with her mother  Knew she had 2 children  Currently taking Namenda, Paxil, Seroquel  She is on seroquel for sleep   Needing assistance with dressing and walking to the dining room  Plan: Nursing to provide cares  In a few weeks start to decrease seroquel.  If not sleeping then try trazodone.  " PT was on ambien prior to seroquel for sleep      (I48.92) Atrial flutter, unspecified type (H)  Comment: Chronic  For rate control she is currently taking metoprolol succinate  For stroke prophylaxis she is currently taking rivaroxaban  Plan: consider stopping Xarelto at end of card and changing to Eliquis 2.5 mg BID     (K51.00) Pancolitis (H)  Comment: Noted on CT in 2022  Plan: Monitor bowel     (N18.31) Chronic kidney disease, stage 3a (H)  Comment: Chronic  Last GFR was 55 (5/2024)  Plan: Avoid nephrotoxic medications  Monitor BMP, CBC     (I10) Benign essential hypertension  Comment: Chronic  Currently taking metoprolol succinate  Plan: Monitor BMP, CBC and blood pressure     (Z86.73) History of CVA (cerebrovascular accident)  Comment: Currently taking atorvastatin  Plan: Continue with plan of care no changes at this time, adjustment as needed        (Z74.09,  Z78.9) Impaired mobility and ADLs  (R54) Frailty  (Z74.1) Personal care impairment  (Z74.2) Need for assistance at home without other household member able to render care  (Z74.9) Problem related to care provider dependency  Comment: Patient recently moved from assisted living to a new [facility].  She is currently needing assistance from staff to get dressed and walk with her to the dining room  Patient is using a 2 wheeled walker  Plan: Nursing to provide supportive care     (J43.9) Pulmonary emphysema, unspecified emphysema type (H)  Comment: Chronic  Currently having wheezing  DuoNeb QID   Plan: Continue with plan of care no changes at this time, adjustment as needed          Electronically signed by:    KAM Gordillo CNP on 3/13/2025 at 12:33 PM      MEDICATIONS:  MED REC REQUIRED  Post Medication Reconciliation Status:  Discharge medications reconciled and changed, see notes/orders

## 2025-03-13 ENCOUNTER — ASSISTED LIVING VISIT (OUTPATIENT)
Dept: GERIATRICS | Facility: CLINIC | Age: OVER 89
End: 2025-03-13
Payer: MEDICARE

## 2025-03-13 VITALS
HEIGHT: 65 IN | WEIGHT: 159.8 LBS | DIASTOLIC BLOOD PRESSURE: 74 MMHG | SYSTOLIC BLOOD PRESSURE: 144 MMHG | BODY MASS INDEX: 26.62 KG/M2 | HEART RATE: 74 BPM | OXYGEN SATURATION: 92 % | TEMPERATURE: 97.5 F | RESPIRATION RATE: 18 BRPM

## 2025-03-13 DIAGNOSIS — Z78.9 IMPAIRED MOBILITY AND ADLS: ICD-10-CM

## 2025-03-13 DIAGNOSIS — N18.31 CHRONIC KIDNEY DISEASE, STAGE 3A (H): ICD-10-CM

## 2025-03-13 DIAGNOSIS — I10 BENIGN ESSENTIAL HYPERTENSION: ICD-10-CM

## 2025-03-13 DIAGNOSIS — F03.918 DEMENTIA WITH OTHER BEHAVIORAL DISTURBANCE, UNSPECIFIED DEMENTIA SEVERITY, UNSPECIFIED DEMENTIA TYPE (H): ICD-10-CM

## 2025-03-13 DIAGNOSIS — R54 FRAILTY: ICD-10-CM

## 2025-03-13 DIAGNOSIS — K59.00 CONSTIPATION, UNSPECIFIED CONSTIPATION TYPE: ICD-10-CM

## 2025-03-13 DIAGNOSIS — J43.9 PULMONARY EMPHYSEMA, UNSPECIFIED EMPHYSEMA TYPE (H): ICD-10-CM

## 2025-03-13 DIAGNOSIS — F03.C0 SEVERE DEMENTIA WITHOUT BEHAVIORAL DISTURBANCE, PSYCHOTIC DISTURBANCE, MOOD DISTURBANCE, OR ANXIETY, UNSPECIFIED DEMENTIA TYPE (H): ICD-10-CM

## 2025-03-13 DIAGNOSIS — Z74.09 IMPAIRED MOBILITY AND ADLS: ICD-10-CM

## 2025-03-13 DIAGNOSIS — Z74.1 PERSONAL CARE IMPAIRMENT: ICD-10-CM

## 2025-03-13 DIAGNOSIS — K51.00 PANCOLITIS (H): ICD-10-CM

## 2025-03-13 DIAGNOSIS — Z74.2 NEED FOR ASSISTANCE AT HOME WITHOUT OTHER HOUSEHOLD MEMBER ABLE TO RENDER CARE: ICD-10-CM

## 2025-03-13 DIAGNOSIS — I50.20 HEART FAILURE WITH REDUCED EJECTION FRACTION (H): Primary | ICD-10-CM

## 2025-03-13 DIAGNOSIS — Z74.9: ICD-10-CM

## 2025-03-13 DIAGNOSIS — Z86.73 HISTORY OF CVA (CEREBROVASCULAR ACCIDENT): ICD-10-CM

## 2025-03-13 DIAGNOSIS — I48.92 ATRIAL FLUTTER, UNSPECIFIED TYPE (H): ICD-10-CM

## 2025-03-13 RX ORDER — GUAIFENESIN 200 MG/10ML
200 LIQUID ORAL EVERY 4 HOURS PRN
COMMUNITY

## 2025-04-03 ENCOUNTER — ASSISTED LIVING VISIT (OUTPATIENT)
Dept: GERIATRICS | Facility: CLINIC | Age: OVER 89
End: 2025-04-03
Payer: MEDICARE

## 2025-04-03 VITALS
WEIGHT: 158.6 LBS | SYSTOLIC BLOOD PRESSURE: 151 MMHG | OXYGEN SATURATION: 94 % | BODY MASS INDEX: 26.42 KG/M2 | DIASTOLIC BLOOD PRESSURE: 71 MMHG | HEART RATE: 81 BPM | RESPIRATION RATE: 20 BRPM | HEIGHT: 65 IN | TEMPERATURE: 97.3 F

## 2025-04-03 DIAGNOSIS — R06.02 SOB (SHORTNESS OF BREATH): ICD-10-CM

## 2025-04-03 DIAGNOSIS — J96.01 ACUTE RESPIRATORY FAILURE WITH HYPOXIA (H): ICD-10-CM

## 2025-04-03 RX ORDER — IPRATROPIUM BROMIDE AND ALBUTEROL SULFATE 2.5; .5 MG/3ML; MG/3ML
3 SOLUTION RESPIRATORY (INHALATION) 4 TIMES DAILY
Status: SHIPPED
Start: 2025-04-03

## 2025-04-03 NOTE — PROGRESS NOTES
"[facility] GERIATRICS    Chief Complaint   Patient presents with    Forms     HPI:  Margy Babin is a 89 year old  (10/2/1929), who is being seen today for an episodic care visit at: [facility] ([facility]) [[id number]].     Per nursing, Resident was up & escorted by [place] to [facility] for breakfast & lunch with assist of walker. Resident's alert & oriented to self, but disoriented to time and place.  Noted to refuse nebulizers.   Seroquel was stopped and pt appears to be sleeping and no behaviors.     Allergies, and PMH/PSH reviewed in EPIC today.  REVIEW OF SYSTEMS:  4 point ROS including Respiratory, CV, GI and , other than that noted in the HPI,  is negative    Objective:   BP (!) 151/71   Pulse 81   Temp 97.3  F (36.3  C)   Resp 20   Ht 1.651 m (5' 5\")   Wt 71.9 kg (158 lb 9.6 oz)   SpO2 94%   BMI 26.39 kg/m    GENERAL APPEARANCE:  Alert  RESP:  no respiratory distress  CV:  rate-normal  PSYCH:  memory impaired     Most Recent 3 CBC's:  Recent Labs   Lab Test 03/12/25  0927 05/09/24  0740 05/08/24  1640   WBC 10.4 11.5* 12.5*   HGB 10.7* 12.2 13.5   MCV 90 96 96    462* 450     Most Recent 3 BMP's:  Recent Labs   Lab Test 03/12/25  0927 05/09/24  0740 05/08/24  0813    138 140   POTASSIUM 4.5 4.5 4.2   CHLORIDE 103 104 104   CO2 24 28 26   BUN 15.4 28.5* 24.2*   CR 0.83 0.95 0.89   ANIONGAP 16* 6* 10   SOBIA 9.6 9.0 8.7   GLC 90 103* 102*       Assessment/Plan:    (I50.20) Heart failure with reduced ejection fraction (H)  (primary encounter diagnosis)  Comment: Chronic  Goal from 2023 shows EF 50-55% with mild concentric left ventricular hypertrophy  Euvolemic on exam   currently taking metoprolol succinate, rosuvsatin  Plan: Monitor BMP and CBC and daily weights x 2 weeks       (F03.90) Dementia, unspecified dementia severity, unspecified dementia type, with behavioral, psychotic, or mood disturbance or anxiety (H)  (F03.C0) Severe dementia without behavioral disturbance, psychotic " disturbance, mood disturbance, or anxiety, unspecified dementia type (H)  Comment: Chronic  Currently taking Namenda, Paxil, Seroquel  Needing assistance with dressing and walking to the dining room  Seroquel discontinued on 3/20 - sleeping well and no behaviors  Plan:   Continue with plan of care no changes at this time, adjustment as needed       (I48.92) Atrial flutter, unspecified type (H)  Comment: Chronic  For rate control she is currently taking metoprolol succinate  For stroke prophylaxis she is currently taking rivaroxaban  Hemoglobin   Date Value Ref Range Status   03/12/2025 10.7 (L) 11.7 - 15.7 g/dL Final   12/25/2020 14.5 11.7 - 15.7 g/dL Final   Plan: consider stopping rivroxaban and starting eliquis     (K51.00) Pancolitis (H)  Comment: Noted on CT in 2022  Plan: Monitor bowel     (N18.31) Chronic kidney disease, stage 3a (H)  Comment: Chronic  Last GFR was 65 (3/2025)  Estimated Creatinine Clearance: 40.3 mL/min (based on SCr of 0.83 mg/dL). (3/2025)  Plan: Avoid nephrotoxic medications       (I10) Benign essential hypertension  Comment: Chronic  Currently taking metoprolol succinate  Plan: Continue with plan of care no changes at this time, adjustment as needed       (Z86.73) History of CVA (cerebrovascular accident)  Comment: Currently taking atorvastatin  Plan: Continue with plan of care no changes at this time, adjustment as needed        (Z74.09,  Z78.9) Impaired mobility and ADLs  (R54) Frailty  (Z74.1) Personal care impairment  (Z74.2) Need for assistance at home without other household member able to render care  (Z74.9) Problem related to care provider dependency  Comment: Patient recently moved from assisted living to a new [facility].  She is currently needing assistance from staff to get dressed and walk with her to the dining room  Patient is using a 2 wheeled walker  Plan: Nursing to provide supportive care     (J43.9) Pulmonary emphysema, unspecified emphysema type (H)  Comment:  Chronic  Duoneb QID  Plan: Continue with plan of care no changes at this time, adjustment as needed               MED REC REQUIRED  Post Medication Reconciliation Status:  Discharge medications reconciled and changed, see notes/orders        Electronically signed by:     KAM Gordillo CNP on 4/3/2025 at 7:16 PM

## 2025-04-10 ENCOUNTER — ASSISTED LIVING VISIT (OUTPATIENT)
Dept: GERIATRICS | Facility: CLINIC | Age: OVER 89
End: 2025-04-10
Payer: MEDICARE

## 2025-04-10 VITALS
SYSTOLIC BLOOD PRESSURE: 136 MMHG | WEIGHT: 158.5 LBS | DIASTOLIC BLOOD PRESSURE: 58 MMHG | OXYGEN SATURATION: 96 % | BODY MASS INDEX: 26.41 KG/M2 | RESPIRATION RATE: 18 BRPM | HEIGHT: 65 IN | HEART RATE: 90 BPM | TEMPERATURE: 97.3 F

## 2025-04-10 DIAGNOSIS — F03.918 DEMENTIA WITH OTHER BEHAVIORAL DISTURBANCE, UNSPECIFIED DEMENTIA SEVERITY, UNSPECIFIED DEMENTIA TYPE (H): ICD-10-CM

## 2025-04-10 DIAGNOSIS — I50.20 HEART FAILURE WITH REDUCED EJECTION FRACTION (H): Primary | ICD-10-CM

## 2025-04-10 DIAGNOSIS — I48.92 ATRIAL FLUTTER, UNSPECIFIED TYPE (H): ICD-10-CM

## 2025-04-10 NOTE — PROGRESS NOTES
"[facility] GERIATRICS    Chief Complaint   Patient presents with    RECHECK     HPI:  Margy Babin is a 89 year old  (10/2/1929), who is being seen today for an episodic care visit at: [facility] ([facility]) [[id number]].     Seroquel was discontinued, she has been sleeping   Today she was sleeping in her chair  She had no complaints  She stated she was suppose to go home today.  (Which is not true)      Allergies, and PMH/PSH reviewed in Psychiatric today.  REVIEW OF SYSTEMS:  4 point ROS including Respiratory, CV, GI and , other than that noted in the HPI,  is negative    Objective:   /58   Pulse 90   Temp 97.3  F (36.3  C)   Resp 18   Ht 1.651 m (5' 5\")   Wt 71.9 kg (158 lb 8 oz)   SpO2 96%   BMI 26.38 kg/m    GENERAL APPEARANCE:  Alert  RESP:  no respiratory distress  CV:  rate-normal  PSYCH:  memory impaired     Most Recent 3 CBC's:  Recent Labs   Lab Test 03/12/25  0927 05/09/24  0740 05/08/24  1640   WBC 10.4 11.5* 12.5*   HGB 10.7* 12.2 13.5   MCV 90 96 96    462* 450     Most Recent 3 BMP's:  Recent Labs   Lab Test 03/12/25  0927 05/09/24  0740 05/08/24  0813    138 140   POTASSIUM 4.5 4.5 4.2   CHLORIDE 103 104 104   CO2 24 28 26   BUN 15.4 28.5* 24.2*   CR 0.83 0.95 0.89   ANIONGAP 16* 6* 10   SOBIA 9.6 9.0 8.7   GLC 90 103* 102*       Assessment/Plan:    (I50.20) Heart failure with reduced ejection fraction (H)  (primary encounter diagnosis)  Comment: Chronic  Goal from 2023 shows EF 50-55% with mild concentric left ventricular hypertrophy  Euvolemic on exam   currently taking metoprolol succinate, rosuvastatin  Weight stable  Plan: Continue with plan of care no changes at this time, adjustment as needed         (F03.90) Dementia, unspecified dementia severity, unspecified dementia type, with behavioral, psychotic, or mood disturbance or anxiety (H)  (F03.C0) Severe dementia without behavioral disturbance, psychotic disturbance, mood disturbance, or anxiety, unspecified " dementia type (H)  Comment: Chronic  Currently taking Namenda, Paxil, Seroquel  Needing assistance with dressing and walking to the dining room  Seroquel discontinued on 3/20 - sleeping well and no behaviors  Plan:   Continue with plan of care no changes at this time, adjustment as needed       (I48.92) Atrial flutter, unspecified type (H)  Comment: Chronic  For rate control she is currently taking metoprolol succinate  For stroke prophylaxis she is currently taking rivaroxaban  Hemoglobin   Date Value Ref Range Status   03/12/2025 10.7 (L) 11.7 - 15.7 g/dL Final   12/25/2020 14.5 11.7 - 15.7 g/dL Final   Plan: consider stopping rivroxaban and starting eliquis - sent message to pharmacy asking for insurance information.      (K51.00) Pancolitis (H)  Comment: Noted on CT in 2022  Plan: Monitor bowel     (N18.31) Chronic kidney disease, stage 3a (H)  Comment: Chronic  Last GFR was 65 (3/2025)  Estimated Creatinine Clearance: 40.3 mL/min (based on SCr of 0.83 mg/dL). (3/2025)  Plan: Avoid nephrotoxic medications       (I10) Benign essential hypertension  Comment: Chronic  Currently taking metoprolol succinate  Plan: Continue with plan of care no changes at this time, adjustment as needed       (Z86.73) History of CVA (cerebrovascular accident)  Comment: Currently taking atorvastatin  Plan: Continue with plan of care no changes at this time, adjustment as needed        (Z74.09,  Z78.9) Impaired mobility and ADLs  (R54) Frailty  (Z74.1) Personal care impairment  (Z74.2) Need for assistance at home without other household member able to render care  (Z74.9) Problem related to care provider dependency  Comment: Patient recently moved from assisted living to a new [facility].  She is currently needing assistance from staff to get dressed and walk with her to the dining room  Patient is using a 2 wheeled walker  Plan: Nursing to provide supportive care     (J43.9) Pulmonary emphysema, unspecified emphysema type (H)  Comment:  Chronic  Duoneb QID  Plan: Continue with plan of care no changes at this time, adjustment as needed               MED REC REQUIRED  Post Medication Reconciliation Status:  Medication reconciliation previously completed during another office visit        Electronically signed by:   KAM Gordillo CNP on 4/10/2025 at 3:34 PM

## 2025-04-13 ENCOUNTER — LAB REQUISITION (OUTPATIENT)
Dept: LAB | Facility: CLINIC | Age: OVER 89
End: 2025-04-13
Payer: MEDICARE

## 2025-04-13 DIAGNOSIS — N18.31 CHRONIC KIDNEY DISEASE, STAGE 3A (H): ICD-10-CM

## 2025-04-13 LAB
ALBUMIN UR-MCNC: NEGATIVE MG/DL
APPEARANCE UR: CLEAR
BACTERIA #/AREA URNS HPF: ABNORMAL /HPF
BILIRUB UR QL STRIP: NEGATIVE
COLOR UR AUTO: ABNORMAL
GLUCOSE UR STRIP-MCNC: NEGATIVE MG/DL
HGB UR QL STRIP: NEGATIVE
HYALINE CASTS: 4 /LPF
KETONES UR STRIP-MCNC: NEGATIVE MG/DL
LEUKOCYTE ESTERASE UR QL STRIP: NEGATIVE
MUCOUS THREADS #/AREA URNS LPF: PRESENT /LPF
NITRATE UR QL: POSITIVE
PH UR STRIP: 5.5 [PH] (ref 5–7)
RBC URINE: 0 /HPF
SP GR UR STRIP: 1.02 (ref 1–1.03)
SQUAMOUS EPITHELIAL: 1 /HPF
UROBILINOGEN UR STRIP-MCNC: NORMAL MG/DL
WBC URINE: 2 /HPF

## 2025-04-14 ENCOUNTER — TELEPHONE (OUTPATIENT)
Dept: GERIATRICS | Facility: CLINIC | Age: OVER 89
End: 2025-04-14
Payer: MEDICARE

## 2025-04-14 RX ORDER — CEFUROXIME AXETIL 250 MG/1
250 TABLET ORAL 2 TIMES DAILY
Qty: 14 TABLET | Refills: 0 | Status: CANCELLED | OUTPATIENT
Start: 2025-04-14

## 2025-04-14 NOTE — TELEPHONE ENCOUNTER
[facility] Geriatrics   2025     Name: Margy Babin   : 10/2/1929     Background:  Culture is still pending  Estimated Creatinine Clearance: 40.3 mL/min (based on SCr of 0.83 mg/dL).      Orders:  No new orders    Electronically signed by     KAM Gordillo CNP on 2025 at 2:27 PM

## 2025-04-15 ENCOUNTER — ASSISTED LIVING VISIT (OUTPATIENT)
Dept: GERIATRICS | Facility: CLINIC | Age: OVER 89
End: 2025-04-15
Payer: MEDICARE

## 2025-04-15 VITALS
TEMPERATURE: 97.8 F | HEIGHT: 65 IN | RESPIRATION RATE: 20 BRPM | BODY MASS INDEX: 26.34 KG/M2 | OXYGEN SATURATION: 97 % | HEART RATE: 70 BPM | SYSTOLIC BLOOD PRESSURE: 141 MMHG | WEIGHT: 158.1 LBS | DIASTOLIC BLOOD PRESSURE: 57 MMHG

## 2025-04-15 DIAGNOSIS — F03.918 DEMENTIA WITH OTHER BEHAVIORAL DISTURBANCE, UNSPECIFIED DEMENTIA SEVERITY, UNSPECIFIED DEMENTIA TYPE (H): ICD-10-CM

## 2025-04-15 DIAGNOSIS — I10 BENIGN ESSENTIAL HYPERTENSION: ICD-10-CM

## 2025-04-15 DIAGNOSIS — F41.9 ANXIETY: ICD-10-CM

## 2025-04-15 DIAGNOSIS — J43.9 PULMONARY EMPHYSEMA, UNSPECIFIED EMPHYSEMA TYPE (H): ICD-10-CM

## 2025-04-15 DIAGNOSIS — I48.92 ATRIAL FLUTTER, UNSPECIFIED TYPE (H): ICD-10-CM

## 2025-04-15 DIAGNOSIS — I50.20 HEART FAILURE WITH REDUCED EJECTION FRACTION (H): Primary | ICD-10-CM

## 2025-04-15 DIAGNOSIS — R54 FRAILTY: ICD-10-CM

## 2025-04-15 DIAGNOSIS — G47.00 INSOMNIA, UNSPECIFIED TYPE: ICD-10-CM

## 2025-04-15 LAB
BACTERIA UR CULT: ABNORMAL
BACTERIA UR CULT: ABNORMAL

## 2025-04-15 PROCEDURE — 99349 HOME/RES VST EST MOD MDM 40: CPT | Performed by: INTERNAL MEDICINE

## 2025-04-22 ENCOUNTER — ASSISTED LIVING VISIT (OUTPATIENT)
Dept: GERIATRICS | Facility: CLINIC | Age: OVER 89
End: 2025-04-22
Payer: MEDICARE

## 2025-04-22 VITALS
HEART RATE: 73 BPM | BODY MASS INDEX: 26.02 KG/M2 | RESPIRATION RATE: 18 BRPM | HEIGHT: 65 IN | DIASTOLIC BLOOD PRESSURE: 60 MMHG | WEIGHT: 156.2 LBS | SYSTOLIC BLOOD PRESSURE: 152 MMHG | TEMPERATURE: 97.7 F | OXYGEN SATURATION: 97 %

## 2025-04-22 DIAGNOSIS — R54 FRAILTY: ICD-10-CM

## 2025-04-22 DIAGNOSIS — Z78.9 IMPAIRED MOBILITY AND ADLS: ICD-10-CM

## 2025-04-22 DIAGNOSIS — F03.918 DEMENTIA WITH OTHER BEHAVIORAL DISTURBANCE, UNSPECIFIED DEMENTIA SEVERITY, UNSPECIFIED DEMENTIA TYPE (H): ICD-10-CM

## 2025-04-22 DIAGNOSIS — F22 DELUSIONAL DISORDER (H): ICD-10-CM

## 2025-04-22 DIAGNOSIS — Z74.09 IMPAIRED MOBILITY AND ADLS: ICD-10-CM

## 2025-04-22 DIAGNOSIS — Z22.9 COLONIZED WITH INFECTIOUS ORGANISM: Primary | ICD-10-CM

## 2025-04-22 PROCEDURE — 99349 HOME/RES VST EST MOD MDM 40: CPT | Performed by: NURSE PRACTITIONER

## 2025-04-22 NOTE — PROGRESS NOTES
"[facility] GERIATRICS    Chief Complaint   Patient presents with    RECHECK     HPI:  Margy Babin is a 89 year old  (10/2/1929), who is being seen today for an episodic care visit at: [facility] ([facility]) [[id number]].       Patient seen resting in her room  She is not wandering  We just stable at 156 pounds    Allergies, and PMH/PSH reviewed in [facility] today.  REVIEW OF SYSTEMS:  4 point ROS including Respiratory, CV, GI and , other than that noted in the HPI,  is negative    Objective:   BP (!) 152/60   Pulse 73   Temp 97.7  F (36.5  C)   Resp 18   Ht 1.651 m (5' 5\")   Wt 70.9 kg (156 lb 3.2 oz)   SpO2 97%   BMI 25.99 kg/m    GENERAL APPEARANCE:  Alert, in no distress  RESP:  no respiratory distress  PSYCH:  insight and judgement impaired, memory impaired     Most Recent 3 CBC's:  Recent Labs   Lab Test 03/12/25  0927 05/09/24  0740 05/08/24  1640   WBC 10.4 11.5* 12.5*   HGB 10.7* 12.2 13.5   MCV 90 96 96    462* 450     Most Recent 3 BMP's:  Recent Labs   Lab Test 03/12/25  0927 05/09/24  0740 05/08/24  0813    138 140   POTASSIUM 4.5 4.5 4.2   CHLORIDE 103 104 104   CO2 24 28 26   BUN 15.4 28.5* 24.2*   CR 0.83 0.95 0.89   ANIONGAP 16* 6* 10   SOBIA 9.6 9.0 8.7   GLC 90 103* 102*       Assessment/Plan:  (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  Once discontinued Jerica was found wandering the facility with delusions  Seroquel was restarted and behaviors have resolved  Plan: Continue with plan of care no changes at this " time, adjustment as needed    (Z74.09, Z78.9) impaired mobility and ADL  Comment: Patient needs assistance using a 4 wheeled walker and help with getting to the dining room  Plan: Nursing to assist with cares      MED REC REQUIRED  Post Medication Reconciliation Status:  Medication reconciliation previously completed during another office visit      Electronically signed by:       KAM Gordillo CNP on 4/22/2025 at 2:38 PM

## 2025-04-26 NOTE — PROGRESS NOTES
"Margy Babin is a 89 year old female seen April 15, 2025 at [facility] where she has resided for one month (admit 3/2025) seen for initial visit.   Pt is seen in her room resting abed.   Disoriented, asks \"Did we have lunch?\"  Feels very tired and does not want to get up.   Not other complaints, denies pain.  .   Nursing staff reports pt habitually sleeps during the day, needs cues to get to the dining room for meals.   But wanders at night; this past weekend pt was found downstairs in the employee cafe.      By chart review, pt has a h/o atrial fib, HFrEF, left BBB, HTN, h/o CVA with dysphagia, essential tremor and dementia     --May 2023 for CAP with hypoxia and sepsis    WBC 27k and a microcytic anemia  Tx included transfusion one unit pRBCs, IV Fe, abx and IVF.  Was in [facility] before return to [place]   --Sept and Oct 2023 ED visits following falls.  In Oct had elbow laceration and avulsion fracture    -- Oct 2023 hospitalization following another fall, and treated for symptomatic microcytic anemia with 2 units pRBCs and 3 doses of IV Fe.   Pt and family declined endoscopy and rivaroxaban was stopped.    Comfort focus     --Nov 2023 hospitalization following a fall after COVID19 infection and Citrobacter UTI   Medications adjusted due to orthostatic hypotension     --April 2024 hospitalization for metapneumovirus bronchitis and pneumonia with hypoxia    She was started back on rivaroxaban at some point, records not clear.     Past Medical History:   Diagnosis Date    Anxiety     Blepharitis of both eyes     Cerebrovascular accident (CVA) due to embolism (H) 04/2022    corpus striatum    Chronic atrial fibrillation (H)     Depression, major, recurrent, in complete remission     Dry eye syndrome     Dyspnea on exertion 10/07/2021    Essential hypertension 03/05/2020    Familial tremor 03/06/2013    Insomnia, unspecified type 11/03/2014    No CSA on file Last  check - 02/28/2020 with no " "concerns.    Mixed hyperlipidemia 11/04/2020    Nausea without vomiting 06/16/2015     Problem list name updated by automated process. Provider to review    Tremor, hereditary, benign        Past Surgical History:   Procedure Laterality Date    HYSTERECTOMY TOTAL ABDOMINAL, BILATERAL SALPINGO-OOPHORECTOMY, COMBINED      NECK SURGERY  2009    cervical fusion    ROTATOR CUFF REPAIR RT/LT Right     ZZC TOTAL HIP ARTHROPLASTY Right 1997     SH: previously lived at [place] [facility] [place]       Daughter Tina in [place]    Granddaughters Yarelis and Amy    Non smoker     ROS:  BIMS 7/15  Ambulates with FWW    Needs cuing and assist with all ADLs       GENERAL APPEARANCE: frail, alert and no distress  BP (!) 141/57   Pulse 70   Temp 97.8  F (36.6  C)   Resp 20   Ht 1.651 m (5' 5\")   Wt 71.7 kg (158 lb 1.6 oz)   SpO2 97%   BMI 26.31 kg/m     HEENT: normocephalic, no lesion or abnormalities, +kyphosis  NECK: no adenopathy, no asymmetry, masses, or scars and thyroid normal to palpation  RESP: lungs clear to auscultation - no rales, rhonchi or wheezes  CV: regular rate and rhythm, normal S1 S2  ABDOMEN:  soft, nontender, no HSM or masses and bowel sounds normal  MS: extremities normal- no ext edema     SKIN: no suspicious lesions or rashes  NEURO: oriented x1     PSYCH: affect okay  LYMPHATICS: No cervical,  or supraclavicular nodes     Lab Results   Component Value Date     03/12/2025    POTASSIUM 4.5 03/12/2025    CHLORIDE 103 03/12/2025    CO2 24 03/12/2025    ANIONGAP 16 (H) 03/12/2025    GLC 90 03/12/2025    BUN 15.4 03/12/2025    CR 0.83 03/12/2025    GFRESTIMATED 65 03/12/2025    SOBIA 9.6 03/12/2025     Lab Results   Component Value Date    WBC 10.4 03/12/2025      HGB 10.7 03/12/2025      MCV 90 03/12/2025       03/12/2025     TSH   Date Value Ref Range Status   03/12/2025 2.60 0.30 - 4.20 uIU/mL Final   11/23/2021 1.27 0.40 - 4.00 mU/L Final      Lab Results   Component Value Date    A1C 5.6 " 03/12/2025     ECHO 11/2023  Left ventricular systolic function is borderline reduced.  The visual ejection fraction is 50-55%.  There is mild concentric left ventricular hypertrophy.  The right ventricle is normal in structure, function and size.  No significant valve dysfunction.  The inferior vena cava was normal in size with preserved respiratory variability.  There is no pericardial effusion.  Compared to prior study dated 5/17/2023, pulmonary pressures are lower, otherwise no significant change.    CT HEAD W/O CONTRAST, CT CERVICAL SPINE W/O CONTRAST 11/24/2023  HEAD CT:   INTRACRANIAL CONTENTS: No intracranial hemorrhage, extraaxial collection, or mass effect. Chronic right basal ganglia infarct. Moderate presumed chronic small vessel ischemic changes. Mild generalized volume loss. No hydrocephalus.   CERVICAL SPINE CT:   1.  No CT evidence for acute fracture or post traumatic subluxation.  2.  Chronic and degenerative changes, as above.  3.  Stable appearing right thyroid lobe nodule measuring up to 1.5 cm. Recommend non-emergent thyroid function testing and thyroid ultrasound for further assessment, if not already performed.      IMP/PLAN:   (I50.20) Heart failure with reduced ejection fraction (H)    Comment: stable volume status   Also LVH  Plan: monitor weights and symptoms   Atorvastatin 20 mg/day     (I48.92) Atrial flutter, unspecified type (H)  Comment:  HR 70-90   CHADS -VASc 8    Plan: metoprolol 100 mg /day for VR control  Apixaban 5 mg bid for stroke prophylaxis     (F03.918) Dementia with other behavioral disturbance, unspecified dementia severity, unspecified dementia type (H)  Comment: quetiapine held at admission, but then wandering and exit seeking at night, so restarted     Plan: remains on PTA memantine 5 mg bid   Quetiapine 25 mg/ HS     (F41.9) Anxiety  (G47.00) Insomnia, unspecified type  Comment: ongoing   Plan: paroxetine 20 mg/day     (I10) Benign essential hypertension  Comment:   SBPs  140s   H/o orthostatic hypotension     Plan: Metoprolol 100 mg/day     (J43.9) Pulmonary emphysema, unspecified emphysema type (H)  Comment: symptomatic at times   Plan: Duonebs qid, and PRN BDs       (R54) Frailty  Comment: cognitive and physical decline   Plan: Board and Care support for med admin, meals, activity and cares       Advance directive: DNR/DNI per signed BRYSON Monteiro MD

## 2025-05-15 ENCOUNTER — ASSISTED LIVING VISIT (OUTPATIENT)
Dept: GERIATRICS | Facility: CLINIC | Age: OVER 89
End: 2025-05-15
Payer: MEDICARE

## 2025-05-15 VITALS
TEMPERATURE: 97.3 F | RESPIRATION RATE: 16 BRPM | HEIGHT: 65 IN | WEIGHT: 156.8 LBS | SYSTOLIC BLOOD PRESSURE: 154 MMHG | OXYGEN SATURATION: 97 % | DIASTOLIC BLOOD PRESSURE: 66 MMHG | BODY MASS INDEX: 26.12 KG/M2 | HEART RATE: 77 BPM

## 2025-05-15 DIAGNOSIS — Z74.09 IMPAIRED MOBILITY AND ADLS: ICD-10-CM

## 2025-05-15 DIAGNOSIS — Z74.9: ICD-10-CM

## 2025-05-15 DIAGNOSIS — Z74.1 PERSONAL CARE IMPAIRMENT: ICD-10-CM

## 2025-05-15 DIAGNOSIS — J43.9 PULMONARY EMPHYSEMA, UNSPECIFIED EMPHYSEMA TYPE (H): ICD-10-CM

## 2025-05-15 DIAGNOSIS — Z86.73 HISTORY OF CVA (CEREBROVASCULAR ACCIDENT): ICD-10-CM

## 2025-05-15 DIAGNOSIS — Z74.2 NEED FOR ASSISTANCE AT HOME WITHOUT OTHER HOUSEHOLD MEMBER ABLE TO RENDER CARE: ICD-10-CM

## 2025-05-15 DIAGNOSIS — K51.00 PANCOLITIS (H): ICD-10-CM

## 2025-05-15 DIAGNOSIS — F03.C0 SEVERE DEMENTIA WITHOUT BEHAVIORAL DISTURBANCE, PSYCHOTIC DISTURBANCE, MOOD DISTURBANCE, OR ANXIETY, UNSPECIFIED DEMENTIA TYPE (H): ICD-10-CM

## 2025-05-15 DIAGNOSIS — I50.20 HEART FAILURE WITH REDUCED EJECTION FRACTION (H): Primary | ICD-10-CM

## 2025-05-15 DIAGNOSIS — I48.92 ATRIAL FLUTTER, UNSPECIFIED TYPE (H): ICD-10-CM

## 2025-05-15 DIAGNOSIS — Z78.9 IMPAIRED MOBILITY AND ADLS: ICD-10-CM

## 2025-05-15 DIAGNOSIS — I10 BENIGN ESSENTIAL HYPERTENSION: ICD-10-CM

## 2025-05-15 DIAGNOSIS — F03.918 DEMENTIA WITH OTHER BEHAVIORAL DISTURBANCE, UNSPECIFIED DEMENTIA SEVERITY, UNSPECIFIED DEMENTIA TYPE (H): ICD-10-CM

## 2025-05-15 DIAGNOSIS — R54 FRAILTY: ICD-10-CM

## 2025-05-15 DIAGNOSIS — N18.31 CHRONIC KIDNEY DISEASE, STAGE 3A (H): ICD-10-CM

## 2025-05-15 RX ORDER — QUETIAPINE FUMARATE 25 MG/1
25 TABLET, FILM COATED ORAL DAILY
COMMUNITY

## 2025-05-15 NOTE — PROGRESS NOTES
[facility] GERIATRICS  Chief Complaint   Patient presents with    prison Regulatory     [place] Medical Record Number:  [id number]  Place of Service where encounter took place:  [facility] ([facility]) [[id number]]    HPI:    Margy Babin  is 89 year old (10/2/1929), who is being seen today for a federally mandated E/M visit.       Her past medical history includes  Atrial fibrillation  HTN  Dementia  Hx of CVA  Depressin  Right knee pain    Today patient was seen and  to cognitive impairment review of systems was limited.   Recently moved to dementia unit. BIMS=7/15 (score 13 to 15 suggests the patient is cognitively intact, 8 to 12 suggests moderately impaired and 0 to 7 suggest severe impairment) PHQ9=00/27.   Patient needs cuing assistance with ADLs, uses a walker.    her appetite is good and consumes a regular diet.  Per nursing, skin is intact. Code status is DNR/DNI.     In reviewing point click care BP, HR, weight have been stable.        ALLERGIES:Patient has no known allergies.  PAST MEDICAL HISTORY:   Past Medical History:   Diagnosis Date    Anxiety     Blepharitis of both eyes     Cerebrovascular accident (CVA) due to embolism (H) 04/2022    corpus striatum    Chronic atrial fibrillation (H)     Depression, major, recurrent, in complete remission     Dry eye syndrome     Dyspnea on exertion 10/07/2021    Essential hypertension 03/05/2020    Familial tremor 03/06/2013    Insomnia, unspecified type 11/03/2014    No CSA on file Last  check - 02/28/2020 with no concerns.    Mixed hyperlipidemia 11/04/2020    Nausea without vomiting 06/16/2015     Problem list name updated by automated process. Provider to review    Tremor, hereditary, benign      PAST SURGICAL HISTORY:   has a past surgical history that includes Neck surgery (2009); TOTAL HIP ARTHROPLASTY (Right, 1997); rotator cuff repair rt/lt (Right); and Hysterectomy total abdominal, bilateral salpingo-oophorectomy, combined.  FAMILY  HISTORY: family history includes Breast Cancer (age of onset: 43) in her daughter; C.A.D. in her father; Emphysema in her sister; Heart Disease in her sister; Heart Disease (age of onset: 80) in her father.  SOCIAL HISTORY:  reports that she has never smoked. She has never used smokeless tobacco. She reports that she does not drink alcohol and does not use drugs.    MEDICATIONS:  MED REC REQUIRED  Post Medication Reconciliation Status:  Discharge medications reconciled and changed, see notes/orders         Review of your medicines            Accurate as of May 15, 2025  9:03 AM. If you have any questions, ask your nurse or doctor.                CONTINUE these medicines which have NOT CHANGED        Dose / Directions   * acetaminophen 500 MG tablet  Commonly known as: TYLENOL      Dose: 1,000 mg  Take 1,000 mg by mouth every 12 hours as needed for mild pain  Refills: 0     * acetaminophen 500 MG tablet  Commonly known as: TYLENOL      Dose: 1,000 mg  Take 1,000 mg by mouth daily  Refills: 0     Albuterol-Budesonide 90-80 MCG/ACT Aero      Dose: 2 puff  Inhale 2 puffs into the lungs every 4 hours as needed.  Refills: 0     apixaban ANTICOAGULANT 5 MG tablet  Commonly known as: ELIQUIS      Dose: 5 mg  Take 5 mg by mouth 2 times daily.  Refills: 0     atorvastatin 20 MG tablet  Commonly known as: LIPITOR  Used for: Hyperlipidemia LDL goal <100      Dose: 20 mg  Take 1 tablet (20 mg) by mouth daily  Quantity: 90 tablet  Refills: 3     calcium carbonate 500 MG chewable tablet  Commonly known as: TUMS      Dose: 1 chew tab  Take 1 chew tab by mouth every 8 hours as needed for heartburn.  Refills: 0     docusate sodium 100 MG capsule  Commonly known as: COLACE  Used for: Constipation, unspecified constipation type      Dose: 100 mg  Take 1 capsule (100 mg) by mouth 2 times daily as needed for constipation.  Refills: 0     guaiFENesin 20 mg/mL liquid  Commonly known as: ROBITUSSIN      Dose: 200 mg  Take 200 mg by mouth  "every 4 hours as needed for cough.  Refills: 0     ipratropium - albuterol 0.5 mg/2.5 mg/3 mL 0.5-2.5 (3) MG/3ML neb solution  Commonly known as: DUONEB  Used for: Acute respiratory failure with hypoxia (H), SOB (shortness of breath)      Dose: 3 mL  Take 1 vial (3 mLs) by nebulization 4 times daily.  Refills: 0     memantine 5 MG tablet  Commonly known as: NAMENDA  Used for: Dementia (H)      Dose: 5 mg  1 TAB BY MOUTH TWICE DAILY  Quantity: 60 tablet  Refills: 10     metoprolol succinate  MG 24 hr tablet  Commonly known as: TOPROL XL  Used for: Paroxysmal atrial fibrillation (H)      1 TAB BY MOUTH DAILY (DX: HYPERTENSION)  Quantity: 30 tablet  Refills: 10     PARoxetine 20 MG tablet  Commonly known as: PAXIL  Used for: 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      1 TAB BY MOUTH AT BEDTIME Strength: 20 mg  Quantity: 90 tablet  Refills: 0     QUEtiapine 25 MG tablet  Commonly known as: SEROquel      Dose: 25 mg  Take 25 mg by mouth daily.  Refills: 0     Vitamin D3 25 mcg (1000 units) tablet  Commonly known as: CHOLECALCIFEROL      Dose: 25 mcg  Take 25 mcg by mouth daily  Refills: 0           * This list has 2 medication(s) that are the same as other medications prescribed for you. Read the directions carefully, and ask your doctor or other care provider to review them with you.                STOP taking      rivaroxaban ANTICOAGULANT 15 MG Tabs tablet  Commonly known as: XARELTO                 Case Management:  I have reviewed the care plan and MDS and do agree with the plan. Patient's desire to return to the community is not assessible due to cognitive impairment. Information reviewed:  Medications, vital signs, orders, and nursing notes.    ROS:  Unobtainable secondary to cognitive impairment.     Vitals:  BP (!) 154/66   Pulse 77   Temp 97.3  F (36.3  C)   Resp 16   Ht 1.651 m (5' 5\")   Wt 71.1 kg (156 lb 12.8 oz)   SpO2 97%   BMI 26.09 kg/m    Body mass index is 26.09 " kg/m .  Exam:  GENERAL APPEARANCE:  Alert, in no distress  RESP:  lungs clear to auscultation , no respiratory distress  CV:  rate-normal  PSYCH:  insight and judgement impaired, memory impaired     Lab/Diagnostic data:   Most Recent 3 CBC's:  Recent Labs   Lab Test 03/12/25  0927 05/09/24  0740 05/08/24  1640   WBC 10.4 11.5* 12.5*   HGB 10.7* 12.2 13.5   MCV 90 96 96    462* 450     Most Recent 3 BMP's:  Recent Labs   Lab Test 03/12/25  0927 05/09/24  0740 05/08/24  0813    138 140   POTASSIUM 4.5 4.5 4.2   CHLORIDE 103 104 104   CO2 24 28 26   BUN 15.4 28.5* 24.2*   CR 0.83 0.95 0.89   ANIONGAP 16* 6* 10   SOBIA 9.6 9.0 8.7   GLC 90 103* 102*     Plan and Assessment    (I50.20) Heart failure with reduced ejection fraction (H)  (primary encounter diagnosis)  Comment: Chronic  Goal from 2023 shows EF 50-55% with mild concentric left ventricular hypertrophy  Euvolemic on exam   currently taking metoprolol succinate  Plan: Continue with plan of care no changes at this time, adjustment as needed       (F03.90) Dementia, unspecified dementia severity, unspecified dementia type, with behavioral, psychotic, or mood disturbance or anxiety (H)  (F03.C0) Severe dementia without behavioral disturbance, psychotic disturbance, mood disturbance, or anxiety, unspecified dementia type (H)  Comment: Chronic  Progressive   Currently taking Namenda, Paxil, Seroquel  Failed GDR on seroquel.  Was found wondering when seroquel was stopped  Plan: Nursing to provide cares     (I48.92) Atrial flutter, unspecified type (H)  Comment: Chronic  For rate control she is currently taking metoprolol succinate  For stroke prophylaxis she is currently taking Eliquis  Plan: BMP and CBC next lab day      (K51.00) Pancolitis (H)  Comment: Noted on CT in 2022  Plan: Monitor bowel     (N18.31) Chronic kidney disease, stage 3a (H)  Comment: Chronic  Last GFR was 55 (5/2024)  Estimated Creatinine Clearance: 40.1 mL/min (based on SCr of 0.83  mg/dL). (3/2025)   Plan: Avoid nephrotoxic medications  Monitor BMP, CBC     (I10) Benign essential hypertension  Comment: Chronic  Currently taking metoprolol succinate  Plan: Continue with plan of care no changes at this time, adjustment as needed       (Z86.73) History of CVA (cerebrovascular accident)  Comment: Currently taking atorvastatin  Plan: Continue with plan of care no changes at this time, adjustment as needed        (Z74.09,  Z78.9) Impaired mobility and ADLs  (R54) Frailty  (Z74.1) Personal care impairment  (Z74.2) Need for assistance at home without other household member able to render care  (Z74.9) Problem related to care provider dependency  Comment: Patient recently moved from assisted living to a new [facility].  She is currently needing assistance from staff to get dressed and walk with her to the dining room  Patient is using a 2 wheeled walker  Plan: Nursing to provide supportive care     (J43.9) Pulmonary emphysema, unspecified emphysema type (H)  Comment: Chronic  Currently has orders for inhalers as needed and DuoNeb 4x/day   Plan: Continue with plan of care no changes at this time, adjustment as needed            Electronically signed by:      KAM Gordillo CNP on 5/15/2025 at 9:03 PM

## 2025-06-02 ENCOUNTER — ASSISTED LIVING VISIT (OUTPATIENT)
Dept: GERIATRICS | Facility: CLINIC | Age: OVER 89
End: 2025-06-02
Payer: MEDICARE

## 2025-06-02 ENCOUNTER — LAB REQUISITION (OUTPATIENT)
Dept: LAB | Facility: CLINIC | Age: OVER 89
End: 2025-06-02
Payer: MEDICARE

## 2025-06-02 ENCOUNTER — TELEPHONE (OUTPATIENT)
Dept: GERIATRICS | Facility: CLINIC | Age: OVER 89
End: 2025-06-02

## 2025-06-02 VITALS
BODY MASS INDEX: 26.39 KG/M2 | SYSTOLIC BLOOD PRESSURE: 154 MMHG | HEART RATE: 90 BPM | DIASTOLIC BLOOD PRESSURE: 74 MMHG | TEMPERATURE: 97.8 F | RESPIRATION RATE: 18 BRPM | WEIGHT: 158.4 LBS | OXYGEN SATURATION: 95 % | HEIGHT: 65 IN

## 2025-06-02 DIAGNOSIS — D64.9 ANEMIA, UNSPECIFIED TYPE: ICD-10-CM

## 2025-06-02 DIAGNOSIS — I10 BENIGN ESSENTIAL HYPERTENSION: ICD-10-CM

## 2025-06-02 DIAGNOSIS — F03.918 DEMENTIA WITH OTHER BEHAVIORAL DISTURBANCE, UNSPECIFIED DEMENTIA SEVERITY, UNSPECIFIED DEMENTIA TYPE (H): ICD-10-CM

## 2025-06-02 DIAGNOSIS — I48.92 ATRIAL FLUTTER, UNSPECIFIED TYPE (H): ICD-10-CM

## 2025-06-02 DIAGNOSIS — J43.9 PULMONARY EMPHYSEMA, UNSPECIFIED EMPHYSEMA TYPE (H): ICD-10-CM

## 2025-06-02 DIAGNOSIS — I10 ESSENTIAL (PRIMARY) HYPERTENSION: ICD-10-CM

## 2025-06-02 DIAGNOSIS — R54 FRAILTY: ICD-10-CM

## 2025-06-02 DIAGNOSIS — I50.20 HEART FAILURE WITH REDUCED EJECTION FRACTION (H): Primary | ICD-10-CM

## 2025-06-02 DIAGNOSIS — F41.9 ANXIETY: ICD-10-CM

## 2025-06-02 PROCEDURE — 99349 HOME/RES VST EST MOD MDM 40: CPT | Performed by: INTERNAL MEDICINE

## 2025-06-02 NOTE — TELEPHONE ENCOUNTER
[facility] Geriatrics   2025     Name: Margy Babin   : 10/2/1929     Background:  Pt lungs are course    Orders:  Start furosemide 20 mg by mouth daily.  BMP on on 2025      Electronically signed by     KAM Gordillo CNP on 2025 at 12:40 PM

## 2025-06-15 NOTE — PROGRESS NOTES
"Margy Babin is a 89 year old female seen May 2, 2025 at [facility] Care unit where she has resided for 3 months (admit 3/2025) seen to follow up atrial fib and HFrEF   Pt is seen on the unit up to chair, ambulates there with FWW.   Breathing seems labored, but when asked states, \"I don't know about my breathing.   It's sometimes loud.\"   Can't say if nebs are helpful     Also reports her \"stomach was sick this morning.\"   Again not able to get much clarifying history, can't say if food makes it better or worse, or if it's related to her medications.    Nursing staff reports pt is having regular bowel movements.   No change in her appetite or weight.       Nursing staff reports pt habitually sleeps during the day, needs cues to get to the dining room for meals.   But wanders at night; this past weekend pt was found downstairs in the employee cafe.      By chart review, pt has a h/o atrial fib, HFrEF, left BBB, HTN, h/o CVA with dysphagia, essential tremor and dementia     --May 2023 for CAP with hypoxia and sepsis    WBC 27k and a microcytic anemia  Tx included transfusion one unit pRBCs, IV Fe, abx and IVF.  Was in [facility] TCU before [place]   --Sept and Oct 2023 ED visits following falls.  In Oct had elbow laceration and avulsion fracture    -- Oct 2023 hospitalization following another fall, and treated for symptomatic microcytic anemia with 2 units pRBCs and 3 doses of IV Fe.   Pt and family declined endoscopy and rivaroxaban was stopped.    Comfort focus     --Nov 2023 hospitalization following a fall after COVID19 infection and Citrobacter UTI   Medications adjusted due to orthostatic hypotension     --April 2024 hospitalization for metapneumovirus bronchitis and pneumonia with hypoxia    She was started back on rivaroxaban at some point, records not clear.     Past Medical History:   Diagnosis Date    Anxiety     Blepharitis of both eyes     Cerebrovascular accident " "(CVA) due to embolism (H) 04/2022    corpus striatum    Chronic atrial fibrillation (H)     Depression, major, recurrent, in complete remission     Dry eye syndrome     Dyspnea on exertion 10/07/2021    Essential hypertension 03/05/2020    Familial tremor 03/06/2013    Insomnia, unspecified type 11/03/2014    No CSA on file Last  check - 02/28/2020 with no concerns.    Mixed hyperlipidemia 11/04/2020    Nausea without vomiting 06/16/2015     Problem list name updated by automated process. Provider to review    Tremor, hereditary, benign        Past Surgical History:   Procedure Laterality Date    HYSTERECTOMY TOTAL ABDOMINAL, BILATERAL SALPINGO-OOPHORECTOMY, COMBINED      NECK SURGERY  2009    cervical fusion    ROTATOR CUFF REPAIR RT/LT Right     ZZC TOTAL HIP ARTHROPLASTY Right 1997     SH: previously lived at [facility] [place]       Daughter Tina in [facility] [place] and Amy    Non smoker     ROS:  BIMS 7/15  Ambulates with FWW    Needs cuing and assist with all ADLs     Weight at admission was 160 lbs   Wt Readings from Last 5 Encounters:   06/02/25 71.8 kg (158 lb 6.4 oz)   05/15/25 71.1 kg (156 lb 12.8 oz)   04/22/25 70.9 kg (156 lb 3.2 oz)   04/15/25 71.7 kg (158 lb 1.6 oz)   04/15/25 71.7 kg (158 lb 1.6 oz)      GENERAL APPEARANCE: frail, alert and no distress  BP (!) 154/74   Pulse 90   Temp 97.8  F (36.6  C)   Resp 18   Ht 1.651 m (5' 5\")   Wt 71.8 kg (158 lb 6.4 oz)   SpO2 95%   BMI 26.36 kg/m     HEENT: normocephalic, no lesion or abnormalities, +kyphosis  NECK: no adenopathy, no asymmetry, masses, or scars and thyroid normal to palpation  RESP: lungs with markedly decreased BS, dense rales bilaterally   CV: regular rate and rhythm, normal S1 S2, +ectopy   ABDOMEN:  soft, nontender, no HSM or masses and bowel sounds normal, no rebound or guarding     MS: extremities normal- no ext edema     NEURO: oriented x1     PSYCH: affect a little flat    Labs reviewed, BMP in " March was WNL        HGB 10.7 03/12/2025      MCV 90 03/12/2025     ECHO 11/2023  Left ventricular systolic function is borderline reduced.  The visual ejection fraction is 50-55%.  There is mild concentric left ventricular hypertrophy.  The right ventricle is normal in structure, function and size.  No significant valve dysfunction.  The inferior vena cava was normal in size with preserved respiratory variability.  There is no pericardial effusion.  Compared to prior study dated 5/17/2023, pulmonary pressures are lower, otherwise no significant change.    CT HEAD W/O CONTRAST, CT CERVICAL SPINE W/O CONTRAST 11/24/2023  HEAD CT:   INTRACRANIAL CONTENTS: No intracranial hemorrhage, extraaxial collection, or mass effect. Chronic right basal ganglia infarct. Moderate presumed chronic small vessel ischemic changes. Mild generalized volume loss. No hydrocephalus.   CERVICAL SPINE CT:   1.  No CT evidence for acute fracture or post traumatic subluxation.  2.  Chronic and degenerative changes, as above.  3.  Stable appearing right thyroid lobe nodule measuring up to 1.5 cm. Recommend non-emergent thyroid function testing and thyroid ultrasound for further assessment, if not already performed.      IMP/PLAN:   (I50.20) Heart failure with reduced ejection fraction (H)    Comment: exam today c/w pulmonary edema     Also LVH on ECHO   Plan:  add furosemide 20 mg/day   Monitor BMP, weights and symptoms   Atorvastatin 20 mg/day     (I48.92) Atrial flutter, unspecified type (H)  Comment:    Pulse Readings from Last 4 Encounters:   06/02/25 90   05/15/25 77   04/22/25 73   04/15/25 70      CHADS -VASc 8    Plan: metoprolol 100 mg /day for VR control  Apixaban 5 mg bid for stroke prophylaxis     (F03.918) Dementia with other behavioral disturbance, unspecified dementia severity, unspecified dementia type (H)  Comment: quetiapine held at admission, but then wandering and exit seeking at night, so restarted and pt moved to secure  Memory Care unit       Plan: remains on PTA memantine 5 mg bid   Quetiapine 25 mg/ HS     (F41.9) Anxiety  (G47.00) Insomnia, unspecified type  Comment: ongoing   Plan: remains on PTA paroxetine 20 mg/day     (I10) Benign essential hypertension  Comment:    BP Readings from Last 3 Encounters:   06/02/25 (!) 154/74   05/15/25 (!) 154/66   04/22/25 (!) 152/60      H/o orthostatic hypotension     Plan: Metoprolol 100 mg/day   May need to add an ACEI/AARB if bps not better with diuresis       (J43.9) Pulmonary emphysema, unspecified emphysema type (H)  Comment: may also be contributing to elevated RR   Plan: Duonebs qid, and PRN BDs       (D64.9) Anemia, unspecified type  Comment: NCNC   Plan: recheck CBC   Consider a PPI if continued low hgb and reporting of GI symptoms        (R54) Frailty  Comment: cognitive and physical decline   Plan: Board and Care support for med admin, meals, activity and cares      Advance directive: DNR/DNI per signed BRYSON Monteiro MD

## 2025-06-16 ENCOUNTER — MEDICAL CORRESPONDENCE (OUTPATIENT)
Dept: HEALTH INFORMATION MANAGEMENT | Facility: CLINIC | Age: OVER 89
End: 2025-06-16

## 2025-06-16 LAB
ANION GAP SERPL CALCULATED.3IONS-SCNC: 12 MMOL/L (ref 7–15)
BUN SERPL-MCNC: 19.1 MG/DL (ref 8–23)
CALCIUM SERPL-MCNC: 9.3 MG/DL (ref 8.8–10.4)
CHLORIDE SERPL-SCNC: 103 MMOL/L (ref 98–107)
CREAT SERPL-MCNC: 0.91 MG/DL (ref 0.51–0.95)
EGFRCR SERPLBLD CKD-EPI 2021: 58 ML/MIN/1.73M2
GLUCOSE SERPL-MCNC: 89 MG/DL (ref 70–99)
HCO3 SERPL-SCNC: 27 MMOL/L (ref 22–29)
POTASSIUM SERPL-SCNC: 4.3 MMOL/L (ref 3.4–5.3)
SODIUM SERPL-SCNC: 142 MMOL/L (ref 135–145)

## 2025-06-16 PROCEDURE — P9604 ONE-WAY ALLOW PRORATED TRIP: HCPCS | Mod: ORL | Performed by: NURSE PRACTITIONER

## 2025-06-16 PROCEDURE — 36415 COLL VENOUS BLD VENIPUNCTURE: CPT | Mod: ORL | Performed by: NURSE PRACTITIONER

## 2025-06-16 PROCEDURE — 80048 BASIC METABOLIC PNL TOTAL CA: CPT | Mod: ORL | Performed by: NURSE PRACTITIONER

## 2025-06-21 ENCOUNTER — APPOINTMENT (OUTPATIENT)
Dept: CT IMAGING | Facility: CLINIC | Age: OVER 89
DRG: 552 | End: 2025-06-21
Attending: EMERGENCY MEDICINE
Payer: MEDICARE

## 2025-06-21 ENCOUNTER — HOSPITAL ENCOUNTER (INPATIENT)
Facility: CLINIC | Age: OVER 89
LOS: 3 days | Discharge: NURSING, REHAB OR LONG-TERM CARE | DRG: 552 | End: 2025-06-24
Attending: EMERGENCY MEDICINE | Admitting: INTERNAL MEDICINE
Payer: MEDICARE

## 2025-06-21 DIAGNOSIS — S12.031A CLOSED NONDISPLACED FRACTURE OF POSTERIOR ARCH OF FIRST CERVICAL VERTEBRA, INITIAL ENCOUNTER (H): ICD-10-CM

## 2025-06-21 DIAGNOSIS — D64.9 ACUTE ON CHRONIC ANEMIA: ICD-10-CM

## 2025-06-21 DIAGNOSIS — S12.000A CLOSED DISPLACED FRACTURE OF FIRST CERVICAL VERTEBRA, UNSPECIFIED FRACTURE MORPHOLOGY, INITIAL ENCOUNTER (H): Primary | ICD-10-CM

## 2025-06-21 DIAGNOSIS — F41.9 ANXIETY: ICD-10-CM

## 2025-06-21 DIAGNOSIS — N17.9 AKI (ACUTE KIDNEY INJURY): ICD-10-CM

## 2025-06-21 LAB
ALBUMIN SERPL BCG-MCNC: 3.4 G/DL (ref 3.5–5.2)
ALBUMIN UR-MCNC: NEGATIVE MG/DL
ALP SERPL-CCNC: 99 U/L (ref 40–150)
ALT SERPL W P-5'-P-CCNC: 8 U/L (ref 0–50)
ANION GAP SERPL CALCULATED.3IONS-SCNC: 11 MMOL/L (ref 7–15)
APPEARANCE UR: CLEAR
AST SERPL W P-5'-P-CCNC: 12 U/L (ref 0–45)
BASE EXCESS BLDV CALC-SCNC: 2 MMOL/L (ref -3–3)
BASOPHILS # BLD AUTO: 0.1 10E3/UL (ref 0–0.2)
BASOPHILS NFR BLD AUTO: 1 %
BILIRUB SERPL-MCNC: 0.2 MG/DL
BILIRUB UR QL STRIP: NEGATIVE
BUN SERPL-MCNC: 26.1 MG/DL (ref 8–23)
CALCIUM SERPL-MCNC: 8.5 MG/DL (ref 8.8–10.4)
CHLORIDE SERPL-SCNC: 102 MMOL/L (ref 98–107)
COLOR UR AUTO: ABNORMAL
CREAT BLD-MCNC: 1.3 MG/DL (ref 0.5–1)
CREAT SERPL-MCNC: 1.16 MG/DL (ref 0.51–0.95)
EGFRCR SERPLBLD CKD-EPI 2021: 38 ML/MIN/1.73M2
EGFRCR SERPLBLD CKD-EPI 2021: 43 ML/MIN/1.73M2
EOSINOPHIL # BLD AUTO: 1.1 10E3/UL (ref 0–0.7)
EOSINOPHIL NFR BLD AUTO: 8 %
ERYTHROCYTE [DISTWIDTH] IN BLOOD BY AUTOMATED COUNT: 18 % (ref 10–15)
GLUCOSE BLDC GLUCOMTR-MCNC: 187 MG/DL (ref 70–99)
GLUCOSE SERPL-MCNC: 181 MG/DL (ref 70–99)
GLUCOSE UR STRIP-MCNC: NEGATIVE MG/DL
HCO3 BLDV-SCNC: 28 MMOL/L (ref 21–28)
HCO3 SERPL-SCNC: 27 MMOL/L (ref 22–29)
HCT VFR BLD AUTO: 26.4 % (ref 35–47)
HGB BLD-MCNC: 7.6 G/DL (ref 11.7–15.7)
HGB UR QL STRIP: NEGATIVE
HOLD SPECIMEN: NORMAL
HOLD SPECIMEN: NORMAL
HYALINE CASTS: 1 /LPF
IMM GRANULOCYTES # BLD: 0 10E3/UL
IMM GRANULOCYTES NFR BLD: 0 %
IRON BINDING CAPACITY (ROCHE): 366 UG/DL (ref 240–430)
IRON SATN MFR SERPL: 3 % (ref 15–46)
IRON SERPL-MCNC: 11 UG/DL (ref 37–145)
KETONES UR STRIP-MCNC: NEGATIVE MG/DL
LACTATE BLD-SCNC: 2.7 MMOL/L (ref 0.7–2)
LACTATE SERPL-SCNC: 1.6 MMOL/L (ref 0.7–2)
LDH SERPL L TO P-CCNC: 170 U/L (ref 0–250)
LEUKOCYTE ESTERASE UR QL STRIP: NEGATIVE
LYMPHOCYTES # BLD AUTO: 2.9 10E3/UL (ref 0.8–5.3)
LYMPHOCYTES NFR BLD AUTO: 22 %
MCH RBC QN AUTO: 19.7 PG (ref 26.5–33)
MCHC RBC AUTO-ENTMCNC: 28.8 G/DL (ref 31.5–36.5)
MCV RBC AUTO: 69 FL (ref 78–100)
MONOCYTES # BLD AUTO: 1.4 10E3/UL (ref 0–1.3)
MONOCYTES NFR BLD AUTO: 11 %
MUCOUS THREADS #/AREA URNS LPF: PRESENT /LPF
NEUTROPHILS # BLD AUTO: 7.8 10E3/UL (ref 1.6–8.3)
NEUTROPHILS NFR BLD AUTO: 59 %
NITRATE UR QL: NEGATIVE
NRBC # BLD AUTO: 0 10E3/UL
NRBC BLD AUTO-RTO: 0 /100
PCO2 BLDV: 49 MM HG (ref 40–50)
PH BLDV: 7.36 [PH] (ref 7.32–7.43)
PH UR STRIP: 7.5 [PH] (ref 5–7)
PLATELET # BLD AUTO: 451 10E3/UL (ref 150–450)
PO2 BLDV: 34 MM HG (ref 25–47)
POTASSIUM SERPL-SCNC: 4.3 MMOL/L (ref 3.4–5.3)
PROT SERPL-MCNC: 6.4 G/DL (ref 6.4–8.3)
RBC # BLD AUTO: 3.85 10E6/UL (ref 3.8–5.2)
RBC URINE: 2 /HPF
RETICS # AUTO: 0.06 10E6/UL (ref 0.03–0.1)
RETICS/RBC NFR AUTO: 1.5 % (ref 0.5–2)
SAO2 % BLDV: 61 % (ref 70–75)
SODIUM SERPL-SCNC: 140 MMOL/L (ref 135–145)
SP GR UR STRIP: 1 (ref 1–1.03)
SQUAMOUS EPITHELIAL: 13 /HPF
UROBILINOGEN UR STRIP-MCNC: NORMAL MG/DL
WBC # BLD AUTO: 13.3 10E3/UL (ref 4–11)
WBC URINE: 20 /HPF
YEAST #/AREA URNS HPF: ABNORMAL /HPF

## 2025-06-21 PROCEDURE — 120N000001 HC R&B MED SURG/OB

## 2025-06-21 PROCEDURE — 96368 THER/DIAG CONCURRENT INF: CPT

## 2025-06-21 PROCEDURE — 93005 ELECTROCARDIOGRAM TRACING: CPT

## 2025-06-21 PROCEDURE — 82565 ASSAY OF CREATININE: CPT

## 2025-06-21 PROCEDURE — 96361 HYDRATE IV INFUSION ADD-ON: CPT

## 2025-06-21 PROCEDURE — 82728 ASSAY OF FERRITIN: CPT | Performed by: EMERGENCY MEDICINE

## 2025-06-21 PROCEDURE — 82962 GLUCOSE BLOOD TEST: CPT

## 2025-06-21 PROCEDURE — 81001 URINALYSIS AUTO W/SCOPE: CPT | Performed by: EMERGENCY MEDICINE

## 2025-06-21 PROCEDURE — 96365 THER/PROPH/DIAG IV INF INIT: CPT | Mod: 59

## 2025-06-21 PROCEDURE — 83010 ASSAY OF HAPTOGLOBIN QUANT: CPT | Performed by: EMERGENCY MEDICINE

## 2025-06-21 PROCEDURE — 96360 HYDRATION IV INFUSION INIT: CPT | Mod: 59

## 2025-06-21 PROCEDURE — 36415 COLL VENOUS BLD VENIPUNCTURE: CPT | Performed by: EMERGENCY MEDICINE

## 2025-06-21 PROCEDURE — 87040 BLOOD CULTURE FOR BACTERIA: CPT | Performed by: EMERGENCY MEDICINE

## 2025-06-21 PROCEDURE — 82310 ASSAY OF CALCIUM: CPT | Performed by: EMERGENCY MEDICINE

## 2025-06-21 PROCEDURE — 258N000003 HC RX IP 258 OP 636: Performed by: EMERGENCY MEDICINE

## 2025-06-21 PROCEDURE — 83550 IRON BINDING TEST: CPT | Performed by: EMERGENCY MEDICINE

## 2025-06-21 PROCEDURE — 85045 AUTOMATED RETICULOCYTE COUNT: CPT | Performed by: EMERGENCY MEDICINE

## 2025-06-21 PROCEDURE — 250N000011 HC RX IP 250 OP 636: Performed by: EMERGENCY MEDICINE

## 2025-06-21 PROCEDURE — 72125 CT NECK SPINE W/O DYE: CPT

## 2025-06-21 PROCEDURE — 99223 1ST HOSP IP/OBS HIGH 75: CPT | Mod: AI | Performed by: INTERNAL MEDICINE

## 2025-06-21 PROCEDURE — 87186 SC STD MICRODIL/AGAR DIL: CPT | Performed by: EMERGENCY MEDICINE

## 2025-06-21 PROCEDURE — 71275 CT ANGIOGRAPHY CHEST: CPT

## 2025-06-21 PROCEDURE — 83615 LACTATE (LD) (LDH) ENZYME: CPT | Performed by: EMERGENCY MEDICINE

## 2025-06-21 PROCEDURE — 85004 AUTOMATED DIFF WBC COUNT: CPT | Performed by: EMERGENCY MEDICINE

## 2025-06-21 PROCEDURE — 99292 CRITICAL CARE ADDL 30 MIN: CPT

## 2025-06-21 PROCEDURE — 250N000009 HC RX 250: Performed by: EMERGENCY MEDICINE

## 2025-06-21 PROCEDURE — 70450 CT HEAD/BRAIN W/O DYE: CPT

## 2025-06-21 PROCEDURE — 99291 CRITICAL CARE FIRST HOUR: CPT | Mod: 25

## 2025-06-21 PROCEDURE — 82803 BLOOD GASES ANY COMBINATION: CPT

## 2025-06-21 PROCEDURE — 83605 ASSAY OF LACTIC ACID: CPT

## 2025-06-21 PROCEDURE — 96375 TX/PRO/DX INJ NEW DRUG ADDON: CPT

## 2025-06-21 RX ORDER — NALOXONE HYDROCHLORIDE 0.4 MG/ML
0.4 INJECTION, SOLUTION INTRAMUSCULAR; INTRAVENOUS; SUBCUTANEOUS ONCE
Status: COMPLETED | OUTPATIENT
Start: 2025-06-21 | End: 2025-06-21

## 2025-06-21 RX ORDER — FUROSEMIDE 20 MG/1
20 TABLET ORAL DAILY
COMMUNITY

## 2025-06-21 RX ORDER — FLUTICASONE PROPIONATE 50 MCG
1 SPRAY, SUSPENSION (ML) NASAL DAILY PRN
Status: DISCONTINUED | OUTPATIENT
Start: 2025-06-21 | End: 2025-06-24 | Stop reason: HOSPADM

## 2025-06-21 RX ORDER — IOPAMIDOL 755 MG/ML
84 INJECTION, SOLUTION INTRAVASCULAR ONCE
Status: COMPLETED | OUTPATIENT
Start: 2025-06-21 | End: 2025-06-21

## 2025-06-21 RX ORDER — PIPERACILLIN SODIUM, TAZOBACTAM SODIUM 4; .5 G/20ML; G/20ML
4.5 INJECTION, POWDER, LYOPHILIZED, FOR SOLUTION INTRAVENOUS ONCE
Status: COMPLETED | OUTPATIENT
Start: 2025-06-21 | End: 2025-06-21

## 2025-06-21 RX ADMIN — PANTOPRAZOLE SODIUM 80 MG: 40 INJECTION, POWDER, FOR SOLUTION INTRAVENOUS at 23:17

## 2025-06-21 RX ADMIN — PIPERACILLIN AND TAZOBACTAM 4.5 G: 4; .5 INJECTION, POWDER, FOR SOLUTION INTRAVENOUS at 21:02

## 2025-06-21 RX ADMIN — IOPAMIDOL 84 ML: 755 INJECTION, SOLUTION INTRAVENOUS at 20:26

## 2025-06-21 RX ADMIN — VANCOMYCIN HYDROCHLORIDE 2000 MG: 10 INJECTION, POWDER, LYOPHILIZED, FOR SOLUTION INTRAVENOUS at 21:44

## 2025-06-21 RX ADMIN — SODIUM CHLORIDE, SODIUM LACTATE, POTASSIUM CHLORIDE, AND CALCIUM CHLORIDE 2289 ML: .6; .31; .03; .02 INJECTION, SOLUTION INTRAVENOUS at 21:03

## 2025-06-21 RX ADMIN — SODIUM CHLORIDE 90 ML: 9 INJECTION, SOLUTION INTRAVENOUS at 20:26

## 2025-06-21 RX ADMIN — NALOXONE HYDROCHLORIDE 0.4 MG: 0.4 INJECTION, SOLUTION INTRAMUSCULAR; INTRAVENOUS; SUBCUTANEOUS at 20:30

## 2025-06-21 ASSESSMENT — ACTIVITIES OF DAILY LIVING (ADL)
ADLS_ACUITY_SCORE: 58

## 2025-06-21 ASSESSMENT — COLUMBIA-SUICIDE SEVERITY RATING SCALE - C-SSRS: IS THE PATIENT NOT ABLE TO COMPLETE C-SSRS: UNABLE TO VERBALIZE

## 2025-06-22 ENCOUNTER — APPOINTMENT (OUTPATIENT)
Dept: GENERAL RADIOLOGY | Facility: CLINIC | Age: OVER 89
DRG: 552 | End: 2025-06-22
Attending: NURSE PRACTITIONER
Payer: MEDICARE

## 2025-06-22 ENCOUNTER — APPOINTMENT (OUTPATIENT)
Dept: CT IMAGING | Facility: CLINIC | Age: OVER 89
DRG: 552 | End: 2025-06-22
Attending: EMERGENCY MEDICINE
Payer: MEDICARE

## 2025-06-22 LAB
ABO + RH BLD: NORMAL
BLD GP AB SCN SERPL QL: NEGATIVE
FERRITIN SERPL-MCNC: 10 NG/ML (ref 11–328)
HAPTOGLOB SERPL-MCNC: 141 MG/DL (ref 30–200)
HGB BLD-MCNC: 7.1 G/DL (ref 11.7–15.7)
HGB BLD-MCNC: 7.2 G/DL (ref 11.7–15.7)
MCV RBC AUTO: 70 FL (ref 78–100)
MCV RBC AUTO: 71 FL (ref 78–100)
SPECIMEN EXP DATE BLD: NORMAL

## 2025-06-22 PROCEDURE — 85018 HEMOGLOBIN: CPT | Performed by: INTERNAL MEDICINE

## 2025-06-22 PROCEDURE — 94640 AIRWAY INHALATION TREATMENT: CPT

## 2025-06-22 PROCEDURE — 999N000157 HC STATISTIC RCP TIME EA 10 MIN

## 2025-06-22 PROCEDURE — 86900 BLOOD TYPING SEROLOGIC ABO: CPT | Performed by: INTERNAL MEDICINE

## 2025-06-22 PROCEDURE — 72040 X-RAY EXAM NECK SPINE 2-3 VW: CPT

## 2025-06-22 PROCEDURE — 86923 COMPATIBILITY TEST ELECTRIC: CPT | Performed by: STUDENT IN AN ORGANIZED HEALTH CARE EDUCATION/TRAINING PROGRAM

## 2025-06-22 PROCEDURE — 258N000003 HC RX IP 258 OP 636: Performed by: INTERNAL MEDICINE

## 2025-06-22 PROCEDURE — 250N000011 HC RX IP 250 OP 636: Performed by: INTERNAL MEDICINE

## 2025-06-22 PROCEDURE — 999N000128 HC STATISTIC PERIPHERAL IV START W/O US GUIDANCE

## 2025-06-22 PROCEDURE — 99207 PR APP CREDIT; MD BILLING SHARED VISIT: CPT | Mod: FS | Performed by: NURSE PRACTITIONER

## 2025-06-22 PROCEDURE — 250N000013 HC RX MED GY IP 250 OP 250 PS 637: Performed by: INTERNAL MEDICINE

## 2025-06-22 PROCEDURE — 94640 AIRWAY INHALATION TREATMENT: CPT | Mod: 76

## 2025-06-22 PROCEDURE — 36415 COLL VENOUS BLD VENIPUNCTURE: CPT | Performed by: INTERNAL MEDICINE

## 2025-06-22 PROCEDURE — 70496 CT ANGIOGRAPHY HEAD: CPT

## 2025-06-22 PROCEDURE — 999N000040 HC STATISTIC CONSULT NO CHARGE VASC ACCESS

## 2025-06-22 PROCEDURE — 99232 SBSQ HOSP IP/OBS MODERATE 35: CPT | Performed by: INTERNAL MEDICINE

## 2025-06-22 PROCEDURE — 250N000009 HC RX 250: Performed by: INTERNAL MEDICINE

## 2025-06-22 PROCEDURE — 250N000011 HC RX IP 250 OP 636: Performed by: EMERGENCY MEDICINE

## 2025-06-22 PROCEDURE — 120N000001 HC R&B MED SURG/OB

## 2025-06-22 PROCEDURE — 99222 1ST HOSP IP/OBS MODERATE 55: CPT | Mod: FS | Performed by: NEUROLOGICAL SURGERY

## 2025-06-22 PROCEDURE — 250N000009 HC RX 250: Performed by: EMERGENCY MEDICINE

## 2025-06-22 RX ORDER — PAROXETINE 20 MG/1
20 TABLET, FILM COATED ORAL EVERY EVENING
Status: DISCONTINUED | OUTPATIENT
Start: 2025-06-22 | End: 2025-06-24 | Stop reason: HOSPADM

## 2025-06-22 RX ORDER — AMOXICILLIN 250 MG
1 CAPSULE ORAL 2 TIMES DAILY PRN
Status: DISCONTINUED | OUTPATIENT
Start: 2025-06-22 | End: 2025-06-24 | Stop reason: HOSPADM

## 2025-06-22 RX ORDER — OXYCODONE HYDROCHLORIDE 5 MG/1
5 TABLET ORAL EVERY 4 HOURS PRN
Status: DISCONTINUED | OUTPATIENT
Start: 2025-06-22 | End: 2025-06-24 | Stop reason: HOSPADM

## 2025-06-22 RX ORDER — MEMANTINE HYDROCHLORIDE 5 MG/1
5 TABLET ORAL 2 TIMES DAILY
Status: DISCONTINUED | OUTPATIENT
Start: 2025-06-22 | End: 2025-06-24 | Stop reason: HOSPADM

## 2025-06-22 RX ORDER — LIDOCAINE 40 MG/G
CREAM TOPICAL
Status: DISCONTINUED | OUTPATIENT
Start: 2025-06-22 | End: 2025-06-24 | Stop reason: HOSPADM

## 2025-06-22 RX ORDER — METOPROLOL SUCCINATE 50 MG/1
100 TABLET, EXTENDED RELEASE ORAL DAILY
Status: DISCONTINUED | OUTPATIENT
Start: 2025-06-22 | End: 2025-06-24 | Stop reason: HOSPADM

## 2025-06-22 RX ORDER — NALOXONE HYDROCHLORIDE 0.4 MG/ML
0.4 INJECTION, SOLUTION INTRAMUSCULAR; INTRAVENOUS; SUBCUTANEOUS
Status: DISCONTINUED | OUTPATIENT
Start: 2025-06-22 | End: 2025-06-24 | Stop reason: HOSPADM

## 2025-06-22 RX ORDER — PIPERACILLIN SODIUM, TAZOBACTAM SODIUM 3; .375 G/15ML; G/15ML
3.38 INJECTION, POWDER, LYOPHILIZED, FOR SOLUTION INTRAVENOUS EVERY 6 HOURS
Status: DISCONTINUED | OUTPATIENT
Start: 2025-06-22 | End: 2025-06-24

## 2025-06-22 RX ORDER — ACETAMINOPHEN 325 MG/1
975 TABLET ORAL 3 TIMES DAILY
Status: DISCONTINUED | OUTPATIENT
Start: 2025-06-22 | End: 2025-06-24 | Stop reason: HOSPADM

## 2025-06-22 RX ORDER — NALOXONE HYDROCHLORIDE 0.4 MG/ML
0.2 INJECTION, SOLUTION INTRAMUSCULAR; INTRAVENOUS; SUBCUTANEOUS
Status: DISCONTINUED | OUTPATIENT
Start: 2025-06-22 | End: 2025-06-24 | Stop reason: HOSPADM

## 2025-06-22 RX ORDER — SODIUM CHLORIDE, SODIUM LACTATE, POTASSIUM CHLORIDE, CALCIUM CHLORIDE 600; 310; 30; 20 MG/100ML; MG/100ML; MG/100ML; MG/100ML
INJECTION, SOLUTION INTRAVENOUS CONTINUOUS
Status: DISCONTINUED | OUTPATIENT
Start: 2025-06-22 | End: 2025-06-23

## 2025-06-22 RX ORDER — IPRATROPIUM BROMIDE AND ALBUTEROL SULFATE 2.5; .5 MG/3ML; MG/3ML
3 SOLUTION RESPIRATORY (INHALATION) 4 TIMES DAILY
Status: DISCONTINUED | OUTPATIENT
Start: 2025-06-22 | End: 2025-06-24

## 2025-06-22 RX ORDER — ONDANSETRON 4 MG/1
4 TABLET, ORALLY DISINTEGRATING ORAL EVERY 6 HOURS PRN
Status: DISCONTINUED | OUTPATIENT
Start: 2025-06-22 | End: 2025-06-24 | Stop reason: HOSPADM

## 2025-06-22 RX ORDER — POLYETHYLENE GLYCOL 3350 17 G/17G
17 POWDER, FOR SOLUTION ORAL 2 TIMES DAILY PRN
Status: DISCONTINUED | OUTPATIENT
Start: 2025-06-22 | End: 2025-06-24 | Stop reason: HOSPADM

## 2025-06-22 RX ORDER — ATORVASTATIN CALCIUM 20 MG/1
20 TABLET, FILM COATED ORAL EVERY EVENING
Status: DISCONTINUED | OUTPATIENT
Start: 2025-06-22 | End: 2025-06-24 | Stop reason: HOSPADM

## 2025-06-22 RX ORDER — PROCHLORPERAZINE MALEATE 5 MG/1
5 TABLET ORAL EVERY 6 HOURS PRN
Status: DISCONTINUED | OUTPATIENT
Start: 2025-06-22 | End: 2025-06-24 | Stop reason: HOSPADM

## 2025-06-22 RX ORDER — ACETAMINOPHEN 500 MG
1000 TABLET ORAL DAILY
Status: DISCONTINUED | OUTPATIENT
Start: 2025-06-22 | End: 2025-06-22

## 2025-06-22 RX ORDER — FUROSEMIDE 20 MG/1
20 TABLET ORAL DAILY
Status: DISCONTINUED | OUTPATIENT
Start: 2025-06-22 | End: 2025-06-24 | Stop reason: HOSPADM

## 2025-06-22 RX ORDER — IOPAMIDOL 755 MG/ML
67 INJECTION, SOLUTION INTRAVASCULAR ONCE
Status: COMPLETED | OUTPATIENT
Start: 2025-06-22 | End: 2025-06-22

## 2025-06-22 RX ORDER — AMOXICILLIN 250 MG
2 CAPSULE ORAL 2 TIMES DAILY PRN
Status: DISCONTINUED | OUTPATIENT
Start: 2025-06-22 | End: 2025-06-24 | Stop reason: HOSPADM

## 2025-06-22 RX ORDER — ONDANSETRON 2 MG/ML
4 INJECTION INTRAMUSCULAR; INTRAVENOUS EVERY 6 HOURS PRN
Status: DISCONTINUED | OUTPATIENT
Start: 2025-06-22 | End: 2025-06-24 | Stop reason: HOSPADM

## 2025-06-22 RX ORDER — BISACODYL 10 MG
10 SUPPOSITORY, RECTAL RECTAL DAILY PRN
Status: DISCONTINUED | OUTPATIENT
Start: 2025-06-22 | End: 2025-06-24 | Stop reason: HOSPADM

## 2025-06-22 RX ADMIN — PIPERACILLIN AND TAZOBACTAM 3.38 G: 3; .375 INJECTION, POWDER, FOR SOLUTION INTRAVENOUS at 14:17

## 2025-06-22 RX ADMIN — IPRATROPIUM BROMIDE AND ALBUTEROL SULFATE 3 ML: .5; 3 SOLUTION RESPIRATORY (INHALATION) at 11:30

## 2025-06-22 RX ADMIN — SODIUM CHLORIDE 500 ML: 0.9 INJECTION, SOLUTION INTRAVENOUS at 09:46

## 2025-06-22 RX ADMIN — PANTOPRAZOLE SODIUM 40 MG: 40 INJECTION, POWDER, FOR SOLUTION INTRAVENOUS at 20:42

## 2025-06-22 RX ADMIN — ACETAMINOPHEN 975 MG: 325 TABLET, FILM COATED ORAL at 20:41

## 2025-06-22 RX ADMIN — ACETAMINOPHEN 975 MG: 325 TABLET, FILM COATED ORAL at 09:38

## 2025-06-22 RX ADMIN — IPRATROPIUM BROMIDE AND ALBUTEROL SULFATE 3 ML: .5; 3 SOLUTION RESPIRATORY (INHALATION) at 20:01

## 2025-06-22 RX ADMIN — MEMANTINE 5 MG: 5 TABLET ORAL at 09:38

## 2025-06-22 RX ADMIN — QUETIAPINE 25 MG: 25 TABLET, FILM COATED ORAL at 20:42

## 2025-06-22 RX ADMIN — ACETAMINOPHEN 975 MG: 325 TABLET, FILM COATED ORAL at 13:49

## 2025-06-22 RX ADMIN — ATORVASTATIN CALCIUM 20 MG: 20 TABLET, FILM COATED ORAL at 20:41

## 2025-06-22 RX ADMIN — PIPERACILLIN AND TAZOBACTAM 3.38 G: 3; .375 INJECTION, POWDER, FOR SOLUTION INTRAVENOUS at 20:42

## 2025-06-22 RX ADMIN — SODIUM CHLORIDE, SODIUM LACTATE, POTASSIUM CHLORIDE, AND CALCIUM CHLORIDE: .6; .31; .03; .02 INJECTION, SOLUTION INTRAVENOUS at 09:42

## 2025-06-22 RX ADMIN — SODIUM CHLORIDE 90 ML: 9 INJECTION, SOLUTION INTRAVENOUS at 00:09

## 2025-06-22 RX ADMIN — MEMANTINE 5 MG: 5 TABLET ORAL at 20:41

## 2025-06-22 RX ADMIN — PAROXETINE HYDROCHLORIDE 20 MG: 20 TABLET, FILM COATED ORAL at 20:41

## 2025-06-22 RX ADMIN — IOPAMIDOL 67 ML: 755 INJECTION, SOLUTION INTRAVENOUS at 00:09

## 2025-06-22 RX ADMIN — PANTOPRAZOLE SODIUM 40 MG: 40 INJECTION, POWDER, FOR SOLUTION INTRAVENOUS at 09:40

## 2025-06-22 ASSESSMENT — ACTIVITIES OF DAILY LIVING (ADL)
ADLS_ACUITY_SCORE: 67
ADLS_ACUITY_SCORE: 58
ADLS_ACUITY_SCORE: 67
ADLS_ACUITY_SCORE: 67
ADLS_ACUITY_SCORE: 58
ADLS_ACUITY_SCORE: 67
ADLS_ACUITY_SCORE: 58
ADLS_ACUITY_SCORE: 58
ADLS_ACUITY_SCORE: 67
ADLS_ACUITY_SCORE: 67
ADLS_ACUITY_SCORE: 58
ADLS_ACUITY_SCORE: 58
ADLS_ACUITY_SCORE: 67
ADLS_ACUITY_SCORE: 61
ADLS_ACUITY_SCORE: 61
ADLS_ACUITY_SCORE: 58
ADLS_ACUITY_SCORE: 58
ADLS_ACUITY_SCORE: 67
ADLS_ACUITY_SCORE: 58
ADLS_ACUITY_SCORE: 67
DEPENDENT_IADLS:: CLEANING;COOKING;LAUNDRY;SHOPPING;MEAL PREPARATION;MEDICATION MANAGEMENT;MONEY MANAGEMENT;TRANSPORTATION;INCONTINENCE
ADLS_ACUITY_SCORE: 58
ADLS_ACUITY_SCORE: 61
ADLS_ACUITY_SCORE: 61

## 2025-06-22 NOTE — PLAN OF CARE
Goal Outcome Evaluation:    A&Ox2/3. VSS on RA, soft bps. LR running at 75ml/hr. Cervical collar on at all times. Discharge pending.

## 2025-06-22 NOTE — ED TRIAGE NOTES
"Patient presents via EMS with reports of a period of apnea at nursing home. Staff placed her on oxygen and began to respond. Patient had low b/p and was given Epi by EMS and now back to a normal baseline VS. Patient has a hx a dementia. Patient has a DNR order but staff called daughter and she \"over-rode\" DNR.      Triage Assessment (Adult)       Row Name 06/21/25 2009          Triage Assessment    Airway WDL WDL        Respiratory WDL    Respiratory WDL X        Skin Circulation/Temperature WDL    Skin Circulation/Temperature WDL WDL        Cardiac WDL    Cardiac WDL WDL        Peripheral/Neurovascular WDL    Peripheral Neurovascular WDL WDL        Cognitive/Neuro/Behavioral WDL    Cognitive/Neuro/Behavioral WDL X  hx dementia                     "

## 2025-06-22 NOTE — CONSULTS
Care Management Initial Consult    General Information  Assessment completed with: Tina Bazan  Type of CM/SW Visit: Initial Assessment    Primary Care Provider verified and updated as needed: Yes   Readmission within the last 30 days: no previous admission in last 30 days      Reason for Consult: discharge planning  Advance Care Planning: Advance Care Planning Reviewed: present on chart          Communication Assessment  Patient's communication style: spoken language (English or Bilingual)             Cognitive  Cognitive/Neuro/Behavioral: .WDL except, orientation  Level of Consciousness: intermittent confusion  Arousal Level: opens eyes spontaneously  Orientation: disoriented to, situation  Mood/Behavior: cooperative  Best Language: 0 - No aphasia  Speech: clear, logical    Living Environment:   People in home: facility resident     Current living Arrangements: assisted living  Name of Facility: [facility]   Able to return to prior arrangements: yes  Living Arrangement Comments: Memory care unit    Family/Social Support:  Care provided by:  (facility staff)  Provides care for: no one, unable/limited ability to care for self  Marital Status:   Support system: Children, Facility resident(s)/Staff          Description of Support System: Supportive, Involved    Support Assessment: Adequate family and caregiver support, Adequate social supports    Current Resources:   Patient receiving home care services: No        Community Resources: None  Equipment currently used at home: walker, rolling, grab bar, tub/shower  Supplies currently used at home: Incontinence Supplies    Employment/Financial:  Employment Status: retired        Financial Concerns:     Referral to Financial Worker: No       Does the patient's insurance plan have a 3 day qualifying hospital stay waiver?  No    Lifestyle & Psychosocial Needs:  Social Drivers of Health     Food Insecurity: Not on file   Depression: Not at risk  (12/27/2022)    PHQ-2     PHQ-2 Score: 0   Housing Stability: Not on file   Tobacco Use: Low Risk  (11/23/2023)    Patient History     Smoking Tobacco Use: Never     Smokeless Tobacco Use: Never     Passive Exposure: Not on file   Financial Resource Strain: Not on file   Alcohol Use: Not on file   Transportation Needs: Not on file   Physical Activity: Not on file   Interpersonal Safety: Not on file   Stress: Not on file   Social Connections: Not on file   Health Literacy: Not on file       Functional Status:  Prior to admission patient needed assistance:      Dependent IADLs:: Cleaning, Cooking, Laundry, Shopping, Meal Preparation, Medication Management, Money Management, Transportation, Incontinence       Mental Health Status:  Mental Health Status: No Current Concerns       Chemical Dependency Status:  Chemical Dependency Status: No Current Concerns             Values/Beliefs:  Spiritual, Cultural Beliefs, Restoration Practices, Values that affect care: yes  Description of Beliefs that Will Affect Care: Jayshree            Discussed  Partnership in Safe Discharge Planning  document with patient/family: No    Additional Information:     CM team called -Gisele Jordan  who provided the following information:     In what kind of facility does pt reside?   [facility]  [address] [place] [id number]   [address]  [phone]  Name of building/unit: [facility]   Any steps to get in (#)? 0                2.   Best number to reach facility nursing? Phone [phone] Fax:  [phone]         Evening/weekend number? nicol    3.  What is the preferred return time for the resident?  anytime    Can patient return on weekend? yes       Do you know how they typically transport? Medical wheelchair    4.   Does facility manage medications?   Yes If yes, contracted pharmacy? Name:  [facility]. - noted in discharge pharmacy        If new Rx meds at discharge, fill at hospital pharmacy or  "contracted pharmacy?   Contracted pharmacy     5.   What is pt's baseline cognition and mobility? Dementia, use of walker         What level of cognition/mobility is required for safe return? DESTINEE     6.  Is resident enrolled in any \"services?\"  Yes  If so, what services:  medication management, meal prep, bathing, physical assist with ADLs, housekeeping        7.  Are \"skilled home health\" or \"on-site outpatient therapy services\" available there? Yes  name/agency, if        known: TCU onsite    8.   Is the resident seen by PCP: on-site   Name: Cynthia Monteiro MD    Next Steps: Await medical readiness    Gabi Aguila RN BSN  Care Coordination  [facility]       "

## 2025-06-22 NOTE — H&P
"[facility]    History and Physical - Hospitalist Service       Date of Admission:  6/21/2025    Assessment & Plan   Margy Babin is a 89 year old female with past medical history significant for dementia, atrial flutter on DOAC, HFrEF, hypertension who presents with apnea and hypotension at [facility] care. Admitted on 6/21/2025.     Hypotension  Lactic acid elevation, suspect secondary to hypotension   *Reportedly had BP into 60s at memory care unit with associated apnea spells. Received epinephrine per EMS.   *WBC 13.3. CT chest/abdomen/pelvis PE protocol without PE, acute findings in chest/abdomen/pelvis.  UA mildly abnormal but grossly contaminated. Overall no clear signs of infection. Received piperacillin-tazobactam IV and vancomycin IV in ED while undifferentiated.   *New anemia noted as below, no clinical evidence of bleeding reported or noted at this time  - BP normal after fluids  - Continue mIVF  - Hold prior to admission furosemide and metoprolol XL     Acute C1 posterior arch fracture   *Incidentally noted on CT cervical spine. No known trauma. Denies any neck pain.   *Neurosurgery contacted from ED, recommended [place] J collar   *CTA head/neck without vascular injury   - Cervical collar in place at all times  - Neurosurgery consulted  - Scheduled acetaminophen PO, oxycodone PO, hydromorphone IV PRN; limit use of opioids given age and dementia     Advanced care planning  *Notes indicated daughter \"over-rode\" DNR to send patient to hospital  *Discussed with daughter, she reports she had just returned home from out of state and was having difficulty understanding staff at the facility and was unsure what was going on, simply requested hospital evaluation and did not rescind code status  *Goals of care discussed with daughter as below   - DNR/DNI   - No escalation of cares to ICU, pressors   - Daughter open to blood transfusion, possible surgeries/procedures if would " maintain quality of life as she reports her mother has otherwise been fairly functional     Microcytic anemia with iron deficiency   *Hgb 7.6 g/dl from most recent 10.7 g/dl 3/2025, had been resumed on apixaban for stroke prophylaxis in afib/flutter in interim   *Iron studies consistent with iron deficiency  *Daughter unaware of any bleeding hx, none per patient but limited by dementia. Possible slow bleed in setting of DOAC  *PTA on apixaban  - Hold apixaban  - Repeat hgb 7.2 g/dl   - Verbally consented for blood with daughter via phone and confirmed with daughter by ED RN, form reportedly signed by RN but lost in ED. Attempting to locate, otherwise will need to be re-signed by daughter when she is available if blood products needed  - Type and screen   - Pantoprazole IV BID   - Holding GI consult pending evidence of blood loss, hemoglobin trend, neurosurgery clearance for EGD with cervical fracture, further discussion with family. Note GI consulted 10/2023 for FLORI, patient/daughter declined invasive procedures at that time.       HFmrEF  Hypertension  H/o orthostatic hypotension   *Recently started on furosemide with concern for pulmonary edema on exam at [facility]. Unclear if may be contributing to hypotension. Hx of orthostatic hypotension noted in chart.  *TTE 11/2023 with EF 50-55%, 45-50% in the past  - Hold prior to admission furosemide and metoprolol XL   - Monitor volume status after IVF given in ED     Paroxysmal atrial fibrillation/flutter  LBBB  EKG on admission with sinus rhythm and old LBBB.  - Hold metoprolol XL and apixaban as above    Hx of CVA  - Hold apixaban as above   - Continue prior to admission atorvastatin     Dementia  Anxiety  Lives in memory care unit.   - Re-orient as needed  - Maintain normal day/night, sleep wake cycles  - Minimize sedating/altering medications as able  - Treat separate conditions as detailed above/below  - Continue prior to admission memantine, quetiapine,  "paroxetine     Pulmonary emphysema   No sign of acute exacerbation.  - Continue prior to admission nebulizer regimen        Diet: Clear liquid diet pending hemoglobin trend   DVT Prophylaxis: Pneumatic Compression Devices  Pichardo Catheter: Not present  Code Status: DNR/DNI - Per POLST and confirmed with daughter    Disposition Plan   Admit to inpatient.     Medically Ready for Discharge: Anticipated in 2-4 Days       Entered: Herminio Madrid MD 06/21/2025, 10:55 PM     The patient's care was discussed with the ED provider, patient and patient's daughter    Clinically Significant Risk Factors Present on Admission           # Hypocalcemia: Lowest Ca = 8.5 mg/dL in last 2 days, will monitor and replace as appropriate     # Hypoalbuminemia: Lowest albumin = 3.4 g/dL at 6/21/2025  8:21 PM, will monitor as appropriate  # Drug Induced Coagulation Defect: home medication list includes an anticoagulant medication    # Hypertension: Noted on problem list     # Dementia: noted on problem list  # Anemia: based on hgb <11       # Overweight: Estimated body mass index is 27.99 kg/m  as calculated from the following:    Height as of 6/2/25: 1.651 m (5' 5\").    Weight as of this encounter: 76.3 kg (168 lb 3.4 oz).       # Financial/Environmental Concerns:                Herminio Madrid MD  [facility]    ______________________________________________________________________    Chief Complaint   Hypotension     History of Present Illness   Margy Babin is a 89 year old female who presents with the above chief complaint.    History is obtained from the patient's daughter, discussion with ED provider and review of medical record. History from patient limited due to dementia, she can not state events leading to hospitalization. The patient was reportedly noted at her memory care facility to have periods of apnea.  Patient was placed on oxygen and daughter was contacted by staff, who reported she " "\"overrode\" the patient's DNR status and so EMS was called.  EMS noted her systolic blood pressure was in the 60s and administered epinephrine.  She was brought to the emergency department for further evaluation.  The patient currently denies any symptoms; denies any neck pain, chest pain, shortness of breath.  On discussion with her daughter, she saw her mom within the past week and felt she was doing well.  Her daughter is unaware of any bleeding symptoms at her facility.       In the Emergency Department, the patient was seen by Dr. Rees, with whom I discussed the patient's presenting symptoms and emergency department course.  Initial vital signs were a temperature of 97F, HR 72, BP 95/38, RR 12, SpO2 94% on 6L NC. Pertinent work-up as noted in A&P. Hospitalists were contacted for admission to the hospital.     Past Medical History    I have reviewed this patient's medical history and updated it with pertinent information if needed.   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    Mixed hyperlipidemia 11/04/2020    Nausea without vomiting 06/16/2015     Problem list name updated by automated process. Provider to review    Tremor, hereditary, benign        Past Surgical History   I have reviewed this patient's surgical history and updated it with pertinent information if needed.  Past Surgical History:   Procedure Laterality Date    HYSTERECTOMY TOTAL ABDOMINAL, BILATERAL SALPINGO-OOPHORECTOMY, COMBINED      NECK SURGERY  2009    cervical fusion    ROTATOR CUFF REPAIR RT/LT Right     " [facility] TOTAL HIP ARTHROPLASTY Right          Social History   I have reviewed this patient's social history and updated it with pertinent information if needed.  Social History     Tobacco Use    Smoking status: Never    Smokeless tobacco: Never   Substance Use Topics    Alcohol use: No     Alcohol/week: 0.0 standard drinks of alcohol    Drug use: No        Family History   I have reviewed this patient's family history and updated it with pertinent information if needed.   Family History   Problem Relation Age of Onset    Heart Disease Father 80        MI    C.A.D. Father     Breast Cancer Daughter 43         age 53    Emphysema Sister     Heart Disease Sister         Prior to Admission Medications  - Pending pharmacy reconciliation   Prior to Admission Medications   Prescriptions Last Dose Informant Patient Reported? Taking?   PARoxetine (PAXIL) 20 MG tablet 2025 Evening Nursing Home No Yes   Si TAB BY MOUTH AT BEDTIME Strength: 20 mg   QUEtiapine (SEROQUEL) 25 MG tablet 2025 Evening  Yes Yes   Sig: Take 25 mg by mouth daily.   Vitamin D3 (VITAMIN D, CHOLECALCIFEROL,) 25 mcg (1000 units) tablet 2025 Morning Nursing Home Yes Yes   Sig: Take 25 mcg by mouth daily   acetaminophen (TYLENOL) 500 MG tablet 2025 Morning Nursing Home Yes Yes   Sig: Take 1,000 mg by mouth daily   apixaban ANTICOAGULANT (ELIQUIS) 5 MG tablet 2025 Morning  Yes Yes   Sig: Take 5 mg by mouth 2 times daily.   atorvastatin (LIPITOR) 20 MG tablet 2025 Evening Nursing Home No Yes   Sig: Take 1 tablet (20 mg) by mouth daily   calcium carbonate (TUMS) 500 MG chewable tablet 2025 at  8:05 PM  Yes Yes   Sig: Take 1 chew tab by mouth every 8 hours as needed for heartburn.   docusate sodium (COLACE) 100 MG capsule   No Yes   Sig: Take 1 capsule (100 mg) by mouth 2 times daily as needed for constipation.   furosemide (LASIX) 20 MG tablet 2025 Morning  Yes Yes   Sig: Take 20 mg by mouth daily.   guaiFENesin  (ROBITUSSIN) 20 mg/mL liquid   Yes Yes   Sig: Take 200 mg by mouth every 4 hours as needed for cough.   ipratropium - albuterol 0.5 mg/2.5 mg/3 mL (DUONEB) 0.5-2.5 (3) MG/3ML neb solution 2025 at  5:00 PM  No Yes   Sig: Take 1 vial (3 mLs) by nebulization 4 times daily.   memantine (NAMENDA) 5 MG tablet 2025 Morning Nursing Home No Yes   Si TAB BY MOUTH TWICE DAILY   metoprolol succinate ER (TOPROL XL) 100 MG 24 hr tablet 2025 Morning Nursing Home No Yes   Si TAB BY MOUTH DAILY (DX: HYPERTENSION)      Facility-Administered Medications: None     Allergies   No Known Allergies    Physical Exam   Vital Signs: Temp: 97  F (36.1  C) Temp src: Temporal BP: 126/57 Pulse: 70   Resp: 15 SpO2: 97 % O2 Device: Nasal cannula Oxygen Delivery: 3 LPM  Weight: 168 lbs 3.38 oz    Constitutional: NAD  Neck: Cervical collar in place   Respiratory: Clear to auscultation bilaterally, good air movement, normal effort   Cardiovascular: RRR, no m/r/g. No peripheral edema.    GI: Soft, non-tender, non-distended. No rebound tenderness or guarding.    Skin: Warm, dry   Neurologic: Alert. Responding to questions appropriately. Following commands. Oriented to self only.   Psychiatric: Normal affect, appropriate      Data   Data reviewed today: I reviewed all medications, new labs and imaging results over the last 24 hours. I personally reviewed the EKG tracing showing sinus rhythm with LBBB.    Recent Labs   Lab 25  0718   WBC 13.3*  --   --    HGB 7.6*  --   --    MCV 69*  --   --    *  --   --      --  142   POTASSIUM 4.3  --  4.3   CHLORIDE 102  --  103   CO2 27  --  27   BUN 26.1*  --  19.1   CR 1.16*  1.3*  --  0.91   ANIONGAP 11  --  12   SOBIA 8.5*  --  9.3   * 187* 89   ALBUMIN 3.4*  --   --    PROTTOTAL 6.4  --   --    BILITOTAL 0.2  --   --    ALKPHOS 99  --   --    ALT 8  --   --    AST 12  --   --        Recent Results (from the past 24 hours)   Head CT  w/o contrast    Narrative    EXAM: CT HEAD W/O CONTRAST  LOCATION: [facility]  DATE: 6/21/2025    INDICATION: altered mental status  COMPARISON: None.  TECHNIQUE: Routine CT Head without IV contrast. Multiplanar reformats. Dose reduction techniques were used.    FINDINGS:  INTRACRANIAL CONTENTS: No evidence of acute intracranial hemorrhage or mass effect. Scattered foci of decreased attenuation within the cerebral hemispheric white matter which are nonspecific, though most commonly ascribed to chronic small vessel ischemic   disease. The ventricles and sulci are prominent consistent with moderate brain parenchymal volume loss. Gray-white matter differentiation is maintained. The basilar cisterns are patent.    VISUALIZED ORBITS/SINUSES/MASTOIDS: The globes are unremarkable. The partially imaged paranasal sinuses, mastoid air cells and middle ear cavities are unremarkable.     BONES/SOFT TISSUES: The visualized skull base and calvarium are unremarkable.      Impression    IMPRESSION:    1.  No evidence of acute intracranial hemorrhage or mass effect.  2.  Moderate nonspecific white matter changes.  3.  Moderate brain parenchymal volume loss.   CT Chest (PE) Abdomen Pelvis w Contrast    Narrative    EXAM: CT CHEST PE ABDOMEN PELVIS W CONTRAST  LOCATION: [facility]  DATE: 6/21/2025    INDICATION: shock, hypoxia  COMPARISON: CT chest November 23, 2023, CT chest, abdomen, and pelvis 9/1/2022  TECHNIQUE: CT chest pulmonary angiogram and routine CT abdomen pelvis with IV contrast. Arterial phase through the chest and venous phase through the abdomen and pelvis. Multiplanar reformats and MIP reconstructions were performed. Dose reduction   techniques were used.   CONTRAST: 84mL Isovue 370    FINDINGS:  ANGIOGRAM CHEST: Pulmonary arteries are normal caliber and negative for pulmonary emboli. Thoracic aorta is negative for dissection. No CT evidence of right heart strain.      LUNGS AND PLEURA: Bibasilar atelectasis. No pleural effusions.    MEDIASTINUM/AXILLAE: Large hiatal hernia. No thoracic aortic aneurysm. No lymphadenopathy.    CORONARY ARTERY CALCIFICATION: Mild.    HEPATOBILIARY: Normal.    PANCREAS: Normal.    SPLEEN: Normal.    ADRENAL GLANDS: Normal.    KIDNEYS/BLADDER: No significant mass, stone, or hydronephrosis.    BOWEL: No obstruction or inflammatory change.    LYMPH NODES: Normal.    VASCULATURE: Normal.    PELVIC ORGANS: Hysterectomy    MUSCULOSKELETAL: Right hip arthroplasty.      Impression    IMPRESSION:  1.  No pulmonary embolism.    2.  No acute findings in the chest, abdomen, or pelvis.   CT Cervical Spine w/o Contrast    Narrative    EXAM: CT CERVICAL SPINE W/O CONTRAST  LOCATION: [facility]  DATE: 6/21/2025    INDICATION: altered mental status, unknown trauma  COMPARISON: CT angiogram head and neck April 25, 2022  TECHNIQUE: Routine CT Cervical Spine without IV contrast. Multiplanar reformats. Dose reduction techniques were used.    FINDINGS:  Acute fracture posterior arch of C1 on axial image #19, series 4. Recommend CT angiogram of the head and neck for further evaluation. Postsurgical changes posterior fusion from C2-C5. No hardware complication. No other evidence of acute fracture or   subluxation of the cervical spine by CT imaging. Anterolisthesis of C4 on C5 measuring 2 mm. The vertebral bodies of the cervical spine otherwise have normal stature and alignment. Multilevel degenerative disc disease of the cervical spine with disc   height loss, most pronounced at C5/C6 and C6/C7. No extraspinal abnormality.     Craniovertebral junction and C1-C2: Normal.    C2-C3: No posterior disc bulge or spinal canal narrowing. No neural foraminal narrowing.     C3-C4: No posterior disc bulge or spinal canal narrowing. No neural foraminal narrowing.     C4-C5: No posterior disc bulge or spinal canal narrowing. No neural foraminal narrowing.      C5-C6: No posterior disc bulge or spinal canal narrowing. Uncovertebral joint disease and facet arthropathy with moderate bilateral neural foraminal narrowing.     C6-C7: No posterior disc bulge or spinal canal narrowing. No neural foraminal narrowing.     C7-T1: No posterior disc bulge or spinal canal narrowing. No neural foraminal narrowing.        Impression    IMPRESSION:  1.  Acute fracture posterior arch of C1 on axial image #19, series 4. Recommend CT angiogram of the head and neck for further evaluation.   2.  Postsurgical changes posterior fusion from C2-C5. No hardware complication.  3.  No other evidence of acute fracture or subluxation of the cervical spine by CT imaging.    Dr. Simons discussed the results with Dr. Rees on 6/21/2025 9:24 PM CDT by telephone.

## 2025-06-22 NOTE — ED TRIAGE NOTES
Triage Assessment (Adult)       Row Name 06/21/25 2009          Triage Assessment    Airway WDL WDL        Respiratory WDL    Respiratory WDL X        Skin Circulation/Temperature WDL    Skin Circulation/Temperature WDL WDL        Cardiac WDL    Cardiac WDL WDL        Peripheral/Neurovascular WDL    Peripheral Neurovascular WDL WDL        Cognitive/Neuro/Behavioral WDL    Cognitive/Neuro/Behavioral WDL X  hx dementia

## 2025-06-22 NOTE — PROGRESS NOTES
"        [facility] - Internal Medicine Daily Note   Date of Service: 6/22/2025  Patient: Margy Babin  MRN: [id number]  Admission Date: 6/21/2025  Hospital Day # 1    Assessment & Plan    Margy Babin is a 89 year old female with past medical history significant for dementia, atrial flutter on DOAC, HFrEF, hypertension who presents with apnea and hypotension at [facility]. Admitted on 6/21/2025.      Hypotension, BP remains low today , 80/58. No tachycardia , ordered 500 ml of NS fluid bolus. Patient denies feeling lightheaded or dizzy.   Hypoxia, was requiring NC supplemental oxygen  6 L, now on room air , saturating in mid 90s.   Lactic acid elevation, suspect secondary to hypotension   *Reportedly had BP into 60s at [facility] unit with associated apnea spells. Received epinephrine per EMS.   *WBC 13.3. CT chest/abdomen/pelvis PE protocol without PE, acute findings in chest/abdomen/pelvis.  UA mildly abnormal but grossly contaminated. Overall no clear signs of infection. Received piperacillin-tazobactam IV and vancomycin IV in ED while undifferentiated.   *New anemia noted as below, no clinical evidence of bleeding reported or noted at this time  - BP normal after fluids  - Continue mIVF  - Hold prior to admission furosemide and metoprolol XL      Acute C1 posterior arch fracture   *Incidentally noted on CT cervical spine. No known trauma. Denies any neck pain.   *Neurosurgery contacted from ED, recommended [place] J collar   *CTA head/neck without vascular injury   - Cervical collar in place at all times  - Neurosurgery consulted  - Scheduled acetaminophen PO, oxycodone PO, hydromorphone IV PRN; limit use of opioids given age and dementia      Advanced care planning, CODE STATUS changed to DNR/DNI  *Notes indicated daughter \"over-rode\" DNR to send patient to hospital  *Discussed with daughter, she reports she had just [place] home from [place] of [place] and was having difficulty understanding " staff at the facility and was unsure what was going on, simply requested hospital evaluation and did not rescind code status  *Goals of care discussed with daughter as below   - DNR/DNI   - No escalation of cares to ICU, pressors   - Daughter open to blood transfusion, possible surgeries/procedures if would maintain quality of life as she reports her mother has otherwise been fairly functional      Microcytic anemia with iron deficiency, chronic anemia.  Hemoglobin down to 7.2.  Will be transfused if hemoglobin is below 7 with 1 unit of PRBC.  *Hgb 7.6 g/dl from most recent 10.7 g/dl 3/2025, had been resumed on apixaban for stroke prophylaxis in afib/flutter in interim   *Iron studies consistent with iron deficiency  *Daughter unaware of any bleeding hx, none per patient but limited by dementia. Possible slow bleed in setting of DOAC  *PTA on apixaban  - Hold apixaban   Verbally consented for blood with daughter via phone and confirmed with daughter by ED RN, form reportedly signed by RN but lost in ED. Attempting to locate, otherwise will need to be re-signed by daughter when she is available if blood products needed  - Type and screen   - Pantoprazole IV BID   - Holding GI consult pending evidence of blood loss, hemoglobin trend, neurosurgery clearance for EGD with cervical fracture, further discussion with family. Note GI consulted 10/2023 for FLORI, patient/daughter declined invasive procedures at that time.        HFmrEF  Hypertension  H/o orthostatic hypotension   *Recently started on furosemide with concern for pulmonary edema on exam at [facility]. Unclear if may be contributing to hypotension. Hx of orthostatic hypotension noted in chart.  *TTE 11/2023 with EF 50-55%, 45-50% in the past  - Hold prior to admission furosemide and metoprolol XL   - Monitor volume status after IVF given in ED      Paroxysmal atrial fibrillation/flutter  LBBB  EKG on admission with sinus rhythm and old LBBB.  - Hold metoprolol XL  and apixaban as above     Hx of CVA  - Hold apixaban as above   - Continue prior to admission atorvastatin      Dementia  Anxiety  Lives in memory care unit.   - Re-orient as needed  - Maintain normal day/night, sleep wake cycles  - Minimize sedating/altering medications as able  - Treat separate conditions as detailed above/below  - Continue prior to admission memantine, quetiapine, paroxetine     Pulmonary emphysema   No sign of acute exacerbation.  - Continue prior to admission nebulizer regimen         Diet: Clear liquid diet pending hemoglobin trend   DVT Prophylaxis: Pneumatic Compression Devices  Pichardo Catheter: Not present  Code Status: DNR/DNI - Per POLST and confirmed with daughter     Tomás Parham MD  Internal Medicine Staff Hospitalist   [facility]   Pager: [phone]  Page Cross Cover after 5 pm: pager [phone]   ___________________________________________________________________    Subjective & Interval Hx:      Is resting comfortably in bed.  Waiting on a call.  Denies any dizziness lightheadedness, shortness of breath or pain.  She is getting 500 mL normal saline bolus when her blood pressure this morning is noted to be normal.  She has been weaned off supplemental oxygen.  Yesterday she was on 6 L of oxygen weaned down to 3 and this morning she was completely weaned off oxygen.    She has a low hemoglobin of 7.2.  She is DNR/DNI.  She is fully awake alert, conversant, oriented x 3      Last 24 hr care team notes reviewed.   ROS:  4 point ROS including Respiratory, CV, GI and , other than that noted in the HPI, is negative.    Medications: Reviewed in EPIC. List below for reference    Physical Exam:    BP (!) 80/58 (BP Location: Right arm)   Pulse 65   Temp 97.4  F (36.3  C) (Axillary)   Resp 16   Wt 76.3 kg (168 lb 3.4 oz)   SpO2 96%   BMI 27.99 kg/m       GENERAL: Alert and oriented x 3. NAD.   HEENT: Wearing a neck collar anicteric sclera. Mucous membranes moist.    CV: RRR. S1, S2. No murmurs appreciated.   RESPIRATORY: Effort normal on room air. Lungs CTAB with no wheezing, rales, rhonchi.   GI: Abdomen soft and non distended with normoactive bowel sounds present in all quadrants. No tenderness, rebound, guarding.   NEUROLOGICAL: No focal deficits. Moves all extremities.    EXTREMITIES: Trace peripheral edema. Intact bilateral pedal pulses.   SKIN: No jaundice. No rashes.     Labs & Studies of Note: I personally reviewed the following studies:  CBC RESULTS:   Recent Labs   Lab Test 06/22/25  0418 06/21/25 2021   WBC  --  13.3*   RBC  --  3.85   HGB 7.2* 7.6*   HCT  --  26.4*   MCV 71* 69*   MCH  --  19.7*   MCHC  --  28.8*   RDW  --  18.0*   PLT  --  451*   Last Comprehensive Metabolic Panel:  Lab Results   Component Value Date     06/21/2025    POTASSIUM 4.3 06/21/2025    CHLORIDE 102 06/21/2025    CO2 27 06/21/2025    ANIONGAP 11 06/21/2025     (H) 06/21/2025    BUN 26.1 (H) 06/21/2025    CR 1.16 (H) 06/21/2025    CR 1.3 (H) 06/21/2025    GFRESTIMATED 43 (L) 06/21/2025    GFRESTIMATED 38 (L) 06/21/2025    SOBIA 8.5 (L) 06/21/2025

## 2025-06-22 NOTE — ED PROVIDER NOTES
"  Emergency Department Note      History of Present Illness     Chief Complaint   Shortness of Breath (Patient presents via EMS with reports of a period of apnea at nursing home. Staff placed her on oxygen and began to respond. Patient had low b/p and was given Epi by EMS and now back to a normal baseline VS. Patient has a hx a dementia. Patient has a DNR order but staff called daughter and she \"over-rode\" DNR. )      ALEJANDRO Babin is a 89 year old female presents with shortness of breath. EMS states that the patient experienced a period of apnea at the nursing home. Staff placed her on oxygen and she began to respond. Notes the patient had low blood pressure in the 60's and was given 100 mg of epinephrine by EMS and is now back to normal baseline vital signs. Patient has a history of dementia. Patient has a DNR order but staff called daughter and she \"over-rode\" DNR. Patient denies experiencing any chest pain, recent illness, neck or back pain, recent falls, or rashes.    Independent Historian   EMS as detailed above.    Review of External Notes   Hospital admission on 4/28/2024 for an overview of the patient's previous illnesses and hospitalization    Past Medical History     Medical History and Problem List   Past Medical History:   Diagnosis Date    Anxiety     Blepharitis of both eyes     Cerebrovascular accident (CVA) due to embolism (H) 04/2022    Chronic atrial fibrillation (H)     Depression, major, recurrent, in complete remission     Dry eye syndrome     Dyspnea on exertion 10/07/2021    Essential hypertension 03/05/2020    Familial tremor 03/06/2013    Insomnia, unspecified type 11/03/2014    Mixed hyperlipidemia 11/04/2020    Nausea without vomiting 06/16/2015    Tremor, hereditary, benign        Medications   acetaminophen (TYLENOL) 500 MG tablet  apixaban ANTICOAGULANT (ELIQUIS) 5 MG tablet  atorvastatin (LIPITOR) 20 MG tablet  calcium carbonate (TUMS) 500 MG chewable tablet  docusate " sodium (COLACE) 100 MG capsule  furosemide (LASIX) 20 MG tablet  guaiFENesin (ROBITUSSIN) 20 mg/mL liquid  ipratropium - albuterol 0.5 mg/2.5 mg/3 mL (DUONEB) 0.5-2.5 (3) MG/3ML neb solution  memantine (NAMENDA) 5 MG tablet  metoprolol succinate ER (TOPROL XL) 100 MG 24 hr tablet  PARoxetine (PAXIL) 20 MG tablet  QUEtiapine (SEROQUEL) 25 MG tablet  Vitamin D3 (VITAMIN D, CHOLECALCIFEROL,) 25 mcg (1000 units) tablet        Surgical History   Past Surgical History:   Procedure Laterality Date    HYSTERECTOMY TOTAL ABDOMINAL, BILATERAL SALPINGO-OOPHORECTOMY, COMBINED      NECK SURGERY  2009    cervical fusion    ROTATOR CUFF REPAIR RT/LT Right     ZZC TOTAL HIP ARTHROPLASTY Right 1997       Physical Exam     Patient Vitals for the past 24 hrs:   BP Temp Temp src Pulse Resp SpO2 Weight   06/21/25 2145 126/57 -- -- 70 15 97 % --   06/21/25 2130 121/55 -- -- 73 24 94 % --   06/21/25 2115 114/85 -- -- 81 23 (!) 89 % --   06/21/25 2100 133/56 -- -- 76 15 (!) 91 % --   06/21/25 2045 122/45 -- -- 77 18 (!) 91 % --   06/21/25 2019 102/44 97  F (36.1  C) Temporal 67 13 94 % --   06/21/25 2012 -- -- -- -- -- 94 % --   06/21/25 2008 (!) 95/38 -- -- 72 12 -- 76.3 kg (168 lb 3.4 oz)     Physical Exam  Constitutional: Well developed, nontox appearance  Head: Atraumatic.   Mouth/Throat: Oropharynx is clear and somewhat tacky   Neck:  no stridor, no c spine tenderness  Eyes: no scleral icterus, PERRL   Cardiovascular: RRR, 2+ bilat radial pulses  Pulmonary/Chest: nml resp effort, Clear BS bilat  Abdominal: ND, soft, NT, no rebound or guarding   Ext: Warm, well perfused  Neurological: GCS 13, A&Ox3, symmetric facies, moves ext x4  Skin: Skin is warm and dry.   Psychiatric: Behavior is normal. Thought content normal.   Nursing note and vitals reviewed.      Diagnostics     Lab Results   Labs Ordered and Resulted from Time of ED Arrival to Time of ED Departure   GLUCOSE BY METER - Abnormal       Result Value    GLUCOSE BY METER POCT 187  (*)    COMPREHENSIVE METABOLIC PANEL - Abnormal    Sodium 140      Potassium 4.3      Carbon Dioxide (CO2) 27      Anion Gap 11      Urea Nitrogen 26.1 (*)     Creatinine 1.16 (*)     GFR Estimate 43 (*)     Calcium 8.5 (*)     Chloride 102      Glucose 181 (*)     Alkaline Phosphatase 99      AST 12      ALT 8      Protein Total 6.4      Albumin 3.4 (*)     Bilirubin Total 0.2     ROUTINE UA WITH MICROSCOPIC REFLEX TO CULTURE - Abnormal    Color Urine Light Yellow      Appearance Urine Clear      Glucose Urine Negative      Bilirubin Urine Negative      Ketones Urine Negative      Specific Gravity Urine 1.005      Blood Urine Negative      pH Urine 7.5 (*)     Protein Albumin Urine Negative      Urobilinogen Urine Normal      Nitrite Urine Negative      Leukocyte Esterase Urine Negative      Budding Yeast Urine Few (*)     Mucus Urine Present (*)     RBC Urine 2      WBC Urine 20 (*)     Squamous Epithelials Urine 13 (*)     Hyaline Casts Urine 1     CBC WITH PLATELETS AND DIFFERENTIAL - Abnormal    WBC Count 13.3 (*)     RBC Count 3.85      Hemoglobin 7.6 (*)     Hematocrit 26.4 (*)     MCV 69 (*)     MCH 19.7 (*)     MCHC 28.8 (*)     RDW 18.0 (*)     Platelet Count 451 (*)     % Neutrophils 59      % Lymphocytes 22      % Monocytes 11      % Eosinophils 8      % Basophils 1      % Immature Granulocytes 0      NRBCs per 100 WBC 0      Absolute Neutrophils 7.8      Absolute Lymphocytes 2.9      Absolute Monocytes 1.4 (*)     Absolute Eosinophils 1.1 (*)     Absolute Basophils 0.1      Absolute Immature Granulocytes 0.0      Absolute NRBCs 0.0     ISTAT CREATININE POCT - Abnormal    Creatinine POCT 1.3 (*)     GFR, ESTIMATED POCT 38 (*)    ISTAT GASES LACTATE VENOUS POCT - Abnormal    Lactic Acid POCT 2.7 (*)     Bicarbonate Venous POCT 28      O2 Sat, Venous POCT 61 (*)     pCO2 Venous POCT 49      pH Venous POCT 7.36      pO2 Venous POCT 34      Base Excess/Deficit (+/-) POCT 2.0     IRON AND IRON BINDING  CAPACITY - Abnormal    Iron 11 (*)     Iron Binding Capacity 366      Iron Sat Index 3 (*)    LACTIC ACID WHOLE BLOOD - Normal    Lactic Acid 1.6     RETICULOCYTE COUNT - Normal    % Reticulocyte 1.5      Absolute Reticulocyte 0.057     FERRITIN   LACTATE DEHYDROGENASE   HAPTOGLOBIN   BLOOD CULTURE   BLOOD CULTURE   URINE CULTURE       Imaging   CT Cervical Spine w/o Contrast   Final Result   IMPRESSION:   1.  Acute fracture posterior arch of C1 on axial image #19, series 4. Recommend CT angiogram of the head and neck for further evaluation.    2.  Postsurgical changes posterior fusion from C2-C5. No hardware complication.   3.  No other evidence of acute fracture or subluxation of the cervical spine by CT imaging.      Dr. Simons discussed the results with Dr. Rees on 6/21/2025 9:24 PM CDT by telephone.      CT Chest (PE) Abdomen Pelvis w Contrast   Final Result   IMPRESSION:   1.  No pulmonary embolism.      2.  No acute findings in the chest, abdomen, or pelvis.      Head CT w/o contrast   Final Result   IMPRESSION:     1.  No evidence of acute intracranial hemorrhage or mass effect.   2.  Moderate nonspecific white matter changes.   3.  Moderate brain parenchymal volume loss.      CTA Head Neck with Contrast    (Results Pending)       EKG   ECG taken at 2014, ECG read at 2015  Sinus rhythm with occasional premature ventricular complexes   Left axis deviation   Left bundle branch block  Abnormal ECG   No significant differences as compared to prior, dated 4/28/24.  Rate 68 bpm. SC interval 160 ms. QRS duration 148 ms. QT/QTc 478/508 ms. P-R-T axes 96 -49 131.    Independent Interpretation   EKG as noted above  CT head negative for intracranial hemorrhage    ED Course      Medications Administered   Medications   sodium chloride (PF) 0.9% PF flush 3 mL (has no administration in time range)   sodium chloride (PF) 0.9% PF flush 3 mL ( Intracatheter Canceled Entry 6/21/25 2222)   vancomycin (VANCOCIN) 2,000 mg in  0.9% NaCl 500 mL intermittent infusion (2,000 mg Intravenous $New Bag 6/21/25 2144)   lactated ringers BOLUS 2,289 mL (2,289 mLs Intravenous $New Bag 6/21/25 2103)   piperacillin-tazobactam (ZOSYN) 4.5 g vial to attach to  mL bag (0 g Intravenous Stopped 6/21/25 2230)   iopamidol (ISOVUE-370) solution 84 mL (84 mLs Intravenous $Given 6/21/25 2026)   sodium chloride 0.9 % bag for CT scan flush (90 mLs Intravenous $Given 6/21/25 2026)   naloxone (NARCAN) injection 0.4 mg (0.4 mg Intravenous $Given 6/21/25 2030)   pantoprazole (PROTONIX) injection 80 mg (80 mg Intravenous $Given 6/21/25 2317)       Procedures   Procedures     Discussion of Management   Radiologist, I spoke with [place] radiology.  Admitting hospitalist, Dr. Madrid    ED Course   ED Course as of 06/21/25 2324   Sat Jun 21, 2025 2106 I obtained the history and evaluated the patient.        Additional Documentation  Social Determinants of Health: age increasing risk of presenting to the emergency department.    Medical Decision Making / Diagnosis     CMS Diagnoses: IV Antibiotics given and/or elevated Lactate of 2.7 and no sepsis note found - Delete this reminder and enter the sepsis note or '.edcms' before signing chart.>>>Lactic acid elevated due to transient hypotension. At this time there are no signs of sepsis or septic shock    MIPS   CT for PE was ordered because the patient is high risk for pulmonary embolism.             SMILEY Valenzuelasully Babin is a 89 year old female presenting with altered mental status and hypotension    Differential diagnosis includes sepsis, severe sepsis, septic shock, shock NOS, electrolyte abnormality, volume depletion, acute kidney injury, arrhythmia COVID trauma surgeries fracture intracranial hemorrhage, PE.  EKG as noted above.  Patient's blood pressure improved and route to the hospital although remains low.  IV fluids given with improvement in blood pressure.  Less likely severe sepsis although  cannot rule this out at this time.  Lactic acid level may be secondary to hypotension.  Patient is given broad-spectrum antibiotics with blood cultures pending.  No evidence of urinary tract infection, acute traumatic abnormality or PE and chest, abdomen or pelvis.  CT head negative for intracranial hemorrhage but did reveal a C1 fracture as described above.  [place] J collar placed in the emergency department I consulted with neurosurgery who will plan to see the patient in the next 24 hours after she is admitted.  I discussed plan and clarified the situation prior to arrival in the hospital.  Patient's daughter reports that she is having difficulty understanding the situation with her mother at the nursing home and advised him to take her to the hospital.  Patient's daughter remains a proponent of having the patient be DNR DNI although would be amenable to treatment such as antibiotics or blood transfusions unless this prolongs a significantly low quality of life or suffering.  She was counseled on the results, diagnosis and disposition.  The patient was subsequently admitted to the hospitalist service in guarded condition.      Disposition   The patient was admitted to the hospital.     Diagnosis     ICD-10-CM    1. CRISSY (acute kidney injury)  N17.9       2. Closed nondisplaced fracture of posterior arch of first cervical vertebra, initial encounter (H)  S12.031A       3. Acute on chronic anemia  D64.9            Discharge Medications   New Prescriptions    No medications on file         Scribe Disclosure:  I, Ester Garay, am serving as a scribe at 8:25 PM on 6/21/2025 to document services personally performed by Andre Rees MD based on my observations and the provider's statements to me.        Andre Rees MD  06/21/25 0434

## 2025-06-22 NOTE — CONSULTS
"Neurosurgery Consultation    Date of Service:  06/22/2025  Date of Admission:  6/21/2025  Hospital Day: 2    Assessment/Plan  Margy Babin is a 89 year old female with a past medical history of dementia, atrial flutter on DOAC, HFrEF, hypertension admitted on 6/21/2025 for hypotension at [facility].  Incidentally found to have C1 fracture resulting in neurosurgical consult.      Neuro  # C1 posterior arch fracture  - Will place orthotics consult for better fitment of cervical collar  - Upright cervical x-ray  -Brace on at all times.  Okay for switching to Joya collar for bathing and periodic skin checks  - We will arrange outpatient follow-up with repeat imaging  - No surgical intervention  - Neurosurgery to sign off    Clinically Significant Risk Factors Present on Admission           # Hypocalcemia: Lowest Ca = 8.5 mg/dL in last 2 days, will monitor and replace as appropriate     # Hypoalbuminemia: Lowest albumin = 3.4 g/dL at 6/21/2025  8:21 PM, will monitor as appropriate  # Drug Induced Coagulation Defect: home medication list includes an anticoagulant medication    # Hypertension: Noted on problem list     # Dementia: noted on problem list  # Anemia: based on hgb <11       # Overweight: Estimated body mass index is 27.99 kg/m  as calculated from the following:    Height as of 6/2/25: 1.651 m (5' 5\").    Weight as of this encounter: 76.3 kg (168 lb 3.4 oz).       # Financial/Environmental Concerns:        # CKD, Stage 3b (GFR 30-44): Will monitor and treat as appropriate  - last Cr =  1.16 mg/dL (Ref range: 0.51 - 0.95 mg/dL); 1.3 mg/dL (Ref range: 0.5 - 1.0 mg/dL)  - last GFR = 43 mL/min/1.73m2 (Ref range: >60 mL/min/1.73m2); 38 mL/min/1.73m2 (Ref range: >60 mL/min/1.73m2)         TIME SPENT ON THIS ENCOUNTER   I spent 55 minutes of time on the unit/floor managing the care of Margy Babin excluding time performing procedures. I have personally reviewed the following " data/imaging over the past 24 hours.     The patient was discussed with Dr. Watson.    Kuldip Lynn, RACHEL  [facility]  Vocera messaging strongly preferred  Please always look up on-call provider before messaging/paging    Tel [phone]    History of Present Illness:  Margy Babin is a 89 year old female with a past medical history of dementia, atrial flutter on DOAC, HFrEF, hypertension admitted on 6/21/2025 for hypotension at [facility].  Incidentally found to have C1 fracture resulting in neurosurgical consult.    No recent trauma.  Patient denies any neck pain or upper or lower extremity radiculopathy or paresthesias.  CTA completed showing no signs of vascular trauma or damage.    No Known Allergies    Current Medications:  Current Facility-Administered Medications   Medication Dose Route Frequency Provider Last Rate Last Admin    acetaminophen (TYLENOL) tablet 975 mg  975 mg Oral TID Herminio Madrid MD   975 mg at 06/22/25 0938    atorvastatin (LIPITOR) tablet 20 mg  20 mg Oral QPM Herminio Madrid MD        [Held by provider] furosemide (LASIX) tablet 20 mg  20 mg Oral Daily Herminio Madrid MD        ipratropium - albuterol 0.5 mg/2.5 mg (3mg)/3 mL (DUONEB) neb solution 3 mL  3 mL Nebulization 4x Daily Herminio Madrid MD   3 mL at 06/22/25 1130    memantine (NAMENDA) tablet 5 mg  5 mg Oral BID Herminio Madrid MD   5 mg at 06/22/25 0938    [Held by provider] metoprolol succinate ER (TOPROL XL) 24 hr tablet 100 mg  100 mg Oral Daily Herminio Madrid MD        pantoprazole (PROTONIX) injection 40 mg  40 mg Intravenous BID Herminio Madrid MD   40 mg at 06/22/25 0940    PARoxetine (PAXIL) tablet 20 mg  20 mg Oral QPM Herminio Madrid MD        QUEtiapine (SEROquel) half-tab 25 mg  25 mg Oral QPM Herminio Madrid MD        sodium chloride (PF) 0.9% PF flush 3 mL  3 mL Intracatheter Q8H Herminio Madrid MD         vancomycin (VANCOCIN) 750 mg in 0.9% NaCl 250 mL intermittent infusion  750 mg Intravenous Q24H Herminio Madrid MD           PRN Medications:  Current Facility-Administered Medications   Medication Dose Route Frequency Provider Last Rate Last Admin    benzocaine-menthol (CHLORASEPTIC) 6-10 MG lozenge 1 lozenge  1 lozenge Buccal Q1H PRHerminio Thomas MD        bisacodyl (DULCOLAX) suppository 10 mg  10 mg Rectal Daily PRN Herminio Madrid MD        fluticasone (FLONASE) 50 MCG/ACT spray 1 spray  1 spray Both Nostrils Daily PRN Herminio Madrid MD        lidocaine (LMX4) cream   Topical Q1H PRN Herminio Madrid MD        lidocaine 1 % 0.1-1 mL  0.1-1 mL Other Q1H PRN Herminio Madrid MD        melatonin tablet 1 mg  1 mg Oral At Bedtime PRN Herminio Madrid MD        naloxone (NARCAN) injection 0.2 mg  0.2 mg Intravenous Q2 Min PRN Herminio Madrid MD        Or    naloxone (NARCAN) injection 0.4 mg  0.4 mg Intravenous Q2 Min PRHerminio Thomas MD        Or    naloxone (NARCAN) injection 0.2 mg  0.2 mg Intramuscular Q2 Min PRHerminio Thomas MD        Or    naloxone (NARCAN) injection 0.4 mg  0.4 mg Intramuscular Q2 Min PRHerminio Thomas MD        ondansetron (ZOFRAN ODT) ODT tab 4 mg  4 mg Oral Q6H PRN Herminio Madrid MD        Or    ondansetron (ZOFRAN) injection 4 mg  4 mg Intravenous Q6H PRHerminio Thomas MD        oxyCODONE (ROXICODONE) tablet 5 mg  5 mg Oral Q4H PRHerminio Thomas MD        oxyCODONE IR (ROXICODONE) half-tab 2.5 mg  2.5 mg Oral Q4H PRN Herminio Madrid MD        polyethylene glycol (MIRALAX) Packet 17 g  17 g Oral BID PRN Herminio Madrid MD        prochlorperazine (COMPAZINE) injection 5 mg  5 mg Intravenous Q6H PRHerminio Thomas MD        Or    prochlorperazine (COMPAZINE) tablet 5 mg  5 mg Oral Q6H PRN Herminio Madrid MD        senna-docusate (SENOKOT-S/PERICOLACE)  8.6-50 MG per tablet 1 tablet  1 tablet Oral BID PRN Herminio Madrid MD        Or    senna-docusate (SENOKOT-S/PERICOLACE) 8.6-50 MG per tablet 2 tablet  2 tablet Oral BID PRN Herminio Madrid MD        sodium chloride (PF) 0.9% PF flush 3 mL  3 mL Intracatheter q1 min prHerminio Moran MD           Infusions:  Current Facility-Administered Medications   Medication Dose Route Frequency Provider Last Rate Last Admin    lactated ringers infusion   Intravenous Continuous Herminio Madrid MD 75 mL/hr at 06/22/25 0942 New Bag at 06/22/25 0942       Physical Examination:  Vitals: BP 97/66 (BP Location: Right arm)   Pulse 65   Temp 97.4  F (36.3  C) (Axillary)   Resp 16   Wt 76.3 kg (168 lb 3.4 oz)   SpO2 97%   BMI 27.99 kg/m    Patient is alert, oriented to self and situation disoriented to time.  She has full range of motion of her upper and lower extremities.  Denies any recent trauma or neck pain.  Denies any radicular symptoms or paresthesias.  Has 5 out of 5 strength in bilateral upper and lower extremities.  Symmetric.  Labs and Imaging:    Results for orders placed or performed during the hospital encounter of 06/21/25 (from the past 24 hours)   Glucose by meter   Result Value Ref Range    GLUCOSE BY METER POCT 187 (H) 70 - 99 mg/dL   iStat Gases (lactate) venous, POCT   Result Value Ref Range    Lactic Acid POCT 2.7 (H) 0.7 - 2.0 mmol/L    Bicarbonate Venous POCT 28 21 - 28 mmol/L    O2 Sat, Venous POCT 61 (L) 70 - 75 %    pCO2 Venous POCT 49 40 - 50 mm Hg    pH Venous POCT 7.36 7.32 - 7.43    pO2 Venous POCT 34 25 - 47 mm Hg    Base Excess/Deficit (+/-) POCT 2.0 -3.0 - 3.0 mmol/L   CBC with platelets differential    Narrative    The following orders were created for panel order CBC with platelets differential.  Procedure                               Abnormality         Status                     ---------                               -----------         ------                      CBC with platelets and ...[[id number]]  Abnormal            Final result                 Please view results for these tests on the individual orders.   Comprehensive metabolic panel   Result Value Ref Range    Sodium 140 135 - 145 mmol/L    Potassium 4.3 3.4 - 5.3 mmol/L    Carbon Dioxide (CO2) 27 22 - 29 mmol/L    Anion Gap 11 7 - 15 mmol/L    Urea Nitrogen 26.1 (H) 8.0 - 23.0 mg/dL    Creatinine 1.16 (H) 0.51 - 0.95 mg/dL    GFR Estimate 43 (L) >60 mL/min/1.73m2    Calcium 8.5 (L) 8.8 - 10.4 mg/dL    Chloride 102 98 - 107 mmol/L    Glucose 181 (H) 70 - 99 mg/dL    Alkaline Phosphatase 99 40 - 150 U/L    AST 12 0 - 45 U/L    ALT 8 0 - 50 U/L    Protein Total 6.4 6.4 - 8.3 g/dL    Albumin 3.4 (L) 3.5 - 5.2 g/dL    Bilirubin Total 0.2 <=1.2 mg/dL   CBC with platelets and differential   Result Value Ref Range    WBC Count 13.3 (H) 4.0 - 11.0 10e3/uL    RBC Count 3.85 3.80 - 5.20 10e6/uL    Hemoglobin 7.6 (L) 11.7 - 15.7 g/dL    Hematocrit 26.4 (L) 35.0 - 47.0 %    MCV 69 (L) 78 - 100 fL    MCH 19.7 (L) 26.5 - 33.0 pg    MCHC 28.8 (L) 31.5 - 36.5 g/dL    RDW 18.0 (H) 10.0 - 15.0 %    Platelet Count 451 (H) 150 - 450 10e3/uL    % Neutrophils 59 %    % Lymphocytes 22 %    % Monocytes 11 %    % Eosinophils 8 %    % Basophils 1 %    % Immature Granulocytes 0 %    NRBCs per 100 WBC 0 <1 /100    Absolute Neutrophils 7.8 1.6 - 8.3 10e3/uL    Absolute Lymphocytes 2.9 0.8 - 5.3 10e3/uL    Absolute Monocytes 1.4 (H) 0.0 - 1.3 10e3/uL    Absolute Eosinophils 1.1 (H) 0.0 - 0.7 10e3/uL    Absolute Basophils 0.1 0.0 - 0.2 10e3/uL    Absolute Immature Granulocytes 0.0 <=0.4 10e3/uL    Absolute NRBCs 0.0 10e3/uL   Creatinine POCT   Result Value Ref Range    Creatinine POCT 1.3 (H) 0.5 - 1.0 mg/dL    GFR, ESTIMATED POCT 38 (L) >60 mL/min/1.73m2    Narrative    Reference intervals for this test were updated on 03/10/2025 to standardize reference intervals for creatinine measured by different instruments. There may be differences in  the flagging of prior results with similar values performed with this method. Those prior results can be interpreted in the context of the updated reference intervals.   Iron and iron binding capacity   Result Value Ref Range    Iron 11 (L) 37 - 145 ug/dL    Iron Binding Capacity 366 240 - 430 ug/dL    Iron Sat Index 3 (L) 15 - 46 %   Ferritin   Result Value Ref Range    Ferritin 10 (L) 11 - 328 ng/mL   Reticulocyte count   Result Value Ref Range    % Reticulocyte 1.5 0.5 - 2.0 %    Absolute Reticulocyte 0.057 0.025 - 0.095 10e6/uL   Haptoglobin   Result Value Ref Range    Haptoglobin 141 30 - 200 mg/dL   Blood Culture Peripheral blood (BC) Arm, Right    Specimen: Arm, Right; Peripheral blood (BC)   Result Value Ref Range    Culture No growth after 12 hours    Blood Culture Peripheral blood (BC) Arm, Right    Specimen: Arm, Right; Peripheral blood (BC)   Result Value Ref Range    Culture No growth after 12 hours    [place] Draw    Narrative    The following orders were created for panel order [place] Draw.  Procedure                               Abnormality         Status                     ---------                               -----------         ------                     Extra Blue Top Tube[[id number]]                             Final result               Extra Red Top Tube[[id number]]                              Final result                 Please view results for these tests on the individual orders.   Extra Blue Top Tube   Result Value Ref Range    Hold Specimen JIC    Extra Red Top Tube   Result Value Ref Range    Hold Specimen JIC    Head CT w/o contrast    Narrative    EXAM: CT HEAD W/O CONTRAST  LOCATION: [facility]  DATE: 6/21/2025    INDICATION: altered mental status  COMPARISON: None.  TECHNIQUE: Routine CT Head without IV contrast. Multiplanar reformats. Dose reduction techniques were used.    FINDINGS:  INTRACRANIAL CONTENTS: No evidence of acute intracranial hemorrhage  or mass effect. Scattered foci of decreased attenuation within the cerebral hemispheric white matter which are nonspecific, though most commonly ascribed to chronic small vessel ischemic   disease. The ventricles and sulci are prominent consistent with moderate brain parenchymal volume loss. Gray-white matter differentiation is maintained. The basilar cisterns are patent.    VISUALIZED ORBITS/SINUSES/MASTOIDS: The globes are unremarkable. The partially imaged paranasal sinuses, mastoid air cells and middle ear cavities are unremarkable.     BONES/SOFT TISSUES: The visualized skull base and calvarium are unremarkable.      Impression    IMPRESSION:    1.  No evidence of acute intracranial hemorrhage or mass effect.  2.  Moderate nonspecific white matter changes.  3.  Moderate brain parenchymal volume loss.   CT Chest (PE) Abdomen Pelvis w Contrast    Narrative    EXAM: CT CHEST PE ABDOMEN PELVIS W CONTRAST  LOCATION: [facility]  DATE: 6/21/2025    INDICATION: shock, hypoxia  COMPARISON: CT chest November 23, 2023, CT chest, abdomen, and pelvis 9/1/2022  TECHNIQUE: CT chest pulmonary angiogram and routine CT abdomen pelvis with IV contrast. Arterial phase through the chest and venous phase through the abdomen and pelvis. Multiplanar reformats and MIP reconstructions were performed. Dose reduction   techniques were used.   CONTRAST: 84mL Isovue 370    FINDINGS:  ANGIOGRAM CHEST: Pulmonary arteries are normal caliber and negative for pulmonary emboli. Thoracic aorta is negative for dissection. No CT evidence of right heart strain.     LUNGS AND PLEURA: Bibasilar atelectasis. No pleural effusions.    MEDIASTINUM/AXILLAE: Large hiatal hernia. No thoracic aortic aneurysm. No lymphadenopathy.    CORONARY ARTERY CALCIFICATION: Mild.    HEPATOBILIARY: Normal.    PANCREAS: Normal.    SPLEEN: Normal.    ADRENAL GLANDS: Normal.    KIDNEYS/BLADDER: No significant mass, stone, or hydronephrosis.    BOWEL:  No obstruction or inflammatory change.    LYMPH NODES: Normal.    VASCULATURE: Normal.    PELVIC ORGANS: Hysterectomy    MUSCULOSKELETAL: Right hip arthroplasty.      Impression    IMPRESSION:  1.  No pulmonary embolism.    2.  No acute findings in the chest, abdomen, or pelvis.   CT Cervical Spine w/o Contrast    Narrative    EXAM: CT CERVICAL SPINE W/O CONTRAST  LOCATION: [facility]  DATE: 6/21/2025    INDICATION: altered mental status, unknown trauma  COMPARISON: CT angiogram head and neck April 25, 2022  TECHNIQUE: Routine CT Cervical Spine without IV contrast. Multiplanar reformats. Dose reduction techniques were used.    FINDINGS:  Acute fracture posterior arch of C1 on axial image #19, series 4. Recommend CT angiogram of the head and neck for further evaluation. Postsurgical changes posterior fusion from C2-C5. No hardware complication. No other evidence of acute fracture or   subluxation of the cervical spine by CT imaging. Anterolisthesis of C4 on C5 measuring 2 mm. The vertebral bodies of the cervical spine otherwise have normal stature and alignment. Multilevel degenerative disc disease of the cervical spine with disc   height loss, most pronounced at C5/C6 and C6/C7. No extraspinal abnormality.     Craniovertebral junction and C1-C2: Normal.    C2-C3: No posterior disc bulge or spinal canal narrowing. No neural foraminal narrowing.     C3-C4: No posterior disc bulge or spinal canal narrowing. No neural foraminal narrowing.     C4-C5: No posterior disc bulge or spinal canal narrowing. No neural foraminal narrowing.     C5-C6: No posterior disc bulge or spinal canal narrowing. Uncovertebral joint disease and facet arthropathy with moderate bilateral neural foraminal narrowing.     C6-C7: No posterior disc bulge or spinal canal narrowing. No neural foraminal narrowing.     C7-T1: No posterior disc bulge or spinal canal narrowing. No neural foraminal narrowing.        Impression     IMPRESSION:  1.  Acute fracture posterior arch of C1 on axial image #19, series 4. Recommend CT angiogram of the head and neck for further evaluation.   2.  Postsurgical changes posterior fusion from C2-C5. No hardware complication.  3.  No other evidence of acute fracture or subluxation of the cervical spine by CT imaging.    Dr. Simons discussed the results with Dr. Rees on 6/21/2025 9:24 PM CDT by telephone.   UA with Microscopic reflex to Culture    Specimen: Urine, Catheter   Result Value Ref Range    Color Urine Light Yellow Colorless, Straw, Light Yellow, Yellow    Appearance Urine Clear Clear    Glucose Urine Negative Negative mg/dL    Bilirubin Urine Negative Negative    Ketones Urine Negative Negative mg/dL    Specific Gravity Urine 1.005 1.003 - 1.035    Blood Urine Negative Negative    pH Urine 7.5 (H) 5.0 - 7.0    Protein Albumin Urine Negative Negative mg/dL    Urobilinogen Urine Normal Normal mg/dL    Nitrite Urine Negative Negative    Leukocyte Esterase Urine Negative Negative    Budding Yeast Urine Few (A) None Seen /HPF    Mucus Urine Present (A) None Seen /LPF    RBC Urine 2 <=2 /HPF    WBC Urine 20 (H) <=5 /HPF    Squamous Epithelials Urine 13 (H) <=1 /HPF    Hyaline Casts Urine 1 <=2 /LPF    Narrative    Urine Culture ordered based on laboratory criteria   Lactic acid whole blood   Result Value Ref Range    Lactic Acid 1.6 0.7 - 2.0 mmol/L   Lactate Dehydrogenase   Result Value Ref Range    Lactate Dehydrogenase 170 0 - 250 U/L   CTA Head Neck with Contrast    Narrative    EXAM: CTA HEAD NECK W CONTRAST  LOCATION: [facility]  DATE: 6/22/2025    INDICATION: C1 fracture, eval vascular injury.  COMPARISON: CTA head and neck dated 4/25/2022.  CONTRAST: 67 mL Isovue 370.  TECHNIQUE: Head and neck CT angiogram with IV contrast. Axial helical CT images of the head and neck vessels obtained during the arterial phase of intravenous contrast administration. Axial 2D  reconstructed images and multiplanar 3D MIP reconstructed   images of the head and neck vessels were performed by the technologist. Dose reduction techniques were used. All stenosis measurements made according to NASCET criteria unless otherwise specified.    FINDINGS:     HEAD CTA:  ANTERIOR CIRCULATION: Calcific plaque involving the bilateral cavernous and supraclinoid ICAs cause up to mild narrowing.    There is multifocal moderate to severe narrowing involving the left A2 and A3 anterior cerebral arteries. There is moderate short segment narrowing involving a distal left M2 posterior division middle cerebral artery.    No proximal large vessel occlusion. No aneurysm. No high flow vascular malformation. Standard Stebb[place] of [place] anatomy.    POSTERIOR CIRCULATION: There is mild narrowing of the distal basilar artery. Junctional dilatation of the distal basilar artery. There is moderate to severe narrowing of the right P1/P2 posterior cerebral artery. There has been progressive occlusion of   the right P3 posterior cerebral artery with faint reconstitution of flow of the right distal P4 posterior cerebral artery. There is mild to moderate narrowing of the left P2 posterior cerebral artery. There is severe short segment stenosis of the left P3   posterior cerebral artery, progressed since prior exam.    No aneurysm. No high flow vascular malformation. No proximal large vessel occlusion. Balanced vertebral arteries supply a normal basilar artery.     DURAL VENOUS SINUSES: Expected enhancement of the major dural venous sinuses.    NECK CTA: Streak artifact from patient's cervical instrumentation hardware and dental amalgam hardware degrade evaluation. Within these confines:    RIGHT CAROTID: Mild scattered plaque. No significant stenosis or dissection.     LEFT CAROTID: Mild scattered plaque. No significant stenosis or dissection.    VERTEBRAL ARTERIES: No definite high-grade stenosis, dissection or vascular injury.  Linear foci of hypodensity involving the left V3/V4 vertebral artery junction are favored to be artifactual secondary to streak from dental amalgam hardware. Balanced   vertebral arteries.    AORTIC ARCH: Bovine origin left common carotid artery. No significant stenosis at the origin of the great vessels. Mild plaque involving the origin of left subclavian causes minimal associated narrowing.    NONVASCULAR STRUCTURES: Subpleural reticulation is noted involving the lungs. Multinodular thyroid. A dominant nodule within the right inferior lobe of the thyroid measures up to 1.7 cm.      Impression    IMPRESSION:   HEAD CTA:  1.  No proximal large vessel occlusion. Progressive occlusion of the right P3 posterior cerebral artery with faint reconstitution of flow involving the distal P4 segment.   2.  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  3.  No aneurysm. No high flow vascular malformation.    NECK CTA:  1.  No definite high-grade stenosis, vascular injury or dissection.  2.  Multinodular thyroid. A dominant nodule within the right inferior lobe of thyroid measures up to 1.7 cm. Recommend non-emergent thyroid function testing and thyroid ultrasound for further assessment.         Hemoglobin   Result Value Ref Range    Hemoglobin 7.2 (L) 11.7 - 15.7 g/dL    MCV 71 (L) 78 - 100 fL   ABO/Rh type and screen    Narrative    The following orders were created for panel order ABO/Rh type and screen.  Procedure                               Abnormality         Status                     ---------                               -----------         ------                     Adult Type and Screen[[id number]]                           Final result                 Please view results for these tests on the individual orders.   Adult Type and Screen   Result Value Ref Range    ABO/RH(D) O POS     Antibody Screen Negative Negative    SPECIMEN EXPIRATION DATE 6/25/2025 11:59:00  PM CDT        All relevant imaging and laboratory values personally reviewed.    Dragon Disclosure   This was created at least in part with a voice recognition software. Mistakes/typos may be present.

## 2025-06-22 NOTE — PROGRESS NOTES
RECEIVING UNIT ED HANDOFF REVIEW    ED Nurse Handoff Report was reviewed by: Tara Calzada RN on June 22, 2025 at 8:20 AM

## 2025-06-22 NOTE — PLAN OF CARE
Goal Outcome Evaluation:      Plan of Care Reviewed With: caregiver, child    Overall Patient Progress: improvingOverall Patient Progress: improving    Outcome Evaluation: return to Memory Care Unit

## 2025-06-22 NOTE — PHARMACY-VANCOMYCIN DOSING SERVICE
Pharmacy Vancomycin Initial Note  Date of Service 2025  Patient's  10/2/1929  89 year old, female    Indication: Sepsis    Current estimated CrCl = Estimated Creatinine Clearance: 29.6 mL/min (A) (based on SCr of 1.16 mg/dL (H)).    Creatinine for last 3 days  2025:  8:21 PM Creatinine 1.16 mg/dL;  8:21 PM Creatinine POCT 1.3 mg/dL    Recent Vancomycin Level(s) for last 3 days  No results found for requested labs within last 3 days.      Vancomycin IV Administrations (past 72 hours)                     vancomycin (VANCOCIN) 2,000 mg in 0.9% NaCl 500 mL intermittent infusion (mg) 2,000 mg New Bag 25                    Nephrotoxins and other renal medications (From now, onward)      Start     Dose/Rate Route Frequency Ordered Stop    25 2100  vancomycin (VANCOCIN) 750 mg in 0.9% NaCl 250 mL intermittent infusion         750 mg  over 60 Minutes Intravenous EVERY 24 HOURS 25 0824      25 0800  [Held by provider]  furosemide (LASIX) tablet 20 mg        (On hold since today at 0753 until manually unheld; held by Herminio Madrid MDHold Reason: Other)   Note to Pharmacy: PTA Sig:Take 20 mg by mouth daily.      20 mg Oral DAILY 25 0753              Contrast Orders - past 72 hours (72h ago, onward)      Start     Dose/Rate Route Frequency Stop    25 0005  iopamidol (ISOVUE-370) solution 67 mL         67 mL Intravenous ONCE 25 0009    25  iopamidol (ISOVUE-370) solution 84 mL         84 mL Intravenous ONCE 25            Insight[employer] Prediction of Planned Initial Vancomycin Regimen  Loading dose: N/A  Regimen: 750 mg IV every 24 hours.  Start time: 21:44 on 2025  Exposure target: AUC24 (range) 400-600 mg/L.hr   AUC24,ss: 474 mg/L.hr  Probability of AUC24 > 400: 68 %  Ctrough,ss: 15.9 mg/L  Probability of Ctrough,ss > 20: 29 %  Probability of nephrotoxicity (Lodise MARY ANN ): 11 %        Plan:  Start vancomycin  750 mg IV q24h (  mg x1 in ED).   Vancomycin monitoring method: AUC  Vancomycin therapeutic monitoring goal: 400-600 mg*h/L  Pharmacy will check vancomycin levels as appropriate in 1-3 Days.    Serum creatinine levels will be ordered daily for the first week of therapy and at least twice weekly for subsequent weeks .      Marcelino Doan RPH

## 2025-06-22 NOTE — PHARMACY-ADMISSION MEDICATION HISTORY
Pharmacist Admission Medication History    Admission medication history is complete. The information provided in this note is only as accurate as the sources available at the time of the update.    Information Source(s): Facility (U/NH/) medication list/MAR and CareEverywhere/SureScripts via N/A    Pertinent Information: med hx completed using MAR from [facility].     Changes made to PTA medication list:  Added: lasix  Deleted: prn apap, albuterol-budesonide  Changed: None    Allergies reviewed with patient and updates made in EHR: yes - NKDA per nursing home records    Medication History Completed By: Denita Robles RPH 6/21/2025 10:47 PM    PTA Med List   Medication Sig Last Dose/Taking    acetaminophen (TYLENOL) 500 MG tablet Take 1,000 mg by mouth daily 6/21/2025 Morning    apixaban ANTICOAGULANT (ELIQUIS) 5 MG tablet Take 5 mg by mouth 2 times daily. 6/21/2025 Morning    atorvastatin (LIPITOR) 20 MG tablet Take 1 tablet (20 mg) by mouth daily 6/20/2025 Evening    calcium carbonate (TUMS) 500 MG chewable tablet Take 1 chew tab by mouth every 8 hours as needed for heartburn. 6/19/2025 at  8:05 PM    docusate sodium (COLACE) 100 MG capsule Take 1 capsule (100 mg) by mouth 2 times daily as needed for constipation. Taking As Needed    furosemide (LASIX) 20 MG tablet Take 20 mg by mouth daily. 6/21/2025 Morning    guaiFENesin (ROBITUSSIN) 20 mg/mL liquid Take 200 mg by mouth every 4 hours as needed for cough. Taking As Needed    ipratropium - albuterol 0.5 mg/2.5 mg/3 mL (DUONEB) 0.5-2.5 (3) MG/3ML neb solution Take 1 vial (3 mLs) by nebulization 4 times daily. 6/21/2025 at  5:00 PM    memantine (NAMENDA) 5 MG tablet 1 TAB BY MOUTH TWICE DAILY 6/21/2025 Morning    metoprolol succinate ER (TOPROL XL) 100 MG 24 hr tablet 1 TAB BY MOUTH DAILY (DX: HYPERTENSION) 6/21/2025 Morning    PARoxetine (PAXIL) 20 MG tablet 1 TAB BY MOUTH AT BEDTIME Strength: 20 mg 6/20/2025 Evening    QUEtiapine (SEROQUEL) 25 MG  tablet Take 25 mg by mouth daily. 6/20/2025 Evening    Vitamin D3 (VITAMIN D, CHOLECALCIFEROL,) 25 mcg (1000 units) tablet Take 25 mcg by mouth daily 6/21/2025 Morning

## 2025-06-23 LAB
ANION GAP SERPL CALCULATED.3IONS-SCNC: 9 MMOL/L (ref 7–15)
BLD PROD TYP BPU: NORMAL
BLOOD COMPONENT TYPE: NORMAL
BUN SERPL-MCNC: 9.8 MG/DL (ref 8–23)
CALCIUM SERPL-MCNC: 8.6 MG/DL (ref 8.8–10.4)
CHLORIDE SERPL-SCNC: 107 MMOL/L (ref 98–107)
CODING SYSTEM: NORMAL
CREAT SERPL-MCNC: 0.77 MG/DL (ref 0.51–0.95)
CROSSMATCH: NORMAL
EGFRCR SERPLBLD CKD-EPI 2021: 71 ML/MIN/1.73M2
ERYTHROCYTE [DISTWIDTH] IN BLOOD BY AUTOMATED COUNT: 19.6 % (ref 10–15)
GLUCOSE SERPL-MCNC: 93 MG/DL (ref 70–99)
HCO3 SERPL-SCNC: 26 MMOL/L (ref 22–29)
HCT VFR BLD AUTO: 29.6 % (ref 35–47)
HGB BLD-MCNC: 6.9 G/DL (ref 11.7–15.7)
HGB BLD-MCNC: 8.7 G/DL (ref 11.7–15.7)
ISSUE DATE AND TIME: NORMAL
MCH RBC QN AUTO: 21.1 PG (ref 26.5–33)
MCHC RBC AUTO-ENTMCNC: 29.4 G/DL (ref 31.5–36.5)
MCV RBC AUTO: 70 FL (ref 78–100)
MCV RBC AUTO: 72 FL (ref 78–100)
PLATELET # BLD AUTO: 431 10E3/UL (ref 150–450)
POTASSIUM SERPL-SCNC: 3.8 MMOL/L (ref 3.4–5.3)
RBC # BLD AUTO: 4.13 10E6/UL (ref 3.8–5.2)
SODIUM SERPL-SCNC: 142 MMOL/L (ref 135–145)
UNIT ABO/RH: NORMAL
UNIT NUMBER: NORMAL
UNIT STATUS: NORMAL
UNIT TYPE ISBT: 5100
WBC # BLD AUTO: 12.4 10E3/UL (ref 4–11)

## 2025-06-23 PROCEDURE — 250N000013 HC RX MED GY IP 250 OP 250 PS 637: Performed by: INTERNAL MEDICINE

## 2025-06-23 PROCEDURE — 120N000001 HC R&B MED SURG/OB

## 2025-06-23 PROCEDURE — 80048 BASIC METABOLIC PNL TOTAL CA: CPT | Performed by: INTERNAL MEDICINE

## 2025-06-23 PROCEDURE — 999N000157 HC STATISTIC RCP TIME EA 10 MIN

## 2025-06-23 PROCEDURE — 250N000011 HC RX IP 250 OP 636: Performed by: INTERNAL MEDICINE

## 2025-06-23 PROCEDURE — P9016 RBC LEUKOCYTES REDUCED: HCPCS | Performed by: STUDENT IN AN ORGANIZED HEALTH CARE EDUCATION/TRAINING PROGRAM

## 2025-06-23 PROCEDURE — 99232 SBSQ HOSP IP/OBS MODERATE 35: CPT | Performed by: INTERNAL MEDICINE

## 2025-06-23 PROCEDURE — L1499 SPINAL ORTHOSIS NOS: HCPCS

## 2025-06-23 PROCEDURE — 258N000003 HC RX IP 258 OP 636: Performed by: INTERNAL MEDICINE

## 2025-06-23 PROCEDURE — 250N000009 HC RX 250: Performed by: INTERNAL MEDICINE

## 2025-06-23 PROCEDURE — 999N000040 HC STATISTIC CONSULT NO CHARGE VASC ACCESS

## 2025-06-23 PROCEDURE — 85027 COMPLETE CBC AUTOMATED: CPT | Performed by: INTERNAL MEDICINE

## 2025-06-23 PROCEDURE — 36415 COLL VENOUS BLD VENIPUNCTURE: CPT | Performed by: INTERNAL MEDICINE

## 2025-06-23 PROCEDURE — 94640 AIRWAY INHALATION TREATMENT: CPT | Mod: 76

## 2025-06-23 PROCEDURE — 94640 AIRWAY INHALATION TREATMENT: CPT

## 2025-06-23 PROCEDURE — L0172 CERV COL SR FOAM 2PC PRE OTS: HCPCS

## 2025-06-23 PROCEDURE — 999N000128 HC STATISTIC PERIPHERAL IV START W/O US GUIDANCE

## 2025-06-23 RX ADMIN — ACETAMINOPHEN 975 MG: 325 TABLET, FILM COATED ORAL at 14:29

## 2025-06-23 RX ADMIN — IPRATROPIUM BROMIDE AND ALBUTEROL SULFATE 3 ML: .5; 3 SOLUTION RESPIRATORY (INHALATION) at 19:14

## 2025-06-23 RX ADMIN — ACETAMINOPHEN 975 MG: 325 TABLET, FILM COATED ORAL at 08:21

## 2025-06-23 RX ADMIN — PIPERACILLIN AND TAZOBACTAM 3.38 G: 3; .375 INJECTION, POWDER, FOR SOLUTION INTRAVENOUS at 04:33

## 2025-06-23 RX ADMIN — MEMANTINE 5 MG: 5 TABLET ORAL at 20:23

## 2025-06-23 RX ADMIN — QUETIAPINE 25 MG: 25 TABLET, FILM COATED ORAL at 20:23

## 2025-06-23 RX ADMIN — IPRATROPIUM BROMIDE AND ALBUTEROL SULFATE 3 ML: .5; 3 SOLUTION RESPIRATORY (INHALATION) at 15:18

## 2025-06-23 RX ADMIN — PIPERACILLIN AND TAZOBACTAM 3.38 G: 3; .375 INJECTION, POWDER, FOR SOLUTION INTRAVENOUS at 16:54

## 2025-06-23 RX ADMIN — PIPERACILLIN AND TAZOBACTAM 3.38 G: 3; .375 INJECTION, POWDER, FOR SOLUTION INTRAVENOUS at 22:44

## 2025-06-23 RX ADMIN — ACETAMINOPHEN 975 MG: 325 TABLET, FILM COATED ORAL at 23:18

## 2025-06-23 RX ADMIN — SODIUM CHLORIDE, SODIUM LACTATE, POTASSIUM CHLORIDE, AND CALCIUM CHLORIDE: .6; .31; .03; .02 INJECTION, SOLUTION INTRAVENOUS at 05:29

## 2025-06-23 RX ADMIN — PAROXETINE HYDROCHLORIDE 20 MG: 20 TABLET, FILM COATED ORAL at 20:23

## 2025-06-23 RX ADMIN — PIPERACILLIN AND TAZOBACTAM 3.38 G: 3; .375 INJECTION, POWDER, FOR SOLUTION INTRAVENOUS at 11:36

## 2025-06-23 RX ADMIN — MEMANTINE 5 MG: 5 TABLET ORAL at 08:22

## 2025-06-23 RX ADMIN — IPRATROPIUM BROMIDE AND ALBUTEROL SULFATE 3 ML: .5; 3 SOLUTION RESPIRATORY (INHALATION) at 07:35

## 2025-06-23 RX ADMIN — PANTOPRAZOLE SODIUM 40 MG: 40 INJECTION, POWDER, FOR SOLUTION INTRAVENOUS at 20:23

## 2025-06-23 RX ADMIN — PANTOPRAZOLE SODIUM 40 MG: 40 INJECTION, POWDER, FOR SOLUTION INTRAVENOUS at 08:21

## 2025-06-23 RX ADMIN — ATORVASTATIN CALCIUM 20 MG: 20 TABLET, FILM COATED ORAL at 20:23

## 2025-06-23 ASSESSMENT — ACTIVITIES OF DAILY LIVING (ADL)
ADLS_ACUITY_SCORE: 65
ADLS_ACUITY_SCORE: 70
ADLS_ACUITY_SCORE: 65
ADLS_ACUITY_SCORE: 70
ADLS_ACUITY_SCORE: 65
ADLS_ACUITY_SCORE: 70
ADLS_ACUITY_SCORE: 70
ADLS_ACUITY_SCORE: 65
ADLS_ACUITY_SCORE: 65
ADLS_ACUITY_SCORE: 70
ADLS_ACUITY_SCORE: 65
ADLS_ACUITY_SCORE: 70
ADLS_ACUITY_SCORE: 65

## 2025-06-23 NOTE — PLAN OF CARE
Goal Outcome Evaluation:    Alert to self. 1 unit of blood transfused this shift, see significant event note. C-collar in place. Incontinent, purewick in place. Denies pain.

## 2025-06-23 NOTE — SIGNIFICANT EVENT
Significant Event Note    Time of event: 1:21 AM June 23, 2025    Description of event:  Hb 6.9    Plan:  Transfuse 1 unt  Consent form signed verbally w/ daughter    Ilya Bhandari MD

## 2025-06-23 NOTE — PROGRESS NOTES
[facility] - Internal Medicine Daily Note   Date of Service: 6/22/2025  Patient: Margy Babin  MRN: [id number]  Admission Date: 6/21/2025  Hospital Day # 2    Assessment & Plan    Margy Babin is a 89 year old female with past medical history significant for dementia, atrial flutter on DOAC, HFrEF, hypertension who presents with apnea and hypotension at [facility]. Admitted on 6/21/2025.      Acute C1 posterior arch fracture, pain is well-managed.  Wearing c-collar  *Incidentally noted on CT cervical spine. No known trauma. Denies any neck pain.   *Neurosurgery contacted from ED, recommended [place] collar   *CTA head/neck without vascular injury   - Cervical collar in place at all times  - Neurosurgery consulted  - Scheduled acetaminophen PO, oxycodone PO, hydromorphone IV PRN; limit use of opioids given age and dementia     Hypotension, resolved after IV fluid boluses.  Furosemide and metoprolol succinate  mg was put on hold due to hypotension.  Maintenance IV fluids started yesterday will be discontinued today.  Resume furosemide and metoprolol succinate-blood pressure allows tomorrow with holding parameters.  May consider decreasing the dose of metoprolol succinate if her blood pressure remains soft.    Hypoxia, was requiring NC supplemental oxygen  6 L, now on room air , saturating in mid 90s.   Lactic acid elevation, suspect secondary to hypotension   *Reportedly had BP into 60s at [facility] care unit with associated apnea spells. Received epinephrine per EMS.   *WBC 13.3. CT chest/abdomen/pelvis PE protocol without PE, acute findings in chest/abdomen/pelvis.  UA mildly abnormal but grossly contaminated. Overall no clear signs of infection. Received piperacillin-tazobactam IV and vancomycin IV in ED while undifferentiated.   *New anemia noted as below, no clinical evidence of bleeding reported or noted at this time  - BP normal after fluids  - Continue mIVF  - Hold prior to  "admission furosemide and metoprolol XL           Advanced care planning, CODE STATUS changed to DNR/DNI  *Notes indicated daughter \"over-rode\" DNR to send patient to hospital  *Discussed with daughter, she reports she had just [place] of [place] and was having difficulty understanding staff at the facility and was unsure what was going on, simply requested hospital evaluation and did not rescind code status  *Goals of care discussed with daughter as below   - DNR/DNI   - No escalation of cares to ICU, pressors   - Daughter open to blood transfusion, possible surgeries/procedures if would maintain quality of life as she reports her mother has otherwise been fairly functional      Acute on chronic anemia, microcytic anemia, no any evidence of GI bleed.  No melena or hematochezia at this moment.  Hemoglobin dropped down to 6.9 on 6/22 likely due to IV fluid boluses/dilutional.  Transfused with 1 unit of PRBC and posttransfusion hemoglobin on 6/23 8.7.  Hgb 7.6 g/dl from most recent 10.7 g/dl 3/2025, had been resumed on apixaban for stroke prophylaxis in afib/flutter in interim   *Iron studies consistent with iron deficiency  *Daughter unaware of any bleeding hx, none per patient but limited by dementia. Possible slow bleed in setting of DOAC  *PTA on apixaban  - Hold apixaban   Verbally consented for blood with daughter via phone and confirmed with daughter by ED RN, form reportedly signed by RN but lost in ED. Attempting to locate, otherwise will need to be re-signed by daughter when she is available if blood products needed  - Type and screen   - Pantoprazole IV BID   - Holding GI consult pending evidence of blood loss, hemoglobin trend, neurosurgery clearance for EGD with cervical fracture, further discussion with family. Note GI consulted 10/2023 for FLORI, patient/daughter declined invasive procedures at that time.        HFrEF, no evidence of decompensation.  Hypertension  H/o orthostatic hypotension "   *Recently started on furosemide with concern for pulmonary edema on exam at [facility]. Unclear if may be contributing to hypotension. Hx of orthostatic hypotension noted in chart.  *TTE 11/2023 with EF 50-55%, 45-50% in the past  - Hold prior to admission furosemide and metoprolol XL   - Monitor volume status after IVF given in ED      Paroxysmal atrial fibrillation/flutter  LBBB  EKG on admission with sinus rhythm and old LBBB.  - Hold metoprolol XL and apixaban as above     Hx of CVA  - Hold apixaban as above   - Continue prior to admission atorvastatin      Dementia  Anxiety  Lives in memory care unit.   - Re-orient as needed  - Maintain normal day/night, sleep wake cycles  - Minimize sedating/altering medications as able  - Treat separate conditions as detailed above/below  - Continue prior to admission memantine, quetiapine, paroxetine     Pulmonary emphysema   No sign of acute exacerbation.  - Continue prior to admission nebulizer regimen         Diet:   DVT Prophylaxis: Pneumatic Compression Devices  Pichardo Catheter: Not present  Code Status: DNR/DNI - Per POLST and confirmed with daughter  Disposition: Assisted living memory care:  Consider discharge tomorrow.          Tomás Parham MD  Internal Medicine Staff Hospitalist   [facility]   Pager: [phone]  Page Cross Cover after 5 pm: pager [phone]   ___________________________________________________________________    Subjective & Interval Hx:    Up in recliner chair, comfortable nondistressed.  Denies any dizziness lightheadedness, shortness of breath or pain.  She received 500 mL normal saline bolus when her blood pressure this morning is noted to be normal.  She has been weaned off supplemental oxygen.  Yesterday she was on 6 L of oxygen weaned down to 3 and this morning she was completely weaned off oxygen.  She is DNR/DNI    Last 24 hr care team notes reviewed.   ROS:  4 point ROS including Respiratory, CV, GI and , other  than that noted in the HPI, is negative.    Medications: Reviewed in EPIC. List below for reference    Current Facility-Administered Medications:     acetaminophen (TYLENOL) tablet 975 mg, 975 mg, Oral, TID, Herminio Madrid MD, 975 mg at 06/23/25 0821    atorvastatin (LIPITOR) tablet 20 mg, 20 mg, Oral, QPM, Herminio Madrid MD, 20 mg at 06/22/25 2041    benzocaine-menthol (CHLORASEPTIC) 6-10 MG lozenge 1 lozenge, 1 lozenge, Buccal, Q1H PRN, Herminio Madrid MD    bisacodyl (DULCOLAX) suppository 10 mg, 10 mg, Rectal, Daily PRN, Herminio Madrid MD    fluticasone (FLONASE) 50 MCG/ACT spray 1 spray, 1 spray, Both Nostrils, Daily PRN, Herminio Madrid MD    [Held by provider] furosemide (LASIX) tablet 20 mg, 20 mg, Oral, Daily, Herminio Madrid MD    ipratropium - albuterol 0.5 mg/2.5 mg (3mg)/3 mL (DUONEB) neb solution 3 mL, 3 mL, Nebulization, 4x Daily, Herminio Madrid MD, 3 mL at 06/23/25 0735    lactated ringers infusion, , Intravenous, Continuous, Herminio Madrid MD, Stopped at 06/23/25 1129    lidocaine (LMX4) cream, , Topical, Q1H PRN, Herminio Madrid MD    lidocaine 1 % 0.1-1 mL, 0.1-1 mL, Other, Q1H PRN, Herminio Madrid MD    melatonin tablet 1 mg, 1 mg, Oral, At Bedtime PRN, Herminio Madrid MD    memantine (NAMENDA) tablet 5 mg, 5 mg, Oral, BID, Herminio Madrid MD, 5 mg at 06/23/25 0822    [Held by provider] metoprolol succinate ER (TOPROL XL) 24 hr tablet 100 mg, 100 mg, Oral, Daily, Herminio Madrid MD    naloxone (NARCAN) injection 0.2 mg, 0.2 mg, Intravenous, Q2 Min PRN **OR** naloxone (NARCAN) injection 0.4 mg, 0.4 mg, Intravenous, Q2 Min PRN **OR** naloxone (NARCAN) injection 0.2 mg, 0.2 mg, Intramuscular, Q2 Min PRN **OR** naloxone (NARCAN) injection 0.4 mg, 0.4 mg, Intramuscular, Q2 Min PRN, Hemrinio Madrid MD    ondansetron (ZOFRAN ODT) ODT tab 4 mg, 4 mg, Oral, Q6H PRN **OR** ondansetron (ZOFRAN) injection 4  mg, 4 mg, Intravenous, Q6H PRN, Herminio Madrid MD    oxyCODONE (ROXICODONE) tablet 5 mg, 5 mg, Oral, Q4H PRN, Herminio Madrid MD    oxyCODONE IR (ROXICODONE) half-tab 2.5 mg, 2.5 mg, Oral, Q4H PRN, Herminio Madrid MD    pantoprazole (PROTONIX) injection 40 mg, 40 mg, Intravenous, BID, Herminio Madrid MD, 40 mg at 06/23/25 0821    PARoxetine (PAXIL) tablet 20 mg, 20 mg, Oral, QPM, Herminio Madrid MD, 20 mg at 06/22/25 2041    piperacillin-tazobactam (ZOSYN) 3.375 g vial to attach to  mL bag, 3.375 g, Intravenous, Q6H, Tomás Parham MD, 3.375 g at 06/23/25 1136    polyethylene glycol (MIRALAX) Packet 17 g, 17 g, Oral, BID PRN, Herminio Madrid MD    prochlorperazine (COMPAZINE) injection 5 mg, 5 mg, Intravenous, Q6H PRN **OR** prochlorperazine (COMPAZINE) tablet 5 mg, 5 mg, Oral, Q6H PRN, Herminio Madrid MD    QUEtiapine (SEROquel) half-tab 25 mg, 25 mg, Oral, QPM, Herminio Madrid MD, 25 mg at 06/22/25 2042    senna-docusate (SENOKOT-S/PERICOLACE) 8.6-50 MG per tablet 1 tablet, 1 tablet, Oral, BID PRN **OR** senna-docusate (SENOKOT-S/PERICOLACE) 8.6-50 MG per tablet 2 tablet, 2 tablet, Oral, BID PRNMercy Ian Michael, MD    sodium chloride (PF) 0.9% PF flush 3 mL, 3 mL, Intracatheter, Q8H, Herminio Madrid MD    sodium chloride (PF) 0.9% PF flush 3 mL, 3 mL, Intracatheter, q1 min prn, Herminio Madrid MD    Physical Exam:    BP (!) 168/74 (BP Location: Left arm)   Pulse 63   Temp 97.4  F (36.3  C)   Resp 16   Wt 76.3 kg (168 lb 3.4 oz)   SpO2 94%   BMI 27.99 kg/m       GENERAL: Alert and oriented x 3. NAD.   HEENT: Wearing a neck collar anicteric sclera. Mucous membranes moist.   CV: RRR. S1, S2. No murmurs appreciated.   RESPIRATORY: Effort normal on room air. Lungs CTAB with no wheezing, rales, rhonchi.   GI: Abdomen soft and non distended with normoactive bowel sounds present in all quadrants. No tenderness, rebound, guarding.    NEUROLOGICAL: No focal deficits. Moves all extremities.    EXTREMITIES: Trace peripheral edema. Intact bilateral pedal pulses.   SKIN: No jaundice. No rashes.     Labs & Studies of Note: I personally reviewed the following studies:  CBC RESULTS:   CBC RESULTS:   Recent Labs   Lab Test 06/23/25  0804   WBC 12.4*   RBC 4.13   HGB 8.7*   HCT 29.6*   MCV 72*   MCH 21.1*   MCHC 29.4*   RDW 19.6*        Last Comprehensive Metabolic Panel:  Lab Results   Component Value Date     06/21/2025    POTASSIUM 4.3 06/21/2025    CHLORIDE 102 06/21/2025    CO2 27 06/21/2025    ANIONGAP 11 06/21/2025     (H) 06/21/2025    BUN 26.1 (H) 06/21/2025    CR 1.16 (H) 06/21/2025    CR 1.3 (H) 06/21/2025    GFRESTIMATED 43 (L) 06/21/2025    GFRESTIMATED 38 (L) 06/21/2025    SOBIA 8.5 (L) 06/21/2025       Discussed with the patient.  All questions answered.

## 2025-06-23 NOTE — PLAN OF CARE
Goal Outcome Evaluation:    Summary: Admitted for with apnea and hypotension.    Incidentally found to have C1 fracture    Date & Time: 6/22/25 4205-4677   Orientation: A&O x2/3   Activity Level: A1 GBW, Cervical collar on at all times  Fall Risk: yes    Behavior & Aggression: Green   Pain Management: Denies   ABNL VS/O2: VSS on RA   Tele: N/A   ABNL Lab/BG: Hgb 7.2, 7.1, Provider updated, K protocol recheck tomorrow AM   Diet: Clear liquid    Bowel/Bladder: In   Skin: Scattered bruises   Drains/Devices: L PIV infusing LR at 75 ml/hr    Tests/Procedures: XR cervical   Anticipated DC Date: Pending   Other Important Info: Contact precautions maintained for ESBL

## 2025-06-23 NOTE — PLAN OF CARE
Goal Outcome Evaluation:    ..Date/Time 06/23/25 7-7:30    Trauma/Ortho/Medical (Choose one)     Diagnosis: C1 fracture  POD#:n/a  Mental Status: patient has dementia but today knew she was at [facility] and orientated to self.   Activity/dangle assist of 1, walker and gait belt  Diet: clear liquid   Pain: denies pain at this time   Pichardo/Voiding: brief on  Tele/Restraints/Iso:contact  02/LDA: VSS on RA, PIV SL   D/C Date: TBD, possibly tomorrow  Other Info: daughter visited and we gave her an update. Patient is doing well.

## 2025-06-23 NOTE — PROGRESS NOTES
S: Patient was seen today at 2403 for a Horner cervical and Joya collar as ordered by Kuldip Lynn CNP.  DX:  Closed displaced Fx of 1st cervical vertebra.    O:  Pt was fit with a Joya collar for bathing locked in the shortest setting without incident.  Joya collar removed.  Pt was fit with a Horner [place] cervical collar. The anterior section was donned. The posterior section was donned and fastened to the front using the velcro straps. Attention was given to the chin support being sure that the correct height was set to support the chin. An extra padding set was also provided. Patient instructed on donning, doffing, use and care    A:Joya and Horner collar provided and fit as required.     P: Patient will wear the Joya collar for bathing only.  Pt will wear the Horner [place] collar at all times when not bathing following Physician guidelines. Patient has been instructed to contact our facility with any questions and/or concerns    G: The objective/goal of the collar is to help reduce motion/give cervical stability.    Tor BOO

## 2025-06-23 NOTE — CONSULTS
She's from our [facility]. Paul Jordan     Contact is Samina KELLY @ [phone].        ROBIN Dobbins   Care Management  General Surgery/ICU  [phone] or   I can be reached on vocera

## 2025-06-24 VITALS
SYSTOLIC BLOOD PRESSURE: 132 MMHG | RESPIRATION RATE: 16 BRPM | TEMPERATURE: 98.6 F | HEART RATE: 83 BPM | OXYGEN SATURATION: 95 % | WEIGHT: 168.21 LBS | DIASTOLIC BLOOD PRESSURE: 79 MMHG | BODY MASS INDEX: 27.99 KG/M2

## 2025-06-24 LAB — POTASSIUM SERPL-SCNC: 3.7 MMOL/L (ref 3.4–5.3)

## 2025-06-24 PROCEDURE — 999N000157 HC STATISTIC RCP TIME EA 10 MIN

## 2025-06-24 PROCEDURE — 250N000009 HC RX 250: Performed by: INTERNAL MEDICINE

## 2025-06-24 PROCEDURE — 36415 COLL VENOUS BLD VENIPUNCTURE: CPT | Performed by: INTERNAL MEDICINE

## 2025-06-24 PROCEDURE — 250N000011 HC RX IP 250 OP 636: Performed by: INTERNAL MEDICINE

## 2025-06-24 PROCEDURE — 94640 AIRWAY INHALATION TREATMENT: CPT

## 2025-06-24 PROCEDURE — 999N000156 HC STATISTIC RCP CONSULT EA 30 MIN

## 2025-06-24 PROCEDURE — 99239 HOSP IP/OBS DSCHRG MGMT >30: CPT | Performed by: INTERNAL MEDICINE

## 2025-06-24 PROCEDURE — 84132 ASSAY OF SERUM POTASSIUM: CPT | Performed by: INTERNAL MEDICINE

## 2025-06-24 PROCEDURE — 250N000013 HC RX MED GY IP 250 OP 250 PS 637: Performed by: INTERNAL MEDICINE

## 2025-06-24 RX ORDER — IPRATROPIUM BROMIDE AND ALBUTEROL SULFATE 2.5; .5 MG/3ML; MG/3ML
3 SOLUTION RESPIRATORY (INHALATION) EVERY 4 HOURS PRN
Status: DISCONTINUED | OUTPATIENT
Start: 2025-06-24 | End: 2025-06-24 | Stop reason: HOSPADM

## 2025-06-24 RX ORDER — ACETAMINOPHEN 325 MG/1
975 TABLET ORAL 3 TIMES DAILY
DISCHARGE
Start: 2025-06-24

## 2025-06-24 RX ORDER — QUETIAPINE FUMARATE 25 MG/1
25 TABLET, FILM COATED ORAL EVERY EVENING
DISCHARGE
Start: 2025-06-24

## 2025-06-24 RX ADMIN — PIPERACILLIN AND TAZOBACTAM 3.38 G: 3; .375 INJECTION, POWDER, FOR SOLUTION INTRAVENOUS at 04:57

## 2025-06-24 RX ADMIN — MEMANTINE 5 MG: 5 TABLET ORAL at 09:18

## 2025-06-24 RX ADMIN — IPRATROPIUM BROMIDE AND ALBUTEROL SULFATE 3 ML: .5; 3 SOLUTION RESPIRATORY (INHALATION) at 07:25

## 2025-06-24 RX ADMIN — PANTOPRAZOLE SODIUM 40 MG: 40 INJECTION, POWDER, FOR SOLUTION INTRAVENOUS at 09:20

## 2025-06-24 RX ADMIN — ACETAMINOPHEN 975 MG: 325 TABLET, FILM COATED ORAL at 09:17

## 2025-06-24 RX ADMIN — METOPROLOL SUCCINATE 100 MG: 50 TABLET, EXTENDED RELEASE ORAL at 09:18

## 2025-06-24 ASSESSMENT — ACTIVITIES OF DAILY LIVING (ADL)
ADLS_ACUITY_SCORE: 69
ADLS_ACUITY_SCORE: 69
ADLS_ACUITY_SCORE: 70
ADLS_ACUITY_SCORE: 69
ADLS_ACUITY_SCORE: 69
ADLS_ACUITY_SCORE: 70
ADLS_ACUITY_SCORE: 72
ADLS_ACUITY_SCORE: 67
ADLS_ACUITY_SCORE: 70
ADLS_ACUITY_SCORE: 70
ADLS_ACUITY_SCORE: 69
ADLS_ACUITY_SCORE: 70
ADLS_ACUITY_SCORE: 70
ADLS_ACUITY_SCORE: 72
ADLS_ACUITY_SCORE: 69

## 2025-06-24 NOTE — DISCHARGE SUMMARY
"[facility]  Hospitalist Discharge Summary      Date of Admission:  6/21/2025  Date of Discharge:  6/24/2025 returning to [facility] care  Discharging Provider: Najma Bright MD  Discharge Service: Hospitalist Service    Discharge Diagnoses   See Below    Clinically Significant Risk Factors     # Overweight: Estimated body mass index is 27.99 kg/m  as calculated from the following:    Height as of 6/2/25: 1.651 m (5' 5\").    Weight as of this encounter: 76.3 kg (168 lb 3.4 oz).       Follow-ups Needed After Discharge   Follow-up Appointments       Follow Up      Arrange outpatient follow-up in our [place] neurosurgery clinic in approximately 6 weeks for further evaluation of your cervical fracture.  Will obtain x-rays prior to appointment.        Follow Up and recommended labs and tests      Follow up with Nursing home physician.  No follow up labs or test are needed.                Unresulted Labs Ordered in the Past 30 Days of this Admission       Date and Time Order Name Status Description    6/24/2025 12:02 AM Potassium In process     6/21/2025 10:26 PM Urine Culture Preliminary     6/21/2025  8:20 PM Blood Culture Peripheral blood (BC) Arm, Right Preliminary     6/21/2025  8:20 PM Blood Culture Peripheral blood (BC) Arm, Right Preliminary         These results will be followed up by myself or my group    Discharge Disposition   Discharged to long-term care facility  Condition at discharge: Stable    Hospital Course   Margy Babin is a 89 year old female with past medical history significant for dementia who resides in [facility], atrial flutter on DOAC, HFrEF, hypertension who presented with apnea and hypotension at [facility]. Admitted on 6/21/2025. Found to have C1 posterior arch fracture.      Acute C1 posterior arch fracture  Incidentally noted on CT cervical spine. No known trauma.  CTA head/neck without vascular injury.   - Cervical collar in place at all times (see " orders for details)  - Neurosurgery consulted  - Scheduled acetaminophen three times daily. Has not needed narcotic medications     Hypotension, resolved   Resolved after IV fluid boluses. Furosemide and metoprolol succinate  mg were put on hold due to hypotension.    - resume metoprolol and furosemide at discharge. Able to tolerate regular diet and blood pressures elevated.      Pulmonary emphysema   Hypoxia, resolved  Lactic acid elevation, suspect secondary to hypotension, resolved   *Reportedly had BP into 60s at [facility] unit with associated apnea spells. Received epinephrine per EMS.   *WBC 13.3. CT chest/abdomen/pelvis PE protocol without PE, acute findings in chest/abdomen/pelvis.  UA mildly abnormal but grossly contaminated and culture with 10-50k proteus mirabilis. Overall no clear signs of infection. Received piperacillin-tazobactam IV and vancomycin IV in ED while undifferentiated. Zosyn stopped after 4 days total treatment which is sufficient for empiric UTI treatment.      Advanced care planning, CODE STATUS changed to DNR/DNI  *Discussed with daughter, she reports she had just [place] of [place] and was having difficulty understanding staff at the facility and was unsure what was going on, simply requested hospital evaluation and did not rescind code status  *Goals of care discussed with daughter as below   - DNR/DNI   - No escalation of cares to ICU, pressors   - Daughter open to blood transfusion, possible surgeries/procedures if would maintain quality of life as she reports her mother has otherwise been fairly functional      Acute on chronic anemia, microcytic anemia, no any evidence of GI bleed.  No melena or hematochezia at this moment.  Hemoglobin dropped down to 6.9 on 6/22 likely due to IV fluid boluses/dilutional.  Transfused with 1 unit of PRBC and posttransfusion hemoglobin on 6/23 8.7.  Hgb 7.6 g/dl from most recent 10.7 g/dl 3/2025, had been resumed on apixaban for  stroke prophylaxis in afib/flutter in interim   *Iron studies consistent with iron deficiency  *Daughter unaware of any bleeding hx, none per patient but limited by dementia. Possible slow bleed in setting of DOAC?  *PTA on apixaban  Verbally consented for blood with daughter via phone and confirmed with daughter by ED RN, form reportedly signed by RN but lost in ED. Attempting to locate, otherwise will need to be re-signed by daughter when she is available if blood products needed  - Transfused 1 unit PRBCs    Recent Labs   Lab 06/23/25  0804 06/22/25  2348 06/22/25  1416   HGB 8.7* 6.9* 7.1*       HFrEF, no evidence of decompensation  Hypertension  H/o orthostatic hypotension   *Recently started on furosemide with concern for pulmonary edema on exam at [facility]. Unclear if may be contributing to hypotension. Hx of orthostatic hypotension noted in chart.  *TTE 11/2023 with EF 50-55%, 45-50% in the past     Paroxysmal atrial fibrillation/flutter  LBBB  EKG on admission with sinus rhythm and old LBBB.  - metoprolol XL resumed  - resume PTA apixaban     Hx of CVA  - Continue prior to admission atorvastatin      Dementia  Anxiety  Lives in memory care unit.   - Re-orient as needed  - Maintain normal day/night, sleep wake cycles  - Minimize sedating/altering medications as able  - Treat separate conditions as detailed above/below  - Continue prior to admission memantine, quetiapine, paroxetine       Consultations This Hospital Stay   PHARMACY TO DOSE VANCO  NEUROSURGERY IP CONSULT  VASCULAR ACCESS ADULT IP CONSULT  CARE MANAGEMENT / SOCIAL WORK IP CONSULT  CARE MANAGEMENT / SOCIAL WORK IP CONSULT  VASCULAR ACCESS ADULT IP CONSULT  PHYSICAL THERAPY ADULT IP CONSULT  OCCUPATIONAL THERAPY ADULT IP CONSULT    Code Status   No CPR- Do NOT Intubate    Time Spent on this Encounter   I, Najma Bright MD, personally saw the patient today and spent greater than 30 minutes discharging this patient.       Najma Bright,  MD [facility] SPINE  [address] [place] [address]  Phone: [phone]  Fax: [phone]  ______________________________________________________________________    Physical Exam   Vital Signs: Temp: 98.6  F (37  C) Temp src: Oral BP: 132/79 Pulse: 83   Resp: 16 SpO2: 95 % O2 Device: None (Room air)    Weight: 168 lbs 3.38 oz         Primary Care Physician   Cynthia Monteiro    Discharge Orders      XR Cervical Spine 2/3 Views     Follow Up    Arrange outpatient follow-up in our [place] neurosurgery clinic in approximately 6 weeks for further evaluation of your cervical fracture.  Will obtain x-rays prior to appointment.     Activity    Please limit your lifting to no more that ten pounds and avoid excessive bending, twisting and turning at the cervical spine. You should also avoid excessive jostling and jarring activities.     Wear cervical collar at all times.  Continue and replace with Joya collar for bathing.  Also periodically for skin checks    Ok to walk as tolerated, avoid bedrest.    Call my office at [phone] for any other questions and concerns.    Go to ER with any seizure activity, mental status change (increasing confusion), difficulty with speech or increasing or acute weakness     General info for SNF    Length of Stay Estimate: Long Term Care  Condition at Discharge: Stable  Level of care:skilled   Rehabilitation Potential: Poor  Admission H&P remains valid and up-to-date: Yes  Recent Chemotherapy: N/A  Use Nursing Home Standing Orders: Yes     Mantoux instructions    Give two-step Mantoux (PPD) Per Facility Policy Yes     Reason for your hospital stay    Fall resulting in cervical spine fracture     Activity - Up with nursing assistance     Follow Up and recommended labs and tests    Follow up with Nursing home physician.  No follow up labs or test are needed.     Additional Discharge Instructions    Wear [place] cervical collar at all times.    Wear Joya collar for bathing only.     No CPR- Do NOT Intubate    Per daughter POA     Physical Therapy Adult Consult    Evaluate and treat as clinically indicated.    Reason:  fall with cervical fracture, neck brace, deconditioning     Occupational Therapy Adult Consult    Evaluate and treat as clinically indicated.    Reason:  fall with cervical fracture, neck brace     Contact Isolation    ESBL infection history     Fall precautions     Diet    Follow this diet upon discharge:   Regular Diet       Significant Results and Procedures   See epic    Discharge Medications      Review of your medicines        CHANGE how you take these medications        Dose / Directions   acetaminophen 325 MG tablet  Commonly known as: TYLENOL  This may have changed:   medication strength  how much to take  when to take this  Used for: Closed displaced fracture of first cervical vertebra, unspecified fracture morphology, initial encounter (H)      Dose: 975 mg  Take 3 tablets (975 mg) by mouth 3 times daily.  Refills: 0     QUEtiapine 25 MG tablet  Commonly known as: SEROquel  This may have changed: when to take this  Used for: Anxiety      Dose: 25 mg  Take 1 tablet (25 mg) by mouth every evening.  Refills: 0            CONTINUE these medicines which have NOT CHANGED        Dose / Directions   apixaban ANTICOAGULANT 5 MG tablet  Commonly known as: ELIQUIS      Dose: 5 mg  Take 5 mg by mouth 2 times daily.  Refills: 0     atorvastatin 20 MG tablet  Commonly known as: LIPITOR  Used for: Hyperlipidemia LDL goal <100      Dose: 20 mg  Take 1 tablet (20 mg) by mouth daily  Quantity: 90 tablet  Refills: 3     calcium carbonate 500 MG chewable tablet  Commonly known as: TUMS      Dose: 1 chew tab  Take 1 chew tab by mouth every 8 hours as needed for heartburn.  Refills: 0     docusate sodium 100 MG capsule  Commonly known as: COLACE  Used for: Constipation, unspecified constipation type      Dose: 100 mg  Take 1 capsule (100 mg) by  mouth 2 times daily as needed for constipation.  Refills: 0     furosemide 20 MG tablet  Commonly known as: LASIX      Dose: 20 mg  Take 20 mg by mouth daily.  Refills: 0     guaiFENesin 20 mg/mL liquid  Commonly known as: ROBITUSSIN      Dose: 200 mg  Take 200 mg by mouth every 4 hours as needed for cough.  Refills: 0     ipratropium - albuterol 0.5 mg/2.5 mg (3mg)/3 mL 0.5-2.5 (3) MG/3ML neb solution  Commonly known as: DUONEB  Used for: Acute respiratory failure with hypoxia (H), SOB (shortness of breath)      Dose: 3 mL  Take 1 vial (3 mLs) by nebulization 4 times daily.  Refills: 0     memantine 5 MG tablet  Commonly known as: NAMENDA  Used for: Dementia (H)      Dose: 5 mg  1 TAB BY MOUTH TWICE DAILY  Quantity: 60 tablet  Refills: 10     metoprolol succinate  MG 24 hr tablet  Commonly known as: TOPROL XL  Used for: Paroxysmal atrial fibrillation (H)      1 TAB BY MOUTH DAILY (DX: HYPERTENSION)  Quantity: 30 tablet  Refills: 10     PARoxetine 20 MG tablet  Commonly known as: PAXIL  Used for: 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      1 TAB BY MOUTH AT BEDTIME Strength: 20 mg  Quantity: 90 tablet  Refills: 0     Vitamin D3 25 mcg (1000 units) tablet  Commonly known as: CHOLECALCIFEROL      Dose: 25 mcg  Take 25 mcg by mouth daily  Refills: 0            Allergies   No Known Allergies

## 2025-06-24 NOTE — PROGRESS NOTES
Care Management Follow Up    Length of Stay (days): 3    Expected Discharge Date: 06/25/2025     Concerns to be Addressed: adjustment to diagnosis/illness, discharge planning     Patient plan of care discussed at interdisciplinary rounds: Yes    Anticipated Discharge Disposition: Assisted Living              Anticipated Discharge Services: Transportation Services  Anticipated Discharge DME:      Patient/family educated on Medicare website which has current facility and service quality ratings:    Education Provided on the Discharge Plan: Yes  Patient/Family in Agreement with the Plan: yes    Referrals Placed by CM/SW:    Private pay costs discussed: Not applicable    Discussed  Partnership in Safe Discharge Planning  document with patient/family: No     Handoff Completed: No, handoff not indicated or clinically appropriate    Additional Information:  Writer reached out to Oz, admissions liaison at [facility] ([phone]), inquiring if they were ok with the patient. Oz informed the writer to contact the SW at the [facility], Sam[facility] ([phone]).    Writer contacted Samina, but left a VM.     Writer reached out to [facility] and scheduled a stretcher ride due to patient's dementia between 3731-4685. PCS completed.     Writer reached out to the provider inquiring if the patient was medically ready. Provider stated they were placing discharge orders.     Writer updated Oz at [facility] about the orders. Writer left another VM for Samina.     Writer contacted the patient's daughter, Jamin ([phone]), who agreed to the discharge plan.     Writer contacted SAY Sexton at [facility], who confirmed they aware of the patient's return today.     Nursing staff aware.     Writer contacted Jamin once more and discussed transport cost and jamin was ok with it.       Next Steps: discharge today.     ROBIN Ribera  [employer]  Social Work

## 2025-06-24 NOTE — PLAN OF CARE
Goal Outcome Evaluation:    A&oX2/3, dementia. VSS on RA. Ax1/2, GB/W. C collar on. Discharge packet and education provided to pt. Pt disharging back to [facility].

## 2025-06-24 NOTE — PROGRESS NOTES
Care Management Discharge Note    Discharge Date: 06/24/2025       Discharge Disposition: Memory care    Discharge Services: Transportation Services    Discharge DME:  none    Discharge Transportation: health plan transportation (wheelchair)    Private pay costs discussed: transportation costs    Does the patient's insurance plan have a 3 day qualifying hospital stay waiver?  No    PAS Confirmation Code:    Patient/family educated on Medicare website which has current facility and service quality ratings:      Education Provided on the Discharge Plan: Yes  Persons Notified of Discharge Plans: Patient's daughter, [facility], and [facility] staff  Patient/Family in Agreement with the Plan: yes    Handoff Referral Completed: No, handoff not indicated or clinically appropriate    Additional Information:  Patient will discharge from [facility] to [facility] with [facility]:  Stretcher at 2456-9027.  Please refer to  progress notes for further details.No further care management intervention anticipated at this time.  Please re-consult if further needs arise.  Care management signing off.        ROBIN Ribera  [employer]  Social Work

## 2025-06-24 NOTE — PLAN OF CARE
Goal Outcome Evaluation:       Shift Summary [id number]    Admitting Diagnosis: CRISSY (acute kidney injury) [N17.9]  Closed nondisplaced fracture of posterior arch of first cervical vertebra, initial encounter (H) [S12.031A]  Acute on chronic anemia [D64.9]   C1 fracture  Vitals VSS on RA  Pain denies  A&Ox1-2, Hx dementia  Voiding Purewick in place.  Mobility Ax1 GB/W  CMS intact  GI Incontinent, no BM @ this shift.   Dressing C collar @ all times  IV SL, On contact precaution (ESBL)  Orders Placed This Encounter      Combination Diet Regular Diet       Plan: discharge TBD

## 2025-06-25 ENCOUNTER — TELEPHONE (OUTPATIENT)
Dept: GERIATRICS | Facility: CLINIC | Age: OVER 89
End: 2025-06-25
Payer: MEDICARE

## 2025-06-25 DIAGNOSIS — S12.031D CLOSED NONDISPLACED FRACTURE OF POSTERIOR ARCH OF FIRST CERVICAL VERTEBRA WITH ROUTINE HEALING, SUBSEQUENT ENCOUNTER: Primary | ICD-10-CM

## 2025-06-25 LAB
BACTERIA UR CULT: ABNORMAL

## 2025-06-25 RX ORDER — OXYCODONE HYDROCHLORIDE 5 MG/1
2.5 TABLET ORAL DAILY PRN
Qty: 10 TABLET | Refills: 0 | Status: SHIPPED | OUTPATIENT
Start: 2025-06-25

## 2025-06-25 NOTE — TELEPHONE ENCOUNTER
Ortho Nursing home visit    Margy Babin is a 89 year old female who resides at [facility]/ unit    Patient has Ct scan reviewed for C-1 posterior fracture, noted from eval in the ED. Patient was treated with C- Collar, and will F/U with Neuro in 6 weeks.      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    Mixed hyperlipidemia 11/04/2020    Nausea without vomiting 06/16/2015     Problem list name updated by automated process. Provider to review    Tremor, hereditary, benign       Past Surgical History:   Procedure Laterality Date    HYSTERECTOMY TOTAL ABDOMINAL, BILATERAL SALPINGO-OOPHORECTOMY, COMBINED      NECK SURGERY  2009    cervical fusion    ROTATOR CUFF REPAIR RT/LT Right     ZZC TOTAL HIP ARTHROPLASTY Right 1997        No Known Allergies   There were no vitals taken for this visit.       X-rays show Ct scans reviewed from current to 9-23 and compared, I feel this is an acute finding, Above a solid fusion, in 89 Y/O certainly good have a pathological component to her bone quality. May rec; Calcium / Vit D. Added to medication      ASSESSMENT / PLAN: Encourage c-collar, will be difficult given dementia. Daily skin checks . F/U with Neuro in 6 weeks,     [id number]          JDiana OPA-C  [phone] Cell

## 2025-06-26 ENCOUNTER — TELEPHONE (OUTPATIENT)
Dept: GERIATRICS | Facility: CLINIC | Age: OVER 89
End: 2025-06-26

## 2025-06-26 ENCOUNTER — ASSISTED LIVING VISIT (OUTPATIENT)
Dept: GERIATRICS | Facility: CLINIC | Age: OVER 89
End: 2025-06-26
Payer: MEDICARE

## 2025-06-26 VITALS
RESPIRATION RATE: 18 BRPM | SYSTOLIC BLOOD PRESSURE: 149 MMHG | HEART RATE: 79 BPM | DIASTOLIC BLOOD PRESSURE: 68 MMHG | BODY MASS INDEX: 27.96 KG/M2 | WEIGHT: 168 LBS | OXYGEN SATURATION: 91 % | TEMPERATURE: 96.7 F

## 2025-06-26 DIAGNOSIS — I10 BENIGN ESSENTIAL HYPERTENSION: ICD-10-CM

## 2025-06-26 DIAGNOSIS — F03.C0 SEVERE DEMENTIA WITHOUT BEHAVIORAL DISTURBANCE, PSYCHOTIC DISTURBANCE, MOOD DISTURBANCE, OR ANXIETY, UNSPECIFIED DEMENTIA TYPE (H): ICD-10-CM

## 2025-06-26 DIAGNOSIS — I50.20 HEART FAILURE WITH REDUCED EJECTION FRACTION (H): ICD-10-CM

## 2025-06-26 DIAGNOSIS — J43.9 PULMONARY EMPHYSEMA, UNSPECIFIED EMPHYSEMA TYPE (H): ICD-10-CM

## 2025-06-26 DIAGNOSIS — S12.031D CLOSED NONDISPLACED FRACTURE OF POSTERIOR ARCH OF FIRST CERVICAL VERTEBRA WITH ROUTINE HEALING, SUBSEQUENT ENCOUNTER: Primary | ICD-10-CM

## 2025-06-26 LAB
BACTERIA SPEC CULT: NO GROWTH
BACTERIA SPEC CULT: NO GROWTH

## 2025-06-26 PROCEDURE — 99349 HOME/RES VST EST MOD MDM 40: CPT

## 2025-06-26 NOTE — TELEPHONE ENCOUNTER
Prior Authorization Retail Medication Request    Medication/Dose: oxyCODONE HCI 5MG  Diagnosis and ICD code (if different than what is on RX):    New/renewal/insurance change PA/secondary ins. PA:  Previously Tried and Failed:  NA  Rationale:  NA    Insurance   Primary: Medicare  Insurance ID:  [id number]     Secondary (if applicable):  Insurance ID:      Pharmacy Information (if different than what is on RX)  Name:  [facility]  Phone:  [phone]  Fax:[phone]    Clinic Information  Preferred routing pool for dept communication:

## 2025-06-26 NOTE — PROGRESS NOTES
[facility]    PRIMARY CARE PROVIDER AND CLINIC:  Cynthia Monteiro MD, [address] [facility] [address]***  Chief Complaint   Patient presents with    Hospital F/U      [facility] Medical Record Number:  [id number]  Place of Service where encounter took place:  [facility] [[id number]]    Margy Babin  is a 89 year old  (10/2/1929), re-admitted to the above facility from  [facility]. Hospital stay 6/21/25 - 6/24/25. .   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    Mixed hyperlipidemia 11/04/2020    Nausea without vomiting 06/16/2015     Problem list name updated by automated process. Provider to review    Tremor, hereditary, benign       PAST SURGICAL HISTORY:   has a past surgical history that includes Neck surgery (2009); TOTAL HIP ARTHROPLASTY (Right, 1997); rotator cuff repair rt/lt (Right); and Hysterectomy total abdominal, bilateral salpingo-oophorectomy, combined.  FAMILY HISTORY: family history includes Breast Cancer (age of onset: 43) in her daughter; C.A.D. in her father; Emphysema in her sister; Heart Disease in her sister; Heart Disease (age of onset: 80) in her father.  SOCIAL HISTORY:   reports that she has never smoked. She has never used smokeless tobacco. She reports that she does not drink alcohol and does not use drugs.  Patient's living  condition: {LIVES WITH (NURSING HOME):843363}    Post Discharge Medication Reconciliation Status:   MED REC REQUIRED{TIP  Click the link below to document or use med rec list, use list to pull in response :465060}  Post Medication Reconciliation Status: {MED REC LIST:552720}       Current Outpatient Medications   Medication Sig Dispense Refill    acetaminophen (TYLENOL) 325 MG tablet Take 3 tablets (975 mg) by mouth 3 times daily.      apixaban ANTICOAGULANT (ELIQUIS) 5 MG tablet Take 5 mg by mouth 2 times daily.      atorvastatin (LIPITOR) 20 MG tablet Take 1 tablet (20 mg) by mouth daily 90 tablet 3    calcium carbonate (TUMS) 500 MG chewable tablet Take 1 chew tab by mouth every 8 hours as needed for heartburn.      docusate sodium (COLACE) 100 MG capsule Take 1 capsule (100 mg) by mouth 2 times daily as needed for constipation.      furosemide (LASIX) 20 MG tablet Take 20 mg by mouth daily.      guaiFENesin (ROBITUSSIN) 20 mg/mL liquid Take 200 mg by mouth every 4 hours as needed for cough.      ipratropium - albuterol 0.5 mg/2.5 mg/3 mL (DUONEB) 0.5-2.5 (3) MG/3ML neb solution Take 1 vial (3 mLs) by nebulization 4 times daily.      memantine (NAMENDA) 5 MG tablet 1 TAB BY MOUTH TWICE DAILY 60 tablet 10    metoprolol succinate ER (TOPROL XL) 100 MG 24 hr tablet 1 TAB BY MOUTH DAILY (DX: HYPERTENSION) 30 tablet 10    oxyCODONE (ROXICODONE) 5 MG tablet Take 0.5 tablets (2.5 mg) by mouth daily as needed for pain. 10 tablet 0    PARoxetine (PAXIL) 20 MG tablet 1 TAB BY MOUTH AT BEDTIME Strength: 20 mg 90 tablet 0    QUEtiapine (SEROQUEL) 25 MG tablet Take 1 tablet (25 mg) by mouth every evening.      Vitamin D3 (VITAMIN D, CHOLECALCIFEROL,) 25 mcg (1000 units) tablet Take 25 mcg by mouth daily       No current facility-administered medications for this visit.       ROS:  {ROS FGS:709480}    Vitals:  BP (!) 149/68   Pulse 79   Temp (!) 96.7  F (35.9  C)   Resp 18   Wt 76.2 kg (168 lb)   SpO2 (!) 91%   BMI  27.96 kg/m    Exam:  {Nursing home physical exam :937892}    Lab/Diagnostic data:  {fgslab:282217}    ASSESSMENT/PLAN:    {[facility] DX2:834077}    Orders:  {fgsorders:342654}  ***    Electronically signed by:  Mahnaz Ribera ***                06/16/25  0718   NA  --  142 140  --  142   POTASSIUM 3.7 3.8 4.3  --  4.3   CHLORIDE  --  107 102  --  103   CO2  --  26 27  --  27   BUN  --  9.8 26.1*  --  19.1   CR  --  0.77 1.16*  1.3*  --  0.91   ANIONGAP  --  9 11  --  12   SOBIA  --  8.6* 8.5*  --  9.3   GLC  --  93 181* 187* 89       ASSESSMENT/PLAN:  (S12.031D) Closed nondisplaced fracture of posterior arch of first cervical vertebra with routine healing, subsequent encounter   Comment: Acute cervical collar on at all times follow-up per schedule.  Plan:   - Pain management with scheduled APAP  - Continue as needed oxycodone x 14 days  - Monitor for pain and any changes  - Follow-up per schedule    (F03.C0) Severe dementia without behavioral disturbance, psychotic disturbance, mood disturbance, or anxiety, unspecified dementia type (H)  Comment: Patient resides in memory care unit she remains on memantine and Seroquel no acute concern.  Plan:   - Continue memantine 5 mg twice daily  - Continue Seroquel 25 mg at at bedtime  - 24/7 behavioral monitoring    (I50.20) Heart failure with reduced ejection fraction (H)  (I10) Benign essential hypertension  Comment: SBP controlled ranging 122-130 she had few 178/70 likely related to pain staff to offer her as needed pain medication as needed weight remained stable at 168  Plan:   - Continue furosemide Lasix 20 mg daily  - Continue metoprolol 100 mg daily  - Monitor blood pressure per protocol    (J43.9) Pulmonary emphysema, unspecified emphysema type (H)  Comment: Acute on chronic history of SOB and some symptoms no recent concerns  Plan:   - Continue as needed guaifenesin  - Continue DuoNeb  4 times daily      Orders:  Oxycodone for next 14 days Daily PRN using sparingly     Electronically signed by:KAM Harman CNP

## 2025-06-27 NOTE — TELEPHONE ENCOUNTER
PA Initiation    Medication: OXYCODONE HCL 5 MG PO TABS  Insurance Company: [employer]Specialty PA's - Phone [phone] Fax [phone]  Pharmacy Filling the Rx: [facility] - [place], [place] - [address]  Filling Pharmacy Phone: [phone]  Filling Pharmacy Fax: [phone]  Start Date: 6/27/2025

## 2025-06-30 NOTE — TELEPHONE ENCOUNTER
Prior Authorization Approval    Medication: OXYCODONE HCL 5 MG PO TABS  Authorization Effective Date: 6/27/2025  Authorization Expiration Date: 7/27/2025  Approved Dose/Quantity:  Reference #:     Insurance Company: [email or web address] - Phone [phone] Fax [phone]  Expected CoPay: $    CoPay Card Available:      Financial Assistance Needed:   Which Pharmacy is filling the prescription: [facility] - [place], [place] - [address] [place] [address]  Pharmacy Notified: YES  Patient Notified: **Instructed pharmacy to notify patient when script is ready to /ship.**

## 2025-07-21 ENCOUNTER — TELEPHONE (OUTPATIENT)
Dept: GERIATRICS | Facility: CLINIC | Age: OVER 89
End: 2025-07-21
Payer: MEDICARE

## 2025-07-21 DIAGNOSIS — R11.0 NAUSEA: Primary | ICD-10-CM

## 2025-07-21 RX ORDER — ONDANSETRON 4 MG/1
2 TABLET, FILM COATED ORAL EVERY 8 HOURS PRN
Status: SHIPPED
Start: 2025-07-21

## 2025-07-21 NOTE — TELEPHONE ENCOUNTER
[facility]   2025     Name: Margy Babin   : 10/2/1929     Background:  Pt have nausea    Orders:  BMP and CBC   Hold Eliquis x 7 days  Abdominal xray (flat plate) due to ongoing abdominal discomfort.  Rule out obstruction.     Electronically signed by       KAM Gordillo CNP on 2025 at 12:52 PM

## 2025-07-22 ENCOUNTER — LAB REQUISITION (OUTPATIENT)
Dept: LAB | Facility: CLINIC | Age: OVER 89
End: 2025-07-22
Payer: MEDICARE

## 2025-07-22 DIAGNOSIS — I10 ESSENTIAL (PRIMARY) HYPERTENSION: ICD-10-CM

## 2025-07-22 DIAGNOSIS — D64.9 ANEMIA, UNSPECIFIED: ICD-10-CM

## 2025-07-23 ENCOUNTER — ASSISTED LIVING VISIT (OUTPATIENT)
Dept: GERIATRICS | Facility: CLINIC | Age: OVER 89
End: 2025-07-23
Payer: MEDICARE

## 2025-07-23 VITALS
SYSTOLIC BLOOD PRESSURE: 146 MMHG | HEIGHT: 65 IN | WEIGHT: 152.7 LBS | OXYGEN SATURATION: 94 % | TEMPERATURE: 96.8 F | HEART RATE: 86 BPM | RESPIRATION RATE: 18 BRPM | BODY MASS INDEX: 25.44 KG/M2 | DIASTOLIC BLOOD PRESSURE: 68 MMHG

## 2025-07-23 DIAGNOSIS — F03.C0 SEVERE DEMENTIA WITHOUT BEHAVIORAL DISTURBANCE, PSYCHOTIC DISTURBANCE, MOOD DISTURBANCE, OR ANXIETY, UNSPECIFIED DEMENTIA TYPE (H): ICD-10-CM

## 2025-07-23 DIAGNOSIS — R63.4 WEIGHT LOSS: ICD-10-CM

## 2025-07-23 DIAGNOSIS — D64.9 ANEMIA, UNSPECIFIED TYPE: Primary | ICD-10-CM

## 2025-07-23 DIAGNOSIS — S12.031D CLOSED NONDISPLACED FRACTURE OF POSTERIOR ARCH OF FIRST CERVICAL VERTEBRA WITH ROUTINE HEALING, SUBSEQUENT ENCOUNTER: ICD-10-CM

## 2025-07-23 DIAGNOSIS — Z53.09 CONTRAINDICATION TO ANTICOAGULATION THERAPY: ICD-10-CM

## 2025-07-23 DIAGNOSIS — R11.0 NAUSEA: ICD-10-CM

## 2025-07-23 LAB
ANION GAP SERPL CALCULATED.3IONS-SCNC: 13 MMOL/L (ref 7–15)
BUN SERPL-MCNC: 19.5 MG/DL (ref 8–23)
CALCIUM SERPL-MCNC: 9.4 MG/DL (ref 8.8–10.4)
CHLORIDE SERPL-SCNC: 101 MMOL/L (ref 98–107)
CREAT SERPL-MCNC: 0.9 MG/DL (ref 0.51–0.95)
EGFRCR SERPLBLD CKD-EPI 2021: 59 ML/MIN/1.73M2
ERYTHROCYTE [DISTWIDTH] IN BLOOD BY AUTOMATED COUNT: 22.2 % (ref 10–15)
GLUCOSE SERPL-MCNC: 98 MG/DL (ref 70–99)
HCO3 SERPL-SCNC: 26 MMOL/L (ref 22–29)
HCT VFR BLD AUTO: 31 % (ref 35–47)
HGB BLD-MCNC: 8.6 G/DL (ref 11.7–15.7)
MCH RBC QN AUTO: 20 PG (ref 26.5–33)
MCHC RBC AUTO-ENTMCNC: 27.7 G/DL (ref 31.5–36.5)
MCV RBC AUTO: 72 FL (ref 78–100)
PLATELET # BLD AUTO: 493 10E3/UL (ref 150–450)
POTASSIUM SERPL-SCNC: 4 MMOL/L (ref 3.4–5.3)
RBC # BLD AUTO: 4.3 10E6/UL (ref 3.8–5.2)
SODIUM SERPL-SCNC: 140 MMOL/L (ref 135–145)
WBC # BLD AUTO: 15.6 10E3/UL (ref 4–11)

## 2025-07-23 PROCEDURE — P9604 ONE-WAY ALLOW PRORATED TRIP: HCPCS | Mod: ORL | Performed by: NURSE PRACTITIONER

## 2025-07-23 PROCEDURE — 80048 BASIC METABOLIC PNL TOTAL CA: CPT | Mod: ORL | Performed by: NURSE PRACTITIONER

## 2025-07-23 PROCEDURE — 36415 COLL VENOUS BLD VENIPUNCTURE: CPT | Mod: ORL | Performed by: NURSE PRACTITIONER

## 2025-07-23 PROCEDURE — 99349 HOME/RES VST EST MOD MDM 40: CPT | Performed by: NURSE PRACTITIONER

## 2025-07-23 PROCEDURE — 85027 COMPLETE CBC AUTOMATED: CPT | Mod: ORL | Performed by: NURSE PRACTITIONER

## 2025-07-23 RX ORDER — OMEPRAZOLE 20 MG/1
20 TABLET, DELAYED RELEASE ORAL DAILY
COMMUNITY

## 2025-07-23 NOTE — PROGRESS NOTES
"[facility] GERIATRICS    Chief Complaint   Patient presents with    Gastric Problem     HPI:  Margy Babin is a 89 year old  (10/2/1929), who is being seen today for an episodic care visit at: [facility] ([facility]) [[id number]].       Patient having significant weight loss since admission.  Currently does not 150 pounds.  This is a 10% weight loss in 30 days.  Her weight was 168 pounds 6/24/2025  Patient having intermittent abdominal pain with nausea  Patient is a poor historian today she is told writer she had no pain however she told nursing earlier in the day she was nauseated and not going to eat breakfast    Allergies, and PMH/PSH reviewed in EPIC today.  REVIEW OF SYSTEMS:  Limited secondary to cognitive impairment but today pt reports no concerns    Objective:   BP (!) 146/68   Pulse 86   Temp 96.8  F (36  C)   Resp 18   Ht 1.651 m (5' 5\")   Wt 69.3 kg (152 lb 11.2 oz)   SpO2 94%   BMI 25.41 kg/m    GENERAL APPEARANCE:  Alert, in no distress  RESP:  no respiratory distress  CV:  rate-normal  PSYCH:  insight and judgement impaired, memory impaired     Most Recent 3 CBC's:  Recent Labs   Lab Test 07/23/25  0705 06/23/25  0804 06/22/25  2348 06/22/25  0418 06/21/25 2021   WBC 15.6* 12.4*  --   --  13.3*   HGB 8.6* 8.7* 6.9*   < > 7.6*   MCV 72* 72* 70*   < > 69*   * 431  --   --  451*    < > = values in this interval not displayed.     Most Recent 3 BMP's:  Recent Labs   Lab Test 07/23/25  0705 06/24/25  0821 06/23/25  0804 06/21/25 2021     --  142 140   POTASSIUM 4.0 3.7 3.8 4.3   CHLORIDE 101  --  107 102   CO2 26  --  26 27   BUN 19.5  --  9.8 26.1*   CR 0.90  --  0.77 1.16*  1.3*   ANIONGAP 13  --  9 11   SOBIA 9.4  --  8.6* 8.5*   GLC 98  --  93 181*       Assessment/Plan:  (D64.9) Anemia, unspecified type  (primary encounter diagnosis)  (R11.0) nausea  Comment: Acute  Patient has had acute drop in hemoglobin in the past month.  She had a transfusion 6/21/2025  Currently on " apixaban  Current hemoglobin is 8.6  Question if patient is having GI bleed  Abdominal x-ray negative  Plan:   Zofran as needed  Start omeprazole 20 mg daily  Discontinue apixaban  Discontinue guaifenesin  Discontinue atorvastatin  On 7/28/2025, obtain a guaiac of her stool.     CBC on 7/30/2025    (S12.031D) Closed nondisplaced fracture of posterior arch of first cervical vertebra with routine healing, subsequent encounter  Comment: Patient had a fall and was hospitalized.  Noted to have a cervical fracture.  Wearing neck brace  Plan: Follow-up with neurosurgery on 8/5/2025    (F03.C0) Severe dementia without behavioral disturbance, psychotic disturbance, mood disturbance, or anxiety, unspecified dementia type (H)  Comment: Progressive  Patient on a locked unit  Taking Namenda  Poor historian  Plan: Nursing to provide supportive cares      (R63.4) weight loss  Comment: Acute  Patient not consistently eating due to stomach upset  Wt Readings from Last 4 Encounters:   07/23/25 69.3 kg (152 lb 11.2 oz)   06/26/25 76.2 kg (168 lb)   06/21/25 76.3 kg (168 lb 3.4 oz)   06/02/25 71.8 kg (158 lb 6.4 oz)   Plan: Dietitian to evaluate and treat      (I48.92) Atrial flutter, unspecified type (H)  (Z53.09) contract indication to anticoagulation therapy  Comment: Chronic  For rate control she is currently taking metoprolol succinate  For stroke prophylaxis she is currently taking Eliquis  Plan: Discontinue apixaban       MED REC REQUIRED  Post Medication Reconciliation Status:  Medication reconciliation previously completed during another office visit        Electronically signed by:       KAM Gordillo CNP on 7/23/2025 at 7:31 PM

## 2025-07-24 ENCOUNTER — LAB REQUISITION (OUTPATIENT)
Dept: LAB | Facility: CLINIC | Age: OVER 89
End: 2025-07-24
Payer: MEDICARE

## 2025-07-24 DIAGNOSIS — D64.9 ANEMIA, UNSPECIFIED: ICD-10-CM

## 2025-07-24 NOTE — PROGRESS NOTES
[facility]   2025     Name: Margy Babin   : 10/2/1929     Background:  Patient has anemia with nausea.  Question if she has a GI bleed    Orders:  Discontinue apixaban  Discontinue guaifenesin  Discontinue atorvastatin  On 2025, obtain a guaiac of her stool.     CBC on 2025    Electronically signed by     KAM Gordillo CNP on 2025 at 7:38 PM

## 2025-07-28 VITALS
HEIGHT: 65 IN | HEART RATE: 84 BPM | BODY MASS INDEX: 25.27 KG/M2 | OXYGEN SATURATION: 94 % | DIASTOLIC BLOOD PRESSURE: 62 MMHG | SYSTOLIC BLOOD PRESSURE: 108 MMHG | RESPIRATION RATE: 18 BRPM | WEIGHT: 151.7 LBS | TEMPERATURE: 97.1 F

## 2025-07-29 ENCOUNTER — ASSISTED LIVING VISIT (OUTPATIENT)
Dept: GERIATRICS | Facility: CLINIC | Age: OVER 89
End: 2025-07-29
Payer: MEDICARE

## 2025-07-29 DIAGNOSIS — S12.031D CLOSED NONDISPLACED FRACTURE OF POSTERIOR ARCH OF FIRST CERVICAL VERTEBRA WITH ROUTINE HEALING, SUBSEQUENT ENCOUNTER: ICD-10-CM

## 2025-07-29 DIAGNOSIS — R11.0 NAUSEA: ICD-10-CM

## 2025-07-29 DIAGNOSIS — I48.92 ATRIAL FLUTTER, UNSPECIFIED TYPE (H): ICD-10-CM

## 2025-07-29 DIAGNOSIS — I10 BENIGN ESSENTIAL HYPERTENSION: Primary | ICD-10-CM

## 2025-07-29 DIAGNOSIS — F03.C0 SEVERE DEMENTIA WITHOUT BEHAVIORAL DISTURBANCE, PSYCHOTIC DISTURBANCE, MOOD DISTURBANCE, OR ANXIETY, UNSPECIFIED DEMENTIA TYPE (H): ICD-10-CM

## 2025-07-29 DIAGNOSIS — Z53.09 CONTRAINDICATION TO ANTICOAGULATION THERAPY: ICD-10-CM

## 2025-07-29 PROCEDURE — 99349 HOME/RES VST EST MOD MDM 40: CPT | Performed by: NURSE PRACTITIONER

## 2025-07-29 RX ORDER — METOPROLOL SUCCINATE 50 MG/1
50 TABLET, EXTENDED RELEASE ORAL DAILY
Status: SHIPPED
Start: 2025-07-29

## 2025-07-29 NOTE — PROGRESS NOTES
[facility]   2025     Name: Margy Babin   : 10/2/1929     Background:  Blood pressure has been soft    Orders:  Stop metoprolol succinate 100 mg daily  Start metoprolol succinate 50 mg daily.  Hold for systolic blood pressure less than 120 mmHg.   Diagnosis atrial fibrillation  Discontinue memantine 5 mg BID   Start memantine 5 mg by mouth once daily.   Dx dementia    Electronically signed by     KAM Gordillo CNP on 2025 at 7:43 AM

## 2025-07-29 NOTE — PROGRESS NOTES
"[facility] GERIATRICS    Chief Complaint   Patient presents with    RECHECK     HPI:  Margy Babin is a 89 year old  (10/2/1929), who is being seen today for an episodic care visit at: [facility]) [[id number]].     Weight currently 151 pounds  Blood pressure soft  Patient noted to be dizzy on 7/27  Not having regular bowel movements per charting    Allergies, and PMH/PSH reviewed in [facility] today.  REVIEW OF SYSTEMS:  Limited secondary to cognitive impairment but today pt reports no problems    Objective:   /62   Pulse 84   Temp 97.1  F (36.2  C)   Resp 18   Ht 1.651 m (5' 5\")   Wt 68.8 kg (151 lb 11.2 oz)   SpO2 94%   BMI 25.24 kg/m    GENERAL APPEARANCE:  Alert, in no distress  RESP:  lungs clear to auscultation , no respiratory distress  CV:  rate-normal  PSYCH:  memory impaired , affect and mood normal    Most Recent 3 CBC's:  Recent Labs   Lab Test 07/23/25  0705 06/23/25  0804 06/22/25  2348 06/22/25  0418 06/21/25 2021   WBC 15.6* 12.4*  --   --  13.3*   HGB 8.6* 8.7* 6.9*   < > 7.6*   MCV 72* 72* 70*   < > 69*   * 431  --   --  451*    < > = values in this interval not displayed.     Most Recent 3 BMP's:  Recent Labs   Lab Test 07/23/25  0705 06/24/25  0821 06/23/25  0804 06/21/25 2021     --  142 140   POTASSIUM 4.0 3.7 3.8 4.3   CHLORIDE 101  --  107 102   CO2 26  --  26 27   BUN 19.5  --  9.8 26.1*   CR 0.90  --  0.77 1.16*  1.3*   ANIONGAP 13  --  9 11   SOBIA 9.4  --  8.6* 8.5*   GLC 98  --  93 181*       Assessment/Plan:    (R11.0) nausea  Comment: Resolved  Patient has had acute drop in hemoglobin in the past month.  She had a transfusion 6/21/2025  Currently on apixaban  Current hemoglobin is 8.6  Question if patient is having GI bleed  Abdominal x-ray negative  Plan:   Zofran as needed  omeprazole 20 mg daily\  Plan:  obtain a guaiac of her stool.     CBC on 7/30/2025     (S12.031D) Closed nondisplaced fracture of posterior arch of first cervical vertebra " with routine healing, subsequent encounter  Comment: Patient had a fall and was hospitalized.  Noted to have a cervical fracture.  Wearing neck brace  Plan: Follow-up with neurosurgery on 8/5/2025     (F03.C0) Severe dementia without behavioral disturbance, psychotic disturbance, mood disturbance, or anxiety, unspecified dementia type (H)  Comment: Progressive  Patient on a locked unit  Taking Namenda  Poor historian  Plan: Decrease Namenda from 5 mg twice daily to 5 mg daily            (I48.92) Atrial flutter, unspecified type (H)  (Z53.09) contract indication to anticoagulation therapy  (I10) benign essential hypertension  Comment: Chronic  For rate control she is currently taking metoprolol succinate.  Blood pressure soft  For stroke prophylaxis she is currently not taking oral anticoagulation.  Apixaban was discontinued on 7/23/2025 due to anemia  Plan: CBC pending  Decrease metoprolol succinate from 100 mg daily to 50 mg daily      MED REC REQUIRED  Post Medication Reconciliation Status:  Medication reconciliation previously completed during another office visit        Electronically signed by:      KAM Gordillo CNP on 7/29/2025 at 2:21 PM

## 2025-07-30 LAB
ERYTHROCYTE [DISTWIDTH] IN BLOOD BY AUTOMATED COUNT: 22.3 % (ref 10–15)
HCT VFR BLD AUTO: 31.7 % (ref 35–47)
HGB BLD-MCNC: 8.7 G/DL (ref 11.7–15.7)
MCH RBC QN AUTO: 20 PG (ref 26.5–33)
MCHC RBC AUTO-ENTMCNC: 27.4 G/DL (ref 31.5–36.5)
MCV RBC AUTO: 73 FL (ref 78–100)
PLATELET # BLD AUTO: 465 10E3/UL (ref 150–450)
RBC # BLD AUTO: 4.35 10E6/UL (ref 3.8–5.2)
WBC # BLD AUTO: 9 10E3/UL (ref 4–11)

## 2025-07-30 PROCEDURE — 36415 COLL VENOUS BLD VENIPUNCTURE: CPT | Mod: ORL | Performed by: NURSE PRACTITIONER

## 2025-07-30 PROCEDURE — P9604 ONE-WAY ALLOW PRORATED TRIP: HCPCS | Mod: ORL | Performed by: NURSE PRACTITIONER

## 2025-07-30 PROCEDURE — 85027 COMPLETE CBC AUTOMATED: CPT | Mod: ORL | Performed by: NURSE PRACTITIONER

## (undated) RX ORDER — REGADENOSON 0.08 MG/ML
INJECTION, SOLUTION INTRAVENOUS
Status: DISPENSED
Start: 2021-11-29